# Patient Record
Sex: FEMALE | Race: OTHER | NOT HISPANIC OR LATINO | Employment: FULL TIME | URBAN - METROPOLITAN AREA
[De-identification: names, ages, dates, MRNs, and addresses within clinical notes are randomized per-mention and may not be internally consistent; named-entity substitution may affect disease eponyms.]

---

## 2017-01-25 ENCOUNTER — TELEPHONE (OUTPATIENT)
Dept: CARDIOLOGY | Facility: CLINIC | Age: 57
End: 2017-01-25

## 2017-01-25 RX ORDER — CARVEDILOL 25 MG/1
25 TABLET ORAL 2 TIMES DAILY WITH MEALS
Qty: 60 TABLET | Refills: 2 | Status: SHIPPED | OUTPATIENT
Start: 2017-01-25 | End: 2017-08-13 | Stop reason: SDUPTHER

## 2017-02-22 ENCOUNTER — CLINICAL SUPPORT NO REQUIREMENTS (OUTPATIENT)
Dept: CARDIOLOGY | Facility: CLINIC | Age: 57
End: 2017-02-22

## 2017-02-22 ENCOUNTER — TELEPHONE (OUTPATIENT)
Dept: CARDIOLOGY | Facility: CLINIC | Age: 57
End: 2017-02-22

## 2017-02-22 DIAGNOSIS — I42.9 CARDIOMYOPATHY (HCC): Primary | ICD-10-CM

## 2017-02-22 PROCEDURE — 93297 REM INTERROG DEV EVAL ICPMS: CPT | Performed by: INTERNAL MEDICINE

## 2017-02-22 PROCEDURE — 93295 DEV INTERROG REMOTE 1/2/MLT: CPT | Performed by: INTERNAL MEDICINE

## 2017-02-22 PROCEDURE — 93296 REM INTERROG EVL PM/IDS: CPT | Performed by: INTERNAL MEDICINE

## 2017-02-22 NOTE — TELEPHONE ENCOUNTER
Scheduled remote transmission received and reviewed. Presenting rhythm AS/Vs at 71 bpm. Automated testing shows normal AAI<=>DDD ICD function.  < 0.1% and AP 8/8%. No episodes since last interrogation.     However, pt's fluid index has crossed the threshold around 1/18/17. It returned baseline around 2/1/17, however is currently continuing to rise and has crossed the threshold again on 2/15/17. Thoracic impedance is also < baseline. Pt's 14 month hx does show a saw tooth pattern on the fluid index, however this past episode that crossed the threshold was the most above the threshold she has been.     I called her to further assess. She stated that the 1/18 episode was likely due to eating out and general dietary indiscretions. She states over the past few weeks she has been trying to exercise more and eat healthier. She states she is easily fatigued but has had no change in weight or SOB. She confirmed she is taking her carvedilol, spironolactone and furosemide as prescribed. I advised that if she became increasingly fatigued or had an increase in weight, edema or SOB to call Dr. Saldivar's office.     Dr. Saldivar, please advise if you would like anything else done.

## 2017-03-07 ENCOUNTER — TRANSCRIBE ORDERS (OUTPATIENT)
Dept: CARDIOLOGY | Facility: CLINIC | Age: 57
End: 2017-03-07

## 2017-03-07 ENCOUNTER — HOSPITAL ENCOUNTER (OUTPATIENT)
Dept: CARDIOLOGY | Facility: HOSPITAL | Age: 57
Discharge: HOME | End: 2017-03-07
Admitting: PHYSICIAN ASSISTANT

## 2017-03-07 ENCOUNTER — OFFICE VISIT (OUTPATIENT)
Dept: CARDIOLOGY | Facility: CLINIC | Age: 57
End: 2017-03-07

## 2017-03-07 ENCOUNTER — LAB (OUTPATIENT)
Dept: LAB | Facility: HOSPITAL | Age: 57
End: 2017-03-07

## 2017-03-07 ENCOUNTER — HOSPITAL ENCOUNTER (OUTPATIENT)
Dept: GENERAL RADIOLOGY | Facility: HOSPITAL | Age: 57
Discharge: HOME | End: 2017-03-07

## 2017-03-07 VITALS
SYSTOLIC BLOOD PRESSURE: 124 MMHG | WEIGHT: 225 LBS | HEART RATE: 83 BPM | DIASTOLIC BLOOD PRESSURE: 80 MMHG | OXYGEN SATURATION: 99 % | BODY MASS INDEX: 45.36 KG/M2 | HEIGHT: 59 IN

## 2017-03-07 DIAGNOSIS — Z95.810 AUTOMATIC IMPLANTABLE CARDIOVERTER-DEFIBRILLATOR IN SITU: ICD-10-CM

## 2017-03-07 DIAGNOSIS — R53.83 FATIGUE, UNSPECIFIED TYPE: ICD-10-CM

## 2017-03-07 DIAGNOSIS — I50.20 CONGESTIVE HEART FAILURE WITH LEFT VENTRICULAR SYSTOLIC DYSFUNCTION (HCC): ICD-10-CM

## 2017-03-07 DIAGNOSIS — R06.02 SHORTNESS OF BREATH: ICD-10-CM

## 2017-03-07 DIAGNOSIS — I50.20 CONGESTIVE HEART FAILURE WITH LEFT VENTRICULAR SYSTOLIC DYSFUNCTION (HCC): Primary | ICD-10-CM

## 2017-03-07 PROBLEM — I10 BENIGN ESSENTIAL HYPERTENSION: Status: ACTIVE | Noted: 2017-03-07

## 2017-03-07 PROBLEM — G47.33 OBSTRUCTIVE SLEEP APNEA SYNDROME: Status: ACTIVE | Noted: 2017-03-07

## 2017-03-07 PROBLEM — I25.5 GENERALIZED ISCHEMIC MYOCARDIAL DYSFUNCTION: Status: ACTIVE | Noted: 2017-03-07

## 2017-03-07 LAB
ALBUMIN SERPL-MCNC: 4.4 G/DL (ref 3.5–5.2)
ALBUMIN/GLOB SERPL: 1.3 G/DL
ALP SERPL-CCNC: 107 U/L (ref 39–117)
ALT SERPL W P-5'-P-CCNC: 77 U/L (ref 1–33)
ANION GAP SERPL CALCULATED.3IONS-SCNC: 14.7 MMOL/L
AST SERPL-CCNC: 39 U/L (ref 1–32)
BASOPHILS # BLD AUTO: 0.05 10*3/MM3 (ref 0–0.2)
BASOPHILS NFR BLD AUTO: 0.5 % (ref 0–1.5)
BILIRUB SERPL-MCNC: 0.5 MG/DL (ref 0.1–1.2)
BUN BLD-MCNC: 11 MG/DL (ref 6–20)
BUN/CREAT SERPL: 11 (ref 7–25)
CALCIUM SPEC-SCNC: 10.7 MG/DL (ref 8.6–10.5)
CHLORIDE SERPL-SCNC: 99 MMOL/L (ref 98–107)
CO2 SERPL-SCNC: 27.3 MMOL/L (ref 22–29)
CREAT BLD-MCNC: 1 MG/DL (ref 0.57–1)
DEPRECATED RDW RBC AUTO: 42.8 FL (ref 37–54)
EOSINOPHIL # BLD AUTO: 0.2 10*3/MM3 (ref 0–0.7)
EOSINOPHIL NFR BLD AUTO: 1.8 % (ref 0.3–6.2)
ERYTHROCYTE [DISTWIDTH] IN BLOOD BY AUTOMATED COUNT: 13.8 % (ref 11.7–13)
GFR SERPL CREATININE-BSD FRML MDRD: 58 ML/MIN/1.73
GLOBULIN UR ELPH-MCNC: 3.4 GM/DL
GLUCOSE BLD-MCNC: 128 MG/DL (ref 65–99)
HCT VFR BLD AUTO: 44.8 % (ref 35.6–45.5)
HGB BLD-MCNC: 14.6 G/DL (ref 11.9–15.5)
IMM GRANULOCYTES # BLD: 0.04 10*3/MM3 (ref 0–0.03)
IMM GRANULOCYTES NFR BLD: 0.4 % (ref 0–0.5)
LYMPHOCYTES # BLD AUTO: 4.67 10*3/MM3 (ref 0.9–4.8)
LYMPHOCYTES NFR BLD AUTO: 42.1 % (ref 19.6–45.3)
MCH RBC QN AUTO: 27.9 PG (ref 26.9–32)
MCHC RBC AUTO-ENTMCNC: 32.6 G/DL (ref 32.4–36.3)
MCV RBC AUTO: 85.5 FL (ref 80.5–98.2)
MONOCYTES # BLD AUTO: 0.83 10*3/MM3 (ref 0.2–1.2)
MONOCYTES NFR BLD AUTO: 7.5 % (ref 5–12)
NEUTROPHILS # BLD AUTO: 5.31 10*3/MM3 (ref 1.9–8.1)
NEUTROPHILS NFR BLD AUTO: 47.7 % (ref 42.7–76)
NT-PROBNP SERPL-MCNC: 137.1 PG/ML (ref 0–900)
PLATELET # BLD AUTO: 313 10*3/MM3 (ref 140–500)
PMV BLD AUTO: 11.2 FL (ref 6–12)
POTASSIUM BLD-SCNC: 3.9 MMOL/L (ref 3.5–5.2)
PROT SERPL-MCNC: 7.8 G/DL (ref 6–8.5)
RBC # BLD AUTO: 5.24 10*6/MM3 (ref 3.9–5.2)
SODIUM BLD-SCNC: 141 MMOL/L (ref 136–145)
WBC NRBC COR # BLD: 11.1 10*3/MM3 (ref 4.5–10.7)

## 2017-03-07 PROCEDURE — 83880 ASSAY OF NATRIURETIC PEPTIDE: CPT

## 2017-03-07 PROCEDURE — 93000 ELECTROCARDIOGRAM COMPLETE: CPT | Performed by: PHYSICIAN ASSISTANT

## 2017-03-07 PROCEDURE — 99214 OFFICE O/P EST MOD 30 MIN: CPT | Performed by: PHYSICIAN ASSISTANT

## 2017-03-07 PROCEDURE — 71020 HC CHEST PA AND LATERAL: CPT

## 2017-03-07 PROCEDURE — 85025 COMPLETE CBC W/AUTO DIFF WBC: CPT

## 2017-03-07 PROCEDURE — 80053 COMPREHEN METABOLIC PANEL: CPT

## 2017-03-07 PROCEDURE — 36415 COLL VENOUS BLD VENIPUNCTURE: CPT

## 2017-03-07 RX ORDER — DULAGLUTIDE 1.5 MG/.5ML
INJECTION, SOLUTION SUBCUTANEOUS
Refills: 0 | Status: ON HOLD | COMMUNITY
Start: 2017-02-16 | End: 2018-01-12

## 2017-03-07 RX ORDER — LISINOPRIL 2.5 MG/1
2.5 TABLET ORAL DAILY
Qty: 30 TABLET | Refills: 1 | Status: SHIPPED | OUTPATIENT
Start: 2017-03-07 | End: 2017-08-18 | Stop reason: SDUPTHER

## 2017-03-07 NOTE — PROGRESS NOTES
Date of Office Visit: 2017  Encounter Provider: JAN Gracia  Place of Service: [facility] CARDIOLOGY  Patient Name: Daisy Dent  :1960    Chief Complaint   Patient presents with   • Follow-up     fluid on lungs   :     HPI: Daisy Dent is a 56 y.o. female , new to me, who presents today for follow-up.  Old records a been obtained and reviewed by me.  She is a patient of Dr. Saldivar's with a past medical history significant for ischemic cardiomyopathy with her last known EF at 30-35%, diabetes mellitus type 2, hypertension, status post dual-chamber AICD, dyslipidemia, and coronary artery disease with prior LAD stent.  She was last in our office to see Dr. Saldivar on 11/15/2016.  At that visit, she was doing well.  She was stable from a heart failure standpoint as well as a coronary artery disease standpoint.  Dr. 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  Recommendations were to follow-up in 1 year or sooner if need her.  At that visit, her blood pressure was too low to add an ACE or an ARB.  She is here today because a remote device check showed that the patient's fluid index has crossed the threshold several times over the past month.   She is here today because she is concerned about her symptoms.  For about a month now, she has been extremely fatigued.  She feels like the fatigue is getting worse.  About 3 weeks ago, she started to go to the gym and walking on the treadmill so that she could try to lose weight.  She does weigh herself at home, and her weight has remained stable.  She really only fluctuates about 1 pound a day.  She feels like her shortness of breath is getting worse.  She did have some dietary indiscretions in January that she could attribute to the fluctuation in her fluid status.  However she recently has been really paying attention to  her sodium intake and eating more fruits and vegetables.  She did have a couple episodes of chest pain that she states were sharp and lasted for less than a second.  There was substernal.  She has not had any anginal type chest pain.  These have occurred at rest and not with exercise.  She feels like she can walk less than a city block before she gets short of breath.  If she takes it so, she does feel well.  She does have some swelling in her legs.  At times she has palpitations.  She states that prior to her second stent placement for thrombosis in her previous stent, her main complaint was fatigue.        Past Medical History   Diagnosis Date   • Abdominal pain    • Abnormal liver function test    • Acute bronchitis    • CAD (coronary artery disease)    • Colon polyp    • Congestive heart disease    • Cough    • Diabetes    • Diarrhea    • Gastroenteritis    • Health care maintenance    • Hyperlipidemia    • Hypertension    • IFG (impaired fasting glucose)    • Ischemic cardiomyopathy    • Myocardial infarction    • SHELLIE (obstructive sleep apnea)    • Sore throat    • Type 2 diabetes mellitus    • Vitamin D deficiency        Past Surgical History   Procedure Laterality Date   • Pacemaker implantation     • Tubal abdominal ligation         Social History     Social History   • Marital status:      Spouse name: N/A   • Number of children: N/A   • Years of education: N/A     Occupational History   • Not on file.     Social History Main Topics   • Smoking status: Former Smoker   • Smokeless tobacco: Not on file      Comment: CAFFINE USE   • Alcohol use 0.6 oz/week     1 Glasses of wine per week      Comment: occasionally   • Drug use: No   • Sexual activity: Defer     Other Topics Concern   • Not on file     Social History Narrative       Family History   Problem Relation Age of Onset   • Hypertension Father    • Colon cancer Father    • Heart attack Father    • Diabetes Mother    • Emphysema Mother    • Heart  disease Brother    • Heart attack Brother    • Heart disease Maternal Grandmother    • Heart disease Maternal Grandfather    • Heart disease Paternal Grandmother    • Heart disease Paternal Grandfather        Review of Systems   Constitution: Positive for malaise/fatigue. Negative for chills, fever, weight gain and weight loss.   HENT: Negative for ear pain, headaches, hearing loss, nosebleeds and sore throat.    Eyes: Negative for double vision, pain and visual disturbance.   Cardiovascular: Positive for dyspnea on exertion and leg swelling. Negative for chest pain, irregular heartbeat, near-syncope, orthopnea, palpitations, paroxysmal nocturnal dyspnea and syncope.   Respiratory: Positive for shortness of breath. Negative for cough, sleep disturbances due to breathing, snoring and wheezing.    Endocrine: Negative for cold intolerance, heat intolerance and polyuria.   Skin: Negative for itching and rash.   Musculoskeletal: Negative for joint pain, joint swelling and myalgias.   Gastrointestinal: Negative for abdominal pain, diarrhea, melena, nausea and vomiting.   Genitourinary: Negative for frequency, hematuria and hesitancy.   Neurological: Negative for excessive daytime sleepiness, light-headedness, numbness, paresthesias and seizures.   Psychiatric/Behavioral: Negative for altered mental status and depression.   Allergic/Immunologic: Negative.    All other systems reviewed and are negative.      No Known Allergies      Current Outpatient Prescriptions:   •  aspirin 81 MG tablet, Take 81 mg by mouth Daily., Disp: , Rfl:   •  atorvastatin (LIPITOR) 80 MG tablet, Take 1 tablet by mouth Daily., Disp: 30 tablet, Rfl: 11  •  carvedilol (COREG) 25 MG tablet, Take 1 tablet by mouth 2 (Two) Times a Day With Meals., Disp: 60 tablet, Rfl: 2  •  Cholecalciferol (VITAMIN D3) 2000 UNITS capsule, Take 2,000 Units by mouth Daily., Disp: , Rfl:   •  clopidogrel (PLAVIX) 75 MG tablet, Take 1 tablet by mouth Daily., Disp: 30  "tablet, Rfl: 11  •  furosemide (LASIX) 40 MG tablet, TAKE ONE TABLET BY MOUTH DAILY, Disp: 90 tablet, Rfl: 0  •  glucose blood test strip, daily., Disp: , Rfl:   •  metFORMIN (GLUCOPHAGE) 1000 MG tablet, Take 1,000 mg by mouth 2 (Two) Times a Day., Disp: , Rfl:   •  nitroglycerin (NITROSTAT) 0.4 MG SL tablet, Place 1 tablet under the tongue Every 5 (Five) Minutes As Needed for chest pain. Take no more than 3 doses in 15 minutes., Disp: 30 tablet, Rfl: 12  •  Omega-3 Fatty Acids (FISH OIL) 1200 MG capsule capsule, Take 1,200 mg by mouth Daily With Breakfast., Disp: , Rfl:   •  spironolactone (ALDACTONE) 25 MG tablet, TAKE ONE TABLET BY MOUTH DAILY, Disp: 90 tablet, Rfl: 0  •  TRULICITY 1.5 MG/0.5ML solution pen-injector, , Disp: , Rfl: 0  •  lisinopril (PRINIVIL,ZESTRIL) 2.5 MG tablet, Take 1 tablet by mouth Daily., Disp: 30 tablet, Rfl: 1     Objective:     Vitals:    03/07/17 1506 03/07/17 1521   BP: 124/80 124/80   BP Location: Right arm Left arm   Pulse: 83    SpO2: 99%    Weight: 225 lb (102 kg)    Height: 59\" (149.9 cm)      Body mass index is 45.44 kg/(m^2).    PHYSICAL EXAM:    Physical Exam   Constitutional: She is oriented to person, place, and time. She appears well-developed and well-nourished. No distress.   HENT:   Head: Normocephalic and atraumatic.   Eyes: Pupils are equal, round, and reactive to light.   Neck: No JVD present. No thyromegaly present.   Cardiovascular: Normal rate, regular rhythm, normal heart sounds and intact distal pulses.    No murmur heard.  Pulmonary/Chest: Effort normal. No respiratory distress. She has decreased breath sounds.   Abdominal: Soft. Bowel sounds are normal. She exhibits no distension. There is no splenomegaly or hepatomegaly. There is no tenderness.   Musculoskeletal: Normal range of motion. She exhibits no edema.   Neurological: She is alert and oriented to person, place, and time.   Skin: Skin is warm and dry. She is not diaphoretic. No erythema.   Psychiatric: " She has a normal mood and affect. Her behavior is normal. Judgment normal.         ECG 12 Lead  Date/Time: 3/7/2017 3:25 PM  Performed by: ALETHA RICKS.  Authorized by: ALETHA RICKS   Comparison: compared with previous ECG from 11/15/2016  Similar to previous ECG  Rhythm: sinus rhythm  Ectopy: PVCs  BPM: 83  Q waves: V1 and V2  Clinical impression: abnormal ECG  Comments: Indication: Coronary artery disease, status post stenting, CHF              Assessment:       Diagnosis Plan   1. Congestive heart failure with left ventricular systolic dysfunction  ECG 12 Lead    Adult Transthoracic Echo Complete    XR Chest 2 View    Comprehensive Metabolic Panel    CBC & Differential    proBNP    lisinopril (PRINIVIL,ZESTRIL) 2.5 MG tablet   2. Automatic implantable cardioverter-defibrillator in situ     3. Shortness of breath  Adult Transthoracic Echo Complete    XR Chest 2 View    Comprehensive Metabolic Panel    CBC & Differential    proBNP   4. Fatigue, unspecified type  Adult Transthoracic Echo Complete    Comprehensive Metabolic Panel    CBC & Differential    proBNP     Orders Placed This Encounter   Procedures   • XR Chest 2 View     Standing Status:   Future     Standing Expiration Date:   3/7/2018     Order Specific Question:   Reason for Exam:     Answer:   dyspnea     Order Specific Question:   Is the patient pregnant?     Answer:   No   • Comprehensive Metabolic Panel     Standing Status:   Future     Standing Expiration Date:   3/7/2018   • proBNP     Standing Status:   Future     Standing Expiration Date:   3/7/2018   • ECG 12 Lead     This order was created via procedure documentation   • Adult Transthoracic Echo Complete     Standing Status:   Future     Order Specific Question:   Reason for exam?     Answer:   Dyspnea     Order Specific Question:   Reason for exam?     Answer:   Heart Failure, Cardiomyopathy, or Hypertension     Order Specific Question:   Hypertension, Heart Failure, or Cardiomyopathy  specification?     Answer:   Known Heart Failure     Order Specific Question:   Change in clinical status, cardiac exam, or medical therapy?     Answer:   Yes          Plan:       1.  Heart Failure  Assessment  • NYHA class III-A - There is limitation of physical activity. The patient is comfortable at rest, but ordinary activity causes fatigue, palpitations or shortness of breath.  • ACE inhibitor prescribed  • Beta blocker prescribed  • Diuretics prescribed  • Left ventricular function is moderately reduced by qualitative assessment    Plan  • The patient has received heart failure education on the following topics: dietary sodium restriction, medication instructions, symptom management, physical activity, weight monitoring and minimizing alcohol intake  • The heart failure care plan was discussed with the patient today including: up-titrating HF medications    Subjective/Objective  • The patient reports dyspnea  • Physical exam findings positive for peripheral edema.   • Physical exam findings negative for elevated JVP and hepatomegaly.  • The patient has an ICD implant  • I do have concerns about this patient.  She has fluid fluctuations on her interrogation that have been going above the threshold several times over the past month.  She also has symptoms of fatigue and increased shortness of breath.  Fortunately, her weight is remaining stable.  It's been a while since she's had an echocardiogram.  I did discuss this with Dr. Tilley.  I'm going to check an echocardiogram, chest x-ray, CBC, CMP, and BNP.  The increase her Lasix to 40 mg twice a day for 1 week.  Her blood pressure is good today at 124/80 and she can now tolerate a small dose of ACE inhibitor.  I'm going to give her lisinopril 2.5 mg daily.  Going to have her come back and see Dr. Saldivar, her primary cardiologist, in 1 week.  Recommendations will be made pending the results of these tests.    2.  Coronary Artery Disease  Assessment  • The patient  "has no angina    Plan  • Lifestyle modifications discussed include adhering to a heart healthy diet and medication compliance    Subjective - Objective  • There is a history of past MI  • There has been a previous stent procedure using HUGO  • Current antiplatelet therapy includes aspirin 81 mg and clopidogrel 75 mg  • She did have some chest pains, however I do not think they were anginal in nature.  They were fleeting and sharp.  I am a little concerned about her recent fatigue, however.  Her main complaint prior to her last stent placement was \"bleeding and fatigue.  Her fatigue is not as bad this time, but it is somewhat concerning.  I'm going to check an echocardiogram to assess her heart function.  Continue current medical regimen.      As always, it has been a pleasure to participate in your patient's care.      Sincerely,         Karina Arteaga PA-C  "

## 2017-03-08 ENCOUNTER — HOSPITAL ENCOUNTER (OUTPATIENT)
Dept: CARDIOLOGY | Facility: HOSPITAL | Age: 57
Discharge: HOME | End: 2017-03-08
Admitting: PHYSICIAN ASSISTANT

## 2017-03-08 VITALS
BODY MASS INDEX: 45.36 KG/M2 | HEIGHT: 59 IN | DIASTOLIC BLOOD PRESSURE: 76 MMHG | WEIGHT: 225 LBS | SYSTOLIC BLOOD PRESSURE: 128 MMHG | HEART RATE: 72 BPM

## 2017-03-08 DIAGNOSIS — I50.20 CONGESTIVE HEART FAILURE WITH LEFT VENTRICULAR SYSTOLIC DYSFUNCTION (HCC): ICD-10-CM

## 2017-03-08 DIAGNOSIS — R53.83 FATIGUE, UNSPECIFIED TYPE: ICD-10-CM

## 2017-03-08 DIAGNOSIS — R06.02 SHORTNESS OF BREATH: ICD-10-CM

## 2017-03-08 LAB
ASCENDING AORTA: 3 CM
BH CV ECHO MEAS - ACS: 2.1 CM
BH CV ECHO MEAS - AO MAX PG (FULL): 4.4 MMHG
BH CV ECHO MEAS - AO MAX PG: 6.7 MMHG
BH CV ECHO MEAS - AO MEAN PG (FULL): 2.6 MMHG
BH CV ECHO MEAS - AO MEAN PG: 3.7 MMHG
BH CV ECHO MEAS - AO ROOT AREA (BSA CORRECTED): 1.7
BH CV ECHO MEAS - AO ROOT AREA: 8.7 CM^2
BH CV ECHO MEAS - AO ROOT DIAM: 3.3 CM
BH CV ECHO MEAS - AO V2 MAX: 129.1 CM/SEC
BH CV ECHO MEAS - AO V2 MEAN: 88.8 CM/SEC
BH CV ECHO MEAS - AO V2 VTI: 30.3 CM
BH CV ECHO MEAS - ASC AORTA: 3 CM
BH CV ECHO MEAS - AVA(I,A): 1.5 CM^2
BH CV ECHO MEAS - AVA(I,D): 1.5 CM^2
BH CV ECHO MEAS - AVA(V,A): 2 CM^2
BH CV ECHO MEAS - AVA(V,D): 2 CM^2
BH CV ECHO MEAS - BSA(HAYCOCK): 2.1 M^2
BH CV ECHO MEAS - BSA: 1.9 M^2
BH CV ECHO MEAS - BZI_BMI: 45.4 KILOGRAMS/M^2
BH CV ECHO MEAS - BZI_METRIC_HEIGHT: 149.9 CM
BH CV ECHO MEAS - BZI_METRIC_WEIGHT: 102.1 KG
BH CV ECHO MEAS - CONTRAST EF (2CH): 38.1 ML/M^2
BH CV ECHO MEAS - CONTRAST EF 4CH: 43.7 ML/M^2
BH CV ECHO MEAS - EDV(MOD-SP2): 105 ML
BH CV ECHO MEAS - EDV(MOD-SP4): 103 ML
BH CV ECHO MEAS - EDV(TEICH): 207.4 ML
BH CV ECHO MEAS - EF(CUBED): 38.5 %
BH CV ECHO MEAS - EF(MOD-SP2): 38.1 %
BH CV ECHO MEAS - EF(MOD-SP4): 43.7 %
BH CV ECHO MEAS - EF(TEICH): 31 %
BH CV ECHO MEAS - ESV(MOD-SP2): 65 ML
BH CV ECHO MEAS - ESV(MOD-SP4): 58 ML
BH CV ECHO MEAS - ESV(TEICH): 143.1 ML
BH CV ECHO MEAS - FS: 15 %
BH CV ECHO MEAS - IVS/LVPW: 0.65
BH CV ECHO MEAS - IVSD: 0.65 CM
BH CV ECHO MEAS - LAT PEAK E' VEL: 8 CM/SEC
BH CV ECHO MEAS - LV DIASTOLIC VOL/BSA (35-75): 53.1 ML/M^2
BH CV ECHO MEAS - LV MASS(C)D: 215.7 GRAMS
BH CV ECHO MEAS - LV MASS(C)DI: 111.3 GRAMS/M^2
BH CV ECHO MEAS - LV MAX PG: 2.3 MMHG
BH CV ECHO MEAS - LV MEAN PG: 1.1 MMHG
BH CV ECHO MEAS - LV SYSTOLIC VOL/BSA (12-30): 29.9 ML/M^2
BH CV ECHO MEAS - LV V1 MAX: 75.4 CM/SEC
BH CV ECHO MEAS - LV V1 MEAN: 45.6 CM/SEC
BH CV ECHO MEAS - LV V1 VTI: 13.6 CM
BH CV ECHO MEAS - LVIDD: 6.4 CM
BH CV ECHO MEAS - LVIDS: 5.4 CM
BH CV ECHO MEAS - LVLD AP2: 8.4 CM
BH CV ECHO MEAS - LVLD AP4: 8.6 CM
BH CV ECHO MEAS - LVLS AP2: 8 CM
BH CV ECHO MEAS - LVLS AP4: 7.3 CM
BH CV ECHO MEAS - LVOT AREA (M): 3.5 CM^2
BH CV ECHO MEAS - LVOT AREA: 3.4 CM^2
BH CV ECHO MEAS - LVOT DIAM: 2.1 CM
BH CV ECHO MEAS - LVPWD: 1 CM
BH CV ECHO MEAS - MED PEAK E' VEL: 8 CM/SEC
BH CV ECHO MEAS - MR MAX PG: 58.2 MMHG
BH CV ECHO MEAS - MR MAX VEL: 381.6 CM/SEC
BH CV ECHO MEAS - MV A DUR: 0.1 SEC
BH CV ECHO MEAS - MV A MAX VEL: 84.4 CM/SEC
BH CV ECHO MEAS - MV DEC SLOPE: 302.5 CM/SEC^2
BH CV ECHO MEAS - MV DEC TIME: 0.16 SEC
BH CV ECHO MEAS - MV E MAX VEL: 52.9 CM/SEC
BH CV ECHO MEAS - MV E/A: 0.63
BH CV ECHO MEAS - MV MAX PG: 3.5 MMHG
BH CV ECHO MEAS - MV MEAN PG: 1.7 MMHG
BH CV ECHO MEAS - MV P1/2T MAX VEL: 57.2 CM/SEC
BH CV ECHO MEAS - MV P1/2T: 55.4 MSEC
BH CV ECHO MEAS - MV V2 MAX: 93.4 CM/SEC
BH CV ECHO MEAS - MV V2 MEAN: 61.4 CM/SEC
BH CV ECHO MEAS - MV V2 VTI: 24.9 CM
BH CV ECHO MEAS - MVA P1/2T LCG: 3.8 CM^2
BH CV ECHO MEAS - MVA(P1/2T): 4 CM^2
BH CV ECHO MEAS - MVA(VTI): 1.9 CM^2
BH CV ECHO MEAS - PA MAX PG (FULL): 1.2 MMHG
BH CV ECHO MEAS - PA MAX PG: 2.9 MMHG
BH CV ECHO MEAS - PA V2 MAX: 85.8 CM/SEC
BH CV ECHO MEAS - PULM A REVS DUR: 0.1 SEC
BH CV ECHO MEAS - PULM A REVS VEL: 25.2 CM/SEC
BH CV ECHO MEAS - PULM DIAS VEL: 31.7 CM/SEC
BH CV ECHO MEAS - PULM S/D: 1.4
BH CV ECHO MEAS - PULM SYS VEL: 45.4 CM/SEC
BH CV ECHO MEAS - PVA(V,A): 2.3 CM^2
BH CV ECHO MEAS - PVA(V,D): 2.3 CM^2
BH CV ECHO MEAS - QP/QS: 0.85
BH CV ECHO MEAS - RV MAX PG: 1.8 MMHG
BH CV ECHO MEAS - RV MEAN PG: 0.98 MMHG
BH CV ECHO MEAS - RV V1 MAX: 66.5 CM/SEC
BH CV ECHO MEAS - RV V1 MEAN: 45.2 CM/SEC
BH CV ECHO MEAS - RV V1 VTI: 13.5 CM
BH CV ECHO MEAS - RVOT AREA: 2.9 CM^2
BH CV ECHO MEAS - RVOT DIAM: 1.9 CM
BH CV ECHO MEAS - SI(AO): 135.3 ML/M^2
BH CV ECHO MEAS - SI(CUBED): 51.7 ML/M^2
BH CV ECHO MEAS - SI(LVOT): 24.1 ML/M^2
BH CV ECHO MEAS - SI(MOD-SP2): 20.6 ML/M^2
BH CV ECHO MEAS - SI(MOD-SP4): 23.2 ML/M^2
BH CV ECHO MEAS - SI(TEICH): 33.2 ML/M^2
BH CV ECHO MEAS - SUP REN AO DIAM: 1.4 CM
BH CV ECHO MEAS - SV(AO): 262.3 ML
BH CV ECHO MEAS - SV(CUBED): 100.2 ML
BH CV ECHO MEAS - SV(LVOT): 46.6 ML
BH CV ECHO MEAS - SV(MOD-SP2): 40 ML
BH CV ECHO MEAS - SV(MOD-SP4): 45 ML
BH CV ECHO MEAS - SV(RVOT): 39.6 ML
BH CV ECHO MEAS - SV(TEICH): 64.3 ML
BH CV ECHO MEAS - TAPSE (>1.6): 2.5 CM2
BH CV ECHO MEAS - TR MAX VEL: 218.4 CM/SEC
BH CV XLRA - RV BASE: 3.2 CM
BH CV XLRA - TDI S': 11 CM/SEC
E/E' RATIO: 7
LEFT ATRIUM VOLUME INDEX: 19 ML/M2
LEFT ATRIUM VOLUME: 37 CM3
SINUS: 3 CM
STJ: 2.9 CM

## 2017-03-08 PROCEDURE — 93306 TTE W/DOPPLER COMPLETE: CPT | Performed by: INTERNAL MEDICINE

## 2017-03-08 PROCEDURE — C8929 TTE W OR WO FOL WCON,DOPPLER: HCPCS

## 2017-03-08 PROCEDURE — 25010000002 PERFLUTREN (DEFINITY) 8.476 MG IN SODIUM CHLORIDE 10 ML INJECTION: Performed by: PHYSICIAN ASSISTANT

## 2017-03-08 RX ADMIN — PERFLUTREN 1.5 ML: 6.52 INJECTION, SUSPENSION INTRAVENOUS at 09:18

## 2017-03-09 ENCOUNTER — TELEPHONE (OUTPATIENT)
Dept: CARDIOLOGY | Facility: CLINIC | Age: 57
End: 2017-03-09

## 2017-03-09 DIAGNOSIS — I50.20 CONGESTIVE HEART FAILURE WITH LEFT VENTRICULAR SYSTOLIC DYSFUNCTION (HCC): Primary | ICD-10-CM

## 2017-03-09 RX ORDER — POTASSIUM CHLORIDE 750 MG/1
10 TABLET, FILM COATED, EXTENDED RELEASE ORAL DAILY
Qty: 7 TABLET | Refills: 0 | Status: SHIPPED | OUTPATIENT
Start: 2017-03-09 | End: 2017-11-15 | Stop reason: SDUPTHER

## 2017-03-09 RX ORDER — FUROSEMIDE 40 MG/1
40 TABLET ORAL DAILY
Qty: 7 TABLET | Refills: 0 | Status: SHIPPED | OUTPATIENT
Start: 2017-03-09 | End: 2017-11-15 | Stop reason: SDUPTHER

## 2017-03-09 NOTE — TELEPHONE ENCOUNTER
I talked to the patient and informed her of the results of her echocardiogram.  Her potassium was 3.9 when I last checked it and before I increased her Lasix.  I'm going to give her 10 mEq of potassium to take only while she is doubled up on her Lasix.  I'm also going to have her come back and check another BMP prior to her visit with Dr. Saldivar next week.

## 2017-03-14 ENCOUNTER — OFFICE VISIT (OUTPATIENT)
Dept: CARDIOLOGY | Facility: CLINIC | Age: 57
End: 2017-03-14

## 2017-03-14 ENCOUNTER — LAB (OUTPATIENT)
Dept: LAB | Facility: HOSPITAL | Age: 57
End: 2017-03-14

## 2017-03-14 VITALS
HEIGHT: 59 IN | BODY MASS INDEX: 44.96 KG/M2 | DIASTOLIC BLOOD PRESSURE: 70 MMHG | SYSTOLIC BLOOD PRESSURE: 126 MMHG | WEIGHT: 223 LBS | HEART RATE: 79 BPM

## 2017-03-14 DIAGNOSIS — I50.20 CONGESTIVE HEART FAILURE WITH LEFT VENTRICULAR SYSTOLIC DYSFUNCTION (HCC): ICD-10-CM

## 2017-03-14 DIAGNOSIS — R07.2 PRECORDIAL PAIN: ICD-10-CM

## 2017-03-14 DIAGNOSIS — E11.9 DIABETES MELLITUS TYPE 2, NONINSULIN DEPENDENT (HCC): ICD-10-CM

## 2017-03-14 DIAGNOSIS — R53.83 FATIGUE, UNSPECIFIED TYPE: ICD-10-CM

## 2017-03-14 DIAGNOSIS — I10 BENIGN ESSENTIAL HYPERTENSION: ICD-10-CM

## 2017-03-14 DIAGNOSIS — I25.10 CHRONIC CORONARY ARTERY DISEASE: ICD-10-CM

## 2017-03-14 DIAGNOSIS — E78.5 HYPERLIPIDEMIA, UNSPECIFIED HYPERLIPIDEMIA TYPE: Primary | ICD-10-CM

## 2017-03-14 DIAGNOSIS — I21.09 ACUTE MYOCARDIAL INFARCTION OF ANTERIOR WALL (HCC): ICD-10-CM

## 2017-03-14 DIAGNOSIS — I42.9 CARDIOMYOPATHY (HCC): ICD-10-CM

## 2017-03-14 DIAGNOSIS — Z95.810 AUTOMATIC IMPLANTABLE CARDIOVERTER-DEFIBRILLATOR IN SITU: ICD-10-CM

## 2017-03-14 DIAGNOSIS — G47.33 OBSTRUCTIVE SLEEP APNEA SYNDROME: ICD-10-CM

## 2017-03-14 LAB
ANION GAP SERPL CALCULATED.3IONS-SCNC: 17.1 MMOL/L
BUN BLD-MCNC: 11 MG/DL (ref 6–20)
BUN/CREAT SERPL: 11.1 (ref 7–25)
CALCIUM SPEC-SCNC: 9.8 MG/DL (ref 8.6–10.5)
CHLORIDE SERPL-SCNC: 98 MMOL/L (ref 98–107)
CO2 SERPL-SCNC: 23.9 MMOL/L (ref 22–29)
CREAT BLD-MCNC: 0.99 MG/DL (ref 0.57–1)
GFR SERPL CREATININE-BSD FRML MDRD: 58 ML/MIN/1.73
GLUCOSE BLD-MCNC: 179 MG/DL (ref 65–99)
POTASSIUM BLD-SCNC: 4 MMOL/L (ref 3.5–5.2)
SODIUM BLD-SCNC: 139 MMOL/L (ref 136–145)

## 2017-03-14 PROCEDURE — 99214 OFFICE O/P EST MOD 30 MIN: CPT | Performed by: INTERNAL MEDICINE

## 2017-03-14 PROCEDURE — 36415 COLL VENOUS BLD VENIPUNCTURE: CPT

## 2017-03-14 PROCEDURE — 93000 ELECTROCARDIOGRAM COMPLETE: CPT | Performed by: INTERNAL MEDICINE

## 2017-03-14 PROCEDURE — 80048 BASIC METABOLIC PNL TOTAL CA: CPT

## 2017-03-14 NOTE — PROGRESS NOTES
Subjective:     Encounter Date:03/14/2017      Patient ID: Daisy Dent is a 56 y.o. female.    Chief Complaint:  Coronary Artery Disease   Symptoms include chest pain. Pertinent negatives include no dizziness, leg swelling, muscle weakness, palpitations or shortness of breath.   Hypertension   Associated symptoms include chest pain and malaise/fatigue. Pertinent negatives include no headaches, orthopnea, palpitations, PND or shortness of breath.        The patient is a 56-year-old female with a history of ischemic cardiomyopathy.  Her last ejection fraction was about 40-45%, diabetes mellitus type 2, and hypertension, status post dual chamber AICD, and dyslipidemia, coronary artery disease with prior anterior MI and LAD stent placement who presents for a followup. I saw the patient initially in 11/2015 when she presented to [facility] care.  Prior to that, she was followed by Dr. Ayala with [facility].   Prior cardiac history included a non-ST-elevation MI in 07/2006, at which time she received a Cypher 3.0 x 23 mm stent to her mid-LAD.  Unfortunately, she presented in 10/2009 with a late in-stent thrombosis and required repeat stenting of her mid-LAD in addition to a stent placement of her left circumflex artery.   Her last cardiac cath since then was in 2010 that was reportedly unremarkable.  Following her MI in 2009, she developed ischemic cardiomyopathy and ultimately underwent AICD placement in 07/2010.  In 07/2015 she was admitted and underwent a repeat echocardiogram that was unremarkable with no significant changes in her left ventricle function.  She also had a PET stress test at that time that showed anterior infarct with minimal elva-infarct ischemic.  She has also been diagnosed with diabetes mellitus type 2 that is followed by endocrinology, and sleep apnea which she has compliant with a CPAP.    When I saw her last she was doing well in the office.  She had an interrogation  of her device that revealed she was a little bit above her OptiVol threshold.    Since I saw her last, her remote interrogation of her device continued to show her fluid index had crossed the threshold several times over the prior month.  She ended up following up with Karina Arteaga regarding this.  She complained to her that she was having more fatigue than normal.  She has decided to start working out at the gym about four weeks ago, and reports that this did not really make a difference.  She denies any change in her weight.  She has described to her very brief episodes of chest discomfort that only lasted a second.  Those episodes occurred at rest.   She ended up undergoing an echocardiogram around that time that showed her ejection fraction was fairly stable at around 40-45%.  She, otherwise, had no significant valvular disease.       Today, she presents for a one week followup.  In addition to her fatigue, she now tells me that when she exercises on a treadmill that sometimes she gets a discomfort in her chest.   Initially, she told me this was a sharp like pain, but when pressed further described it more as a heaviness.  Usually, when the symptoms occur she would have to stop and rest.   This does not occur every time she exercises.    She is quite concerned about her fatigue.   She reports she has been compliant with her CPAP.  She has had her thyroid function checked recently, which has been within normal limits. She does admit she has been under a lot of stress lately at work.           Review of Systems   Constitution: Positive for malaise/fatigue. Negative for weakness.   HENT: Negative for headaches, hearing loss, hoarse voice, nosebleeds and sore throat.    Eyes: Negative for pain.   Cardiovascular: Positive for chest pain. Negative for claudication, cyanosis, dyspnea on exertion, irregular heartbeat, leg swelling, near-syncope, orthopnea, palpitations, paroxysmal nocturnal dyspnea and syncope.    Respiratory: Negative for shortness of breath and snoring.    Endocrine: Negative for cold intolerance, heat intolerance, polydipsia, polyphagia and polyuria.   Skin: Negative for itching and rash.   Musculoskeletal: Negative for arthritis, falls, joint pain, joint swelling, muscle cramps, muscle weakness and myalgias.   Gastrointestinal: Negative for constipation, diarrhea, dysphagia, heartburn, hematemesis, hematochezia, melena, nausea and vomiting.   Genitourinary: Negative for frequency, hematuria and hesitancy.   Neurological: Negative for excessive daytime sleepiness, dizziness, light-headedness and numbness.   Psychiatric/Behavioral: Negative for depression. The patient is not nervous/anxious.           Current Outpatient Prescriptions:   •  aspirin 81 MG tablet, Take 81 mg by mouth Daily., Disp: , Rfl:   •  atorvastatin (LIPITOR) 80 MG tablet, Take 1 tablet by mouth Daily., Disp: 30 tablet, Rfl: 11  •  carvedilol (COREG) 25 MG tablet, Take 1 tablet by mouth 2 (Two) Times a Day With Meals., Disp: 60 tablet, Rfl: 2  •  Cholecalciferol (VITAMIN D3) 2000 UNITS capsule, Take 2,000 Units by mouth Daily., Disp: , Rfl:   •  clopidogrel (PLAVIX) 75 MG tablet, Take 1 tablet by mouth Daily., Disp: 30 tablet, Rfl: 11  •  furosemide (LASIX) 40 MG tablet, TAKE ONE TABLET BY MOUTH DAILY, Disp: 90 tablet, Rfl: 0  •  furosemide (LASIX) 40 MG tablet, Take 1 tablet by mouth Daily., Disp: 7 tablet, Rfl: 0  •  glucose blood test strip, daily., Disp: , Rfl:   •  lisinopril (PRINIVIL,ZESTRIL) 2.5 MG tablet, Take 1 tablet by mouth Daily., Disp: 30 tablet, Rfl: 1  •  metFORMIN (GLUCOPHAGE) 1000 MG tablet, Take 1,000 mg by mouth 2 (Two) Times a Day., Disp: , Rfl:   •  nitroglycerin (NITROSTAT) 0.4 MG SL tablet, Place 1 tablet under the tongue Every 5 (Five) Minutes As Needed for chest pain. Take no more than 3 doses in 15 minutes., Disp: 30 tablet, Rfl: 12  •  Omega-3 Fatty Acids (FISH OIL) 1200 MG capsule capsule, Take 1,200 mg by  "mouth Daily With Breakfast., Disp: , Rfl:   •  potassium chloride (K-DUR) 10 MEQ CR tablet, Take 1 tablet by mouth Daily., Disp: 7 tablet, Rfl: 0  •  spironolactone (ALDACTONE) 25 MG tablet, TAKE ONE TABLET BY MOUTH DAILY, Disp: 90 tablet, Rfl: 0  •  TRULICITY 1.5 MG/0.5ML solution pen-injector, , Disp: , Rfl: 0    Past Medical History   Diagnosis Date   • Abdominal pain    • Abnormal liver function test    • Acute bronchitis    • CAD (coronary artery disease)    • Colon polyp    • Congestive heart disease    • Cough    • Diabetes    • Diarrhea    • Gastroenteritis    • Health care maintenance    • Hyperlipidemia    • Hypertension    • IFG (impaired fasting glucose)    • Ischemic cardiomyopathy    • Myocardial infarction    • SHELLIE (obstructive sleep apnea)    • Sore throat    • Type 2 diabetes mellitus    • Vaginal yeast infection    • Vitamin D deficiency      Past Surgical History   Procedure Laterality Date   • Pacemaker implantation     • Tubal abdominal ligation       Family History   Problem Relation Age of Onset   • Hypertension Father    • Colon cancer Father    • Heart attack Father    • Diabetes Mother    • Emphysema Mother    • Heart disease Brother    • Heart attack Brother    • Heart disease Maternal Grandmother    • Heart disease Maternal Grandfather    • Heart disease Paternal Grandmother    • Heart disease Paternal Grandfather      Social History   Substance Use Topics   • Smoking status: Former Smoker   • Smokeless tobacco: None      Comment: caffeine use   • Alcohol use 0.6 oz/week     1 Glasses of wine per week      Comment: occasionally           ECG 12 Lead  Date/Time: 3/14/2017 5:50 PM  Performed by: AMY CASTANEDA  Authorized by: AMY CASTANEDA   Comparison: compared with previous ECG   Similar to previous ECG  Rhythm: sinus rhythm  Q waves: V1, V2 and V3                 Objective:         Visit Vitals   • /70 (BP Location: Right arm, Patient Position: Sitting)   • Pulse 79   • Ht 59\" " (149.9 cm)   • Wt 223 lb (101 kg)   • BMI 45.04 kg/m2          Physical Exam   Constitutional: She is oriented to person, place, and time. She appears well-developed and well-nourished.   HENT:   Head: Normocephalic and atraumatic.   Eyes: Conjunctivae, EOM and lids are normal. Pupils are equal, round, and reactive to light.   Neck: Normal range of motion and full passive range of motion without pain. Neck supple. No JVD present. Carotid bruit is not present.   Cardiovascular: Normal rate, regular rhythm, S1 normal and S2 normal.  Exam reveals no S3 and no S4.    No murmur heard.  Pulses:       Radial pulses are 2+ on the right side, and 2+ on the left side.   No bilateral lower extremity edema   Pulmonary/Chest: Effort normal and breath sounds normal. She has no rales.   Abdominal: Soft. Normal appearance. She exhibits no ascites. There is no hepatomegaly.   Lymphadenopathy:     She has no cervical adenopathy.   Neurological: She is alert and oriented to person, place, and time.   Skin: Skin is warm, dry and intact.   Psychiatric: She has a normal mood and affect.       Lab Review:       Assessment:          Diagnosis Plan   1. Hyperlipidemia, unspecified hyperlipidemia type     2. Cardiomyopathy     3. Automatic implantable cardioverter-defibrillator in situ     4. Chronic coronary artery disease  Stress Test With Pet Myocardial Perfusion (Multi-Study)   5. Acute myocardial infarction of anterior wall     6. Precordial pain  Stress Test With Pet Myocardial Perfusion (Multi-Study)   7. Diabetes mellitus type 2, noninsulin dependent     8. Obstructive sleep apnea syndrome     9. Congestive heart failure with left ventricular systolic dysfunction     10. Benign essential hypertension            Plan:         1. Fatigue/chest discomfort.  I am having a hard time figuring out what the cause of her fatigue is.  But she is having some chest heaviness with exercise.  Her recent echocardiogram showed no significant change  in her LV function.  At this point we will go ahead and proceed with a PET stress test to see if she has any new ischemia.  If this is unremarkable, then I think lifestyle changes including continued exercise, management of her stress may need to be pursued in order to see if this will help with her symptoms.   2. Ischemic cardiomyopathy.  Her ejection fraction is 40-45%.  She was started on lisinopril by Karina Arteaga last week and the patient appears to be tolerating this well.  Additionally she is on carvedilol and a stable dose of furosemide.    3. Coronary artery disease.  She has had prior stents to both her LAD and left circumflex with a late in-stent thrombosis of her LAD stent resulting in an anterior myocardial infarction.  She should remain on aspirin and clopidogrel indefinitely as long as she is able to tolerate.    4. Status post dual chamber AICD.   5. Dyslipidemia.  She is on high dose atorvastatin for this and is followed by her endocrinologist.   6. Hypertension.  Her blood pressures are well controlled.   7. Diabetes mellitus type 2.  Followed by endocrinology.   8. Obstructive sleep apnea.  She is compliant with use of her CPAP.      We will call the patient with the results of her stress test.  Further workup, management and followup will be based on those results.       Coronary Artery Disease  Assessment  • The patient has CCS class I - angina only during strenuous or prolonged physical activity  • The patient has stable angina     Plan  • Lifestyle modifications discussed include adhering to a heart healthy diet, avoidance of tobacco products, maintenance of a healthy weight, medication compliance, regular exercise and regular monitoring of cholesterol and blood pressure    Subjective - Objective  • There has been a previous stent procedure using HUGO 7/2006, 10/2009  • Current antiplatelet therapy includes aspirin 81 mg and clopidogrel 75 mg      Heart Failure  Assessment  • NYHA class II -  There is slight limitation of physical activity. The patient is comfortable at rest, but physical activity results in fatigue, palpitations or shortness of breath.  • ACE inhibitor prescribed  • Beta blocker prescribed  • Diuretics prescribed  • Angiotensin receptor blocker (ARB) not prescribed for medical reasons  • Calcium channel blockers not prescribed  • Left ventricular function is mildly reduced by qualitative assessment    Plan  • The heart failure care plan was discussed with the patient today including: continuing the current program    Subjective/Objective    • Physical exam findings negative for rales, peripheral edema, elevated JVP, S3 gallop, S4 gallop, hepatomegaly and ascites.

## 2017-03-20 RX ORDER — FUROSEMIDE 40 MG/1
TABLET ORAL
Qty: 90 TABLET | Refills: 0 | Status: SHIPPED | OUTPATIENT
Start: 2017-03-20 | End: 2017-11-15

## 2017-03-20 RX ORDER — SPIRONOLACTONE 25 MG/1
TABLET ORAL
Qty: 90 TABLET | Refills: 0 | Status: SHIPPED | OUTPATIENT
Start: 2017-03-20 | End: 2017-06-21 | Stop reason: SDUPTHER

## 2017-05-24 ENCOUNTER — CLINICAL SUPPORT NO REQUIREMENTS (OUTPATIENT)
Dept: CARDIOLOGY | Facility: CLINIC | Age: 57
End: 2017-05-24

## 2017-05-24 DIAGNOSIS — I50.22 CHRONIC SYSTOLIC HEART FAILURE (HCC): ICD-10-CM

## 2017-05-24 DIAGNOSIS — I42.9 CARDIOMYOPATHY (HCC): Primary | ICD-10-CM

## 2017-05-24 PROCEDURE — 93283 PRGRMG EVAL IMPLANTABLE DFB: CPT | Performed by: INTERNAL MEDICINE

## 2017-05-24 PROCEDURE — 93290 INTERROG DEV EVAL ICPMS IP: CPT | Performed by: INTERNAL MEDICINE

## 2017-06-21 RX ORDER — FUROSEMIDE 40 MG/1
TABLET ORAL
Qty: 90 TABLET | Refills: 0 | Status: SHIPPED | OUTPATIENT
Start: 2017-06-21 | End: 2017-11-15

## 2017-06-21 RX ORDER — SPIRONOLACTONE 25 MG/1
TABLET ORAL
Qty: 90 TABLET | Refills: 0 | Status: SHIPPED | OUTPATIENT
Start: 2017-06-21 | End: 2017-10-11 | Stop reason: SDUPTHER

## 2017-08-14 RX ORDER — CARVEDILOL 25 MG/1
TABLET ORAL
Qty: 180 TABLET | Refills: 0 | Status: SHIPPED | OUTPATIENT
Start: 2017-08-14 | End: 2017-11-15 | Stop reason: SDUPTHER

## 2017-08-18 ENCOUNTER — TELEPHONE (OUTPATIENT)
Dept: CARDIOLOGY | Facility: CLINIC | Age: 57
End: 2017-08-18

## 2017-08-18 DIAGNOSIS — I50.20 CONGESTIVE HEART FAILURE WITH LEFT VENTRICULAR SYSTOLIC DYSFUNCTION (HCC): ICD-10-CM

## 2017-08-18 NOTE — TELEPHONE ENCOUNTER
It sounds like vertigo.  I would have her start with PCP and we can see her if Dr. Kathleen doesn't think it is vertigo.  thanks

## 2017-08-18 NOTE — TELEPHONE ENCOUNTER
08/18/17  9:59 AM  Levymyleneaysha Dent  1960    Home Phone [phone]     Ms. Dent reports two days of dizziness that occurred a couple of weeks ago and returned today. She states the dizziness is constant and is not made worse with position change. She reports that it makes her feel that she can only sit still and not move. She describes the sensation at the room spinning.     She denies chest pain or SOA. She has not measured BP/ HR.     Does she need to be seen or does she need to see PCP?    Carli FISHER RN

## 2017-08-21 RX ORDER — LISINOPRIL 2.5 MG/1
TABLET ORAL
Qty: 30 TABLET | Refills: 3 | Status: SHIPPED | OUTPATIENT
Start: 2017-08-21 | End: 2017-11-15 | Stop reason: SDUPTHER

## 2017-08-23 ENCOUNTER — CLINICAL SUPPORT NO REQUIREMENTS (OUTPATIENT)
Dept: CARDIOLOGY | Facility: CLINIC | Age: 57
End: 2017-08-23

## 2017-08-23 DIAGNOSIS — I42.9 CARDIOMYOPATHY (HCC): Primary | ICD-10-CM

## 2017-08-23 PROCEDURE — 93297 REM INTERROG DEV EVAL ICPMS: CPT | Performed by: INTERNAL MEDICINE

## 2017-08-23 PROCEDURE — 93295 DEV INTERROG REMOTE 1/2/MLT: CPT | Performed by: INTERNAL MEDICINE

## 2017-08-23 PROCEDURE — 93296 REM INTERROG EVL PM/IDS: CPT | Performed by: INTERNAL MEDICINE

## 2017-08-24 ENCOUNTER — HOSPITAL ENCOUNTER (OUTPATIENT)
Dept: CARDIOLOGY | Facility: HOSPITAL | Age: 57
Discharge: HOME | End: 2017-08-24
Attending: INTERNAL MEDICINE | Admitting: INTERNAL MEDICINE

## 2017-08-24 DIAGNOSIS — R07.2 PRECORDIAL PAIN: ICD-10-CM

## 2017-08-24 DIAGNOSIS — I25.10 CHRONIC CORONARY ARTERY DISEASE: ICD-10-CM

## 2017-08-24 LAB
BH CV NUCLEAR PRIOR STUDY: 3
BH CV STRESS BP STAGE 1: NORMAL
BH CV STRESS COMMENTS STAGE 1: NORMAL
BH CV STRESS DOSE REGADENOSON STAGE 1: 0.4
BH CV STRESS DURATION MIN STAGE 1: 0
BH CV STRESS DURATION SEC STAGE 1: 15
BH CV STRESS HR STAGE 1: 105
BH CV STRESS PROTOCOL 1: NORMAL
BH CV STRESS RECOVERY BP: NORMAL MMHG
BH CV STRESS RECOVERY HR: 80 BPM
BH CV STRESS STAGE 1: 1
LV EF NUC BP: 54 %
MAXIMAL PREDICTED HEART RATE: 165 BPM
PERCENT MAX PREDICTED HR: 63.64 %
STRESS BASELINE BP: NORMAL MMHG
STRESS BASELINE HR: 63 BPM
STRESS PERCENT HR: 75 %
STRESS POST EXERCISE DUR SEC: 15 SEC
STRESS POST PEAK BP: NORMAL MMHG
STRESS POST PEAK HR: 105 BPM
STRESS TARGET HR: 140 BPM

## 2017-08-24 PROCEDURE — 25010000002 REGADENOSON 0.4 MG/5ML SOLUTION: Performed by: INTERNAL MEDICINE

## 2017-08-24 PROCEDURE — 93018 CV STRESS TEST I&R ONLY: CPT | Performed by: INTERNAL MEDICINE

## 2017-08-24 PROCEDURE — 93016 CV STRESS TEST SUPVJ ONLY: CPT | Performed by: INTERNAL MEDICINE

## 2017-08-24 PROCEDURE — 93017 CV STRESS TEST TRACING ONLY: CPT

## 2017-08-24 PROCEDURE — 78492 MYOCRD IMG PET MLT RST&STRS: CPT | Performed by: INTERNAL MEDICINE

## 2017-08-24 PROCEDURE — 0 RUBIDIUM CHLORIDE: Performed by: INTERNAL MEDICINE

## 2017-08-24 PROCEDURE — 78492 MYOCRD IMG PET MLT RST&STRS: CPT

## 2017-08-24 PROCEDURE — A9555 RB82 RUBIDIUM: HCPCS | Performed by: INTERNAL MEDICINE

## 2017-08-24 RX ADMIN — RUBIDIUM CHLORIDE RB-82 1 DOSE: 150 INJECTION, SOLUTION INTRAVENOUS at 10:15

## 2017-08-24 RX ADMIN — REGADENOSON 0.4 MG: 0.08 INJECTION, SOLUTION INTRAVENOUS at 10:15

## 2017-08-24 RX ADMIN — RUBIDIUM CHLORIDE RB-82 1 DOSE: 150 INJECTION, SOLUTION INTRAVENOUS at 10:00

## 2017-08-25 NOTE — PROGRESS NOTES
Called and left a message stating that her stress test findings look similar to prior stress test.  Asked her to call back so I could follow up on her symptoms and determine next steps.

## 2017-10-04 RX ORDER — FUROSEMIDE 40 MG/1
TABLET ORAL
Qty: 90 TABLET | Refills: 0 | Status: SHIPPED | OUTPATIENT
Start: 2017-10-04 | End: 2017-11-15

## 2017-10-12 RX ORDER — SPIRONOLACTONE 25 MG/1
TABLET ORAL
Qty: 90 TABLET | Refills: 0 | Status: SHIPPED | OUTPATIENT
Start: 2017-10-12 | End: 2017-11-15 | Stop reason: SDUPTHER

## 2017-11-15 ENCOUNTER — CLINICAL SUPPORT NO REQUIREMENTS (OUTPATIENT)
Dept: CARDIOLOGY | Facility: CLINIC | Age: 57
End: 2017-11-15

## 2017-11-15 ENCOUNTER — OFFICE VISIT (OUTPATIENT)
Dept: CARDIOLOGY | Facility: CLINIC | Age: 57
End: 2017-11-15

## 2017-11-15 VITALS
BODY MASS INDEX: 44.35 KG/M2 | HEIGHT: 59 IN | HEART RATE: 64 BPM | DIASTOLIC BLOOD PRESSURE: 64 MMHG | WEIGHT: 220 LBS | SYSTOLIC BLOOD PRESSURE: 118 MMHG

## 2017-11-15 DIAGNOSIS — I10 BENIGN ESSENTIAL HYPERTENSION: ICD-10-CM

## 2017-11-15 DIAGNOSIS — G47.33 OBSTRUCTIVE SLEEP APNEA SYNDROME: ICD-10-CM

## 2017-11-15 DIAGNOSIS — I25.5 ISCHEMIC CARDIOMYOPATHY: Primary | ICD-10-CM

## 2017-11-15 DIAGNOSIS — I25.10 CHRONIC CORONARY ARTERY DISEASE: ICD-10-CM

## 2017-11-15 DIAGNOSIS — I50.20 CONGESTIVE HEART FAILURE WITH LEFT VENTRICULAR SYSTOLIC DYSFUNCTION (HCC): ICD-10-CM

## 2017-11-15 DIAGNOSIS — Z95.810 AUTOMATIC IMPLANTABLE CARDIOVERTER-DEFIBRILLATOR IN SITU: ICD-10-CM

## 2017-11-15 DIAGNOSIS — E78.5 HYPERLIPIDEMIA, UNSPECIFIED HYPERLIPIDEMIA TYPE: ICD-10-CM

## 2017-11-15 DIAGNOSIS — I21.09 ACUTE MYOCARDIAL INFARCTION OF ANTERIOR WALL (HCC): ICD-10-CM

## 2017-11-15 DIAGNOSIS — Z95.5 S/P CORONARY ARTERY STENT PLACEMENT: ICD-10-CM

## 2017-11-15 DIAGNOSIS — I25.10 CORONARY ARTERY DISEASE INVOLVING NATIVE CORONARY ARTERY OF NATIVE HEART WITHOUT ANGINA PECTORIS: ICD-10-CM

## 2017-11-15 DIAGNOSIS — E11.9 DIABETES MELLITUS TYPE 2, NONINSULIN DEPENDENT (HCC): ICD-10-CM

## 2017-11-15 PROCEDURE — 93000 ELECTROCARDIOGRAM COMPLETE: CPT | Performed by: INTERNAL MEDICINE

## 2017-11-15 PROCEDURE — 99214 OFFICE O/P EST MOD 30 MIN: CPT | Performed by: INTERNAL MEDICINE

## 2017-11-15 PROCEDURE — 93283 PRGRMG EVAL IMPLANTABLE DFB: CPT | Performed by: INTERNAL MEDICINE

## 2017-11-15 RX ORDER — FUROSEMIDE 40 MG/1
40 TABLET ORAL DAILY
Qty: 90 TABLET | Refills: 2 | Status: SHIPPED | OUTPATIENT
Start: 2017-11-15 | End: 2019-07-29 | Stop reason: SDUPTHER

## 2017-11-15 RX ORDER — ATORVASTATIN CALCIUM 80 MG/1
80 TABLET, FILM COATED ORAL DAILY
Qty: 90 TABLET | Refills: 2 | Status: SHIPPED | OUTPATIENT
Start: 2017-11-15 | End: 2018-12-17 | Stop reason: SDUPTHER

## 2017-11-15 RX ORDER — CARVEDILOL 25 MG/1
25 TABLET ORAL 2 TIMES DAILY WITH MEALS
Qty: 180 TABLET | Refills: 2 | Status: SHIPPED | OUTPATIENT
Start: 2017-11-15 | End: 2018-11-26 | Stop reason: SDUPTHER

## 2017-11-15 RX ORDER — LISINOPRIL 2.5 MG/1
2.5 TABLET ORAL DAILY
Qty: 90 TABLET | Refills: 2 | Status: SHIPPED | OUTPATIENT
Start: 2017-11-15 | End: 2018-09-27 | Stop reason: SDUPTHER

## 2017-11-15 RX ORDER — POTASSIUM CHLORIDE 750 MG/1
10 TABLET, FILM COATED, EXTENDED RELEASE ORAL DAILY
Qty: 90 TABLET | Refills: 2 | Status: SHIPPED | OUTPATIENT
Start: 2017-11-15 | End: 2018-11-12 | Stop reason: SDUPTHER

## 2017-11-15 RX ORDER — NITROGLYCERIN 0.4 MG/1
0.4 TABLET SUBLINGUAL
Qty: 30 TABLET | Refills: 12 | Status: SHIPPED | OUTPATIENT
Start: 2017-11-15 | End: 2019-01-22 | Stop reason: SDUPTHER

## 2017-11-15 RX ORDER — CLOPIDOGREL BISULFATE 75 MG/1
75 TABLET ORAL DAILY
Qty: 90 TABLET | Refills: 2 | Status: SHIPPED | OUTPATIENT
Start: 2017-11-15 | End: 2018-09-18 | Stop reason: SDUPTHER

## 2017-11-15 RX ORDER — SPIRONOLACTONE 25 MG/1
25 TABLET ORAL DAILY
Qty: 90 TABLET | Refills: 2 | Status: SHIPPED | OUTPATIENT
Start: 2017-11-15 | End: 2019-07-29 | Stop reason: SDUPTHER

## 2017-11-15 NOTE — PROGRESS NOTES
Subjective:     Encounter Date:11/15/2017      Patient ID: Daisy Dent is a 57 y.o. female.    Chief Complaint:  History of Present Illness    This is a 57-year-old female with a history of ischemic cardiomyopathy, last ejection fraction of 40-45%, diabetes mellitus type 2, obstructive sleep apnea on CPAP, hypertension, status post dual-chamber AICD placement, dyslipidemia, coronary artery disease with a history of prior anterior myocardial infarction and LAD stent placement, who presents for follow-up.    I began following the patient in 11/2015 when she presented to establish care.  Prior to that she was followed by Dr. Ayala with [facility].  Her prior cardiac history includes a non-ST elevation MI and 7/2006 at which time she received a Cypher 3.0 x 23 mm stent to her mid LAD.  She then presented in 10/2009 with a late in-stent thrombosis that required repeat stenting of her mid LAD in addition to a second drug-eluting stent placement of her left circumflex artery.  Following that she had a repeat cardiac catheterization in 2010 that was reportedly unremarkable.  Following her myocardial infarction in 2009 she developed ischemic myopathy with an EF of around 25% and ultimately underwent AICD placement and 7/2010.  At some point in the course of her follow-ups her left ventricular function improved with an ejection fraction of 40-45%.  His also had stress test that Showed evidence of a large anterior infarct and minimal elva-infarct ischemia.    In her follow-ups she has done fairly well recently when she reported more fatigued than normal and episodes of chest discomfort that occurred while she was exercising on the treadmill.  She underwent  an echocardiogram in 3/2017 that showed an ejection fraction of 40-45%.  Due to continued symptoms we went ahead and proceeded with a PET stress test which was performed in 8/2017 that showed medium sized anterior wall infarct with mild elva-infarct  ischemia and a post stress ejection fraction of 58%.     Today she presents for routine follow-up.  She denies any chest discomfort with exercise any longer.  She reports that she does continue to have chest tightness but this is primarily at times of stress and sometimes can last several hours or even a couple of days at a time.  She denies any significant dyspnea on exertion.  She denies any PND or orthopnea, presyncope or syncope, or lower extremity edema.  She underwent a device check today in the office which shows that her generator is at end-of-life and could stop functioning at any point.        Review of Systems   Constitution: Negative for weakness and malaise/fatigue.   HENT: Negative for hearing loss, hoarse voice, nosebleeds and sore throat.    Eyes: Negative for pain.   Cardiovascular: Positive for chest pain. Negative for claudication, cyanosis, dyspnea on exertion, irregular heartbeat, leg swelling, near-syncope, orthopnea, palpitations, paroxysmal nocturnal dyspnea and syncope.   Respiratory: Negative for shortness of breath and snoring.    Endocrine: Negative for cold intolerance, heat intolerance, polydipsia, polyphagia and polyuria.   Skin: Negative for itching and rash.   Musculoskeletal: Negative for arthritis, falls, joint pain, joint swelling, muscle cramps, muscle weakness and myalgias.   Gastrointestinal: Negative for constipation, diarrhea, dysphagia, heartburn, hematemesis, hematochezia, melena, nausea and vomiting.   Genitourinary: Negative for frequency, hematuria and hesitancy.   Neurological: Positive for light-headedness. Negative for excessive daytime sleepiness, dizziness, headaches and numbness.   Psychiatric/Behavioral: Negative for depression. The patient is not nervous/anxious.           Current Outpatient Prescriptions:   •  aspirin 81 MG tablet, Take 81 mg by mouth Daily., Disp: , Rfl:   •  atorvastatin (LIPITOR) 80 MG tablet, Take 1 tablet by mouth Daily., Disp: 90 tablet,  Rfl: 2  •  carvedilol (COREG) 25 MG tablet, Take 1 tablet by mouth 2 (Two) Times a Day With Meals., Disp: 180 tablet, Rfl: 2  •  Cholecalciferol (VITAMIN D3) 2000 UNITS capsule, Take 2,000 Units by mouth Daily., Disp: , Rfl:   •  clopidogrel (PLAVIX) 75 MG tablet, Take 1 tablet by mouth Daily., Disp: 90 tablet, Rfl: 2  •  furosemide (LASIX) 40 MG tablet, Take 1 tablet by mouth Daily., Disp: 90 tablet, Rfl: 2  •  glucose blood test strip, daily., Disp: , Rfl:   •  lisinopril (PRINIVIL,ZESTRIL) 2.5 MG tablet, Take 1 tablet by mouth Daily., Disp: 90 tablet, Rfl: 2  •  metFORMIN (GLUCOPHAGE) 1000 MG tablet, Take 1,000 mg by mouth 2 (Two) Times a Day., Disp: , Rfl:   •  nitroglycerin (NITROSTAT) 0.4 MG SL tablet, Place 1 tablet under the tongue Every 5 (Five) Minutes As Needed for Chest Pain. Take no more than 3 doses in 15 minutes., Disp: 30 tablet, Rfl: 12  •  Omega-3 Fatty Acids (FISH OIL) 1200 MG capsule capsule, Take 1,200 mg by mouth Daily With Breakfast., Disp: , Rfl:   •  potassium chloride (K-DUR) 10 MEQ CR tablet, Take 1 tablet by mouth Daily., Disp: 90 tablet, Rfl: 2  •  spironolactone (ALDACTONE) 25 MG tablet, Take 1 tablet by mouth Daily., Disp: 90 tablet, Rfl: 2  •  TRULICITY 1.5 MG/0.5ML solution pen-injector, , Disp: , Rfl: 0    Past Medical History:   Diagnosis Date   • Abdominal pain    • Abnormal liver function test    • Acute bronchitis    • CAD (coronary artery disease)    • Colon polyp    • Congestive heart disease    • Cough    • Diabetes    • Diarrhea    • Gastroenteritis    • Health care maintenance    • Hyperlipidemia    • Hypertension    • IFG (impaired fasting glucose)    • Ischemic cardiomyopathy    • Myocardial infarction    • SHELLIE (obstructive sleep apnea)    • Sore throat    • Type 2 diabetes mellitus    • Vaginal yeast infection    • Vitamin D deficiency      Past Surgical History:   Procedure Laterality Date   • PACEMAKER IMPLANTATION     • TUBAL ABDOMINAL LIGATION       Family History  "  Problem Relation Age of Onset   • Hypertension Father    • Colon cancer Father    • Heart attack Father    • Diabetes Mother    • Emphysema Mother    • Heart disease Brother    • Heart attack Brother    • Heart disease Maternal Grandmother    • Heart disease Maternal Grandfather    • Heart disease Paternal Grandmother    • Heart disease Paternal Grandfather      Social History   Substance Use Topics   • Smoking status: Former Smoker   • Smokeless tobacco: None      Comment: caffeine use   • Alcohol use 0.6 oz/week     1 Glasses of wine per week      Comment: occasionally           ECG 12 Lead  Date/Time: 11/15/2017 11:36 AM  Performed by: AMY CASTANEDA  Authorized by: AMY CASTANEDA   Comparison: compared with previous ECG   Similar to previous ECG  Rhythm: sinus rhythm  Q waves: V1, V2, V3 and V4                 Objective:         Visit Vitals   • /64 (BP Location: Right arm, Patient Position: Sitting)   • Pulse 64   • Ht 59\" (149.9 cm)   • Wt 220 lb (99.8 kg)   • BMI 44.43 kg/m2          Physical Exam   Constitutional: She is oriented to person, place, and time. She appears well-developed and well-nourished.   HENT:   Head: Normocephalic and atraumatic.   Eyes: Conjunctivae, EOM and lids are normal. Pupils are equal, round, and reactive to light.   Neck: Normal range of motion and full passive range of motion without pain. Neck supple. No JVD present. Carotid bruit is not present.   Cardiovascular: Normal rate, regular rhythm, S1 normal and S2 normal.  Exam reveals no gallop.    No murmur heard.  Pulses:       Radial pulses are 2+ on the right side, and 2+ on the left side.   No bilateral lower extremity edema   Pulmonary/Chest: Effort normal and breath sounds normal.   Abdominal: Soft. Normal appearance.   Lymphadenopathy:     She has no cervical adenopathy.   Neurological: She is alert and oriented to person, place, and time.   Skin: Skin is warm, dry and intact.   Psychiatric: She has a normal mood " and affect.       Lab Review:       Assessment:          Diagnosis Plan   1. Ischemic cardiomyopathy  carvedilol (COREG) 25 MG tablet    furosemide (LASIX) 40 MG tablet    potassium chloride (K-DUR) 10 MEQ CR tablet    spironolactone (ALDACTONE) 25 MG tablet   2. Chronic coronary artery disease  atorvastatin (LIPITOR) 80 MG tablet    carvedilol (COREG) 25 MG tablet   3. History of anterior myocardial infarction      4. Benign essential hypertension     5. Diabetes mellitus type 2, noninsulin dependent  Ambulatory Referral to Endocrinology   6. Obstructive sleep apnea syndrome     7. Congestive heart failure with left ventricular systolic dysfunction  lisinopril (PRINIVIL,ZESTRIL) 2.5 MG tablet   8. S/P coronary artery stent placement     9. Hyperlipidemia, unspecified hyperlipidemia type  Ambulatory Referral to Endocrinology    atorvastatin (LIPITOR) 80 MG tablet   10. Automatic implantable cardioverter-defibrillator in situ     11. Coronary artery disease involving native coronary artery of native heart without angina pectoris  clopidogrel (PLAVIX) 75 MG tablet    nitroglycerin (NITROSTAT) 0.4 MG SL tablet          Plan:       1.  Chest tightness.  Symptoms sound atypical and likely due to stress.  Nose Additional symptoms at this time.  Recent stress test showed no new findings with continued findings of an anterior infarct with mild elva-infarct ischemia.  She also continues to have stable left ventricular function.  At this point we'll just monitor her symptoms.  If her symptoms worsen or become more associated with exertion would consider repeat cardiac catheterization.    2.  Coronary artery disease.  As per #1.  Continue current medical management.  Due to her early in-stent restenosis she is to stay on dual antiplatelet therapy indefinitely.  3.  Ischemic cardiomyopathy.  Last EF of 40-45%.  Continue current medical management.  She remains NYHA class 1-2.  4.  Status post AICD placement.  Her generator is  at end-of-life.  It appears that this was placed primarily for ischemic cardiac myopathy with an ejection fraction of less than 35% following her myocardial infarction in 2009.  With improvement in her LV function and no known history of significant ventricular arrhythmias could consider not replacing the generator.  We will review her history further and make further recommendations based on that.  5.  Hyperlipidemia.  On high-dose atorvastatin.  This is followed by endocrinology.  She is requesting a switch to [facility].  We'll make that referral.  6.  Diabetes mellitus type 2.  Again will refer to [facility].  7.  Hypertension.  Well controlled.  8.  Objective sleep apnea.  Compliant with CPAP.  9.  Morbid obesity.  Continued to encourage her to continue to exercise.    Will plan on seeing the patient back again in 6 months.  We'll call and discuss recommendations regarding generator replacement.

## 2017-12-08 ENCOUNTER — TELEPHONE (OUTPATIENT)
Dept: CARDIOLOGY | Facility: CLINIC | Age: 57
End: 2017-12-08

## 2017-12-08 NOTE — TELEPHONE ENCOUNTER
Pt's ICD reached RRT as of this morning. She AP 23.7% and  < 0.1%.     She has a Medtronic device and both her leads are MRI safe. (RA - 5096 lead and RV - 1971 lead). No abandoned leads that we are aware of.

## 2017-12-11 NOTE — TELEPHONE ENCOUNTER
Dr. Saldivar,   Patient called back and was wondering if you had decided you wanted the device replaced or not. Per your last note you were considering not replacing. Please advise.

## 2017-12-12 ENCOUNTER — TELEPHONE (OUTPATIENT)
Dept: CARDIOLOGY | Facility: CLINIC | Age: 57
End: 2017-12-12

## 2018-01-11 ENCOUNTER — LAB (OUTPATIENT)
Dept: LAB | Facility: HOSPITAL | Age: 58
End: 2018-01-11
Attending: INTERNAL MEDICINE

## 2018-01-11 ENCOUNTER — TRANSCRIBE ORDERS (OUTPATIENT)
Dept: CARDIOLOGY | Facility: CLINIC | Age: 58
End: 2018-01-11

## 2018-01-11 DIAGNOSIS — Z13.6 SCREENING FOR ISCHEMIC HEART DISEASE: ICD-10-CM

## 2018-01-11 DIAGNOSIS — Z01.810 PRE-OPERATIVE CARDIOVASCULAR EXAMINATION: Primary | ICD-10-CM

## 2018-01-11 DIAGNOSIS — Z01.810 PRE-OPERATIVE CARDIOVASCULAR EXAMINATION: ICD-10-CM

## 2018-01-11 LAB
ANION GAP SERPL CALCULATED.3IONS-SCNC: 12.2 MMOL/L
BASOPHILS # BLD AUTO: 0.03 10*3/MM3 (ref 0–0.2)
BASOPHILS NFR BLD AUTO: 0.4 % (ref 0–1.5)
BUN BLD-MCNC: 14 MG/DL (ref 6–20)
BUN/CREAT SERPL: 13.5 (ref 7–25)
CALCIUM SPEC-SCNC: 9.7 MG/DL (ref 8.6–10.5)
CHLORIDE SERPL-SCNC: 98 MMOL/L (ref 98–107)
CO2 SERPL-SCNC: 26.8 MMOL/L (ref 22–29)
CREAT BLD-MCNC: 1.04 MG/DL (ref 0.57–1)
DEPRECATED RDW RBC AUTO: 42.7 FL (ref 37–54)
EOSINOPHIL # BLD AUTO: 0.15 10*3/MM3 (ref 0–0.7)
EOSINOPHIL NFR BLD AUTO: 2 % (ref 0.3–6.2)
ERYTHROCYTE [DISTWIDTH] IN BLOOD BY AUTOMATED COUNT: 13.7 % (ref 11.7–13)
GFR SERPL CREATININE-BSD FRML MDRD: 55 ML/MIN/1.73
GLUCOSE BLD-MCNC: 294 MG/DL (ref 65–99)
HCT VFR BLD AUTO: 43.5 % (ref 35.6–45.5)
HGB BLD-MCNC: 14.5 G/DL (ref 11.9–15.5)
IMM GRANULOCYTES # BLD: 0.04 10*3/MM3 (ref 0–0.03)
IMM GRANULOCYTES NFR BLD: 0.5 % (ref 0–0.5)
LYMPHOCYTES # BLD AUTO: 3.3 10*3/MM3 (ref 0.9–4.8)
LYMPHOCYTES NFR BLD AUTO: 45 % (ref 19.6–45.3)
MCH RBC QN AUTO: 28.3 PG (ref 26.9–32)
MCHC RBC AUTO-ENTMCNC: 33.3 G/DL (ref 32.4–36.3)
MCV RBC AUTO: 85 FL (ref 80.5–98.2)
MONOCYTES # BLD AUTO: 0.57 10*3/MM3 (ref 0.2–1.2)
MONOCYTES NFR BLD AUTO: 7.8 % (ref 5–12)
NEUTROPHILS # BLD AUTO: 3.24 10*3/MM3 (ref 1.9–8.1)
NEUTROPHILS NFR BLD AUTO: 44.3 % (ref 42.7–76)
PLATELET # BLD AUTO: 256 10*3/MM3 (ref 140–500)
PMV BLD AUTO: 11.5 FL (ref 6–12)
POTASSIUM BLD-SCNC: 4.3 MMOL/L (ref 3.5–5.2)
RBC # BLD AUTO: 5.12 10*6/MM3 (ref 3.9–5.2)
SODIUM BLD-SCNC: 137 MMOL/L (ref 136–145)
WBC NRBC COR # BLD: 7.33 10*3/MM3 (ref 4.5–10.7)

## 2018-01-11 PROCEDURE — 85025 COMPLETE CBC W/AUTO DIFF WBC: CPT

## 2018-01-11 PROCEDURE — 36415 COLL VENOUS BLD VENIPUNCTURE: CPT

## 2018-01-11 PROCEDURE — 80048 BASIC METABOLIC PNL TOTAL CA: CPT

## 2018-01-12 ENCOUNTER — HOSPITAL ENCOUNTER (OUTPATIENT)
Facility: HOSPITAL | Age: 58
Setting detail: HOSPITAL OUTPATIENT SURGERY
Discharge: HOME | End: 2018-01-12
Attending: INTERNAL MEDICINE | Admitting: INTERNAL MEDICINE

## 2018-01-12 VITALS
RESPIRATION RATE: 16 BRPM | BODY MASS INDEX: 44.35 KG/M2 | TEMPERATURE: 98.5 F | WEIGHT: 220 LBS | HEART RATE: 59 BPM | OXYGEN SATURATION: 95 % | DIASTOLIC BLOOD PRESSURE: 57 MMHG | HEIGHT: 59 IN | SYSTOLIC BLOOD PRESSURE: 114 MMHG

## 2018-01-12 LAB — GLUCOSE BLDC GLUCOMTR-MCNC: 284 MG/DL (ref 70–130)

## 2018-01-12 PROCEDURE — 33263 RMVL & RPLCMT DFB GEN 2 LEAD: CPT | Performed by: INTERNAL MEDICINE

## 2018-01-12 PROCEDURE — 25010000002 MIDAZOLAM PER 1 MG: Performed by: INTERNAL MEDICINE

## 2018-01-12 PROCEDURE — 99152 MOD SED SAME PHYS/QHP 5/>YRS: CPT | Performed by: INTERNAL MEDICINE

## 2018-01-12 PROCEDURE — C1721 AICD, DUAL CHAMBER: HCPCS | Performed by: INTERNAL MEDICINE

## 2018-01-12 PROCEDURE — 25010000002 FENTANYL CITRATE (PF) 100 MCG/2ML SOLUTION: Performed by: INTERNAL MEDICINE

## 2018-01-12 PROCEDURE — 99153 MOD SED SAME PHYS/QHP EA: CPT | Performed by: INTERNAL MEDICINE

## 2018-01-12 PROCEDURE — 82962 GLUCOSE BLOOD TEST: CPT

## 2018-01-12 DEVICE — IMPLANTABLE DEVICE: Type: IMPLANTABLE DEVICE | Status: FUNCTIONAL

## 2018-01-12 RX ORDER — HYDROCODONE BITARTRATE AND ACETAMINOPHEN 5; 325 MG/1; MG/1
1 TABLET ORAL EVERY 4 HOURS PRN
Status: DISCONTINUED | OUTPATIENT
Start: 2018-01-12 | End: 2018-01-12 | Stop reason: HOSPADM

## 2018-01-12 RX ORDER — FENTANYL CITRATE 50 UG/ML
INJECTION, SOLUTION INTRAMUSCULAR; INTRAVENOUS AS NEEDED
Status: DISCONTINUED | OUTPATIENT
Start: 2018-01-12 | End: 2018-01-12 | Stop reason: HOSPADM

## 2018-01-12 RX ORDER — SODIUM CHLORIDE 9 MG/ML
75 INJECTION, SOLUTION INTRAVENOUS CONTINUOUS
Status: DISCONTINUED | OUTPATIENT
Start: 2018-01-12 | End: 2018-01-12 | Stop reason: HOSPADM

## 2018-01-12 RX ORDER — LIDOCAINE HYDROCHLORIDE 10 MG/ML
INJECTION, SOLUTION INFILTRATION; PERINEURAL AS NEEDED
Status: DISCONTINUED | OUTPATIENT
Start: 2018-01-12 | End: 2018-01-12 | Stop reason: HOSPADM

## 2018-01-12 RX ORDER — ONDANSETRON 2 MG/ML
4 INJECTION INTRAMUSCULAR; INTRAVENOUS EVERY 6 HOURS PRN
Status: DISCONTINUED | OUTPATIENT
Start: 2018-01-12 | End: 2018-01-12 | Stop reason: HOSPADM

## 2018-01-12 RX ORDER — NITROGLYCERIN 0.4 MG/1
0.4 TABLET SUBLINGUAL
Status: DISCONTINUED | OUTPATIENT
Start: 2018-01-12 | End: 2018-01-12 | Stop reason: HOSPADM

## 2018-01-12 RX ORDER — CHOLECALCIFEROL (VITAMIN D3) 125 MCG
500 CAPSULE ORAL DAILY
COMMUNITY

## 2018-01-12 RX ORDER — PROMETHAZINE HYDROCHLORIDE 25 MG/ML
12.5 INJECTION, SOLUTION INTRAMUSCULAR; INTRAVENOUS EVERY 4 HOURS PRN
Status: DISCONTINUED | OUTPATIENT
Start: 2018-01-12 | End: 2018-01-12 | Stop reason: HOSPADM

## 2018-01-12 RX ORDER — ALPRAZOLAM 0.25 MG/1
1 TABLET ORAL 2 TIMES DAILY PRN
Status: DISCONTINUED | OUTPATIENT
Start: 2018-01-12 | End: 2018-01-12 | Stop reason: HOSPADM

## 2018-01-12 RX ORDER — METOCLOPRAMIDE HYDROCHLORIDE 5 MG/ML
10 INJECTION INTRAMUSCULAR; INTRAVENOUS EVERY 6 HOURS PRN
Status: DISCONTINUED | OUTPATIENT
Start: 2018-01-12 | End: 2018-01-12 | Stop reason: HOSPADM

## 2018-01-12 RX ORDER — MIDAZOLAM HYDROCHLORIDE 1 MG/ML
1 INJECTION INTRAMUSCULAR; INTRAVENOUS
Status: DISCONTINUED | OUTPATIENT
Start: 2018-01-12 | End: 2018-01-12 | Stop reason: HOSPADM

## 2018-01-12 RX ORDER — LIDOCAINE HYDROCHLORIDE 10 MG/ML
0.1 INJECTION, SOLUTION EPIDURAL; INFILTRATION; INTRACAUDAL; PERINEURAL ONCE AS NEEDED
Status: DISCONTINUED | OUTPATIENT
Start: 2018-01-12 | End: 2018-01-12 | Stop reason: HOSPADM

## 2018-01-12 RX ORDER — OXYCODONE HYDROCHLORIDE AND ACETAMINOPHEN 5; 325 MG/1; MG/1
1-2 TABLET ORAL EVERY 4 HOURS PRN
Qty: 15 TABLET | Refills: 0 | Status: SHIPPED | OUTPATIENT
Start: 2018-01-12 | End: 2019-01-03

## 2018-01-12 RX ORDER — SODIUM CHLORIDE 0.9 % (FLUSH) 0.9 %
1-10 SYRINGE (ML) INJECTION AS NEEDED
Status: DISCONTINUED | OUTPATIENT
Start: 2018-01-12 | End: 2018-01-12 | Stop reason: HOSPADM

## 2018-01-12 RX ORDER — ACETAMINOPHEN 325 MG/1
650 TABLET ORAL EVERY 4 HOURS PRN
Status: DISCONTINUED | OUTPATIENT
Start: 2018-01-12 | End: 2018-01-12 | Stop reason: HOSPADM

## 2018-01-12 RX ORDER — MIDAZOLAM HYDROCHLORIDE 1 MG/ML
INJECTION INTRAMUSCULAR; INTRAVENOUS AS NEEDED
Status: DISCONTINUED | OUTPATIENT
Start: 2018-01-12 | End: 2018-01-12 | Stop reason: HOSPADM

## 2018-01-12 RX ADMIN — CEFAZOLIN SODIUM 2 G: 1 INJECTION, POWDER, FOR SOLUTION INTRAMUSCULAR; INTRAVENOUS at 07:58

## 2018-01-12 RX ADMIN — SODIUM CHLORIDE 75 ML/HR: 9 INJECTION, SOLUTION INTRAVENOUS at 07:36

## 2018-01-19 ENCOUNTER — TELEPHONE (OUTPATIENT)
Dept: CARDIOLOGY | Facility: CLINIC | Age: 58
End: 2018-01-19

## 2018-01-19 ENCOUNTER — CLINICAL SUPPORT NO REQUIREMENTS (OUTPATIENT)
Dept: CARDIOLOGY | Facility: CLINIC | Age: 58
End: 2018-01-19

## 2018-01-19 DIAGNOSIS — I42.9 CARDIOMYOPATHY, UNSPECIFIED TYPE (HCC): Primary | ICD-10-CM

## 2018-01-19 PROCEDURE — 93283 PRGRMG EVAL IMPLANTABLE DFB: CPT | Performed by: INTERNAL MEDICINE

## 2018-01-19 NOTE — TELEPHONE ENCOUNTER
I saw this patient in the clinic for an incision check s/p generator change to Medtronic Evera ICD. There is a  observation about her therapy redirect intervals being longer than the initial detection intervals. Her VT initial is 16, and her redirect is 20. For VF initial is 24/32 and redirect is 30/40. On her previous device, VT redirect was 12 and VF redirect was 9/12. Also, Monitor is off, was previously set at a rate of 150 x28 beats. I just wanted to double check that these settings are what you want.

## 2018-01-25 ENCOUNTER — CLINICAL SUPPORT NO REQUIREMENTS (OUTPATIENT)
Dept: CARDIOLOGY | Facility: CLINIC | Age: 58
End: 2018-01-25

## 2018-01-25 DIAGNOSIS — I42.9 CARDIOMYOPATHY, UNSPECIFIED TYPE (HCC): Primary | ICD-10-CM

## 2018-01-26 ENCOUNTER — CLINICAL SUPPORT NO REQUIREMENTS (OUTPATIENT)
Dept: CARDIOLOGY | Facility: CLINIC | Age: 58
End: 2018-01-26

## 2018-01-26 DIAGNOSIS — I25.5 ISCHEMIC CARDIOMYOPATHY: Primary | ICD-10-CM

## 2018-04-23 ENCOUNTER — CLINICAL SUPPORT NO REQUIREMENTS (OUTPATIENT)
Dept: CARDIOLOGY | Facility: CLINIC | Age: 58
End: 2018-04-23

## 2018-04-23 DIAGNOSIS — I42.9 CARDIOMYOPATHY, UNSPECIFIED TYPE (HCC): Primary | ICD-10-CM

## 2018-04-23 PROCEDURE — 93295 DEV INTERROG REMOTE 1/2/MLT: CPT | Performed by: INTERNAL MEDICINE

## 2018-04-23 PROCEDURE — 93296 REM INTERROG EVL PM/IDS: CPT | Performed by: INTERNAL MEDICINE

## 2018-04-23 PROCEDURE — 93297 REM INTERROG DEV EVAL ICPMS: CPT | Performed by: INTERNAL MEDICINE

## 2018-09-18 ENCOUNTER — OFFICE VISIT (OUTPATIENT)
Dept: CARDIOLOGY | Facility: CLINIC | Age: 58
End: 2018-09-18

## 2018-09-18 ENCOUNTER — CLINICAL SUPPORT NO REQUIREMENTS (OUTPATIENT)
Dept: CARDIOLOGY | Facility: CLINIC | Age: 58
End: 2018-09-18

## 2018-09-18 VITALS
WEIGHT: 211 LBS | BODY MASS INDEX: 41.43 KG/M2 | HEIGHT: 60 IN | DIASTOLIC BLOOD PRESSURE: 68 MMHG | HEART RATE: 70 BPM | SYSTOLIC BLOOD PRESSURE: 118 MMHG

## 2018-09-18 DIAGNOSIS — Z95.810 AUTOMATIC IMPLANTABLE CARDIOVERTER-DEFIBRILLATOR IN SITU: ICD-10-CM

## 2018-09-18 DIAGNOSIS — I42.9 CARDIOMYOPATHY, UNSPECIFIED TYPE (HCC): Primary | ICD-10-CM

## 2018-09-18 DIAGNOSIS — I21.09 ACUTE MYOCARDIAL INFARCTION OF ANTERIOR WALL (HCC): ICD-10-CM

## 2018-09-18 DIAGNOSIS — E11.9 DIABETES MELLITUS TYPE 2, NONINSULIN DEPENDENT (HCC): ICD-10-CM

## 2018-09-18 DIAGNOSIS — I25.10 CHRONIC CORONARY ARTERY DISEASE: Primary | ICD-10-CM

## 2018-09-18 DIAGNOSIS — E78.5 HYPERLIPIDEMIA, UNSPECIFIED HYPERLIPIDEMIA TYPE: ICD-10-CM

## 2018-09-18 DIAGNOSIS — G47.33 OBSTRUCTIVE SLEEP APNEA SYNDROME: ICD-10-CM

## 2018-09-18 DIAGNOSIS — I25.5 ISCHEMIC CARDIOMYOPATHY: ICD-10-CM

## 2018-09-18 DIAGNOSIS — I10 BENIGN ESSENTIAL HYPERTENSION: ICD-10-CM

## 2018-09-18 DIAGNOSIS — I25.10 CORONARY ARTERY DISEASE INVOLVING NATIVE CORONARY ARTERY OF NATIVE HEART WITHOUT ANGINA PECTORIS: ICD-10-CM

## 2018-09-18 DIAGNOSIS — Z95.5 S/P CORONARY ARTERY STENT PLACEMENT: ICD-10-CM

## 2018-09-18 PROCEDURE — 93000 ELECTROCARDIOGRAM COMPLETE: CPT | Performed by: INTERNAL MEDICINE

## 2018-09-18 PROCEDURE — 93283 PRGRMG EVAL IMPLANTABLE DFB: CPT | Performed by: INTERNAL MEDICINE

## 2018-09-18 PROCEDURE — 99214 OFFICE O/P EST MOD 30 MIN: CPT | Performed by: INTERNAL MEDICINE

## 2018-09-18 RX ORDER — CLOPIDOGREL BISULFATE 75 MG/1
TABLET ORAL
Qty: 120 TABLET | Refills: 1 | Status: SHIPPED | OUTPATIENT
Start: 2018-09-18 | End: 2018-12-15 | Stop reason: SDUPTHER

## 2018-09-18 NOTE — PROGRESS NOTES
Subjective:     Encounter Date:09/18/2018      Patient ID: Daisy Dent is a 57 y.o. female.    Chief Complaint:  History of Present Illness    This is a 57-year-old female with a history of ischemic cardiomyopathy, last ejection fraction of 40-45%, diabetes mellitus type 2, obstructive sleep apnea on CPAP, hypertension, status post dual-chamber AICD placement, dyslipidemia, coronary artery disease with a history of prior anterior myocardial infarction and LAD stent placement, who presents for follow-up.     I began following the patient in 11/2015 when she presented to establish care.  Prior to that she was followed by Dr. Ayala with [facility].  Her prior cardiac history includes a non-ST elevation MI and 7/2006 at which time she received a Cypher 3.0 x 23 mm stent to her mid LAD.  She then presented in 10/2009 with a late in-stent thrombosis that required repeat stenting of her mid LAD in addition to a second drug-eluting stent placement of her left circumflex artery.  Following that she had a repeat cardiac catheterization in 2010 that was reportedly unremarkable.  Following her myocardial infarction in 2009 she developed ischemic myopathy with an EF of around 25% and ultimately underwent AICD placement and 7/2010.  At some point in the course of her follow-ups her left ventricular function improved with an ejection fraction of 40-45%.  His also had stress test that Showed evidence of a large anterior infarct and minimal elva-infarct ischemia.     In her follow-ups she has done fairly well recently when she reported more fatigued than normal and episodes of chest discomfort that occurred while she was exercising on the treadmill.  She underwent  an echocardiogram in 3/2017 that showed an ejection fraction of 40-45%.  Due to continued symptoms we went ahead and proceeded with a PET stress test which was performed in 8/2017 that showed medium sized anterior wall infarct with mild elva-infarct  ischemia and a post stress ejection fraction of 58%.      Center lasting office in 11/2017 is noted that her AICD was at [facility].  She subsequently underwent generator replacement on 1/12/2018 with Dr. Ornelas.  Is her first office visit since then.  She had her device checked again today that showed she is atrial paced about 70% of the time.  She had no recent episodes.  She denies any changes in her medications.  She continues to have episodes of brief chest tightness that occurs both at rest and with activity and usually resolves if she will lay down and rest.  This is her chronic issue and she denies any change in the frequency, intensity, or duration.  She also reports occasional episodes of shortness of breath that resolves with use of her inhaler.  She also reports that this is chronic and stable symptom.  She has intermittent lower extremity edema.  She denies any dizziness or lightheadedness, palpitations, PND or orthopnea.  She has been working on trying to lose weight.  She is trying to exercise 3 times a week.  She is down about 9 pounds since her last office visit.  She does struggle with her diet and is very much aware of this.       Review of Systems   Constitution: Positive for malaise/fatigue. Negative for weakness.   HENT: Negative for hearing loss, hoarse voice, nosebleeds and sore throat.    Eyes: Negative for pain.   Cardiovascular: Positive for chest pain, dyspnea on exertion and leg swelling. Negative for claudication, cyanosis, irregular heartbeat, near-syncope, orthopnea, palpitations, paroxysmal nocturnal dyspnea and syncope.   Respiratory: Negative for shortness of breath and snoring.    Endocrine: Negative for cold intolerance, heat intolerance, polydipsia, polyphagia and polyuria.   Skin: Negative for itching and rash.   Musculoskeletal: Negative for arthritis, falls, joint pain, joint swelling, muscle cramps, muscle weakness and myalgias.   Gastrointestinal: Negative for constipation,  diarrhea, dysphagia, heartburn, hematemesis, hematochezia, melena, nausea and vomiting.   Genitourinary: Negative for frequency, hematuria and hesitancy.   Neurological: Negative for excessive daytime sleepiness, dizziness, headaches, light-headedness and numbness.   Psychiatric/Behavioral: Negative for depression. The patient is not nervous/anxious.           Current Outpatient Prescriptions:   •  aspirin 81 MG tablet, Take 81 mg by mouth Daily., Disp: , Rfl:   •  atorvastatin (LIPITOR) 80 MG tablet, Take 1 tablet by mouth Daily., Disp: 90 tablet, Rfl: 2  •  carvedilol (COREG) 25 MG tablet, Take 1 tablet by mouth 2 (Two) Times a Day With Meals., Disp: 180 tablet, Rfl: 2  •  Cholecalciferol (VITAMIN D3) 2000 UNITS capsule, Take 1,000 Units by mouth Daily., Disp: , Rfl:   •  clopidogrel (PLAVIX) 75 MG tablet, Take 1 tablet by mouth Daily., Disp: 90 tablet, Rfl: 2  •  furosemide (LASIX) 40 MG tablet, Take 1 tablet by mouth Daily., Disp: 90 tablet, Rfl: 2  •  lisinopril (PRINIVIL,ZESTRIL) 2.5 MG tablet, Take 1 tablet by mouth Daily., Disp: 90 tablet, Rfl: 2  •  metFORMIN (GLUCOPHAGE) 1000 MG tablet, Take 1,000 mg by mouth Every Evening., Disp: , Rfl:   •  metFORMIN (GLUCOPHAGE) 500 MG tablet, Take 500 mg by mouth Every Morning., Disp: , Rfl:   •  Multiple Vitamins-Minerals (MULTIVITAL PO), Take 1 tablet by mouth Daily., Disp: , Rfl:   •  nitroglycerin (NITROSTAT) 0.4 MG SL tablet, Place 1 tablet under the tongue Every 5 (Five) Minutes As Needed for Chest Pain. Take no more than 3 doses in 15 minutes., Disp: 30 tablet, Rfl: 12  •  Omega-3 Fatty Acids (FISH OIL) 1200 MG capsule capsule, Take 1,200 mg by mouth Daily With Breakfast., Disp: , Rfl:   •  oxyCODONE-acetaminophen (PERCOCET) 5-325 MG per tablet, Take 1-2 tablets by mouth Every 4 (Four) Hours As Needed (Pain)., Disp: 15 tablet, Rfl: 0  •  potassium chloride (K-DUR) 10 MEQ CR tablet, Take 1 tablet by mouth Daily., Disp: 90 tablet, Rfl: 2  •  spironolactone  (ALDACTONE) 25 MG tablet, Take 1 tablet by mouth Daily., Disp: 90 tablet, Rfl: 2  •  vitamin B-12 (CYANOCOBALAMIN) 500 MCG tablet, Take 500 mcg by mouth Daily., Disp: , Rfl:     Past Medical History:   Diagnosis Date   • Abdominal pain    • Abnormal liver function test    • Acute bronchitis    • CAD (coronary artery disease)    • Colon polyp    • Congestive heart disease (CMS/HCC)    • Cough    • Diabetes (CMS/HCC)    • Diarrhea    • Gastroenteritis    • Health care maintenance    • Hyperlipidemia    • Hypertension    • IFG (impaired fasting glucose)    • Ischemic cardiomyopathy    • Myocardial infarction    • SHELLIE (obstructive sleep apnea)    • Sore throat    • Type 2 diabetes mellitus (CMS/HCC)    • Vaginal yeast infection    • Vitamin D deficiency      Past Surgical History:   Procedure Laterality Date   • CARDIAC ELECTROPHYSIOLOGY PROCEDURE N/A 1/12/2018    Procedure: ICD DC generator change  medtronic;  Surgeon: Hany Ornelas MD;  Location: [facility] INVASIVE LOCATION;  Service:    • PACEMAKER IMPLANTATION     • TUBAL ABDOMINAL LIGATION       Family History   Problem Relation Age of Onset   • Hypertension Father    • Colon cancer Father    • Heart attack Father    • Diabetes Mother    • Emphysema Mother    • Heart disease Brother    • Heart attack Brother    • Heart disease Maternal Grandmother    • Heart disease Maternal Grandfather    • Heart disease Paternal Grandmother    • Heart disease Paternal Grandfather      Social History   Substance Use Topics   • Smoking status: Former Smoker     Packs/day: 1.00     Years: 15.00     Types: Cigarettes   • Smokeless tobacco: Never Used      Comment: QUIT 10 YRS   • Alcohol use 0.6 oz/week     1 Glasses of wine per week      Comment: occasionally           ECG 12 Lead  Date/Time: 9/18/2018 10:25 AM  Performed by: AMY CASTANEDA  Authorized by: AMY CASTANEDA   Comparison: compared with previous ECG   Similar to previous ECG  Rhythm: sinus rhythm  Q waves: V1,  "V2 and V3                 Objective:         Visit Vitals  /68 (BP Location: Right arm, Patient Position: Sitting)   Pulse 70   Ht 152.4 cm (60\")   Wt 95.7 kg (211 lb)   BMI 41.21 kg/m²          Physical Exam   Constitutional: She is oriented to person, place, and time. She appears well-developed and well-nourished.   HENT:   Head: Normocephalic and atraumatic.   Eyes: Pupils are equal, round, and reactive to light. Conjunctivae, EOM and lids are normal.   Neck: Normal range of motion and full passive range of motion without pain. Neck supple. No JVD present. Carotid bruit is not present.   Cardiovascular: Normal rate, regular rhythm, S1 normal and S2 normal.    No murmur heard.  Pulses:       Radial pulses are 2+ on the right side, and 2+ on the left side.   No bilateral lower extremity edema   Pulmonary/Chest: Effort normal and breath sounds normal.   Abdominal: Soft. Normal appearance.   Lymphadenopathy:     She has no cervical adenopathy.   Neurological: She is alert and oriented to person, place, and time.   Skin: Skin is warm, dry and intact.   Psychiatric: She has a normal mood and affect.       Lab Review:       Assessment:          Diagnosis Plan   1. Chronic coronary artery disease     2. S/P coronary artery stent placement     3. Ischemic cardiomyopathy     4. Hyperlipidemia, unspecified hyperlipidemia type     5. Automatic implantable cardioverter-defibrillator in situ     6. History of anterior myocardial infarction      7. Benign essential hypertension     8. Obstructive sleep apnea syndrome     9. Diabetes mellitus type 2, noninsulin dependent (CMS/HCC)            Plan:       1.  Coronary artery disease.  She has a history of a prior anterior myocardial infarction.  She is to continue current medical management including dual antiplatelet therapy due to a history of early in-stent restenosis.  2.  Ischemic cardiomyopathy.  EF of 40-45%.  She remains NYHA class 1-2.  She is status post AICD and " generator replacement.  We'll continue her current medical management.  3.  Status post AICD.  She underwent a generator change and 1/218.  She has normal function on her device interrogation today.  4.  Hyperlipidemia.  She is on high-dose atorvastatin.  She had recent lab work and reports that her cholesterol was noted to be elevated but it should it should be system or diet.  Encouraged her to continue to work on improving her diet.  5.  Diabetes mellitus type 2  6.  Hypertension.  Well controlled.  7.  Obstructive sleep apnea.  This is followed by Dr. Blackburn.  She is compliant with her CPAP.  8.  Morbid obesity.  I encouraged her to continue her efforts in order to lose weight.    We'll plan on seeing the patient back again in one year or sooner if further issues arise.    Coronary Artery Disease  Assessment  • The patient has CCS class I - angina only during strenuous or prolonged physical activity  • The patient has stable angina     Plan  • Lifestyle modifications discussed include adhering to a heart healthy diet, avoidance of tobacco products, maintenance of a healthy weight, medication compliance, regular exercise and regular monitoring of cholesterol and blood pressure    Subjective - Objective  • There has been a previous stent procedure using HUGO 7/2006, 10/2009  • Current antiplatelet therapy includes aspirin 81 mg and clopidogrel 75 mg      Heart Failure  Assessment  • NYHA class II - There is slight limitation of physical activity. The patient is comfortable at rest, but physical activity results in fatigue, palpitations or shortness of breath.  • ACE inhibitor prescribed  • Beta blocker prescribed  • Diuretics prescribed  • Angiotensin receptor blocker (ARB) not prescribed for medical reasons  • Calcium channel blockers not prescribed  • Left ventricular function is mildly reduced by qualitative assessment    Plan  • The heart failure care plan was discussed with the patient today including:  continuing the current program    Subjective/Objective    • Physical exam findings negative for elevated JVP.  • The patient has an ICD implant

## 2018-09-27 DIAGNOSIS — I50.20 CONGESTIVE HEART FAILURE WITH LEFT VENTRICULAR SYSTOLIC DYSFUNCTION (HCC): ICD-10-CM

## 2018-09-27 RX ORDER — LISINOPRIL 2.5 MG/1
TABLET ORAL
Qty: 90 TABLET | Refills: 1 | Status: SHIPPED | OUTPATIENT
Start: 2018-09-27 | End: 2019-01-22

## 2018-11-12 DIAGNOSIS — I25.5 ISCHEMIC CARDIOMYOPATHY: ICD-10-CM

## 2018-11-12 RX ORDER — POTASSIUM CHLORIDE 750 MG/1
10 TABLET, FILM COATED, EXTENDED RELEASE ORAL DAILY
Qty: 90 TABLET | Refills: 2 | Status: SHIPPED | OUTPATIENT
Start: 2018-11-12 | End: 2018-11-13 | Stop reason: SDUPTHER

## 2018-11-13 DIAGNOSIS — I25.5 ISCHEMIC CARDIOMYOPATHY: ICD-10-CM

## 2018-11-13 RX ORDER — POTASSIUM CHLORIDE 750 MG/1
TABLET, FILM COATED, EXTENDED RELEASE ORAL
Qty: 90 TABLET | Refills: 2 | Status: SHIPPED | OUTPATIENT
Start: 2018-11-13 | End: 2019-12-15 | Stop reason: SDUPTHER

## 2018-11-26 DIAGNOSIS — I25.10 CHRONIC CORONARY ARTERY DISEASE: ICD-10-CM

## 2018-11-26 DIAGNOSIS — I25.5 ISCHEMIC CARDIOMYOPATHY: ICD-10-CM

## 2018-11-26 RX ORDER — CARVEDILOL 25 MG/1
TABLET ORAL
Qty: 60 TABLET | Refills: 1 | Status: SHIPPED | OUTPATIENT
Start: 2018-11-26 | End: 2019-01-22

## 2018-12-08 ENCOUNTER — APPOINTMENT (OUTPATIENT)
Dept: GENERAL RADIOLOGY | Facility: HOSPITAL | Age: 58
End: 2018-12-08

## 2018-12-08 ENCOUNTER — HOSPITAL ENCOUNTER (INPATIENT)
Facility: HOSPITAL | Age: 58
LOS: 4 days | Discharge: HOME | End: 2018-12-12
Attending: EMERGENCY MEDICINE | Admitting: INTERNAL MEDICINE

## 2018-12-08 ENCOUNTER — APPOINTMENT (OUTPATIENT)
Dept: CT IMAGING | Facility: HOSPITAL | Age: 58
End: 2018-12-08

## 2018-12-08 DIAGNOSIS — Z74.09 IMPAIRED FUNCTIONAL MOBILITY, BALANCE, GAIT, AND ENDURANCE: ICD-10-CM

## 2018-12-08 DIAGNOSIS — I63.412 CEREBROVASCULAR ACCIDENT (CVA) DUE TO EMBOLISM OF LEFT MIDDLE CEREBRAL ARTERY (HCC): Primary | ICD-10-CM

## 2018-12-08 DIAGNOSIS — R73.9 HYPERGLYCEMIA: ICD-10-CM

## 2018-12-08 LAB
ABO GROUP BLD: NORMAL
ALBUMIN SERPL-MCNC: 4.7 G/DL (ref 3.5–5.2)
ALBUMIN/GLOB SERPL: 1.5 G/DL
ALP SERPL-CCNC: 132 U/L (ref 39–117)
ALT SERPL W P-5'-P-CCNC: 108 U/L (ref 1–33)
ANION GAP SERPL CALCULATED.3IONS-SCNC: 16.1 MMOL/L
APTT PPP: 23.6 SECONDS (ref 22.7–35.4)
AST SERPL-CCNC: 67 U/L (ref 1–32)
BASOPHILS # BLD AUTO: 0.03 10*3/MM3 (ref 0–0.2)
BASOPHILS NFR BLD AUTO: 0.4 % (ref 0–1.5)
BILIRUB SERPL-MCNC: 0.4 MG/DL (ref 0.1–1.2)
BLD GP AB SCN SERPL QL: NEGATIVE
BUN BLD-MCNC: 11 MG/DL (ref 6–20)
BUN/CREAT SERPL: 9.6 (ref 7–25)
CALCIUM SPEC-SCNC: 10.1 MG/DL (ref 8.6–10.5)
CHLORIDE SERPL-SCNC: 91 MMOL/L (ref 98–107)
CO2 SERPL-SCNC: 24.9 MMOL/L (ref 22–29)
CREAT BLD-MCNC: 1.15 MG/DL (ref 0.57–1)
DEPRECATED RDW RBC AUTO: 40.1 FL (ref 37–54)
EOSINOPHIL # BLD AUTO: 0.12 10*3/MM3 (ref 0–0.7)
EOSINOPHIL NFR BLD AUTO: 1.8 % (ref 0.3–6.2)
ERYTHROCYTE [DISTWIDTH] IN BLOOD BY AUTOMATED COUNT: 12.9 % (ref 11.7–13)
GFR SERPL CREATININE-BSD FRML MDRD: 49 ML/MIN/1.73
GLOBULIN UR ELPH-MCNC: 3.2 GM/DL
GLUCOSE BLD-MCNC: 666 MG/DL (ref 65–99)
GLUCOSE BLDC GLUCOMTR-MCNC: 317 MG/DL (ref 70–130)
GLUCOSE BLDC GLUCOMTR-MCNC: 379 MG/DL (ref 70–130)
HCT VFR BLD AUTO: 48.2 % (ref 35.6–45.5)
HGB BLD-MCNC: 15.8 G/DL (ref 11.9–15.5)
HOLD SPECIMEN: NORMAL
HOLD SPECIMEN: NORMAL
IMM GRANULOCYTES # BLD: 0.02 10*3/MM3 (ref 0–0.03)
IMM GRANULOCYTES NFR BLD: 0.3 % (ref 0–0.5)
INR PPP: 0.95 (ref 0.9–1.1)
LYMPHOCYTES # BLD AUTO: 2.73 10*3/MM3 (ref 0.9–4.8)
LYMPHOCYTES NFR BLD AUTO: 40.3 % (ref 19.6–45.3)
MCH RBC QN AUTO: 27.9 PG (ref 26.9–32)
MCHC RBC AUTO-ENTMCNC: 32.8 G/DL (ref 32.4–36.3)
MCV RBC AUTO: 85.2 FL (ref 80.5–98.2)
MONOCYTES # BLD AUTO: 0.58 10*3/MM3 (ref 0.2–1.2)
MONOCYTES NFR BLD AUTO: 8.6 % (ref 5–12)
NEUTROPHILS # BLD AUTO: 3.3 10*3/MM3 (ref 1.9–8.1)
NEUTROPHILS NFR BLD AUTO: 48.6 % (ref 42.7–76)
PLATELET # BLD AUTO: 246 10*3/MM3 (ref 140–500)
PMV BLD AUTO: 11.8 FL (ref 6–12)
POTASSIUM BLD-SCNC: 4.3 MMOL/L (ref 3.5–5.2)
PROT SERPL-MCNC: 7.9 G/DL (ref 6–8.5)
PROTHROMBIN TIME: 12.5 SECONDS (ref 11.7–14.2)
RBC # BLD AUTO: 5.66 10*6/MM3 (ref 3.9–5.2)
RH BLD: POSITIVE
SODIUM BLD-SCNC: 132 MMOL/L (ref 136–145)
T&S EXPIRATION DATE: NORMAL
TROPONIN T SERPL-MCNC: <0.01 NG/ML (ref 0–0.03)
WBC NRBC COR # BLD: 6.78 10*3/MM3 (ref 4.5–10.7)
WHOLE BLOOD HOLD SPECIMEN: NORMAL
WHOLE BLOOD HOLD SPECIMEN: NORMAL

## 2018-12-08 PROCEDURE — 0 IOPAMIDOL PER 1 ML: Performed by: NURSE PRACTITIONER

## 2018-12-08 PROCEDURE — 80053 COMPREHEN METABOLIC PANEL: CPT | Performed by: NURSE PRACTITIONER

## 2018-12-08 PROCEDURE — 86901 BLOOD TYPING SEROLOGIC RH(D): CPT | Performed by: NURSE PRACTITIONER

## 2018-12-08 PROCEDURE — 99253 IP/OBS CNSLTJ NEW/EST LOW 45: CPT | Performed by: PSYCHIATRY & NEUROLOGY

## 2018-12-08 PROCEDURE — 85610 PROTHROMBIN TIME: CPT | Performed by: NURSE PRACTITIONER

## 2018-12-08 PROCEDURE — 86900 BLOOD TYPING SEROLOGIC ABO: CPT | Performed by: NURSE PRACTITIONER

## 2018-12-08 PROCEDURE — 63710000001 INSULIN REGULAR HUMAN PER 5 UNITS: Performed by: EMERGENCY MEDICINE

## 2018-12-08 PROCEDURE — 99291 CRITICAL CARE FIRST HOUR: CPT

## 2018-12-08 PROCEDURE — 85025 COMPLETE CBC W/AUTO DIFF WBC: CPT | Performed by: NURSE PRACTITIONER

## 2018-12-08 PROCEDURE — 70496 CT ANGIOGRAPHY HEAD: CPT

## 2018-12-08 PROCEDURE — 86850 RBC ANTIBODY SCREEN: CPT | Performed by: NURSE PRACTITIONER

## 2018-12-08 PROCEDURE — 82962 GLUCOSE BLOOD TEST: CPT

## 2018-12-08 PROCEDURE — 70498 CT ANGIOGRAPHY NECK: CPT

## 2018-12-08 PROCEDURE — 82565 ASSAY OF CREATININE: CPT

## 2018-12-08 PROCEDURE — 25010000002 ALTEPLASE PER 1 MG: Performed by: EMERGENCY MEDICINE

## 2018-12-08 PROCEDURE — 25010000002 ALTEPLASE PER 1 MG

## 2018-12-08 PROCEDURE — 93005 ELECTROCARDIOGRAM TRACING: CPT | Performed by: NURSE PRACTITIONER

## 2018-12-08 PROCEDURE — 3E03317 INTRODUCTION OF OTHER THROMBOLYTIC INTO PERIPHERAL VEIN, PERCUTANEOUS APPROACH: ICD-10-PCS | Performed by: EMERGENCY MEDICINE

## 2018-12-08 PROCEDURE — 93010 ELECTROCARDIOGRAM REPORT: CPT | Performed by: INTERNAL MEDICINE

## 2018-12-08 PROCEDURE — 84484 ASSAY OF TROPONIN QUANT: CPT | Performed by: NURSE PRACTITIONER

## 2018-12-08 PROCEDURE — 0042T HC CT CEREBRAL PERFUSION W/WO CONTRAST: CPT

## 2018-12-08 PROCEDURE — 85730 THROMBOPLASTIN TIME PARTIAL: CPT | Performed by: NURSE PRACTITIONER

## 2018-12-08 PROCEDURE — 71045 X-RAY EXAM CHEST 1 VIEW: CPT

## 2018-12-08 RX ORDER — FUROSEMIDE 40 MG/1
40 TABLET ORAL DAILY
COMMUNITY
End: 2019-01-03

## 2018-12-08 RX ORDER — SODIUM CHLORIDE 0.9 % (FLUSH) 0.9 %
3 SYRINGE (ML) INJECTION EVERY 12 HOURS SCHEDULED
Status: DISCONTINUED | OUTPATIENT
Start: 2018-12-08 | End: 2018-12-12 | Stop reason: HOSPADM

## 2018-12-08 RX ORDER — SPIRONOLACTONE 25 MG/1
25 TABLET ORAL DAILY
COMMUNITY
End: 2019-01-03

## 2018-12-08 RX ORDER — SODIUM CHLORIDE 9 MG/ML
100 INJECTION, SOLUTION INTRAVENOUS ONCE
Status: COMPLETED | OUTPATIENT
Start: 2018-12-08 | End: 2018-12-08

## 2018-12-08 RX ORDER — ATORVASTATIN CALCIUM 80 MG/1
80 TABLET, FILM COATED ORAL NIGHTLY
Status: DISCONTINUED | OUTPATIENT
Start: 2018-12-08 | End: 2018-12-12 | Stop reason: HOSPADM

## 2018-12-08 RX ORDER — SODIUM CHLORIDE 0.9 % (FLUSH) 0.9 %
3-10 SYRINGE (ML) INJECTION AS NEEDED
Status: DISCONTINUED | OUTPATIENT
Start: 2018-12-08 | End: 2018-12-12 | Stop reason: HOSPADM

## 2018-12-08 RX ORDER — CARVEDILOL 25 MG/1
25 TABLET ORAL 2 TIMES DAILY WITH MEALS
COMMUNITY
End: 2019-01-22 | Stop reason: SDUPTHER

## 2018-12-08 RX ORDER — ASPIRIN 325 MG
325 TABLET ORAL DAILY
Status: DISCONTINUED | OUTPATIENT
Start: 2018-12-09 | End: 2018-12-11

## 2018-12-08 RX ORDER — POTASSIUM CHLORIDE 750 MG/1
10 TABLET, FILM COATED, EXTENDED RELEASE ORAL DAILY
COMMUNITY
End: 2019-01-03

## 2018-12-08 RX ORDER — ATORVASTATIN CALCIUM 80 MG/1
80 TABLET, FILM COATED ORAL DAILY
COMMUNITY
End: 2019-01-22

## 2018-12-08 RX ORDER — SODIUM CHLORIDE 0.9 % (FLUSH) 0.9 %
10 SYRINGE (ML) INJECTION AS NEEDED
Status: DISCONTINUED | OUTPATIENT
Start: 2018-12-08 | End: 2018-12-12 | Stop reason: HOSPADM

## 2018-12-08 RX ORDER — ASPIRIN 300 MG/1
300 SUPPOSITORY RECTAL DAILY
Status: DISCONTINUED | OUTPATIENT
Start: 2018-12-09 | End: 2018-12-11

## 2018-12-08 RX ORDER — CLOPIDOGREL BISULFATE 75 MG/1
75 TABLET ORAL DAILY
COMMUNITY
End: 2019-01-22

## 2018-12-08 RX ADMIN — IOPAMIDOL 150 ML: 755 INJECTION, SOLUTION INTRAVENOUS at 14:39

## 2018-12-08 RX ADMIN — ATORVASTATIN CALCIUM 80 MG: 80 TABLET, FILM COATED ORAL at 21:46

## 2018-12-08 RX ADMIN — INSULIN HUMAN 10 UNITS: 100 INJECTION, SOLUTION PARENTERAL at 15:14

## 2018-12-08 RX ADMIN — SODIUM CHLORIDE 1000 ML: 9 INJECTION, SOLUTION INTRAVENOUS at 15:36

## 2018-12-08 RX ADMIN — SODIUM CHLORIDE 100 ML: 9 INJECTION, SOLUTION INTRAVENOUS at 16:05

## 2018-12-08 RX ADMIN — SODIUM CHLORIDE, PRESERVATIVE FREE 3 ML: 5 INJECTION INTRAVENOUS at 21:47

## 2018-12-08 RX ADMIN — Medication 76 MG: at 15:05

## 2018-12-08 NOTE — ED NOTES
Dr. Jones at bedside speaking with patient and family at this time     Keshav Land, RN  12/08/18 1071

## 2018-12-08 NOTE — ED PROVIDER NOTES
EMERGENCY DEPARTMENT ENCOUNTER    Room Number:  37/37  Date seen:  12/8/2018  Time seen: 2:12 PM  PCP: Kim Kathleen MD    HPI:  Chief complaint:neuro deficits  Context:PAULA Dent is a 58 y.o. female who presents to the ED with c/o neuro deficits that occurred around 1315 after going into the bathroom. Per daughter, pt remembers walking into the bathroom but does not remember anything until a few minutes after. Pt is able to nod and understand what is being asked of her. Pt only complains of aphasia. Pt denies any difficulty breathing.     Timing:constant  Duration: one hour  Location:diffuse  Radiation:none  Quality:aphasia  Intensity/Severity:moderate  Associated Symptoms:none  Aggravating Factors:none  Alleviating Factors:none  Previous Episodes:none  Treatment before arrival:EMS care and treatment     ALLERGIES  Patient has no known allergies.    PAST MEDICAL HISTORY  Active Ambulatory Problems     Diagnosis Date Noted   • No Active Ambulatory Problems     Resolved Ambulatory Problems     Diagnosis Date Noted   • No Resolved Ambulatory Problems     No Additional Past Medical History       PAST SURGICAL HISTORY  No past surgical history on file.    FAMILY HISTORY  No family history on file.    SOCIAL HISTORY  Social History     Socioeconomic History   • Marital status:      Spouse name: Not on file   • Number of children: Not on file   • Years of education: Not on file   • Highest education level: Not on file   Social Needs   • Financial resource strain: Not on file   • Food insecurity - worry: Not on file   • Food insecurity - inability: Not on file   • Transportation needs - medical: Not on file   • Transportation needs - non-medical: Not on file   Occupational History   • Not on file   Tobacco Use   • Smoking status: Not on file   Substance and Sexual Activity   • Alcohol use: Not on file   • Drug use: Not on file   • Sexual activity: Not on file   Other Topics Concern   • Not on  file   Social History Narrative   • Not on file       REVIEW OF SYSTEMS  Review of Systems   Constitutional: Negative for activity change, appetite change, diaphoresis and fever.   HENT: Negative for trouble swallowing.    Eyes: Negative for visual disturbance.   Respiratory: Negative for cough, chest tightness, shortness of breath and wheezing.    Cardiovascular: Negative for chest pain, palpitations and leg swelling.   Gastrointestinal: Negative for abdominal pain, diarrhea, nausea and vomiting.   Genitourinary: Negative for dysuria.   Musculoskeletal: Negative for back pain.   Skin: Negative for rash.   Neurological: Positive for speech difficulty (aphasia).       PHYSICAL EXAM  ED Triage Vitals   Temp Pulse Resp BP SpO2   -- -- -- -- --      Temp src Heart Rate Source Patient Position BP Location FiO2 (%)   -- -- -- -- --     Physical Exam   Constitutional: She is oriented to person, place, and time and well-developed, well-nourished, and in no distress. No distress.   HENT:   Head: Normocephalic and atraumatic.   Mouth/Throat: Uvula is midline and mucous membranes are normal.   Eyes: EOM are normal. Pupils are equal, round, and reactive to light.   Neck: Normal range of motion. Neck supple.   Cardiovascular: Normal rate, regular rhythm, S1 normal, S2 normal and normal heart sounds. Exam reveals no gallop and no friction rub.   No murmur heard.  Pulmonary/Chest: Effort normal and breath sounds normal. She has no decreased breath sounds. She has no wheezes. She has no rhonchi. She has no rales.   Lungs are clear   Abdominal: Soft. Normal appearance. There is no rebound and no guarding.   Musculoskeletal: Normal range of motion.   Neurological: She is alert and oriented to person, place, and time.   Pt is unable to verbalize but can mouth words and use fingers to demonstrate numbers. BUE and BLE equal. No drift noted.    Skin: Skin is warm, dry and intact.   Psychiatric: Affect and judgment normal.   Nursing note  and vitals reviewed.      LAB RESULTS  Recent Results (from the past 24 hour(s))   Comprehensive Metabolic Panel    Collection Time: 12/08/18  2:15 PM   Result Value Ref Range    Glucose 666 (C) 65 - 99 mg/dL    BUN 11 6 - 20 mg/dL    Creatinine 1.15 (H) 0.57 - 1.00 mg/dL    Sodium 132 (L) 136 - 145 mmol/L    Potassium 4.3 3.5 - 5.2 mmol/L    Chloride 91 (L) 98 - 107 mmol/L    CO2 24.9 22.0 - 29.0 mmol/L    Calcium 10.1 8.6 - 10.5 mg/dL    Total Protein 7.9 6.0 - 8.5 g/dL    Albumin 4.70 3.50 - 5.20 g/dL    ALT (SGPT) 108 (H) 1 - 33 U/L    AST (SGOT) 67 (H) 1 - 32 U/L    Alkaline Phosphatase 132 (H) 39 - 117 U/L    Total Bilirubin 0.4 0.1 - 1.2 mg/dL    eGFR Non African Amer 49 (L) >60 mL/min/1.73    Globulin 3.2 gm/dL    A/G Ratio 1.5 g/dL    BUN/Creatinine Ratio 9.6 7.0 - 25.0    Anion Gap 16.1 mmol/L   Protime-INR    Collection Time: 12/08/18  2:15 PM   Result Value Ref Range    Protime 12.5 11.7 - 14.2 Seconds    INR 0.95 0.90 - 1.10   aPTT    Collection Time: 12/08/18  2:15 PM   Result Value Ref Range    PTT 23.6 22.7 - 35.4 seconds   Troponin    Collection Time: 12/08/18  2:15 PM   Result Value Ref Range    Troponin T <0.010 0.000 - 0.030 ng/mL   Type & Screen    Collection Time: 12/08/18  2:15 PM   Result Value Ref Range    ABO Type A     RH type Positive     Antibody Screen Negative     T&S Expiration Date 12/11/2018 11:59:59 PM    Light Blue Top    Collection Time: 12/08/18  2:15 PM   Result Value Ref Range    Extra Tube hold for add-on    Green Top (Gel)    Collection Time: 12/08/18  2:15 PM   Result Value Ref Range    Extra Tube Hold for add-ons.    Lavender Top    Collection Time: 12/08/18  2:15 PM   Result Value Ref Range    Extra Tube hold for add-on    Gold Top - SST    Collection Time: 12/08/18  2:15 PM   Result Value Ref Range    Extra Tube Hold for add-ons.    CBC Auto Differential    Collection Time: 12/08/18  2:15 PM   Result Value Ref Range    WBC 6.78 4.50 - 10.70 10*3/mm3    RBC 5.66 (H) 3.90  - 5.20 10*6/mm3    Hemoglobin 15.8 (H) 11.9 - 15.5 g/dL    Hematocrit 48.2 (H) 35.6 - 45.5 %    MCV 85.2 80.5 - 98.2 fL    MCH 27.9 26.9 - 32.0 pg    MCHC 32.8 32.4 - 36.3 g/dL    RDW 12.9 11.7 - 13.0 %    RDW-SD 40.1 37.0 - 54.0 fl    MPV 11.8 6.0 - 12.0 fL    Platelets 246 140 - 500 10*3/mm3    Neutrophil % 48.6 42.7 - 76.0 %    Lymphocyte % 40.3 19.6 - 45.3 %    Monocyte % 8.6 5.0 - 12.0 %    Eosinophil % 1.8 0.3 - 6.2 %    Basophil % 0.4 0.0 - 1.5 %    Immature Grans % 0.3 0.0 - 0.5 %    Neutrophils, Absolute 3.30 1.90 - 8.10 10*3/mm3    Lymphocytes, Absolute 2.73 0.90 - 4.80 10*3/mm3    Monocytes, Absolute 0.58 0.20 - 1.20 10*3/mm3    Eosinophils, Absolute 0.12 0.00 - 0.70 10*3/mm3    Basophils, Absolute 0.03 0.00 - 0.20 10*3/mm3    Immature Grans, Absolute 0.02 0.00 - 0.03 10*3/mm3       I ordered the above labs and reviewed the results    RADIOLOGY  CT Cerebral Perfusion With & Without Contrast   Preliminary Result   1. Abnormal cerebral perfusion imaging as discussed above.   2. There is cut off of opacification of a left anterior middle cerebral   artery branch as described. This is consistent with acute occlusion.   Please see full discussion above.   3. No other significant findings are noted on the CT of the brain with   and without contrast.        Radiation dose reduction techniques were utilized, including automated   exposure control and exposure modulation based on body size.              CT Angiogram Head With & Without Contrast   Preliminary Result   1. Abnormal cerebral perfusion imaging as discussed above.   2. There is cut off of opacification of a left anterior middle cerebral   artery branch as described. This is consistent with acute occlusion.   Please see full discussion above.   3. No other significant findings are noted on the CT of the brain with   and without contrast.        Radiation dose reduction techniques were utilized, including automated   exposure control and exposure  modulation based on body size.              CT Angiogram Neck With & Without Contrast   Preliminary Result   1. Abnormal cerebral perfusion imaging as discussed above.   2. There is cut off of opacification of a left anterior middle cerebral   artery branch as described. This is consistent with acute occlusion.   Please see full discussion above.   3. No other significant findings are noted on the CT of the brain with   and without contrast.        Radiation dose reduction techniques were utilized, including automated   exposure control and exposure modulation based on body size.              XR Chest 1 View    (Results Pending)   CT Head Without Contrast    (Results Pending)       I ordered the above noted radiological studies and reviewed the images on the PACS system.      MEDICATIONS GIVEN IN ER  Medications   sodium chloride 0.9 % flush 10 mL (not administered)   sodium chloride 0.9 % bolus 1,000 mL (not administered)   alteplase (ACTIVASE) 76 mg in sterile water (preservative free) 76 mL (1 mg/mL) infusion (76 mg Intravenous New Bag 12/8/18 1505)   sodium chloride 0.9 % infusion 100 mL (not administered)   sodium chloride 0.9 % flush 3 mL (not administered)   sodium chloride 0.9 % flush 3-10 mL (not administered)   atorvastatin (LIPITOR) tablet 80 mg (not administered)   aspirin tablet 325 mg (not administered)     Or   aspirin suppository 300 mg (not administered)   iopamidol (ISOVUE-370) 76 % injection 150 mL (150 mL Intravenous Given by Other 12/8/18 1439)   insulin regular (humuLIN R,novoLIN R) injection 10 Units (10 Units Intravenous Given 12/8/18 1514)   alteplase (ACTIVASE) 0.09 mg/kg = 8.4 mg in sterile water (preservative free) 8.4 mL bolus (8.4 mg Intravenous Bolus from Bag 12/8/18 1504)       EKG  Interpreted by ED Physician    PROCEDURES  Procedures    COURSE & MEDICAL DECISION MAKING  Pertinent Labs and Imaging studies that were ordered and reviewed are noted above.  Results were  "reviewed/discussed with the patient and they were also made aware of online access.  Pt also made aware that some labs, such as cultures, will not be resulted during ER visit and follow up with PMD is necessary.     PROGRESS AND CONSULTS    Progress Notes:       1412-Called Team EVI Glucose at this time greater than 600.     1413-Discussed pt case with  (stroke neurology) who states to order the stroke panel.     1416-Ordered stroke order panel.     1418-Discussed pt case with nurse who states while doing verbal test pt was about to say \"glove\" and that she knew what it was.     1420- Reviewed pt's history and workup with Dr. Caldwell.  After a bedside evaluation, Dr. Caldwell agrees with the plan of care.    1430-Per nurse, pt's family is now at bedside and states that pt was able to ambulate out of the restroom to the couch and began hysterically crying and stating that for a split second she did not recognize her daughter. Pt then began crying again and EMS was called.     1458-Care turned over to  (ED Physician)     Disposition vitals:  /87   Pulse 98   Temp 98.4 °F (36.9 °C)   Resp 20   Ht 157.5 cm (62\")   Wt 94 kg (207 lb 3.7 oz)   SpO2 97%   BMI 37.90 kg/m²       DIAGNOSIS  Final diagnoses:   Cerebrovascular accident (CVA) due to embolism of left middle cerebral artery (CMS/HCC)   Hyperglycemia         Documentation assistance provided by mary Chatterjee for KEYONA Sky.  Information recorded by the scribe was done at my direction and has been verified and validated by me.  Electronically signed by Janneth Scales on 12/8/2018 at time 2:12 PM       Janae Chatterjee  12/08/18 1502       Janneth Scales APRN  12/08/18 1532    "

## 2018-12-08 NOTE — ED NOTES
Pt daughter states pt went into bathroom at 1315. Came out of the bathroom and started crying hysterically on the couch. Then patient began to calm down enough to speak and told her daughter that she did not recognize her when she walked out of the bathroom. Pt then started to cry hysterically again and tried to talk but was unable to get any words out. Pt daughter states she thought patient has a slight droop but ems said that she looked find.     Amrita Jj RN  12/08/18 [id number]

## 2018-12-08 NOTE — CONSULTS
Neurology Note    Patient:  PAULA Dent    YOB: 1960    REFERRING PHYSICIAN:  No ref. provider found    CHIEF COMPLAINT:    Difficulty with speech    HISTORY OF PRESENT ILLNESS:   The patient is a 58 y.o. female with h/o DM, CAD, CMT, PCM. On Plavix with sudden onset of inability to speak around 1 pm today. She presented to the ED right away, NIHSS 3, CT head negative, CTP with a large LMC mismatch, small core infarct, CTA with LMCA thrombus. Initially aphasic, her aphasia resolved after TPA administration. Reviewed imaging with radiology and NS. Patient denies prior stroke symptoms, reports being on anticoagulation I the past, not sure whu it was stopped. SBP 150s, .    Past Medical History:  DM.CAD, CMT, PCM    Past Surgical History:  PCM    Social History:   Social History     Socioeconomic History   • Marital status:    Denies alcohol, tobacco smoking.    Family History:       Medications Prior to Admission:    Prior to Admission medications    Plavix    Allergies:  Patient has no known allergies.      Review of system  Review of Systems   Unable to perform ROS: Patient nonverbal       Vitals:    12/08/18 1448   BP: 142/87   Pulse: 98   Resp: 20   Temp:    SpO2: 97%       Physical exam  Physical Exam   Constitutional: She appears well-developed and well-nourished.   Neck: Carotid bruit is not present.   Cardiovascular: Normal rate and regular rhythm.   Pulmonary/Chest: Effort normal.   Neurological: She is alert. She has normal strength and normal reflexes. No cranial nerve deficit or sensory deficit. She displays no Babinski's sign on the right side. She displays no Babinski's sign on the left side.   Expressive aphasia, follows commands well, visual fields full, no facial droop or limb drift.   Psychiatric: Her behavior is normal. Her mood appears anxious.         Lab Results   Component Value Date    WBC 6.78 12/08/2018    HGB 15.8 (H) 12/08/2018    HCT 48.2 (H)  12/08/2018    MCV 85.2 12/08/2018     12/08/2018     Lab Results   Component Value Date    GLUCOSE 666 (C) 12/08/2018    BUN 11 12/08/2018    CREATININE 1.15 (H) 12/08/2018    EGFRIFNONA 49 (L) 12/08/2018    BCR 9.6 12/08/2018    CO2 24.9 12/08/2018    CALCIUM 10.1 12/08/2018    ALBUMIN 4.70 12/08/2018    AST 67 (H) 12/08/2018     (H) 12/08/2018         Radiological Studies:   Ct Angiogram Head With & Without Contrast    Result Date: 12/8/2018  CT OF THE BRAIN WITH AND WITHOUT CONTRAST, CT ANGIOGRAPHY OF THE HEAD AND NECK WITH CONTRAST INCLUDING RECONSTRUCTION IMAGES AND CT CEREBRAL PERFUSION IMAGES 12/08/2018.  HISTORY: Unable to speak. Possible stroke.  TECHNIQUE: Axial images were obtained through the brain without intravenous contrast.  FINDINGS: There is some bifrontal atrophy. Ventricles appear normal in size. There is no evidence of acute infarction, hemorrhage, midline shift or mass effect. Minimal basal ganglia calcification is seen on the left. Report was called to the stroke team at approximately 1425 hours.  TECHNIQUE: Following the intravenous contrast injection CT angiography of the head and neck was performed. Sagittal, coronal and 3-D reconstruction images were reviewed. CT cerebral perfusion images were obtained.  FINDINGS: On the cerebral perfusion images there is prolonged Tmax in the left frontoparietal region in the distribution of the left middle cerebral artery territory. A small area of abnormal density is seen in the left frontal lobe on the CBF images. This is not seen on the CBV images.  NASCET criteria was used. Sagittal, coronal and 3-D reconstruction images were reviewed.  There appears to be cut off of opacification of an anterior division left middle cerebral artery branch involving the anterior aspect of the left temporal lobe. This is best seen on coronal reconstruction images 66 through 69. The left middle cerebral artery otherwise opacifies normally. The left  anterior cerebral artery and the right middle and anterior cerebral arteries opacify normally. The bilateral vertebral and the bilateral common carotid and internal carotid arteries opacify relatively normally.  No aneurysm is seen.  The right vertebral artery appears smaller than the left.  Postcontrast CT of the brain shows no abnormal enhancement.      1. Abnormal cerebral perfusion imaging as discussed above. 2. There is cut off of opacification of a left anterior middle cerebral artery branch as described. This is consistent with acute occlusion. Please see full discussion above. 3. No other significant findings are noted on the CT of the brain with and without contrast.   Radiation dose reduction techniques were utilized, including automated exposure control and exposure modulation based on body size.       Ct Angiogram Neck With & Without Contrast    Result Date: 12/8/2018  CT OF THE BRAIN WITH AND WITHOUT CONTRAST, CT ANGIOGRAPHY OF THE HEAD AND NECK WITH CONTRAST INCLUDING RECONSTRUCTION IMAGES AND CT CEREBRAL PERFUSION IMAGES 12/08/2018.  HISTORY: Unable to speak. Possible stroke.  TECHNIQUE: Axial images were obtained through the brain without intravenous contrast.  FINDINGS: There is some bifrontal atrophy. Ventricles appear normal in size. There is no evidence of acute infarction, hemorrhage, midline shift or mass effect. Minimal basal ganglia calcification is seen on the left. Report was called to the stroke team at approximately 1425 hours.  TECHNIQUE: Following the intravenous contrast injection CT angiography of the head and neck was performed. Sagittal, coronal and 3-D reconstruction images were reviewed. CT cerebral perfusion images were obtained.  FINDINGS: On the cerebral perfusion images there is prolonged Tmax in the left frontoparietal region in the distribution of the left middle cerebral artery territory. A small area of abnormal density is seen in the left frontal lobe on the CBF images.  This is not seen on the CBV images.  NASCET criteria was used. Sagittal, coronal and 3-D reconstruction images were reviewed.  There appears to be cut off of opacification of an anterior division left middle cerebral artery branch involving the anterior aspect of the left temporal lobe. This is best seen on coronal reconstruction images 66 through 69. The left middle cerebral artery otherwise opacifies normally. The left anterior cerebral artery and the right middle and anterior cerebral arteries opacify normally. The bilateral vertebral and the bilateral common carotid and internal carotid arteries opacify relatively normally.  No aneurysm is seen.  The right vertebral artery appears smaller than the left.  Postcontrast CT of the brain shows no abnormal enhancement.      1. Abnormal cerebral perfusion imaging as discussed above. 2. There is cut off of opacification of a left anterior middle cerebral artery branch as described. This is consistent with acute occlusion. Please see full discussion above. 3. No other significant findings are noted on the CT of the brain with and without contrast.   Radiation dose reduction techniques were utilized, including automated exposure control and exposure modulation based on body size.       Xr Chest 1 View    Result Date: 12/8/2018  XR CHEST 1 VW-  HISTORY: Female who is 58 years-old,  stroke  TECHNIQUE: Frontal view of the chest  COMPARISON: None available  FINDINGS: Heart size is borderline. Left-sided pacemaker and cardiac leads. Mild vascular congestion. No focal pulmonary consolidation, pleural effusion, or pneumothorax. No acute osseous process.      No focal pulmonary consolidation. Borderline heart size with mild vascular congestion.  This report was finalized on 12/8/2018 4:27 PM by Dr. Jarvis Marcum M.D.      Ct Cerebral Perfusion With & Without Contrast    Result Date: 12/8/2018  CT OF THE BRAIN WITH AND WITHOUT CONTRAST, CT ANGIOGRAPHY OF THE HEAD AND NECK  WITH CONTRAST INCLUDING RECONSTRUCTION IMAGES AND CT CEREBRAL PERFUSION IMAGES 12/08/2018.  HISTORY: Unable to speak. Possible stroke.  TECHNIQUE: Axial images were obtained through the brain without intravenous contrast.  FINDINGS: There is some bifrontal atrophy. Ventricles appear normal in size. There is no evidence of acute infarction, hemorrhage, midline shift or mass effect. Minimal basal ganglia calcification is seen on the left. Report was called to the stroke team at approximately 1425 hours.  TECHNIQUE: Following the intravenous contrast injection CT angiography of the head and neck was performed. Sagittal, coronal and 3-D reconstruction images were reviewed. CT cerebral perfusion images were obtained.  FINDINGS: On the cerebral perfusion images there is prolonged Tmax in the left frontoparietal region in the distribution of the left middle cerebral artery territory. A small area of abnormal density is seen in the left frontal lobe on the CBF images. This is not seen on the CBV images.  NASCET criteria was used. Sagittal, coronal and 3-D reconstruction images were reviewed.  There appears to be cut off of opacification of an anterior division left middle cerebral artery branch involving the anterior aspect of the left temporal lobe. This is best seen on coronal reconstruction images 66 through 69. The left middle cerebral artery otherwise opacifies normally. The left anterior cerebral artery and the right middle and anterior cerebral arteries opacify normally. The bilateral vertebral and the bilateral common carotid and internal carotid arteries opacify relatively normally.  No aneurysm is seen.  The right vertebral artery appears smaller than the left.  Postcontrast CT of the brain shows no abnormal enhancement.      1. Abnormal cerebral perfusion imaging as discussed above. 2. There is cut off of opacification of a left anterior middle cerebral artery branch as described. This is consistent with acute  occlusion. Please see full discussion above. 3. No other significant findings are noted on the CT of the brain with and without contrast.   Radiation dose reduction techniques were utilized, including automated exposure control and exposure modulation based on body size.           During this visit the following were done:  Labs Reviewed [x]    Labs Ordered []    Radiology Reports Reviewed [x]    Radiology Ordered []    EKG, echo, and/or stress test reviewed []    EEG results reviewed  []    EEG reviewed and interpreted per myself   []    Discussed case with neurointerventionalist or neuroradiologist [x]    Referring Provider Records Reviewed []    ER Records Reviewed [x]    Hospital Records Reviewed []    History Obtained From Family [x]    Radiological images view and Interpreted per myself [x]    Case Discussed with referring provider []     Decision to obtain and request outside records  []        Assessment and Plan     Threatened LMCA stroke, suspect cardiac embolic source given cardiac h/o, getting IV TPA.      - ICU admit per TPA protocol.    - Maintain -180, DBP<105.    - TTE.    - Start AC if 24 hour head CT negative for bleed.    Thanks,    Electronically signed by Mark Jones MD on 12/8/2018 at 3:22 PM

## 2018-12-08 NOTE — ED PROVIDER NOTES
MD ATTESTATION NOTE    Pt presents to the ED with trouble talking that began about 1315 today. Pt is unable to verbalize any history. On exam, the pt has severe expressive aphasia but has no receptive aphasia. Pt quickly nods yes or no appropriately to questions. Pt has no sensory deficit or focal motor deficit. I agree with the plan to order the Team D protocol.    EKG          EKG time: 1450  Rhythm/Rate: NSR, 94  P waves and ID: normal  QRS, axis: old anteroseptal infarct   ST and T waves: unremarkable     Interpreted Contemporaneously by me, independently viewed  No prior for comparison    1415  Interval: baseline(at presentation)  1a. Level of Consciousness: 0-->Alert, keenly responsive  1b. LOC Questions: 0-->Answers both questions correctly  1c. LOC Commands: 0-->Performs both tasks correctly  2. Best Gaze: 0-->Normal  3. Visual: 0-->No visual loss  4. Facial Palsy: 0-->Normal symmetrical movements  5a. Motor Arm, Left: 0-->No drift, limb holds 90 (or 45) degrees for full 10 secs  5b. Motor Arm, Right: 0-->No drift, limb holds 90 (or 45) degrees for full 10 secs  6a. Motor Leg, Left: 0-->No drift, leg holds 30 degree position for full 5 secs  6b. Motor Leg, Right: 0-->No drift, leg holds 30 degree position for full 5 secs  7. Limb Ataxia: 0-->Absent  8. Sensory: 0-->Normal, no sensory loss  9. Best Language: 3-->Mute, global aphasia, no usable speech or auditory comprehension  10. Dysarthria: (UN) Intubated or other physical barrier  11. Extinction and Inattention (formerly Neglect): 0-->No abnormality    Total (NIH Stroke Scale): 3  Critical Care  Performed by: Leobardo Caldwell MD  Authorized by: Leobardo Caldwell MD     Critical care provider statement:     Critical care time (minutes):  42    Critical care time was exclusive of:  Separately billable procedures and treating other patients    Critical care was necessary to treat or prevent imminent or life-threatening deterioration of the following conditions:   CNS failure or compromise    Critical care was time spent personally by me on the following activities:  Development of treatment plan with patient or surrogate, discussions with consultants, evaluation of patient's response to treatment, examination of patient, obtaining history from patient or surrogate, ordering and performing treatments and interventions, ordering and review of laboratory studies, ordering and review of radiographic studies, pulse oximetry and re-evaluation of patient's condition          1448- Discussed the pt's case with Dr. Jones (neurology), who agrees to come to the ED to evaluate the pt.    1449- Rechecked pt. Pt's family is now at bedside. Pt's family confirmed that the pt is on Plavix. RN states that the pt is beginning to respond verbally to some questions and .    1453- Discussed the pt's case with Dr. Jones (neurology), who states that the pt has a left MCA stroke.     1454- Rechecked pt. Pt is resting comfortably and states that her speech is starting to improve. Notified pt and family of the pt's CTA Head results and that the pt is a tPA candidate. I discussed the risks and benefits of tPA. Pt states that she wants tPA. Discussed the plan to administer tPA and to admit the pt for observation and further evaluation. Pt and family agree with the plan and all questions were addressed.    1501- Ordered IVF for hydration and insulin for hyperglycemia.    1507- Rechecked pt. Pt nods that she has been taking her medications as prescribed. Dr. Jones is now at bedside. Pt understands and agrees with the plan, all questions answered.    1517- Placed call to pulmonology for admission.    1523- Rechecked pt. Pt is now slowly reading words off the stroke cards.    1539- Discussed the pt's case with Dr. Diaz (pulmonology), who agrees to admit the pt to the ICU.    1611- Dr. Jones informed me that he reviewed the pt's imaging studies with Dr. Cleveland and that Dr. Cleveland is going to  take the pt to the cath lab for clot retrieval.     1620- Dr. Cleveland (neurosurgery) is in the ED talking to pt's family    Pt's tPA administration was delayed by the pt's atypical presentation, CTP results and for family to bring the pt's medications from home.     Final diagnoses:   Cerebrovascular accident (CVA) due to embolism of left middle cerebral artery (CMS/HCC)   Hyperglycemia     DISPOSITION  ADMISSION    Discussed treatment plan and reason for admission with pt/family and admitting physician.  Pt/family voiced understanding of the plan for admission for further testing/treatment as needed.       The CARMEL and I have discussed this patient's history, physical exam, and treatment plan.  I have reviewed the documentation and personally had a face to face interaction with the patient. I affirm the documentation and agree with the treatment and plan.  The attached note describes my personal findings.    Documentation assistance provided by mary Gannon for Dr. Caldwell.  Information recorded by the mary was done at my direction and has been verified and validated by me.     Kim Gannon  12/08/18 1548       Kim Gannon  12/08/18 1621       Leobardo Caldwell MD  12/09/18 0056

## 2018-12-09 ENCOUNTER — APPOINTMENT (OUTPATIENT)
Dept: CARDIOLOGY | Facility: HOSPITAL | Age: 58
End: 2018-12-09
Attending: INTERNAL MEDICINE

## 2018-12-09 ENCOUNTER — APPOINTMENT (OUTPATIENT)
Dept: CT IMAGING | Facility: HOSPITAL | Age: 58
End: 2018-12-09

## 2018-12-09 LAB
ANION GAP SERPL CALCULATED.3IONS-SCNC: 11.8 MMOL/L
ANION GAP SERPL CALCULATED.3IONS-SCNC: 14.4 MMOL/L
ARTICHOKE IGE QN: 199 MG/DL (ref 0–100)
BUN BLD-MCNC: 11 MG/DL (ref 6–20)
BUN BLD-MCNC: 9 MG/DL (ref 6–20)
BUN/CREAT SERPL: 12.7 (ref 7–25)
BUN/CREAT SERPL: 12.9 (ref 7–25)
CALCIUM SPEC-SCNC: 7.9 MG/DL (ref 8.6–10.5)
CALCIUM SPEC-SCNC: 9.6 MG/DL (ref 8.6–10.5)
CHLORIDE SERPL-SCNC: 106 MMOL/L (ref 98–107)
CHLORIDE SERPL-SCNC: 108 MMOL/L (ref 98–107)
CHOLEST SERPL-MCNC: 279 MG/DL (ref 0–200)
CO2 SERPL-SCNC: 21.6 MMOL/L (ref 22–29)
CO2 SERPL-SCNC: 23.2 MMOL/L (ref 22–29)
CREAT BLD-MCNC: 0.71 MG/DL (ref 0.57–1)
CREAT BLD-MCNC: 0.85 MG/DL (ref 0.57–1)
CREAT BLDA-MCNC: 1 MG/DL (ref 0.6–1.3)
GFR SERPL CREATININE-BSD FRML MDRD: 69 ML/MIN/1.73
GFR SERPL CREATININE-BSD FRML MDRD: 85 ML/MIN/1.73
GLUCOSE BLD-MCNC: 295 MG/DL (ref 65–99)
GLUCOSE BLD-MCNC: 310 MG/DL (ref 65–99)
GLUCOSE BLDC GLUCOMTR-MCNC: 303 MG/DL (ref 70–130)
GLUCOSE BLDC GLUCOMTR-MCNC: 329 MG/DL (ref 70–130)
GLUCOSE BLDC GLUCOMTR-MCNC: 350 MG/DL (ref 70–130)
GLUCOSE BLDC GLUCOMTR-MCNC: 367 MG/DL (ref 70–130)
HBA1C MFR BLD: 12.6 % (ref 4.8–5.6)
HDLC SERPL-MCNC: 32 MG/DL (ref 40–60)
LDLC SERPL CALC-MCNC: ABNORMAL MG/DL (ref 0–100)
LDLC/HDLC SERPL: ABNORMAL {RATIO}
POTASSIUM BLD-SCNC: 3.4 MMOL/L (ref 3.5–5.2)
POTASSIUM BLD-SCNC: 3.9 MMOL/L (ref 3.5–5.2)
SODIUM BLD-SCNC: 142 MMOL/L (ref 136–145)
SODIUM BLD-SCNC: 143 MMOL/L (ref 136–145)
TRIGL SERPL-MCNC: 401 MG/DL (ref 0–150)
VLDLC SERPL-MCNC: ABNORMAL MG/DL (ref 5–40)

## 2018-12-09 PROCEDURE — 63710000001 INSULIN GLARGINE PER 5 UNITS: Performed by: INTERNAL MEDICINE

## 2018-12-09 PROCEDURE — 70450 CT HEAD/BRAIN W/O DYE: CPT

## 2018-12-09 PROCEDURE — 97162 PT EVAL MOD COMPLEX 30 MIN: CPT

## 2018-12-09 PROCEDURE — 99254 IP/OBS CNSLTJ NEW/EST MOD 60: CPT | Performed by: INTERNAL MEDICINE

## 2018-12-09 PROCEDURE — 83721 ASSAY OF BLOOD LIPOPROTEIN: CPT | Performed by: PSYCHIATRY & NEUROLOGY

## 2018-12-09 PROCEDURE — 83036 HEMOGLOBIN GLYCOSYLATED A1C: CPT | Performed by: PSYCHIATRY & NEUROLOGY

## 2018-12-09 PROCEDURE — 97110 THERAPEUTIC EXERCISES: CPT

## 2018-12-09 PROCEDURE — 82962 GLUCOSE BLOOD TEST: CPT

## 2018-12-09 PROCEDURE — 63710000001 INSULIN LISPRO (HUMAN) PER 5 UNITS: Performed by: INTERNAL MEDICINE

## 2018-12-09 PROCEDURE — 80061 LIPID PANEL: CPT | Performed by: PSYCHIATRY & NEUROLOGY

## 2018-12-09 PROCEDURE — 80048 BASIC METABOLIC PNL TOTAL CA: CPT | Performed by: INTERNAL MEDICINE

## 2018-12-09 PROCEDURE — 99233 SBSQ HOSP IP/OBS HIGH 50: CPT | Performed by: NURSE PRACTITIONER

## 2018-12-09 RX ORDER — DEXTROSE MONOHYDRATE 25 G/50ML
25 INJECTION, SOLUTION INTRAVENOUS
Status: DISCONTINUED | OUTPATIENT
Start: 2018-12-09 | End: 2018-12-12 | Stop reason: HOSPADM

## 2018-12-09 RX ORDER — FUROSEMIDE 40 MG/1
40 TABLET ORAL DAILY
Status: DISCONTINUED | OUTPATIENT
Start: 2018-12-09 | End: 2018-12-12 | Stop reason: HOSPADM

## 2018-12-09 RX ORDER — CARVEDILOL 25 MG/1
25 TABLET ORAL 2 TIMES DAILY WITH MEALS
Status: DISCONTINUED | OUTPATIENT
Start: 2018-12-09 | End: 2018-12-12 | Stop reason: HOSPADM

## 2018-12-09 RX ORDER — CLOPIDOGREL BISULFATE 75 MG/1
75 TABLET ORAL DAILY
Status: DISCONTINUED | OUTPATIENT
Start: 2018-12-09 | End: 2018-12-12 | Stop reason: HOSPADM

## 2018-12-09 RX ORDER — SPIRONOLACTONE 25 MG/1
25 TABLET ORAL DAILY
Status: DISCONTINUED | OUTPATIENT
Start: 2018-12-09 | End: 2018-12-12 | Stop reason: HOSPADM

## 2018-12-09 RX ORDER — INSULIN GLARGINE 100 [IU]/ML
30 INJECTION, SOLUTION SUBCUTANEOUS EVERY MORNING
Status: DISCONTINUED | OUTPATIENT
Start: 2018-12-09 | End: 2018-12-10

## 2018-12-09 RX ORDER — NICOTINE POLACRILEX 4 MG
15 LOZENGE BUCCAL
Status: DISCONTINUED | OUTPATIENT
Start: 2018-12-09 | End: 2018-12-12 | Stop reason: HOSPADM

## 2018-12-09 RX ADMIN — SODIUM CHLORIDE, PRESERVATIVE FREE 3 ML: 5 INJECTION INTRAVENOUS at 22:43

## 2018-12-09 RX ADMIN — METFORMIN HYDROCHLORIDE 1000 MG: 1000 TABLET ORAL at 11:27

## 2018-12-09 RX ADMIN — SPIRONOLACTONE 25 MG: 25 TABLET ORAL at 11:20

## 2018-12-09 RX ADMIN — INSULIN LISPRO 6 UNITS: 100 INJECTION, SOLUTION INTRAVENOUS; SUBCUTANEOUS at 16:40

## 2018-12-09 RX ADMIN — INSULIN LISPRO 6 UNITS: 100 INJECTION, SOLUTION INTRAVENOUS; SUBCUTANEOUS at 16:47

## 2018-12-09 RX ADMIN — ATORVASTATIN CALCIUM 80 MG: 80 TABLET, FILM COATED ORAL at 22:35

## 2018-12-09 RX ADMIN — SODIUM CHLORIDE, PRESERVATIVE FREE 3 ML: 5 INJECTION INTRAVENOUS at 08:34

## 2018-12-09 RX ADMIN — INSULIN LISPRO 4 UNITS: 100 INJECTION, SOLUTION INTRAVENOUS; SUBCUTANEOUS at 12:24

## 2018-12-09 RX ADMIN — ASPIRIN 325 MG: 325 TABLET ORAL at 16:38

## 2018-12-09 RX ADMIN — FUROSEMIDE 40 MG: 40 TABLET ORAL at 11:26

## 2018-12-09 RX ADMIN — CLOPIDOGREL 75 MG: 75 TABLET, FILM COATED ORAL at 18:12

## 2018-12-09 RX ADMIN — CARVEDILOL 25 MG: 25 TABLET, FILM COATED ORAL at 11:26

## 2018-12-09 RX ADMIN — INSULIN GLARGINE 30 UNITS: 100 INJECTION, SOLUTION SUBCUTANEOUS at 16:38

## 2018-12-09 RX ADMIN — INSULIN LISPRO 6 UNITS: 100 INJECTION, SOLUTION INTRAVENOUS; SUBCUTANEOUS at 22:35

## 2018-12-09 RX ADMIN — CARVEDILOL 25 MG: 25 TABLET, FILM COATED ORAL at 22:35

## 2018-12-09 NOTE — PROGRESS NOTES
Neymar Matute MD                          [phone]      Patient ID:    Name:  PAULA Dent    MRN:  [id number]    1960   58 y.o.  female            Patient Care Team:  Kim Kathleen MD as PCP - General (Family Medicine)    CC/ Reason for visit: Per Assessment mentioned below    Subjective: Pt seen and examined this AM. No acute overnight events noted. Doing better. No neuro deficits noted.     ROS: Denies any subjective fevers, chest pain, nausea/ vomiting    Objective     Vital Signs past 24hrs    BP range: BP: (111-167)/(54-89) 111/62  Pulse range: Heart Rate:  [] 64  Resp rate range: Resp:  [16-20] 18  Temp range: Temp (24hrs), Av.3 °F (36.8 °C), Min:97.8 °F (36.6 °C), Max:98.6 °F (37 °C)      Ventilator/Non-Invasive Ventilation Settings (From admission, onward)    None          Device (Oxygen Therapy): room air       92.8 kg (204 lb 9.4 oz); Body mass index is 39.96 kg/m².      Intake/Output Summary (Last 24 hours) at 2018 1054  Last data filed at 2018 0600  Gross per 24 hour   Intake 1165 ml   Output 500 ml   Net 665 ml       Exam:  Constitutional: No acute distress No accessory muscle use   Head: - NCAT  Eyes: No pallor, Anicteric conjunctiva, EOMI.  ENMT:  Mallampati 3, no oral thrush. No palpable cervical lymphadenopathy  Heart: RRR, no murmur  Lungs: REGINA +, No wheezes/crackles heard    Abdomen: Obese. Soft. No tenderness, guarding or rigidity  Extremities: Extremities warm and well perfused, Trace pitting edema  Neuro: Conscious, alert, oriented x3, no gross focal neuro deficits    Scheduled meds:    aspirin 325 mg Oral Daily   Or      aspirin 300 mg Rectal Daily   atorvastatin 80 mg Oral Nightly   carvedilol 25 mg Oral BID With Meals   furosemide 40 mg Oral Daily   insulin lispro 0-7 Units Subcutaneous 4x Daily With Meals & Nightly   metFORMIN 1,000 mg Oral BID With Meals   sodium chloride 3 mL Intravenous Q12H    spironolactone 25 mg Oral Daily       IV meds:                           Data Review:      Results from last 7 days   Lab Units  12/09/18   0309  12/08/18   1421  12/08/18   1415   SODIUM mmol/L  142   --   132*   POTASSIUM mmol/L  3.9   --   4.3   CHLORIDE mmol/L  106   --   91*   CO2 mmol/L  21.6*   --   24.9   BUN mg/dL  11   --   11   CREATININE mg/dL  0.85  1.00  1.15*   CALCIUM mg/dL  9.6   --   10.1   BILIRUBIN mg/dL   --    --   0.4   ALK PHOS U/L   --    --   132*   ALT (SGPT) U/L   --    --   108*   AST (SGOT) U/L   --    --   67*   GLUCOSE mg/dL  310*   --   666*   WBC 10*3/mm3   --    --   6.78   HEMOGLOBIN g/dL   --    --   15.8*   PLATELETS 10*3/mm3   --    --   246   INR    --    --   0.95       Lab Results   Component Value Date    CALCIUM 9.6 12/09/2018                    Results Review:    I have reviewed the available laboratory results and reviewed the chest imaging from the last 3 days personally and summarized it below    Assessment    Ac aphasia; resolved    Ac Lt Frontal CVA S/p Tpa   Uncontrolled diabetes  Hyperlipidemia  CAD status post PCI  ICM with EF of 40-45% s/p AICD   SHELLIE on CPAP sees   COPD not in exac  Morbid obesity    PLAN:  Patient has made neurological recovery after the TPA.  She does not have any new bleeding noted on the repeat CT head.  Evolving ischemia confirmed.  This has been deemed embolic by neurology but no further workup has been ordered.  Patient has a history of CHF and with a low ejection fraction of 40-45%.  I will get an echocardiogram.  We will consider consulting cardiology to evaluate for embolic source.  Patient has severely uncontrolled diabetes with HbA1c of 12.  We will consult endocrinology given that she has poor control for several visits noted in the cardiology documentation.   Will restart home meds  PT/ST eval   Tx to floor   [facility] DVT ppx     I have discussed my findings and recommendations with patient, family and nursing staff.     Neymar  Bert Matute MD  12/9/2018

## 2018-12-09 NOTE — THERAPY EVALUATION
Acute Care - Physical Therapy Initial Evaluation  [facility]     Patient Name: PAULA Dent  : 1960  MRN: [id number]  Today's Date: 2018      Date of Referral to PT: 18  Referring Physician: Dr. Jones      Admit Date: 2018    Visit Dx:     ICD-10-CM ICD-9-CM   1. Cerebrovascular accident (CVA) due to embolism of left middle cerebral artery (CMS/HCC) I63.412 434.11   2. Hyperglycemia R73.9 790.29   3. Impaired functional mobility, balance, gait, and endurance Z74.09 V49.89     Patient Active Problem List   Diagnosis   • Cerebrovascular accident (CVA) due to embolism of left middle cerebral artery (CMS/HCC)     Past Medical History:   Diagnosis Date   • CHF (congestive heart failure) (CMS/HCC)    • COPD (chronic obstructive pulmonary disease) (CMS/HCC)    • Diabetes mellitus (CMS/HCC)    • Hyperlipidemia      Past Surgical History:   Procedure Laterality Date   • CARDIAC CATHETERIZATION          PT ASSESSMENT (last 12 hours)      Physical Therapy Evaluation     Row Name 18 1045          PT Evaluation Time/Intention    Subjective Information  no complaints  -DO     Document Type  evaluation  -DO     Mode of Treatment  physical therapy  -DO     Total Evaluation Minutes, Physical Therapy  19  -DO     Patient Effort  excellent  -DO     Symptoms Noted During/After Treatment  none  -DO     Comment  Pt tolerated evaluation well with no adverse s/s noted; VSS throughout.   -DO     Row Name 18 1045          General Information    Patient Profile Reviewed?  yes  -DO     Referring Physician  Dr. Jones  -DO     Patient Observations  alert;cooperative;agree to therapy  -DO     Patient/Family Observations  Pt supine in bed in no apparent distress upon entry.  present.   -DO     Prior Level of Function  independent:;all household mobility;community mobility  -DO     Equipment Currently Used at Home  none  -DO     Existing Precautions/Restrictions  fall  -DO      Benefits Reviewed  patient and family:;improve function;increase independence;increase strength;increase balance  -DO     Barriers to Rehab  none identified  -DO     Row Name 12/09/18 1045          Relationship/Environment    Lives With  spouse  -DO     Row Name 12/09/18 1045          Resource/Environmental Concerns    Current Living Arrangements  home/apartment/condo two flights of stairs within home.  -DO     Resource/Environmental Concerns  none  -DO     Transportation Concerns  car, none  -DO     Row Name 12/09/18 1045          Cognitive Assessment/Intervention- PT/OT    Orientation Status (Cognition)  oriented x 4  -DO     Follows Commands (Cognition)  WFL  -DO     Row Name 12/09/18 1045          Bed Mobility Assessment/Treatment    Bed Mobility Assessment/Treatment  supine-sit;sit-supine  -DO     Supine-Sit [place] (Bed Mobility)  independent  -DO     Sit-Supine [place] (Bed Mobility)  independent  -DO     Row Name 12/09/18 1045          Transfer Assessment/Treatment    Transfer Assessment/Treatment  sit-stand transfer;stand-sit transfer  -DO     Sit-Stand [place] (Transfers)  supervision  -DO     Stand-Sit [place] (Transfers)  supervision  -DO     Row Name 12/09/18 1045          Gait/Stairs Assessment/Training    [place] Level (Gait)  supervision  -DO     Assistive Device (Gait)  -- None.  -DO     Distance in Feet (Gait)  150  -DO     Pattern (Gait)  step-through  -DO     [place] Level (Stairs)  not tested  -DO     Row Name 12/09/18 1045          General ROM    GENERAL ROM COMMENTS  AROM is WFL for the BUE/BLE.   -DO     Row Name 12/09/18 1045          MMT (Manual Muscle Testing)    General MMT Comments  Pt demonstrates functional strength of at least 3+/5 in the BUE/BLE.   -DO     Row Name 12/09/18 1045          Sensory Assessment/Intervention    Sensory General Assessment  no sensation deficits identified  -DO     Row Name 12/09/18 1045          Vision Assessment/Intervention     Visual Impairment/Limitations  WFL;corrective lenses full time  -DO     Row Name 12/09/18 1045          Pain Assessment    Additional Documentation  Pain Scale: Numbers Pre/Post-Treatment (Group)  -DO     Row Name 12/09/18 1045          Pain Scale: Numbers Pre/Post-Treatment    Pain Scale: Numbers, Pretreatment  0/10 - no pain  -DO     Pain Scale: Numbers, Post-Treatment  0/10 - no pain  -DO     Row Name 12/09/18 1045          Physical Therapy Clinical Impression    Date of Referral to PT  12/09/18  -DO     PT Diagnosis (PT Clinical Impression)  Impaired mobility  -DO     Prognosis (PT Clinical Impression)  excellent  -DO     Functional Level at Time of Evaluation (PT Clinical Impression)  Supervision  -DO     Criteria for Skilled Interventions Met (PT Clinical Impression)  no;no problems identified which require skilled intervention Pt appears to be at PLOF.  -DO     Pathology/Pathophysiology Noted (Describe Specifically for Each System)  musculoskeletal  -DO     Impairments Found (describe specific impairments)  aerobic capacity/endurance;muscle performance  -DO     Rehab Potential (PT Clinical Summary)  good, to achieve stated therapy goals  -DO     Row Name 12/09/18 1045          Vital Signs    Pre Systolic BP Rehab  111  -DO     Pre Treatment Diastolic BP  62  -DO     Pretreatment Heart Rate (beats/min)  63  -DO     Pretreatment Resp Rate (breaths/min)  14  -DO     Pre SpO2 (%)  98  -DO     O2 Delivery Pre Treatment  room air  -DO     Row Name 12/09/18 1045          Positioning and Restraints    Pre-Treatment Position  in bed  -DO     Post Treatment Position  bed  -DO     In Bed  notified nsg;fowlers;call light within reach;encouraged to call for assist;with family/caregiver;SCD pump applied  -DO       User Key  (r) = Recorded By, (t) = Taken By, (c) = Cosigned By    Initials Name Provider Type    DO Faraz Urbina, KRISTIN Physical Therapist        Physical Therapy Education     Title: PT OT SLP Therapies  (Done)     Topic: Physical Therapy (Done)     Point: Mobility training (Done)     Learning Progress Summary           Patient Acceptance, E, VU,DU by DO at 12/9/2018 11:14 AM   Family Acceptance, E, VU,DU by DO at 12/9/2018 11:14 AM                   Point: Home exercise program (Done)     Learning Progress Summary           Patient Acceptance, E, VU,DU by DO at 12/9/2018 11:14 AM   Family Acceptance, E, VU,DU by DO at 12/9/2018 11:14 AM                   Point: Body mechanics (Done)     Learning Progress Summary           Patient Acceptance, E, VU,DU by DO at 12/9/2018 11:14 AM   Family Acceptance, E, VU,DU by DO at 12/9/2018 11:14 AM                   Point: Precautions (Done)     Learning Progress Summary           Patient Acceptance, E, VU,DU by DO at 12/9/2018 11:14 AM   Family Acceptance, E, VU,DU by DO at 12/9/2018 11:14 AM                               User Key     Initials Effective Dates Name Provider Type Discipline    DO 03/07/18 -  Faraz Urbina, PT Physical Therapist PT              PT Recommendation and Plan  Anticipated Discharge Disposition (PT): home, home with assist  Therapy Frequency (PT Clinical Impression): evaluation only  Outcome Summary/Treatment Plan (PT)  Anticipated Discharge Disposition (PT): home, home with assist  Plan of Care Reviewed With: patient, family  Outcome Summary: PT is a 58 y.o. female admitted for CVA due to embolism in the [facility], with a PMH that includes CHF, COPD, and DM. Pt was able to demonstrates independence with bed mobility, transfers, and ambulate 150ft. Pt provided supervision for safety only, however her functional mobility seems to be at her PLOF. No unsteadiness noted during ambulation. Pt does not require skilled PT at this time due to being at PLOF and was encouraged to ambulate in hallway with spouse/nursing. KANE Naik aware. PT will sign off at this time, with reconsult if necessary. PT recommends d/c home when medically appropriate.   Outcome Measures      Row Name 12/09/18 1100             How much help from another person do you currently need...    Turning from your back to your side while in flat bed without using bedrails?  4  -DO      Moving from lying on back to sitting on the side of a flat bed without bedrails?  4  -DO      Moving to and from a bed to a chair (including a wheelchair)?  4  -DO      Standing up from a chair using your arms (e.g., wheelchair, bedside chair)?  4  -DO      Climbing 3-5 steps with a railing?  4  -DO      To walk in hospital room?  4  -DO      AM-PAC 6 Clicks Score  24  -DO         Functional Assessment    Outcome Measure Options  AM-PAC 6 Clicks Basic Mobility (PT)  -DO        User Key  (r) = Recorded By, (t) = Taken By, (c) = Cosigned By    Initials Name Provider Type    Faraz Fuentes PT Physical Therapist         Time Calculation:   PT Charges     Row Name 12/09/18 1117             Time Calculation    Start Time  1045  -DO      Stop Time  1104  -DO      Time Calculation (min)  19 min  -DO      PT Received On  12/09/18  -DO        User Key  (r) = Recorded By, (t) = Taken By, (c) = Cosigned By    Initials Name Provider Type    Faraz Fuentes PT Physical Therapist        Therapy Suggested Charges     Code   Minutes Charges    None           Therapy Charges for Today     Code Description Service Date Service Provider Modifiers Qty    16198314746  PT EVAL MOD COMPLEXITY 2 12/9/2018 Faraz Urbina, PT GP 1    08449961971 HC PT THER PROC EA 15 MIN 12/9/2018 Faraz Urbina, PT GP 1          PT G-Codes  Outcome Measure Options: AM-PAC 6 Clicks Basic Mobility (PT)  AM-PAC 6 Clicks Score: 24      Faraz Urbina PT  12/9/2018

## 2018-12-09 NOTE — PLAN OF CARE
Problem: Patient Care Overview  Goal: Plan of Care Review   12/09/18 0517   Coping/Psychosocial   Plan of Care Reviewed With patient   Plan of Care Review   Progress improving   OTHER   Outcome Summary Pt neurologically intact, NIH 0, passed bedside swallow, am CT completed, no significant changes, VSS, no c/o of pain or HA, will continue to monitor.

## 2018-12-09 NOTE — PROGRESS NOTES
"DOS: 2018  NAME: PAULA Dent   : 1960  PCP: Kim Kathleen MD  Chief Complaint   Patient presents with   • Neuro Deficit(s)     CC: Stroke    Subjective: No new events overnight per RN. Patient with no recurrent aphasia. She denies headache or any new stroke/TIA symptoms. Spouse at bedside.     Interval History  History taken from: patient chart family RN    Objective:  Vital signs: /62 (BP Location: Left arm, Patient Position: Lying)   Pulse 64   Temp 97.8 °F (36.6 °C) (Oral)   Resp 18   Ht 152.4 cm (60\")   Wt 92.8 kg (204 lb 9.4 oz)   LMP  (LMP Unknown)   SpO2 97%   Breastfeeding? No   BMI 39.96 kg/m²       Physical Exam:  GENERAL: NAD, obese  HEENT: Normocephalic, atraumatic   COR: RRR  Resp: Even and unlabored  Extremities: No signs of distal embolization.     Neurological:   MS: AO. Language normal. No neglect. Higher integrative function normal  CN: II-XII normal  Motor: Normal strength and tone throughout.  Sensory: Intact  Coordination: Normal    Current Medications:  Scheduled Medications:  aspirin 325 mg Oral Daily   Or      aspirin 300 mg Rectal Daily   atorvastatin 80 mg Oral Nightly   carvedilol 25 mg Oral BID With Meals   furosemide 40 mg Oral Daily   insulin lispro 0-7 Units Subcutaneous 4x Daily With Meals & Nightly   metFORMIN 1,000 mg Oral BID With Meals   sodium chloride 3 mL Intravenous Q12H   spironolactone 25 mg Oral Daily     Infusions:    PRN Medications:  dextrose  •  dextrose  •  glucagon (human recombinant)  •  sodium chloride  •  sodium chloride    Medications Reviewed:    Laboratory results:  Lab Results   Component Value Date    GLUCOSE 310 (H) 2018    CALCIUM 9.6 2018     2018    K 3.9 2018    CO2 21.6 (L) 2018     2018    BUN 11 2018    CREATININE 0.85 2018    EGFRIFNONA 69 2018    BCR 12.9 2018    ANIONGAP 14.4 2018     Lab Results   Component Value Date    WBC " 6.78 12/08/2018    HGB 15.8 (H) 12/08/2018    HCT 48.2 (H) 12/08/2018    MCV 85.2 12/08/2018     12/08/2018      Results from last 7 days   Lab Units  12/09/18   0309   CHOLESTEROL mg/dL  279*     Lab Results   Component Value Date    INR 0.95 12/08/2018    PROTIME 12.5 12/08/2018     No results found for: TSH  Lab Results   Component Value Date    HGBA1C 12.60 (H) 12/09/2018     Lab Results   Component Value Date    CHOL 279 (H) 12/09/2018    TRIG 401 (H) 12/09/2018    HDL 32 (L) 12/09/2018    LDL  12/09/2018      Comment:      Unable to calculate     (H) 12/09/2018      No results found for: [id number]    Results Review:     I reviewed the patient's new clinical results.  I reviewed the patient's new imaging results and agree with the interpretation.  CT head 12/8/18: There is some bifrontal atrophy. Ventricles appear normal in  size. There is no evidence of acute infarction, hemorrhage, midline  shift or mass effect. Minimal basal ganglia calcification is seen on the  Left.  CTP 12/8/18: On the cerebral perfusion images there is prolonged Tmax in  the left frontoparietal region in the distribution of the left middle  cerebral artery territory. A small area of abnormal density is seen in  the left frontal lobe on the CBF images. This is not seen on the CBV  images.   CTA  Head/neck 12/8/18: There appears to be cut off of opacification of an anterior division  left middle cerebral artery branch involving the anterior aspect of the  left temporal lobe. This is best seen on coronal reconstruction images  66 through 69. The left middle cerebral artery otherwise opacifies  normally. The left anterior cerebral artery and the right middle and  anterior cerebral arteries opacify normally. The bilateral vertebral and  the bilateral common carotid and internal carotid arteries opacify  relatively normally.    EKG 12/8/18: SR  TTE Pending    Impression: Ms. Dent is a 59 yo female with CHF, PPM, COPD, DM, HLD  who presented on 12/8 with aphasia, NIH 3 and CTP with large perfusion mismatch, CTA with LMCA thrombus. Her aphasia resolved s/p IV tPA and therefore not a candidate for embolectomy. I reviewed and discussed her imaging with Dr. Jones. The etiology of her event is likely cardioembolic. She is followed by cardiology and has PPM, ?MRI compatible. Her 24-hour CT head is pending this afternoon.     Clinically she has remained at her baseline with no recurrent aphasia. If 24-hour CT negative for bleed, okay for ASA and to transfer out of ICU. Need for anticoagulation pending cardiology consult. She also needs aggressive control of RFs given her A1C of 12.6 and high LDL. Diabetes educator consult. Continue post-tPA protocol. PT/OT/ST. Close neurochecks. Will follow.     Plan:  Cardiology consult  Aspirin 325mg if 24-hour CT head negative for bleed  Lipitor 80 mg ()  Hydrate  Neurochecks  Non-pharmacological DVT prophylaxis  EKG Tele  PT/OT/ST  Stroke Education  Diabetes educator    I have discussed the above with Dr. Jones, Dr. EMMANUEL, RN, patient and family.  Shanae Lew, APRN  12/09/18  10:50 AM        Cerebrovascular accident (CVA) due to embolism of left middle cerebral artery (CMS/HCC)

## 2018-12-09 NOTE — PLAN OF CARE
Problem: Patient Care Overview  Goal: Plan of Care Review  Outcome: Outcome(s) achieved Date Met: 12/09/18 12/09/18 1114   Coping/Psychosocial   Plan of Care Reviewed With patient;family   OTHER   Outcome Summary PT is a 58 y.o. female admitted for CVA due to embolism in the [facility], with a PMH that includes CHF, COPD, and DM. Pt was able to demonstrates independence with bed mobility, transfers, and ambulate 150ft. Pt provided supervision for safety only, however her functional mobility seems to be at her PLOF. No unsteadiness noted during ambulation. Pt does not require skilled PT at this time due to being at PLOF and was encouraged to ambulate in hallway with spouse/nursing. RN Gumaro aware. PT will sign off at this time, with reconsult if necessary. PT recommends d/c home when medically appropriate.

## 2018-12-09 NOTE — H&P
[facility]    Patient Care Team:  Kim Kathleen MD as PCP - General (Family Medicine)    CC: Aphasia    HPI: Patient is a pleasant 58-year-old  female with a past medical history significant for CHF, COPD, diabetes, hyperlipidemia who noted sudden onset of inability to speak around 1 PM today.  She presents to the ED and had an initial NIHSS of 3.  CT head showed no acute stroke.  CT perfusion was concerning for a large perfusion mismatch.  CTA showed a left MCA thrombus.  She was given TPA after discussing with neurology.  Neurosurgery was consulted for possible intervention.  During my evaluation patient appears to be stable.  Does not have any headache or visual defects.  Does not have any neurological deficits.  She has no significant aphagia.     Objective     I have discussed the case with ED physician     Records Reviewed  Old medical records.  Nursing notes.  Previous radiology studies.    Review of Systems:  Constitutional: No fever, chills, significant weight loss or loss of appetite.   ENMT: No sinus congestion, post nasal drip, epistaxis, sore throat  Cardiovascular: No chest pain, palpitation or legs swelling.    Respiratory: No hemoptysis, orthopnea, PND.  Gastrointestinal: No constipation, diarrhea or abdominal pain   Neurology: As above   Musculoskeletal: No joint stiffness or swelling.   Psychiatry: No agitation or behavioral changes  Lymphatic: No swollen neck glands.  Integumentary: No rash.    Past Medical History:   Diagnosis Date   • CHF (congestive heart failure) (CMS/HCC)    • COPD (chronic obstructive pulmonary disease) (CMS/HCC)    • Diabetes mellitus (CMS/HCC)    • Hyperlipidemia        Past Surgical History:   Procedure Laterality Date   • CARDIAC CATHETERIZATION         Prior to Admission Medications     Prescriptions Last Dose Informant Patient Reported? Taking?    atorvastatin (LIPITOR) 80 MG tablet 12/7/2018 Medication Bottle Yes Yes    Take 80 mg by mouth  Daily.    carvedilol (COREG) 25 MG tablet 2018 Medication Bottle Yes Yes    Take 25 mg by mouth 2 (Two) Times a Day With Meals.    clopidogrel (PLAVIX) 75 MG tablet 2018 Medication Bottle Yes Yes    Take 75 mg by mouth Daily.    furosemide (LASIX) 40 MG tablet 2018 Medication Bottle Yes Yes    Take 40 mg by mouth Daily.    metFORMIN (GLUCOPHAGE) 500 MG tablet 2018 Medication Bottle Yes Yes    Take 500 mg by mouth Daily With Breakfast.    metFORMIN (GLUCOPHAGE) 500 MG tablet 2018 Medication Bottle Yes Yes    Take 1,000 mg by mouth every night at bedtime.    potassium chloride (K-DUR) 10 MEQ CR tablet 2018 Medication Bottle Yes Yes    Take 10 mEq by mouth Daily.    spironolactone (ALDACTONE) 25 MG tablet 2018 Medication Bottle Yes Yes    Take 25 mg by mouth Daily.          Patient has no known allergies.    Family History   Problem Relation Age of Onset   • Diabetes Mother    • Heart disease Mother    • Hyperlipidemia Mother    • Diabetes Father    • Heart disease Father    • Hyperlipidemia Father    • Heart disease Brother    • Hyperlipidemia Brother        Social History     Socioeconomic History   • Marital status:      Spouse name: Not on file   • Number of children: Not on file   • Years of education: Not on file   • Highest education level: Not on file   Tobacco Use   • Smoking status: Former Smoker     Packs/day: 1.00     Years: 30.00     Pack years: 30.00     Types: Cigarettes     Start date: 1979     Last attempt to quit: 2009     Years since quittin.1   • Smokeless tobacco: Never Used   Substance and Sexual Activity   • Alcohol use: Yes   • Drug use: No   • Sexual activity: Yes     Partners: Male       Physical Exam    Temp:  [98.4 °F (36.9 °C)-98.6 °F (37 °C)] 98.6 °F (37 °C)  Heart Rate:  [] 86  Resp:  [16-20] 18  BP: (122-167)/(58-89) 146/58    Constitutional: No acute distress No accessory muscle use   Head: - NCAT  Eyes: No pallor, Anicteric  conjunctiva, EOMI.  ENMT:  Mallampati 3, no oral thrush. No palpable cervical lymphadenopathy  Heart: RRR, no murmur  Lungs: REGINA +, No wheezes/crackles heard    Abdomen: Obese. Soft. No tenderness, guarding or rigidity  Extremities: Extremities warm and well perfused, Trace pitting edema  Neuro: Conscious, alert, oriented x3, no gross focal neuro deficits    LABS and IMAGING DATA:    Lab Results (last 24 hours)     Procedure Component Value Units Date/Time    POC Glucose Once [[id number]]  (Abnormal) Collected:  12/08/18 1654    Specimen:  Blood Updated:  12/08/18 1704     Glucose 379 mg/dL     [place] Draw [[id number]] Collected:  12/08/18 1415    Specimen:  Blood Updated:  12/08/18 1515    Narrative:       The following orders were created for panel order [place] Draw.  Procedure                               Abnormality         Status                     ---------                               -----------         ------                     Light Blue Top[[id number]]                                   Final result               Green Top (Gel)[[id number]]                                  Final result               Lavender Top[[id number]]                                     Final result               Gold Top - SST[[id number]]                                   Final result                 Please view results for these tests on the individual orders.    Light Blue Top [[id number]] Collected:  12/08/18 1415    Specimen:  Blood Updated:  12/08/18 1515     Extra Tube hold for add-on     Comment: Auto resulted       Green Top (Gel) [[id number]] Collected:  12/08/18 1415    Specimen:  Blood Updated:  12/08/18 1515     Extra Tube Hold for add-ons.     Comment: Auto resulted.       Lavender Top [[id number]] Collected:  12/08/18 1415    Specimen:  Blood Updated:  12/08/18 1515     Extra Tube hold for add-on     Comment: Auto resulted       Gold Top - SST [[id number]] Collected:  12/08/18 1415    Specimen:  Blood Updated:  12/08/18  1515     Extra Tube Hold for add-ons.     Comment: Auto resulted.       Comprehensive Metabolic Panel [[id number]]  (Abnormal) Collected:  12/08/18 1415    Specimen:  Blood Updated:  12/08/18 1458     Glucose 666 mg/dL      BUN 11 mg/dL      Creatinine 1.15 mg/dL      Sodium 132 mmol/L      Potassium 4.3 mmol/L      Chloride 91 mmol/L      CO2 24.9 mmol/L      Calcium 10.1 mg/dL      Total Protein 7.9 g/dL      Albumin 4.70 g/dL      ALT (SGPT) 108 U/L      AST (SGOT) 67 U/L      Alkaline Phosphatase 132 U/L      Total Bilirubin 0.4 mg/dL      eGFR Non African Amer 49 mL/min/1.73      Globulin 3.2 gm/dL      A/G Ratio 1.5 g/dL      BUN/Creatinine Ratio 9.6     Anion Gap 16.1 mmol/L     Troponin [[id number]]  (Normal) Collected:  12/08/18 1415    Specimen:  Blood Updated:  12/08/18 1442     Troponin T <0.010 ng/mL     Narrative:       Troponin T Reference Ranges:  Less than 0.03 ng/mL:    Negative for AMI  0.03 to 0.09 ng/mL:      Indeterminant for AMI  Greater than 0.09 ng/mL: Positive for AMI    Protime-INR [[id number]]  (Normal) Collected:  12/08/18 1415    Specimen:  Blood Updated:  12/08/18 1435     Protime 12.5 Seconds      INR 0.95    aPTT [[id number]]  (Normal) Collected:  12/08/18 1415    Specimen:  Blood Updated:  12/08/18 1435     PTT 23.6 seconds     CBC & Differential [[id number]] Collected:  12/08/18 1415    Specimen:  Blood Updated:  12/08/18 1425    Narrative:       The following orders were created for panel order CBC & Differential.  Procedure                               Abnormality         Status                     ---------                               -----------         ------                     CBC Auto Differential[[id number]]        Abnormal            Final result                 Please view results for these tests on the individual orders.    CBC Auto Differential [[id number]]  (Abnormal) Collected:  12/08/18 1415    Specimen:  Blood Updated:  12/08/18 1425     WBC 6.78 10*3/mm3      RBC 5.66  10*6/mm3      Hemoglobin 15.8 g/dL      Hematocrit 48.2 %      MCV 85.2 fL      MCH 27.9 pg      MCHC 32.8 g/dL      RDW 12.9 %      RDW-SD 40.1 fl      MPV 11.8 fL      Platelets 246 10*3/mm3      Neutrophil % 48.6 %      Lymphocyte % 40.3 %      Monocyte % 8.6 %      Eosinophil % 1.8 %      Basophil % 0.4 %      Immature Grans % 0.3 %      Neutrophils, Absolute 3.30 10*3/mm3      Lymphocytes, Absolute 2.73 10*3/mm3      Monocytes, Absolute 0.58 10*3/mm3      Eosinophils, Absolute 0.12 10*3/mm3      Basophils, Absolute 0.03 10*3/mm3      Immature Grans, Absolute 0.02 10*3/mm3         Lab Results   Component Value Date    TROPONINT <0.010 12/08/2018             Results from last 7 days   Lab Units  12/08/18   1415   INR   0.95            I have personally reviewed the chest imaging from the last 3 days, agree with assessment of the radiologist and summarized it below    Assessment     ASSESSMENT:  Ac aphasia   Threatened Lt MCA CVA  S/p Tpa   COPD not in exac  H/o CHF  DM    PLAN:  Patient did well with TPA.  Awaiting neurology and further neurosurgery plan regarding interventional procedure.  We will continue to watch the patient closely in ICU.  Post TPA protocol  Echocardiogram and IV heparin plan per neurology noted  Secondary stroke prophylaxis  DVT ppx     I have discussed my plan with the patient, available family and nursing staff.     Neymar Matute MD  12/8/2018  7:43 PM

## 2018-12-09 NOTE — CONSULTS
Date of Hospital Visit: 18  Encounter Provider: Milo Tilley MD  Place of Service: [facility] CARDIOLOG[facility]  Patient Name: PAULA Dent  :1960  [id number]  Referral Provider: Donovan Millard MD    Chief complaint: aphasia.    History of Present Illness:  Ms. Dent is a 58 year old woman who follows with Dr. Saldivar and has history of ischemic cardiomyopathy with AICD placement, coronary artery disease, COPD, sleep apnea (CPAP), hyperlipidemia, and type II diabetes. She had NSTEMI in  and had HUGO to LAD; EF was normal. She had in-stent thrombosis in 2009 and had repeat stenting of LAD and new stent to LCX. She was hospitalized with respiratory failure in  and had unremarkable cardiac catheterization after an abnormal stress test. A [employer] AICD was implanted in 2010. In 2015, echocardiogram showed severely decreased LV systolic function and grade I diastolic dysfunction. Nuclear stress test showed large anteroapical defect at lateral margin with elva-infarct ischemia; this was felt not to be significant. She began following with Dr. Saldivar in 2015. In May 2016, she had normal BLE dopplers to evaluate for peripheral vascular disease. In 2017 she reported chest pain and had  nuclear stress test showed medium sized infarct of anterior wall with mild elva-infarct ischemia and EF 54-58%; unchanged from previous. Echocardiogram showed EF 40-45%. She was last seen in office in September and was instructed to continue her dual antiplatelet therapy due to history of in-stent stenosis.     She presented to ED on 18 with aphasia and loss of short-term memory. Upon arrival, /87 with HR 98; ekg showed sinus rhythm. Labs: glu 666, BUN 11, Crea 1.15, troponin <0.01. CT cerebral perfusion showed abnormal cerebral perfusion of left anterior middle cerebral artery branch. She was administered tPA. Her aphasia resolved.  Repeat CT head showed no new bleeding. Endocrinology has been consulted for hgb A1c of 12. An echocardiogram has been ordered.     I have reviewed the above HPI and agree with it.  This is a complex lady.  She has had diabetes that is poorly controlled.  She has had stents in the past.  There is a report that she had stent thrombosis.  Talking to her, it is not clear that she has it.  She has had fluctuating LV function in the past that one time was quite low and she has an ICD in.  Last time it was looked at, it was in the 40% to 45% range.  She is chronically on Plavix and aspirin.  Yesterday, she had aphasia and difficulty with motor skill.  She was brought here to the hospital and found to have a stroke and was given TPA.  She is now back to her baseline.  She has not had any palpitations, no chest pain.  She has not been complaining of shortness of breath.  No bleeding difficulty.  Her sugars are out of control.        Cardiac Testing:  Myocardial Perfusion Stress Test March 2017  Interpretation Summary     · Left ventricular ejection fraction is normal (Calculated EF = 54%).  · Myocardial perfusion imaging indicates a medium-sized infarct located in the anterior wall with mild elva-infarct ischemia.  · Impressions are consistent with an intermediate risk study.  · LVEF with stress is 58%.     Echocardiogram March 2017  Interpretation Summary     · There is global hypokinesis. EF 40-45%.  · The study is technically difficult for diagnosis.     Nuclear Stress Test January 2015      Echocardiogram January 2015    Past Medical History:   Diagnosis Date   • CHF (congestive heart failure) (CMS/[facility])    • COPD (chronic obstructive pulmonary disease) (CMS/[facility])    • Diabetes mellitus (CMS/[facility])    • Hyperlipidemia        Past Surgical History:   Procedure Laterality Date   • CARDIAC CATHETERIZATION         Medications Prior to Admission   Medication Sig Dispense Refill Last Dose   • atorvastatin (LIPITOR) 80 MG tablet  Take 80 mg by mouth Daily.   12/7/2018 at 2100   • carvedilol (COREG) 25 MG tablet Take 25 mg by mouth 2 (Two) Times a Day With Meals.   12/8/2018 at 0800   • clopidogrel (PLAVIX) 75 MG tablet Take 75 mg by mouth Daily.   12/8/2018 at 0800   • furosemide (LASIX) 40 MG tablet Take 40 mg by mouth Daily.   12/8/2018 at 0800   • metFORMIN (GLUCOPHAGE) 500 MG tablet Take 500 mg by mouth Daily With Breakfast.   12/8/2018 at 0800   • metFORMIN (GLUCOPHAGE) 500 MG tablet Take 1,000 mg by mouth every night at bedtime.   12/7/2018 at 2100   • potassium chloride (K-DUR) 10 MEQ CR tablet Take 10 mEq by mouth Daily.   12/8/2018 at 0800   • spironolactone (ALDACTONE) 25 MG tablet Take 25 mg by mouth Daily.   12/8/2018 at 0800       Current Meds  Scheduled Meds:    aspirin 325 mg Oral Daily   Or      aspirin 300 mg Rectal Daily   atorvastatin 80 mg Oral Nightly   carvedilol 25 mg Oral BID With Meals   furosemide 40 mg Oral Daily   insulin glargine 30 Units Subcutaneous QAM   insulin lispro 0-7 Units Subcutaneous 4x Daily With Meals & Nightly   insulin lispro 6 Units Subcutaneous TID With Meals   metFORMIN 1,000 mg Oral BID With Meals   sodium chloride 3 mL Intravenous Q12H   spironolactone 25 mg Oral Daily     Continuous Infusions:   PRN Meds:.dextrose  •  dextrose  •  glucagon (human recombinant)  •  sodium chloride  •  sodium chloride    Allergies as of 12/08/2018   • (No Known Allergies)       Social History     Socioeconomic History   • Marital status:      Spouse name: Not on file   • Number of children: Not on file   • Years of education: Not on file   • Highest education level: Not on file   Social Needs   • Financial resource strain: Not on file   • Food insecurity - worry: Not on file   • Food insecurity - inability: Not on file   • Transportation needs - medical: Not on file   • Transportation needs - non-medical: Not on file   Occupational History   • Not on file   Tobacco Use   • Smoking status: Former Smoker      Packs/day: 1.00     Years: 30.00     Pack years: 30.00     Types: Cigarettes     Start date: 1979     Last attempt to quit: 2009     Years since quittin.1   • Smokeless tobacco: Never Used   Substance and Sexual Activity   • Alcohol use: Yes   • Drug use: No   • Sexual activity: Yes     Partners: Male   Other Topics Concern   • Not on file   Social History Narrative   • Not on file       Family History   Problem Relation Age of Onset   • Diabetes Mother    • Heart disease Mother    • Hyperlipidemia Mother    • Diabetes Father    • Heart disease Father    • Hyperlipidemia Father    • Heart disease Brother    • Hyperlipidemia Brother        REVIEW OF SYSTEMS:   ROS was performed and is negative except as outlined in HPI     REVIEW OF SYSTEMS:   CONSTITUTIONAL: No weight loss, fever, chills, weakness or fatigue.   HEENT: Eyes: No visual loss, blurred vision, double vision or yellow sclerae. Ears, Nose, Throat: No hearing loss, sneezing, congestion, runny nose or sore throat.   SKIN: No rash or itching.     RESPIRATORY: No shortness of breath, hemoptysis, cough or sputum.   GASTROINTESTINAL: No anorexia, nausea, vomiting or diarrhea. No abdominal pain, bright red blood per rectum or melena.  GENITOURINARY: No burning on urination, hematuria or increased frequency.  NEUROLOGICAL: No headache, dizziness, syncope, paralysis, ataxia, numbness or tingling in the extremities. No change in bowel or bladder control.   MUSCULOSKELETAL: No muscle, back pain, joint pain or stiffness.   HEMATOLOGIC: No anemia, bleeding or bruising.   LYMPHATICS: No enlarged nodes. No history of splenectomy.   PSYCHIATRIC: No history of depression, anxiety, hallucinations.   ENDOCRINOLOGIC: No reports of sweating, cold or heat intolerance. No polyuria or polydipsia.        Objective:   Temp:  [97.8 °F (36.6 °C)-98.6 °F (37 °C)] 98.1 °F (36.7 °C)  Heart Rate:  [] 67  Resp:  [16-20] 16  BP: (110-167)/(54-89) 131/74  Body mass  "index is 39.96 kg/m².  Flowsheet Rows      First Filed Value   Admission Height  157.5 cm (62\") Documented at 12/08/2018 1400   Admission Weight  94 kg (207 lb 3.7 oz) Documented at 12/08/2018 1400        Vitals:    12/09/18 1500   BP: 131/74   Pulse: 67   Resp: 16   Temp:    SpO2: 96%       Head:    Normocephalic, without obvious abnormality, atraumatic   Eyes:            Lids and lashes normal, conjunctivae and sclerae normal, no   icterus, no pallor   Ears:    Ears appear intact with no abnormalities noted   Throat:   No oral lesions, dentition good   Neck:   No adenopathy, supple, trachea midline, no thyromegaly, no   carotid bruit, no JVD   Lungs:     Breath sounds are equal and clear to auscultation    Heart:    Normal S1 and S2, RRR, No M/G/R   Abdomen:     Normal bowel sounds, no masses, no organomegaly, soft        non-tender, non-distended, no guarding   Extremities:   Moves all extremities well, no edema, no cyanosis, no redness   Pulses:   Pulses palpable and equal bilaterally.    Skin:  Psychiatric:   No bleeding, bruising or rash    Awake, alert and oriented x 3, normal mood and affect           Telemetry:        EKG:        I personally viewed and interpreted the patient's EKG/Telemetry data    Assessment:  Active Hospital Problems    Diagnosis Date Noted   • Cerebrovascular accident (CVA) due to embolism of left middle cerebral artery (CMS/HCC) [I63.412] 12/08/2018      Resolved Hospital Problems   No resolved problems to display.       Plan:This is a complex issue with stroke in a lady who is on Plavix and aspirin already, it is thought to be embolic. It is suspicious that it may be something from the left ventricle or maybe left atrial appendage.  At this point, we need to get a regular echocardiogram and see what that shows.  We will interrogate the ICD and see if we are seeing any tachyarrhythmias that have been missed.  I am inclined to recommend anticoagulation in addition to what she is " already on and probably drop the aspirin, but I think she needs a complete evaluation first.  It starts with an echocardiogram.         Milo Tilley MD  12/09/18  3:09 PM.

## 2018-12-09 NOTE — CONSULTS
58 y.o.  Patient Care Team:  Kim Kathleen MD as PCP - General (Family Medicine)      Chief Complaint   Patient presents with   • Neuro Deficit(s)       HPI  58-year-old female with past medical history as mentioned below presents to the hospital with sudden inability to speak.  When she presented to the hospital yesterday at around 1 PM with the symptoms she was diagnosed to have acute stroke and also received TPA.  With the TPA administration most of her symptoms have immediately resolved.  Endocrine has been consulted for elevated blood sugars.    Type 2 diabetes mellitus-diagnosed about 2 years ago, currently being managed by her primary care.  Has been on metformin 1 pill in the morning and 2 pills in the evening.  Has been checking her blood sugars maybe 2 or 3 times a week.  Most of her blood sugars have been running in 200s range.  Patient did note that her A1c has been running high as her primary care has reported that if her next A1c remains high she should be started on some other medications.  Occasional increased urination, no increased thirst.  Last eye examination was about one to 2 years ago, no known history of diabetic retinopathy.  Denied complains of tingling, burning sensation and numbness in her bilateral feet.  Does have history of coronary artery disease, stroke in this admission, no history of CK D.    Past Medical History:   Diagnosis Date   • CHF (congestive heart failure) (CMS/[facility])    • COPD (chronic obstructive pulmonary disease) (CMS/HCC)    • Diabetes mellitus (CMS/HCC)    • Hyperlipidemia        Family History   Problem Relation Age of Onset   • Diabetes Mother    • Heart disease Mother    • Hyperlipidemia Mother    • Diabetes Father    • Heart disease Father    • Hyperlipidemia Father    • Heart disease Brother    • Hyperlipidemia Brother        Social History     Socioeconomic History   • Marital status:      Spouse name: Not on file   • Number of children: Not on file    • Years of education: Not on file   • Highest education level: Not on file   Social Needs   • Financial resource strain: Not on file   • Food insecurity - worry: Not on file   • Food insecurity - inability: Not on file   • Transportation needs - medical: Not on file   • Transportation needs - non-medical: Not on file   Occupational History   • Not on file   Tobacco Use   • Smoking status: Former Smoker     Packs/day: 1.00     Years: 30.00     Pack years: 30.00     Types: Cigarettes     Start date: 1979     Last attempt to quit: 2009     Years since quittin.1   • Smokeless tobacco: Never Used   Substance and Sexual Activity   • Alcohol use: Yes   • Drug use: No   • Sexual activity: Yes     Partners: Male   Other Topics Concern   • Not on file   Social History Narrative   • Not on file       No Known Allergies      Current Facility-Administered Medications:   •  aspirin tablet 325 mg, 325 mg, Oral, Daily **OR** aspirin suppository 300 mg, 300 mg, Rectal, Daily, Mark Jones MD  •  atorvastatin (LIPITOR) tablet 80 mg, 80 mg, Oral, Nightly, Mark Jones MD, 80 mg at 18 2146  •  carvedilol (COREG) tablet 25 mg, 25 mg, Oral, BID With Meals, Neymar Matute MD, 25 mg at 18 1126  •  dextrose (D50W) 25 g/ 50mL Intravenous Solution 25 g, 25 g, Intravenous, Q15 Min PRN, Neymar Matute MD  •  dextrose (GLUTOSE) oral gel 15 g, 15 g, Oral, Q15 Min PRN, Neymar Matute MD  •  furosemide (LASIX) tablet 40 mg, 40 mg, Oral, Daily, Neymar Matute MD, 40 mg at 18 1126  •  glucagon (human recombinant) (GLUCAGEN DIAGNOSTIC) injection 1 mg, 1 mg, Subcutaneous, PRN, Neymar Matute MD  •  insulin glargine (LANTUS) injection 30 Units, 30 Units, Subcutaneous, Constantine LIU Manikya, MD  •  insulin lispro (humaLOG) injection 0-7 Units, 0-7 Units, Subcutaneous, 4x Daily With Meals & Nightly, Neymar Matute MD, 4 Units at 18  1224  •  insulin lispro (humaLOG) injection 6 Units, 6 Units, Subcutaneous, TID With Meals, Abraham Fitch MD  •  metFORMIN (GLUCOPHAGE) tablet 1,000 mg, 1,000 mg, Oral, BID With Meals, Neymar Matute MD, 1,000 mg at 12/09/18 1127  •  sodium chloride 0.9 % flush 10 mL, 10 mL, Intravenous, PRN, Janneth Scales APRN  •  sodium chloride 0.9 % flush 3 mL, 3 mL, Intravenous, Q12H, Mark Jones MD, 3 mL at 12/09/18 0834  •  sodium chloride 0.9 % flush 3-10 mL, 3-10 mL, Intravenous, PRN, Mark Jones MD  •  spironolactone (ALDACTONE) tablet 25 mg, 25 mg, Oral, Daily, Neymar Matute MD, 25 mg at 12/09/18 1120         Review of Systems   Constitutional: Positive for appetite change and fatigue. Negative for fever.   Eyes: Negative for visual disturbance.   Respiratory: Negative for shortness of breath.    Cardiovascular: Negative for palpitations and leg swelling.   Gastrointestinal: Negative for abdominal pain and vomiting.   Endocrine: Negative for polydipsia and polyuria.   Musculoskeletal: Negative for joint swelling and neck pain.   Skin: Negative for rash.   Neurological: Positive for weakness. Negative for numbness.   Psychiatric/Behavioral: Negative for behavioral problems.     Objective     Vital Signs  Temp:  [97.8 °F (36.6 °C)-98.6 °F (37 °C)] 98.5 °F (36.9 °C)  Heart Rate:  [] 69  Resp:  [16-20] 18  BP: (111-167)/(54-89) 130/75    Physical Exam  Physical Exam   Constitutional: She is oriented to person, place, and time. She appears well-nourished.   Obese     HENT:   Head: Normocephalic and atraumatic.   Wide neck   Eyes: Conjunctivae and EOM are normal. No scleral icterus.   Neck: Normal range of motion. Neck supple. No thyromegaly present.   Acanthosis nigricans   Cardiovascular: Normal rate and normal heart sounds.   Pulmonary/Chest: Effort normal and breath sounds normal. No stridor. She has no wheezes.   Abdominal: Soft. Bowel sounds are normal. She exhibits  no distension. There is no tenderness.   Central obesity   Musculoskeletal: She exhibits no edema or tenderness.   Neurological: She is alert and oriented to person, place, and time.   Skin: Skin is warm and dry. She is not diaphoretic.   Psychiatric: She has a normal mood and affect.   Vitals reviewed.      Results Review:    I reviewed the patient's new clinical results and summarized them in the HPI and in plan.  Glucose   Date/Time Value Ref Range Status   12/09/2018 0805 303 (H) 70 - 130 mg/dL Final   12/09/2018 0616 329 (H) 70 - 130 mg/dL Final   12/08/2018 2332 317 (H) 70 - 130 mg/dL Final   12/08/2018 1654 379 (H) 70 - 130 mg/dL Final     Lab Results (last 24 hours)     Procedure Component Value Units Date/Time    Basic Metabolic Panel [[id number]]  (Abnormal) Collected:  12/09/18 1042    Specimen:  Blood Updated:  12/09/18 1122     Glucose 295 mg/dL      BUN 9 mg/dL      Creatinine 0.71 mg/dL      Sodium 143 mmol/L      Potassium 3.4 mmol/L      Chloride 108 mmol/L      CO2 23.2 mmol/L      Calcium 7.9 mg/dL      eGFR Non African Amer 85 mL/min/1.73      BUN/Creatinine Ratio 12.7     Anion Gap 11.8 mmol/L     Narrative:       GFR Normal >60  Chronic Kidney Disease <60  Kidney Failure <15    Basic Metabolic Panel [[id number]]  (Abnormal) Collected:  12/09/18 0309    Specimen:  Blood Updated:  12/09/18 1049     Glucose 310 mg/dL      BUN 11 mg/dL      Creatinine 0.85 mg/dL      Sodium 142 mmol/L      Potassium 3.9 mmol/L      Chloride 106 mmol/L      CO2 21.6 mmol/L      Calcium 9.6 mg/dL      eGFR Non African Amer 69 mL/min/1.73      BUN/Creatinine Ratio 12.9     Anion Gap 14.4 mmol/L     Narrative:       GFR Normal >60  Chronic Kidney Disease <60  Kidney Failure <15    POC Glucose Once [[id number]]  (Abnormal) Collected:  12/09/18 0805    Specimen:  Blood Updated:  12/09/18 0806     Glucose 303 mg/dL     POC Glucose Once [[id number]]  (Abnormal) Collected:  12/09/18 0616    Specimen:  Blood Updated:  12/09/18  0617     Glucose 329 mg/dL     POC Creatinine [[id number]]  (Normal) Collected:  12/08/18 1421    Specimen:  Blood Updated:  12/09/18 0550     Creatinine 1.00 mg/dL      Comment: Serial Number: [id number]:  [id number]       LDL Cholesterol, Direct [[id number]]  (Abnormal) Collected:  12/09/18 0309    Specimen:  Blood Updated:  12/09/18 0442     LDL Cholesterol  199 mg/dL     Narrative:       LDL Reference Ranges    (U.S. Department of Health and Human Services ATP III Classifications)    Optimal          <100 mg/dl  Near Optimal     100-129 mg/dl  Borderline High  130-159 mg/dl  High             160-189 mg/dl  Very High        >189 mg/dl    Hemoglobin A1c [[id number]]  (Abnormal) Collected:  12/09/18 0309    Specimen:  Blood Updated:  12/09/18 0404     Hemoglobin A1C 12.60 %     Narrative:       Hemoglobin A1C Ranges:    Increased Risk for Diabetes  5.7% to 6.4%  Diabetes                     >= 6.5%  Diabetic Goal                < 7.0%    Lipid Panel [[id number]]  (Abnormal) Collected:  12/09/18 0309    Specimen:  Blood Updated:  12/09/18 0354     Total Cholesterol 279 mg/dL      Triglycerides 401 mg/dL      HDL Cholesterol 32 mg/dL      LDL Cholesterol  -- mg/dL      Comment: Unable to calculate        VLDL Cholesterol -- mg/dL      Comment: Unable to calculate        LDL/HDL Ratio --     Comment: Unable to calculate       Narrative:       Cholesterol Reference Ranges  (U.S. Department of Health and Human Services ATP III Classifications)    Desirable          <200 mg/dL  Borderline High    200-239 mg/dL  High Risk          >240 mg/dL      Triglyceride Reference Ranges  (U.S. Department of Health and Human Services ATP III Classifications)    Normal           <150 mg/dL  Borderline High  150-199 mg/dL  High             200-499 mg/dL  Very High        >500 mg/dL    HDL Reference Ranges  (U.S. Department of Health and Human Services ATP III Classifcations)    Low     <40 mg/dl (major risk factor for CHD)  High    >60  mg/dl ('negative' risk factor for CHD)        LDL Reference Ranges  (U.S. Department of Health and Human Services ATP III Classifcations)    Optimal          <100 mg/dL  Near Optimal     100-129 mg/dL  Borderline High  130-159 mg/dL  High             160-189 mg/dL  Very High        >189 mg/dL    POC Glucose Once [[id number]]  (Abnormal) Collected:  12/08/18 2332    Specimen:  Blood Updated:  12/08/18 2333     Glucose 317 mg/dL     POC Glucose Once [[id number]]  (Abnormal) Collected:  12/08/18 1654    Specimen:  Blood Updated:  12/08/18 1704     Glucose 379 mg/dL     [place] Draw [[id number]] Collected:  12/08/18 1415    Specimen:  Blood Updated:  12/08/18 1515    Narrative:       The following orders were created for panel order [place] Draw.  Procedure                               Abnormality         Status                     ---------                               -----------         ------                     Light Blue Top[[id number]]                                   Final result               Green Top (Gel)[[id number]]                                  Final result               Lavender Top[[id number]]                                     Final result               Gold Top - SST[[id number]]                                   Final result                 Please view results for these tests on the individual orders.    Light Blue Top [[id number]] Collected:  12/08/18 1415    Specimen:  Blood Updated:  12/08/18 1515     Extra Tube hold for add-on     Comment: Auto resulted       Green Top (Gel) [[id number]] Collected:  12/08/18 1415    Specimen:  Blood Updated:  12/08/18 1515     Extra Tube Hold for add-ons.     Comment: Auto resulted.       Lavender Top [[id number]] Collected:  12/08/18 1415    Specimen:  Blood Updated:  12/08/18 1515     Extra Tube hold for add-on     Comment: Auto resulted       Gold Top - SST [[id number]] Collected:  12/08/18 1415    Specimen:  Blood Updated:  12/08/18 1515     Extra Tube Hold for  add-ons.     Comment: Auto resulted.       Comprehensive Metabolic Panel [[id number]]  (Abnormal) Collected:  12/08/18 1415    Specimen:  Blood Updated:  12/08/18 1458     Glucose 666 mg/dL      BUN 11 mg/dL      Creatinine 1.15 mg/dL      Sodium 132 mmol/L      Potassium 4.3 mmol/L      Chloride 91 mmol/L      CO2 24.9 mmol/L      Calcium 10.1 mg/dL      Total Protein 7.9 g/dL      Albumin 4.70 g/dL      ALT (SGPT) 108 U/L      AST (SGOT) 67 U/L      Alkaline Phosphatase 132 U/L      Total Bilirubin 0.4 mg/dL      eGFR Non African Amer 49 mL/min/1.73      Globulin 3.2 gm/dL      A/G Ratio 1.5 g/dL      BUN/Creatinine Ratio 9.6     Anion Gap 16.1 mmol/L     Troponin [[id number]]  (Normal) Collected:  12/08/18 1415    Specimen:  Blood Updated:  12/08/18 1442     Troponin T <0.010 ng/mL     Narrative:       Troponin T Reference Ranges:  Less than 0.03 ng/mL:    Negative for AMI  0.03 to 0.09 ng/mL:      Indeterminant for AMI  Greater than 0.09 ng/mL: Positive for AMI    Protime-INR [[id number]]  (Normal) Collected:  12/08/18 1415    Specimen:  Blood Updated:  12/08/18 1435     Protime 12.5 Seconds      INR 0.95    aPTT [[id number]]  (Normal) Collected:  12/08/18 1415    Specimen:  Blood Updated:  12/08/18 1435     PTT 23.6 seconds     CBC & Differential [[id number]] Collected:  12/08/18 1415    Specimen:  Blood Updated:  12/08/18 1425    Narrative:       The following orders were created for panel order CBC & Differential.  Procedure                               Abnormality         Status                     ---------                               -----------         ------                     CBC Auto Differential[[id number]]        Abnormal            Final result                 Please view results for these tests on the individual orders.    CBC Auto Differential [[id number]]  (Abnormal) Collected:  12/08/18 1415    Specimen:  Blood Updated:  12/08/18 1425     WBC 6.78 10*3/mm3      RBC 5.66 10*6/mm3      Hemoglobin  15.8 g/dL      Hematocrit 48.2 %      MCV 85.2 fL      MCH 27.9 pg      MCHC 32.8 g/dL      RDW 12.9 %      RDW-SD 40.1 fl      MPV 11.8 fL      Platelets 246 10*3/mm3      Neutrophil % 48.6 %      Lymphocyte % 40.3 %      Monocyte % 8.6 %      Eosinophil % 1.8 %      Basophil % 0.4 %      Immature Grans % 0.3 %      Neutrophils, Absolute 3.30 10*3/mm3      Lymphocytes, Absolute 2.73 10*3/mm3      Monocytes, Absolute 0.58 10*3/mm3      Eosinophils, Absolute 0.12 10*3/mm3      Basophils, Absolute 0.03 10*3/mm3      Immature Grans, Absolute 0.02 10*3/mm3           Imaging Results (last 24 hours)     Procedure Component Value Units Date/Time    CT Head Without Contrast [[id number]] Collected:  12/09/18 0315     Updated:  12/09/18 0324    Narrative:       CRANIAL CT SCAN WITHOUT CONTRAST     CLINICAL HISTORY: Stroke; I63.412-Cerebral infarction due to embolism of  left middle cerebral artery; R73.9-Hyperglycemia, unspecified     COMPARISON: December 8, 2018.     TECHNIQUE: Radiation dose reduction techniques were utilized, including  automated exposure control and exposure modulation based on body size.  Multiple axial images of the head were obtained without contrast.      FINDINGS:  Mild atrophy, frontal lobes primarily. There is a small area  of ischemia now evident along the inferior-posterior margin of the left  frontal lobe and lateral insular region adjacent to the left sylvian  fissure. This area is within the previously described perfusion  abnormality. It is best seen on axial images 20-25. There is no mass  effect or acute hemorrhage.  Ventricular size and configuration is  normal. Bone window images show small polyp or retention cyst in the  left maxillary sinus.                Impression:       1. Evolving ischemia is now evident in the inferior-posterior left  frontal lobe corresponding to a portion of the CT perfusion abnormality.  There is no mass effect or hemorrhage.                     This report was  finalized on 12/9/2018 3:21 AM by Jay Bar M.D.       XR Chest 1 View [[id number]] Collected:  12/08/18 1625     Updated:  12/08/18 1631    Narrative:       XR CHEST 1 VW-     HISTORY: Female who is 58 years-old,  stroke     TECHNIQUE: Frontal view of the chest     COMPARISON: None available     FINDINGS: Heart size is borderline. Left-sided pacemaker and cardiac  leads. Mild vascular congestion. No focal pulmonary consolidation,  pleural effusion, or pneumothorax. No acute osseous process.       Impression:       No focal pulmonary consolidation. Borderline heart size with  mild vascular congestion.     This report was finalized on 12/8/2018 4:27 PM by Dr. Jarvis Marcum M.D.       CT Cerebral Perfusion With & Without Contrast [[id number]] Collected:  12/08/18 1529     Updated:  12/08/18 1529    Narrative:       CT OF THE BRAIN WITH AND WITHOUT CONTRAST, CT ANGIOGRAPHY OF THE HEAD  AND NECK WITH CONTRAST INCLUDING RECONSTRUCTION IMAGES AND CT CEREBRAL  PERFUSION IMAGES 12/08/2018.     HISTORY: Unable to speak. Possible stroke.     TECHNIQUE: Axial images were obtained through the brain without  intravenous contrast.      FINDINGS: There is some bifrontal atrophy. Ventricles appear normal in  size. There is no evidence of acute infarction, hemorrhage, midline  shift or mass effect. Minimal basal ganglia calcification is seen on the  left. Report was called to the stroke team at approximately 1425 hours.     TECHNIQUE: Following the intravenous contrast injection CT angiography  of the head and neck was performed. Sagittal, coronal and 3-D  reconstruction images were reviewed. CT cerebral perfusion images were  obtained.     FINDINGS: On the cerebral perfusion images there is prolonged Tmax in  the left frontoparietal region in the distribution of the left middle  cerebral artery territory. A small area of abnormal density is seen in  the left frontal lobe on the CBF images. This is not seen on the  CBV  images.     NASCET criteria was used. Sagittal, coronal and 3-D reconstruction  images were reviewed.     There appears to be cut off of opacification of an anterior division  left middle cerebral artery branch involving the anterior aspect of the  left temporal lobe. This is best seen on coronal reconstruction images  66 through 69. The left middle cerebral artery otherwise opacifies  normally. The left anterior cerebral artery and the right middle and  anterior cerebral arteries opacify normally. The bilateral vertebral and  the bilateral common carotid and internal carotid arteries opacify  relatively normally.     No aneurysm is seen.     The right vertebral artery appears smaller than the left.     Postcontrast CT of the brain shows no abnormal enhancement.       Impression:       1. Abnormal cerebral perfusion imaging as discussed above.  2. There is cut off of opacification of a left anterior middle cerebral  artery branch as described. This is consistent with acute occlusion.  Please see full discussion above.  3. No other significant findings are noted on the CT of the brain with  and without contrast.      Radiation dose reduction techniques were utilized, including automated  exposure control and exposure modulation based on body size.          CT Angiogram Head With & Without Contrast [[id number]] Collected:  12/08/18 1529     Updated:  12/08/18 1529    Narrative:       CT OF THE BRAIN WITH AND WITHOUT CONTRAST, CT ANGIOGRAPHY OF THE HEAD  AND NECK WITH CONTRAST INCLUDING RECONSTRUCTION IMAGES AND CT CEREBRAL  PERFUSION IMAGES 12/08/2018.     HISTORY: Unable to speak. Possible stroke.     TECHNIQUE: Axial images were obtained through the brain without  intravenous contrast.      FINDINGS: There is some bifrontal atrophy. Ventricles appear normal in  size. There is no evidence of acute infarction, hemorrhage, midline  shift or mass effect. Minimal basal ganglia calcification is seen on the  left.  Report was called to the stroke team at approximately 1425 hours.     TECHNIQUE: Following the intravenous contrast injection CT angiography  of the head and neck was performed. Sagittal, coronal and 3-D  reconstruction images were reviewed. CT cerebral perfusion images were  obtained.     FINDINGS: On the cerebral perfusion images there is prolonged Tmax in  the left frontoparietal region in the distribution of the left middle  cerebral artery territory. A small area of abnormal density is seen in  the left frontal lobe on the CBF images. This is not seen on the CBV  images.     NASCET criteria was used. Sagittal, coronal and 3-D reconstruction  images were reviewed.     There appears to be cut off of opacification of an anterior division  left middle cerebral artery branch involving the anterior aspect of the  left temporal lobe. This is best seen on coronal reconstruction images  66 through 69. The left middle cerebral artery otherwise opacifies  normally. The left anterior cerebral artery and the right middle and  anterior cerebral arteries opacify normally. The bilateral vertebral and  the bilateral common carotid and internal carotid arteries opacify  relatively normally.     No aneurysm is seen.     The right vertebral artery appears smaller than the left.     Postcontrast CT of the brain shows no abnormal enhancement.       Impression:       1. Abnormal cerebral perfusion imaging as discussed above.  2. There is cut off of opacification of a left anterior middle cerebral  artery branch as described. This is consistent with acute occlusion.  Please see full discussion above.  3. No other significant findings are noted on the CT of the brain with  and without contrast.      Radiation dose reduction techniques were utilized, including automated  exposure control and exposure modulation based on body size.          CT Angiogram Neck With & Without Contrast [[id number]] Collected:  12/08/18 1529     Updated:   12/08/18 1529    Narrative:       CT OF THE BRAIN WITH AND WITHOUT CONTRAST, CT ANGIOGRAPHY OF THE HEAD  AND NECK WITH CONTRAST INCLUDING RECONSTRUCTION IMAGES AND CT CEREBRAL  PERFUSION IMAGES 12/08/2018.     HISTORY: Unable to speak. Possible stroke.     TECHNIQUE: Axial images were obtained through the brain without  intravenous contrast.      FINDINGS: There is some bifrontal atrophy. Ventricles appear normal in  size. There is no evidence of acute infarction, hemorrhage, midline  shift or mass effect. Minimal basal ganglia calcification is seen on the  left. Report was called to the stroke team at approximately 1425 hours.     TECHNIQUE: Following the intravenous contrast injection CT angiography  of the head and neck was performed. Sagittal, coronal and 3-D  reconstruction images were reviewed. CT cerebral perfusion images were  obtained.     FINDINGS: On the cerebral perfusion images there is prolonged Tmax in  the left frontoparietal region in the distribution of the left middle  cerebral artery territory. A small area of abnormal density is seen in  the left frontal lobe on the CBF images. This is not seen on the CBV  images.     NASCET criteria was used. Sagittal, coronal and 3-D reconstruction  images were reviewed.     There appears to be cut off of opacification of an anterior division  left middle cerebral artery branch involving the anterior aspect of the  left temporal lobe. This is best seen on coronal reconstruction images  66 through 69. The left middle cerebral artery otherwise opacifies  normally. The left anterior cerebral artery and the right middle and  anterior cerebral arteries opacify normally. The bilateral vertebral and  the bilateral common carotid and internal carotid arteries opacify  relatively normally.     No aneurysm is seen.     The right vertebral artery appears smaller than the left.     Postcontrast CT of the brain shows no abnormal enhancement.       Impression:       1.  "Abnormal cerebral perfusion imaging as discussed above.  2. There is cut off of opacification of a left anterior middle cerebral  artery branch as described. This is consistent with acute occlusion.  Please see full discussion above.  3. No other significant findings are noted on the CT of the brain with  and without contrast.      Radiation dose reduction techniques were utilized, including automated  exposure control and exposure modulation based on body size.                Assessment/Plan     Active Hospital Problems    Diagnosis Date Noted   • Cerebrovascular accident (CVA) due to embolism of left middle cerebral artery (CMS/HCC) [I63.412] 12/08/2018      Resolved Hospital Problems   No resolved problems to display.     Type 2 diabetes mellitus-severely uncontrolled  AsS3j-64%  Will start patient on Lantus 30 units, starting now.  Will start Humalog 6 units with each meal  Will cover with Humalog sliding scale 3 times a day before meals and at bedtime  Will continue metformin thousand milligrams twice daily.    Discussed with the patient extensively the importance of glycemic control in order to prevent cardiovascular diseases.    Hyperlipidemia  Continue Lipitor 80 mg oral daily.    Patient would need teaching with insulin administration and diabetic diet control. Will do this on Monday.   Dm educator consult has been placed.     Discussed plan with nurse.     Thank you for your consultation.  Will follow the pt while in hospital.     Abraham Fitch MD.  12/09/18  1:58 PM      EMR Dragon / transcription disclaimer:    \"Dictated utilizing Dragon dictation\".   "

## 2018-12-09 NOTE — PLAN OF CARE
Problem: Patient Care Overview  Goal: Plan of Care Review  Outcome: Ongoing (interventions implemented as appropriate)   12/08/18 1907   Coping/Psychosocial   Plan of Care Reviewed With patient;spouse;daughter;family   Plan of Care Review   Progress improving   OTHER   Outcome Summary Patient from ER S/P TPA administration. Q 15 minute neuro checks completed, Q 30 minute neuro checks until 2330 tonight. No reports of headache reported. AVSS continue to monitor and assess patient's neurological, hemodynamic, and situational response statuses.     Goal: Individualization and Mutuality  Outcome: Ongoing (interventions implemented as appropriate)   12/08/18 1907   Individualization   Patient Specific Preferences Goes by Dionic[employer]     Goal: Discharge Needs Assessment  Outcome: Ongoing (interventions implemented as appropriate)   12/08/18 1907   Discharge Needs Assessment   Readmission Within the Last 30 Days no previous admission in last 30 days   Concerns to be Addressed no discharge needs identified   Patient/Family Anticipates Transition to home with family   Patient/Family Anticipated Services at Transition none   Transportation Concerns car, none   Transportation Anticipated family or friend will provide   Equipment Needed After Discharge none   Disability   Equipment Currently Used at Home none     Goal: Interprofessional Rounds/Family Conf  Outcome: Ongoing (interventions implemented as appropriate)   12/08/18 1907   Interdisciplinary Rounds/Family Conf   Participants family;nursing;patient;physician       Problem: Stroke (IRF) (Adult)  Goal: Promote Optimal Functional [place]  Outcome: Ongoing (interventions implemented as appropriate)   12/08/18 1907   Stroke (IRF) (Adult)   Promote Optimal Functional [place] demonstrating adequate progress;progress more gradual than expected       Problem: Thrombolytic Therapy (Adult)  Goal: Signs and Symptoms of Listed Potential Problems Will be Absent, Minimized or  Managed (Thrombolytic Therapy)  Outcome: Ongoing (interventions implemented as appropriate)   12/08/18 1907   Goal/Outcome Evaluation   Problems Assessed (Thrombolytic Therapy) all   Problems Assessed (Thrombolytic Therapy) none       Problem: Fall Risk (Adult)  Goal: Identify Related Risk Factors and Signs and Symptoms  Outcome: Ongoing (interventions implemented as appropriate)   12/08/18 1907   Fall Risk (Adult)   Related Risk Factors (Fall Risk) environment unfamiliar;slippery/uneven surfaces     Goal: Absence of Fall  Outcome: Ongoing (interventions implemented as appropriate)   12/08/18 1907   Fall Risk (Adult)   Absence of Fall making progress toward outcome

## 2018-12-10 ENCOUNTER — APPOINTMENT (OUTPATIENT)
Dept: CARDIOLOGY | Facility: HOSPITAL | Age: 58
End: 2018-12-10
Attending: INTERNAL MEDICINE

## 2018-12-10 LAB
ANION GAP SERPL CALCULATED.3IONS-SCNC: 12 MMOL/L
AORTIC DIMENSIONLESS INDEX: 0.9 (DI)
BASOPHILS # BLD AUTO: 0.04 10*3/MM3 (ref 0–0.2)
BASOPHILS NFR BLD AUTO: 0.6 % (ref 0–1.5)
BH CV ECHO MEAS - AO MAX PG (FULL): 1.3 MMHG
BH CV ECHO MEAS - AO MAX PG: 7.4 MMHG
BH CV ECHO MEAS - AO MEAN PG (FULL): 0 MMHG
BH CV ECHO MEAS - AO MEAN PG: 3 MMHG
BH CV ECHO MEAS - AO ROOT AREA (BSA CORRECTED): 1.3
BH CV ECHO MEAS - AO ROOT AREA: 4.9 CM^2
BH CV ECHO MEAS - AO ROOT DIAM: 2.5 CM
BH CV ECHO MEAS - AO V2 MAX: 136 CM/SEC
BH CV ECHO MEAS - AO V2 MEAN: 87.2 CM/SEC
BH CV ECHO MEAS - AO V2 VTI: 29.6 CM
BH CV ECHO MEAS - AVA(I,A): 2.4 CM^2
BH CV ECHO MEAS - AVA(I,D): 2.4 CM^2
BH CV ECHO MEAS - AVA(V,A): 2.6 CM^2
BH CV ECHO MEAS - AVA(V,D): 2.6 CM^2
BH CV ECHO MEAS - BSA(HAYCOCK): 2 M^2
BH CV ECHO MEAS - BSA: 1.9 M^2
BH CV ECHO MEAS - BZI_BMI: 39.3 KILOGRAMS/M^2
BH CV ECHO MEAS - BZI_METRIC_HEIGHT: 152.4 CM
BH CV ECHO MEAS - BZI_METRIC_WEIGHT: 91.2 KG
BH CV ECHO MEAS - EDV(CUBED): 110.6 ML
BH CV ECHO MEAS - EDV(MOD-SP2): 92 ML
BH CV ECHO MEAS - EDV(MOD-SP4): 81 ML
BH CV ECHO MEAS - EDV(TEICH): 107.5 ML
BH CV ECHO MEAS - EF(CUBED): 37.7 %
BH CV ECHO MEAS - EF(MOD-BP): 30 %
BH CV ECHO MEAS - EF(MOD-SP2): 25 %
BH CV ECHO MEAS - EF(MOD-SP4): 38.3 %
BH CV ECHO MEAS - EF(TEICH): 31 %
BH CV ECHO MEAS - ESV(CUBED): 68.9 ML
BH CV ECHO MEAS - ESV(MOD-SP2): 69 ML
BH CV ECHO MEAS - ESV(MOD-SP4): 50 ML
BH CV ECHO MEAS - ESV(TEICH): 74.2 ML
BH CV ECHO MEAS - FS: 14.6 %
BH CV ECHO MEAS - IVS/LVPW: 1.1
BH CV ECHO MEAS - IVSD: 1.1 CM
BH CV ECHO MEAS - LAT PEAK E' VEL: 6 CM/SEC
BH CV ECHO MEAS - LV DIASTOLIC VOL/BSA (35-75): 43.3 ML/M^2
BH CV ECHO MEAS - LV MASS(C)D: 181.9 GRAMS
BH CV ECHO MEAS - LV MASS(C)DI: 97.2 GRAMS/M^2
BH CV ECHO MEAS - LV MAX PG: 6.1 MMHG
BH CV ECHO MEAS - LV MEAN PG: 3 MMHG
BH CV ECHO MEAS - LV SYSTOLIC VOL/BSA (12-30): 26.7 ML/M^2
BH CV ECHO MEAS - LV V1 MAX: 123 CM/SEC
BH CV ECHO MEAS - LV V1 MEAN: 75.3 CM/SEC
BH CV ECHO MEAS - LV V1 VTI: 25.4 CM
BH CV ECHO MEAS - LVIDD: 4.8 CM
BH CV ECHO MEAS - LVIDS: 4.1 CM
BH CV ECHO MEAS - LVLD AP2: 7.7 CM
BH CV ECHO MEAS - LVLD AP4: 7.4 CM
BH CV ECHO MEAS - LVLS AP2: 7.6 CM
BH CV ECHO MEAS - LVLS AP4: 6.8 CM
BH CV ECHO MEAS - LVOT AREA (M): 2.8 CM^2
BH CV ECHO MEAS - LVOT AREA: 2.8 CM^2
BH CV ECHO MEAS - LVOT DIAM: 1.9 CM
BH CV ECHO MEAS - LVPWD: 1 CM
BH CV ECHO MEAS - MED PEAK E' VEL: 6 CM/SEC
BH CV ECHO MEAS - MR MAX PG: 40.2 MMHG
BH CV ECHO MEAS - MR MAX VEL: 317 CM/SEC
BH CV ECHO MEAS - MV A DUR: 0.2 SEC
BH CV ECHO MEAS - MV A MAX VEL: 81.5 CM/SEC
BH CV ECHO MEAS - MV DEC SLOPE: 172 CM/SEC^2
BH CV ECHO MEAS - MV DEC TIME: 0.22 SEC
BH CV ECHO MEAS - MV E MAX VEL: 58.7 CM/SEC
BH CV ECHO MEAS - MV E/A: 0.72
BH CV ECHO MEAS - MV MAX PG: 2.9 MMHG
BH CV ECHO MEAS - MV MEAN PG: 1 MMHG
BH CV ECHO MEAS - MV P1/2T MAX VEL: 71.7 CM/SEC
BH CV ECHO MEAS - MV P1/2T: 122.1 MSEC
BH CV ECHO MEAS - MV V2 MAX: 84.9 CM/SEC
BH CV ECHO MEAS - MV V2 MEAN: 49.6 CM/SEC
BH CV ECHO MEAS - MV V2 VTI: 37.8 CM
BH CV ECHO MEAS - MVA P1/2T LCG: 3.1 CM^2
BH CV ECHO MEAS - MVA(P1/2T): 1.8 CM^2
BH CV ECHO MEAS - MVA(VTI): 1.9 CM^2
BH CV ECHO MEAS - PA ACC TIME: 0.16 SEC
BH CV ECHO MEAS - PA MAX PG (FULL): -0.25 MMHG
BH CV ECHO MEAS - PA MAX PG: 2.4 MMHG
BH CV ECHO MEAS - PA PR(ACCEL): 9.3 MMHG
BH CV ECHO MEAS - PA V2 MAX: 77.4 CM/SEC
BH CV ECHO MEAS - PVA(V,A): 4 CM^2
BH CV ECHO MEAS - PVA(V,D): 4 CM^2
BH CV ECHO MEAS - QP/QS: 0.96
BH CV ECHO MEAS - RAP SYSTOLE: 3 MMHG
BH CV ECHO MEAS - RV MAX PG: 2.6 MMHG
BH CV ECHO MEAS - RV MEAN PG: 1 MMHG
BH CV ECHO MEAS - RV V1 MAX: 81.3 CM/SEC
BH CV ECHO MEAS - RV V1 MEAN: 52.5 CM/SEC
BH CV ECHO MEAS - RV V1 VTI: 18.1 CM
BH CV ECHO MEAS - RVOT AREA: 3.8 CM^2
BH CV ECHO MEAS - RVOT DIAM: 2.2 CM
BH CV ECHO MEAS - RVSP: 16.5 MMHG
BH CV ECHO MEAS - SI(AO): 77.7 ML/M^2
BH CV ECHO MEAS - SI(CUBED): 22.3 ML/M^2
BH CV ECHO MEAS - SI(LVOT): 38.5 ML/M^2
BH CV ECHO MEAS - SI(MOD-SP2): 12.3 ML/M^2
BH CV ECHO MEAS - SI(MOD-SP4): 16.6 ML/M^2
BH CV ECHO MEAS - SI(TEICH): 17.8 ML/M^2
BH CV ECHO MEAS - SV(AO): 145.3 ML
BH CV ECHO MEAS - SV(CUBED): 41.7 ML
BH CV ECHO MEAS - SV(LVOT): 72 ML
BH CV ECHO MEAS - SV(MOD-SP2): 23 ML
BH CV ECHO MEAS - SV(MOD-SP4): 31 ML
BH CV ECHO MEAS - SV(RVOT): 68.8 ML
BH CV ECHO MEAS - SV(TEICH): 33.3 ML
BH CV ECHO MEAS - TR MAX VEL: 184 CM/SEC
BH CV ECHO MEASUREMENTS AVERAGE E/E' RATIO: 9.78
BH CV VAS BP RIGHT ARM: NORMAL MMHG
BH CV XLRA - RV BASE: 3.3 CM
BUN BLD-MCNC: 11 MG/DL (ref 6–20)
BUN/CREAT SERPL: 12.8 (ref 7–25)
CALCIUM SPEC-SCNC: 9.3 MG/DL (ref 8.6–10.5)
CHLORIDE SERPL-SCNC: 100 MMOL/L (ref 98–107)
CO2 SERPL-SCNC: 23 MMOL/L (ref 22–29)
CREAT BLD-MCNC: 0.86 MG/DL (ref 0.57–1)
DEPRECATED RDW RBC AUTO: 40.7 FL (ref 37–54)
EOSINOPHIL # BLD AUTO: 0.16 10*3/MM3 (ref 0–0.7)
EOSINOPHIL NFR BLD AUTO: 2.3 % (ref 0.3–6.2)
ERYTHROCYTE [DISTWIDTH] IN BLOOD BY AUTOMATED COUNT: 13 % (ref 11.7–13)
GFR SERPL CREATININE-BSD FRML MDRD: 68 ML/MIN/1.73
GLUCOSE BLD-MCNC: 306 MG/DL (ref 65–99)
GLUCOSE BLDC GLUCOMTR-MCNC: 212 MG/DL (ref 70–130)
GLUCOSE BLDC GLUCOMTR-MCNC: 294 MG/DL (ref 70–130)
GLUCOSE BLDC GLUCOMTR-MCNC: 325 MG/DL (ref 70–130)
GLUCOSE BLDC GLUCOMTR-MCNC: 410 MG/DL (ref 70–130)
HCT VFR BLD AUTO: 44.2 % (ref 35.6–45.5)
HGB BLD-MCNC: 14.2 G/DL (ref 11.9–15.5)
IMM GRANULOCYTES # BLD: 0.03 10*3/MM3 (ref 0–0.03)
IMM GRANULOCYTES NFR BLD: 0.4 % (ref 0–0.5)
LEFT ATRIUM VOLUME INDEX: 19 ML/M2
LV EF 2D ECHO EST: 30 %
LYMPHOCYTES # BLD AUTO: 3.32 10*3/MM3 (ref 0.9–4.8)
LYMPHOCYTES NFR BLD AUTO: 46.8 % (ref 19.6–45.3)
MAXIMAL PREDICTED HEART RATE: 163 BPM
MCH RBC QN AUTO: 27.5 PG (ref 26.9–32)
MCHC RBC AUTO-ENTMCNC: 32.1 G/DL (ref 32.4–36.3)
MCV RBC AUTO: 85.7 FL (ref 80.5–98.2)
MONOCYTES # BLD AUTO: 0.69 10*3/MM3 (ref 0.2–1.2)
MONOCYTES NFR BLD AUTO: 9.7 % (ref 5–12)
NEUTROPHILS # BLD AUTO: 2.86 10*3/MM3 (ref 1.9–8.1)
NEUTROPHILS NFR BLD AUTO: 40.2 % (ref 42.7–76)
PLATELET # BLD AUTO: 207 10*3/MM3 (ref 140–500)
PMV BLD AUTO: 11.4 FL (ref 6–12)
POTASSIUM BLD-SCNC: 3.5 MMOL/L (ref 3.5–5.2)
RBC # BLD AUTO: 5.16 10*6/MM3 (ref 3.9–5.2)
SODIUM BLD-SCNC: 135 MMOL/L (ref 136–145)
STRESS TARGET HR: 139 BPM
WBC NRBC COR # BLD: 7.1 10*3/MM3 (ref 4.5–10.7)

## 2018-12-10 PROCEDURE — 99232 SBSQ HOSP IP/OBS MODERATE 35: CPT | Performed by: INTERNAL MEDICINE

## 2018-12-10 PROCEDURE — 63710000001 INSULIN LISPRO (HUMAN) PER 5 UNITS: Performed by: INTERNAL MEDICINE

## 2018-12-10 PROCEDURE — 92610 EVALUATE SWALLOWING FUNCTION: CPT | Performed by: SPEECH-LANGUAGE PATHOLOGIST

## 2018-12-10 PROCEDURE — 63710000001 INSULIN GLARGINE PER 5 UNITS: Performed by: INTERNAL MEDICINE

## 2018-12-10 PROCEDURE — 85025 COMPLETE CBC W/AUTO DIFF WBC: CPT | Performed by: INTERNAL MEDICINE

## 2018-12-10 PROCEDURE — 99233 SBSQ HOSP IP/OBS HIGH 50: CPT | Performed by: NURSE PRACTITIONER

## 2018-12-10 PROCEDURE — 82962 GLUCOSE BLOOD TEST: CPT

## 2018-12-10 PROCEDURE — 93306 TTE W/DOPPLER COMPLETE: CPT | Performed by: INTERNAL MEDICINE

## 2018-12-10 PROCEDURE — 93306 TTE W/DOPPLER COMPLETE: CPT

## 2018-12-10 PROCEDURE — 80048 BASIC METABOLIC PNL TOTAL CA: CPT | Performed by: INTERNAL MEDICINE

## 2018-12-10 PROCEDURE — 25010000002 PERFLUTREN (DEFINITY) 8.476 MG IN SODIUM CHLORIDE 0.9 % 10 ML INJECTION: Performed by: INTERNAL MEDICINE

## 2018-12-10 RX ORDER — INSULIN GLARGINE 100 [IU]/ML
10 INJECTION, SOLUTION SUBCUTANEOUS ONCE
Status: COMPLETED | OUTPATIENT
Start: 2018-12-10 | End: 2018-12-10

## 2018-12-10 RX ORDER — BLOOD-GLUCOSE METER
EACH MISCELLANEOUS
Qty: 1 KIT | Refills: 0 | Status: SHIPPED | OUTPATIENT
Start: 2018-12-10 | End: 2019-01-09 | Stop reason: SDUPTHER

## 2018-12-10 RX ORDER — LANCETS
EACH MISCELLANEOUS
Qty: 100 EACH | Refills: 2 | Status: SHIPPED | OUTPATIENT
Start: 2018-12-10 | End: 2019-01-09 | Stop reason: SDUPTHER

## 2018-12-10 RX ORDER — INSULIN GLARGINE 100 [IU]/ML
45 INJECTION, SOLUTION SUBCUTANEOUS EVERY MORNING
Status: DISCONTINUED | OUTPATIENT
Start: 2018-12-11 | End: 2018-12-11

## 2018-12-10 RX ADMIN — ATORVASTATIN CALCIUM 80 MG: 80 TABLET, FILM COATED ORAL at 22:01

## 2018-12-10 RX ADMIN — INSULIN LISPRO 6 UNITS: 100 INJECTION, SOLUTION INTRAVENOUS; SUBCUTANEOUS at 12:06

## 2018-12-10 RX ADMIN — SODIUM CHLORIDE, PRESERVATIVE FREE 3 ML: 5 INJECTION INTRAVENOUS at 22:04

## 2018-12-10 RX ADMIN — INSULIN LISPRO 4 UNITS: 100 INJECTION, SOLUTION INTRAVENOUS; SUBCUTANEOUS at 18:58

## 2018-12-10 RX ADMIN — FUROSEMIDE 40 MG: 40 TABLET ORAL at 08:23

## 2018-12-10 RX ADMIN — PERFLUTREN 4 ML: 6.52 INJECTION, SUSPENSION INTRAVENOUS at 17:30

## 2018-12-10 RX ADMIN — CARVEDILOL 25 MG: 25 TABLET, FILM COATED ORAL at 18:57

## 2018-12-10 RX ADMIN — CARVEDILOL 25 MG: 25 TABLET, FILM COATED ORAL at 08:24

## 2018-12-10 RX ADMIN — CLOPIDOGREL 75 MG: 75 TABLET, FILM COATED ORAL at 08:24

## 2018-12-10 RX ADMIN — SODIUM CHLORIDE, PRESERVATIVE FREE 3 ML: 5 INJECTION INTRAVENOUS at 08:45

## 2018-12-10 RX ADMIN — INSULIN LISPRO 6 UNITS: 100 INJECTION, SOLUTION INTRAVENOUS; SUBCUTANEOUS at 08:25

## 2018-12-10 RX ADMIN — INSULIN LISPRO 5 UNITS: 100 INJECTION, SOLUTION INTRAVENOUS; SUBCUTANEOUS at 08:25

## 2018-12-10 RX ADMIN — INSULIN LISPRO 7 UNITS: 100 INJECTION, SOLUTION INTRAVENOUS; SUBCUTANEOUS at 12:06

## 2018-12-10 RX ADMIN — METFORMIN HYDROCHLORIDE 1000 MG: 1000 TABLET ORAL at 08:42

## 2018-12-10 RX ADMIN — INSULIN GLARGINE 10 UNITS: 100 INJECTION, SOLUTION SUBCUTANEOUS at 13:03

## 2018-12-10 RX ADMIN — INSULIN GLARGINE 30 UNITS: 100 INJECTION, SOLUTION SUBCUTANEOUS at 08:25

## 2018-12-10 RX ADMIN — SPIRONOLACTONE 25 MG: 25 TABLET ORAL at 08:23

## 2018-12-10 RX ADMIN — INSULIN LISPRO 10 UNITS: 100 INJECTION, SOLUTION INTRAVENOUS; SUBCUTANEOUS at 18:58

## 2018-12-10 RX ADMIN — ASPIRIN 325 MG: 325 TABLET ORAL at 08:24

## 2018-12-10 RX ADMIN — METFORMIN HYDROCHLORIDE 1000 MG: 1000 TABLET ORAL at 18:57

## 2018-12-10 RX ADMIN — INSULIN LISPRO 8 UNITS: 100 INJECTION, SOLUTION INTRAVENOUS; SUBCUTANEOUS at 22:01

## 2018-12-10 NOTE — PLAN OF CARE
Problem: Patient Care Overview  Goal: Plan of Care Review  Outcome: Ongoing (interventions implemented as appropriate)   12/10/18 6693   Coping/Psychosocial   Plan of Care Reviewed With patient   OTHER   Outcome Summary No overt s/s aspiration with any consistency. Good mastication and oral clearance. No difficulties with speech or language. Pt reports currently at baseline. Discussed to contact MD if notices difficulties with return home/work.

## 2018-12-10 NOTE — PROGRESS NOTES
Discharge Planning Assessment  [facility]     Patient Name: PAULA Dent  MRN: [id number]  Today's Date: 12/10/2018    Admit Date: 12/8/2018    Discharge Needs Assessment     Row Name 12/10/18 1048       Living Environment    Lives With  spouse    Current Living Arrangements  home/apartment/condo    Primary Care Provided by  self    Provides Primary Care For  no one    Family Caregiver if Needed  spouse    Family Caregiver Names  -- Stan Dent [phone]    Quality of Family Relationships  involved;helpful    Able to Return to Prior Arrangements  yes       Resource/Environmental Concerns    Resource/Environmental Concerns  none    Transportation Concerns  car, none       Transition Planning    Patient/Family Anticipates Transition to  home with family    Patient/Family Anticipated Services at Transition  none    Transportation Anticipated  family or friend will provide       Discharge Needs Assessment    Concerns to be Addressed  discharge planning    Equipment Currently Used at Home  none    Anticipated Changes Related to Illness  none    Equipment Needed After Discharge  none    Offered/Gave Vendor List  yes pt declined    Discharge Coordination/Progress  Home with spouse, no needs identified. -JACQUELYN Urena        Discharge Plan     Row Name 12/10/18 1058       Plan    Plan  Home with spouse, no needs identified. -JACQUELYN Urena    Patient/Family in Agreement with Plan  yes    Plan Comments  CCP met with pt at bedside. Explained role of CCP, verified face sheet, and discussed d/c planning. Changes made to pt's EPIC profile include a new PCP (Nusrat Dent) and a new pharmacy [facility]. Pt lives at home with spouse in multi-story home with zero exterior steps and 15 interior steps. Pt DME includes c-pap through [facility] and a glucometer. Pt is independent with all ADL's. Pt denies medication affordability issues and denies issues with transportation. No previous  SNF/HH services. Pt's preferred d/c plan is to return home with spouse, no need for services or DME. Spouse will provide transportation at discharge. -JACQUELYN Urena        Destination      No service coordination in this encounter.      Durable Medical Equipment      No service coordination in this encounter.      Dialysis/Infusion      No service coordination in this encounter.      Home Medical Care      No service coordination in this encounter.      Community Resources      No service coordination in this encounter.          Demographic Summary     Row Name 12/10/18 1047       General Information    Admission Type  inpatient    Arrived From  home    Referral Source  admission list    Reason for Consult  discharge planning    Preferred Language  English     Used During This Interaction  no        Functional Status     Row Name 12/10/18 1047       Functional Status    Usual Activity Tolerance  good    Current Activity Tolerance  good       Functional Status, IADL    Medications  independent    Meal Preparation  independent    Housekeeping  independent    Laundry  independent    Shopping  independent       Mental Status    General Appearance WDL  WDL       Mental Status Summary    Recent Changes in Mental Status/Cognitive Functioning  no changes        Psychosocial    No documentation.       Abuse/Neglect    No documentation.       Legal    No documentation.       Substance Abuse    No documentation.       Patient Forms    No documentation.           JACQUELYN Urena

## 2018-12-10 NOTE — PROGRESS NOTES
Hospital Follow Up    Chief Complaint: Status post stroke, CAD, ICMP    Interval History:  No chest pain or dyspnea.     Objective:     Objective:  Temp:  [98.1 °F (36.7 °C)-98.5 °F (36.9 °C)] 98.2 °F (36.8 °C)  Heart Rate:  [62-86] 62  Resp:  [16-18] 16  BP: (106-136)/(56-83) 120/71     Intake/Output Summary (Last 24 hours) at 12/10/2018 0740  Last data filed at 12/9/2018 2243  Gross per 24 hour   Intake 870 ml   Output 500 ml   Net 370 ml     Body mass index is 39.27 kg/m².      12/08/18  1712 12/09/18  0600 12/10/18  0503   Weight: 92.8 kg (204 lb 9.4 oz) 92.8 kg (204 lb 9.4 oz) 91.2 kg (201 lb 1.6 oz)     Weight change: -2.782 kg (-2.1 oz)      Physical Exam:   General : Alert, cooperative, in no acute distress.  Neuro: alert,cooperative and oriented  Lungs: CTAB. Normal respiratory effort and rate.  CV:: Regular rate and rhythm, normal S1 and S2, no murmurs, gallops or rubs.  ABD: Soft, nontender, non-distended. positive bowel sounds  Extr: No edema or cyanosis, moves all extremities    Lab Review:   Results from last 7 days   Lab Units  12/10/18   0601  12/09/18   1042   12/08/18   1415   SODIUM mmol/L  135*  143   < >  132*   POTASSIUM mmol/L  3.5  3.4*   < >  4.3   CHLORIDE mmol/L  100  108*   < >  91*   CO2 mmol/L  23.0  23.2   < >  24.9   BUN mg/dL  11  9   < >  11   CREATININE mg/dL  0.86  0.71   < >  1.15*   GLUCOSE mg/dL  306*  295*   < >  666*   CALCIUM mg/dL  9.3  7.9*   < >  10.1   AST (SGOT) U/L   --    --    --   67*   ALT (SGPT) U/L   --    --    --   108*    < > = values in this interval not displayed.     Results from last 7 days   Lab Units  12/08/18   1415   TROPONIN T ng/mL  <0.010     Results from last 7 days   Lab Units  12/10/18   0601  12/08/18   1415   WBC 10*3/mm3  7.10  6.78   HEMOGLOBIN g/dL  14.2  15.8*   HEMATOCRIT %  44.2  48.2*   PLATELETS 10*3/mm3  207  246     Results from last 7 days   Lab Units  12/08/18   1415   INR   0.95   APTT seconds  23.6         Results from last 7  days   Lab Units  12/09/18   0309   CHOLESTEROL mg/dL  279*   TRIGLYCERIDES mg/dL  401*   HDL CHOL mg/dL  32*             I reviewed the patient's new clinical results.  I personally viewed and interpreted the patient's EKG  Current Medications:   Scheduled Meds:  aspirin 325 mg Oral Daily   Or      aspirin 300 mg Rectal Daily   atorvastatin 80 mg Oral Nightly   carvedilol 25 mg Oral BID With Meals   clopidogrel 75 mg Oral Daily   furosemide 40 mg Oral Daily   insulin glargine 30 Units Subcutaneous QAM   insulin lispro 0-7 Units Subcutaneous 4x Daily With Meals & Nightly   insulin lispro 6 Units Subcutaneous TID With Meals   metFORMIN 1,000 mg Oral BID With Meals   sodium chloride 3 mL Intravenous Q12H   spironolactone 25 mg Oral Daily     Continuous Infusions:     Allergies:  No Known Allergies    Assessment/Plan:     1. Left frontal stroke due to left MCA occlusion.  Felt to be cardioembolic.  Presented with aphasia that resolved with TPA administration.  Already on chronic aspirin and clopidogrel.  Echo and device interrogation pending.   2. History of coronary artery disease  3. Ischemic cardiomyopathy  4. Status post AICD  5. Diabetes mellitus type 2. HgA1c 12.  6. Hyperlipidemia.    -  Will follow up echocardiogram.      Oneida Saldivar MD  12/10/18  7:40 AM

## 2018-12-10 NOTE — THERAPY EVALUATION
Acute Care - Speech Language Pathology   Swallow Initial Evaluation [facility]     Patient Name: PAULA Dent  : 1960  MRN: [id number]  Today's Date: 12/10/2018        Referring Physician: Dr. Jones      Admit Date: 2018    Visit Dx:     ICD-10-CM ICD-9-CM   1. Cerebrovascular accident (CVA) due to embolism of left middle cerebral artery (CMS/HCC) I63.412 434.11   2. Hyperglycemia R73.9 790.29   3. Impaired functional mobility, balance, gait, and endurance Z74.09 V49.89     Patient Active Problem List   Diagnosis   • Cerebrovascular accident (CVA) due to embolism of left middle cerebral artery (CMS/HCC)     Past Medical History:   Diagnosis Date   • CHF (congestive heart failure) (CMS/HCC)    • COPD (chronic obstructive pulmonary disease) (CMS/HCC)    • Diabetes mellitus (CMS/HCC)    • Hyperlipidemia      Past Surgical History:   Procedure Laterality Date   • CARDIAC CATHETERIZATION          SWALLOW EVALUATION (last 72 hours)      SLP Adult Swallow Evaluation     Row Name 12/10/18 1145                   Rehab Evaluation    Document Type  evaluation  -SA        Subjective Information  no complaints  -SA        Patient Observations  alert;cooperative  -SA        Patient Effort  good  -SA        Symptoms Noted During/After Treatment  none  -SA           General Information    Patient Profile Reviewed  yes  -SA        Pertinent History Of Current Problem  L frontal infarct, s/p TPA  -SA        Current Method of Nutrition  regular textures;thin liquids  -SA        Prior Level of Function-Communication  WFL  -SA        Prior Level of Function-Swallowing  no diet consistency restrictions  -SA        Plans/Goals Discussed with  patient  -SA        Barriers to Rehab  none identified  -SA        Patient's Goals for Discharge  return home;return to all previous roles/activities  -SA           Pain Assessment    Additional Documentation  Pain Scale: Numbers Pre/Post-Treatment (Group)  -SA            Pain Scale: Numbers Pre/Post-Treatment    Pain Scale: Numbers, Pretreatment  0/10 - no pain  -SA        Pain Scale: Numbers, Post-Treatment  0/10 - no pain  -SA           Clinical Swallow Eval    Oral Prep Phase  WFL  -SA        Oral Transit  WFL  -SA        Oral Residue  WFL  -SA        Pharyngeal Phase  no overt signs/symptoms of pharyngeal impairment  -SA        Esophageal Phase  unremarkable  -SA        Clinical Swallow Evaluation Summary  No overt s/s aspiration with any consistency.  Good mastication and oral clearance.  No difficulties with speech or language.  Pt reports currently at baseline.  Discussed to contact MD if notices difficulties with return home/work.  -SA           Clinical Impression    SLP Swallowing Diagnosis  functional pharyngeal phase;functional oral phase  -SA        Functional Impact  no impact on function  -SA        Swallow Criteria for Skilled Therapeutic Interventions Met  no problems identified which require skilled intervention  -SA           Recommendations    Therapy Frequency (Swallow)  evaluation only  -        Predicted Duration Therapy Intervention (Days)  until discharge  -SA        SLP Diet Recommendation  regular textures;thin liquids  -SA        Recommended Precautions and Strategies  upright posture during/after eating;small bites of food and sips of liquid  -SA        SLP Rec. for Method of Medication Administration  as tolerated  -        Monitor for Signs of Aspiration  notify SLP if any concerns  -SA        Anticipated Dischage Disposition  home  -          User Key  (r) = Recorded By, (t) = Taken By, (c) = Cosigned By    Initials Name Effective Dates    Miesha Ny MS [facility]-SLP 03/07/18 -           EDUCATION  The patient has been educated in the following areas:   Dysphagia (Swallowing Impairment).    SLP Recommendation and Plan  SLP Swallowing Diagnosis: functional pharyngeal phase, functional oral phase  SLP Diet Recommendation: regular textures,  thin liquids  Recommended Precautions and Strategies: upright posture during/after eating, small bites of food and sips of liquid     Monitor for Signs of Aspiration: notify SLP if any concerns     Swallow Criteria for Skilled Therapeutic Interventions Met: no problems identified which require skilled intervention  Anticipated Dischage Disposition: home     Therapy Frequency (Swallow): evaluation only  Predicted Duration Therapy Intervention (Days): until discharge       Plan of Care Reviewed With: patient  Plan of Care Review  Plan of Care Reviewed With: patient  Outcome Summary: No overt s/s aspiration with any consistency.  Good mastication and oral clearance.  No difficulties with speech or language.  Pt reports currently at baseline.  Discussed to contact MD if notices difficulties with return home/work.         SLP Outcome Measures (last 72 hours)      SLP Outcome Measures     Row Name 12/10/18 1145             SLP Outcome Measures    Outcome Measure Used?  Adult NOMS  -         Adult FCM Scores    FCM Chosen  Swallowing  -      Swallowing FCM Score  7  -        User Key  (r) = Recorded By, (t) = Taken By, (c) = Cosigned By    Initials Name Effective Dates    Miesha Ny MS CCC-SLP 03/07/18 -            Time Calculation:   Time Calculation- SLP     Row Name 12/10/18 1645             Time Calculation- SLP    SLP Start Time  1045  -      SLP Stop Time  1145  -      SLP Time Calculation (min)  60 min  -      SLP Received On  12/10/18  -        User Key  (r) = Recorded By, (t) = Taken By, (c) = Cosigned By    Initials Name Provider Type    Miesha Ny MS CCC-SLP Speech and Language Pathologist          Therapy Charges for Today     Code Description Service Date Service Provider Modifiers Qty    20797226799 HC ST EVAL ORAL PHARYNG SWALLOW 4 12/10/2018 Miesha Ochoa MS CCC-SLP GN 1               Miesha Ochoa MS CCC-MAXIMILIAN  12/10/2018

## 2018-12-10 NOTE — PROGRESS NOTES
Neymar Matute MD                          [phone]      Patient ID:    Name:  PAULA Dent    MRN:  [id number]    1960   58 y.o.  female            Patient Care Team:  Nusrat Dent APRN as PCP - General (Family Medicine)    CC/ Reason for visit: Per Assessment mentioned below    Subjective: Pt seen and examined this AM. No acute overnight events noted. Doing better. No neuro deficits noted.     ROS: Denies any subjective fevers, chest pain, nausea/ vomiting    Objective     Vital Signs past 24hrs    BP range: BP: (106-123)/(56-71) 120/71  Pulse range: Heart Rate:  [62-67] 62  Resp rate range: Resp:  [16-18] 16  Temp range: Temp (24hrs), Av.2 °F (36.8 °C), Min:98.2 °F (36.8 °C), Max:98.2 °F (36.8 °C)      Ventilator/Non-Invasive Ventilation Settings (From admission, onward)    None          Device (Oxygen Therapy): room air       91.2 kg (201 lb 1.6 oz); Body mass index is 39.27 kg/m².      Intake/Output Summary (Last 24 hours) at 12/10/2018 1500  Last data filed at 2018 2243  Gross per 24 hour   Intake 150 ml   Output --   Net 150 ml       Exam:  Constitutional: No acute distress, No accessory muscle use   Head: - NCAT  Eyes: No pallor, Anicteric conjunctiva, EOMI.  ENMT:  Mallampati 3, no oral thrush. No palpable cervical lymphadenopathy  Heart: RRR, no murmur  Lungs: REGINA +, No wheezes/crackles heard    Abdomen: Obese. Soft. No tenderness, guarding or rigidity  Extremities: Extremities warm and well perfused, Trace pitting edema  Neuro: Conscious, alert, oriented x3, no gross focal neuro deficits    Scheduled meds:      aspirin 325 mg Oral Daily   Or      aspirin 300 mg Rectal Daily   atorvastatin 80 mg Oral Nightly   carvedilol 25 mg Oral BID With Meals   clopidogrel 75 mg Oral Daily   furosemide 40 mg Oral Daily   [START ON 2018] insulin glargine 45 Units Subcutaneous QAM   insulin lispro 0-10 Units Subcutaneous 4x Daily With Meals &  Nightly   insulin lispro 10 Units Subcutaneous TID With Meals   metFORMIN 1,000 mg Oral BID With Meals   sodium chloride 3 mL Intravenous Q12H   spironolactone 25 mg Oral Daily       IV meds:                           Data Review:      Results from last 7 days   Lab Units  12/10/18   0601  12/09/18   1042  12/09/18   0309   12/08/18   1415   SODIUM mmol/L  135*  143  142   --   132*   POTASSIUM mmol/L  3.5  3.4*  3.9   --   4.3   CHLORIDE mmol/L  100  108*  106   --   91*   CO2 mmol/L  23.0  23.2  21.6*   --   24.9   BUN mg/dL  11  9  11   --   11   CREATININE mg/dL  0.86  0.71  0.85   < >  1.15*   CALCIUM mg/dL  9.3  7.9*  9.6   --   10.1   BILIRUBIN mg/dL   --    --    --    --   0.4   ALK PHOS U/L   --    --    --    --   132*   ALT (SGPT) U/L   --    --    --    --   108*   AST (SGOT) U/L   --    --    --    --   67*   GLUCOSE mg/dL  306*  295*  310*   --   666*   WBC 10*3/mm3  7.10   --    --    --   6.78   HEMOGLOBIN g/dL  14.2   --    --    --   15.8*   PLATELETS 10*3/mm3  207   --    --    --   246   INR    --    --    --    --   0.95    < > = values in this interval not displayed.       Lab Results   Component Value Date    CALCIUM 9.3 12/10/2018                    Results Review:    I have reviewed the available laboratory results and reviewed the chest imaging from the last 3 days personally and summarized it below    Assessment      Ac aphasia; resolved    Ac Lt Frontal CVA S/p Tpa   Uncontrolled diabetes  Hyperlipidemia  CAD status post PCI  ICM with EF of 40-45% s/p AICD   SHELLIE on CPAP sees   COPD not in exac  Morbid obesity    PLAN:  Patient has made neurological recovery after the TPA.    ECHO pending. Appreciate Cards eval.  .  Appreciate endo eval and recs. Agree with insulin rx.    On home meds   PT/ST cleared pt for home.   [facility] DVT ppx     DISPO - Will dc in am once we have a plan from all.     I have discussed my findings and recommendations with patient, family and nursing staff.      Neymar Matute MD  12/10/2018

## 2018-12-10 NOTE — PLAN OF CARE
Problem: Patient Care Overview  Goal: Plan of Care Review  Outcome: Ongoing (interventions implemented as appropriate)   12/10/18 1500   Coping/Psychosocial   Plan of Care Reviewed With patient;daughter   OTHER   Outcome Summary Meet with 57 y/o pt this afternoon at bedside to initiate insulin instructions. Pls see DM ed flowsheet or note for more detail.

## 2018-12-10 NOTE — PROGRESS NOTES
58 y.o.   LOS: 2 days   Patient Care Team:  Nusrat Dent APRN as PCP - General (Family Medicine)    Chief Complaint:  Elevated BG    Chief Complaint   Patient presents with   • Neuro Deficit(s)       Subjective    Patients sugars are still elevated and 300 and 400 range.  Discussed with the patient extensively about diet control if she is being discharged today.    Interval History:    Review of Systems:   Review of Systems   Constitutional: Positive for appetite change and fatigue. Negative for fever.   Respiratory: Negative for shortness of breath.    Cardiovascular: Negative for chest pain.   Gastrointestinal: Negative for diarrhea and vomiting.   Endocrine: Negative for polydipsia and polyuria.   Genitourinary: Negative for flank pain.   Musculoskeletal: Negative for arthralgias and myalgias.   Skin: Negative for pallor.   Neurological: Positive for weakness. Negative for numbness.   Psychiatric/Behavioral: Negative for agitation.     Objective     Vital Signs   Temp:  [98.1 °F (36.7 °C)-98.5 °F (36.9 °C)] 98.2 °F (36.8 °C)  Heart Rate:  [62-69] 62  Resp:  [16-18] 16  BP: (106-136)/(56-83) 120/71    Physical Exam:  Physical Exam   Constitutional: She is oriented to person, place, and time. She appears well-nourished.   Obese     HENT:   Head: Normocephalic and atraumatic.   Wide neck   Eyes: Conjunctivae and EOM are normal. No scleral icterus.   Neck: Normal range of motion. Neck supple. No thyromegaly present.   Acanthosis nigricans   Cardiovascular: Normal rate and normal heart sounds.   Pulmonary/Chest: Effort normal and breath sounds normal. No stridor. She has no wheezes.   Abdominal: Soft. Bowel sounds are normal. She exhibits no distension. There is no tenderness.   Central obesity   Musculoskeletal: She exhibits no edema or tenderness.   Neurological: She is alert and oriented to person, place, and time.   Skin: Skin is warm and dry. She is not diaphoretic.   Psychiatric: She has a normal mood and  affect.   Vitals reviewed.  Results Review:     I reviewed the patient's new clinical results and summarized them in subjective and in plan.      Glucose   Date/Time Value Ref Range Status   12/10/2018 0601 306 (H) 65 - 99 mg/dL Final   12/09/2018 1042 295 (H) 65 - 99 mg/dL Final   12/09/2018 0309 310 (H) 65 - 99 mg/dL Final   12/08/2018 1415 666 (C) 65 - 99 mg/dL Final     Lab Results (last 24 hours)     Procedure Component Value Units Date/Time    POC Glucose Once [[id number]]  (Abnormal) Collected:  12/10/18 1104    Specimen:  Blood Updated:  12/10/18 1106     Glucose 410 mg/dL     Basic Metabolic Panel [[id number]]  (Abnormal) Collected:  12/10/18 0601    Specimen:  Blood Updated:  12/10/18 0724     Glucose 306 mg/dL      BUN 11 mg/dL      Creatinine 0.86 mg/dL      Sodium 135 mmol/L      Potassium 3.5 mmol/L      Chloride 100 mmol/L      CO2 23.0 mmol/L      Calcium 9.3 mg/dL      eGFR Non African Amer 68 mL/min/1.73      BUN/Creatinine Ratio 12.8     Anion Gap 12.0 mmol/L     Narrative:       GFR Normal >60  Chronic Kidney Disease <60  Kidney Failure <15    CBC & Differential [[id number]] Collected:  12/10/18 0601    Specimen:  Blood Updated:  12/10/18 0703    Narrative:       The following orders were created for panel order CBC & Differential.  Procedure                               Abnormality         Status                     ---------                               -----------         ------                     CBC Auto Differential[[id number]]        Abnormal            Final result                 Please view results for these tests on the individual orders.    CBC Auto Differential [[id number]]  (Abnormal) Collected:  12/10/18 0601    Specimen:  Blood Updated:  12/10/18 0703     WBC 7.10 10*3/mm3      RBC 5.16 10*6/mm3      Hemoglobin 14.2 g/dL      Hematocrit 44.2 %      MCV 85.7 fL      MCH 27.5 pg      MCHC 32.1 g/dL      RDW 13.0 %      RDW-SD 40.7 fl      MPV 11.4 fL      Platelets 207 10*3/mm3       Neutrophil % 40.2 %      Lymphocyte % 46.8 %      Monocyte % 9.7 %      Eosinophil % 2.3 %      Basophil % 0.6 %      Immature Grans % 0.4 %      Neutrophils, Absolute 2.86 10*3/mm3      Lymphocytes, Absolute 3.32 10*3/mm3      Monocytes, Absolute 0.69 10*3/mm3      Eosinophils, Absolute 0.16 10*3/mm3      Basophils, Absolute 0.04 10*3/mm3      Immature Grans, Absolute 0.03 10*3/mm3     POC Glucose Once [[id number]]  (Abnormal) Collected:  12/10/18 0544    Specimen:  Blood Updated:  12/10/18 0548     Glucose 294 mg/dL     POC Glucose Once [[id number]]  (Abnormal) Collected:  12/09/18 2102    Specimen:  Blood Updated:  12/09/18 2103     Glucose 367 mg/dL     POC Glucose Once [[id number]]  (Abnormal) Collected:  12/09/18 1633    Specimen:  Blood Updated:  12/09/18 1635     Glucose 350 mg/dL         Imaging Results (last 24 hours)     Procedure Component Value Units Date/Time    CT Cerebral Perfusion With & Without Contrast [[id number]] Collected:  12/08/18 1529     Updated:  12/10/18 1103    Narrative:       CT OF THE BRAIN WITH AND WITHOUT CONTRAST, CT ANGIOGRAPHY OF THE HEAD  AND NECK WITH CONTRAST INCLUDING RECONSTRUCTION IMAGES AND CT CEREBRAL  PERFUSION IMAGES 12/08/2018.     HISTORY: Unable to speak. Possible stroke.     TECHNIQUE: Axial images were obtained through the brain without  intravenous contrast.      FINDINGS: There is some bifrontal atrophy. Ventricles appear normal in  size. There is no evidence of acute infarction, hemorrhage, midline  shift or mass effect. Minimal basal ganglia calcification is seen on the  left. Report was called to the stroke team at approximately 1425 hours.     TECHNIQUE: Following the intravenous contrast injection CT angiography  of the head and neck was performed. Sagittal, coronal and 3-D  reconstruction images were reviewed. CT cerebral perfusion images were  obtained.     FINDINGS: On the cerebral perfusion images there is prolonged Tmax in  the left frontoparietal region in  the distribution of the left middle  cerebral artery territory. A small area of abnormal density is seen in  the left frontal lobe on the CBF images. This is not seen on the CBV  images.     NASCET criteria was used. Sagittal, coronal and 3-D reconstruction  images were reviewed.     There appears to be cut off of opacification of an anterior division  left middle cerebral artery branch involving the anterior aspect of the  left temporal lobe. This is best seen on coronal reconstruction images  66 through 69. The left middle cerebral artery otherwise opacifies  normally. The left anterior cerebral artery and the right middle and  anterior cerebral arteries opacify normally. The bilateral vertebral and  the bilateral common carotid and internal carotid arteries opacify  relatively normally.     No aneurysm is seen.     The right vertebral artery appears smaller than the left.     Postcontrast CT of the brain shows no abnormal enhancement.       Impression:       1. Abnormal cerebral perfusion imaging as discussed above.  2. There is absent/cut off of opacification of a left anterior middle  cerebral artery branch as described. This is consistent with acute  occlusion. Please see full discussion above.  3. No other significant findings are noted on the CT of the brain with  and without contrast. There is no evidence of acute infarction  hemorrhage or mass effect.      Radiation dose reduction techniques were utilized, including automated  exposure control and exposure modulation based on body size.     This report was finalized on 12/10/2018 10:59 AM by Dr. Rich Banerjee M.D.       CT Angiogram Head With & Without Contrast [[id number]] Collected:  12/08/18 1529     Updated:  12/10/18 1103    Narrative:       CT OF THE BRAIN WITH AND WITHOUT CONTRAST, CT ANGIOGRAPHY OF THE HEAD  AND NECK WITH CONTRAST INCLUDING RECONSTRUCTION IMAGES AND CT CEREBRAL  PERFUSION IMAGES 12/08/2018.     HISTORY: Unable to speak.  Possible stroke.     TECHNIQUE: Axial images were obtained through the brain without  intravenous contrast.      FINDINGS: There is some bifrontal atrophy. Ventricles appear normal in  size. There is no evidence of acute infarction, hemorrhage, midline  shift or mass effect. Minimal basal ganglia calcification is seen on the  left. Report was called to the stroke team at approximately 1425 hours.     TECHNIQUE: Following the intravenous contrast injection CT angiography  of the head and neck was performed. Sagittal, coronal and 3-D  reconstruction images were reviewed. CT cerebral perfusion images were  obtained.     FINDINGS: On the cerebral perfusion images there is prolonged Tmax in  the left frontoparietal region in the distribution of the left middle  cerebral artery territory. A small area of abnormal density is seen in  the left frontal lobe on the CBF images. This is not seen on the CBV  images.     NASCET criteria was used. Sagittal, coronal and 3-D reconstruction  images were reviewed.     There appears to be cut off of opacification of an anterior division  left middle cerebral artery branch involving the anterior aspect of the  left temporal lobe. This is best seen on coronal reconstruction images  66 through 69. The left middle cerebral artery otherwise opacifies  normally. The left anterior cerebral artery and the right middle and  anterior cerebral arteries opacify normally. The bilateral vertebral and  the bilateral common carotid and internal carotid arteries opacify  relatively normally.     No aneurysm is seen.     The right vertebral artery appears smaller than the left.     Postcontrast CT of the brain shows no abnormal enhancement.       Impression:       1. Abnormal cerebral perfusion imaging as discussed above.  2. There is absent/cut off of opacification of a left anterior middle  cerebral artery branch as described. This is consistent with acute  occlusion. Please see full discussion  above.  3. No other significant findings are noted on the CT of the brain with  and without contrast. There is no evidence of acute infarction  hemorrhage or mass effect.      Radiation dose reduction techniques were utilized, including automated  exposure control and exposure modulation based on body size.     This report was finalized on 12/10/2018 10:59 AM by Dr. Rich Banerjee M.D.       CT Angiogram Neck With & Without Contrast [[id number]] Collected:  12/08/18 1529     Updated:  12/10/18 1103    Narrative:       CT OF THE BRAIN WITH AND WITHOUT CONTRAST, CT ANGIOGRAPHY OF THE HEAD  AND NECK WITH CONTRAST INCLUDING RECONSTRUCTION IMAGES AND CT CEREBRAL  PERFUSION IMAGES 12/08/2018.     HISTORY: Unable to speak. Possible stroke.     TECHNIQUE: Axial images were obtained through the brain without  intravenous contrast.      FINDINGS: There is some bifrontal atrophy. Ventricles appear normal in  size. There is no evidence of acute infarction, hemorrhage, midline  shift or mass effect. Minimal basal ganglia calcification is seen on the  left. Report was called to the stroke team at approximately 1425 hours.     TECHNIQUE: Following the intravenous contrast injection CT angiography  of the head and neck was performed. Sagittal, coronal and 3-D  reconstruction images were reviewed. CT cerebral perfusion images were  obtained.     FINDINGS: On the cerebral perfusion images there is prolonged Tmax in  the left frontoparietal region in the distribution of the left middle  cerebral artery territory. A small area of abnormal density is seen in  the left frontal lobe on the CBF images. This is not seen on the CBV  images.     NASCET criteria was used. Sagittal, coronal and 3-D reconstruction  images were reviewed.     There appears to be cut off of opacification of an anterior division  left middle cerebral artery branch involving the anterior aspect of the  left temporal lobe. This is best seen on coronal  reconstruction images  66 through 69. The left middle cerebral artery otherwise opacifies  normally. The left anterior cerebral artery and the right middle and  anterior cerebral arteries opacify normally. The bilateral vertebral and  the bilateral common carotid and internal carotid arteries opacify  relatively normally.     No aneurysm is seen.     The right vertebral artery appears smaller than the left.     Postcontrast CT of the brain shows no abnormal enhancement.       Impression:       1. Abnormal cerebral perfusion imaging as discussed above.  2. There is absent/cut off of opacification of a left anterior middle  cerebral artery branch as described. This is consistent with acute  occlusion. Please see full discussion above.  3. No other significant findings are noted on the CT of the brain with  and without contrast. There is no evidence of acute infarction  hemorrhage or mass effect.      Radiation dose reduction techniques were utilized, including automated  exposure control and exposure modulation based on body size.     This report was finalized on 12/10/2018 10:59 AM by Dr. Rich Banerjee M.D.       CT Head Without Contrast [[id number]] Collected:  12/09/18 1607     Updated:  12/09/18 1621    Narrative:       CT HEAD WO CONTRAST-     INDICATIONS: Stroke, follow-up     TECHNIQUE: Radiation dose reduction techniques were utilized, including  automated exposure control and exposure modulation based on body size.      Noncontrast head CT     COMPARISON: 12/9/2018 at 0253 hours     FINDINGS:      A 2.4 cm focus of hypodensity in the left frontal lobe is mildly more  conspicuous, reflecting evolving post infarct changes. A couple punctate  hyperdensities within this region, for example image 26 of series 1 may  be passing vessels or potentially petechial hemorrhage, continued  follow-up suggested. No other evidence of acute intracranial hemorrhage,  midline shift or conspicuous mass effect.  Basal ganglia  mineralization  is present.     Arterial calcifications are seen at the base of the brain.        Ventricles, cisterns, cerebral sulci are unremarkable for patient age.     Likely mucous retention cyst or polyp in the left maxillary sinus. The  visualized paranasal sinuses, orbits, mastoid air cells are otherwise  unremarkable.                   Impression:             Evolving postinfarct changes in the left frontal lobe, as described.  Continued follow-up suggested.     Discussed by telephone with the patient's nurse, Anjel, at 1615,  12/9/2018.     This report was finalized on 12/9/2018 4:18 PM by Dr. Jarvis Marcum M.D.             Medication Review: DONE      Current Facility-Administered Medications:   •  aspirin tablet 325 mg, 325 mg, Oral, Daily, 325 mg at 12/10/18 0824 **OR** aspirin suppository 300 mg, 300 mg, Rectal, Daily, Mark Jones MD  •  atorvastatin (LIPITOR) tablet 80 mg, 80 mg, Oral, Nightly, Mark Jones MD, 80 mg at 12/09/18 2235  •  carvedilol (COREG) tablet 25 mg, 25 mg, Oral, BID With Meals, Neymar Matute MD, 25 mg at 12/10/18 0824  •  clopidogrel (PLAVIX) tablet 75 mg, 75 mg, Oral, Daily, Mark Jones MD, 75 mg at 12/10/18 0824  •  dextrose (D50W) 25 g/ 50mL Intravenous Solution 25 g, 25 g, Intravenous, Q15 Min PRN, Neymar Matute MD  •  dextrose (GLUTOSE) oral gel 15 g, 15 g, Oral, Q15 Min PRN, Neymar Matute MD  •  furosemide (LASIX) tablet 40 mg, 40 mg, Oral, Daily, Neymar Matute MD, 40 mg at 12/10/18 0823  •  glucagon (human recombinant) (GLUCAGEN DIAGNOSTIC) injection 1 mg, 1 mg, Subcutaneous, PRN, Neymar Matute MD  •  [START ON 12/11/2018] insulin glargine (LANTUS) injection 45 Units, 45 Units, Subcutaneous, Constantine LIU Manikya, MD  •  insulin lispro (humaLOG) injection 0-10 Units, 0-10 Units, Subcutaneous, 4x Daily With Meals & Nightly, Abraham Fitch MD  •  insulin lispro (humaLOG) injection 10  "Units, 10 Units, Subcutaneous, TID With Meals, Abraham Fitch MD  •  metFORMIN (GLUCOPHAGE) tablet 1,000 mg, 1,000 mg, Oral, BID With Meals, Neymar Matute MD, 1,000 mg at 12/10/18 0842  •  sodium chloride 0.9 % flush 10 mL, 10 mL, Intravenous, PRN, Janneth Scales APRN  •  sodium chloride 0.9 % flush 3 mL, 3 mL, Intravenous, Q12H, Mark Jones MD, 3 mL at 12/10/18 0845  •  sodium chloride 0.9 % flush 3-10 mL, 3-10 mL, Intravenous, PRN, Mark Jones MD  •  spironolactone (ALDACTONE) tablet 25 mg, 25 mg, Oral, Daily, Neymar Matute MD, 25 mg at 12/10/18 0823    Assessment/Plan     Active Hospital Problems    Diagnosis Date Noted   • Cerebrovascular accident (CVA) due to embolism of left middle cerebral artery (CMS/HCC) [I63.412] 12/08/2018      Resolved Hospital Problems   No resolved problems to display.     Type 2 diabetes mellitus-severely uncontrolled  Will increase the dosage of Lantus to 45 units in the morning.  Will give Lantus 10 units ×1 now  Will increase the dosage of Humalog to 10 units with each meal  Continue Humalog sliding scale 3 times a day before meals and at bedtime  Continue metformin thousand milligrams twice daily.    Discussed with the patient extensively the importance of glycemic control in order to prevent cardiovascular diseases.     Hyperlipidemia  Continue Lipitor 80 mg oral daily.      Discharge instructions from endocrine  Prescriptions have been sent to patient's pharmacy  Accu-Chek glucometer has been sent to the pharmacy.  Tresiba 45 units in the morning  NovoLog 10 units with each meal  Metformin thousand milligrams twice daily  These insulins are covered by patient's insurance.  Follow-up with me in clinic in about 2-3 weeks.     Discussed plan with Nurse.     Abraham Fitch MD.  12/10/18  12:21 PM      EMR Dragon / transcription disclaimer:    \"Dictated utilizing Dragon dictation\".   "

## 2018-12-10 NOTE — PLAN OF CARE
Problem: Patient Care Overview  Goal: Plan of Care Review  Outcome: Ongoing (interventions implemented as appropriate)   12/10/18 0438   Coping/Psychosocial   Plan of Care Reviewed With patient   Plan of Care Review   Progress improving   OTHER   Outcome Summary Transfer from ICU on dayshift-diagnosed with stroke/received TPA/NIH=0/Up ad tye/ gait steady/P.T. signed off/Endocrinology following due to DM management/DM Educator to see/Placed on scheduled Lantus and humalog and ssi/accuchecks ac/hs/VSS/will continue to monitor        Problem: Stroke (IRF) (Adult)  Goal: Promote Optimal Functional [place]  Outcome: Ongoing (interventions implemented as appropriate)      Problem: Thrombolytic Therapy (Adult)  Goal: Signs and Symptoms of Listed Potential Problems Will be Absent, Minimized or Managed (Thrombolytic Therapy)  Outcome: Ongoing (interventions implemented as appropriate)      Problem: Diabetes, Type 2 (Adult)  Goal: Signs and Symptoms of Listed Potential Problems Will be Absent, Minimized or Managed (Diabetes, Type 2)  Outcome: Ongoing (interventions implemented as appropriate)

## 2018-12-10 NOTE — PROGRESS NOTES
DOS: 12/10/2018  NAME: Daisy Dent  : 1960  PCP: Nusrat RAND  CC: difficulty with speech     Stroke    Subjective: Patient sitting up in bed, states all of her symptoms have resolved. She does notice sometimes it takes her a few seconds to get out what she wants to say but her speech is normal. Daughter at bedside.    Objective:  Vital signs:      Vitals:    18 1912 18 2329 12/10/18 0503 12/10/18 0734   BP: 123/67 106/56  120/71   BP Location: Right arm Left arm  Right arm   Patient Position: Lying Lying  Lying   Pulse: 67 66  62   Resp:   16   Temp: 98.2 °F (36.8 °C) 98.2 °F (36.8 °C)  98.2 °F (36.8 °C)   TempSrc: Oral Oral  Oral   SpO2: 95% 96%  95%   Weight:   91.2 kg (201 lb 1.6 oz)    Height:           Current Facility-Administered Medications:   •  aspirin tablet 325 mg, 325 mg, Oral, Daily, 325 mg at 12/10/18 0824 **OR** aspirin suppository 300 mg, 300 mg, Rectal, Daily, Mark Jones MD  •  atorvastatin (LIPITOR) tablet 80 mg, 80 mg, Oral, Nightly, Mark Jones MD, 80 mg at 18  •  carvedilol (COREG) tablet 25 mg, 25 mg, Oral, BID With Meals, Neymar Matute MD, 25 mg at 12/10/18 0824  •  clopidogrel (PLAVIX) tablet 75 mg, 75 mg, Oral, Daily, Mark Jones MD, 75 mg at 12/10/18 0824  •  dextrose (D50W) 25 g/ 50mL Intravenous Solution 25 g, 25 g, Intravenous, Q15 Min PRN, Neymar Matute MD  •  dextrose (GLUTOSE) oral gel 15 g, 15 g, Oral, Q15 Min PRN, Neymar Matute MD  •  furosemide (LASIX) tablet 40 mg, 40 mg, Oral, Daily, Neymar Matute MD, 40 mg at 12/10/18 0823  •  glucagon (human recombinant) (GLUCAGEN DIAGNOSTIC) injection 1 mg, 1 mg, Subcutaneous, PRN, Neymar Matute MD  •  insulin glargine (LANTUS) injection 10 Units, 10 Units, Subcutaneous, Once, Abraham Fitch MD  •  [START ON 2018] insulin glargine (LANTUS) injection 45 Units, 45 Units, Subcutaneous, QAM,  Abraham Fitch MD  •  insulin lispro (humaLOG) injection 0-10 Units, 0-10 Units, Subcutaneous, 4x Daily With Meals & Nightly, Abraham Fitch MD  •  insulin lispro (humaLOG) injection 10 Units, 10 Units, Subcutaneous, TID With Meals, Abraham Fitch MD  •  metFORMIN (GLUCOPHAGE) tablet 1,000 mg, 1,000 mg, Oral, BID With Meals, Neymar Matute MD, 1,000 mg at 12/10/18 0842  •  sodium chloride 0.9 % flush 10 mL, 10 mL, Intravenous, PRN, Janneth Scales APRN  •  sodium chloride 0.9 % flush 3 mL, 3 mL, Intravenous, Q12H, Mark Jones MD, 3 mL at 12/10/18 0845  •  sodium chloride 0.9 % flush 3-10 mL, 3-10 mL, Intravenous, PRN, Mark Jones MD  •  spironolactone (ALDACTONE) tablet 25 mg, 25 mg, Oral, Daily, Neymar Matute MD, 25 mg at 12/10/18 0823  No current facility-administered medications on file prior to encounter.      Current Outpatient Medications on File Prior to Encounter   Medication Sig   • atorvastatin (LIPITOR) 80 MG tablet Take 80 mg by mouth Daily.   • carvedilol (COREG) 25 MG tablet Take 25 mg by mouth 2 (Two) Times a Day With Meals.   • clopidogrel (PLAVIX) 75 MG tablet Take 75 mg by mouth Daily.   • furosemide (LASIX) 40 MG tablet Take 40 mg by mouth Daily.   • metFORMIN (GLUCOPHAGE) 500 MG tablet Take 500 mg by mouth Daily With Breakfast.   • metFORMIN (GLUCOPHAGE) 500 MG tablet Take 1,000 mg by mouth every night at bedtime.   • potassium chloride (K-DUR) 10 MEQ CR tablet Take 10 mEq by mouth Daily.   • spironolactone (ALDACTONE) 25 MG tablet Take 25 mg by mouth Daily.     PRN meds  dextrose  •  dextrose  •  glucagon (human recombinant)  •  sodium chloride  •  sodium chloride    General appearance: alert and cooperative, well groomed, in NAD  HEENT: Normocephalic, atraumatic, no masses or tenderness  COR: RRR  Resp: Even and unlabored, clear to auscultation in all 4 lung fields  Extremities: Equal pulses, no edema  Skin: warm, dry, bruising in BUE A/C  area    Neurological:   MS: AO x4. Language normal. No neglect. Higher integrative function normal  CN: II-XII normal  Motor: 5/5 strength in all 4 ext., normal tone  Reflexes: 1+ in all 4 ext.  Sensory: normal light sensation intact in all 4 ext.  Coordination: Normal finger to nose test, normal heel to shin test    Laboratory results:  Lab Results   Component Value Date    CHOL 279 (H) 12/09/2018     Lab Results   Component Value Date    TRIG 401 (H) 12/09/2018     Lab Results   Component Value Date    HDL 32 (L) 12/09/2018     Lab Results   Component Value Date    LDL  12/09/2018      Comment:      Unable to calculate     (H) 12/09/2018     Lab Results   Component Value Date    HGBA1C 12.60 (H) 12/09/2018     No results found for: TSH  No results found for: [id number]  Lab Results   Component Value Date    GLUCOSE 306 (H) 12/10/2018    BUN 11 12/10/2018    CREATININE 0.86 12/10/2018    EGFRIFNONA 68 12/10/2018    BCR 12.8 12/10/2018    K 3.5 12/10/2018    CO2 23.0 12/10/2018    CALCIUM 9.3 12/10/2018    ALBUMIN 4.70 12/08/2018    AST 67 (H) 12/08/2018     (H) 12/08/2018       Review and interpretation of imaging:  Ct Angiogram Head With & Without Contrast    Result Date: 12/10/2018  1. Abnormal cerebral perfusion imaging as discussed above. 2. There is absent/cut off of opacification of a left anterior middle cerebral artery branch as described. This is consistent with acute occlusion. Please see full discussion above. 3. No other significant findings are noted on the CT of the brain with and without contrast. There is no evidence of acute infarction hemorrhage or mass effect.   Radiation dose reduction techniques were utilized, including automated exposure control and exposure modulation based on body size.  This report was finalized on 12/10/2018 10:59 AM by Dr. Rich Banerjee M.D.      Ct Head Without Contrast    Result Date: 12/9/2018      Evolving postinfarct changes in the left frontal  lobe, as described. Continued follow-up suggested.  Discussed by telephone with the patient's nurse, Anjel, at 1615, 12/9/2018.  This report was finalized on 12/9/2018 4:18 PM by Dr. Jarvis Marcum M.D.      Ct Head Without Contrast    Result Date: 12/9/2018  1. Evolving ischemia is now evident in the inferior-posterior left frontal lobe corresponding to a portion of the CT perfusion abnormality. There is no mass effect or hemorrhage.         This report was finalized on 12/9/2018 3:21 AM by Jay Bar M.D.      Ct Angiogram Neck With & Without Contrast    Result Date: 12/10/2018  1. Abnormal cerebral perfusion imaging as discussed above. 2. There is absent/cut off of opacification of a left anterior middle cerebral artery branch as described. This is consistent with acute occlusion. Please see full discussion above. 3. No other significant findings are noted on the CT of the brain with and without contrast. There is no evidence of acute infarction hemorrhage or mass effect.   Radiation dose reduction techniques were utilized, including automated exposure control and exposure modulation based on body size.  This report was finalized on 12/10/2018 10:59 AM by Dr. Rich Banerjee M.D.      Xr Chest 1 View    Result Date: 12/8/2018  No focal pulmonary consolidation. Borderline heart size with mild vascular congestion.  This report was finalized on 12/8/2018 4:27 PM by Dr. Jarvis Marcum M.D.      Ct Cerebral Perfusion With & Without Contrast    Result Date: 12/10/2018  1. Abnormal cerebral perfusion imaging as discussed above. 2. There is absent/cut off of opacification of a left anterior middle cerebral artery branch as described. This is consistent with acute occlusion. Please see full discussion above. 3. No other significant findings are noted on the CT of the brain with and without contrast. There is no evidence of acute infarction hemorrhage or mass effect.   Radiation dose reduction techniques were  utilized, including automated exposure control and exposure modulation based on body size.  This report was finalized on 12/10/2018 10:59 AM by Dr. Rich Banerjee M.D.      2D Echo pending    Impression: This is a 57 yo female with PMH of DM, CAD, CMT with PPM who presented to the ER with aphasia, hyperglycemia, and mildly hypertensive.  Her initial CT head was negative but CTP showed a large LMCA mismatch, small core infarct, and CTA showed a LMCA thrombus.  She received TPA and her aphasia was noted to have resolved, therefore patient was not a candidate for thrombectomy. She was on Plavix and Lipitor at home. Repeat CTA head today showing no acute infarction hemorrhage or mass effect.  Also CTA insignificant for ICAD, the etiology of her stroke is likely cardioembolic. 2D echo is pending, however patient may benefit from a RUBEN.  Cardiology following, will assess need for A/C + DAPT. She will need aggressive control of her risk factors to prevent another stroke event (Uncontrolled diabetes, hyperlipidemia). Endocrinology is following the patient.       Assessment/Plan:  Left MCA thrombus likely cardioembolic  Continue ASA 325mg and Plavix 75mg  Lipitor 80mg  Neurochecks per stroke protocol  2D Echo pending, Cardiology following patient may need A/C + plavix.  Endocrinology/Diabetes educator following for hyperglycemia.  Normalize BP, encourage hydration.  Stroke Education  AUBREE/SCDs  PT/OT/ST  Will follow.    Discussed plan with patient, RN, and Dr. Wright, agrees with plan above.  KEYONA Paz

## 2018-12-10 NOTE — SIGNIFICANT NOTE
12/10/18 0836   Rehab Time/Intention   Evaluation Not Performed (pt ambulates to BR, eating, lower body dressing w/o assist, denies visual/cognitive/sensation/coordination deficits.  Feels back to baseline.  Full OT eval not warranted.  RN aware. )   Rehab Treatment   Discipline occupational therapist

## 2018-12-10 NOTE — NURSING NOTE
"Diabetes Education  Assessment/Teaching    Patient Name:  PAULA Dent  YOB: 1960  MRN: [id number]  Admit Date:  12/8/2018      Assessment Date:  12/10/2018    Most Recent Value   General Information    Referral From:  MD selby   Height  152.4 cm (60\")   Height Method  Stated   Weight  91.2 kg (201 lb 1.6 oz)   Weight Method  Bed scale   Diabetes History   What type of diabetes do you have?  Type 2   Do you test your blood sugar at home?  yes   Have you had high blood sugar? (>140mg/dl)  yes [based upon current a1C elevated > 12%.]   Education Preferences   Barriers to Learning  -- none observed this afternoon.    Assessment Topics   Taking Medication - Assessment  Needs education -anticipate pt to dc new to basal/bolus insulin (long-acting/meal-time insulins.) Pt describes learning to use injectable DM med (Trulicity.)    Healthy Coping - Assessment  Competent [supportive daughter at bedside. ]   Monitoring - Assessment  Competent   DM Goals            Most Recent Value   DM Education Needs   Meter  Has own   Meter Type  Freestyle. D/w pt she will need a new rx for test strips d/t increased frequency to test BGs upon dc.    Medication  Insulin, Administration, Pen [Initiate instructions for taking Lantus qd, and  Humalog qmeal. ]   Problem Solving  Hypoglycemia   Healthy Coping  Appropriate   Discharge Plan  Home, Follow-up with PCP [plan is f/u with endo Dr. Fitch.]   Motivation  Moderate, Engaged   Teaching Method  Explanation, Discussion, Demonstration, Handouts   Patient Response  Needs reinforcement   This RN asked pt to call re her pharmacy benefits to find out which insulins will be covered, so that she can get co-pay cards for each. Will plan to f/u with pt tomorrow.          Other Comments:  Pt needs rx for dc for 'Stefany pen needles' as well as insulin pens. Thank you.         Electronically signed by:  Magda Wu RN, BSN, CDE   12/10/18 4:22 PM  "

## 2018-12-11 LAB
ANION GAP SERPL CALCULATED.3IONS-SCNC: 15.3 MMOL/L
BASOPHILS # BLD AUTO: 0.03 10*3/MM3 (ref 0–0.2)
BASOPHILS NFR BLD AUTO: 0.3 % (ref 0–1.5)
BUN BLD-MCNC: 11 MG/DL (ref 6–20)
BUN/CREAT SERPL: 14.5 (ref 7–25)
CALCIUM SPEC-SCNC: 9.2 MG/DL (ref 8.6–10.5)
CHLORIDE SERPL-SCNC: 104 MMOL/L (ref 98–107)
CO2 SERPL-SCNC: 22.7 MMOL/L (ref 22–29)
CREAT BLD-MCNC: 0.76 MG/DL (ref 0.57–1)
DEPRECATED RDW RBC AUTO: 40.3 FL (ref 37–54)
EOSINOPHIL # BLD AUTO: 0.24 10*3/MM3 (ref 0–0.7)
EOSINOPHIL NFR BLD AUTO: 2.8 % (ref 0.3–6.2)
ERYTHROCYTE [DISTWIDTH] IN BLOOD BY AUTOMATED COUNT: 13 % (ref 11.7–13)
GFR SERPL CREATININE-BSD FRML MDRD: 78 ML/MIN/1.73
GLUCOSE BLD-MCNC: 214 MG/DL (ref 65–99)
GLUCOSE BLDC GLUCOMTR-MCNC: 135 MG/DL (ref 70–130)
GLUCOSE BLDC GLUCOMTR-MCNC: 141 MG/DL (ref 70–130)
GLUCOSE BLDC GLUCOMTR-MCNC: 251 MG/DL (ref 70–130)
GLUCOSE BLDC GLUCOMTR-MCNC: 284 MG/DL (ref 70–130)
HCT VFR BLD AUTO: 44.5 % (ref 35.6–45.5)
HGB BLD-MCNC: 14.3 G/DL (ref 11.9–15.5)
IMM GRANULOCYTES # BLD: 0.04 10*3/MM3 (ref 0–0.03)
IMM GRANULOCYTES NFR BLD: 0.5 % (ref 0–0.5)
LYMPHOCYTES # BLD AUTO: 3.98 10*3/MM3 (ref 0.9–4.8)
LYMPHOCYTES NFR BLD AUTO: 46.3 % (ref 19.6–45.3)
MCH RBC QN AUTO: 27.4 PG (ref 26.9–32)
MCHC RBC AUTO-ENTMCNC: 32.1 G/DL (ref 32.4–36.3)
MCV RBC AUTO: 85.2 FL (ref 80.5–98.2)
MONOCYTES # BLD AUTO: 0.71 10*3/MM3 (ref 0.2–1.2)
MONOCYTES NFR BLD AUTO: 8.3 % (ref 5–12)
NEUTROPHILS # BLD AUTO: 3.59 10*3/MM3 (ref 1.9–8.1)
NEUTROPHILS NFR BLD AUTO: 41.8 % (ref 42.7–76)
PLATELET # BLD AUTO: 208 10*3/MM3 (ref 140–500)
PMV BLD AUTO: 11.2 FL (ref 6–12)
POTASSIUM BLD-SCNC: 3.6 MMOL/L (ref 3.5–5.2)
RBC # BLD AUTO: 5.22 10*6/MM3 (ref 3.9–5.2)
SODIUM BLD-SCNC: 142 MMOL/L (ref 136–145)
WBC NRBC COR # BLD: 8.59 10*3/MM3 (ref 4.5–10.7)

## 2018-12-11 PROCEDURE — 99232 SBSQ HOSP IP/OBS MODERATE 35: CPT | Performed by: NURSE PRACTITIONER

## 2018-12-11 PROCEDURE — 80048 BASIC METABOLIC PNL TOTAL CA: CPT | Performed by: INTERNAL MEDICINE

## 2018-12-11 PROCEDURE — 99232 SBSQ HOSP IP/OBS MODERATE 35: CPT | Performed by: INTERNAL MEDICINE

## 2018-12-11 PROCEDURE — 63710000001 INSULIN GLARGINE PER 5 UNITS: Performed by: INTERNAL MEDICINE

## 2018-12-11 PROCEDURE — 63710000001 INSULIN LISPRO (HUMAN) PER 5 UNITS: Performed by: INTERNAL MEDICINE

## 2018-12-11 PROCEDURE — 82962 GLUCOSE BLOOD TEST: CPT

## 2018-12-11 PROCEDURE — 85025 COMPLETE CBC W/AUTO DIFF WBC: CPT | Performed by: INTERNAL MEDICINE

## 2018-12-11 RX ORDER — INSULIN GLARGINE 100 [IU]/ML
55 INJECTION, SOLUTION SUBCUTANEOUS EVERY MORNING
Status: DISCONTINUED | OUTPATIENT
Start: 2018-12-12 | End: 2018-12-12 | Stop reason: HOSPADM

## 2018-12-11 RX ADMIN — ASPIRIN 325 MG: 325 TABLET ORAL at 07:54

## 2018-12-11 RX ADMIN — CLOPIDOGREL 75 MG: 75 TABLET, FILM COATED ORAL at 07:55

## 2018-12-11 RX ADMIN — INSULIN GLARGINE 45 UNITS: 100 INJECTION, SOLUTION SUBCUTANEOUS at 07:03

## 2018-12-11 RX ADMIN — SPIRONOLACTONE 25 MG: 25 TABLET ORAL at 07:54

## 2018-12-11 RX ADMIN — SODIUM CHLORIDE, PRESERVATIVE FREE 3 ML: 5 INJECTION INTRAVENOUS at 20:21

## 2018-12-11 RX ADMIN — CARVEDILOL 25 MG: 25 TABLET, FILM COATED ORAL at 07:54

## 2018-12-11 RX ADMIN — SODIUM CHLORIDE, PRESERVATIVE FREE 3 ML: 5 INJECTION INTRAVENOUS at 07:57

## 2018-12-11 RX ADMIN — INSULIN LISPRO 15 UNITS: 100 INJECTION, SOLUTION INTRAVENOUS; SUBCUTANEOUS at 17:34

## 2018-12-11 RX ADMIN — CARVEDILOL 25 MG: 25 TABLET, FILM COATED ORAL at 17:34

## 2018-12-11 RX ADMIN — INSULIN LISPRO 6 UNITS: 100 INJECTION, SOLUTION INTRAVENOUS; SUBCUTANEOUS at 07:55

## 2018-12-11 RX ADMIN — RIVAROXABAN 15 MG: 15 TABLET, FILM COATED ORAL at 18:23

## 2018-12-11 RX ADMIN — INSULIN LISPRO 6 UNITS: 100 INJECTION, SOLUTION INTRAVENOUS; SUBCUTANEOUS at 12:20

## 2018-12-11 RX ADMIN — METFORMIN HYDROCHLORIDE 1000 MG: 1000 TABLET ORAL at 07:54

## 2018-12-11 RX ADMIN — INSULIN LISPRO 10 UNITS: 100 INJECTION, SOLUTION INTRAVENOUS; SUBCUTANEOUS at 12:20

## 2018-12-11 RX ADMIN — ATORVASTATIN CALCIUM 80 MG: 80 TABLET, FILM COATED ORAL at 20:21

## 2018-12-11 RX ADMIN — FUROSEMIDE 40 MG: 40 TABLET ORAL at 07:54

## 2018-12-11 RX ADMIN — INSULIN LISPRO 10 UNITS: 100 INJECTION, SOLUTION INTRAVENOUS; SUBCUTANEOUS at 07:55

## 2018-12-11 RX ADMIN — METFORMIN HYDROCHLORIDE 1000 MG: 1000 TABLET ORAL at 17:34

## 2018-12-11 NOTE — PROGRESS NOTES
Continued Stay Note  [facility]     Patient Name: PAULA Dent  MRN: [id number]  Today's Date: 12/11/2018    Admit Date: 12/8/2018    Discharge Plan     Row Name 12/11/18 1413       Plan    Plan Comments  Received referral to check pt's cost of Xarelto.  [facility] placed call to Carli OLVERA/ Cardiology nurse ([phone]) to request Xarelto be escribed to pt's pharmacy in order to check cost.  Await rx to be sent.  Pt has commercial insurance.  Xarelto $10 copay card given to pt.          Discharge Codes    No documentation.             Miesha Walls, RN

## 2018-12-11 NOTE — PROGRESS NOTES
DOS: 2018  NAME: Daisy Dent  : 1960  PCP: Nusrat RAND  CC: difficulty with speech    Stroke    Subjective: No acute events overnight. Resting in bed, states she is hoping to go home today. Not having any new deficits.    Objective:  Vital signs:      Vitals:    12/10/18 1935 12/10/18 2300 18 0500 18 0734   BP: 117/81 109/59  112/76   BP Location: Left arm Left arm  Right arm   Patient Position: Lying Lying  Lying   Pulse:  67  72   Resp:    Temp: 98 °F (36.7 °C) 97.7 °F (36.5 °C)  97.7 °F (36.5 °C)   TempSrc: Oral Oral  Oral   SpO2: 96% 95%     Weight:   90.9 kg (200 lb 4.8 oz)    Height:           Current Facility-Administered Medications:   •  aspirin tablet 325 mg, 325 mg, Oral, Daily, 325 mg at 18 075 **OR** aspirin suppository 300 mg, 300 mg, Rectal, Daily, Mark Jones MD  •  atorvastatin (LIPITOR) tablet 80 mg, 80 mg, Oral, Nightly, Mark Jones MD, 80 mg at 12/10/18 2201  •  carvedilol (COREG) tablet 25 mg, 25 mg, Oral, BID With Meals, Neymar Matute MD, 25 mg at 18 075  •  clopidogrel (PLAVIX) tablet 75 mg, 75 mg, Oral, Daily, Mark Jones MD, 75 mg at 18 0755  •  dextrose (D50W) 25 g/ 50mL Intravenous Solution 25 g, 25 g, Intravenous, Q15 Min PRN, Neymar Matute MD  •  dextrose (GLUTOSE) oral gel 15 g, 15 g, Oral, Q15 Min PRN, Neymar Matute MD  •  furosemide (LASIX) tablet 40 mg, 40 mg, Oral, Daily, Neymar Matute MD, 40 mg at 18 0754  •  glucagon (human recombinant) (GLUCAGEN DIAGNOSTIC) injection 1 mg, 1 mg, Subcutaneous, PRN, Neymar Matute MD  •  insulin glargine (LANTUS) injection 45 Units, 45 Units, Subcutaneous, QAConstantine FISHER Manikya, MD, 45 Units at 18 0703  •  insulin lispro (humaLOG) injection 0-10 Units, 0-10 Units, Subcutaneous, 4x Daily With Meals & Nightly, Abraham Fitch MD, 6 Units at 18 0755  •  insulin lispro  (humaLOG) injection 10 Units, 10 Units, Subcutaneous, TID With Meals, Abraham Fitch MD, 10 Units at 12/11/18 0755  •  metFORMIN (GLUCOPHAGE) tablet 1,000 mg, 1,000 mg, Oral, BID With Meals, Neymar Matute MD, 1,000 mg at 12/11/18 0754  •  sodium chloride 0.9 % flush 10 mL, 10 mL, Intravenous, PRN, Janneth Scales APRN  •  sodium chloride 0.9 % flush 3 mL, 3 mL, Intravenous, Q12H, Mark Jones MD, 3 mL at 12/11/18 0757  •  sodium chloride 0.9 % flush 3-10 mL, 3-10 mL, Intravenous, PRN, Mark Jones MD  •  spironolactone (ALDACTONE) tablet 25 mg, 25 mg, Oral, Daily, Neymar Matute MD, 25 mg at 12/11/18 0754    No current facility-administered medications on file prior to encounter.      Current Outpatient Medications on File Prior to Encounter   Medication Sig   • atorvastatin (LIPITOR) 80 MG tablet Take 80 mg by mouth Daily.   • carvedilol (COREG) 25 MG tablet Take 25 mg by mouth 2 (Two) Times a Day With Meals.   • clopidogrel (PLAVIX) 75 MG tablet Take 75 mg by mouth Daily.   • furosemide (LASIX) 40 MG tablet Take 40 mg by mouth Daily.   • metFORMIN (GLUCOPHAGE) 500 MG tablet Take 500 mg by mouth Daily With Breakfast.   • metFORMIN (GLUCOPHAGE) 500 MG tablet Take 1,000 mg by mouth every night at bedtime.   • potassium chloride (K-DUR) 10 MEQ CR tablet Take 10 mEq by mouth Daily.   • spironolactone (ALDACTONE) 25 MG tablet Take 25 mg by mouth Daily.     PRN meds  dextrose  •  dextrose  •  glucagon (human recombinant)  •  sodium chloride  •  sodium chloride    General appearance:   HEENT: Normocephalic, atraumatic   COR: RRR  Resp: Even and unlabored  Extremities: Equal pulses, non distal embolization  Skin:    Neurological:   MS: AO. Language normal. No neglect. Higher integrative function normal  CN: II-XII normal  Motor: 5/5, normal tone  Reflexes: 2+  Sensory: Intact  Coordination: Normal    Laboratory results:  No results found for: TSH  No results found for:  [id number]  Lab Results   Component Value Date    HGBA1C 12.60 (H) 12/09/2018     Lab Results   Component Value Date    CHOL 279 (H) 12/09/2018     Lab Results   Component Value Date    TRIG 401 (H) 12/09/2018     Lab Results   Component Value Date    HDL 32 (L) 12/09/2018     Lab Results   Component Value Date    LDL  12/09/2018      Comment:      Unable to calculate     (H) 12/09/2018     Lab Results   Component Value Date    GLUCOSE 214 (H) 12/11/2018    BUN 11 12/11/2018    CREATININE 0.76 12/11/2018    EGFRIFNONA 78 12/11/2018    BCR 14.5 12/11/2018    K 3.6 12/11/2018    CO2 22.7 12/11/2018    CALCIUM 9.2 12/11/2018    ALBUMIN 4.70 12/08/2018    AST 67 (H) 12/08/2018     (H) 12/08/2018     Lab Results   Component Value Date    WBC 8.59 12/11/2018    HGB 14.3 12/11/2018    HCT 44.5 12/11/2018    MCV 85.2 12/11/2018     12/11/2018       Review and interpretation of imaging:  Ct Angiogram Head With & Without Contrast    Result Date: 12/10/2018  1. Abnormal cerebral perfusion imaging as discussed above. 2. There is absent/cut off of opacification of a left anterior middle cerebral artery branch as described. This is consistent with acute occlusion. Please see full discussion above. 3. No other significant findings are noted on the CT of the brain with and without contrast. There is no evidence of acute infarction hemorrhage or mass effect.   Radiation dose reduction techniques were utilized, including automated exposure control and exposure modulation based on body size.  This report was finalized on 12/10/2018 10:59 AM by Dr. Rich Banerjee M.D.      Ct Head Without Contrast    Result Date: 12/9/2018      Evolving postinfarct changes in the left frontal lobe, as described. Continued follow-up suggested.  Discussed by telephone with the patient's nurse, Anjel, at 1615, 12/9/2018.  This report was finalized on 12/9/2018 4:18 PM by Dr. Jarvis Marcum M.D.      Ct Head Without  Contrast    Result Date: 12/9/2018  1. Evolving ischemia is now evident in the inferior-posterior left frontal lobe corresponding to a portion of the CT perfusion abnormality. There is no mass effect or hemorrhage.         This report was finalized on 12/9/2018 3:21 AM by Jay Bar M.D.      Ct Angiogram Neck With & Without Contrast    Result Date: 12/10/2018  1. Abnormal cerebral perfusion imaging as discussed above. 2. There is absent/cut off of opacification of a left anterior middle cerebral artery branch as described. This is consistent with acute occlusion. Please see full discussion above. 3. No other significant findings are noted on the CT of the brain with and without contrast. There is no evidence of acute infarction hemorrhage or mass effect.   Radiation dose reduction techniques were utilized, including automated exposure control and exposure modulation based on body size.  This report was finalized on 12/10/2018 10:59 AM by Dr. Rich Banerjee M.D.      Xr Chest 1 View    Result Date: 12/8/2018  No focal pulmonary consolidation. Borderline heart size with mild vascular congestion.  This report was finalized on 12/8/2018 4:27 PM by Dr. Jarvis Marcum M.D.      Ct Cerebral Perfusion With & Without Contrast    Result Date: 12/10/2018  1. Abnormal cerebral perfusion imaging as discussed above. 2. There is absent/cut off of opacification of a left anterior middle cerebral artery branch as described. This is consistent with acute occlusion. Please see full discussion above. 3. No other significant findings are noted on the CT of the brain with and without contrast. There is no evidence of acute infarction hemorrhage or mass effect.   Radiation dose reduction techniques were utilized, including automated exposure control and exposure modulation based on body size.  This report was finalized on 12/10/2018 10:59 AM by Dr. Rich Banerjee M.D.      12/10 2D Echo Interpretation:  · Calculated EF =  30%. Estimated EF was in agreement with the calculated EF. Estimated EF = 30%. Normal left ventricular cavity size and wall thickness noted. Left ventricular diastolic dysfunction is noted (grade I) consistent with impaired relaxation.  · Wall motion abnormalities as noted below  · Trace mitral valve regurgitation is present.  · The tricuspid valve is grossly normal. Physiologic tricuspid valve regurgitation is present. Calculated right ventricular systolic pressure from tricuspid regurgitation is 16.5 mmHg.  · Electronic lead present in the ventricle.    Impression: This is a 59 yo female with PMH of DM, CAD, CMT with PPM who presented to the ER with aphasia, hyperglycemia, and mildly hypertensive.  Her initial CT head was negative but CTP showed a large LMCA mismatch, small core infarct, and CTA showed a LMCA thrombus.  She received TPA and her aphasia was noted to have resolved, therefore patient was not a candidate for thrombectomy. She was on Plavix and Lipitor at home. Repeat CTA head on 12/10 showing no acute infarction hemorrhage or mass effect.  Also CTA insignificant for ICAD, the etiology of her stroke is likely cardioembolic.    2D echo with EF of 30%,  I agree with Cardiology patient will need A/C + Plavix. She also needs aggressive control of her risk factors to prevent another stroke event (Uncontrolled diabetes, hyperlipidemia).     Okay to start patient on A/C post TPA.     Assessment/Plan:  Left MCA thrombus likely cardioembolic  Continue ASA 325mg and Plavix 75mg, until Cardiology starts A/C.   Lipitor 80mg   Neurochecks per stroke protocol  Endocrinology/Diabetes educator following for hyperglycemia.  Normalize BP, encourage hydration.  Stroke Education  AUBREE/SCDs  PT/OT/ST  Will sign off, will see again per request.  Patient to follow up in neurology clinic as needed in 3 months with Nikki RAND.      Plan Discussed with Dr. Wright, patient, and RN, agrees with plan above.

## 2018-12-11 NOTE — PROGRESS NOTES
Hospital Follow Up    Chief Complaint: Follow up stroke, CAD, ICMP    Interval History:  No chest pain or dyspnea.     Objective:     Objective:  Temp:  [97.7 °F (36.5 °C)-98.2 °F (36.8 °C)] 97.7 °F (36.5 °C)  Heart Rate:  [62-67] 67  Resp:  [16-18] 18  BP: (109-120)/(59-81) 109/59     Intake/Output Summary (Last 24 hours) at 12/11/2018 0732  Last data filed at 12/10/2018 1423  Gross per 24 hour   Intake 420 ml   Output --   Net 420 ml     Body mass index is 39.12 kg/m².      12/10/18  0503 12/10/18  1900 12/11/18  0500   Weight: 91.2 kg (201 lb 1.6 oz) 91.2 kg (201 lb) 90.9 kg (200 lb 4.8 oz)     Weight change: -0.045 kg (-1.6 oz)      Physical Exam:   General : Alert, cooperative, in no acute distress.  Neuro: alert,cooperative and oriented  Lungs: CTAB. Normal respiratory effort and rate.  CV:: Regular rate and rhythm, normal S1 and S2, no murmurs, gallops or rubs.  ABD: Soft, nontender, non-distended. positive bowel sounds  Extr: No edema or cyanosis, moves all extremities    Lab Review:   Results from last 7 days   Lab Units  12/11/18   0347  12/10/18   0601   12/08/18   1415   SODIUM mmol/L  142  135*   < >  132*   POTASSIUM mmol/L  3.6  3.5   < >  4.3   CHLORIDE mmol/L  104  100   < >  91*   CO2 mmol/L  22.7  23.0   < >  24.9   BUN mg/dL  11  11   < >  11   CREATININE mg/dL  0.76  0.86   < >  1.15*   GLUCOSE mg/dL  214*  306*   < >  666*   CALCIUM mg/dL  9.2  9.3   < >  10.1   AST (SGOT) U/L   --    --    --   67*   ALT (SGPT) U/L   --    --    --   108*    < > = values in this interval not displayed.     Results from last 7 days   Lab Units  12/08/18   1415   TROPONIN T ng/mL  <0.010     Results from last 7 days   Lab Units  12/11/18   0347  12/10/18   0601   WBC 10*3/mm3  8.59  7.10   HEMOGLOBIN g/dL  14.3  14.2   HEMATOCRIT %  44.5  44.2   PLATELETS 10*3/mm3  208  207     Results from last 7 days   Lab Units  12/08/18   1415   INR   0.95   APTT seconds  23.6         Results from last 7 days   Lab Units   12/09/18   0309   CHOLESTEROL mg/dL  279*   TRIGLYCERIDES mg/dL  401*   HDL CHOL mg/dL  32*             I reviewed the patient's new clinical results.  I personally viewed and interpreted the patient's EKG  Current Medications:   Scheduled Meds:  aspirin 325 mg Oral Daily   Or      aspirin 300 mg Rectal Daily   atorvastatin 80 mg Oral Nightly   carvedilol 25 mg Oral BID With Meals   clopidogrel 75 mg Oral Daily   furosemide 40 mg Oral Daily   insulin glargine 45 Units Subcutaneous QAM   insulin lispro 0-10 Units Subcutaneous 4x Daily With Meals & Nightly   insulin lispro 10 Units Subcutaneous TID With Meals   metFORMIN 1,000 mg Oral BID With Meals   sodium chloride 3 mL Intravenous Q12H   spironolactone 25 mg Oral Daily     Continuous Infusions:     Allergies:  No Known Allergies    Assessment/Plan:     1. Left frontal stroke due to left MCA occlusion.  Felt to be cardioembolic.  Presented with aphasia that resolved with TPA administration.  Already on chronic aspirin and clopidogrel.  Aspirin dose increased.  Device interrogation with no events including no evidence of atrial fibrillation.  Echo with depressed LV function, EF of 30% and no evidence of left ventricular thrombus.    2. History of coronary artery disease  3. Ischemic cardiomyopathy  4. Status post AICD  5. Diabetes mellitus type 2. HgA1c 12.  Endocrine following.  6. Hyperlipidemia.    -  Felt to be cardioembolic but no evidence of atrial fibrillation or thrombus on TTE.  I do not know if additional imaging with a RUBEN will be useful at this point.  She did have this even on dual antiplatelet therapy adding anticoagulation should be strongly considered.  I discussed this at length with the patient including the risks and benefits and the options for anticoagulation.  We decided to start on her anticoagulation.  She would prefer a novel anticoagulant.    -  Will consider starting rivaroxaban if the cost is not prohibitive.  Will need to stop aspirin  once this is started.    -  Will wait to start anticoagulation until ok from neurology standpoint after TPA administration.  -  She is otherwise ok from my standpoint when above sorted out.    Oneida Saldivar MD  12/11/18  7:32 AM

## 2018-12-11 NOTE — PROGRESS NOTES
Neymar Matute MD                          [phone]      Patient ID:    Name:  PAULA Dent    MRN:  [id number]    1960   58 y.o.  female            Patient Care Team:  Nusrat Dent APRN as PCP - General (Family Medicine)    CC/ Reason for visit: Per Assessment mentioned below    Subjective: Pt seen and examined this AM. No acute overnight events noted. Doing better. No neuro deficits noted.     ROS: Denies any subjective fevers, chest pain, nausea/ vomiting    Objective     Vital Signs past 24hrs    BP range: BP: (109-129)/(59-81) 129/80  Pulse range: Heart Rate:  [62-72] 70  Resp rate range: Resp:  [16-18] 16  Temp range: Temp (24hrs), Av.8 °F (36.6 °C), Min:97.7 °F (36.5 °C), Max:98 °F (36.7 °C)      Ventilator/Non-Invasive Ventilation Settings (From admission, onward)    None          Device (Oxygen Therapy): room air       90.9 kg (200 lb 4.8 oz); Body mass index is 39.12 kg/m².    No intake or output data in the 24 hours ending 18 1728    Exam:  Constitutional: No acute distress, No accessory muscle use   Head: - NCAT  Eyes: No pallor, Anicteric conjunctiva, EOMI.  ENMT:  Mallampati 3, no oral thrush. No palpable cervical lymphadenopathy  Heart: RRR, no murmur  Lungs: REGINA +, No wheezes/crackles heard    Abdomen: Obese. Soft. No tenderness, guarding or rigidity  Extremities: Extremities warm and well perfused, Trace pitting edema  Neuro: Conscious, alert, oriented x3, no gross focal neuro deficits    Scheduled meds:      atorvastatin 80 mg Oral Nightly   carvedilol 25 mg Oral BID With Meals   clopidogrel 75 mg Oral Daily   furosemide 40 mg Oral Daily   [START ON 2018] insulin glargine 55 Units Subcutaneous QAM   insulin lispro 0-10 Units Subcutaneous 4x Daily With Meals & Nightly   insulin lispro 15 Units Subcutaneous TID With Meals   metFORMIN 1,000 mg Oral BID With Meals   rivaroxaban 15 mg Oral BID With Meals   Followed by       [START ON 1/2/2019] rivaroxaban 20 mg Oral Daily With Dinner   sodium chloride 3 mL Intravenous Q12H   spironolactone 25 mg Oral Daily       IV meds:                           Data Review:      Results from last 7 days   Lab Units  12/11/18   0347  12/10/18   0601  12/09/18   1042   12/08/18   1415   SODIUM mmol/L  142  135*  143   < >  132*   POTASSIUM mmol/L  3.6  3.5  3.4*   < >  4.3   CHLORIDE mmol/L  104  100  108*   < >  91*   CO2 mmol/L  22.7  23.0  23.2   < >  24.9   BUN mg/dL  11  11  9   < >  11   CREATININE mg/dL  0.76  0.86  0.71   < >  1.15*   CALCIUM mg/dL  9.2  9.3  7.9*   < >  10.1   BILIRUBIN mg/dL   --    --    --    --   0.4   ALK PHOS U/L   --    --    --    --   132*   ALT (SGPT) U/L   --    --    --    --   108*   AST (SGOT) U/L   --    --    --    --   67*   GLUCOSE mg/dL  214*  306*  295*   < >  666*   WBC 10*3/mm3  8.59  7.10   --    --   6.78   HEMOGLOBIN g/dL  14.3  14.2   --    --   15.8*   PLATELETS 10*3/mm3  208  207   --    --   246   INR    --    --    --    --   0.95    < > = values in this interval not displayed.       Lab Results   Component Value Date    CALCIUM 9.2 12/11/2018                    Results Review:    I have reviewed the available laboratory results and reviewed the chest imaging from the last 3 days personally and summarized it below    Assessment    Ac aphasia; resolved    Ac Lt Frontal CVA S/p Tpa   Uncontrolled diabetes  Hyperlipidemia  CAD status post PCI  ICM with EF of 40-45% s/p AICD   SHELLIE on CPAP sees   COPD not in exac  Morbid obesity    PLAN:  Patient has made neurological recovery after the TPA.    Noted acrds and neuro rec - Will start Xarelto per plan and dc ASA.  Appreciate endo eval and recs. Noted BS still high. Insulin changes noted   On home meds   PT/ST cleared pt for home.   C/w CPAP.   Mech DVT ppx     DISPO - Will dc in am once tolerating AC and better BS noted.     I have discussed my findings and recommendations with patient, family  and nursing staff.     Neymar Matute MD  12/11/2018

## 2018-12-11 NOTE — PAYOR COMM NOTE
"PAULA Dent (58 y.o. Female)     PLEASE SEE ATTACHED CLINICAL REVIEW. PT REMAINS INPT AT [facility]    REF#[id number]    PLEASE CALL   OR  [phone] WITH CONTINUED STAY AUTH AND DAYS.     THANK YOU    VIOLETTA LOYD LPN  CCP        Date of Birth Social Security Number Address Home Phone MRN    1960  [address] [phone] [id number]    Jain Marital Status          None        Admission Date Admission Type Admitting Provider Attending Provider Department, Room/Bed    12/8/18 Emergency Donovan Millard MD Nidadavolu, Vinay Gopal, MD [address], [address]    Discharge Date Discharge Disposition Discharge Destination                       Attending Provider:  Neymar Matute MD    Allergies:  No Known Allergies    Isolation:  None   Infection:  None   Code Status:  CPR    Ht:  152.4 cm (60\")   Wt:  90.9 kg (200 lb 4.8 oz)    Admission Cmt:  None   Principal Problem:  None                Active Insurance as of 12/8/2018     Primary Coverage     Payor Plan Insurance Group Employer/Plan Group    [employer] BLUE CROSS AN[employer]M BLUE CROSS BLUE [employer]IELD PPO [id number]     Payor Plan Address Payor Plan Phone Number Payor Plan Fax Number Effective Dates    [address] [phone]  1/1/2018 - None Entered    [place] [address]       Subscriber Name Subscriber Birth Date Member ID       ROBERTO DENT 10/9/1958 [id number]                 Emergency Contacts      (Rel.) Home Phone Work Phone Mobile Phone    ROBERTO DENT (Spouse) [phone] -- --    issac, Cat (Daughter) -- -- [phone]              Intake & Output (last day)       12/10 0701 - 12/11 0700 12/11 0701 - 12/12 0700    P.O. 420     Total Intake(mL/kg) 420 (4.6)     Urine (mL/kg/hr)      Stool      Total Output      Net +420           Urine Unmeasured Occurrence 1 x         Lines, Drains & Airways    Active LDAs     Name:   Placement date:   " Placement time:   Site:   Days:    Peripheral IV 12/08/18 1446 Right Antecubital   12/08/18    1446    Antecubital   2    Peripheral IV 12/08/18 1500 Right Antecubital   12/08/18    1500    Antecubital   2                Orders (last 24 hrs)     Start     Ordered    12/11/18 0916  Inpatient Case Management  Consult  Once     Provider:  (Not yet assigned)    12/11/18 0916    12/09/18 1100  POC Glucose 4x Daily AC & at Bedtime  4 Times Daily Before Meals & at Bedtime      12/09/18 1016             Physician Progress Notes (last 24 hours) (Notes from 12/10/2018 10:06 AM through 12/11/2018 10:06 AM)      Oneida Saldivar MD at 12/11/2018  7:32 AM              Hospital Follow Up    Chief Complaint: Follow up stroke, CAD, ICMP    Interval History:  No chest pain or dyspnea.     Objective:     Objective:  Temp:  [97.7 °F (36.5 °C)-98.2 °F (36.8 °C)] 97.7 °F (36.5 °C)  Heart Rate:  [62-67] 67  Resp:  [16-18] 18  BP: (109-120)/(59-81) 109/59     Intake/Output Summary (Last 24 hours) at 12/11/2018 0732  Last data filed at 12/10/2018 1423  Gross per 24 hour   Intake 420 ml   Output --   Net 420 ml     Body mass index is 39.12 kg/m².      12/10/18  0503 12/10/18  1900 12/11/18  0500   Weight: 91.2 kg (201 lb 1.6 oz) 91.2 kg (201 lb) 90.9 kg (200 lb 4.8 oz)     Weight change: -0.045 kg (-1.6 oz)      Physical Exam:   General : Alert, cooperative, in no acute distress.  Neuro: alert,cooperative and oriented  Lungs: CTAB. Normal respiratory effort and rate.  CV:: Regular rate and rhythm, normal S1 and S2, no murmurs, gallops or rubs.  ABD: Soft, nontender, non-distended. positive bowel sounds  Extr: No edema or cyanosis, moves all extremities    Lab Review:   Results from last 7 days   Lab Units  12/11/18   0347  12/10/18   0601   12/08/18   1415   SODIUM mmol/L  142  135*   < >  132*   POTASSIUM mmol/L  3.6  3.5   < >  4.3   CHLORIDE mmol/L  104  100   < >  91*   CO2 mmol/L  22.7  23.0   < >  24.9   BUN mg/dL  11   11   < >  11   CREATININE mg/dL  0.76  0.86   < >  1.15*   GLUCOSE mg/dL  214*  306*   < >  666*   CALCIUM mg/dL  9.2  9.3   < >  10.1   AST (SGOT) U/L   --    --    --   67*   ALT (SGPT) U/L   --    --    --   108*    < > = values in this interval not displayed.     Results from last 7 days   Lab Units  12/08/18   1415   TROPONIN T ng/mL  <0.010     Results from last 7 days   Lab Units  12/11/18   0347  12/10/18   0601   WBC 10*3/mm3  8.59  7.10   HEMOGLOBIN g/dL  14.3  14.2   HEMATOCRIT %  44.5  44.2   PLATELETS 10*3/mm3  208  207     Results from last 7 days   Lab Units  12/08/18   1415   INR   0.95   APTT seconds  23.6         Results from last 7 days   Lab Units  12/09/18   0309   CHOLESTEROL mg/dL  279*   TRIGLYCERIDES mg/dL  401*   HDL CHOL mg/dL  32*             I reviewed the patient's new clinical results.  I personally viewed and interpreted the patient's EKG  Current Medications:   Scheduled Meds:  aspirin 325 mg Oral Daily   Or      aspirin 300 mg Rectal Daily   atorvastatin 80 mg Oral Nightly   carvedilol 25 mg Oral BID With Meals   clopidogrel 75 mg Oral Daily   furosemide 40 mg Oral Daily   insulin glargine 45 Units Subcutaneous QAM   insulin lispro 0-10 Units Subcutaneous 4x Daily With Meals & Nightly   insulin lispro 10 Units Subcutaneous TID With Meals   metFORMIN 1,000 mg Oral BID With Meals   sodium chloride 3 mL Intravenous Q12H   spironolactone 25 mg Oral Daily     Continuous Infusions:     Allergies:  No Known Allergies    Assessment/Plan:     1. Left frontal stroke due to left MCA occlusion.  Felt to be cardioembolic.  Presented with aphasia that resolved with TPA administration.  Already on chronic aspirin and clopidogrel.   Aspirin dose increased.  Device interrogation with no events including no evidence of atrial fibrillation.  Echo with depressed LV function, EF of 30% and no evidence of left ventricular thrombus.    2. History of coronary artery disease  3. Ischemic cardiomyopathy  4.  Status post AICD  5. Diabetes mellitus type 2. HgA1c 12.  Endocrine following.  6. Hyperlipidemia.    -  Felt to be cardioembolic but no evidence of atrial fibrillation or thrombus on TTE.  I do not know if additional imaging with a RUBEN will be useful at this point.  She did have this even on dual antiplatelet therapy adding anticoagulation should be strongly considered.  I discussed this at length with the patient including the risks and benefits and the options for anticoagulation.  We decided to start on her anticoagulation.  She would prefer a novel anticoagulant.    -  Will consider starting rivaroxaban if the cost is not prohibitive.  Will need to stop aspirin once this is started.    -  Will wait to start anticoagulation until ok from neurology standpoint after TPA administration.  -  She is otherwise ok from my standpoint when above sorted out.    Oneida Saldivar MD  18  7:32 AM    Electronically signed by Oneida Saldivar MD at 2018  9:26 AM     Neymar Matute MD at 12/10/2018  3:00 PM                                      Neymar Matute MD                          [phone]      Patient ID:    Name:  PAULA Dent    MRN:  [id number]    1960   58 y.o.  female            Patient Care Team:  Nusrat Dent APRN as PCP - General (Family Medicine)    CC/ Reason for visit: Per Assessment mentioned below    Subjective: Pt seen and examined this AM. No acute overnight events noted. Doing better. No neuro deficits noted.     ROS: Denies any subjective fevers, chest pain, nausea/ vomiting    Objective     Vital Signs past 24hrs    BP range: BP: (106-123)/(56-71) 120/71  Pulse range: Heart Rate:  [62-67] 62  Resp rate range: Resp:  [16-18] 16  Temp range: Temp (24hrs), Av.2 °F (36.8 °C), Min:98.2 °F (36.8 °C), Max:98.2 °F (36.8 °C)      Ventilator/Non-Invasive Ventilation Settings (From admission, onward)    None          Device (Oxygen Therapy): room air        91.2 kg (201 lb 1.6 oz); Body mass index is 39.27 kg/m².      Intake/Output Summary (Last 24 hours) at 12/10/2018 1500  Last data filed at 12/9/2018 2243  Gross per 24 hour   Intake 150 ml   Output --   Net 150 ml       Exam:  Constitutional: No acute distress, No accessory muscle use   Head: - NCAT  Eyes: No pallor, Anicteric conjunctiva, EOMI.  ENMT:  Mallampati 3, no oral thrush. No palpable cervical lymphadenopathy  Heart: RRR, no murmur  Lungs: REGINA +, No wheezes/crackles heard    Abdomen: Obese. Soft. No tenderness, guarding or rigidity  Extremities: Extremities warm and well perfused, Trace pitting edema  Neuro: Conscious, alert, oriented x3, no gross focal neuro deficits    Scheduled meds:      aspirin 325 mg Oral Daily   Or      aspirin 300 mg Rectal Daily   atorvastatin 80 mg Oral Nightly   carvedilol 25 mg Oral BID With Meals   clopidogrel 75 mg Oral Daily   furosemide 40 mg Oral Daily   [START ON 12/11/2018] insulin glargine 45 Units Subcutaneous QAM   insulin lispro 0-10 Units Subcutaneous 4x Daily With Meals & Nightly   insulin lispro 10 Units Subcutaneous TID With Meals   metFORMIN 1,000 mg Oral BID With Meals   sodium chloride 3 mL Intravenous Q12H   spironolactone 25 mg Oral Daily       IV meds:                           Data Review:      Results from last 7 days   Lab Units  12/10/18   0601  12/09/18   1042  12/09/18   0309   12/08/18   1415   SODIUM mmol/L  135*  143  142   --   132*   POTASSIUM mmol/L  3.5  3.4*  3.9   --   4.3   CHLORIDE mmol/L  100  108*  106   --   91*   CO2 mmol/L  23.0  23.2  21.6*   --   24.9   BUN mg/dL  11  9  11   --   11   CREATININE mg/dL  0.86  0.71  0.85   < >  1.15*   CALCIUM mg/dL  9.3  7.9*  9.6   --   10.1   BILIRUBIN mg/dL   --    --    --    --   0.4   ALK PHOS U/L   --    --    --    --   132*   ALT (SGPT) U/L   --    --    --    --   108*   AST (SGOT) U/L   --    --    --    --   67*   GLUCOSE mg/dL  306*  295*  310*   --   666*   WBC 10*3/mm3  7.10   --     --    --   6.78   HEMOGLOBIN g/dL  14.2   --    --    --   15.8*   PLATELETS 10*3/mm3  207   --    --    --   246   INR    --    --    --    --   0.95    < > = values in this interval not displayed.       Lab Results   Component Value Date    CALCIUM 9.3 12/10/2018                    Results Review:    I have reviewed the available laboratory results and reviewed the chest imaging from the last 3 days personally and summarized it below    Assessment      Ac aphasia; resolved    Ac Lt Frontal CVA S/p Tpa   Uncontrolled diabetes  Hyperlipidemia  CAD status post PCI  ICM with EF of 40-45% s/p AICD   SHELLIE on CPAP sees   COPD not in exac  Morbid obesity    PLAN:  Patient has made neurological recovery after the TPA.    ECHO pending. Appreciate Cards eval.  .  Appreciate endo eval and recs. Agree with insulin rx.    On home meds   PT/ST cleared pt for home.   [facility] DVT ppx     DISPO - Will dc in am once we have a plan from all.     I have discussed my findings and recommendations with patient, family and nursing staff.     Neymar Matute MD  12/10/2018    Electronically signed by Neymar Matute MD at 12/10/2018  3:03 PM     Charline Hollingsworth APRN at 12/10/2018 12:18 PM          DOS: 12/10/2018  NAME: Daisy Dent  : 1960  PCP: Nusrat RAND  CC: difficulty with speech     Stroke    Subjective: Patient sitting up in bed, states all of her symptoms have resolved. She does notice sometimes it takes her a few seconds to get out what she wants to say but her speech is normal. Daughter at bedside.    Objective:  Vital signs:      Vitals:    18 1912 18 2329 12/10/18 0503 12/10/18 0734   BP: 123/67 106/56  120/71   BP Location: Right arm Left arm  Right arm   Patient Position: Lying Lying  Lying   Pulse: 67 66  62   Resp: 18 18  16   Temp: 98.2 °F (36.8 °C) 98.2 °F (36.8 °C)  98.2 °F (36.8 °C)   TempSrc: Oral Oral  Oral   SpO2: 95% 96%  95%   Weight:   91.2 kg (201 lb  1.6 oz)    Height:           Current Facility-Administered Medications:   •  aspirin tablet 325 mg, 325 mg, Oral, Daily, 325 mg at 12/10/18 0824 **OR** aspirin suppository 300 mg, 300 mg, Rectal, Daily, Mark Jones MD  •  atorvastatin (LIPITOR) tablet 80 mg, 80 mg, Oral, Nightly, Mark Jones MD, 80 mg at 12/09/18 2235  •  carvedilol (COREG) tablet 25 mg, 25 mg, Oral, BID With Meals, Neymar Matute MD, 25 mg at 12/10/18 0824  •  clopidogrel (PLAVIX) tablet 75 mg, 75 mg, Oral, Daily, Mark Jones MD, 75 mg at 12/10/18 0824  •  dextrose (D50W) 25 g/ 50mL Intravenous Solution 25 g, 25 g, Intravenous, Q15 Min PRN, Neymar Matute MD  •  dextrose (GLUTOSE) oral gel 15 g, 15 g, Oral, Q15 Min PRN, Neymar Matute MD  •  furosemide (LASIX) tablet 40 mg, 40 mg, Oral, Daily, Neymar Matute MD, 40 mg at 12/10/18 0823  •  glucagon (human recombinant) (GLUCAGEN DIAGNOSTIC) injection 1 mg, 1 mg, Subcutaneous, PRN, Neymar Matute MD  •  insulin glargine (LANTUS) injection 10 Units, 10 Units, Subcutaneous, Once, Abraham Fitch MD  •  [START ON 12/11/2018] insulin glargine (LANTUS) injection 45 Units, 45 Units, Subcutaneous, QAM, Abraham Fitch MD  •  insulin lispro (humaLOG) injection 0-10 Units, 0-10 Units, Subcutaneous, 4x Daily With Meals & Nightly, Abraham Fitch MD  •  insulin lispro (humaLOG) injection 10 Units, 10 Units, Subcutaneous, TID With Meals, Abraham Fitch MD  •  metFORMIN (GLUCOPHAGE) tablet 1,000 mg, 1,000 mg, Oral, BID With Meals, Neymar Matute MD, 1,000 mg at 12/10/18 0842  •  sodium chloride 0.9 % flush 10 mL, 10 mL, Intravenous, PRN, Janneth Scales APRN  •  sodium chloride 0.9 % flush 3 mL, 3 mL, Intravenous, Q12H, Mark Jones MD, 3 mL at 12/10/18 0845  •  sodium chloride 0.9 % flush 3-10 mL, 3-10 mL, Intravenous, PRN, Mark Jones MD  •  spironolactone (ALDACTONE) tablet 25 mg, 25 mg,  Oral, Daily, Neymar Matute MD, 25 mg at 12/10/18 0823  No current facility-administered medications on file prior to encounter.      Current Outpatient Medications on File Prior to Encounter   Medication Sig   • atorvastatin (LIPITOR) 80 MG tablet Take 80 mg by mouth Daily.   • carvedilol (COREG) 25 MG tablet Take 25 mg by mouth 2 (Two) Times a Day With Meals.   • clopidogrel (PLAVIX) 75 MG tablet Take 75 mg by mouth Daily.   • furosemide (LASIX) 40 MG tablet Take 40 mg by mouth Daily.   • metFORMIN (GLUCOPHAGE) 500 MG tablet Take 500 mg by mouth Daily With Breakfast.   • metFORMIN (GLUCOPHAGE) 500 MG tablet Take 1,000 mg by mouth every night at bedtime.   • potassium chloride (K-DUR) 10 MEQ CR tablet Take 10 mEq by mouth Daily.   • spironolactone (ALDACTONE) 25 MG tablet Take 25 mg by mouth Daily.     PRN meds  dextrose  •  dextrose  •  glucagon (human recombinant)  •  sodium chloride  •  sodium chloride    General appearance: alert and cooperative, well groomed, in NAD  HEENT: Normocephalic, atraumatic, no masses or tenderness  COR: RRR  Resp: Even and unlabored, clear to auscultation in all 4 lung fields  Extremities: Equal pulses, no edema  Skin: warm, dry, bruising in BUE A/C area    Neurological:   MS: AO x4. Language normal. No neglect. Higher integrative function normal  CN: II-XII normal  Motor: 5/5 strength in all 4 ext., normal tone  Reflexes: 1+ in all 4 ext.  Sensory: normal light sensation intact in all 4 ext.  Coordination: Normal finger to nose test, normal heel to shin test    Laboratory results:  Lab Results   Component Value Date    CHOL 279 (H) 12/09/2018     Lab Results   Component Value Date    TRIG 401 (H) 12/09/2018     Lab Results   Component Value Date    HDL 32 (L) 12/09/2018     Lab Results   Component Value Date    LDL  12/09/2018      Comment:      Unable to calculate     (H) 12/09/2018     Lab Results   Component Value Date    HGBA1C 12.60 (H) 12/09/2018     No  results found for: TSH  No results found for: [id number]  Lab Results   Component Value Date    GLUCOSE 306 (H) 12/10/2018    BUN 11 12/10/2018    CREATININE 0.86 12/10/2018    EGFRIFNONA 68 12/10/2018    BCR 12.8 12/10/2018    K 3.5 12/10/2018    CO2 23.0 12/10/2018    CALCIUM 9.3 12/10/2018    ALBUMIN 4.70 12/08/2018    AST 67 (H) 12/08/2018     (H) 12/08/2018       Review and interpretation of imaging:  Ct Angiogram Head With & Without Contrast    Result Date: 12/10/2018  1. Abnormal cerebral perfusion imaging as discussed above. 2. There is absent/cut off of opacification of a left anterior middle cerebral artery branch as described. This is consistent with acute occlusion. Please see full discussion above. 3. No other significant findings are noted on the CT of the brain with and without contrast. There is no evidence of acute infarction hemorrhage or mass effect.   Radiation dose reduction techniques were utilized, including automated exposure control and exposure modulation based on body size.  This report was finalized on 12/10/2018 10:59 AM by Dr. Rich Banerjee M.D.      Ct Head Without Contrast    Result Date: 12/9/2018      Evolving postinfarct changes in the left frontal lobe, as described. Continued follow-up suggested.  Discussed by telephone with the patient's nurse, Anjel, at 1615, 12/9/2018.  This report was finalized on 12/9/2018 4:18 PM by Dr. Jarvis Marcum M.D.      Ct Head Without Contrast    Result Date: 12/9/2018  1. Evolving ischemia is now evident in the inferior-posterior left frontal lobe corresponding to a portion of the CT perfusion abnormality. There is no mass effect or hemorrhage.         This report was finalized on 12/9/2018 3:21 AM by Jay Bar M.D.      Ct Angiogram Neck With & Without Contrast    Result Date: 12/10/2018  1. Abnormal cerebral perfusion imaging as discussed above. 2. There is absent/cut off of opacification of a left anterior middle cerebral  artery branch as described. This is consistent with acute occlusion. Please see full discussion above. 3. No other significant findings are noted on the CT of the brain with and without contrast. There is no evidence of acute infarction hemorrhage or mass effect.   Radiation dose reduction techniques were utilized, including automated exposure control and exposure modulation based on body size.  This report was finalized on 12/10/2018 10:59 AM by Dr. Rich Banerjee M.D.      Xr Chest 1 View    Result Date: 12/8/2018  No focal pulmonary consolidation. Borderline heart size with mild vascular congestion.  This report was finalized on 12/8/2018 4:27 PM by Dr. Jarvis Marcum M.D.      Ct Cerebral Perfusion With & Without Contrast    Result Date: 12/10/2018  1. Abnormal cerebral perfusion imaging as discussed above. 2. There is absent/cut off of opacification of a left anterior middle cerebral artery branch as described. This is consistent with acute occlusion. Please see full discussion above. 3. No other significant findings are noted on the CT of the brain with and without contrast. There is no evidence of acute infarction hemorrhage or mass effect.   Radiation dose reduction techniques were utilized, including automated exposure control and exposure modulation based on body size.  This report was finalized on 12/10/2018 10:59 AM by Dr. Rich Banerjee M.D.      2D Echo pending    Impression: This is a 57 yo female with PMH of DM, CAD, CMT with PPM who presented to the ER with aphasia, hyperglycemia, and mildly hypertensive.  Her initial CT head was negative but CTP showed a large LMCA mismatch, small core infarct, and CTA showed a LMCA thrombus.  She received TPA and her aphasia was noted to have resolved, therefore patient was not a candidate for thrombectomy. She was on Plavix and Lipitor at home. Repeat CTA head today showing no acute infarction hemorrhage or mass effect.  Also CTA insignificant for ICAD,  the etiology of her stroke is likely cardioembolic. 2D echo is pending, however patient may benefit from a RUBEN.  Cardiology following, will assess need for A/C + DAPT. She will need aggressive control of her risk factors to prevent another stroke event (Uncontrolled diabetes, hyperlipidemia). Endocrinology is following the patient.       Assessment/Plan:  Left MCA thrombus likely cardioembolic  Continue ASA 325mg and Plavix 75mg  Lipitor 80mg  Neurochecks per stroke protocol  2D Echo pending, Cardiology following patient may need A/C + plavix.  Endocrinology/Diabetes educator following for hyperglycemia.  Normalize BP, encourage hydration.  Stroke Education  AUBREE/SCDs  PT/OT/ST  Will follow.    Discussed plan with patient, RN, and Dr. Wright, agrees with plan above.  KEYONA Paz      Electronically signed by Charline Hollingsworth APRN at 12/10/2018  2:21 PM     Abraham Fitch MD at 12/10/2018 11:55 AM          58 y.o.   LOS: 2 days   Patient Care Team:  Nusrat Dent APRN as PCP - General (Family Medicine)    Chief Complaint:  Elevated BG    Chief Complaint   Patient presents with   • Neuro Deficit(s)       Subjective    Patients sugars are still elevated and 300 and 400 range.  Discussed with the patient extensively about diet control if she is being discharged today.    Interval History:    Review of Systems:   Review of Systems   Constitutional: Positive for appetite change and fatigue. Negative for fever.   Respiratory: Negative for shortness of breath.    Cardiovascular: Negative for chest pain.   Gastrointestinal: Negative for diarrhea and vomiting.   Endocrine: Negative for polydipsia and polyuria.   Genitourinary: Negative for flank pain.   Musculoskeletal: Negative for arthralgias and myalgias.   Skin: Negative for pallor.   Neurological: Positive for weakness. Negative for numbness.   Psychiatric/Behavioral: Negative for agitation.     Objective     Vital Signs   Temp:  [98.1 °F (36.7 °C)-98.5 °F  (36.9 °C)] 98.2 °F (36.8 °C)  Heart Rate:  [62-69] 62  Resp:  [16-18] 16  BP: (106-136)/(56-83) 120/71    Physical Exam:  Physical Exam   Constitutional: She is oriented to person, place, and time. She appears well-nourished.   Obese     HENT:   Head: Normocephalic and atraumatic.   Wide neck   Eyes: Conjunctivae and EOM are normal. No scleral icterus.   Neck: Normal range of motion. Neck supple. No thyromegaly present.   Acanthosis nigricans   Cardiovascular: Normal rate and normal heart sounds.   Pulmonary/Chest: Effort normal and breath sounds normal. No stridor. She has no wheezes.   Abdominal: Soft. Bowel sounds are normal. She exhibits no distension. There is no tenderness.   Central obesity   Musculoskeletal: She exhibits no edema or tenderness.   Neurological: She is alert and oriented to person, place, and time.   Skin: Skin is warm and dry. She is not diaphoretic.   Psychiatric: She has a normal mood and affect.   Vitals reviewed.  Results Review:     I reviewed the patient's new clinical results and summarized them in subjective and in plan.    Medication Review: DONE      Current Facility-Administered Medications:   •  aspirin tablet 325 mg, 325 mg, Oral, Daily, 325 mg at 12/10/18 0824 **OR** aspirin suppository 300 mg, 300 mg, Rectal, Daily, Mark Jones MD  •  atorvastatin (LIPITOR) tablet 80 mg, 80 mg, Oral, Nightly, Mark Jones MD, 80 mg at 12/09/18 2235  •  carvedilol (COREG) tablet 25 mg, 25 mg, Oral, BID With Meals, Neymar Matute MD, 25 mg at 12/10/18 0824  •  clopidogrel (PLAVIX) tablet 75 mg, 75 mg, Oral, Daily, Mark Jones MD, 75 mg at 12/10/18 0824  •  dextrose (D50W) 25 g/ 50mL Intravenous Solution 25 g, 25 g, Intravenous, Q15 Min PRN, Neymar Matute MD  •  dextrose (GLUTOSE) oral gel 15 g, 15 g, Oral, Q15 Min PRN, Neymar Matute MD  •  furosemide (LASIX) tablet 40 mg, 40 mg, Oral, Daily, Neymar Matute MD, 40 mg at  12/10/18 0823  •  glucagon (human recombinant) (GLUCAGEN DIAGNOSTIC) injection 1 mg, 1 mg, Subcutaneous, PRN, Neymar Matute MD  •  [START ON 12/11/2018] insulin glargine (LANTUS) injection 45 Units, 45 Units, Subcutaneous, QAM, Abraham Fitch MD  •  insulin lispro (humaLOG) injection 0-10 Units, 0-10 Units, Subcutaneous, 4x Daily With Meals & Nightly, Abraham Fitch MD  •  insulin lispro (humaLOG) injection 10 Units, 10 Units, Subcutaneous, TID With Meals, Abraham Fitch MD  •  metFORMIN (GLUCOPHAGE) tablet 1,000 mg, 1,000 mg, Oral, BID With Meals, Neymar Matute MD, 1,000 mg at 12/10/18 0842  •  sodium chloride 0.9 % flush 10 mL, 10 mL, Intravenous, PRN, Janneth Scales APRN  •  sodium chloride 0.9 % flush 3 mL, 3 mL, Intravenous, Q12H, Mark Jones MD, 3 mL at 12/10/18 0845  •  sodium chloride 0.9 % flush 3-10 mL, 3-10 mL, Intravenous, PRN, Mark Jones MD  •  spironolactone (ALDACTONE) tablet 25 mg, 25 mg, Oral, Daily, Neymar Matute MD, 25 mg at 12/10/18 0823    Assessment/Plan     Active Hospital Problems    Diagnosis Date Noted   • Cerebrovascular accident (CVA) due to embolism of left middle cerebral artery (CMS/HCC) [I63.412] 12/08/2018      Resolved Hospital Problems   No resolved problems to display.     Type 2 diabetes mellitus-severely uncontrolled  Will increase the dosage of Lantus to 45 units in the morning.  Will give Lantus 10 units ×1 now  Will increase the dosage of Humalog to 10 units with each meal  Continue Humalog sliding scale 3 times a day before meals and at bedtime  Continue metformin thousand milligrams twice daily.    Discussed with the patient extensively the importance of glycemic control in order to prevent cardiovascular diseases.     Hyperlipidemia  Continue Lipitor 80 mg oral daily.      Discharge instructions from endocrine  Prescriptions have been sent to patient's pharmacy  Accu-Chek glucometer has been sent to the  "pharmacy.  Tresiba 45 units in the morning  NovoLog 10 units with each meal  Metformin thousand milligrams twice daily  These insulins are covered by patient's insurance.  Follow-up with me in clinic in about 2-3 weeks.     Discussed plan with Nurse.     Abraham Fitch MD.  12/10/18  12:21 PM      EMR Dragon / transcription disclaimer:    \"Dictated utilizing Dragon dictation\".     Electronically signed by Abraham Fitch MD at 12/10/2018 12:26 PM     "

## 2018-12-11 NOTE — PLAN OF CARE
Problem: Patient Care Overview  Goal: Plan of Care Review  Outcome: Ongoing (interventions implemented as appropriate)   12/11/18 0108   Coping/Psychosocial   Plan of Care Reviewed With patient   Plan of Care Review   Progress improving   OTHER   Outcome Summary NIH=0/accuchecks ac/hs with insulin dosage/per MD note/possible D/C home on Tuesday/will continue to monitor       Problem: Stroke (IRF) (Adult)  Goal: Promote Optimal Functional [place]  Outcome: Ongoing (interventions implemented as appropriate)

## 2018-12-11 NOTE — PROGRESS NOTES
58 y.o.   LOS: 3 days   Patient Care Team:  Nusrat Dent APRN as PCP - General (Family Medicine)    Chief Complaint:  Elevated BG    Chief Complaint   Patient presents with   • Neuro Deficit(s)       Subjective    Pt BG are still in 200s. Eating well. Mostly will be dced today.     Interval History:    Review of Systems:   Review of Systems   Constitutional: Positive for appetite change and fatigue. Negative for fever.   Respiratory: Negative for shortness of breath.    Cardiovascular: Negative for chest pain.   Gastrointestinal: Negative for diarrhea and vomiting.   Endocrine: Negative for polydipsia and polyuria.   Genitourinary: Negative for flank pain.   Musculoskeletal: Negative for arthralgias and myalgias.   Skin: Negative for pallor.   Neurological: Positive for weakness. Negative for numbness.   Psychiatric/Behavioral: Negative for agitation.     Objective     Vital Signs   Temp:  [97.7 °F (36.5 °C)-98 °F (36.7 °C)] 97.7 °F (36.5 °C)  Heart Rate:  [62-72] 72  Resp:  [16-18] 16  BP: (109-120)/(59-81) 112/76    Physical Exam:  Physical Exam   Constitutional: She is oriented to person, place, and time. She appears well-nourished.   Obese     HENT:   Head: Normocephalic and atraumatic.   Wide neck   Eyes: Conjunctivae and EOM are normal. No scleral icterus.   Neck: Normal range of motion. Neck supple. No thyromegaly present.   Acanthosis nigricans   Cardiovascular: Normal rate and normal heart sounds.   Pulmonary/Chest: Effort normal and breath sounds normal. No stridor. She has no wheezes.   Abdominal: Soft. Bowel sounds are normal. She exhibits no distension. There is no tenderness.   Central obesity   Musculoskeletal: She exhibits no edema or tenderness.   Neurological: She is alert and oriented to person, place, and time.   Skin: Skin is warm and dry. She is not diaphoretic.   Psychiatric: She has a normal mood and affect.   Vitals reviewed.  Results Review:     I reviewed the patient's new clinical  results and summarized them in subjective and in plan.      Glucose   Date/Time Value Ref Range Status   12/11/2018 0347 214 (H) 65 - 99 mg/dL Final   12/10/2018 0601 306 (H) 65 - 99 mg/dL Final   12/09/2018 1042 295 (H) 65 - 99 mg/dL Final   12/09/2018 0309 310 (H) 65 - 99 mg/dL Final   12/08/2018 1415 666 (C) 65 - 99 mg/dL Final     Lab Results (last 24 hours)     Procedure Component Value Units Date/Time    POC Glucose Once [[id number]]  (Abnormal) Collected:  12/11/18 1123    Specimen:  Blood Updated:  12/11/18 1126     Glucose 284 mg/dL     POC Glucose Once [[id number]]  (Abnormal) Collected:  12/11/18 0629    Specimen:  Blood Updated:  12/11/18 0633     Glucose 251 mg/dL     Basic Metabolic Panel [[id number]]  (Abnormal) Collected:  12/11/18 0347    Specimen:  Blood Updated:  12/11/18 0455     Glucose 214 mg/dL      BUN 11 mg/dL      Creatinine 0.76 mg/dL      Sodium 142 mmol/L      Potassium 3.6 mmol/L      Chloride 104 mmol/L      CO2 22.7 mmol/L      Calcium 9.2 mg/dL      eGFR Non African Amer 78 mL/min/1.73      BUN/Creatinine Ratio 14.5     Anion Gap 15.3 mmol/L     Narrative:       GFR Normal >60  Chronic Kidney Disease <60  Kidney Failure <15    CBC & Differential [[id number]] Collected:  12/11/18 0347    Specimen:  Blood Updated:  12/11/18 0430    Narrative:       The following orders were created for panel order CBC & Differential.  Procedure                               Abnormality         Status                     ---------                               -----------         ------                     CBC Auto Differential[[id number]]        Abnormal            Final result                 Please view results for these tests on the individual orders.    CBC Auto Differential [[id number]]  (Abnormal) Collected:  12/11/18 0347    Specimen:  Blood Updated:  12/11/18 0430     WBC 8.59 10*3/mm3      RBC 5.22 10*6/mm3      Hemoglobin 14.3 g/dL      Hematocrit 44.5 %      MCV 85.2 fL      MCH 27.4 pg      MCHC  32.1 g/dL      RDW 13.0 %      RDW-SD 40.3 fl      MPV 11.2 fL      Platelets 208 10*3/mm3      Neutrophil % 41.8 %      Lymphocyte % 46.3 %      Monocyte % 8.3 %      Eosinophil % 2.8 %      Basophil % 0.3 %      Immature Grans % 0.5 %      Neutrophils, Absolute 3.59 10*3/mm3      Lymphocytes, Absolute 3.98 10*3/mm3      Monocytes, Absolute 0.71 10*3/mm3      Eosinophils, Absolute 0.24 10*3/mm3      Basophils, Absolute 0.03 10*3/mm3      Immature Grans, Absolute 0.04 10*3/mm3     POC Glucose Once [[id number]]  (Abnormal) Collected:  12/10/18 2057    Specimen:  Blood Updated:  12/10/18 2100     Glucose 325 mg/dL     POC Glucose Once [[id number]]  (Abnormal) Collected:  12/10/18 1850    Specimen:  Blood Updated:  12/10/18 1902     Glucose 212 mg/dL         Imaging Results (last 24 hours)     ** No results found for the last 24 hours. **          Medication Review: DONE      Current Facility-Administered Medications:   •  aspirin tablet 325 mg, 325 mg, Oral, Daily, 325 mg at 12/11/18 0754 **OR** aspirin suppository 300 mg, 300 mg, Rectal, Daily, Mark Jones MD  •  atorvastatin (LIPITOR) tablet 80 mg, 80 mg, Oral, Nightly, Mark Jones MD, 80 mg at 12/10/18 2201  •  carvedilol (COREG) tablet 25 mg, 25 mg, Oral, BID With Meals, Neymar Matute MD, 25 mg at 12/11/18 0754  •  clopidogrel (PLAVIX) tablet 75 mg, 75 mg, Oral, Daily, Mark Jones MD, 75 mg at 12/11/18 0755  •  dextrose (D50W) 25 g/ 50mL Intravenous Solution 25 g, 25 g, Intravenous, Q15 Min PRN, Neymar Matute MD  •  dextrose (GLUTOSE) oral gel 15 g, 15 g, Oral, Q15 Min PRN, Neymar Matute MD  •  furosemide (LASIX) tablet 40 mg, 40 mg, Oral, Daily, Neymar Matute MD, 40 mg at 12/11/18 0754  •  glucagon (human recombinant) (GLUCAGEN DIAGNOSTIC) injection 1 mg, 1 mg, Subcutaneous, PRN, Neymar Matute MD  •  insulin glargine (LANTUS) injection 45 Units, 45 Units, Subcutaneous, QAM,  Abraham Fitch MD, 45 Units at 12/11/18 0703  •  insulin lispro (humaLOG) injection 0-10 Units, 0-10 Units, Subcutaneous, 4x Daily With Meals & Nightly, Abraham Fitch MD, 6 Units at 12/11/18 1220  •  insulin lispro (humaLOG) injection 10 Units, 10 Units, Subcutaneous, TID With Meals, Abraham Fitch MD, 10 Units at 12/11/18 1220  •  metFORMIN (GLUCOPHAGE) tablet 1,000 mg, 1,000 mg, Oral, BID With Meals, Neymar Matute MD, 1,000 mg at 12/11/18 0754  •  sodium chloride 0.9 % flush 10 mL, 10 mL, Intravenous, PRN, Janneth Scales APRN  •  sodium chloride 0.9 % flush 3 mL, 3 mL, Intravenous, Q12H, Mark Jones MD, 3 mL at 12/11/18 0757  •  sodium chloride 0.9 % flush 3-10 mL, 3-10 mL, Intravenous, PRN, Mark Jones MD  •  spironolactone (ALDACTONE) tablet 25 mg, 25 mg, Oral, Daily, Neymar Matute MD, 25 mg at 12/11/18 0754    Assessment/Plan     Active Hospital Problems    Diagnosis Date Noted   • Cerebrovascular accident (CVA) due to embolism of left middle cerebral artery (CMS/[facility]) [I63.412] 12/08/2018      Resolved Hospital Problems   No resolved problems to display.     Type 2 diabetes mellitus-severely uncontrolled  Increase Lantus 55 units in the morning  Increase the dosage of Humalog to 15 units with each meal  Continue Humalog sliding scale 3 times a day before meals and at bedtime  Continue metformin thousand milligrams twice daily.    Discussed with the patient extensively the importance of glycemic control in order to prevent cardiovascular diseases.     Hyperlipidemia  Continue Lipitor 80 mg oral daily.    Discharge instructions from endocrine  Prescriptions have been sent to patient's pharmacy  Accu-Chek glucometer has been sent to the pharmacy.  Tresiba 45 units in the morning  NovoLog 10 units with each meal  Metformin thousand milligrams twice daily  These insulins are covered by patient's insurance.  Follow-up with me in clinic in about 2-3 weeks.    "  Discussed plan with Nurse.     Abraham Fitch MD.  12/11/18  12:21 PM      EMR Dragon / transcription disclaimer:    \"Dictated utilizing Dragon dictation\".   "

## 2018-12-11 NOTE — NURSING NOTE
"Diabetes Education  Assessment/Teaching    Patient Name:  PAULA Dent  YOB: 1960  MRN: [id number]  Admit Date:  12/8/2018      Assessment Date:  12/10/18    Most Recent Value   General Information    Referral From:  MD selby. F/u with pt at bedside to assess for questions r/t DM ed.    Height  152.4 cm (60\")   Height Method  Stated   Weight  90.9 kg (200 lb 4.8 oz)   Weight Method  Bed scale   Diabetes History   What type of diabetes do you have?  Type 2   Length of Diabetes Diagnosis  1 - 5 years   Do you test your blood sugar at home?  yes   Have you had high blood sugar? (>140mg/dl)  yes [based upon current a1C elevated > 12%.]   Education Preferences   Assessment Topics   Problem Solving - Assessment  -- Review: hypoglycemia and approp tx.    Healthy Coping - Assessment  Competent   Monitoring - Assessment  Competent   DM Goals            Most Recent Value   DM Education Needs   Meter  Has own   Meter Type  Freestyle   Problem Solving  Hypoglycemia, Treatment [advise pt to always have her BG meter w/her as well as source of carbohydrate in case of hypoglycemia ]   Healthy Coping  Appropriate   Discharge Plan  Home   Motivation    Teaching Method  Explanation   Patient Response  Verbalized understanding, Needs reinforcement   Pt voices no questions this morning.         Other Comments:  Pt needs rx for 'Stefany pen needles' at dc as well as for insulins. Thank you.         Electronically signed by:  Magda Wu, RN, BSN, CDE   12/11/18 11:25 AM  "

## 2018-12-11 NOTE — PLAN OF CARE
Problem: Patient Care Overview  Goal: Plan of Care Review  Outcome: Ongoing (interventions implemented as appropriate)   12/11/18 1110   Coping/Psychosocial   Plan of Care Reviewed With patient   OTHER   Outcome Summary F/u with 57 y/o pt at bedside in anticipation of dc. Pls see DM ed flowsheet or note for more detail.

## 2018-12-12 VITALS
BODY MASS INDEX: 39.32 KG/M2 | RESPIRATION RATE: 16 BRPM | HEART RATE: 75 BPM | SYSTOLIC BLOOD PRESSURE: 118 MMHG | WEIGHT: 200.3 LBS | HEIGHT: 60 IN | OXYGEN SATURATION: 95 % | DIASTOLIC BLOOD PRESSURE: 70 MMHG | TEMPERATURE: 97.8 F

## 2018-12-12 LAB
ANION GAP SERPL CALCULATED.3IONS-SCNC: 12.3 MMOL/L
BASOPHILS # BLD AUTO: 0.03 10*3/MM3 (ref 0–0.2)
BASOPHILS NFR BLD AUTO: 0.4 % (ref 0–1.5)
BUN BLD-MCNC: 11 MG/DL (ref 6–20)
BUN/CREAT SERPL: 12.9 (ref 7–25)
CALCIUM SPEC-SCNC: 9.3 MG/DL (ref 8.6–10.5)
CHLORIDE SERPL-SCNC: 100 MMOL/L (ref 98–107)
CO2 SERPL-SCNC: 25.7 MMOL/L (ref 22–29)
CREAT BLD-MCNC: 0.85 MG/DL (ref 0.57–1)
DEPRECATED RDW RBC AUTO: 40.2 FL (ref 37–54)
EOSINOPHIL # BLD AUTO: 0.21 10*3/MM3 (ref 0–0.7)
EOSINOPHIL NFR BLD AUTO: 2.8 % (ref 0.3–6.2)
ERYTHROCYTE [DISTWIDTH] IN BLOOD BY AUTOMATED COUNT: 13.2 % (ref 11.7–13)
GFR SERPL CREATININE-BSD FRML MDRD: 69 ML/MIN/1.73
GLUCOSE BLD-MCNC: 195 MG/DL (ref 65–99)
GLUCOSE BLDC GLUCOMTR-MCNC: 117 MG/DL (ref 70–130)
GLUCOSE BLDC GLUCOMTR-MCNC: 231 MG/DL (ref 70–130)
HCT VFR BLD AUTO: 41.7 % (ref 35.6–45.5)
HGB BLD-MCNC: 14.2 G/DL (ref 11.9–15.5)
IMM GRANULOCYTES # BLD: 0.04 10*3/MM3 (ref 0–0.03)
IMM GRANULOCYTES NFR BLD: 0.5 % (ref 0–0.5)
LYMPHOCYTES # BLD AUTO: 3.62 10*3/MM3 (ref 0.9–4.8)
LYMPHOCYTES NFR BLD AUTO: 47.8 % (ref 19.6–45.3)
MCH RBC QN AUTO: 27.9 PG (ref 26.9–32)
MCHC RBC AUTO-ENTMCNC: 34.1 G/DL (ref 32.4–36.3)
MCV RBC AUTO: 81.9 FL (ref 80.5–98.2)
MONOCYTES # BLD AUTO: 0.72 10*3/MM3 (ref 0.2–1.2)
MONOCYTES NFR BLD AUTO: 9.5 % (ref 5–12)
NEUTROPHILS # BLD AUTO: 2.99 10*3/MM3 (ref 1.9–8.1)
NEUTROPHILS NFR BLD AUTO: 39.5 % (ref 42.7–76)
PLATELET # BLD AUTO: 212 10*3/MM3 (ref 140–500)
PMV BLD AUTO: 12.2 FL (ref 6–12)
POTASSIUM BLD-SCNC: 3.7 MMOL/L (ref 3.5–5.2)
RBC # BLD AUTO: 5.09 10*6/MM3 (ref 3.9–5.2)
SODIUM BLD-SCNC: 138 MMOL/L (ref 136–145)
WBC NRBC COR # BLD: 7.57 10*3/MM3 (ref 4.5–10.7)

## 2018-12-12 PROCEDURE — 85025 COMPLETE CBC W/AUTO DIFF WBC: CPT | Performed by: INTERNAL MEDICINE

## 2018-12-12 PROCEDURE — 80048 BASIC METABOLIC PNL TOTAL CA: CPT | Performed by: INTERNAL MEDICINE

## 2018-12-12 PROCEDURE — 63710000001 INSULIN LISPRO (HUMAN) PER 5 UNITS: Performed by: INTERNAL MEDICINE

## 2018-12-12 PROCEDURE — 99232 SBSQ HOSP IP/OBS MODERATE 35: CPT | Performed by: INTERNAL MEDICINE

## 2018-12-12 PROCEDURE — 82962 GLUCOSE BLOOD TEST: CPT

## 2018-12-12 PROCEDURE — 63710000001 INSULIN GLARGINE PER 5 UNITS: Performed by: INTERNAL MEDICINE

## 2018-12-12 RX ADMIN — INSULIN LISPRO 4 UNITS: 100 INJECTION, SOLUTION INTRAVENOUS; SUBCUTANEOUS at 12:02

## 2018-12-12 RX ADMIN — CLOPIDOGREL 75 MG: 75 TABLET, FILM COATED ORAL at 09:21

## 2018-12-12 RX ADMIN — FUROSEMIDE 40 MG: 40 TABLET ORAL at 09:22

## 2018-12-12 RX ADMIN — INSULIN GLARGINE 55 UNITS: 100 INJECTION, SOLUTION SUBCUTANEOUS at 06:29

## 2018-12-12 RX ADMIN — INSULIN LISPRO 15 UNITS: 100 INJECTION, SOLUTION INTRAVENOUS; SUBCUTANEOUS at 12:01

## 2018-12-12 RX ADMIN — SPIRONOLACTONE 25 MG: 25 TABLET ORAL at 09:21

## 2018-12-12 RX ADMIN — SODIUM CHLORIDE, PRESERVATIVE FREE 3 ML: 5 INJECTION INTRAVENOUS at 09:24

## 2018-12-12 RX ADMIN — CARVEDILOL 25 MG: 25 TABLET, FILM COATED ORAL at 09:21

## 2018-12-12 RX ADMIN — RIVAROXABAN 20 MG: 20 TABLET, FILM COATED ORAL at 12:01

## 2018-12-12 RX ADMIN — METFORMIN HYDROCHLORIDE 1000 MG: 1000 TABLET ORAL at 09:21

## 2018-12-12 RX ADMIN — INSULIN LISPRO 15 UNITS: 100 INJECTION, SOLUTION INTRAVENOUS; SUBCUTANEOUS at 09:21

## 2018-12-12 NOTE — PROGRESS NOTES
Hospital Follow Up    Chief Complaint: Follow up stroke, CAD, ICMP    Interval History:  No chest pain or dyspnea.  Started on rivaroxaban yesterday.     Objective:     Objective:  Temp:  [97.4 °F (36.3 °C)-97.8 °F (36.6 °C)] 97.5 °F (36.4 °C)  Heart Rate:  [60-72] 61  Resp:  [16] 16  BP: (105-129)/(57-80) 126/78   No intake or output data in the 24 hours ending 12/12/18 0728  Body mass index is 39.12 kg/m².      12/10/18  0503 12/10/18  1900 12/11/18  0500   Weight: 91.2 kg (201 lb 1.6 oz) 91.2 kg (201 lb) 90.9 kg (200 lb 4.8 oz)     Weight change:       Physical Exam:   General : Alert, cooperative, in no acute distress.  Neuro: alert,cooperative and oriented  Lungs: CTAB. Normal respiratory effort and rate.  CV:: Regular rate and rhythm, normal S1 and S2, no murmurs, gallops or rubs.  ABD: Soft, nontender, non-distended. positive bowel sounds  Extr: No edema or cyanosis, moves all extremities    Lab Review:   Results from last 7 days   Lab Units  12/12/18 0429 12/11/18 0347 12/08/18   1415   SODIUM mmol/L  138  142   < >  132*   POTASSIUM mmol/L  3.7  3.6   < >  4.3   CHLORIDE mmol/L  100  104   < >  91*   CO2 mmol/L  25.7  22.7   < >  24.9   BUN mg/dL  11  11   < >  11   CREATININE mg/dL  0.85  0.76   < >  1.15*   GLUCOSE mg/dL  195*  214*   < >  666*   CALCIUM mg/dL  9.3  9.2   < >  10.1   AST (SGOT) U/L   --    --    --   67*   ALT (SGPT) U/L   --    --    --   108*    < > = values in this interval not displayed.     Results from last 7 days   Lab Units  12/08/18   1415   TROPONIN T ng/mL  <0.010     Results from last 7 days   Lab Units  12/12/18 0429 12/11/18   0347   WBC 10*3/mm3  7.57  8.59   HEMOGLOBIN g/dL  14.2  14.3   HEMATOCRIT %  41.7  44.5   PLATELETS 10*3/mm3  212  208     Results from last 7 days   Lab Units  12/08/18   1415   INR   0.95   APTT seconds  23.6         Results from last 7 days   Lab Units  12/09/18   0309   CHOLESTEROL mg/dL  279*   TRIGLYCERIDES mg/dL  401*   HDL CHOL  mg/dL  32*             I reviewed the patient's new clinical results.  I personally viewed and interpreted the patient's EKG  Current Medications:   Scheduled Meds:  atorvastatin 80 mg Oral Nightly   carvedilol 25 mg Oral BID With Meals   clopidogrel 75 mg Oral Daily   furosemide 40 mg Oral Daily   insulin glargine 55 Units Subcutaneous QAM   insulin lispro 0-10 Units Subcutaneous 4x Daily With Meals & Nightly   insulin lispro 15 Units Subcutaneous TID With Meals   metFORMIN 1,000 mg Oral BID With Meals   rivaroxaban 15 mg Oral BID With Meals   Followed by      [START ON 1/2/2019] rivaroxaban 20 mg Oral Daily With Dinner   sodium chloride 3 mL Intravenous Q12H   spironolactone 25 mg Oral Daily     Continuous Infusions:     Allergies:  No Known Allergies    Assessment/Plan:     1. Left frontal stroke due to left MCA occlusion.  Felt to be cardioembolic.  Presented with aphasia that resolved with TPA administration.  Already on chronic aspirin and clopidogrel.  Aspirin dose increased.  Device interrogation with no events including no evidence of atrial fibrillation.  Echo with depressed LV function, EF of 30% and no evidence of left ventricular thrombus.    2. History of coronary artery disease  3. Ischemic cardiomyopathy  4. Status post AICD  5. Diabetes mellitus type 2. HgA1c 12.  Endocrine following.  6. Hyperlipidemia.     -  Will change rivaroxaban to 20 mg daily.  I don't think she needs DVT/PE indicated dosing.  -  Ok for discharge from my standpoint.  Follow up with me in the office in 6 weeks      Oneida Saldivar MD  12/12/18  7:28 AM

## 2018-12-12 NOTE — DISCHARGE SUMMARY
PHYSICIAN DISCHARGE SUMMARY                                                                          [facility]      Patient Identification:    Name: PAULA Dent  Age: 58 y.o.  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  She presents to the ED and had an initial NIHSS of 3.  CT head showed no acute stroke.  CT perfusion was concerning for a large perfusion mismatch.  CTA showed a left MCA thrombus.  She was given TPA after discussing with neurology.  Neurosurgery was consulted for possible intervention.  During my evaluation patient appears to be stable.  Does not have any headache or visual defects.  Does not have any neurological deficits.  She has no significant aphagia.    Consults:   Patient Care Team:  Nusrat Dent APRN as PCP - General (Family Medicine)    Procedures Performed:  Procedure(s):  EMBOLECTOMY MECHANICAL       Hospital Course:   Patient was admitted to ICU due to concern for acute CVA requiring TPA.  She was evaluated by neurology and they felt that the stroke was possibly embolic.  Cardiology was consulted given that she has no history of congestive heart failure.  She was recommended to be started on anticoagulation and continue with Plavix only.  She was started on medication while in the hospital and did not have any complications.  She also was noted to have  significantly uncontrolled diabetes and hence endocrinology was consulted who have started her on aggressive insulin regimen with good response.  She'll need close outpatient follow-up with the same.  She'll follow-up with neurology clinic in 3 months.  She'll follow-up with PCP soon in a week.  She sees Dr. Blackburn in the clinic for sleep apnea and follow up with them as scheduled     Objective:   Temp:  [97.4 °F (36.3 °C)-97.8 °F (36.6 °C)] 97.8 °F (36.6 °C)  Heart Rate:  [60-75] 75  Resp:  [16] 16  BP: (105-126)/(57-78) 118/70      on    Device (Oxygen Therapy): room air    Intake/Output Summary (Last 24 hours) at 12/12/2018 1558  Last data filed at 12/12/2018 1300  Gross per 24 hour   Intake 210 ml   Output --   Net 210 ml     Body mass index is 39.12 kg/m².      12/10/18  0503 12/10/18  1900 12/11/18  0500   Weight: 91.2 kg (201 lb 1.6 oz) 91.2 kg (201 lb) 90.9 kg (200 lb 4.8 oz)     Weight change:       Physical Exam:  Constitutional: No acute distress, No accessory muscle use   Head: - NCAT  Eyes: No pallor, Anicteric conjunctiva, EOMI.  ENMT:  Mallampati 3, no oral thrush. No palpable cervical lymphadenopathy  Heart: RRR, no murmur  Lungs: REGINA +, No wheezes/crackles heard    Abdomen: Obese. Soft. No tenderness, guarding or rigidity  Extremities: Extremities warm and well perfused, Trace pitting edema  Neuro: Conscious, alert, oriented x3, no gross focal neuro deficits    Significant Discharge Diagnostics     Pertinent Lab Results:  Results from last 7 days   Lab Units  12/12/18   0429  12/11/18   0347  12/10/18   0601  12/09/18   1042  12/09/18   0309  12/08/18   1421  12/08/18   1415   SODIUM mmol/L  138  142  135*  143  142   --   132*   POTASSIUM mmol/L  3.7  3.6  3.5  3.4*  3.9   --   4.3   CHLORIDE mmol/L  100  104  100  108*  106   --   91*   CO2 mmol/L  25.7  22.7  23.0  23.2  21.6*   --   24.9   BUN mg/dL  11  11  11  9  11   --   11   CREATININE mg/dL  0.85  0.76  0.86  0.71  0.85  1.00  1.15*    GLUCOSE mg/dL  195*  214*  306*  295*  310*   --   666*   CALCIUM mg/dL  9.3  9.2  9.3  7.9*  9.6   --   10.1   AST (SGOT) U/L   --    --    --    --    --    --   67*   ALT (SGPT) U/L   --    --    --    --    --    --   108*     Results from last 7 days   Lab Units  12/08/18   1415   TROPONIN T ng/mL  <0.010     Results from last 7 days   Lab Units  12/12/18   0429  12/11/18   0347  12/10/18   0601  12/08/18   1415   WBC 10*3/mm3  7.57  8.59  7.10  6.78   HEMOGLOBIN g/dL  14.2  14.3  14.2  15.8*   HEMATOCRIT %  41.7  44.5  44.2  48.2*   PLATELETS 10*3/mm3  212  208  207  246   MCV fL  81.9  85.2  85.7  85.2   MCH pg  27.9  27.4  27.5  27.9   MCHC g/dL  34.1  32.1*  32.1*  32.8   RDW %  13.2*  13.0  13.0  12.9   RDW-SD fl  40.2  40.3  40.7  40.1   MPV fL  12.2*  11.2  11.4  11.8   NEUTROPHIL % %  39.5*  41.8*  40.2*  48.6   LYMPHOCYTE % %  47.8*  46.3*  46.8*  40.3   MONOCYTES % %  9.5  8.3  9.7  8.6   EOSINOPHIL % %  2.8  2.8  2.3  1.8   BASOPHIL % %  0.4  0.3  0.6  0.4   IMM GRAN % %  0.5  0.5  0.4  0.3   NEUTROS ABS 10*3/mm3  2.99  3.59  2.86  3.30   LYMPHS ABS 10*3/mm3  3.62  3.98  3.32  2.73   MONOS ABS 10*3/mm3  0.72  0.71  0.69  0.58   EOS ABS 10*3/mm3  0.21  0.24  0.16  0.12   BASOS ABS 10*3/mm3  0.03  0.03  0.04  0.03   IMMATURE GRANS (ABS) 10*3/mm3  0.04*  0.04*  0.03  0.02     Results from last 7 days   Lab Units  12/08/18   1415   INR   0.95   APTT seconds  23.6                                           Imaging Results:  Imaging Results (all)     Procedure Component Value Units Date/Time    CT Cerebral Perfusion With & Without Contrast [[id number]] Collected:  12/08/18 1529     Updated:  12/10/18 1103    Narrative:       CT OF THE BRAIN WITH AND WITHOUT CONTRAST, CT ANGIOGRAPHY OF THE HEAD  AND NECK WITH CONTRAST INCLUDING RECONSTRUCTION IMAGES AND CT CEREBRAL  PERFUSION IMAGES 12/08/2018.     HISTORY: Unable to speak. Possible stroke.     TECHNIQUE: Axial images were obtained through the brain  without  intravenous contrast.      FINDINGS: There is some bifrontal atrophy. Ventricles appear normal in  size. There is no evidence of acute infarction, hemorrhage, midline  shift or mass effect. Minimal basal ganglia calcification is seen on the  left. Report was called to the stroke team at approximately 1425 hours.     TECHNIQUE: Following the intravenous contrast injection CT angiography  of the head and neck was performed. Sagittal, coronal and 3-D  reconstruction images were reviewed. CT cerebral perfusion images were  obtained.     FINDINGS: On the cerebral perfusion images there is prolonged Tmax in  the left frontoparietal region in the distribution of the left middle  cerebral artery territory. A small area of abnormal density is seen in  the left frontal lobe on the CBF images. This is not seen on the CBV  images.     NASCET criteria was used. Sagittal, coronal and 3-D reconstruction  images were reviewed.     There appears to be cut off of opacification of an anterior division  left middle cerebral artery branch involving the anterior aspect of the  left temporal lobe. This is best seen on coronal reconstruction images  66 through 69. The left middle cerebral artery otherwise opacifies  normally. The left anterior cerebral artery and the right middle and  anterior cerebral arteries opacify normally. The bilateral vertebral and  the bilateral common carotid and internal carotid arteries opacify  relatively normally.     No aneurysm is seen.     The right vertebral artery appears smaller than the left.     Postcontrast CT of the brain shows no abnormal enhancement.       Impression:       1. Abnormal cerebral perfusion imaging as discussed above.  2. There is absent/cut off of opacification of a left anterior middle  cerebral artery branch as described. This is consistent with acute  occlusion. Please see full discussion above.  3. No other significant findings are noted on the CT of the brain with  and  without contrast. There is no evidence of acute infarction  hemorrhage or mass effect.      Radiation dose reduction techniques were utilized, including automated  exposure control and exposure modulation based on body size.     This report was finalized on 12/10/2018 10:59 AM by Dr. Rich Banerjee M.D.       CT Angiogram Head With & Without Contrast [[id number]] Collected:  12/08/18 1529     Updated:  12/10/18 1103    Narrative:       CT OF THE BRAIN WITH AND WITHOUT CONTRAST, CT ANGIOGRAPHY OF THE HEAD  AND NECK WITH CONTRAST INCLUDING RECONSTRUCTION IMAGES AND CT CEREBRAL  PERFUSION IMAGES 12/08/2018.     HISTORY: Unable to speak. Possible stroke.     TECHNIQUE: Axial images were obtained through the brain without  intravenous contrast.      FINDINGS: There is some bifrontal atrophy. Ventricles appear normal in  size. There is no evidence of acute infarction, hemorrhage, midline  shift or mass effect. Minimal basal ganglia calcification is seen on the  left. Report was called to the stroke team at approximately 1425 hours.     TECHNIQUE: Following the intravenous contrast injection CT angiography  of the head and neck was performed. Sagittal, coronal and 3-D  reconstruction images were reviewed. CT cerebral perfusion images were  obtained.     FINDINGS: On the cerebral perfusion images there is prolonged Tmax in  the left frontoparietal region in the distribution of the left middle  cerebral artery territory. A small area of abnormal density is seen in  the left frontal lobe on the CBF images. This is not seen on the CBV  images.     NASCET criteria was used. Sagittal, coronal and 3-D reconstruction  images were reviewed.     There appears to be cut off of opacification of an anterior division  left middle cerebral artery branch involving the anterior aspect of the  left temporal lobe. This is best seen on coronal reconstruction images  66 through 69. The left middle cerebral artery otherwise  opacifies  normally. The left anterior cerebral artery and the right middle and  anterior cerebral arteries opacify normally. The bilateral vertebral and  the bilateral common carotid and internal carotid arteries opacify  relatively normally.     No aneurysm is seen.     The right vertebral artery appears smaller than the left.     Postcontrast CT of the brain shows no abnormal enhancement.       Impression:       1. Abnormal cerebral perfusion imaging as discussed above.  2. There is absent/cut off of opacification of a left anterior middle  cerebral artery branch as described. This is consistent with acute  occlusion. Please see full discussion above.  3. No other significant findings are noted on the CT of the brain with  and without contrast. There is no evidence of acute infarction  hemorrhage or mass effect.      Radiation dose reduction techniques were utilized, including automated  exposure control and exposure modulation based on body size.     This report was finalized on 12/10/2018 10:59 AM by Dr. Rich Banerjee M.D.       CT Angiogram Neck With & Without Contrast [[id number]] Collected:  12/08/18 1529     Updated:  12/10/18 1103    Narrative:       CT OF THE BRAIN WITH AND WITHOUT CONTRAST, CT ANGIOGRAPHY OF THE HEAD  AND NECK WITH CONTRAST INCLUDING RECONSTRUCTION IMAGES AND CT CEREBRAL  PERFUSION IMAGES 12/08/2018.     HISTORY: Unable to speak. Possible stroke.     TECHNIQUE: Axial images were obtained through the brain without  intravenous contrast.      FINDINGS: There is some bifrontal atrophy. Ventricles appear normal in  size. There is no evidence of acute infarction, hemorrhage, midline  shift or mass effect. Minimal basal ganglia calcification is seen on the  left. Report was called to the stroke team at approximately 1425 hours.     TECHNIQUE: Following the intravenous contrast injection CT angiography  of the head and neck was performed. Sagittal, coronal and 3-D  reconstruction images  were reviewed. CT cerebral perfusion images were  obtained.     FINDINGS: On the cerebral perfusion images there is prolonged Tmax in  the left frontoparietal region in the distribution of the left middle  cerebral artery territory. A small area of abnormal density is seen in  the left frontal lobe on the CBF images. This is not seen on the CBV  images.     NASCET criteria was used. Sagittal, coronal and 3-D reconstruction  images were reviewed.     There appears to be cut off of opacification of an anterior division  left middle cerebral artery branch involving the anterior aspect of the  left temporal lobe. This is best seen on coronal reconstruction images  66 through 69. The left middle cerebral artery otherwise opacifies  normally. The left anterior cerebral artery and the right middle and  anterior cerebral arteries opacify normally. The bilateral vertebral and  the bilateral common carotid and internal carotid arteries opacify  relatively normally.     No aneurysm is seen.     The right vertebral artery appears smaller than the left.     Postcontrast CT of the brain shows no abnormal enhancement.       Impression:       1. Abnormal cerebral perfusion imaging as discussed above.  2. There is absent/cut off of opacification of a left anterior middle  cerebral artery branch as described. This is consistent with acute  occlusion. Please see full discussion above.  3. No other significant findings are noted on the CT of the brain with  and without contrast. There is no evidence of acute infarction  hemorrhage or mass effect.      Radiation dose reduction techniques were utilized, including automated  exposure control and exposure modulation based on body size.     This report was finalized on 12/10/2018 10:59 AM by Dr. Rich Banerjee M.D.       CT Head Without Contrast [[id number]] Collected:  12/09/18 1607     Updated:  12/09/18 1621    Narrative:       CT HEAD WO CONTRAST-     INDICATIONS: Stroke, follow-up      TECHNIQUE: Radiation dose reduction techniques were utilized, including  automated exposure control and exposure modulation based on body size.      Noncontrast head CT     COMPARISON: 12/9/2018 at 0253 hours     FINDINGS:      A 2.4 cm focus of hypodensity in the left frontal lobe is mildly more  conspicuous, reflecting evolving post infarct changes. A couple punctate  hyperdensities within this region, for example image 26 of series 1 may  be passing vessels or potentially petechial hemorrhage, continued  follow-up suggested. No other evidence of acute intracranial hemorrhage,  midline shift or conspicuous mass effect.  Basal ganglia mineralization  is present.     Arterial calcifications are seen at the base of the brain.        Ventricles, cisterns, cerebral sulci are unremarkable for patient age.     Likely mucous retention cyst or polyp in the left maxillary sinus. The  visualized paranasal sinuses, orbits, mastoid air cells are otherwise  unremarkable.                   Impression:             Evolving postinfarct changes in the left frontal lobe, as described.  Continued follow-up suggested.     Discussed by telephone with the patient's nurse, Anjel, at 1615,  12/9/2018.     This report was finalized on 12/9/2018 4:18 PM by Dr. Jarvis Marcum M.D.       CT Head Without Contrast [[id number]] Collected:  12/09/18 0315     Updated:  12/09/18 0324    Narrative:       CRANIAL CT SCAN WITHOUT CONTRAST     CLINICAL HISTORY: Stroke; I63.412-Cerebral infarction due to embolism of  left middle cerebral artery; R73.9-Hyperglycemia, unspecified     COMPARISON: December 8, 2018.     TECHNIQUE: Radiation dose reduction techniques were utilized, including  automated exposure control and exposure modulation based on body size.  Multiple axial images of the head were obtained without contrast.      FINDINGS:  Mild atrophy, frontal lobes primarily. There is a small area  of ischemia now evident along the  inferior-posterior margin of the left  frontal lobe and lateral insular region adjacent to the left sylvian  fissure. This area is within the previously described perfusion  abnormality. It is best seen on axial images 20-25. There is no mass  effect or acute hemorrhage.  Ventricular size and configuration is  normal. Bone window images show small polyp or retention cyst in the  left maxillary sinus.                Impression:       1. Evolving ischemia is now evident in the inferior-posterior left  frontal lobe corresponding to a portion of the CT perfusion abnormality.  There is no mass effect or hemorrhage.                     This report was finalized on 12/9/2018 3:21 AM by Jay Bar M.D.       XR Chest 1 View [[id number]] Collected:  12/08/18 1625     Updated:  12/08/18 1631    Narrative:       XR CHEST 1 VW-     HISTORY: Female who is 58 years-old,  stroke     TECHNIQUE: Frontal view of the chest     COMPARISON: None available     FINDINGS: Heart size is borderline. Left-sided pacemaker and cardiac  leads. Mild vascular congestion. No focal pulmonary consolidation,  pleural effusion, or pneumothorax. No acute osseous process.       Impression:       No focal pulmonary consolidation. Borderline heart size with  mild vascular congestion.     This report was finalized on 12/8/2018 4:27 PM by Dr. Jarvis Marcum M.D.             Discharge Medications and Instructions:     Discharge Medications     Discharge Medications      New Medications      Instructions Start Date   ACCU-CHEK LONNY PLUS w/Device kit   1 device      accu-chek multiclix lancets   To check 3 - 4 times a day      glucose blood test strip  Commonly known as:  ACCU-CHEK LONNY PLUS   To check 3 - 4 times a day      insulin aspart 100 UNIT/ML solution pen-injector sc pen  Commonly known as:  NOVOLOG FLEXPEN   15 Units, Subcutaneous, 3 Times Daily With Meals      Insulin Degludec 200 UNIT/ML solution pen-injector  Commonly known as:  TRESIBA  FLEXTOUCH   55 Units, Subcutaneous, Daily      Insulin Pen Needle 32G X 4 MM misc  Commonly known as:  BD PEN NEEDLE NADEEN U/F   TO INJECT 4 TIMES DAILY      rivaroxaban 20 MG tablet  Commonly known as:  XARELTO   20 mg, Oral, Daily         Changes to Medications      Instructions Start Date   metFORMIN 1000 MG tablet  Commonly known as:  GLUCOPHAGE  What changed:    · medication strength  · how much to take  · when to take this  · Another medication with the same name was removed. Continue taking this medication, and follow the directions you see here.   1,000 mg, Oral, 2 Times Daily With Meals         Continue These Medications      Instructions Start Date   atorvastatin 80 MG tablet  Commonly known as:  LIPITOR   80 mg, Oral, Daily      carvedilol 25 MG tablet  Commonly known as:  COREG   25 mg, Oral, 2 Times Daily With Meals      clopidogrel 75 MG tablet  Commonly known as:  PLAVIX   75 mg, Oral, Daily      furosemide 40 MG tablet  Commonly known as:  LASIX   40 mg, Oral, Daily      potassium chloride 10 MEQ CR tablet  Commonly known as:  K-DUR   10 mEq, Oral, Daily      spironolactone 25 MG tablet  Commonly known as:  ALDACTONE   25 mg, Oral, Daily             Disposition:  Home    Follow-up Information     Oneida Saldivar MD Follow up in 6 week(s).    Specialty:  Cardiology  Contact information:  [address] [facility] [address]  [place] [address]  [phone]             Nusrat Dent APRN Follow up.    Specialty:  Family Medicine  Contact information:  [address] [place] [address]  [phone]             Nikki Dalal APRN. Schedule an appointment as soon as possible for a visit in 3 month(s).    Specialties:  Emergency Medicine, Neurology, Nurse Practitioner  Contact information:  [address] [facility] [address]  [place] [address]  [phone]             Abraham Fitch MD. Schedule an appointment as soon as possible for a visit in 2 week(s).    Specialty:  Endocrinology  Contact  information:  [address] [address]  [phone]                   Total time spent discharging patient including evaluation, medication reconciliation, arranging follow up, and post hospitalization instructions and education total time exceeds 30 minutes.    Signed:  Neymar Matute MD  12/12/2018  3:58 PM

## 2018-12-12 NOTE — PLAN OF CARE
Problem: Patient Care Overview  Goal: Plan of Care Review  Outcome: Ongoing (interventions implemented as appropriate)   12/12/18 5539   Coping/Psychosocial   Plan of Care Reviewed With patient   Plan of Care Review   Progress improving   OTHER   Outcome Summary Mx glucose r/t insulin changes, Pt cleared to DC home in AM once toleratiting anticoagulant and having better blood sugar checks.

## 2018-12-12 NOTE — PROGRESS NOTES
Continued Stay Note  [facility]     Patient Name: PAULA Dent  MRN: [id number]  Today's Date: 12/12/2018    Admit Date: 12/8/2018    Discharge Plan     Row Name 12/12/18 1606       Plan    Plan  Plans are home.  Call to [facility] pharmacy, no copay on Xarelto.  Pt updated.  CCP will follow.         Discharge Codes    No documentation.       Expected Discharge Date and Time     Expected Discharge Date Expected Discharge Time    Dec 12, 2018             Ashleigh Villareal RN

## 2018-12-13 ENCOUNTER — READMISSION MANAGEMENT (OUTPATIENT)
Dept: CALL CENTER | Facility: HOSPITAL | Age: 58
End: 2018-12-13

## 2018-12-13 NOTE — PROGRESS NOTES
Case Management Discharge Note    Final Note: Pt was dc'd home.  Call to [facility] pharmacy and pt had 0 copay on Xarelto.  pt was updated prior to dc.     Destination      No service has been selected for the patient.      Durable Medical Equipment      No service has been selected for the patient.      Dialysis/Infusion      No service has been selected for the patient.      Home Medical Care      No service has been selected for the patient.      Community Resources      No service has been selected for the patient.        Other: Other    Final Discharge Disposition Code: 01 - home or self-care

## 2018-12-13 NOTE — OUTREACH NOTE
Prep Survey      Responses   Facility patient discharged from?  [facility]   Is patient eligible?  Yes   Discharge diagnosis  Lt Frontal CVA S/p Tpa    Does the patient have one of the following disease processes/diagnoses(primary or secondary)?  Stroke (TIA)   Does the patient have Home health ordered?  No   Is there a DME ordered?  No   Prep survey completed?  Yes          Magda Alvarado RN

## 2018-12-13 NOTE — PAYOR COMM NOTE
"Daisy Quigley (58 y.o. Female)     PLEASE SEE ATTACHED CLINICAL REVIEW. PT. REMAINED ON A MONITORED TELEM FLOOR.   PT. DC'D  ON 12/12/18     REF#[id number]    PLEASE CALL   OR  [phone] WITH CONTINUED STAY AUTH AND DAYS.     THANK YOU    VIOLETTA LOYD LPN CCP    Date of Birth Social Security Number Address Home Phone MRN    1960  [facility] [place] [address] [phone] [id number]    [employer] Marital Status          Yazidism        Admission Date Admission Type Admitting Provider Attending Provider Department, Room/Bed    12/8/18 Emergency Donovan Millard MD  [address], [address]/1    Discharge Date Discharge Disposition Discharge Destination        12/12/2018 Home or Self Care              Attending Provider:  (none)   Allergies:  No Known Allergies    Isolation:  None   Infection:  None   Code Status:  Prior    Ht:  152.4 cm (60\")   Wt:  90.9 kg (200 lb 4.8 oz)    Admission Cmt:  None   Principal Problem:  None                Active Insurance as of 12/8/2018     Primary Coverage     Payor Plan Insurance Group Employer/Plan Group    [employer] BLUE [employer]OSS [employer] BLUE [employer]OSS BLUE [employer]IELD PPO [id number]     Payor Plan Address Payor Plan Phone Number Payor Plan Fax Number Effective Dates    [address] [phone]  1/1/2018 - None Entered    [place] [address]       Subscriber Name Subscriber Birth Date Member ID       ROBERTO QUIGLEY 10/9/1958 [id number]                 Emergency Contacts      (Rel.) Home Phone Work Phone Mobile Phone    Roberto Quigley (Spouse) [phone] -- --              Intake & Output (last 3 days)       12/10 0701 - 12/11 0700 12/11 0701 - 12/12 0700 12/12 0701 - 12/13 0700 12/13 0701 - 12/14 0700    P.O. 420  210     I.V. (mL/kg)        Total Intake(mL/kg) 420 (4.6)  210 (2.3)     Urine (mL/kg/hr)        Stool        Total Output        Net +420  +210             Urine Unmeasured Occurrence 1 x       "     Lines, Drains & Airways    Active LDAs     None         Inactive LDAs     Name:   Placement date:   Placement time:   Removal date:   Removal time:   Site:   Days:    [REMOVED] Peripheral IV 12/08/18 1414 Left Antecubital   12/08/18    1414    12/08/18    1446    Antecubital   less than 1    [REMOVED] Peripheral IV 12/08/18 1446 Right Antecubital   12/08/18    1446    12/12/18    1648    Antecubital   4    [REMOVED] Peripheral IV 12/08/18 1500 Right Antecubital   12/08/18    1500    12/12/18    1648    Antecubital   4                Lab Results (last 72 hours)     Procedure Component Value Units Date/Time    POC Glucose Once [[id number]]  (Normal) Collected:  12/12/18 1645    Specimen:  Blood Updated:  12/12/18 1648     Glucose 117 mg/dL     POC Glucose Once [[id number]]  (Abnormal) Collected:  12/12/18 1126    Specimen:  Blood Updated:  12/12/18 1129     Glucose 231 mg/dL     Basic Metabolic Panel [[id number]]  (Abnormal) Collected:  12/12/18 0429    Specimen:  Blood Updated:  12/12/18 0522     Glucose 195 mg/dL      BUN 11 mg/dL      Creatinine 0.85 mg/dL      Sodium 138 mmol/L      Potassium 3.7 mmol/L      Chloride 100 mmol/L      CO2 25.7 mmol/L      Calcium 9.3 mg/dL      eGFR Non African Amer 69 mL/min/1.73      BUN/Creatinine Ratio 12.9     Anion Gap 12.3 mmol/L     Narrative:       CBC & Differential [[id number]] Collected:  12/12/18 0429    Specimen:  Blood Updated:  12/12/18 0505    Narrative:       CBC Auto Differential [[id number]]  (Abnormal) Collected:  12/12/18 0429    Specimen:  Blood Updated:  12/12/18 0505     WBC 7.57 10*3/mm3      RBC 5.09 10*6/mm3      Hemoglobin 14.2 g/dL      Hematocrit 41.7 %      MCV 81.9 fL      MCH 27.9 pg      MCHC 34.1 g/dL      RDW 13.2 %      RDW-SD 40.2 fl      MPV 12.2 fL      Platelets 212 10*3/mm3      Neutrophil % 39.5 %      Lymphocyte % 47.8 %      Monocyte % 9.5 %      Eosinophil % 2.8 %      Basophil % 0.4 %      Immature Grans % 0.5 %      Neutrophils,  Absolute 2.99 10*3/mm3      Lymphocytes, Absolute 3.62 10*3/mm3      Monocytes, Absolute 0.72 10*3/mm3      Eosinophils, Absolute 0.21 10*3/mm3      Basophils, Absolute 0.03 10*3/mm3      Immature Grans, Absolute 0.04 10*3/mm3     POC Glucose Once [[id number]]  (Abnormal) Collected:  12/11/18 2048    Specimen:  Blood Updated:  12/11/18 2050     Glucose 141 mg/dL     POC Glucose Once [[id number]]  (Abnormal) Collected:  12/11/18 1704    Specimen:  Blood Updated:  12/11/18 1706     Glucose 135 mg/dL     POC Glucose Once [[id number]]  (Abnormal) Collected:  12/11/18 1123    Specimen:  Blood Updated:  12/11/18 1126     Glucose 284 mg/dL     POC Glucose Once [[id number]]  (Abnormal) Collected:  12/11/18 0629    Specimen:  Blood Updated:  12/11/18 0633     Glucose 251 mg/dL     Basic Metabolic Panel [[id number]]  (Abnormal) Collected:  12/11/18 0347    Specimen:  Blood Updated:  12/11/18 0455     Glucose 214 mg/dL      BUN 11 mg/dL      Creatinine 0.76 mg/dL      Sodium 142 mmol/L      Potassium 3.6 mmol/L      Chloride 104 mmol/L      CO2 22.7 mmol/L      Calcium 9.2 mg/dL      eGFR Non African Amer 78 mL/min/1.73      BUN/Creatinine Ratio 14.5     Anion Gap 15.3 mmol/L     Narrative:       CBC & Differential [[id number]] Collected:  12/11/18 0347    Specimen:  Blood Updated:  12/11/18 0430    Narrative:       CBC Auto Differential [[id number]]  (Abnormal) Collected:  12/11/18 0347    Specimen:  Blood Updated:  12/11/18 0430     WBC 8.59 10*3/mm3      RBC 5.22 10*6/mm3      Hemoglobin 14.3 g/dL      Hematocrit 44.5 %      MCV 85.2 fL      MCH 27.4 pg      MCHC 32.1 g/dL      RDW 13.0 %      RDW-SD 40.3 fl      MPV 11.2 fL      Platelets 208 10*3/mm3      Neutrophil % 41.8 %      Lymphocyte % 46.3 %      Monocyte % 8.3 %      Eosinophil % 2.8 %      Basophil % 0.3 %      Immature Grans % 0.5 %      Neutrophils, Absolute 3.59 10*3/mm3      Lymphocytes, Absolute 3.98 10*3/mm3      Monocytes, Absolute 0.71 10*3/mm3       Eosinophils, Absolute 0.24 10*3/mm3      Basophils, Absolute 0.03 10*3/mm3      Immature Grans, Absolute 0.04 10*3/mm3      Orders (last 72 hrs)     Start     Ordered    12/12/18 1555  Discharge patient  Once      12/12/18 1556    12/12/18 0000  Insulin Degludec (TRESIBA FLEXTOUCH) 200 UNIT/ML solution pen-injector  Daily      12/12/18 1556    12/12/18 0000  insulin aspart (NOVOLOG FLEXPEN) 100 UNIT/ML solution pen-injector sc pen  3 Times Daily With Meals      12/12/18 1556    12/11/18 2051  POC Glucose Once  Once      12/11/18 2048    12/11/18 1815  rivaroxaban (XARELTO) tablet 15 mg  2 Times Daily With Meals,   Status:  Discontinued      12/11/18 1725    12/11/18 0916  Inpatient Case Management  Consult  Once,   Status:  Canceled     Provider:  (Not yet assigned)    12/11/18 0916                  Physician Progress Notes (last 72 hours) (Notes from 12/10/2018  2:18 PM through 12/13/2018  2:18 PM)      Oneida Saldivar MD at 12/12/2018  7:28 AM              Hospital Follow Up    Chief Complaint: Follow up stroke, CAD, ICMP    Interval History:  No chest pain or dyspnea.  Started on rivaroxaban yesterday.     Objective:     Objective:  Temp:  [97.4 °F (36.3 °C)-97.8 °F (36.6 °C)] 97.5 °F (36.4 °C)  Heart Rate:  [60-72] 61  Resp:  [16] 16  BP: (105-129)/(57-80) 126/78   No intake or output data in the 24 hours ending 12/12/18 0728  Body mass index is 39.12 kg/m².      12/10/18  0503 12/10/18  1900 12/11/18  0500   Weight: 91.2 kg (201 lb 1.6 oz) 91.2 kg (201 lb) 90.9 kg (200 lb 4.8 oz)     Weight change:       Physical Exam:   General : Alert, cooperative, in no acute distress.  Neuro: alert,cooperative and oriented  Lungs: CTAB. Normal respiratory effort and rate.  CV:: Regular rate and rhythm, normal S1 and S2, no murmurs, gallops or rubs.  ABD: Soft, nontender, non-distended. positive bowel sounds  Extr: No edema or cyanosis, moves all extremities      I reviewed the patient's new clinical  results.  I personally viewed and interpreted the patient's EKG  Current Medications:   Scheduled Meds:  atorvastatin 80 mg Oral Nightly   carvedilol 25 mg Oral BID With Meals   clopidogrel 75 mg Oral Daily   furosemide 40 mg Oral Daily   insulin glargine 55 Units Subcutaneous QAM   insulin lispro 0-10 Units Subcutaneous 4x Daily With Meals & Nightly   insulin lispro 15 Units Subcutaneous TID With Meals   metFORMIN 1,000 mg Oral BID With Meals   rivaroxaban 15 mg Oral BID With Meals   Followed by      [START ON 1/2/2019] rivaroxaban 20 mg Oral Daily With Dinner   sodium chloride 3 mL Intravenous Q12H   spironolactone 25 mg Oral Daily     Continuous Infusions:     Allergies:  No Known Allergies    Assessment/Plan:     1. Left frontal stroke due to left MCA occlusion.  Felt to be cardioembolic.  Presented with aphasia that resolved with TPA administration.  Already on chronic aspirin and clopidogrel.  Aspirin dose increased.  Device interrogation with no events including no evidence of atrial fibrillation.  Echo with depressed LV function, EF of 30% and no evidence of left ventricular thrombus.    2. History of coronary artery disease  3. Ischemic cardiomyopathy  4. Status post AICD  5. Diabetes mellitus type 2. HgA1c 12.  Endocrine following.  6. Hyperlipidemia.     -  Will change rivaroxaban to 20 mg daily.  I don't think she needs DVT/PE indicated dosing.  -  Ok for discharge from my standpoint.  Follow up with me in the office in 6 weeks      Oneida Saldivar MD  12/12/18  7:28 AM    Electronically signed by Oneida Saldivar MD at 12/12/2018  8:33 AM     Neymar Matute MD at 12/11/2018  5:28 PM                                      Neymar Matute MD                          [phone]      Patient ID:    Name:  PAULA Dent    MRN:  [id number]    1960   58 y.o.  female            Patient Care Team:  Nusrat Dent APRN as PCP - General (Family Medicine)    CC/ Reason for  visit: Per Assessment mentioned below    Subjective: Pt seen and examined this AM. No acute overnight events noted. Doing better. No neuro deficits noted.     ROS: Denies any subjective fevers, chest pain, nausea/ vomiting    Objective     Vital Signs past 24hrs    BP range: BP: (109-129)/(59-81) 129/80  Pulse range: Heart Rate:  [62-72] 70  Resp rate range: Resp:  [16-18] 16  Temp range: Temp (24hrs), Av.8 °F (36.6 °C), Min:97.7 °F (36.5 °C), Max:98 °F (36.7 °C)      Ventilator/Non-Invasive Ventilation Settings (From admission, onward)    None          Device (Oxygen Therapy): room air       90.9 kg (200 lb 4.8 oz); Body mass index is 39.12 kg/m².    No intake or output data in the 24 hours ending 18 1728    Exam:  Constitutional: No acute distress, No accessory muscle use   Head: - NCAT  Eyes: No pallor, Anicteric conjunctiva, EOMI.  ENMT:  Mallampati 3, no oral thrush. No palpable cervical lymphadenopathy  Heart: RRR, no murmur  Lungs: REGINA +, No wheezes/crackles heard    Abdomen: Obese. Soft. No tenderness, guarding or rigidity  Extremities: Extremities warm and well perfused, Trace pitting edema  Neuro: Conscious, alert, oriented x3, no gross focal neuro deficits    Scheduled meds:      atorvastatin 80 mg Oral Nightly   carvedilol 25 mg Oral BID With Meals   clopidogrel 75 mg Oral Daily   furosemide 40 mg Oral Daily   [START ON 2018] insulin glargine 55 Units Subcutaneous QAM   insulin lispro 0-10 Units Subcutaneous 4x Daily With Meals & Nightly   insulin lispro 15 Units Subcutaneous TID With Meals   metFORMIN 1,000 mg Oral BID With Meals   rivaroxaban 15 mg Oral BID With Meals   Followed by      [START ON 2019] rivaroxaban 20 mg Oral Daily With Dinner   sodium chloride 3 mL Intravenous Q12H   spironolactone 25 mg Oral Daily                    Results Review:    I have reviewed the available laboratory results and reviewed the chest imaging from the last 3 days personally and summarized it  below    Assessment    Ac aphasia; resolved    Ac Lt Frontal CVA S/p Tpa   Uncontrolled diabetes  Hyperlipidemia  CAD status post PCI  ICM with EF of 40-45% s/p AICD   SHELLIE on CPAP sees   COPD not in exac  Morbid obesity    PLAN:  Patient has made neurological recovery after the TPA.    Noted acrds and neuro rec - Will start Xarelto per plan and dc ASA.  Appreciate endo eval and recs. Noted BS still high. Insulin changes noted   On home meds   PT/ST cleared pt for home.   C/w CPAP.   Mech DVT ppx     DISPO - Will dc in am once tolerating AC and better BS noted.     I have discussed my findings and recommendations with patient, family and nursing staff.     Neymar Matute MD  12/11/2018    Electronically signed by Neymar Matute MD at 12/11/2018  5:29 PM     Abraham Fitch MD at 12/11/2018  1:55 PM          58 y.o.   LOS: 3 days   Patient Care Team:  Nusrat Dent APRN as PCP - General (Family Medicine)    Chief Complaint:  Elevated BG    Chief Complaint   Patient presents with   • Neuro Deficit(s)       Subjective    Pt BG are still in 200s. Eating well. Mostly will be dced today.     Interval History:    Review of Systems:   Review of Systems   Constitutional: Positive for appetite change and fatigue. Negative for fever.   Respiratory: Negative for shortness of breath.    Cardiovascular: Negative for chest pain.   Gastrointestinal: Negative for diarrhea and vomiting.   Endocrine: Negative for polydipsia and polyuria.   Genitourinary: Negative for flank pain.   Musculoskeletal: Negative for arthralgias and myalgias.   Skin: Negative for pallor.   Neurological: Positive for weakness. Negative for numbness.   Psychiatric/Behavioral: Negative for agitation.     Objective     Vital Signs   Temp:  [97.7 °F (36.5 °C)-98 °F (36.7 °C)] 97.7 °F (36.5 °C)  Heart Rate:  [62-72] 72  Resp:  [16-18] 16  BP: (109-120)/(59-81) 112/76    Physical Exam:  Physical Exam   Constitutional: She is oriented to  person, place, and time. She appears well-nourished.   Obese     HENT:   Head: Normocephalic and atraumatic.   Wide neck   Eyes: Conjunctivae and EOM are normal. No scleral icterus.   Neck: Normal range of motion. Neck supple. No thyromegaly present.   Acanthosis nigricans   Cardiovascular: Normal rate and normal heart sounds.   Pulmonary/Chest: Effort normal and breath sounds normal. No stridor. She has no wheezes.   Abdominal: Soft. Bowel sounds are normal. She exhibits no distension. There is no tenderness.   Central obesity   Musculoskeletal: She exhibits no edema or tenderness.   Neurological: She is alert and oriented to person, place, and time.   Skin: Skin is warm and dry. She is not diaphoretic.   Psychiatric: She has a normal mood and affect.   Vitals reviewed.  Results Review:     I reviewed the patient's new clinical results and summarized them in subjective and in plan.    Current Facility-Administered Medications:   •  aspirin tablet 325 mg, 325 mg, Oral, Daily, 325 mg at 12/11/18 0754 **OR** aspirin suppository 300 mg, 300 mg, Rectal, Daily, Mark Jones MD  •  atorvastatin (LIPITOR) tablet 80 mg, 80 mg, Oral, Nightly, Mark Jones MD, 80 mg at 12/10/18 2201  •  carvedilol (COREG) tablet 25 mg, 25 mg, Oral, BID With Meals, Neymar Matute MD, 25 mg at 12/11/18 0754  •  clopidogrel (PLAVIX) tablet 75 mg, 75 mg, Oral, Daily, Mark Jones MD, 75 mg at 12/11/18 0755  •  dextrose (D50W) 25 g/ 50mL Intravenous Solution 25 g, 25 g, Intravenous, Q15 Min PRN, Neymar Matute MD  •  dextrose (GLUTOSE) oral gel 15 g, 15 g, Oral, Q15 Min PRN, Neymar Matute MD  •  furosemide (LASIX) tablet 40 mg, 40 mg, Oral, Daily, Neymar Matute MD, 40 mg at 12/11/18 0754  •  glucagon (human recombinant) (GLUCAGEN DIAGNOSTIC) injection 1 mg, 1 mg, Subcutaneous, PRN, Neymar Matute MD  •  insulin glargine (LANTUS) injection 45 Units, 45 Units,  Subcutaneous, QAM, Abraham Fitch MD, 45 Units at 12/11/18 0703  •  insulin lispro (humaLOG) injection 0-10 Units, 0-10 Units, Subcutaneous, 4x Daily With Meals & Nightly, Abraham Fitch MD, 6 Units at 12/11/18 1220  •  insulin lispro (humaLOG) injection 10 Units, 10 Units, Subcutaneous, TID With Meals, Abraham Fitch MD, 10 Units at 12/11/18 1220  •  metFORMIN (GLUCOPHAGE) tablet 1,000 mg, 1,000 mg, Oral, BID With Meals, Neymar Matute MD, 1,000 mg at 12/11/18 0754  •  sodium chloride 0.9 % flush 10 mL, 10 mL, Intravenous, PRN, Janneth Scales APRN  •  sodium chloride 0.9 % flush 3 mL, 3 mL, Intravenous, Q12H, Mark Jones MD, 3 mL at 12/11/18 0757  •  sodium chloride 0.9 % flush 3-10 mL, 3-10 mL, Intravenous, PRN, Mark Jones MD  •  spironolactone (ALDACTONE) tablet 25 mg, 25 mg, Oral, Daily, Neymar Matute MD, 25 mg at 12/11/18 0754    Assessment/Plan     Active Hospital Problems    Diagnosis Date Noted   • Cerebrovascular accident (CVA) due to embolism of left middle cerebral artery (CMS/HCC) [I63.412] 12/08/2018      Resolved Hospital Problems   No resolved problems to display.     Type 2 diabetes mellitus-severely uncontrolled  Increase Lantus 55 units in the morning  Increase the dosage of Humalog to 15 units with each meal  Continue Humalog sliding scale 3 times a day before meals and at bedtime  Continue metformin thousand milligrams twice daily.    Discussed with the patient extensively the importance of glycemic control in order to prevent cardiovascular diseases.     Hyperlipidemia  Continue Lipitor 80 mg oral daily.    Discharge instructions from endocrine  Prescriptions have been sent to patient's pharmacy  Accu-Chek glucometer has been sent to the pharmacy.  Tresiba 45 units in the morning  NovoLog 10 units with each meal  Metformin thousand milligrams twice daily  These insulins are covered by patient's insurance.  Follow-up with me in clinic in about  "2-3 weeks.     Discussed plan with Nurse.     Abraham Fitch MD.  18  12:21 PM      EMR Dragon / transcription disclaimer:    \"Dictated utilizing Dragon dictation\".     Electronically signed by Abraham Fitch MD at 2018  9:20 PM     Charline Hollingsworth APRN at 2018 12:01 PM          DOS: 2018  NAME: Daisy Dent  : 1960  PCP: Nusrat RAND  CC: difficulty with speech    Stroke    Subjective: No acute events overnight. Resting in bed, states she is hoping to go home today. Not having any new deficits.    Objective:  Vital signs:      Vitals:    12/10/18 1935 12/10/18 2300 18 0500 18 0734   BP: 117/81 109/59  112/76   BP Location: Left arm Left arm  Right arm   Patient Position: Lying Lying  Lying   Pulse:  67  72   Resp:   16   Temp: 98 °F (36.7 °C) 97.7 °F (36.5 °C)  97.7 °F (36.5 °C)   TempSrc: Oral Oral  Oral   SpO2: 96% 95%     Weight:   90.9 kg (200 lb 4.8 oz)    Height:           Current Facility-Administered Medications:   •  aspirin tablet 325 mg, 325 mg, Oral, Daily, 325 mg at 18 0754 **OR** aspirin suppository 300 mg, 300 mg, Rectal, Daily, Mark Jones MD  •  atorvastatin (LIPITOR) tablet 80 mg, 80 mg, Oral, Nightly, Mark Jones MD, 80 mg at 12/10/18 2201  •  carvedilol (COREG) tablet 25 mg, 25 mg, Oral, BID With Meals, Neymar Matute MD, 25 mg at 18 0754  •  clopidogrel (PLAVIX) tablet 75 mg, 75 mg, Oral, Daily, Mark Jones MD, 75 mg at 18 0755  •  dextrose (D50W) 25 g/ 50mL Intravenous Solution 25 g, 25 g, Intravenous, Q15 Min PRN, Neymar Matute MD  •  dextrose (GLUTOSE) oral gel 15 g, 15 g, Oral, Q15 Min PRN, Neymar Matute MD  •  furosemide (LASIX) tablet 40 mg, 40 mg, Oral, Daily, Neymar Matute MD, 40 mg at 18 0754  •  glucagon (human recombinant) (GLUCAGEN DIAGNOSTIC) injection 1 mg, 1 mg, Subcutaneous, PRN, Neymar Matute MD  • "  insulin glargine (LANTUS) injection 45 Units, 45 Units, Subcutaneous, QAM, Abraham Fitch MD, 45 Units at 12/11/18 0703  •  insulin lispro (humaLOG) injection 0-10 Units, 0-10 Units, Subcutaneous, 4x Daily With Meals & Nightly, Abraham Fitch MD, 6 Units at 12/11/18 0755  •  insulin lispro (humaLOG) injection 10 Units, 10 Units, Subcutaneous, TID With Meals, Abraham Fitch MD, 10 Units at 12/11/18 0755  •  metFORMIN (GLUCOPHAGE) tablet 1,000 mg, 1,000 mg, Oral, BID With Meals, Neymar Matute MD, 1,000 mg at 12/11/18 0754  •  sodium chloride 0.9 % flush 10 mL, 10 mL, Intravenous, PRN, Janneth Scales APRN  •  sodium chloride 0.9 % flush 3 mL, 3 mL, Intravenous, Q12H, Mark Jones MD, 3 mL at 12/11/18 0757  •  sodium chloride 0.9 % flush 3-10 mL, 3-10 mL, Intravenous, PRN, Mark Jones MD  •  spironolactone (ALDACTONE) tablet 25 mg, 25 mg, Oral, Daily, Neymar Matute MD, 25 mg at 12/11/18 0754    No current facility-administered medications on file prior to encounter.      Current Outpatient Medications on File Prior to Encounter   Medication Sig   • atorvastatin (LIPITOR) 80 MG tablet Take 80 mg by mouth Daily.   • carvedilol (COREG) 25 MG tablet Take 25 mg by mouth 2 (Two) Times a Day With Meals.   • clopidogrel (PLAVIX) 75 MG tablet Take 75 mg by mouth Daily.   • furosemide (LASIX) 40 MG tablet Take 40 mg by mouth Daily.   • metFORMIN (GLUCOPHAGE) 500 MG tablet Take 500 mg by mouth Daily With Breakfast.   • metFORMIN (GLUCOPHAGE) 500 MG tablet Take 1,000 mg by mouth every night at bedtime.   • potassium chloride (K-DUR) 10 MEQ CR tablet Take 10 mEq by mouth Daily.   • spironolactone (ALDACTONE) 25 MG tablet Take 25 mg by mouth Daily.     PRN meds  dextrose  •  dextrose  •  glucagon (human recombinant)  •  sodium chloride  •  sodium chloride    General appearance:   HEENT: Normocephalic, atraumatic   COR: RRR  Resp: Even and unlabored  Extremities: Equal pulses, non  distal embolization  Skin:    Neurological:   MS: AO. Language normal. No neglect. Higher integrative function normal  CN: II-XII normal  Motor: 5/5, normal tone  Reflexes: 2+  Sensory: Intact  Coordination: Normal  2D echo with EF of 30%,  I agree with Cardiology patient will need A/C + Plavix. She also needs aggressive control of her risk factors to prevent another stroke event (Uncontrolled diabetes, hyperlipidemia).     Okay to start patient on A/C post TPA.     Assessment/Plan:  Left MCA thrombus likely cardioembolic  Continue ASA 325mg and Plavix 75mg , until Cardiology starts A/C.   Lipitor 80mg    Neurochecks per stroke protocol  Endocrinology/Diabetes educator following for hyperglycemia.  Normalize BP, encourage hydration.  Stroke Education  AUBREE/SCDs  PT/OT/ST  Will sign off, will see again per request.  Patient to follow up in neurology clinic as needed in 3 months with Nikki RAND.      Plan Discussed with Dr. Wright, patient, and RN, agrees with plan above.     Electronically signed by Charline Hollingsworth APRN at 12/11/2018 12:16 PM     Oneida Saldivar MD at 12/11/2018  7:32 AM              Hospital Follow Up    Chief Complaint: Follow up stroke, CAD, ICMP    Interval History:  No chest pain or dyspnea.     Objective:     Objective:  Temp:  [97.7 °F (36.5 °C)-98.2 °F (36.8 °C)] 97.7 °F (36.5 °C)  Heart Rate:  [62-67] 67  Resp:  [16-18] 18  BP: (109-120)/(59-81) 109/59     Intake/Output Summary (Last 24 hours) at 12/11/2018 0732  Last data filed at 12/10/2018 1423  Gross per 24 hour   Intake 420 ml   Output --   Net 420 ml     Body mass index is 39.12 kg/m².      12/10/18  0503 12/10/18  1900 12/11/18  0500   Weight: 91.2 kg (201 lb 1.6 oz) 91.2 kg (201 lb) 90.9 kg (200 lb 4.8 oz)     Weight change: -0.045 kg (-1.6 oz)      Physical Exam:   General : Alert, cooperative, in no acute distress.  Neuro: alert,cooperative and oriented  Lungs: CTAB. Normal respiratory effort and rate.  CV:: Regular  rate and rhythm, normal S1 and S2, no murmurs, gallops or rubs.  ABD: Soft, nontender, non-distended. positive bowel sounds  Extr: No edema or cyanosis, moves all extremities      Assessment/Plan:     1. Left frontal stroke due to left MCA occlusion.  Felt to be cardioembolic.  Presented with aphasia that resolved with TPA administration.  Already on chronic aspirin and clopidogrel.   Aspirin dose increased.  Device interrogation with no events including no evidence of atrial fibrillation.  Echo with depressed LV function, EF of 30% and no evidence of left ventricular thrombus.    2. History of coronary artery disease  3. Ischemic cardiomyopathy  4. Status post AICD  5. Diabetes mellitus type 2. HgA1c 12.  Endocrine following.  6. Hyperlipidemia.    -  Felt to be cardioembolic but no evidence of atrial fibrillation or thrombus on TTE.  I do not know if additional imaging with a RUBEN will be useful at this point.  She did have this even on dual antiplatelet therapy adding anticoagulation should be strongly considered.  I discussed this at length with the patient including the risks and benefits and the options for anticoagulation.  We decided to start on her anticoagulation.  She would prefer a novel anticoagulant.    -  Will consider starting rivaroxaban if the cost is not prohibitive.  Will need to stop aspirin once this is started.    -  Will wait to start anticoagulation until ok from neurology standpoint after TPA administration.  -  She is otherwise ok from my standpoint when above sorted out.    Oneida Saldivar MD  12/11/18  7:32 AM    Electronically signed by Oneida Saldivar MD at 12/11/2018  9:26 AM

## 2018-12-14 ENCOUNTER — READMISSION MANAGEMENT (OUTPATIENT)
Dept: CALL CENTER | Facility: HOSPITAL | Age: 58
End: 2018-12-14

## 2018-12-14 NOTE — OUTREACH NOTE
Stroke Week 1 Survey      Responses   Facility patient discharged from?  [place]   Does the patient have one of the following disease processes/diagnoses(primary or secondary)?  Stroke (TIA)   Is there a successful TCM telephone encounter documented?  No   Week 1 attempt successful?  No   Unsuccessful attempts  Attempt 1          Radha Aj RN

## 2018-12-15 DIAGNOSIS — I25.10 CORONARY ARTERY DISEASE INVOLVING NATIVE CORONARY ARTERY OF NATIVE HEART WITHOUT ANGINA PECTORIS: ICD-10-CM

## 2018-12-17 ENCOUNTER — READMISSION MANAGEMENT (OUTPATIENT)
Dept: CALL CENTER | Facility: HOSPITAL | Age: 58
End: 2018-12-17

## 2018-12-17 DIAGNOSIS — E78.5 HYPERLIPIDEMIA, UNSPECIFIED HYPERLIPIDEMIA TYPE: ICD-10-CM

## 2018-12-17 DIAGNOSIS — I25.10 CHRONIC CORONARY ARTERY DISEASE: ICD-10-CM

## 2018-12-17 RX ORDER — ATORVASTATIN CALCIUM 80 MG/1
80 TABLET, FILM COATED ORAL DAILY
Qty: 90 TABLET | Refills: 2 | Status: SHIPPED | OUTPATIENT
Start: 2018-12-17 | End: 2019-09-13 | Stop reason: SDUPTHER

## 2018-12-17 RX ORDER — SPIRONOLACTONE 25 MG/1
TABLET ORAL
Qty: 90 TABLET | Refills: 1 | Status: SHIPPED | OUTPATIENT
Start: 2018-12-17 | End: 2019-01-03 | Stop reason: SDUPTHER

## 2018-12-17 RX ORDER — CLOPIDOGREL BISULFATE 75 MG/1
TABLET ORAL
Qty: 120 TABLET | Refills: 0 | Status: SHIPPED | OUTPATIENT
Start: 2018-12-17 | End: 2019-04-19 | Stop reason: SDUPTHER

## 2018-12-17 RX ORDER — FUROSEMIDE 40 MG/1
TABLET ORAL
Qty: 90 TABLET | Refills: 1 | Status: SHIPPED | OUTPATIENT
Start: 2018-12-17 | End: 2019-01-03

## 2018-12-17 NOTE — OUTREACH NOTE
Stroke Week 1 Survey      Responses   Facility patient discharged from?  [place]   Does the patient have one of the following disease processes/diagnoses(primary or secondary)?  Stroke (TIA)   Is there a successful TCM telephone encounter documented?  No   Week 1 attempt successful?  No   Unsuccessful attempts  Attempt 2          Lorri Schafer RN

## 2018-12-20 ENCOUNTER — CLINICAL SUPPORT NO REQUIREMENTS (OUTPATIENT)
Dept: CARDIOLOGY | Facility: CLINIC | Age: 58
End: 2018-12-20

## 2018-12-20 ENCOUNTER — READMISSION MANAGEMENT (OUTPATIENT)
Dept: CALL CENTER | Facility: HOSPITAL | Age: 58
End: 2018-12-20

## 2018-12-20 DIAGNOSIS — I25.5 ISCHEMIC CARDIOMYOPATHY: Primary | ICD-10-CM

## 2018-12-20 PROCEDURE — 93296 REM INTERROG EVL PM/IDS: CPT | Performed by: INTERNAL MEDICINE

## 2018-12-20 PROCEDURE — 93295 DEV INTERROG REMOTE 1/2/MLT: CPT | Performed by: INTERNAL MEDICINE

## 2018-12-20 PROCEDURE — 93297 REM INTERROG DEV EVAL ICPMS: CPT | Performed by: INTERNAL MEDICINE

## 2018-12-20 NOTE — OUTREACH NOTE
Stroke Week 2 Survey      Responses   Facility patient discharged from?  [place]   Does the patient have one of the following disease processes/diagnoses(primary or secondary)?  Stroke (TIA)   Week 2 attempt successful?  No   Unsuccessful attempts  Attempt 1          Renny Brennan RN

## 2018-12-21 ENCOUNTER — READMISSION MANAGEMENT (OUTPATIENT)
Dept: CALL CENTER | Facility: HOSPITAL | Age: 58
End: 2018-12-21

## 2018-12-21 NOTE — OUTREACH NOTE
Stroke Week 2 Survey      Responses   Facility patient discharged from?  [place]   Does the patient have one of the following disease processes/diagnoses(primary or secondary)?  Stroke (TIA)   Week 2 attempt successful?  No   Unsuccessful attempts  Attempt 2          Latrice Chandler RN

## 2018-12-24 ENCOUNTER — TRANSCRIBE ORDERS (OUTPATIENT)
Dept: ADMINISTRATIVE | Facility: HOSPITAL | Age: 58
End: 2018-12-24

## 2018-12-24 DIAGNOSIS — Z12.2 ENCOUNTER FOR SCREENING FOR LUNG CANCER: Primary | ICD-10-CM

## 2018-12-24 DIAGNOSIS — F17.218 NICOTINE DEPENDENCE, CIGARETTES, WITH OTHER NICOTINE-INDUCED DISORDERS: ICD-10-CM

## 2018-12-24 RX ORDER — FLASH GLUCOSE SCANNING READER
1 EACH MISCELLANEOUS AS NEEDED
Qty: 1 DEVICE | Refills: 0 | Status: SHIPPED | OUTPATIENT
Start: 2018-12-24 | End: 2019-07-29 | Stop reason: SDUPTHER

## 2018-12-24 RX ORDER — FLASH GLUCOSE SENSOR
1 KIT MISCELLANEOUS AS NEEDED
Qty: 2 EACH | Refills: 3 | Status: SHIPPED | OUTPATIENT
Start: 2018-12-24 | End: 2019-07-29 | Stop reason: SDUPTHER

## 2018-12-26 ENCOUNTER — READMISSION MANAGEMENT (OUTPATIENT)
Dept: CALL CENTER | Facility: HOSPITAL | Age: 58
End: 2018-12-26

## 2018-12-26 NOTE — OUTREACH NOTE
Stroke Week 2 Survey      Responses   Facility patient discharged from?  [place]   Does the patient have one of the following disease processes/diagnoses(primary or secondary)?  Stroke (TIA)   Week 2 attempt successful?  No   Unsuccessful attempts  Attempt 2          Libby Abdullahi RN

## 2018-12-27 ENCOUNTER — TELEPHONE (OUTPATIENT)
Dept: NEUROLOGY | Facility: CLINIC | Age: 58
End: 2018-12-27

## 2018-12-27 NOTE — TELEPHONE ENCOUNTER
Two Week Stroke Phone Call  Spoke with the patient    · Admission Date:  12/8/2018    · Discharge Date: 12/12/2018    · Discharge Destination: Home    · Meds reviewed with patient/caregiver?    [x]Yes [] No   o Antiplatelet: Plavix  - Understands purpose     [x]  Yes     []  No     - Understands how to take      [x]  Yes     []  No    o Cholesterol Reducing: Lipitor  - Understands purpose     [x]  Yes     []  No    - Understands how to take      [x]  Yes     []  No   o Anit-Coagulate: Xarelto  - Understands purpose     [x]  Yes     []  No    - Understands how to take      [x]  Yes     []  No       · Is the patient taking all medication as directed?   [x]  Yes  []  No    · Discussed personal risk factors   [x]  Yes []  No    o High blood pressure   - Bp goal <130/80  o Diabetes   o High cholesterol   - Review desired LDL goal <70  o Atherosclerosis  - Plaque inside the arteries, “hardening of the arteries”    • Discussed signs and symptoms of stroke and when patient to call 911?   [x]  Yes []  No  o Sudden weakness or numbness of the face, arm, or leg especially on one side of the body  o Sudden confusion, trouble speaking or understanding  o Sudden trouble seeing in one or both eyes   o Sudden trouble walking, dizziness, loss of balance or coordination  o Sudden severe headaches with no known cause      Notified Patient that if any of these symptoms occur to call 911    · Does the patient have any new signs or symptoms of a stroke?   []  Yes     [x]  No    · Does the patietnt have an appointment with PCP?  [x]  Yes     []  No  · Does the patient have 3 month Stroke Clinic appointment?  Office will call to make    · Is the patient currently in therapy, outpatient, or home health?  []  Yes     [x]  No    Needs a referral?      []  Yes     [x]  No      Patient Satisfaction     · How often were you kept up to date with your plan of care?    []Never  [] Sometimes  [x] Usually  [] Always    · When test were ordered how  often did someone explain to you the results?    []  Never  [] Sometimes  [x] Usually  [] Always    · What suggestions does the patient have of ways to improve the care to stroke patients?     No suggestions at this time    · Overall how satisfied were you with the stroke care you received at [facility]?   []  Very Dissatisfied [] Dissatisfied   [x] Satisfied [] Very Satisfied

## 2018-12-28 ENCOUNTER — HOSPITAL ENCOUNTER (OUTPATIENT)
Dept: PET IMAGING | Facility: HOSPITAL | Age: 58
Discharge: HOME | End: 2018-12-28
Attending: INTERNAL MEDICINE | Admitting: INTERNAL MEDICINE

## 2018-12-28 ENCOUNTER — TELEPHONE (OUTPATIENT)
Dept: CARDIOLOGY | Facility: CLINIC | Age: 58
End: 2018-12-28

## 2018-12-28 ENCOUNTER — READMISSION MANAGEMENT (OUTPATIENT)
Dept: CALL CENTER | Facility: HOSPITAL | Age: 58
End: 2018-12-28

## 2018-12-28 DIAGNOSIS — F17.218 NICOTINE DEPENDENCE, CIGARETTES, WITH OTHER NICOTINE-INDUCED DISORDERS: ICD-10-CM

## 2018-12-28 DIAGNOSIS — Z12.2 ENCOUNTER FOR SCREENING FOR LUNG CANCER: ICD-10-CM

## 2018-12-28 PROCEDURE — G0297 LDCT FOR LUNG CA SCREEN: HCPCS

## 2018-12-28 NOTE — TELEPHONE ENCOUNTER
Pt called requesting refill on Xarelto. Called to inform pt prescription sent to [facility] in [place]. Pt understood information given. Thanks, Richard

## 2018-12-28 NOTE — OUTREACH NOTE
Stroke Week 2 Survey      Responses   Facility patient discharged from?  [place]   Does the patient have one of the following disease processes/diagnoses(primary or secondary)?  Stroke (TIA)   Week 2 attempt successful?  No   Unsuccessful attempts  Attempt 3          Renny Brennan RN

## 2019-01-03 ENCOUNTER — OFFICE VISIT (OUTPATIENT)
Dept: ENDOCRINOLOGY | Age: 59
End: 2019-01-03

## 2019-01-03 VITALS
BODY MASS INDEX: 40.05 KG/M2 | HEART RATE: 71 BPM | DIASTOLIC BLOOD PRESSURE: 64 MMHG | WEIGHT: 204 LBS | OXYGEN SATURATION: 97 % | SYSTOLIC BLOOD PRESSURE: 126 MMHG | HEIGHT: 60 IN

## 2019-01-03 DIAGNOSIS — IMO0002 DIABETES MELLITUS TYPE 2, UNCONTROLLED, WITH COMPLICATIONS: Primary | ICD-10-CM

## 2019-01-03 DIAGNOSIS — E78.2 MIXED HYPERLIPIDEMIA: ICD-10-CM

## 2019-01-03 PROCEDURE — 99214 OFFICE O/P EST MOD 30 MIN: CPT | Performed by: INTERNAL MEDICINE

## 2019-01-03 NOTE — PROGRESS NOTES
58 y.o.    Patient Care Team:  Nusrat Dent APRN as PCP - General (Family Medicine)  Kim Kathleen MD as PCP - Family Medicine  Kim Kathleen MD    Chief Complaint:    HOSPITAL FOLLOW UP/ TYPE 2 DIABETES MELLITUS  Subjective     HPI     57 y/o  female with pmhx of Type 2 dm, HLP is here for the follow up.   Pt was hospitalized in Dec 2018 with CVA.   She used to see Dr. FENG from Endocrine at [facility] but failed to follow up with her.   During her admission to hospital her a1c was 12%    Type 2 dm - Diagnosed about 1 - 2 years ago. Was started on oral agents initially. Insulin was started in hospital.  Today in clinic pt reports that she is on tresiba 45 units in the afternoon. Novolog 10 units with each meal.   Checks BG 3 - 4 times a day.   FBG - 100 - 150 mg/dl, Premeal - 120 - 200   No increased urination or increased thirst. No BG less than 60 mg/dl in the last one month, does have hypoglycemic awareness. Highest BG in the last 1 month was -  221 Mg/dl.  Pt got her log book for me. No nausea and vomiting.   Due for eye exam, still and no prior hx of dm retinopathy.   No tingling or numbness in her b/l feet, no ulcers and amputations of her feet.   Hx of CAD, no hx of CKD,hx of CVA per pt.   Pt is physically active. weight has been stable.   Pt tries to follow DM diet for most part, but eats candy once in a while.   On Ace inb.    Hyperlipidemia  On Lipitor 80 mg oral daily.    Reviewed primary care physician's/consulting physician documentation and lab results :     Interval History      The following portions of the patient's history were reviewed and updated as appropriate: allergies, current medications, past family history, past medical history, past social history, past surgical history and problem list.    Past Medical History:   Diagnosis Date   • Abdominal pain    • Abnormal liver function test    • Acute bronchitis    • CAD (coronary artery disease)    • CHF (congestive heart failure)  (CMS/[facility])    • Colon polyp    • Congestive heart disease (CMS/[facility])    • COPD (chronic obstructive pulmonary disease) (CMS/[facility])    • Cough    • Diabetes (CMS/[facility])    • Diabetes mellitus (CMS/[facility])    • Diarrhea    • Gastroenteritis    • Health care maintenance    • Hyperlipidemia    • Hypertension    • IFG (impaired fasting glucose)    • Ischemic cardiomyopathy    • Myocardial infarction (CMS/[facility])    • SHELLIE (obstructive sleep apnea)    • Sore throat    • Type 2 diabetes mellitus (CMS/[facility])    • Vaginal yeast infection    • Vitamin D deficiency      Family History   Problem Relation Age of Onset   • Diabetes Mother    • Heart disease Mother    • Hyperlipidemia Mother    • Diabetes Father    • Heart disease Father    • Hyperlipidemia Father    • Heart disease Brother    • Hyperlipidemia Brother    • Hypertension Father    • Colon cancer Father    • Heart attack Father    • Emphysema Mother    • Heart disease Brother    • Heart attack Brother    • Heart disease Maternal Grandmother    • Heart disease Maternal Grandfather    • Heart disease Paternal Grandmother    • Heart disease Paternal Grandfather      Social History     Socioeconomic History   • Marital status:      Spouse name: Not on file   • Number of children: Not on file   • Years of education: Not on file   • Highest education level: Not on file   Social Needs   • Financial resource strain: Not on file   • Food insecurity - worry: Not on file   • Food insecurity - inability: Not on file   • Transportation needs - medical: Not on file   • Transportation needs - non-medical: Not on file   Occupational History   • Not on file   Tobacco Use   • Smoking status: Former Smoker     Packs/day: 1.00     Years: 30.00     Pack years: 30.00     Types: Cigarettes     Start date: 1979     Last attempt to quit: 2009     Years since quittin.1   • Smokeless tobacco: Never Used   • Tobacco comment: QUIT 10 YRS   Substance and Sexual Activity   • Alcohol use: Yes      Comment: occasionally   • Drug use: No   • Sexual activity: Yes     Partners: Male   Other Topics Concern   • Not on file   Social History Narrative    ** Merged History Encounter **          No Known Allergies    Current Outpatient Medications:   •  atorvastatin (LIPITOR) 80 MG tablet, Take 80 mg by mouth Daily., Disp: , Rfl:   •  carvedilol (COREG) 25 MG tablet, TAKE ONE TABLET BY MOUTH TWICE A DAY WITH MEALS, Disp: 60 tablet, Rfl: 1  •  Cholecalciferol (VITAMIN D3) 2000 UNITS capsule, Take 1,000 Units by mouth Daily., Disp: , Rfl:   •  clopidogrel (PLAVIX) 75 MG tablet, Take 75 mg by mouth Daily., Disp: , Rfl:   •  furosemide (LASIX) 40 MG tablet, Take 1 tablet by mouth Daily., Disp: 90 tablet, Rfl: 2  •  insulin aspart (NOVOLOG FLEXPEN) 100 UNIT/ML solution pen-injector sc pen, Inject 15 Units under the skin into the appropriate area as directed 3 (Three) Times a Day With Meals., Disp: 15 mL, Rfl: 3  •  Insulin Pen Needle (BD PEN NEEDLE NADEEN U/F) 32G X 4 MM misc, TO INJECT 4 TIMES DAILY, Disp: 100 each, Rfl: 3  •  lisinopril (PRINIVIL,ZESTRIL) 2.5 MG tablet, TAKE 1 TABLET BY MOUTH EVERY DAY, Disp: 90 tablet, Rfl: 1  •  metFORMIN (GLUCOPHAGE) 1000 MG tablet, Take 1 tablet by mouth 2 (Two) Times a Day With Meals., Disp: 60 tablet, Rfl: 11  •  Multiple Vitamins-Minerals (MULTIVITAL PO), Take 1 tablet by mouth Daily., Disp: , Rfl:   •  nitroglycerin (NITROSTAT) 0.4 MG SL tablet, Place 1 tablet under the tongue Every 5 (Five) Minutes As Needed for Chest Pain. Take no more than 3 doses in 15 minutes., Disp: 30 tablet, Rfl: 12  •  Omega-3 Fatty Acids (FISH OIL) 1200 MG capsule capsule, Take 1,200 mg by mouth Daily With Breakfast., Disp: , Rfl:   •  potassium chloride (K-DUR) 10 MEQ CR tablet, TAKE 1 TABLET BY MOUTH EVERY DAY, Disp: 90 tablet, Rfl: 2  •  rivaroxaban (XARELTO) 20 MG tablet, Take 1 tablet by mouth Daily., Disp: 90 tablet, Rfl: 1  •  spironolactone (ALDACTONE) 25 MG tablet, Take 1 tablet by mouth  Daily., Disp: 90 tablet, Rfl: 2  •  vitamin B-12 (CYANOCOBALAMIN) 500 MCG tablet, Take 500 mcg by mouth Daily., Disp: , Rfl:   •  aspirin 81 MG tablet, Take 81 mg by mouth Daily., Disp: , Rfl:   •  atorvastatin (LIPITOR) 80 MG tablet, Take 1 tablet by mouth Daily., Disp: 90 tablet, Rfl: 2  •  Blood Glucose Monitoring Suppl (ACCU-CHEK LONNY PLUS) w/Device kit, 1 device, Disp: 1 kit, Rfl: 0  •  carvedilol (COREG) 25 MG tablet, Take 25 mg by mouth 2 (Two) Times a Day With Meals., Disp: , Rfl:   •  clopidogrel (PLAVIX) 75 MG tablet, TAKE 1 TABLET BY MOUTH EVERY DAY, Disp: 120 tablet, Rfl: 0  •  Continuous Blood Gluc  (FREESTYLE SHAQUILLE 14 DAY READER) device, 1 Device As Needed (USE TO CHECK BLOOD GLUCOSE LEVELS)., Disp: 1 Device, Rfl: 0  •  Continuous Blood Gluc Sensor (FREESTYLE SHAQUILLE 14 DAY SENSOR) misc, 1 application As Needed (PLACE SENSOR ON UPPER ARM EVERY 14 DAYS)., Disp: 2 each, Rfl: 3  •  glucose blood (ACCU-CHEK LONNY PLUS) test strip, To check 3 - 4 times a day, Disp: 100 each, Rfl: 2  •  Insulin Degludec (TRESIBA FLEXTOUCH) 200 UNIT/ML solution pen-injector, Inject 55 Units under the skin into the appropriate area as directed Daily. (Patient taking differently: Inject 45 Units under the skin into the appropriate area as directed Daily.), Disp: 15 mL, Rfl: 3  •  Lancets (ACCU-CHEK MULTICLIX) lancets, To check 3 - 4 times a day, Disp: 100 each, Rfl: 2        Review of Systems   Constitutional: Negative for fatigue and fever.   Eyes: Negative for visual disturbance.   Respiratory: Positive for shortness of breath.    Cardiovascular: Negative for palpitations and leg swelling.   Gastrointestinal: Negative for abdominal pain and vomiting.   Endocrine: Negative for polydipsia and polyuria.   Musculoskeletal: Negative for joint swelling and neck pain.   Skin: Negative for rash.   Neurological: Negative for weakness and numbness.   Psychiatric/Behavioral: Negative for behavioral problems.       Objective  "      Vitals:    01/03/19 1319   BP: 126/64   Pulse: 71   SpO2: 97%   Weight: 92.5 kg (204 lb)   Height: 152.4 cm (60\")     Body mass index is 39.84 kg/m².      Physical Exam   Constitutional: She is oriented to person, place, and time. She appears well-nourished.   Obese     HENT:   Head: Normocephalic and atraumatic.   Wide neck   Eyes: Conjunctivae and EOM are normal. No scleral icterus.   Neck: Normal range of motion. Neck supple. No thyromegaly present.   Acanthosis nigricans   Cardiovascular: Normal rate and normal heart sounds.   Pulmonary/Chest: Effort normal and breath sounds normal. No stridor. She has no wheezes.   Abdominal: Soft. Bowel sounds are normal. She exhibits no distension. There is no tenderness.   Central obesity   Musculoskeletal: She exhibits no edema or tenderness.   Neurological: She is alert and oriented to person, place, and time.   Skin: Skin is warm and dry. She is not diaphoretic.   Psychiatric: She has a normal mood and affect.   Vitals reviewed.    Results Review:     I reviewed the patient's new clinical results and mentioned them above in HPI and in plan as well.    Medical records reviewed  Summary: done      Admission on 12/08/2018, Discharged on 12/12/2018   No results displayed because visit has over 200 results.        Lab Results   Component Value Date    HGBA1C 12.60 (H) 12/09/2018    HGBA1C 14.3 (H) 07/15/2015    HGBA1C 14.8 (H) 07/14/2015     Lab Results   Component Value Date    CREATININE 0.85 12/12/2018     Imaging Results (most recent)     None                Assessment and Plan:    Diagnoses and all orders for this visit:    Diabetes mellitus type 2, uncontrolled, with complications (CMS/[facility])  -     Ambulatory Referral to Diabetic Education  -     Basic Metabolic Panel; Future  -     Hemoglobin A1c; Future  -     Lipid Panel; Future  -     TSH; Future  -     Vitamin B12 & Folate; Future  -     Vitamin D 25 Hydroxy; Future  -     T4, Free; Future    Mixed " "hyperlipidemia  -     Basic Metabolic Panel; Future  -     Hemoglobin A1c; Future  -     Lipid Panel; Future  -     TSH; Future  -     Vitamin B12 & Folate; Future  -     Vitamin D 25 Hydroxy; Future  -     T4, Free; Future      Type 2 diabetes mellitus-significantly uncontrolled  Placed continues glucose monitoring on the patient to further adjust her insulin regimen  Continue the current insulin regimen for now.    Will refer the patient to diabetic educator to help her with diabetic education.    Hyperlipidemia  Will check lipid panel  Continue Lipitor 80 mg oral daily for now.    Reviewed Lab results with the patient.     Placed CGM on the pt for BG monitoring.   Explained the benefits of CGM to the pt.   CGM would help in adjusting pt's insulin regimen and prevent hyperglycemia and hypoglycemia episodes.   Pt would wear CGM for the next 2 weeks and based on the data downloaded we will adjust his DM medications.                 Abraham Fitch MD  01/03/19    EMR Dragon / transcription disclaimer:     \"Dictated utilizing Dragon dictation\".  "

## 2019-01-09 RX ORDER — LANCETS
EACH MISCELLANEOUS
Qty: 200 EACH | Refills: 2 | Status: SHIPPED | OUTPATIENT
Start: 2019-01-09 | End: 2020-10-27 | Stop reason: SDUPTHER

## 2019-01-09 RX ORDER — BLOOD-GLUCOSE METER
EACH MISCELLANEOUS
Qty: 1 KIT | Refills: 0 | Status: SHIPPED | OUTPATIENT
Start: 2019-01-09 | End: 2020-02-11

## 2019-01-09 RX ORDER — BLOOD SUGAR DIAGNOSTIC
STRIP MISCELLANEOUS
Qty: 200 EACH | Refills: 2 | Status: SHIPPED | OUTPATIENT
Start: 2019-01-09 | End: 2019-10-17 | Stop reason: SDUPTHER

## 2019-01-21 ENCOUNTER — TREATMENT (OUTPATIENT)
Dept: ENDOCRINOLOGY | Age: 59
End: 2019-01-21

## 2019-01-21 DIAGNOSIS — IMO0002 DIABETES MELLITUS TYPE 2, UNCONTROLLED, WITH COMPLICATIONS: ICD-10-CM

## 2019-01-21 PROCEDURE — 95251 CONT GLUC MNTR ANALYSIS I&R: CPT | Performed by: INTERNAL MEDICINE

## 2019-01-21 NOTE — PROGRESS NOTES
Continues glucose monitoring data    Patient wore continuous glucose monitoring-free style fabián Pro from  Francisco 3 - Jan 7th   Average blood glucose reading was 145 mg/dl.   Estimated HbA1c was 7.5%  Likelihood of low blood glucose levels was mild  Likelihood of high blood glucose levels was moderate  Blood glucose trends showed high BG at break fast and dinner times    Current treatment regimen  Tresiba 45 units in the afternoon.   Novolog 10 units with each meal.     Changes to the treatment regimen  Continue tresiba 45 units but to take at bedtime.  NovoLog 10 units with breakfast, 12 units with lunch and 12 units with dinner.  Will start patient on moderate dose NovoLog sliding scale with each meal only.   BG less than 150 - zero  151 - 200 - 2 unit  201 - 250 - 4 units  251 - 300 - 6 units  301 - 350 - 8 units  Above 350 mg/dl - 10 units.

## 2019-01-22 ENCOUNTER — OFFICE VISIT (OUTPATIENT)
Dept: CARDIOLOGY | Facility: CLINIC | Age: 59
End: 2019-01-22

## 2019-01-22 VITALS
BODY MASS INDEX: 41.03 KG/M2 | HEIGHT: 60 IN | HEART RATE: 68 BPM | SYSTOLIC BLOOD PRESSURE: 122 MMHG | WEIGHT: 209 LBS | DIASTOLIC BLOOD PRESSURE: 84 MMHG

## 2019-01-22 DIAGNOSIS — G47.33 OBSTRUCTIVE SLEEP APNEA SYNDROME: ICD-10-CM

## 2019-01-22 DIAGNOSIS — I25.10 CHRONIC CORONARY ARTERY DISEASE: Primary | ICD-10-CM

## 2019-01-22 DIAGNOSIS — I63.412 CEREBROVASCULAR ACCIDENT (CVA) DUE TO EMBOLISM OF LEFT MIDDLE CEREBRAL ARTERY (HCC): ICD-10-CM

## 2019-01-22 DIAGNOSIS — I25.10 CORONARY ARTERY DISEASE INVOLVING NATIVE CORONARY ARTERY OF NATIVE HEART WITHOUT ANGINA PECTORIS: ICD-10-CM

## 2019-01-22 DIAGNOSIS — I10 BENIGN ESSENTIAL HYPERTENSION: ICD-10-CM

## 2019-01-22 DIAGNOSIS — Z95.810 AUTOMATIC IMPLANTABLE CARDIOVERTER-DEFIBRILLATOR IN SITU: ICD-10-CM

## 2019-01-22 DIAGNOSIS — E11.9 DIABETES MELLITUS TYPE 2, NONINSULIN DEPENDENT (HCC): ICD-10-CM

## 2019-01-22 DIAGNOSIS — I25.5 ISCHEMIC CARDIOMYOPATHY: ICD-10-CM

## 2019-01-22 DIAGNOSIS — E78.2 MIXED HYPERLIPIDEMIA: ICD-10-CM

## 2019-01-22 DIAGNOSIS — Z95.5 S/P CORONARY ARTERY STENT PLACEMENT: ICD-10-CM

## 2019-01-22 DIAGNOSIS — I21.09 ACUTE MYOCARDIAL INFARCTION OF ANTERIOR WALL (HCC): ICD-10-CM

## 2019-01-22 PROCEDURE — 93000 ELECTROCARDIOGRAM COMPLETE: CPT | Performed by: INTERNAL MEDICINE

## 2019-01-22 PROCEDURE — 99213 OFFICE O/P EST LOW 20 MIN: CPT | Performed by: INTERNAL MEDICINE

## 2019-01-22 RX ORDER — CARVEDILOL 25 MG/1
25 TABLET ORAL 2 TIMES DAILY WITH MEALS
Qty: 180 TABLET | Refills: 2 | Status: SHIPPED | OUTPATIENT
Start: 2019-01-22 | End: 2019-12-23

## 2019-01-22 RX ORDER — NITROGLYCERIN 0.4 MG/1
0.4 TABLET SUBLINGUAL
Qty: 30 TABLET | Refills: 12 | Status: SHIPPED | OUTPATIENT
Start: 2019-01-22 | End: 2020-04-10 | Stop reason: SDUPTHER

## 2019-01-22 NOTE — PROGRESS NOTES
Subjective:     Encounter Date:01/22/2019      Patient ID: Daisy Dent is a 58 y.o. female.    Chief Complaint:  History of Present Illness    This is a 58-year-old female with a history of ischemic cardiomyopathy, last ejection fraction of 40-45%, diabetes mellitus type 2, obstructive sleep apnea on CPAP, hypertension, status post dual-chamber AICD placement, dyslipidemia, coronary artery disease with a history of prior anterior myocardial infarction and LAD stent placement, status post left MCA stroke, who presents for follow-up.     I began following the patient in 11/2015 when she presented to establish care.  Prior to that she was followed by Dr. Ayala with [facility].  Her prior cardiac history includes a non-ST elevation MI and 7/2006 at which time she received a Cypher 3.0 x 23 mm stent to her mid LAD.  She then presented in 10/2009 with a late in-stent thrombosis that required repeat stenting of her mid LAD in addition to a second drug-eluting stent placement of her left circumflex artery.  Following that she had a repeat cardiac catheterization in 2010 that was reportedly unremarkable.  Following her myocardial infarction in 2009 she developed ischemic myopathy with an EF of around 25% and ultimately underwent AICD placement and 7/2010.  At some point in the course of her follow-ups her left ventricular function improved with an ejection fraction of 40-45%.  A stress test showed evidence of a large anterior infarct and minimal elva-infarct ischemia.     In her follow-ups she has done fairly well recently when she reported more fatigued than normal and episodes of chest discomfort that occurred while she was exercising on the treadmill.  She underwent  an echocardiogram in 3/2017 that showed an ejection fraction of 40-45%.  Due to continued symptoms we went ahead and proceeded with a PET stress test which was performed in 8/2017 that showed medium sized anterior wall infarct with mild  elva-infarct ischemia and a post stress ejection fraction of 58%.      In 11/2017 her AICD was noted to be at CHELLE.  She subsequently underwent generator replacement on 1/12/2018 with Dr. Ornelas. When I saw her back in follow up in 9/2018 she reported that she denied any new or worsening symptoms.      She was admitted and 12/2018 after she presented with aphasia.  On arrival to the hospital she underwent a CT of the head that showed no acute stroke but a perfusion scan showed a large perfusion mismatch.  A CTA showed evidence of a left MCA thrombus.  She was given TPA at this time with improvement in her symptoms.  Neurology felt that her stroke was likely embolic.  She underwent a repeat echocardiogram during her admission that showed moderately depressed left ventricular systolic function with an EF of 30%, grade 1 diastolic dysfunction, and no significant valvular disease.  Based on the likelihood that this was an embolic stroke I opted to go ahead and recommend anticoagulation.  She was subsequently started on rivaroxaban 20 mg a day.  Her aspirin was stopped and she was continued on clopidogrel.    Today she percent for routine follow-up.  She reports that she's been improving since her hospitalization.  She reports that she is to go back to work starting tomorrow.  She denies any chest pain, shortness of breath, PND or orthopnea, near-syncope or syncope, palpitations, lower extremity edema, or bleeding issues.      Review of Systems   Constitution: Positive for malaise/fatigue. Negative for weakness.   HENT: Negative for hearing loss, hoarse voice, nosebleeds and sore throat.    Eyes: Negative for pain.   Cardiovascular: Negative for chest pain, claudication, cyanosis, dyspnea on exertion, irregular heartbeat, leg swelling, near-syncope, orthopnea, palpitations, paroxysmal nocturnal dyspnea and syncope.   Respiratory: Negative for shortness of breath and snoring.    Endocrine: Negative for cold intolerance,  heat intolerance, polydipsia, polyphagia and polyuria.   Skin: Negative for itching and rash.   Musculoskeletal: Negative for arthritis, falls, joint pain, joint swelling, muscle cramps, muscle weakness and myalgias.   Gastrointestinal: Negative for constipation, diarrhea, dysphagia, heartburn, hematemesis, hematochezia, melena, nausea and vomiting.   Genitourinary: Negative for frequency, hematuria and hesitancy.   Neurological: Negative for excessive daytime sleepiness, dizziness, headaches, light-headedness and numbness.   Psychiatric/Behavioral: Negative for depression. The patient is not nervous/anxious.           Current Outpatient Medications:   •  ACCU-CHEK LONNY PLUS test strip, To check 3 - 4 times a day, Disp: 200 each, Rfl: 2  •  atorvastatin (LIPITOR) 80 MG tablet, Take 1 tablet by mouth Daily., Disp: 90 tablet, Rfl: 2  •  Blood Glucose Monitoring Suppl (ACCU-CHEK LONNY PLUS) w/Device kit, 1 device, Disp: 1 kit, Rfl: 0  •  carvedilol (COREG) 25 MG tablet, Take 25 mg by mouth 2 (Two) Times a Day With Meals., Disp: , Rfl:   •  Cholecalciferol (VITAMIN D3) 2000 UNITS capsule, Take 1,000 Units by mouth Daily., Disp: , Rfl:   •  clopidogrel (PLAVIX) 75 MG tablet, TAKE 1 TABLET BY MOUTH EVERY DAY, Disp: 120 tablet, Rfl: 0  •  Continuous Blood Gluc  (FREESTYLE SHAQUILLE 14 DAY READER) device, 1 Device As Needed (USE TO CHECK BLOOD GLUCOSE LEVELS)., Disp: 1 Device, Rfl: 0  •  Continuous Blood Gluc Sensor (FREESTYLE SHAQUILLE 14 DAY SENSOR) misc, 1 application As Needed (PLACE SENSOR ON UPPER ARM EVERY 14 DAYS)., Disp: 2 each, Rfl: 3  •  furosemide (LASIX) 40 MG tablet, Take 1 tablet by mouth Daily., Disp: 90 tablet, Rfl: 2  •  insulin aspart (NOVOLOG FLEXPEN) 100 UNIT/ML solution pen-injector sc pen, Inject 15 Units under the skin into the appropriate area as directed 3 (Three) Times a Day With Meals., Disp: 15 mL, Rfl: 3  •  Insulin Degludec (TRESIBA FLEXTOUCH) 200 UNIT/ML solution pen-injector, Inject 55  Units under the skin into the appropriate area as directed Daily. (Patient taking differently: Inject 45 Units under the skin into the appropriate area as directed Daily.), Disp: 15 mL, Rfl: 3  •  Insulin Pen Needle (BD PEN NEEDLE NADEEN U/F) 32G X 4 MM misc, TO INJECT 4 TIMES DAILY, Disp: 100 each, Rfl: 3  •  Lancets (ACCU-CHEK MULTICLIX) lancets, To check 3 - 4 times a day, Disp: 200 each, Rfl: 2  •  metFORMIN (GLUCOPHAGE) 1000 MG tablet, Take 1 tablet by mouth 2 (Two) Times a Day With Meals., Disp: 60 tablet, Rfl: 11  •  Multiple Vitamins-Minerals (MULTIVITAL PO), Take 1 tablet by mouth Daily., Disp: , Rfl:   •  nitroglycerin (NITROSTAT) 0.4 MG SL tablet, Place 1 tablet under the tongue Every 5 (Five) Minutes As Needed for Chest Pain. Take no more than 3 doses in 15 minutes., Disp: 30 tablet, Rfl: 12  •  Omega-3 Fatty Acids (FISH OIL) 1200 MG capsule capsule, Take 1,200 mg by mouth Daily With Breakfast., Disp: , Rfl:   •  potassium chloride (K-DUR) 10 MEQ CR tablet, TAKE 1 TABLET BY MOUTH EVERY DAY, Disp: 90 tablet, Rfl: 2  •  rivaroxaban (XARELTO) 20 MG tablet, Take 1 tablet by mouth Daily., Disp: 90 tablet, Rfl: 1  •  spironolactone (ALDACTONE) 25 MG tablet, Take 1 tablet by mouth Daily., Disp: 90 tablet, Rfl: 2  •  vitamin B-12 (CYANOCOBALAMIN) 500 MCG tablet, Take 500 mcg by mouth Daily., Disp: , Rfl:     Past Medical History:   Diagnosis Date   • Abdominal pain    • Abnormal liver function test    • Acute bronchitis    • Benign essential hypertension    • CAD (coronary artery disease)    • CHF (congestive heart failure) (CMS/HCC)    • Colon polyp    • Congestive heart disease (CMS/HCC)    • COPD (chronic obstructive pulmonary disease) (CMS/HCC)    • Cough    • Diabetes (CMS/HCC)    • Diabetes mellitus (CMS/HCC)    • Diarrhea    • Gastroenteritis    • Health care maintenance    • Hyperlipidemia    • Hypertension    • IFG (impaired fasting glucose)    • Ischemic cardiomyopathy    • Myocardial infarction  "(CMS/[facility])    • SHELLIE (obstructive sleep apnea)    • Sore throat    • Type 2 diabetes mellitus (CMS/HCC)    • Vaginal yeast infection    • Vitamin D deficiency      Past Surgical History:   Procedure Laterality Date   • CARDIAC CATHETERIZATION     • CARDIAC ELECTROPHYSIOLOGY PROCEDURE N/A 2018    Procedure: ICD DC generator change  medtronic;  Surgeon: Hany Ornelas MD;  Location: [facility] INVASIVE LOCATION;  Service:    • PACEMAKER IMPLANTATION     • TUBAL ABDOMINAL LIGATION       Family History   Problem Relation Age of Onset   • Diabetes Mother    • Heart disease Mother    • Hyperlipidemia Mother    • Diabetes Father    • Heart disease Father    • Hyperlipidemia Father    • Heart disease Brother    • Hyperlipidemia Brother    • Hypertension Father    • Colon cancer Father    • Heart attack Father    • Emphysema Mother    • Heart disease Brother    • Heart attack Brother    • Heart disease Maternal Grandmother    • Heart disease Maternal Grandfather    • Heart disease Paternal Grandmother    • Heart disease Paternal Grandfather      Social History     Tobacco Use   • Smoking status: Former Smoker     Packs/day: 1.00     Years: 30.00     Pack years: 30.00     Types: Cigarettes     Start date: 1979     Last attempt to quit: 2009     Years since quittin.2   • Smokeless tobacco: Never Used   • Tobacco comment: QUIT 10 YRS   Substance Use Topics   • Alcohol use: Yes     Comment: occasionally   • Drug use: No           ECG 12 Lead  Date/Time: 2019 3:35 PM  Performed by: Oneida Saldivar MD  Authorized by: Oneida Saldivar MD   Comparison: compared with previous ECG   Similar to previous ECG  Rhythm: sinus rhythm  Q waves: V1 and V2                 Objective:         Visit Vitals  /84   Pulse 68   Ht 152.4 cm (60\")   Wt 94.8 kg (209 lb)   BMI 40.82 kg/m²          Physical Exam   Constitutional: She is oriented to person, place, and time. She appears well-developed and well-nourished. "   HENT:   Head: Normocephalic and atraumatic.   Eyes: Conjunctivae, EOM and lids are normal. Pupils are equal, round, and reactive to light.   Neck: Normal range of motion and full passive range of motion without pain. Neck supple. No JVD present. Carotid bruit is not present.   Cardiovascular: Normal rate, regular rhythm, S1 normal and S2 normal. Exam reveals no gallop.   No murmur heard.  Pulses:       Radial pulses are 2+ on the right side, and 2+ on the left side.   No bilateral lower extremity edema   Pulmonary/Chest: Effort normal and breath sounds normal.   Abdominal: Soft. Normal appearance.   Lymphadenopathy:     She has no cervical adenopathy.   Neurological: She is alert and oriented to person, place, and time.   Skin: Skin is warm, dry and intact.   Psychiatric: She has a normal mood and affect.       Lab Review:       Assessment:          Diagnosis Plan   1. Chronic coronary artery disease     2. History of anterior myocardial infarction      3. Ischemic cardiomyopathy     4. Automatic implantable cardioverter-defibrillator in situ     5. Mixed hyperlipidemia     6. Benign essential hypertension     7. Cerebrovascular accident (CVA) due to embolism of left middle cerebral artery (CMS/HCC)     8. Diabetes mellitus type 2, noninsulin dependent (CMS/HCC)     9. Obstructive sleep apnea syndrome     10. S/P coronary artery stent placement            Plan:         1.  Coronary artery disease.  No recent symptoms.  Continue current medical management including clopidogrel and statin.  2.  Ischemic cardiopathy myopathy.  Most recent echocardiogram showed her EF had declined from 40-45% to 30%.  She already has an AICD in place.  She is on carvedilol and spironolactone.  We'll continue current management for now.  We'll consider repeat echocardiogram in the near future to reevaluate her ejection fraction.  3.  Status post AICD.  Continue routine device checks her office.  4.  Status post left MCA stroke.  Felt  to be embolic.  This was on both aspirin and clopidogrel.  She is started on anticoagulation since then with rivaroxaban will continue along with clopidogrel.  5.  Diabetes mellitus type 2  6.  Hypertension.  Well-controlled.    7.  Obstructive sleep apnea.  She is compliant with her CPAP  8.  Morbid obesity    Will plan on seeing the patient again in 6 months.    Coronary Artery Disease  Assessment  • The patient has CCS class I - angina only during strenuous or prolonged physical activity  • The patient has stable angina     Plan  • Lifestyle modifications discussed include adhering to a heart healthy diet, avoidance of tobacco products, maintenance of a healthy weight, medication compliance, regular exercise and regular monitoring of cholesterol and blood pressure    Subjective - Objective  • There has been a previous stent procedure using HUGO 7/2006, 10/2009  • Current antiplatelet therapy includes aspirin 81 mg and clopidogrel 75 mg      Heart Failure  Assessment  • NYHA class II - There is slight limitation of physical activity. The patient is comfortable at rest, but physical activity results in fatigue, palpitations or shortness of breath.  • ACE inhibitor prescribed  • Beta blocker prescribed  • Diuretics prescribed  • Angiotensin receptor blocker (ARB) not prescribed for medical reasons  • Calcium channel blockers not prescribed  • Left ventricular function is mildly reduced by qualitative assessment    Plan  • The heart failure care plan was discussed with the patient today including: continuing the current program    Subjective/Objective    • Physical exam findings negative for elevated JVP.  • The patient has an ICD implant

## 2019-03-11 DIAGNOSIS — E11.9 DIABETES MELLITUS TYPE 2, NONINSULIN DEPENDENT (HCC): Primary | ICD-10-CM

## 2019-03-27 ENCOUNTER — OFFICE VISIT (OUTPATIENT)
Dept: NEUROLOGY | Facility: CLINIC | Age: 59
End: 2019-03-27

## 2019-03-27 VITALS
HEART RATE: 69 BPM | OXYGEN SATURATION: 97 % | WEIGHT: 212 LBS | DIASTOLIC BLOOD PRESSURE: 70 MMHG | BODY MASS INDEX: 41.62 KG/M2 | SYSTOLIC BLOOD PRESSURE: 120 MMHG | HEIGHT: 60 IN

## 2019-03-27 DIAGNOSIS — I63.412 CEREBROVASCULAR ACCIDENT (CVA) DUE TO EMBOLISM OF LEFT MIDDLE CEREBRAL ARTERY (HCC): Primary | ICD-10-CM

## 2019-03-27 DIAGNOSIS — G44.049 CHRONIC PAROXYSMAL HEMICRANIA, NOT INTRACTABLE: ICD-10-CM

## 2019-03-27 DIAGNOSIS — I10 BENIGN ESSENTIAL HYPERTENSION: ICD-10-CM

## 2019-03-27 DIAGNOSIS — I25.5 ISCHEMIC CARDIOMYOPATHY: ICD-10-CM

## 2019-03-27 DIAGNOSIS — G47.33 OBSTRUCTIVE SLEEP APNEA SYNDROME: ICD-10-CM

## 2019-03-27 DIAGNOSIS — E11.9 DIABETES MELLITUS TYPE 2, NONINSULIN DEPENDENT (HCC): ICD-10-CM

## 2019-03-27 DIAGNOSIS — E78.2 MIXED HYPERLIPIDEMIA: ICD-10-CM

## 2019-03-27 PROBLEM — G44.329 CHRONIC POST-TRAUMATIC HEADACHE, NOT INTRACTABLE: Status: RESOLVED | Noted: 2019-03-27 | Resolved: 2019-03-27

## 2019-03-27 PROBLEM — G44.329 CHRONIC POST-TRAUMATIC HEADACHE, NOT INTRACTABLE: Status: ACTIVE | Noted: 2019-03-27

## 2019-03-27 PROCEDURE — 99214 OFFICE O/P EST MOD 30 MIN: CPT | Performed by: NURSE PRACTITIONER

## 2019-03-27 RX ORDER — NORTRIPTYLINE HYDROCHLORIDE 25 MG/1
25 CAPSULE ORAL NIGHTLY
Qty: 30 CAPSULE | Refills: 2 | Status: SHIPPED | OUTPATIENT
Start: 2019-03-27 | End: 2019-03-27 | Stop reason: ALTCHOICE

## 2019-03-27 RX ORDER — TOPIRAMATE 25 MG/1
25 TABLET ORAL NIGHTLY
Qty: 30 TABLET | Refills: 2 | Status: SHIPPED | OUTPATIENT
Start: 2019-03-27 | End: 2019-09-25

## 2019-03-27 NOTE — PROGRESS NOTES
"DOS: 3/27/2019  NAME: Daisy Dent   : 1960  PCP: Nusrat Dent APRN    Chief Complaint   Patient presents with   • Stroke      SUBJECTIVE  Neurological Problem:  58 y.o. RH  female with DM, HLD, CAD (s/p stent), ICM with AICD,SHELLIE (compliant with CPAP), COPD who presents today for stroke F/U. She is unaccompanied.     Interval History:   Ms. Dent presented  to [facility] on  with sudden onset inability to speak. Her initial CT head was negative but CTP showed a large LMCA mismatch, small core infarct, and CTA showed a LMCA thrombus.  She received TPA and her aphasia was noted to have resolved, therefore patient was not a candidate for thrombectomy. She was on Plavix and Lipitor at home. Repeat CT head on 12/10 showing no acute infarction hemorrhage or mass effect.  Also CTA insignificant for ICAD, the etiology of her stroke was likely cardioembolic. TTE showed LVEF 30% and she was discharged on Xarelto, Plavix and Lipitor 80 mg.      She presents today and continues on Xarelto, Plavix and Lipitor 80 mg is tolerating medications well.  She reports that she is \"80-85% \"back to her baseline; however she does have some worsening headaches since discharge and some unsteadiness, balance problems at times.  She denies any headache today.  She denies any vision changes, unilateral weakness, worsening speech or any new stroke/TIA symptoms.  She has returned back to most of her normal activities including work and does feel increased anxiety as well as problems sleeping.  She also states she has tried to start an exercise regimen using her treadmill regularly.  She is now being seen by endocrinology and states her blood sugars are much better controlled.  She does not take her BP regularly but states it is well controlled.  She denies smoking. Rare alcohol use.     Review of Systems:Review of Systems   Constitutional: Negative.    Eyes: Negative.    Respiratory: Negative.    Cardiovascular: " Negative.    Gastrointestinal: Negative.    Endocrine: Negative.    Genitourinary: Negative.    Musculoskeletal: Positive for gait problem. Negative for arthralgias, back pain, joint swelling, myalgias, neck pain and neck stiffness.   Skin: Negative.    Allergic/Immunologic: Negative.    Neurological: Positive for speech difficulty and headaches. Negative for dizziness, tremors, seizures, syncope, facial asymmetry, weakness, light-headedness and numbness.   Hematological: Negative for adenopathy. Bruises/bleeds easily.   Psychiatric/Behavioral: Negative.     Above ROS reviewed    Current Medications:   Current Outpatient Medications:   •  ACCU-CHEK LONNY PLUS test strip, To check 3 - 4 times a day, Disp: 200 each, Rfl: 2  •  atorvastatin (LIPITOR) 80 MG tablet, Take 1 tablet by mouth Daily., Disp: 90 tablet, Rfl: 2  •  Blood Glucose Monitoring Suppl (ACCU-CHEK LONNY PLUS) w/Device kit, 1 device, Disp: 1 kit, Rfl: 0  •  carvedilol (COREG) 25 MG tablet, Take 1 tablet by mouth 2 (Two) Times a Day With Meals., Disp: 180 tablet, Rfl: 2  •  Cholecalciferol (VITAMIN D3) 2000 UNITS capsule, Take 1,000 Units by mouth Daily., Disp: , Rfl:   •  clopidogrel (PLAVIX) 75 MG tablet, TAKE 1 TABLET BY MOUTH EVERY DAY, Disp: 120 tablet, Rfl: 0  •  Continuous Blood Gluc  (FREESTYLE SHAQUILLE 14 DAY READER) device, 1 Device As Needed (USE TO CHECK BLOOD GLUCOSE LEVELS)., Disp: 1 Device, Rfl: 0  •  Continuous Blood Gluc Sensor (FREESTYLE SHAQUILLE 14 DAY SENSOR) misc, 1 application As Needed (PLACE SENSOR ON UPPER ARM EVERY 14 DAYS)., Disp: 2 each, Rfl: 3  •  furosemide (LASIX) 40 MG tablet, Take 1 tablet by mouth Daily., Disp: 90 tablet, Rfl: 2  •  insulin aspart (NOVOLOG FLEXPEN) 100 UNIT/ML solution pen-injector sc pen, Inject 15 Units under the skin into the appropriate area as directed 3 (Three) Times a Day With Meals., Disp: 15 mL, Rfl: 3  •  Insulin Degludec (TRESIBA FLEXTOUCH) 200 UNIT/ML solution pen-injector, Inject 55 Units  under the skin into the appropriate area as directed Daily. (Patient taking differently: Inject 45 Units under the skin into the appropriate area as directed Daily.), Disp: 15 mL, Rfl: 3  •  Insulin Pen Needle (BD PEN NEEDLE NADEEN U/F) 32G X 4 MM misc, TO INJECT 4 TIMES DAILY, Disp: 100 each, Rfl: 3  •  Lancets (ACCU-CHEK MULTICLIX) lancets, To check 3 - 4 times a day, Disp: 200 each, Rfl: 2  •  metFORMIN (GLUCOPHAGE) 1000 MG tablet, Take 1 tablet by mouth 2 (Two) Times a Day With Meals., Disp: 60 tablet, Rfl: 11  •  Multiple Vitamins-Minerals (MULTIVITAL PO), Take 1 tablet by mouth Daily., Disp: , Rfl:   •  nitroglycerin (NITROSTAT) 0.4 MG SL tablet, Place 1 tablet under the tongue Every 5 (Five) Minutes As Needed for Chest Pain. Take no more than 3 doses in 15 minutes., Disp: 30 tablet, Rfl: 12  •  potassium chloride (K-DUR) 10 MEQ CR tablet, TAKE 1 TABLET BY MOUTH EVERY DAY, Disp: 90 tablet, Rfl: 2  •  rivaroxaban (XARELTO) 20 MG tablet, Take 1 tablet by mouth Daily., Disp: 90 tablet, Rfl: 1  •  spironolactone (ALDACTONE) 25 MG tablet, Take 1 tablet by mouth Daily., Disp: 90 tablet, Rfl: 2  •  vitamin B-12 (CYANOCOBALAMIN) 500 MCG tablet, Take 500 mcg by mouth Daily., Disp: , Rfl:   •  Omega-3 Fatty Acids (FISH OIL) 1200 MG capsule capsule, Take 1,200 mg by mouth Daily With Breakfast., Disp: , Rfl:   •  topiramate (TOPAMAX) 25 MG tablet, Take 1 tablet by mouth Every Night., Disp: 30 tablet, Rfl: 2    The following portions of the patient's history were reviewed and updated as appropriate: allergies, current medications, past family history, past medical history, past social history, past surgical history and problem list.    OBJECTIVE  Vitals:    03/27/19 1259   BP: 120/70   Pulse: 69   SpO2: 97%       Diagnostics:  CT head 12/8/18: There is some bifrontal atrophy. Ventricles appear normal in  size. There is no evidence of acute infarction, hemorrhage, midline  shift or mass effect. Minimal basal ganglia  calcification is seen on the  Left.  CTP 12/8/18: On the cerebral perfusion images there is prolonged Tmax in  the left frontoparietal region in the distribution of the left middle  cerebral artery territory. A small area of abnormal density is seen in  the left frontal lobe on the CBF images. This is not seen on the CBV  images.   CTA  Head/neck 12/8/18: There appears to be cut off of opacification of an anterior division  left middle cerebral artery branch involving the anterior aspect of the  left temporal lobe. This is best seen on coronal reconstruction images  66 through 69. The left middle cerebral artery otherwise opacifies  normally. The left anterior cerebral artery and the right middle and  anterior cerebral arteries opacify normally. The bilateral vertebral and  the bilateral common carotid and internal carotid arteries opacify  relatively normally.     EKG 12/8/18: SR  TTE 12/10/18: Interpretation Summary   · Calculated EF = 30%. Estimated EF was in agreement with the calculated EF. Estimated EF = 30%. Normal left ventricular cavity size and wall thickness noted. Left ventricular diastolic dysfunction is noted (grade I) consistent with impaired relaxation.  · Wall motion abnormalities as noted below  · Trace mitral valve regurgitation is present.  · The tricuspid valve is grossly normal. Physiologic tricuspid valve regurgitation is present. Calculated right ventricular systolic pressure from tricuspid regurgitation is 16.5 mmHg.  · Electronic lead present in the ventricle.     Laboratory Results:         Lab Results   Component Value Date    WBC 7.57 12/12/2018    HGB 14.2 12/12/2018    HCT 41.7 12/12/2018    MCV 81.9 12/12/2018     12/12/2018     Lab Results   Component Value Date    GLUCOSE 195 (H) 12/12/2018    BUN 11 12/12/2018    CREATININE 0.85 12/12/2018    EGFRIFNONA 69 12/12/2018    EGFRIFAFRI >60 07/14/2015    BCR 12.9 12/12/2018    K 3.7 12/12/2018    CO2 25.7 12/12/2018    CALCIUM 9.3  12/12/2018    PROTENTOTREF 7.3 07/14/2015    ALBUMIN 4.70 12/08/2018    LABIL2 1.8 07/14/2015    AST 67 (H) 12/08/2018     (H) 12/08/2018     Lab Results   Component Value Date    HGBA1C 12.60 (H) 12/09/2018     Lab Results   Component Value Date    CHOL 279 (H) 12/09/2018     Lab Results   Component Value Date    HDL 32 (L) 12/09/2018    HDL 30 (L) 07/14/2015    HDL 33 (L) 01/15/2015     Lab Results   Component Value Date    LDL  12/09/2018      Comment:      Unable to calculate     (H) 12/09/2018    LDL  07/14/2015      Comment:      Unable to calculate due to elevated Triglycerides.                      LDL References Ranges  (U.S. Department of Health and Human Services ATP III Classifications)                Optimal                       <100 mg/dL                Near Optimal               100-129 mg/dL                Borderline High            130-159 mg/dL                High                             160-189 mg/dL                Very High                     >189 mg/dL       Lab Results   Component Value Date    TRIG 401 (H) 12/09/2018    TRIG 409 (H) 07/14/2015    TRIG 241 (H) 01/15/2015     No results found for: RPR  Lab Results   Component Value Date    TSH 1.410 07/14/2015     No results found for: [id number]    Physical Examination:   General Appearance:   Well developed, abdominal obesity, well groomed, alert, and cooperative.  HEENT: Normocephalic.    Neck and Spine: Normal range of motion.  Normal alignment. No mass or tenderness.   Cardiac: Regular rate and rhythm.   Peripheral Vasculature: Radial pulses are equal and symmetric. No signs of distal embolization.  Extremities:    No edema or deformities. Normal joint ROM.  Skin:    No rashes or birth marks.    Neurological examination:  Higher Integrative  Function: Oriented to time, place and person. Normal registration, recall (3/3), attention span and concentration. Rare paraphasic errors. Normal comprehension, spontaneous speech,  repetition, reading, writing, naming and vocabulary. No neglect with normal visual-spatial function and construction. Normal fund of knowledge and higher integrative function.  CN II: Pupils are equal, round, and reactive to light. Normal visual acuity and visual fields.    CN III IV VI: Extraocular movements are full without nystagmus.   CN V: Normal facial sensation and strength of muscles of mastication.  CN VII: Facial movements are symmetric. No weakness.  CN VIII:   Auditory acuity is normal.  CN IX & X:   Symmetric palatal movement.  CN XI: Sternocleidomastoid and trapezius are normal.  No weakness.  CN XII:   The tongue is midline.  No atrophy or fasciculations.  Motor: Normal muscle strength, bulk and tone in upper and lower extremities.  No fasciculations, rigidity, spasticity, or abnormal movements.  Sensation: Normal to light touch and proprioception in arms and legs. Normal graphesthesia and no extinction on DSS.  Station and Gait: Normal gait and station.    Coordination: Finger to nose test shows no dysmetria.  Heel to shin normal.    Impression:  Ms. Dent is doing well following her admission in Dec. 2018 with LMCA thrombus s/p IV tPA.  Follow-up CT head showed no evidence of infarct.  She is unable to have MRI secondary to AICD.  She was on ASA and Plavix prior to event, she has since been maintained on Xarelto and Plavix, Lipitor with no recurrent or new stroke/TIA symptoms.  She does have some mild residual aphasia on exam, no other focal findings.  Regarding her headaches, will start Topamax 25 mg nightly, to titrate to 50 mg at bedtime if side effects tolerable. We discussed keeping well-hydrated, trying OTC Migralief.  I encouraged her to continue her aggressive control of risk factors including blood sugar.  She has follow-up appointment with endocrinology. Monitor BP regularly. Discussed the signs and symptoms of stroke and the importance of calling 911 if she were to develop any of these.   Follow-up in 6 months, sooner if symptoms warrant.    Plan:     Continue medication regimen  Add topamax 25 mg at night, increase to 50 mg if tolerates  Keep well-hydrated, try OTC Migralief  Monitor BP regularly  Encouraged regular physical   Patient to bring in FMLA paperwork  Secondary stroke prevention: Ideal targets for stroke prevention would be Blood pressure < 130/80; B12 > 500 TSH in normal range and LDL < 70; HbA1c < 6.5 and smoking cessation if applicable.    Call 911 for stroke symptoms  Follow-up in 6 months    I spent 30 minutes face to face with patient, with  > 50% spent counseling patient regarding diagnosis, review of diagnostics, personal risk factors for stroke and importance of risk factor control for stroke prevention.     Daisy was seen today for stroke.    Diagnoses and all orders for this visit:    Cerebrovascular accident (CVA) due to embolism of left middle cerebral artery (CMS/HCC)    Benign essential hypertension    Mixed hyperlipidemia    Ischemic cardiomyopathy    Obstructive sleep apnea syndrome    Diabetes mellitus type 2, noninsulin dependent (CMS/HCC)    Chronic paroxysmal hemicrania, not intractable    Other orders  -     Discontinue: nortriptyline (PAMELOR) 25 MG capsule; Take 1 capsule by mouth Every Night.  -     topiramate (TOPAMAX) 25 MG tablet; Take 1 tablet by mouth Every Night.        Coding      Dictated using Dragon

## 2019-04-01 LAB
25(OH)D3+25(OH)D2 SERPL-MCNC: 29.2 NG/ML (ref 30–100)
BUN SERPL-MCNC: 19 MG/DL (ref 6–20)
BUN/CREAT SERPL: 18.6 (ref 7–25)
CALCIUM SERPL-MCNC: 10.7 MG/DL (ref 8.6–10.5)
CHLORIDE SERPL-SCNC: 103 MMOL/L (ref 98–107)
CHOLEST SERPL-MCNC: 154 MG/DL (ref 0–200)
CO2 SERPL-SCNC: 23.6 MMOL/L (ref 22–29)
CREAT SERPL-MCNC: 1.02 MG/DL (ref 0.57–1)
FOLATE SERPL-MCNC: 10.8 NG/ML (ref 4.78–24.2)
GLUCOSE SERPL-MCNC: 155 MG/DL (ref 65–99)
HBA1C MFR BLD: 6.6 % (ref 4.8–5.6)
HDLC SERPL-MCNC: 39 MG/DL (ref 40–60)
INTERPRETATION: NORMAL
LDLC SERPL CALC-MCNC: 74 MG/DL (ref 0–100)
Lab: NORMAL
POTASSIUM SERPL-SCNC: 4.2 MMOL/L (ref 3.5–5.2)
SODIUM SERPL-SCNC: 140 MMOL/L (ref 136–145)
T4 FREE SERPL-MCNC: 1.22 NG/DL (ref 0.93–1.7)
TRIGL SERPL-MCNC: 207 MG/DL (ref 0–150)
TSH SERPL DL<=0.005 MIU/L-ACNC: 1.33 MIU/ML (ref 0.27–4.2)
VIT B12 SERPL-MCNC: 277 PG/ML (ref 211–946)
VLDLC SERPL CALC-MCNC: 41.4 MG/DL

## 2019-04-01 RX ORDER — INSULIN ASPART 100 [IU]/ML
INJECTION, SOLUTION INTRAVENOUS; SUBCUTANEOUS
Qty: 5 PEN | Refills: 3 | Status: SHIPPED | OUTPATIENT
Start: 2019-04-01 | End: 2019-04-15

## 2019-04-02 ENCOUNTER — TELEPHONE (OUTPATIENT)
Dept: NEUROLOGY | Facility: CLINIC | Age: 59
End: 2019-04-02

## 2019-04-02 NOTE — TELEPHONE ENCOUNTER
Regarding: Prescription Question  Contact: [phone]  ----- Message from [facility], Generic sent at 4/2/2019 10:50 AM EDT -----    I started taking Topiramate that was prescribed last week and had to stop.  It was causing Diaherra and I had to stop taking it.  I was checking if you could prescribe me something else.

## 2019-04-03 ENCOUNTER — RESULTS ENCOUNTER (OUTPATIENT)
Dept: ENDOCRINOLOGY | Age: 59
End: 2019-04-03

## 2019-04-03 DIAGNOSIS — IMO0002 DIABETES MELLITUS TYPE 2, UNCONTROLLED, WITH COMPLICATIONS: ICD-10-CM

## 2019-04-03 DIAGNOSIS — E78.2 MIXED HYPERLIPIDEMIA: ICD-10-CM

## 2019-04-04 ENCOUNTER — TELEPHONE (OUTPATIENT)
Dept: NEUROLOGY | Facility: CLINIC | Age: 59
End: 2019-04-04

## 2019-04-04 NOTE — TELEPHONE ENCOUNTER
Regarding: Prescription Question  Contact: [phone]  ----- Message from [employer], Generic sent at 4/4/2019 11:23 AM EDT -----    I had to stop taking my    Topiramate on Douglas  3/31 due to it making me have diarrhea.  I wasn't sure if there was something else you could prescribed.    Thank you  Marcelina Dent

## 2019-04-04 NOTE — TELEPHONE ENCOUNTER
We can try nortriptyline (which we also discussed at her office visit). I can put in script for 25 mg qhs if she wants to try that. If it helps she can titrate to 50 mg qhs after a week.

## 2019-04-11 RX ORDER — NORTRIPTYLINE HYDROCHLORIDE 25 MG/1
25 CAPSULE ORAL NIGHTLY
Qty: 30 CAPSULE | Refills: 2 | Status: SHIPPED | OUTPATIENT
Start: 2019-04-11 | End: 2019-04-15

## 2019-04-15 ENCOUNTER — OFFICE VISIT (OUTPATIENT)
Dept: ENDOCRINOLOGY | Age: 59
End: 2019-04-15

## 2019-04-15 VITALS
OXYGEN SATURATION: 99 % | HEART RATE: 69 BPM | DIASTOLIC BLOOD PRESSURE: 70 MMHG | HEIGHT: 60 IN | BODY MASS INDEX: 42.41 KG/M2 | SYSTOLIC BLOOD PRESSURE: 124 MMHG | WEIGHT: 216 LBS

## 2019-04-15 DIAGNOSIS — E11.9 DIABETES MELLITUS TYPE 2, NONINSULIN DEPENDENT (HCC): ICD-10-CM

## 2019-04-15 DIAGNOSIS — IMO0002 DIABETES MELLITUS TYPE 2, UNCONTROLLED, WITH COMPLICATIONS: Primary | ICD-10-CM

## 2019-04-15 DIAGNOSIS — E78.2 MIXED HYPERLIPIDEMIA: ICD-10-CM

## 2019-04-15 PROCEDURE — 99214 OFFICE O/P EST MOD 30 MIN: CPT | Performed by: INTERNAL MEDICINE

## 2019-04-15 NOTE — PROGRESS NOTES
58 y.o.    Patient Care Team:  Nusrat Dent APRN as PCP - General (Family Medicine)  iKm Kathleen MD as PCP - Family Medicine  Kim Kathleen MD    Chief Complaint:    Follow up appointment/ type 2 diabetes mellitus  Subjective     HPI    59 y/o  female with pmhx of Type 2 dm, HLP is here for the follow up.   Pt was hospitalized in Dec 2018 with CVA.   She used to see Dr. FENG from Endocrine at [facility] but failed to follow up with her.   During her admission to hospital her a1c was 12%    Type 2 diabetes mellitus-diagnosed about 2 years ago.  Insulin was started on patient was hospitalized in December 2018.  Today in the clinic patient is on tresiba 45 units in the morning, NovoLog 10 units with breakfast and dinner.  Most of the working day she is skipping her NovoLog as she misses her lunch.  During the weekend she takes NovoLog 75% of the times when she is busy out shopping she might forget her NovoLog.  Also on metformin thousand milligrams twice daily.  She checks her blood sugars 1-2 times a day.  Reports that she feels her body and so she knows her high and low sugars.  She did get her glucometer for me to review.  Average blood sugars are around 130-140.  No significantly low blood sugars.  Highest blood sugar was 180.  She is still due for her eye examination, no history of diabetic retinopathy.  She reports that her eye examination appointment is tomorrow.  No tingling or numbness in her bilateral feet.  No ulcers or amputations of her toes.  Hx of CAD, no hx of CKD,hx of CVA per pt.   Pt is physically active. weight has been stable.   Pt tries to follow DM diet for most part, but eats candy once in a while.   On Ace inb.     Hyperlipidemia  On Lipitor 80 mg oral daily.      Reviewed primary care physician's/consulting physician documentation and lab results :     Interval History      The following portions of the patient's history were reviewed and updated as appropriate: allergies,  current medications, past family history, past medical history, past social history, past surgical history and problem list.    Past Medical History:   Diagnosis Date   • Abdominal pain    • Abnormal liver function test    • Acute bronchitis    • Benign essential hypertension    • CAD (coronary artery disease)    • CHF (congestive heart failure) (CMS/[facility])    • Colon polyp    • Congestive heart disease (CMS/HCC)    • COPD (chronic obstructive pulmonary disease) (CMS/[facility])    • Cough    • Diabetes (CMS/HCC)    • Diabetes mellitus (CMS/HCC)    • Diarrhea    • Gastroenteritis    • Health care maintenance    • Hyperlipidemia    • Hypertension    • IFG (impaired fasting glucose)    • Ischemic cardiomyopathy    • Myocardial infarction (CMS/HCC)    • SHELLIE (obstructive sleep apnea)    • Sore throat    • Type 2 diabetes mellitus (CMS/HCC)    • Vaginal yeast infection    • Vitamin D deficiency      Family History   Problem Relation Age of Onset   • Diabetes Mother    • Heart disease Mother    • Hyperlipidemia Mother    • Diabetes Father    • Heart disease Father    • Hyperlipidemia Father    • Heart disease Brother    • Hyperlipidemia Brother    • Hypertension Father    • Colon cancer Father    • Heart attack Father    • Emphysema Mother    • Heart disease Brother    • Heart attack Brother    • Heart disease Maternal Grandmother    • Heart disease Maternal Grandfather    • Heart disease Paternal Grandmother    • Heart disease Paternal Grandfather      Social History     Socioeconomic History   • Marital status:      Spouse name: Not on file   • Number of children: Not on file   • Years of education: Not on file   • Highest education level: Not on file   Tobacco Use   • Smoking status: Former Smoker     Packs/day: 1.00     Years: 30.00     Pack years: 30.00     Types: Cigarettes     Start date: 1979     Last attempt to quit: 2009     Years since quittin.4   • Smokeless tobacco: Never Used   • Tobacco comment:  QUIT 10 YRS   Substance and Sexual Activity   • Alcohol use: Yes     Comment: occasionally   • Drug use: No   • Sexual activity: Yes     Partners: Male   Social History Narrative    ** Merged History Encounter **          No Known Allergies    Current Outpatient Medications:   •  ACCU-CHEK LONNY PLUS test strip, To check 3 - 4 times a day, Disp: 200 each, Rfl: 2  •  atorvastatin (LIPITOR) 80 MG tablet, Take 1 tablet by mouth Daily., Disp: 90 tablet, Rfl: 2  •  Blood Glucose Monitoring Suppl (ACCU-CHEK LONNY PLUS) w/Device kit, 1 device, Disp: 1 kit, Rfl: 0  •  carvedilol (COREG) 25 MG tablet, Take 1 tablet by mouth 2 (Two) Times a Day With Meals., Disp: 180 tablet, Rfl: 2  •  Cholecalciferol (VITAMIN D3) 2000 UNITS capsule, Take 1,000 Units by mouth Daily., Disp: , Rfl:   •  clopidogrel (PLAVIX) 75 MG tablet, TAKE 1 TABLET BY MOUTH EVERY DAY, Disp: 120 tablet, Rfl: 0  •  Continuous Blood Gluc  (FREESTYLE SHAQUILLE 14 DAY READER) device, 1 Device As Needed (USE TO CHECK BLOOD GLUCOSE LEVELS)., Disp: 1 Device, Rfl: 0  •  Continuous Blood Gluc Sensor (FREESTYLE SHAQUILLE 14 DAY SENSOR) misc, 1 application As Needed (PLACE SENSOR ON UPPER ARM EVERY 14 DAYS)., Disp: 2 each, Rfl: 3  •  furosemide (LASIX) 40 MG tablet, Take 1 tablet by mouth Daily., Disp: 90 tablet, Rfl: 2  •  insulin aspart (NOVOLOG FLEXPEN) 100 UNIT/ML solution pen-injector sc pen, Inject 15 Units under the skin into the appropriate area as directed 3 (Three) Times a Day With Meals., Disp: 15 mL, Rfl: 3  •  Insulin Degludec (TRESIBA FLEXTOUCH) 200 UNIT/ML solution pen-injector, Inject 55 Units under the skin into the appropriate area as directed Daily. (Patient taking differently: Inject 45 Units under the skin into the appropriate area as directed Daily.), Disp: 15 mL, Rfl: 3  •  Insulin Pen Needle (BD PEN NEEDLE NADEEN U/F) 32G X 4 MM misc, TO INJECT 4 TIMES DAILY, Disp: 100 each, Rfl: 3  •  Lancets (ACCU-CHEK MULTICLIX) lancets, To check 3 - 4 times a  "day, Disp: 200 each, Rfl: 2  •  metFORMIN (GLUCOPHAGE) 1000 MG tablet, Take 1 tablet by mouth 2 (Two) Times a Day With Meals., Disp: 60 tablet, Rfl: 11  •  Multiple Vitamins-Minerals (MULTIVITAL PO), Take 1 tablet by mouth Daily., Disp: , Rfl:   •  nitroglycerin (NITROSTAT) 0.4 MG SL tablet, Place 1 tablet under the tongue Every 5 (Five) Minutes As Needed for Chest Pain. Take no more than 3 doses in 15 minutes., Disp: 30 tablet, Rfl: 12  •  Omega-3 Fatty Acids (FISH OIL) 1200 MG capsule capsule, Take 1,200 mg by mouth Daily With Breakfast., Disp: , Rfl:   •  potassium chloride (K-DUR) 10 MEQ CR tablet, TAKE 1 TABLET BY MOUTH EVERY DAY, Disp: 90 tablet, Rfl: 2  •  rivaroxaban (XARELTO) 20 MG tablet, Take 1 tablet by mouth Daily., Disp: 90 tablet, Rfl: 1  •  spironolactone (ALDACTONE) 25 MG tablet, Take 1 tablet by mouth Daily., Disp: 90 tablet, Rfl: 2  •  topiramate (TOPAMAX) 25 MG tablet, Take 1 tablet by mouth Every Night., Disp: 30 tablet, Rfl: 2  •  vitamin B-12 (CYANOCOBALAMIN) 500 MCG tablet, Take 500 mcg by mouth Daily., Disp: , Rfl:         Review of Systems   Constitutional: Positive for fatigue. Negative for fever.   Eyes: Negative for visual disturbance.   Respiratory: Negative for shortness of breath.    Cardiovascular: Negative for palpitations and leg swelling.   Gastrointestinal: Negative for abdominal pain and vomiting.   Endocrine: Negative for polydipsia and polyuria.   Musculoskeletal: Negative for joint swelling and neck pain.   Skin: Negative for rash.   Neurological: Negative for weakness and numbness.   Psychiatric/Behavioral: Negative for behavioral problems.       Objective       Vitals:    04/15/19 1126   BP: 124/70   Pulse: 69   SpO2: 99%   Weight: 98 kg (216 lb)   Height: 152.4 cm (60\")     Body mass index is 42.18 kg/m².      Physical Exam   Constitutional: She is oriented to person, place, and time. She appears well-nourished.   Obese     HENT:   Head: Normocephalic and atraumatic. "   Wide neck   Eyes: Conjunctivae and EOM are normal. No scleral icterus.   Neck: Normal range of motion. Neck supple. No thyromegaly present.   Acanthosis nigricans   Cardiovascular: Normal rate and normal heart sounds.   Pulmonary/Chest: Effort normal and breath sounds normal. No stridor. She has no wheezes.   Abdominal: Soft. Bowel sounds are normal. She exhibits no distension. There is no tenderness.   Central obesity   Musculoskeletal: She exhibits no edema or tenderness.   Neurological: She is alert and oriented to person, place, and time.   Skin: Skin is warm and dry. She is not diaphoretic.   Psychiatric: She has a normal mood and affect.   Vitals reviewed.    Results Review:     I reviewed the patient's new clinical results and mentioned them above in HPI and in plan as well.    Medical records reviewed  Summary: done      Results Encounter on 04/03/2019   Component Date Value Ref Range Status   • Glucose 04/01/2019 155* 65 - 99 mg/dL Final   • BUN 04/01/2019 19  6 - 20 mg/dL Final   • Creatinine 04/01/2019 1.02* 0.57 - 1.00 mg/dL Final   • eGFR Non African Am 04/01/2019 56* >60 mL/min/1.73 Final   • eGFR African Am 04/01/2019 68  >60 mL/min/1.73 Final   • BUN/Creatinine Ratio 04/01/2019 18.6  7.0 - 25.0 Final   • Sodium 04/01/2019 140  136 - 145 mmol/L Final   • Potassium 04/01/2019 4.2  3.5 - 5.2 mmol/L Final   • Chloride 04/01/2019 103  98 - 107 mmol/L Final   • Total CO2 04/01/2019 23.6  22.0 - 29.0 mmol/L Final   • Calcium 04/01/2019 10.7* 8.6 - 10.5 mg/dL Final   • Hemoglobin A1C 04/01/2019 6.60* 4.80 - 5.60 % Final    Comment: Hemoglobin A1C Ranges:  Increased Risk for Diabetes  5.7% to 6.4%  Diabetes                     >= 6.5%  Diabetic Goal                < 7.0%     • Total Cholesterol 04/01/2019 154  0 - 200 mg/dL Final   • Triglycerides 04/01/2019 207* 0 - 150 mg/dL Final   • HDL Cholesterol 04/01/2019 39* 40 - 60 mg/dL Final   • VLDL Cholesterol 04/01/2019 41.4  mg/dL Final   • LDL Cholesterol   04/01/2019 74  0 - 100 mg/dL Final   • TSH 04/01/2019 1.330  0.270 - 4.200 mIU/mL Final   • Vitamin B-12 04/01/2019 277  211 - 946 pg/mL Final   • Folate 04/01/2019 10.80  4.78 - 24.20 ng/mL Final   • 25 Hydroxy, Vitamin D 04/01/2019 29.2* 30.0 - 100.0 ng/ml Final    Comment: Reference Range for Total Vitamin D 25(OH)  Deficiency <20.0 ng/mL  Insufficiency 21-29 ng/mL  Sufficiency  ng/mL  Toxicity >100 ng/ml     • Free T4 04/01/2019 1.22  0.93 - 1.70 ng/dL Final   • Interpretation 04/01/2019 Note   Final    Supplemental report is available.   • PDF Image 04/01/2019 Not applicable   Final     Lab Results   Component Value Date    HGBA1C 6.60 (H) 04/01/2019    HGBA1C 12.60 (H) 12/09/2018    HGBA1C 14.3 (H) 07/15/2015     Lab Results   Component Value Date    CREATININE 1.02 (H) 04/01/2019     Imaging Results (most recent)     None                Assessment and Plan:    Daisy was seen today for diabetes.    Diagnoses and all orders for this visit:    Diabetes mellitus type 2, uncontrolled, with complications (CMS/[facility])  -     Hemoglobin A1c; Future  -     Basic Metabolic Panel; Future  -     Lipid Panel; Future  -     TSH; Future  -     Vitamin B12 & Folate; Future  -     Vitamin D 25 Hydroxy; Future    Mixed hyperlipidemia  -     Hemoglobin A1c; Future  -     Basic Metabolic Panel; Future  -     Lipid Panel; Future  -     TSH; Future  -     Vitamin B12 & Folate; Future  -     Vitamin D 25 Hydroxy; Future    Diabetes mellitus type 2, noninsulin dependent (CMS/HCC)  -     Hemoglobin A1c; Future  -     Basic Metabolic Panel; Future  -     Lipid Panel; Future  -     TSH; Future  -     Vitamin B12 & Folate; Future  -     Vitamin D 25 Hydroxy; Future      Type 2 diabetes mellitus-uncontrolled  Discussed with the patient to be compliant with her insulin regimen  Advised the patient not to miss whole insulin dosages.  Continue the current insulin regimen  Continue metformin thousand milligrams twice  "daily.    Hyperlipidemia  Continue Lipitor 80 mg oral daily.      Reviewed Lab results with the patient.             Abraham Fitch MD  04/15/19    [facility] Dragon / transcription disclaimer:     \"Dictated utilizing Dragon dictation\".  "

## 2019-04-19 DIAGNOSIS — I25.10 CORONARY ARTERY DISEASE INVOLVING NATIVE CORONARY ARTERY OF NATIVE HEART WITHOUT ANGINA PECTORIS: ICD-10-CM

## 2019-04-22 RX ORDER — CLOPIDOGREL BISULFATE 75 MG/1
TABLET ORAL
Qty: 90 TABLET | Refills: 2 | Status: SHIPPED | OUTPATIENT
Start: 2019-04-22 | End: 2020-01-23

## 2019-05-16 ENCOUNTER — PATIENT MESSAGE (OUTPATIENT)
Dept: CARDIOLOGY | Facility: CLINIC | Age: 59
End: 2019-05-16

## 2019-06-10 RX ORDER — SPIRONOLACTONE 25 MG/1
TABLET ORAL
Qty: 90 TABLET | Refills: 1 | Status: SHIPPED | OUTPATIENT
Start: 2019-06-10 | End: 2019-12-15 | Stop reason: SDUPTHER

## 2019-06-10 RX ORDER — FUROSEMIDE 40 MG/1
TABLET ORAL
Qty: 90 TABLET | Refills: 1 | Status: SHIPPED | OUTPATIENT
Start: 2019-06-10 | End: 2019-12-15 | Stop reason: SDUPTHER

## 2019-06-11 RX ORDER — INSULIN DEGLUDEC 200 U/ML
INJECTION, SOLUTION SUBCUTANEOUS
Qty: 15 ML | Refills: 2 | Status: SHIPPED | OUTPATIENT
Start: 2019-06-11 | End: 2019-07-29

## 2019-06-16 ENCOUNTER — CLINICAL SUPPORT NO REQUIREMENTS (OUTPATIENT)
Dept: CARDIOLOGY | Facility: CLINIC | Age: 59
End: 2019-06-16

## 2019-06-16 DIAGNOSIS — I42.9 CARDIOMYOPATHY, UNSPECIFIED TYPE (HCC): Primary | ICD-10-CM

## 2019-06-16 PROCEDURE — 93295 DEV INTERROG REMOTE 1/2/MLT: CPT | Performed by: INTERNAL MEDICINE

## 2019-06-16 PROCEDURE — 93296 REM INTERROG EVL PM/IDS: CPT | Performed by: INTERNAL MEDICINE

## 2019-06-17 RX ORDER — RIVAROXABAN 20 MG/1
TABLET, FILM COATED ORAL
Qty: 90 TABLET | Refills: 0 | Status: SHIPPED | OUTPATIENT
Start: 2019-06-17 | End: 2019-09-28 | Stop reason: SDUPTHER

## 2019-06-24 ENCOUNTER — CLINICAL SUPPORT NO REQUIREMENTS (OUTPATIENT)
Dept: CARDIOLOGY | Facility: CLINIC | Age: 59
End: 2019-06-24

## 2019-06-24 DIAGNOSIS — I42.9 CARDIOMYOPATHY, UNSPECIFIED TYPE (HCC): Primary | ICD-10-CM

## 2019-07-14 ENCOUNTER — RESULTS ENCOUNTER (OUTPATIENT)
Dept: ENDOCRINOLOGY | Age: 59
End: 2019-07-14

## 2019-07-14 DIAGNOSIS — E78.2 MIXED HYPERLIPIDEMIA: ICD-10-CM

## 2019-07-14 DIAGNOSIS — E11.9 DIABETES MELLITUS TYPE 2, NONINSULIN DEPENDENT (HCC): ICD-10-CM

## 2019-07-14 DIAGNOSIS — IMO0002 DIABETES MELLITUS TYPE 2, UNCONTROLLED, WITH COMPLICATIONS: ICD-10-CM

## 2019-07-15 LAB
25(OH)D3+25(OH)D2 SERPL-MCNC: 29.7 NG/ML (ref 30–100)
BUN SERPL-MCNC: 11 MG/DL (ref 6–20)
BUN/CREAT SERPL: 12 (ref 7–25)
CALCIUM SERPL-MCNC: 9.2 MG/DL (ref 8.6–10.5)
CHLORIDE SERPL-SCNC: 99 MMOL/L (ref 98–107)
CHOLEST SERPL-MCNC: 172 MG/DL (ref 0–200)
CO2 SERPL-SCNC: 26.8 MMOL/L (ref 22–29)
CREAT SERPL-MCNC: 0.92 MG/DL (ref 0.57–1)
FOLATE SERPL-MCNC: 9.22 NG/ML (ref 4.78–24.2)
GLUCOSE SERPL-MCNC: 181 MG/DL (ref 65–99)
HBA1C MFR BLD: 7.5 % (ref 4.8–5.6)
HDLC SERPL-MCNC: 37 MG/DL (ref 40–60)
INTERPRETATION: NORMAL
LDLC SERPL CALC-MCNC: 97 MG/DL (ref 0–100)
Lab: NORMAL
POTASSIUM SERPL-SCNC: 4.1 MMOL/L (ref 3.5–5.2)
SODIUM SERPL-SCNC: 140 MMOL/L (ref 136–145)
TRIGL SERPL-MCNC: 188 MG/DL (ref 0–150)
TSH SERPL DL<=0.005 MIU/L-ACNC: 1.9 MIU/ML (ref 0.27–4.2)
VIT B12 SERPL-MCNC: 231 PG/ML (ref 211–946)
VLDLC SERPL CALC-MCNC: 37.6 MG/DL

## 2019-07-29 ENCOUNTER — OFFICE VISIT (OUTPATIENT)
Dept: ENDOCRINOLOGY | Age: 59
End: 2019-07-29

## 2019-07-29 VITALS
WEIGHT: 216 LBS | BODY MASS INDEX: 42.41 KG/M2 | DIASTOLIC BLOOD PRESSURE: 70 MMHG | SYSTOLIC BLOOD PRESSURE: 122 MMHG | OXYGEN SATURATION: 96 % | HEIGHT: 60 IN | HEART RATE: 74 BPM

## 2019-07-29 DIAGNOSIS — IMO0002 DIABETES MELLITUS TYPE 2, UNCONTROLLED, WITH COMPLICATIONS: Primary | ICD-10-CM

## 2019-07-29 DIAGNOSIS — E78.2 MIXED HYPERLIPIDEMIA: ICD-10-CM

## 2019-07-29 PROCEDURE — 99214 OFFICE O/P EST MOD 30 MIN: CPT | Performed by: INTERNAL MEDICINE

## 2019-07-29 RX ORDER — FLASH GLUCOSE SENSOR
1 KIT MISCELLANEOUS AS NEEDED
Qty: 2 EACH | Refills: 3 | Status: SHIPPED | OUTPATIENT
Start: 2019-07-29 | End: 2019-11-07

## 2019-07-29 RX ORDER — FLASH GLUCOSE SCANNING READER
1 EACH MISCELLANEOUS AS NEEDED
Qty: 1 DEVICE | Refills: 0 | Status: SHIPPED | OUTPATIENT
Start: 2019-07-29 | End: 2019-11-07

## 2019-07-29 NOTE — PROGRESS NOTES
58 y.o.    Patient Care Team:  Nusrat Dent APRN as PCP - General (Family Medicine)  Kim Kathleen MD as PCP - Family Medicine  Kim Kathleen MD    Chief Complaint:    Follow up/ Type 2 Diabetes Mellitus   Subjective     HPI    59 y/o  female with pmhx of Type 2 dm, HLP is here for the follow up.   Pt was hospitalized in Dec 2018 with CVA.   She used to see Dr. FENG from Endocrine at [facility] but failed to follow up with her.   During her admission to hospital her a1c was 12%     Type 2 diabetes mellitus-diagnosed about 2 years ago.  Insulin was started on patient was hospitalized in December 2018.  Today in clinic patient reports that she is on Tresiba 45 units at bedtime, NovoLog 10/12/2010 units with each meal.  Also on NovoLog moderate dose sliding scale.  Metformin thousand milligrams oral daily only.  She could not tolerate the full dose of metformin.  Also following the NovoLog sliding scale moderate dose.  She has recently been on vacation and as a result her diet control for diabetes has not been ideal.  She has also been checking her blood sugars 1-2 times a day only.  Most of her blood sugars are around 150-180.  She is still due for her eye examination, no history of diabetic retinopathy.  She reports that her eye examination appointment is tomorrow.  No tingling or numbness in her bilateral feet.  No ulcers or amputations of her toes.  Hx of CAD, no hx of CKD,hx of CVA per pt.   Pt is physically active. weight has been stable.   Pt tries to follow DM diet for most part, but eats candy once in a while.   On Ace inb.     Hyperlipidemia  On Lipitor 80 mg oral daily.        Reviewed primary care physician's/consulting physician documentation and lab results :     Interval History      The following portions of the patient's history were reviewed and updated as appropriate: allergies, current medications, past family history, past medical history, past social history, past surgical history  and problem list.    Past Medical History:   Diagnosis Date   • Abdominal pain    • Abnormal liver function test    • Acute bronchitis    • Benign essential hypertension    • CAD (coronary artery disease)    • CHF (congestive heart failure) (CMS/[facility])    • Colon polyp    • Congestive heart disease (CMS/HCC)    • COPD (chronic obstructive pulmonary disease) (CMS/HCC)    • Cough    • Diabetes (CMS/[facility])    • Diabetes mellitus (CMS/[facility])    • Diarrhea    • Gastroenteritis    • Health care maintenance    • Hyperlipidemia    • Hypertension    • IFG (impaired fasting glucose)    • Ischemic cardiomyopathy    • Myocardial infarction (CMS/[facility])    • SHELLIE (obstructive sleep apnea)    • Sore throat    • Type 2 diabetes mellitus (CMS/HCC)    • Vaginal yeast infection    • Vitamin D deficiency      Family History   Problem Relation Age of Onset   • Diabetes Mother    • Heart disease Mother    • Hyperlipidemia Mother    • Diabetes Father    • Heart disease Father    • Hyperlipidemia Father    • Heart disease Brother    • Hyperlipidemia Brother    • Hypertension Father    • Colon cancer Father    • Heart attack Father    • Emphysema Mother    • Heart disease Brother    • Heart attack Brother    • Heart disease Maternal Grandmother    • Heart disease Maternal Grandfather    • Heart disease Paternal Grandmother    • Heart disease Paternal Grandfather      Social History     Socioeconomic History   • Marital status:      Spouse name: Not on file   • Number of children: Not on file   • Years of education: Not on file   • Highest education level: Not on file   Tobacco Use   • Smoking status: Former Smoker     Packs/day: 1.00     Years: 30.00     Pack years: 30.00     Types: Cigarettes     Start date: 1979     Last attempt to quit: 2009     Years since quittin.7   • Smokeless tobacco: Never Used   • Tobacco comment: QUIT 10 YRS   Substance and Sexual Activity   • Alcohol use: Yes     Comment: occasionally   • Drug use:  No   • Sexual activity: Yes     Partners: Male   Social History Narrative    ** Merged History Encounter **          No Known Allergies    Current Outpatient Medications:   •  ACCU-CHEK LONNY PLUS test strip, To check 3 - 4 times a day, Disp: 200 each, Rfl: 2  •  atorvastatin (LIPITOR) 80 MG tablet, Take 1 tablet by mouth Daily., Disp: 90 tablet, Rfl: 2  •  Blood Glucose Monitoring Suppl (ACCU-CHEK LONNY PLUS) w/Device kit, 1 device, Disp: 1 kit, Rfl: 0  •  carvedilol (COREG) 25 MG tablet, Take 1 tablet by mouth 2 (Two) Times a Day With Meals., Disp: 180 tablet, Rfl: 2  •  Cholecalciferol (VITAMIN D3) 2000 UNITS capsule, Take 1,000 Units by mouth Daily., Disp: , Rfl:   •  clopidogrel (PLAVIX) 75 MG tablet, TAKE 1 TABLET BY MOUTH EVERY DAY, Disp: 90 tablet, Rfl: 2  •  Continuous Blood Gluc  (FREESTYLE SHAQUILLE 14 DAY READER) device, 1 Device As Needed (USE TO CHECK BLOOD GLUCOSE LEVELS)., Disp: 1 Device, Rfl: 0  •  Continuous Blood Gluc Sensor (FREESTYLE SHAQUILLE 14 DAY SENSOR) misc, 1 application As Needed (PLACE SENSOR ON UPPER ARM EVERY 14 DAYS)., Disp: 2 each, Rfl: 3  •  insulin aspart (NOVOLOG FLEXPEN) 100 UNIT/ML solution pen-injector sc pen, Inject 15 Units under the skin into the appropriate area as directed 3 (Three) Times a Day With Meals., Disp: 15 mL, Rfl: 3  •  Insulin Degludec (TRESIBA FLEXTOUCH) 200 UNIT/ML solution pen-injector, Inject 55 Units under the skin into the appropriate area as directed Daily. (Patient taking differently: Inject 45 Units under the skin into the appropriate area as directed Daily.), Disp: 15 mL, Rfl: 3  •  Insulin Pen Needle (BD PEN NEEDLE NADEEN U/F) 32G X 4 MM misc, TO INJECT 4 TIMES DAILY, Disp: 200 each, Rfl: 3  •  Lancets (ACCU-CHEK MULTICLIX) lancets, To check 3 - 4 times a day, Disp: 200 each, Rfl: 2  •  metFORMIN (GLUCOPHAGE) 1000 MG tablet, Take 1 tablet by mouth 2 (Two) Times a Day With Meals., Disp: 60 tablet, Rfl: 11  •  Multiple Vitamins-Minerals (MULTIVITAL PO),  "Take 1 tablet by mouth Daily., Disp: , Rfl:   •  nitroglycerin (NITROSTAT) 0.4 MG SL tablet, Place 1 tablet under the tongue Every 5 (Five) Minutes As Needed for Chest Pain. Take no more than 3 doses in 15 minutes., Disp: 30 tablet, Rfl: 12  •  Omega-3 Fatty Acids (FISH OIL) 1200 MG capsule capsule, Take 1,200 mg by mouth Daily With Breakfast., Disp: , Rfl:   •  potassium chloride (K-DUR) 10 MEQ CR tablet, TAKE 1 TABLET BY MOUTH EVERY DAY, Disp: 90 tablet, Rfl: 2  •  spironolactone (ALDACTONE) 25 MG tablet, TAKE 1 TABLET BY MOUTH EVERY DAY, Disp: 90 tablet, Rfl: 1  •  vitamin B-12 (CYANOCOBALAMIN) 500 MCG tablet, Take 500 mcg by mouth Daily., Disp: , Rfl:   •  XARELTO 20 MG tablet, TAKE 1 TABLET BY MOUTH EVERY DAY, Disp: 90 tablet, Rfl: 0  •  furosemide (LASIX) 40 MG tablet, TAKE 1 TABLET BY MOUTH EVERY DAY, Disp: 90 tablet, Rfl: 1  •  topiramate (TOPAMAX) 25 MG tablet, Take 1 tablet by mouth Every Night., Disp: 30 tablet, Rfl: 2        Review of Systems   Constitutional: Negative for appetite change, fatigue and fever.   Eyes: Negative for visual disturbance.   Respiratory: Negative for shortness of breath.    Cardiovascular: Negative for palpitations and leg swelling.   Gastrointestinal: Negative for abdominal pain and vomiting.   Endocrine: Negative for polydipsia and polyuria.   Musculoskeletal: Negative for joint swelling and neck pain.   Skin: Negative for rash.   Neurological: Positive for numbness. Negative for weakness.   Psychiatric/Behavioral: Negative for behavioral problems.       Objective       Vitals:    07/29/19 1310   BP: 122/70   Pulse: 74   SpO2: 96%   Weight: 98 kg (216 lb)   Height: 152.4 cm (60\")     Body mass index is 42.18 kg/m².      Physical Exam   Constitutional: She is oriented to person, place, and time. She appears well-nourished.   Obese     HENT:   Head: Normocephalic and atraumatic.   Wide neck   Eyes: Conjunctivae and EOM are normal. No scleral icterus.   Neck: Normal range of " motion. Neck supple. No thyromegaly present.   Acanthosis nigricans   Cardiovascular: Normal rate and normal heart sounds.   Pulmonary/Chest: Effort normal and breath sounds normal. No stridor. She has no wheezes.   Abdominal: Soft. Bowel sounds are normal. She exhibits no distension. There is no tenderness.   Central obesity   Musculoskeletal: She exhibits no edema or tenderness.   Neurological: She is alert and oriented to person, place, and time.   Skin: Skin is warm and dry. She is not diaphoretic.   Psychiatric: She has a normal mood and affect.   Vitals reviewed.    Results Review:     I reviewed the patient's new clinical results and mentioned them above in HPI and in plan as well.    Medical records reviewed  Summary:done      Results Encounter on 07/14/2019   Component Date Value Ref Range Status   • Hemoglobin A1C 07/15/2019 7.50* 4.80 - 5.60 % Final    Comment: Hemoglobin A1C Ranges:  Increased Risk for Diabetes  5.7% to 6.4%  Diabetes                     >= 6.5%  Diabetic Goal                < 7.0%     • Glucose 07/15/2019 181* 65 - 99 mg/dL Final   • BUN 07/15/2019 11  6 - 20 mg/dL Final   • Creatinine 07/15/2019 0.92  0.57 - 1.00 mg/dL Final   • eGFR Non  Am 07/15/2019 63  >60 mL/min/1.73 Final   • eGFR African Am 07/15/2019 76  >60 mL/min/1.73 Final   • BUN/Creatinine Ratio 07/15/2019 12.0  7.0 - 25.0 Final   • Sodium 07/15/2019 140  136 - 145 mmol/L Final   • Potassium 07/15/2019 4.1  3.5 - 5.2 mmol/L Final   • Chloride 07/15/2019 99  98 - 107 mmol/L Final   • Total CO2 07/15/2019 26.8  22.0 - 29.0 mmol/L Final   • Calcium 07/15/2019 9.2  8.6 - 10.5 mg/dL Final   • Total Cholesterol 07/15/2019 172  0 - 200 mg/dL Final   • Triglycerides 07/15/2019 188* 0 - 150 mg/dL Final   • HDL Cholesterol 07/15/2019 37* 40 - 60 mg/dL Final   • VLDL Cholesterol 07/15/2019 37.6  mg/dL Final   • LDL Cholesterol  07/15/2019 97  0 - 100 mg/dL Final   • TSH 07/15/2019 1.900  0.270 - 4.200 mIU/mL Final   • Vitamin  B-12 07/15/2019 231  211 - 946 pg/mL Final   • Folate 07/15/2019 9.22  4.78 - 24.20 ng/mL Final   • 25 Hydroxy, Vitamin D 07/15/2019 29.7* 30.0 - 100.0 ng/ml Final    Comment: Reference Range for Total Vitamin D 25(OH)  Deficiency <20.0 ng/mL  Insufficiency 21-29 ng/mL  Sufficiency  ng/mL  Toxicity >100 ng/ml     • Interpretation 07/15/2019 Note   Final    Supplemental report is available.   • PDF Image 07/15/2019 Not applicable   Final     Lab Results   Component Value Date    HGBA1C 7.50 (H) 07/15/2019    HGBA1C 6.60 (H) 04/01/2019    HGBA1C 12.60 (H) 12/09/2018     Lab Results   Component Value Date    MICROALBUR 0.1 05/07/2018    CREATININE 0.92 07/15/2019     Imaging Results (most recent)     None                Assessment and Plan:    Daisy was seen today for diabetes.    Diagnoses and all orders for this visit:    Diabetes mellitus type 2, uncontrolled, with complications (CMS/[facility])  -     Hemoglobin A1c; Future  -     Basic Metabolic Panel; Future  -     Microalbumin / Creatinine Urine Ratio - Urine, Clean Catch; Future    Mixed hyperlipidemia  -     Hemoglobin A1c; Future  -     Basic Metabolic Panel; Future  -     Microalbumin / Creatinine Urine Ratio - Urine, Clean Catch; Future    Other orders  -     Continuous Blood Gluc  (FREESTYLE SHAQUILLE 14 DAY READER) device; 1 Device As Needed (USE TO CHECK BLOOD GLUCOSE LEVELS).  -     Continuous Blood Gluc Sensor (FREESTYLE SHAQUILLE 14 DAY SENSOR) misc; 1 application As Needed (PLACE SENSOR ON UPPER ARM EVERY 14 DAYS).      Type 2 diabetes mellitus-uncontrolled  HbA1c is worse since last visit  Continue the current insulin regimen  Discussed with the patient about being compliant with her NovoLog doses especially at lunchtime.  Continue metformin thousand milligrams oral daily only.  Discussed with the patient about adding Januvia, patient would like to work on her diet before she goes on on your medication.    Emphasized to the patient to contact us  "if her blood sugars are consistently over 200 so that way we could either change her insulin regimen or add the Januvia sooner than later.    Hyperlipidemia  Continue Lipitor 80 mg oral daily.    Reviewed Lab results with the patient.             Abraham Fitch MD  07/29/19    EMR Dragon / transcription disclaimer:     \"Dictated utilizing Dragon dictation\".  "

## 2019-07-29 NOTE — PATIENT INSTRUCTIONS
Continue tresiba 45 units but to take at bedtime.  NovoLog 10 units with breakfast, 12 units with lunch and 12 units with dinner.  Will start patient on moderate dose NovoLog sliding scale with each meal only.   BG less than 150 - zero  151 - 200 - 2 unit  201 - 250 - 4 units  251 - 300 - 6 units  301 - 350 - 8 units  Above 350 mg/dl - 10 units.     Metformin 1000 mg oral daily.

## 2019-08-08 ENCOUNTER — OFFICE VISIT (OUTPATIENT)
Dept: CARDIOLOGY | Facility: CLINIC | Age: 59
End: 2019-08-08

## 2019-08-08 ENCOUNTER — CLINICAL SUPPORT NO REQUIREMENTS (OUTPATIENT)
Dept: CARDIOLOGY | Facility: CLINIC | Age: 59
End: 2019-08-08

## 2019-08-08 VITALS
BODY MASS INDEX: 42.8 KG/M2 | DIASTOLIC BLOOD PRESSURE: 68 MMHG | HEIGHT: 60 IN | SYSTOLIC BLOOD PRESSURE: 118 MMHG | WEIGHT: 218 LBS | HEART RATE: 66 BPM

## 2019-08-08 DIAGNOSIS — Z95.5 S/P CORONARY ARTERY STENT PLACEMENT: ICD-10-CM

## 2019-08-08 DIAGNOSIS — I63.412 CEREBROVASCULAR ACCIDENT (CVA) DUE TO EMBOLISM OF LEFT MIDDLE CEREBRAL ARTERY (HCC): ICD-10-CM

## 2019-08-08 DIAGNOSIS — I42.9 CARDIOMYOPATHY, UNSPECIFIED TYPE (HCC): Primary | ICD-10-CM

## 2019-08-08 DIAGNOSIS — I10 BENIGN ESSENTIAL HYPERTENSION: ICD-10-CM

## 2019-08-08 DIAGNOSIS — E11.9 DIABETES MELLITUS TYPE 2, NONINSULIN DEPENDENT (HCC): ICD-10-CM

## 2019-08-08 DIAGNOSIS — Z95.810 AUTOMATIC IMPLANTABLE CARDIOVERTER-DEFIBRILLATOR IN SITU: ICD-10-CM

## 2019-08-08 DIAGNOSIS — I25.10 CHRONIC CORONARY ARTERY DISEASE: ICD-10-CM

## 2019-08-08 DIAGNOSIS — I21.09 ACUTE MYOCARDIAL INFARCTION OF ANTERIOR WALL (HCC): ICD-10-CM

## 2019-08-08 DIAGNOSIS — E78.2 MIXED HYPERLIPIDEMIA: ICD-10-CM

## 2019-08-08 PROCEDURE — 99214 OFFICE O/P EST MOD 30 MIN: CPT | Performed by: INTERNAL MEDICINE

## 2019-08-08 PROCEDURE — 93000 ELECTROCARDIOGRAM COMPLETE: CPT | Performed by: INTERNAL MEDICINE

## 2019-08-08 PROCEDURE — 93290 INTERROG DEV EVAL ICPMS IP: CPT | Performed by: INTERNAL MEDICINE

## 2019-08-08 PROCEDURE — 93283 PRGRMG EVAL IMPLANTABLE DFB: CPT | Performed by: INTERNAL MEDICINE

## 2019-08-08 NOTE — PROGRESS NOTES
Subjective:     Encounter Date:08/08/2019      Patient ID: Daisy Dent is a 58 y.o. female.    Chief Complaint:  History of Present Illness    This is a 58-year-old female with a history of ischemic cardiomyopathy, last ejection fraction of 40-45%, diabetes mellitus type 2, obstructive sleep apnea on CPAP, hypertension, status post dual-chamber AICD placement, dyslipidemia, coronary artery disease with a history of prior anterior myocardial infarction and LAD stent placement, status post left MCA stroke, who presents for follow-up.     I began following the patient in 11/2015 when she presented to establish care.  Prior to that she was followed by Dr. Ayala with [facility].  Her prior cardiac history includes a non-ST elevation MI and 7/2006 at which time she received a Cypher 3.0 x 23 mm stent to her mid LAD.  She then presented in 10/2009 with a late in-stent thrombosis that required repeat stenting of her mid LAD in addition to a second drug-eluting stent placement of her left circumflex artery.  Following that she had a repeat cardiac catheterization in 2010 that was reportedly unremarkable.  Following her myocardial infarction in 2009 she developed ischemic myopathy with an EF of around 25% and ultimately underwent AICD placement and 7/2010.  At some point in the course of her follow-ups her left ventricular function improved with an ejection fraction of 40-45%.  A stress test showed evidence of a large anterior infarct and minimal elva-infarct ischemia.     In her follow-ups she has done fairly well recently when she reported more fatigued than normal and episodes of chest discomfort that occurred while she was exercising on the treadmill.  She underwent  an echocardiogram in 3/2017 that showed an ejection fraction of 40-45%.  Due to continued symptoms we went ahead and proceeded with a PET stress test which was performed in 8/2017 that showed medium sized anterior wall infarct with mild  elva-infarct ischemia and a post stress ejection fraction of 58%.      In 11/2017 her AICD was noted to be at CHELLE.  She subsequently underwent generator replacement on 1/12/2018 with Dr. Ornelas. When I saw her back in follow up in 9/2018 she reported that she denied any new or worsening symptoms.       She was admitted and 12/2018 after she presented with aphasia.  On arrival to the hospital she underwent a CT of the head that showed no acute stroke but a perfusion scan showed a large perfusion mismatch.  A CTA showed evidence of a left MCA thrombus.  She was given TPA at this time with improvement in her symptoms.  Neurology felt that her stroke was likely embolic.  She underwent a repeat echocardiogram during her admission that showed moderately depressed left ventricular systolic function with an EF of 30%, grade 1 diastolic dysfunction, and no significant valvular disease.  Based on the likelihood that this was an embolic stroke I opted to go ahead and recommend anticoagulation.  She was subsequently started on rivaroxaban 20 mg a day.  Her aspirin was stopped and she was continued on clopidogrel.     She presents today for routine six-month follow-up.  She reports overall she continues to improve since her stroke although she still has residual fatigue.  She denies any chest pain, shortness of breath, PND or orthopnea, near-syncope or syncope, or palpitations.  She does have some intermittent lower extremity edema that is stable and unchanged.  She underwent device interrogation in the office today that showed normal function with no recent events.       Review of Systems   Constitution: Negative for weakness and malaise/fatigue.   HENT: Negative for hearing loss, hoarse voice, nosebleeds and sore throat.    Eyes: Negative for pain.   Cardiovascular: Positive for leg swelling. Negative for chest pain, claudication, cyanosis, dyspnea on exertion, irregular heartbeat, near-syncope, orthopnea, palpitations,  paroxysmal nocturnal dyspnea and syncope.   Respiratory: Negative for shortness of breath and snoring.    Endocrine: Negative for cold intolerance, heat intolerance, polydipsia, polyphagia and polyuria.   Skin: Negative for itching and rash.   Musculoskeletal: Negative for arthritis, falls, joint pain, joint swelling, muscle cramps, muscle weakness and myalgias.   Gastrointestinal: Negative for constipation, diarrhea, dysphagia, heartburn, hematemesis, hematochezia, melena, nausea and vomiting.   Genitourinary: Negative for frequency, hematuria and hesitancy.   Neurological: Negative for excessive daytime sleepiness, dizziness, headaches, light-headedness and numbness.   Psychiatric/Behavioral: Negative for depression. The patient is not nervous/anxious.           Current Outpatient Medications:   •  ACCU-CHEK LONNY PLUS test strip, To check 3 - 4 times a day, Disp: 200 each, Rfl: 2  •  atorvastatin (LIPITOR) 80 MG tablet, Take 1 tablet by mouth Daily., Disp: 90 tablet, Rfl: 2  •  Blood Glucose Monitoring Suppl (ACCU-CHEK LONNY PLUS) w/Device kit, 1 device, Disp: 1 kit, Rfl: 0  •  carvedilol (COREG) 25 MG tablet, Take 1 tablet by mouth 2 (Two) Times a Day With Meals., Disp: 180 tablet, Rfl: 2  •  Cholecalciferol (VITAMIN D3) 2000 UNITS capsule, Take 1,000 Units by mouth Daily., Disp: , Rfl:   •  clopidogrel (PLAVIX) 75 MG tablet, TAKE 1 TABLET BY MOUTH EVERY DAY, Disp: 90 tablet, Rfl: 2  •  Continuous Blood Gluc  (FREESTYLE SHAQUILLE 14 DAY READER) device, 1 Device As Needed (USE TO CHECK BLOOD GLUCOSE LEVELS)., Disp: 1 Device, Rfl: 0  •  Continuous Blood Gluc Sensor (FREESTYLE SHAQUILLE 14 DAY SENSOR) misc, 1 application As Needed (PLACE SENSOR ON UPPER ARM EVERY 14 DAYS)., Disp: 2 each, Rfl: 3  •  furosemide (LASIX) 40 MG tablet, TAKE 1 TABLET BY MOUTH EVERY DAY, Disp: 90 tablet, Rfl: 1  •  insulin aspart (NOVOLOG FLEXPEN) 100 UNIT/ML solution pen-injector sc pen, Inject 15 Units under the skin into the  appropriate area as directed 3 (Three) Times a Day With Meals., Disp: 15 mL, Rfl: 3  •  Insulin Degludec (TRESIBA FLEXTOUCH) 200 UNIT/ML solution pen-injector, Inject 55 Units under the skin into the appropriate area as directed Daily. (Patient taking differently: Inject 45 Units under the skin into the appropriate area as directed Daily.), Disp: 15 mL, Rfl: 3  •  Insulin Pen Needle (BD PEN NEEDLE NADEEN U/F) 32G X 4 MM misc, TO INJECT 4 TIMES DAILY, Disp: 200 each, Rfl: 3  •  Lancets (ACCU-CHEK MULTICLIX) lancets, To check 3 - 4 times a day, Disp: 200 each, Rfl: 2  •  metFORMIN (GLUCOPHAGE) 1000 MG tablet, Take 1 tablet by mouth 2 (Two) Times a Day With Meals. (Patient taking differently: Take 1,000 mg by mouth Daily With Breakfast.), Disp: 60 tablet, Rfl: 11  •  Multiple Vitamins-Minerals (MULTIVITAL PO), Take 1 tablet by mouth Daily., Disp: , Rfl:   •  nitroglycerin (NITROSTAT) 0.4 MG SL tablet, Place 1 tablet under the tongue Every 5 (Five) Minutes As Needed for Chest Pain. Take no more than 3 doses in 15 minutes., Disp: 30 tablet, Rfl: 12  •  Omega-3 Fatty Acids (FISH OIL) 1200 MG capsule capsule, Take 1,200 mg by mouth Daily With Breakfast., Disp: , Rfl:   •  potassium chloride (K-DUR) 10 MEQ CR tablet, TAKE 1 TABLET BY MOUTH EVERY DAY, Disp: 90 tablet, Rfl: 2  •  spironolactone (ALDACTONE) 25 MG tablet, TAKE 1 TABLET BY MOUTH EVERY DAY, Disp: 90 tablet, Rfl: 1  •  topiramate (TOPAMAX) 25 MG tablet, Take 1 tablet by mouth Every Night., Disp: 30 tablet, Rfl: 2  •  vitamin B-12 (CYANOCOBALAMIN) 500 MCG tablet, Take 500 mcg by mouth Daily., Disp: , Rfl:   •  XARELTO 20 MG tablet, TAKE 1 TABLET BY MOUTH EVERY DAY, Disp: 90 tablet, Rfl: 0    Past Medical History:   Diagnosis Date   • Abdominal pain    • Abnormal liver function test    • Acute bronchitis    • Benign essential hypertension    • CAD (coronary artery disease)    • CHF (congestive heart failure) (CMS/HCC)    • Colon polyp    • Congestive heart disease  (CMS/[facility])    • COPD (chronic obstructive pulmonary disease) (CMS/[facility])    • Cough    • Diabetes (CMS/HCC)    • Diabetes mellitus (CMS/[facility])    • Diarrhea    • Gastroenteritis    • Health care maintenance    • Hyperlipidemia    • Hypertension    • IFG (impaired fasting glucose)    • Ischemic cardiomyopathy    • Myocardial infarction (CMS/[facility])    • SHELLIE (obstructive sleep apnea)    • Sore throat    • Type 2 diabetes mellitus (CMS/[facility])    • Vaginal yeast infection    • Vitamin D deficiency      Past Surgical History:   Procedure Laterality Date   • CARDIAC CATHETERIZATION     • CARDIAC ELECTROPHYSIOLOGY PROCEDURE N/A 2018    Procedure: ICD DC generator change  medtronic;  Surgeon: Hany Ornelas MD;  Location: [facility] INVASIVE LOCATION;  Service:    • PACEMAKER IMPLANTATION     • TUBAL ABDOMINAL LIGATION       Family History   Problem Relation Age of Onset   • Diabetes Mother    • Heart disease Mother    • Hyperlipidemia Mother    • Diabetes Father    • Heart disease Father    • Hyperlipidemia Father    • Heart disease Brother    • Hyperlipidemia Brother    • Hypertension Father    • Colon cancer Father    • Heart attack Father    • Emphysema Mother    • Heart disease Brother    • Heart attack Brother    • Heart disease Maternal Grandmother    • Heart disease Maternal Grandfather    • Heart disease Paternal Grandmother    • Heart disease Paternal Grandfather      Social History     Tobacco Use   • Smoking status: Former Smoker     Packs/day: 1.00     Years: 30.00     Pack years: 30.00     Types: Cigarettes     Start date: 1979     Last attempt to quit: 2009     Years since quittin.7   • Smokeless tobacco: Never Used   • Tobacco comment: QUIT 10 YRS   Substance Use Topics   • Alcohol use: Yes     Comment: occasionally   • Drug use: No           ECG 12 Lead  Date/Time: 2019 5:13 PM  Performed by: Oneida Saldivar MD  Authorized by: Oneida Saldivar MD   Comparison: compared with previous ECG  "  Similar to previous ECG  Rhythm: sinus rhythm  Q waves: V1 and V2                   Objective:         Visit Vitals  /68   Pulse 66   Ht 152.4 cm (60\")   Wt 98.9 kg (218 lb)   BMI 42.58 kg/m²          Physical Exam   Constitutional: She is oriented to person, place, and time. She appears well-developed and well-nourished.   HENT:   Head: Normocephalic and atraumatic.   Eyes: Conjunctivae, EOM and lids are normal. Pupils are equal, round, and reactive to light.   Neck: Normal range of motion and full passive range of motion without pain. Neck supple. No JVD present. Carotid bruit is not present.   Cardiovascular: Normal rate, regular rhythm, S1 normal and S2 normal. Exam reveals no gallop.   No murmur heard.  Pulses:       Radial pulses are 2+ on the right side, and 2+ on the left side.   No bilateral lower extremity edema   Pulmonary/Chest: Effort normal and breath sounds normal.   Abdominal: Soft. Normal appearance.   Lymphadenopathy:     She has no cervical adenopathy.   Neurological: She is alert and oriented to person, place, and time.   Skin: Skin is warm, dry and intact.   Psychiatric: She has a normal mood and affect.       Lab Review:       Assessment:          Diagnosis Plan   1. Cardiomyopathy, unspecified type (CMS/HCC)     2. Chronic coronary artery disease     3. History of anterior myocardial infarction      4. Cerebrovascular accident (CVA) due to embolism of left middle cerebral artery (CMS/HCC)     5. Mixed hyperlipidemia     6. Automatic implantable cardioverter-defibrillator in situ     7. Benign essential hypertension     8. Diabetes mellitus type 2, noninsulin dependent (CMS/HCC)     9. S/P coronary artery stent placement            Plan:       1.  Coronary artery disease.  No recent symptoms.  Continue current medical management.  2.  Ischemic cardiomyopathy.  Most recent echocardiogram in 12/2018 showed decline in her EF to 30%.  Charu Jones has an AICD in place.  She is on both " carvedilol and spironolactone.  Will need a repeat echocardiogram at her next office visit at which point we will need to determine further ischemic work-up and change in her medical management if her left ventricular systolic function remains depressed.  3.  Status post AICD.  Functioning normally today.  Continue routine device checks.    4.  Diabetes mellitus type 2  5.  History of left MCA stroke.  Felt to be embolic.  She is now on anticoagulation with rivaroxaban.  6.  Hypertension  7.  Obstructive sleep apnea.  Compliant with CPAP.  8.  Morbid obesity    Plan on seeing the patient back again in 6 months with a repeat echocardiogram.    Coronary Artery Disease  Assessment  • The patient has CCS class I - angina only during strenuous or prolonged physical activity  • The patient has stable angina     Plan  • Lifestyle modifications discussed include adhering to a heart healthy diet, avoidance of tobacco products, maintenance of a healthy weight, medication compliance, regular exercise and regular monitoring of cholesterol and blood pressure    Subjective - Objective  • There has been a previous stent procedure using HUGO 7/2006, 10/2009  • Current antiplatelet therapy includes aspirin 81 mg and clopidogrel 75 mg      Heart Failure  Assessment  • NYHA class II - There is slight limitation of physical activity. The patient is comfortable at rest, but physical activity results in fatigue, palpitations or shortness of breath.  • ACE inhibitor prescribed  • Beta blocker prescribed  • Diuretics prescribed  • Angiotensin receptor blocker (ARB) not prescribed for medical reasons  • Calcium channel blockers not prescribed  • Left ventricular function is mildly reduced by qualitative assessment    Plan  • The heart failure care plan was discussed with the patient today including: continuing the current program    Subjective/Objective    • Physical exam findings negative for elevated JVP.  • The patient has an ICD  implant

## 2019-08-26 PROBLEM — I25.5 ISCHEMIC CARDIOMYOPATHY: Status: ACTIVE | Noted: 2019-08-26

## 2019-08-26 PROBLEM — E11.8 TYPE 2 DIABETES MELLITUS WITH COMPLICATION: Status: ACTIVE | Noted: 2018-05-07

## 2019-08-26 PROBLEM — I50.20 SYSTOLIC CONGESTIVE HEART FAILURE: Status: ACTIVE | Noted: 2019-08-26

## 2019-08-26 PROBLEM — Z86.73 HISTORY OF CVA (CEREBROVASCULAR ACCIDENT) WITHOUT RESIDUAL DEFICITS: Status: ACTIVE | Noted: 2018-12-31

## 2019-09-13 DIAGNOSIS — I25.10 CHRONIC CORONARY ARTERY DISEASE: ICD-10-CM

## 2019-09-13 DIAGNOSIS — E78.5 HYPERLIPIDEMIA, UNSPECIFIED HYPERLIPIDEMIA TYPE: ICD-10-CM

## 2019-09-13 RX ORDER — ATORVASTATIN CALCIUM 80 MG/1
TABLET, FILM COATED ORAL
Qty: 90 TABLET | Refills: 3 | Status: SHIPPED | OUTPATIENT
Start: 2019-09-13 | End: 2020-08-10 | Stop reason: SDUPTHER

## 2019-09-25 ENCOUNTER — OFFICE VISIT (OUTPATIENT)
Dept: NEUROLOGY | Facility: CLINIC | Age: 59
End: 2019-09-25

## 2019-09-25 VITALS
HEART RATE: 84 BPM | RESPIRATION RATE: 17 BRPM | DIASTOLIC BLOOD PRESSURE: 74 MMHG | OXYGEN SATURATION: 96 % | SYSTOLIC BLOOD PRESSURE: 126 MMHG | WEIGHT: 218.4 LBS | BODY MASS INDEX: 42.88 KG/M2 | HEIGHT: 60 IN

## 2019-09-25 DIAGNOSIS — I25.10 CHRONIC CORONARY ARTERY DISEASE: ICD-10-CM

## 2019-09-25 DIAGNOSIS — E11.8 TYPE 2 DIABETES MELLITUS WITH COMPLICATION, UNSPECIFIED WHETHER LONG TERM INSULIN USE: ICD-10-CM

## 2019-09-25 DIAGNOSIS — G47.33 OBSTRUCTIVE SLEEP APNEA SYNDROME: ICD-10-CM

## 2019-09-25 DIAGNOSIS — E78.2 MIXED HYPERLIPIDEMIA: ICD-10-CM

## 2019-09-25 DIAGNOSIS — Z86.73 HISTORY OF CVA (CEREBROVASCULAR ACCIDENT) WITHOUT RESIDUAL DEFICITS: Primary | ICD-10-CM

## 2019-09-25 DIAGNOSIS — I10 BENIGN ESSENTIAL HYPERTENSION: ICD-10-CM

## 2019-09-25 PROCEDURE — 99214 OFFICE O/P EST MOD 30 MIN: CPT | Performed by: NURSE PRACTITIONER

## 2019-09-25 NOTE — PROGRESS NOTES
DOS: 2019  NAME: Daisy Dent   : 1960  PCP: Nusrat Dent APRN    Chief Complaint   Patient presents with   • Stroke     6 month, follow up      SUBJECTIVE  Neurological Problem:  58 y.o.  RH  female with DM, HLD, CAD (s/p stent), ICM with AICD,SHELLIE (compliant with CPAP), COPD who presents today for stroke F/U. She is unaccompanied.     Interval History:   Ms. Dent presented  to [facility] on  with sudden onset inability to speak. Her initial CT head was negative but CTP showed a large LMCA mismatch, small core infarct, and CTA showed a LMCA thrombus.  She received TPA and her aphasia was noted to have resolved, therefore patient was not a candidate for thrombectomy. She was on Plavix and Lipitor at home. Repeat CT head on 12/10 showing no acute infarction hemorrhage or mass effect.  Also CTA insignificant for ICAD, the etiology of her stroke was likely cardioembolic. TTE showed LVEF 30% and she was discharged on Xarelto, Plavix and Lipitor 80 mg.     She presents today and she continues on Xarelto, Plavix and Lipitor 40 mg.  She reports being essentially back to her baseline with some intermittent difficulties with word finding.  She did not tolerate the Topamax prescribed at her last visit for headaches, she reports having about 3-4 headache days per month occasionally associated with N/V, she uses Tylenol with fairly good relief and not currently interested in trying any other medication.  She denies any vision changes, unilateral weakness, or any new stroke/TIA symptoms.  She continues to engage in all of her normal activities including working.  Denies any changes in her health since her last visit.  She continues to be followed by endocrinology and is trying to improve her blood sugar control.  Review of her most recent lab work shows an A1c of 7.5, LDL of 97.  She does not take her BP regularly but states is well controlled.  She is compliant with her CPAP machine.  She denies  smoking.  Rare alcohol use.      Review of Systems:Review of Systems   Constitutional: Positive for fatigue. Negative for activity change and appetite change.   HENT: Positive for trouble swallowing (sometimes food gets stuck, every once in awhile). Negative for facial swelling and voice change.    Eyes: Negative for photophobia, pain and visual disturbance.   Respiratory: Negative for chest tightness, shortness of breath and wheezing.    Cardiovascular: Negative for chest pain, palpitations and leg swelling.   Endocrine: Negative for polydipsia and polyphagia.   Musculoskeletal: Positive for neck stiffness. Negative for back pain, gait problem and neck pain.   Skin: Negative for rash and wound.   Neurological: Positive for speech difficulty (finding words) and headaches (sharp pains, L side). Negative for dizziness, tremors, seizures, syncope, facial asymmetry, weakness, light-headedness and numbness.   Hematological: Does not bruise/bleed easily.   Psychiatric/Behavioral: Positive for decreased concentration and dysphoric mood. Negative for agitation, behavioral problems, confusion, hallucinations, self-injury, sleep disturbance and suicidal ideas. The patient is not nervous/anxious and is not hyperactive.     Above ROS reviewed    Current Medications:   Current Outpatient Medications:   •  ACCU-CHEK LONNY PLUS test strip, To check 3 - 4 times a day, Disp: 200 each, Rfl: 2  •  atorvastatin (LIPITOR) 80 MG tablet, TAKE 1 TABLET BY MOUTH EVERY DAY, Disp: 90 tablet, Rfl: 3  •  Blood Glucose Monitoring Suppl (ACCU-CHEK LONNY PLUS) w/Device kit, 1 device, Disp: 1 kit, Rfl: 0  •  carvedilol (COREG) 25 MG tablet, Take 1 tablet by mouth 2 (Two) Times a Day With Meals., Disp: 180 tablet, Rfl: 2  •  Cholecalciferol (VITAMIN D3) 2000 UNITS capsule, Take 1,000 Units by mouth Daily., Disp: , Rfl:   •  clopidogrel (PLAVIX) 75 MG tablet, TAKE 1 TABLET BY MOUTH EVERY DAY, Disp: 90 tablet, Rfl: 2  •  Continuous Blood Gluc   (FREESTYLE SHAQUILLE 14 DAY READER) device, 1 Device As Needed (USE TO CHECK BLOOD GLUCOSE LEVELS)., Disp: 1 Device, Rfl: 0  •  Continuous Blood Gluc Sensor (FREESTYLE SHAQUILLE 14 DAY SENSOR) misc, 1 application As Needed (PLACE SENSOR ON UPPER ARM EVERY 14 DAYS)., Disp: 2 each, Rfl: 3  •  furosemide (LASIX) 40 MG tablet, TAKE 1 TABLET BY MOUTH EVERY DAY, Disp: 90 tablet, Rfl: 1  •  insulin aspart (NOVOLOG FLEXPEN) 100 UNIT/ML solution pen-injector sc pen, Inject 15 Units under the skin into the appropriate area as directed 3 (Three) Times a Day With Meals., Disp: 15 mL, Rfl: 3  •  Insulin Degludec (TRESIBA FLEXTOUCH) 200 UNIT/ML solution pen-injector, Inject 55 Units under the skin into the appropriate area as directed Daily. (Patient taking differently: Inject 45 Units under the skin into the appropriate area as directed Daily.), Disp: 15 mL, Rfl: 3  •  Insulin Pen Needle (BD PEN NEEDLE NADEEN U/F) 32G X 4 MM misc, TO INJECT 4 TIMES DAILY, Disp: 200 each, Rfl: 3  •  Lancets (ACCU-CHEK MULTICLIX) lancets, To check 3 - 4 times a day, Disp: 200 each, Rfl: 2  •  metFORMIN (GLUCOPHAGE) 1000 MG tablet, Take 1 tablet by mouth 2 (Two) Times a Day With Meals. (Patient taking differently: Take 1,000 mg by mouth Daily With Breakfast.), Disp: 60 tablet, Rfl: 11  •  Multiple Vitamins-Minerals (MULTIVITAL PO), Take 1 tablet by mouth Daily., Disp: , Rfl:   •  nitroglycerin (NITROSTAT) 0.4 MG SL tablet, Place 1 tablet under the tongue Every 5 (Five) Minutes As Needed for Chest Pain. Take no more than 3 doses in 15 minutes., Disp: 30 tablet, Rfl: 12  •  Omega-3 Fatty Acids (FISH OIL) 1200 MG capsule capsule, Take 1,200 mg by mouth Daily With Breakfast., Disp: , Rfl:   •  potassium chloride (K-DUR) 10 MEQ CR tablet, TAKE 1 TABLET BY MOUTH EVERY DAY, Disp: 90 tablet, Rfl: 2  •  spironolactone (ALDACTONE) 25 MG tablet, TAKE 1 TABLET BY MOUTH EVERY DAY, Disp: 90 tablet, Rfl: 1  •  vitamin B-12 (CYANOCOBALAMIN) 500 MCG tablet, Take 500 mcg by  mouth Daily., Disp: , Rfl:   •  XARELTO 20 MG tablet, TAKE 1 TABLET BY MOUTH EVERY DAY, Disp: 90 tablet, Rfl: 0    The following portions of the patient's history were reviewed and updated as appropriate: allergies, current medications, past family history, past medical history, past social history, past surgical history and problem list.    OBJECTIVE  Vitals:    09/25/19 1401   BP: 126/74   Pulse: 84   Resp: 17   SpO2: 96%       Diagnostics:    Laboratory Results:         Lab Results   Component Value Date    WBC 7.57 12/12/2018    HGB 14.2 12/12/2018    HCT 41.7 12/12/2018    MCV 81.9 12/12/2018     12/12/2018     Lab Results   Component Value Date    GLUCOSE 195 (H) 12/12/2018    BUN 11 07/15/2019    CREATININE 0.92 07/15/2019    EGFRIFNONA 63 07/15/2019    EGFRIFAFRI 76 07/15/2019    BCR 12.0 07/15/2019    K 4.1 07/15/2019    CO2 26.8 07/15/2019    CALCIUM 9.2 07/15/2019    PROTENTOTREF 7.3 07/14/2015    ALBUMIN 4.70 12/08/2018    LABIL2 1.3 08/17/2018    AST 67 (H) 12/08/2018     (H) 12/08/2018     Lab Results   Component Value Date    HGBA1C 7.50 (H) 07/15/2019     Lab Results   Component Value Date    CHOL 279 (H) 12/09/2018     Lab Results   Component Value Date    HDL 37 (L) 07/15/2019    HDL 39 (L) 04/01/2019    HDL 32 (L) 12/09/2018     Lab Results   Component Value Date    LDL 97 07/15/2019    LDL 74 04/01/2019    LDL  12/09/2018      Comment:      Unable to calculate     (H) 12/09/2018     Lab Results   Component Value Date    TRIG 188 (H) 07/15/2019    TRIG 207 (H) 04/01/2019    TRIG 401 (H) 12/09/2018     No results found for: RPR  Lab Results   Component Value Date    TSH 1.900 07/15/2019     Lab Results   Component Value Date    FACGTOTA91 231 07/15/2019       Physical Examination:   General Appearance:   Well developed, obese, well groomed, alert, and cooperative.  HEENT: Normocephalic.    Neck and Spine: Normal range of motion.  Normal alignment. No mass or  tenderness.  Cardiac: Regular rate and rhythm.   Peripheral Vasculature: Radial pulses are equal and symmetric. No signs of distal embolization.  Extremities:    No edema or deformities. Normal joint ROM.  Skin:    No rashes or birth marks.  Psychiatric:    Euthymic. Normal affect.    Neurological examination:  Higher Integrative  Function: Oriented to time, place and person. Normal registration, recall (3/3), attention span and concentration. Normal language including comprehension, spontaneous speech, repetition, reading, writing, naming and vocabulary. No neglect. Normal fund of knowledge and higher integrative function.  CN II: Pupils are equal, round, and reactive to light. Normal visual acuity and visual fields.    CN III IV VI: Extraocular movements are full without nystagmus.   CN V: Normal facial sensation and strength of muscles of mastication.  CN VII: Facial movements are symmetric. No weakness.  CN VIII:   Auditory acuity is normal.  CN IX & X:   Symmetric palatal movement.  CN XI: Sternocleidomastoid and trapezius are normal.  No weakness.  CN XII:   The tongue is midline.  No atrophy or fasciculations.  Motor: Normal muscle strength, bulk and tone in upper and lower extremities.  No fasciculations, rigidity, spasticity, or abnormal movements.  Reflexes: 2+ in the upper and lower extremities.   Sensation: Normal to light touch, pinprick, vibration, temperature, and proprioception in arms and legs. Normal graphesthesia and no extinction on DSS.  Station and Gait: Normal gait and station.    Coordination: Finger to nose test shows no dysmetria.  Heel to shin normal.    Impression:  Ms. Dent continues to do well following her admission in Dec. 2018 with LMCA thrombus s/p IV tPA. She was on ASA and Plavix prior to her event, she has since been maintained on Xarelto and Plavix along with Lipitor with no recurrent or new stroke/TIA symptoms and a normal neurologic exam today.  Her headaches, she did not  tolerate Topamax, is having relatively good improvement on Tylenol as needed, and is not interested in trying any new medications at this time.  We reviewed her risk factors for stroke and the importance of risk factor control for stroke prevention including BP, cholesterol and blood sugar control.  We discussed the signs and symptoms of stroke the importance of calling 911 if she were to develop any of these.  She will follow-up here as needed.  She voiced understanding and agrees with above plan.    Plan:    1. Stroke  Continue current medication regimen  Monitor BP regularly  Keep well-hydrated  Encouraged regular physical activity  Secondary stroke prevention: Ideal targets for stroke prevention would be Blood pressure < 130/80; B12 > 500 TSH in normal range and LDL < 70; HbA1c < 6.5 and smoking cessation if applicable.  Call 911 for stroke symptoms    2. Headaches  Failed topamax d/t SEs  Currently responding well to Tylenol prn, continue (no more than 4g/24h period)  Follow-up as needed    I spent 25 minutes face to face with patient, with  > 50% spent counseling patient regarding diagnosis, review of diagnostics, personal risk factors for stroke and importance of risk factor control for stroke prevention, including lifestyle modifications and preventative measures.     aDisy was seen today for stroke.    Diagnoses and all orders for this visit:    History of CVA (cerebrovascular accident) without residual deficits    Type 2 diabetes mellitus with complication, unspecified whether long term insulin use (CMS/[facility])    Mixed hyperlipidemia    Chronic coronary artery disease    Benign essential hypertension    Obstructive sleep apnea syndrome        Coding      Dictated using Dragon

## 2019-09-30 RX ORDER — RIVAROXABAN 20 MG/1
TABLET, FILM COATED ORAL
Qty: 90 TABLET | Refills: 1 | Status: ON HOLD | OUTPATIENT
Start: 2019-09-30 | End: 2020-01-06 | Stop reason: SDUPTHER

## 2019-10-17 RX ORDER — BLOOD SUGAR DIAGNOSTIC
STRIP MISCELLANEOUS
Qty: 200 EACH | Refills: 2 | Status: SHIPPED | OUTPATIENT
Start: 2019-10-17 | End: 2020-05-04

## 2019-10-25 LAB
BUN SERPL-MCNC: 9 MG/DL (ref 6–20)
BUN/CREAT SERPL: 9.6 (ref 7–25)
CALCIUM SERPL-MCNC: 9.7 MG/DL (ref 8.6–10.5)
CHLORIDE SERPL-SCNC: 102 MMOL/L (ref 98–107)
CO2 SERPL-SCNC: 28.2 MMOL/L (ref 22–29)
CREAT SERPL-MCNC: 0.94 MG/DL (ref 0.57–1)
GLUCOSE SERPL-MCNC: 174 MG/DL (ref 65–99)
HBA1C MFR BLD: 8.6 % (ref 4.8–5.6)
POTASSIUM SERPL-SCNC: 4.1 MMOL/L (ref 3.5–5.2)
SODIUM SERPL-SCNC: 142 MMOL/L (ref 136–145)
UNABLE TO VOID: NORMAL

## 2019-10-27 ENCOUNTER — RESULTS ENCOUNTER (OUTPATIENT)
Dept: ENDOCRINOLOGY | Age: 59
End: 2019-10-27

## 2019-10-27 DIAGNOSIS — E78.2 MIXED HYPERLIPIDEMIA: ICD-10-CM

## 2019-10-27 DIAGNOSIS — IMO0002 DIABETES MELLITUS TYPE 2, UNCONTROLLED, WITH COMPLICATIONS: ICD-10-CM

## 2019-11-07 ENCOUNTER — OFFICE VISIT (OUTPATIENT)
Dept: ENDOCRINOLOGY | Age: 59
End: 2019-11-07

## 2019-11-07 VITALS
HEART RATE: 65 BPM | WEIGHT: 218 LBS | DIASTOLIC BLOOD PRESSURE: 70 MMHG | HEIGHT: 60 IN | OXYGEN SATURATION: 99 % | SYSTOLIC BLOOD PRESSURE: 120 MMHG | BODY MASS INDEX: 42.8 KG/M2

## 2019-11-07 DIAGNOSIS — IMO0002 DIABETES MELLITUS TYPE 2, UNCONTROLLED, WITH COMPLICATIONS: Primary | ICD-10-CM

## 2019-11-07 DIAGNOSIS — E78.2 MIXED HYPERLIPIDEMIA: ICD-10-CM

## 2019-11-07 PROCEDURE — 99214 OFFICE O/P EST MOD 30 MIN: CPT | Performed by: INTERNAL MEDICINE

## 2019-11-07 NOTE — PROGRESS NOTES
59 y.o.    Patient Care Team:  Nusrat Dent APRN as PCP - General (Family Medicine)    Chief Complaint:    FOLLOW UP/ TYPE 2 DIABETES MELLITUS   Subjective     HPI    57 y/o  female with pmhx of Type 2 dm, HLP is here for the follow up.   Pt was hospitalized in Dec 2018 with CVA.   She used to see Dr. FENG from Endocrine at [facility] but failed to follow up with her.   During her admission to hospital her a1c was 12%     Type 2 diabetes mellitus-diagnosed about 2 years ago.  Insulin was started on patient was hospitalized in December 2018.  Today in clinic patient reports she is on Tresiba 45 units at bedtime, NovoLog 10/12/10 with each meal.  Metformin thousand milligrams oral daily.  Could not increase the dosage of metformin further because of diarrhea and stomach cramps.  She is missing the dosage of NovoLog for almost 75% of the time both at lunch and dinner times and as a result her blood sugars are running high.  She is also checking her blood sugars maybe 2-3 times a day.  As a result is not also using her sliding scale.  She is still due for her eye examination, no history of diabetic retinopathy.  Complains of diabetic neuropathy  Hx of CAD, no hx of CKD,hx of CVA per pt.   Pt is physically active. weight has been stable.   Pt tries to follow DM diet for most part, but eats candy once in a while.   On Ace inb.     Hyperlipidemia  On Lipitor 80 mg oral daily.     Reviewed primary care physician's/consulting physician documentation and lab results :     Interval History      The following portions of the patient's history were reviewed and updated as appropriate: allergies, current medications, past family history, past medical history, past social history, past surgical history and problem list.    Past Medical History:   Diagnosis Date   • Abdominal pain    • Abnormal liver function test    • Acute bronchitis    • Benign essential hypertension    • CAD (coronary artery disease)    • CHF  (congestive heart failure) (CMS/[facility])    • Colon polyp    • Congestive heart disease (CMS/[facility])    • COPD (chronic obstructive pulmonary disease) (CMS/[facility])    • Cough    • Diabetes (CMS/[facility])    • Diabetes mellitus (CMS/[facility])    • Diarrhea    • Gastroenteritis    • Health care maintenance    • Hyperlipidemia    • Hypertension    • IFG (impaired fasting glucose)    • Ischemic cardiomyopathy    • Myocardial infarction (CMS/[facility])    • SHELLIE (obstructive sleep apnea)    • Sore throat    • Type 2 diabetes mellitus (CMS/[facility])    • Vaginal yeast infection    • Vitamin D deficiency      Family History   Problem Relation Age of Onset   • Diabetes Mother    • Heart disease Mother    • Hyperlipidemia Mother    • Diabetes Father    • Heart disease Father    • Hyperlipidemia Father    • Heart disease Brother    • Hyperlipidemia Brother    • Hypertension Father    • Colon cancer Father    • Heart attack Father    • Emphysema Mother    • Heart disease Brother    • Heart attack Brother    • Heart disease Maternal Grandmother    • Heart disease Maternal Grandfather    • Heart disease Paternal Grandmother    • Heart disease Paternal Grandfather      Social History     Socioeconomic History   • Marital status:      Spouse name: Not on file   • Number of children: Not on file   • Years of education: Not on file   • Highest education level: Not on file   Tobacco Use   • Smoking status: Former Smoker     Packs/day: 1.00     Years: 30.00     Pack years: 30.00     Types: Cigarettes     Start date: 11/1/1979     Last attempt to quit: 11/1/2009     Years since quitting: 10.0   • Smokeless tobacco: Never Used   • Tobacco comment: QUIT 10 YRS   Substance and Sexual Activity   • Alcohol use: Yes     Comment: occasionally   • Drug use: No   • Sexual activity: Yes     Partners: Male   Social History Narrative    ** Merged History Encounter **          No Known Allergies    Current Outpatient Medications:   •  ACCU-CHEK LONNY PLUS test strip, TO  CHECK 3 - 4 TIMES A DAY, Disp: 200 each, Rfl: 2  •  atorvastatin (LIPITOR) 80 MG tablet, TAKE 1 TABLET BY MOUTH EVERY DAY, Disp: 90 tablet, Rfl: 3  •  Blood Glucose Monitoring Suppl (ACCU-CHEK LONNY PLUS) w/Device kit, 1 device, Disp: 1 kit, Rfl: 0  •  carvedilol (COREG) 25 MG tablet, Take 1 tablet by mouth 2 (Two) Times a Day With Meals., Disp: 180 tablet, Rfl: 2  •  Cholecalciferol (VITAMIN D3) 2000 UNITS capsule, Take 1,000 Units by mouth Daily., Disp: , Rfl:   •  clopidogrel (PLAVIX) 75 MG tablet, TAKE 1 TABLET BY MOUTH EVERY DAY, Disp: 90 tablet, Rfl: 2  •  furosemide (LASIX) 40 MG tablet, TAKE 1 TABLET BY MOUTH EVERY DAY, Disp: 90 tablet, Rfl: 1  •  insulin aspart (NOVOLOG FLEXPEN) 100 UNIT/ML solution pen-injector sc pen, Inject 15 Units under the skin into the appropriate area as directed 3 (Three) Times a Day With Meals., Disp: 15 mL, Rfl: 3  •  Insulin Degludec (TRESIBA FLEXTOUCH) 200 UNIT/ML solution pen-injector, Inject 55 Units under the skin into the appropriate area as directed Daily. (Patient taking differently: Inject 45 Units under the skin into the appropriate area as directed Daily.), Disp: 15 mL, Rfl: 3  •  Insulin Pen Needle (BD PEN NEEDLE NADEEN U/F) 32G X 4 MM misc, TO INJECT 4 TIMES DAILY, Disp: 200 each, Rfl: 3  •  Lancets (ACCU-CHEK MULTICLIX) lancets, To check 3 - 4 times a day, Disp: 200 each, Rfl: 2  •  metFORMIN (GLUCOPHAGE) 1000 MG tablet, Take 1 tablet by mouth 2 (Two) Times a Day With Meals. (Patient taking differently: Take 1,000 mg by mouth Daily With Breakfast.), Disp: 60 tablet, Rfl: 11  •  Multiple Vitamins-Minerals (MULTIVITAL PO), Take 1 tablet by mouth Daily., Disp: , Rfl:   •  nitroglycerin (NITROSTAT) 0.4 MG SL tablet, Place 1 tablet under the tongue Every 5 (Five) Minutes As Needed for Chest Pain. Take no more than 3 doses in 15 minutes., Disp: 30 tablet, Rfl: 12  •  Omega-3 Fatty Acids (FISH OIL) 1200 MG capsule capsule, Take 1,200 mg by mouth Daily With Breakfast., Disp:  ", Rfl:   •  potassium chloride (K-DUR) 10 MEQ CR tablet, TAKE 1 TABLET BY MOUTH EVERY DAY, Disp: 90 tablet, Rfl: 2  •  spironolactone (ALDACTONE) 25 MG tablet, TAKE 1 TABLET BY MOUTH EVERY DAY, Disp: 90 tablet, Rfl: 1  •  vitamin B-12 (CYANOCOBALAMIN) 500 MCG tablet, Take 500 mcg by mouth Daily., Disp: , Rfl:   •  XARELTO 20 MG tablet, TAKE 1 TABLET BY MOUTH EVERY DAY, Disp: 90 tablet, Rfl: 1        Review of Systems   Constitutional: Negative for appetite change and fatigue.   Eyes: Negative for visual disturbance.   Respiratory: Negative for shortness of breath.    Cardiovascular: Negative for palpitations and leg swelling.   Gastrointestinal: Negative for abdominal pain and vomiting.   Endocrine: Negative for polydipsia and polyuria.   Musculoskeletal: Negative for joint swelling and neck pain.   Skin: Negative for rash.   Neurological: Negative for weakness and numbness.   Psychiatric/Behavioral: Negative for behavioral problems.       Objective       Vitals:    11/07/19 1344   BP: 120/70   Pulse: 65   SpO2: 99%   Weight: 98.9 kg (218 lb)   Height: 152.4 cm (60\")     Body mass index is 42.58 kg/m².      Physical Exam   Constitutional: She is oriented to person, place, and time. She appears well-nourished.   Obese     HENT:   Head: Normocephalic and atraumatic.   Wide neck   Eyes: Conjunctivae and EOM are normal. No scleral icterus.   Neck: Normal range of motion. Neck supple. No thyromegaly present.   Acanthosis nigricans   Cardiovascular: Normal rate and normal heart sounds.   Pulmonary/Chest: Effort normal and breath sounds normal. No stridor. She has no wheezes.   Abdominal: Soft. Bowel sounds are normal. She exhibits no distension. There is no tenderness.   Central obesity   Musculoskeletal: She exhibits no edema or tenderness.   Neurological: She is alert and oriented to person, place, and time.   Skin: Skin is warm and dry. She is not diaphoretic.   Psychiatric: She has a normal mood and affect.   Vitals " reviewed.    Results Review:     I reviewed the patient's new clinical results and mentioned them above in HPI and in plan as well.    Medical records reviewed  Summary:Done      Results Encounter on 10/27/2019   Component Date Value Ref Range Status   • Hemoglobin A1C 10/24/2019 8.60* 4.80 - 5.60 % Final    Comment: Hemoglobin A1C Ranges:  Increased Risk for Diabetes  5.7% to 6.4%  Diabetes                     >= 6.5%  Diabetic Goal                < 7.0%     • Glucose 10/24/2019 174* 65 - 99 mg/dL Final   • BUN 10/24/2019 9  6 - 20 mg/dL Final   • Creatinine 10/24/2019 0.94  0.57 - 1.00 mg/dL Final   • eGFR Non  Am 10/24/2019 61  >60 mL/min/1.73 Final   • eGFR African Am 10/24/2019 74  >60 mL/min/1.73 Final   • BUN/Creatinine Ratio 10/24/2019 9.6  7.0 - 25.0 Final   • Sodium 10/24/2019 142  136 - 145 mmol/L Final   • Potassium 10/24/2019 4.1  3.5 - 5.2 mmol/L Final   • Chloride 10/24/2019 102  98 - 107 mmol/L Final   • Total CO2 10/24/2019 28.2  22.0 - 29.0 mmol/L Final   • Calcium 10/24/2019 9.7  8.6 - 10.5 mg/dL Final   • Unable to Void 10/24/2019 Comment   Final    Comment: The patient was not able to render a urine sample and has been  instructed to return for a urine collection at their earliest  convenience.  The urine testing that you have requested has  been deleted from this report.  When the patient returns and  provides a urine specimen, the urine testing will be performed  and separately reported.       Lab Results   Component Value Date    HGBA1C 8.60 (H) 10/24/2019    HGBA1C 7.50 (H) 07/15/2019    HGBA1C 6.60 (H) 04/01/2019     Lab Results   Component Value Date    MICROALBUR 0.1 05/07/2018    CREATININE 0.94 10/24/2019     Imaging Results (Most Recent)     None                Assessment and Plan:    Daisy was seen today for diabetes.    Diagnoses and all orders for this visit:    Diabetes mellitus type 2, uncontrolled, with complications (CMS/[facility])  -     Hemoglobin A1c; Future  -     Basic  "Metabolic Panel; Future  -     Lipid Panel; Future  -     Referral for Diabetic Eye Exam (Optometry)    Mixed hyperlipidemia  -     Hemoglobin A1c; Future  -     Basic Metabolic Panel; Future  -     Lipid Panel; Future  -     Referral for Diabetic Eye Exam (Optometry)      Type 2 diabetes mellitus-uncontrolled  HbA1c is worse  Emphasized the patient to take her NovoLog as she is supposed to and not to miss the dosages.  Add Bydureon once a week  Discussed the benefits and the side effects of the medication  Continue the current dosage of Tresiba.    Hyperlipidemia  Continue Lipitor 80 mg oral daily    Reviewed Lab results with the patient.             Abraham Fitch MD  11/07/19    EMR Dragon / transcription disclaimer:     \"Dictated utilizing Dragon dictation\".  "

## 2019-11-13 ENCOUNTER — CLINICAL SUPPORT NO REQUIREMENTS (OUTPATIENT)
Dept: CARDIOLOGY | Facility: CLINIC | Age: 59
End: 2019-11-13

## 2019-11-13 DIAGNOSIS — I50.20 SYSTOLIC CONGESTIVE HEART FAILURE, UNSPECIFIED HF CHRONICITY (HCC): Primary | ICD-10-CM

## 2019-11-13 PROCEDURE — 93297 REM INTERROG DEV EVAL ICPMS: CPT | Performed by: INTERNAL MEDICINE

## 2019-11-13 PROCEDURE — 93296 REM INTERROG EVL PM/IDS: CPT | Performed by: INTERNAL MEDICINE

## 2019-11-13 PROCEDURE — 93295 DEV INTERROG REMOTE 1/2/MLT: CPT | Performed by: INTERNAL MEDICINE

## 2019-11-21 ENCOUNTER — TELEPHONE (OUTPATIENT)
Dept: CARDIOLOGY | Facility: CLINIC | Age: 59
End: 2019-11-21

## 2019-11-21 NOTE — TELEPHONE ENCOUNTER
"Patient calling in requesting an apt for chest \"tenderness\"  This doesn't radiate, she is not having any SOA and is not nauseated with this. She did mention that it is not like her MI in the past, but she would like to be seen to make sure it is not her heart.    I have scheduled her to see Dr Saldivar 11/26/19 and told her to go to the ER for any worsening symptoms.  Shanae Kaur RN  Triage nurse    "

## 2019-11-26 ENCOUNTER — OFFICE VISIT (OUTPATIENT)
Dept: CARDIOLOGY | Facility: CLINIC | Age: 59
End: 2019-11-26

## 2019-11-26 VITALS
SYSTOLIC BLOOD PRESSURE: 128 MMHG | BODY MASS INDEX: 42.76 KG/M2 | HEART RATE: 68 BPM | HEIGHT: 60 IN | RESPIRATION RATE: 16 BRPM | WEIGHT: 217.8 LBS | DIASTOLIC BLOOD PRESSURE: 74 MMHG

## 2019-11-26 DIAGNOSIS — I25.10 CHRONIC CORONARY ARTERY DISEASE: ICD-10-CM

## 2019-11-26 DIAGNOSIS — I50.22 CHRONIC SYSTOLIC CONGESTIVE HEART FAILURE (HCC): ICD-10-CM

## 2019-11-26 DIAGNOSIS — Z95.5 S/P CORONARY ARTERY STENT PLACEMENT: ICD-10-CM

## 2019-11-26 DIAGNOSIS — Z95.810 ICD (IMPLANTABLE CARDIOVERTER-DEFIBRILLATOR) IN PLACE: ICD-10-CM

## 2019-11-26 DIAGNOSIS — I10 BENIGN ESSENTIAL HYPERTENSION: ICD-10-CM

## 2019-11-26 DIAGNOSIS — I63.412 CEREBROVASCULAR ACCIDENT (CVA) DUE TO EMBOLISM OF LEFT MIDDLE CEREBRAL ARTERY (HCC): ICD-10-CM

## 2019-11-26 DIAGNOSIS — E78.2 MIXED HYPERLIPIDEMIA: ICD-10-CM

## 2019-11-26 DIAGNOSIS — G47.33 OBSTRUCTIVE SLEEP APNEA SYNDROME: ICD-10-CM

## 2019-11-26 DIAGNOSIS — I25.5 ISCHEMIC CARDIOMYOPATHY: ICD-10-CM

## 2019-11-26 DIAGNOSIS — E11.8 TYPE 2 DIABETES MELLITUS WITH COMPLICATION (HCC): ICD-10-CM

## 2019-11-26 DIAGNOSIS — R07.2 PRECORDIAL PAIN: Primary | ICD-10-CM

## 2019-11-26 PROCEDURE — 93000 ELECTROCARDIOGRAM COMPLETE: CPT | Performed by: INTERNAL MEDICINE

## 2019-11-26 PROCEDURE — 99214 OFFICE O/P EST MOD 30 MIN: CPT | Performed by: INTERNAL MEDICINE

## 2019-11-26 NOTE — PROGRESS NOTES
Subjective:     Encounter Date:11/26/2019      Patient ID: Daisy Dent is a 59 y.o. female.    Chief Complaint:  History of Present Illness    This is a 58-year-old female with a history of ischemic cardiomyopathy, last ejection fraction of 40-45%, diabetes mellitus type 2, obstructive sleep apnea on CPAP, hypertension, status post dual-chamber AICD placement, dyslipidemia, coronary artery disease with a history of prior anterior myocardial infarction and LAD stent placement, status post left MCA stroke, who presents for follow-up.     The patient presents today for urgent follow-up.  She reports over the last couple weeks she has been having episodes of substernal chest tightness.  Symptoms only last about a 1 to 2 minutes.  They are not associated with any shortness of breath.  She does report some palpitations that started after the chest discomfort starts.  Symptoms in quality are similar to what she had at the time of her prior myocardial infarctions but reports that its much milder than what she experienced at that time.  The symptoms only occur at rest and not with activity.  She denies any dyspnea on exertion out of the ordinary.  She denies any PND orthopnea, near-syncope or syncope.    Prior History:  I began following the patient in 11/2015 when she presented to establish care.  Prior to that she was followed by Dr. Ayala with [facility].  Her prior cardiac history includes a non-ST elevation MI and 7/2006 at which time she received a Cypher 3.0 x 23 mm stent to her mid LAD.  She then presented in 10/2009 with a late in-stent thrombosis that required repeat stenting of her mid LAD in addition to a second drug-eluting stent placement of her left circumflex artery.  Following that she had a repeat cardiac catheterization in 2010 that was reportedly unremarkable.  Following her myocardial infarction in 2009 she developed ischemic myopathy with an EF of around 25% and ultimately underwent  AICD placement and 7/2010.  At some point in the course of her follow-ups her left ventricular function improved with an ejection fraction of 40-45%.  A stress test showed evidence of a large anterior infarct and minimal elva-infarct ischemia.     In her follow-ups she has done fairly well recently when she reported more fatigued than normal and episodes of chest discomfort that occurred while she was exercising on the treadmill.  She underwent  an echocardiogram in 3/2017 that showed an ejection fraction of 40-45%.  Due to continued symptoms we went ahead and proceeded with a PET stress test which was performed in 8/2017 that showed medium sized anterior wall infarct with mild elva-infarct ischemia and a post stress ejection fraction of 58%.      In 11/2017 her AICD was noted to be at [facility].  She subsequently underwent generator replacement on 1/12/2018 with Dr. Ornelas. When I saw her back in follow up in 9/2018 she reported that she denied any new or worsening symptoms.       She was admitted and 12/2018 after she presented with aphasia.  On arrival to the hospital she underwent a CT of the head that showed no acute stroke but a perfusion scan showed a large perfusion mismatch.  A CTA showed evidence of a left MCA thrombus.  She was given TPA at this time with improvement in her symptoms.  Neurology felt that her stroke was likely embolic.  She underwent a repeat echocardiogram during her admission that showed moderately depressed left ventricular systolic function with an EF of 30%, grade 1 diastolic dysfunction, and no significant valvular disease.  Based on the likelihood that this was an embolic stroke I opted to go ahead and recommend anticoagulation.  She was subsequently started on rivaroxaban 20 mg a day.  Her aspirin was stopped and she was continued on clopidogrel.       Review of Systems   Constitution: Positive for malaise/fatigue. Negative for weakness.   HENT: Negative for hearing loss, hoarse voice,  nosebleeds and sore throat.    Eyes: Negative for pain.   Cardiovascular: Positive for chest pain and palpitations. Negative for claudication, cyanosis, dyspnea on exertion, irregular heartbeat, leg swelling, near-syncope, orthopnea, paroxysmal nocturnal dyspnea and syncope.   Respiratory: Negative for shortness of breath and snoring.    Endocrine: Negative for cold intolerance, heat intolerance, polydipsia, polyphagia and polyuria.   Skin: Negative for itching and rash.   Musculoskeletal: Negative for arthritis, falls, joint pain, joint swelling, muscle cramps, muscle weakness and myalgias.   Gastrointestinal: Negative for constipation, diarrhea, dysphagia, heartburn, hematemesis, hematochezia, melena, nausea and vomiting.   Genitourinary: Negative for frequency, hematuria and hesitancy.   Neurological: Negative for excessive daytime sleepiness, dizziness, headaches, light-headedness and numbness.   Psychiatric/Behavioral: Negative for depression. The patient is not nervous/anxious.          Current Outpatient Medications:   •  ACCU-CHEK LONNY PLUS test strip, TO CHECK 3 - 4 TIMES A DAY, Disp: 200 each, Rfl: 2  •  atorvastatin (LIPITOR) 80 MG tablet, TAKE 1 TABLET BY MOUTH EVERY DAY, Disp: 90 tablet, Rfl: 3  •  Blood Glucose Monitoring Suppl (ACCU-CHEK LONNY PLUS) w/Device kit, 1 device, Disp: 1 kit, Rfl: 0  •  carvedilol (COREG) 25 MG tablet, Take 1 tablet by mouth 2 (Two) Times a Day With Meals., Disp: 180 tablet, Rfl: 2  •  Cholecalciferol (VITAMIN D3) 2000 UNITS capsule, Take 1,000 Units by mouth Daily., Disp: , Rfl:   •  clopidogrel (PLAVIX) 75 MG tablet, TAKE 1 TABLET BY MOUTH EVERY DAY, Disp: 90 tablet, Rfl: 2  •  exenatide er (BYDUREON) 2 MG/0.85ML auto-injector injection, Inject 0.85 mL under the skin into the appropriate area as directed 1 (One) Time Per Week., Disp: 5 pen, Rfl: 3  •  furosemide (LASIX) 40 MG tablet, TAKE 1 TABLET BY MOUTH EVERY DAY, Disp: 90 tablet, Rfl: 1  •  insulin aspart (NOVOLOG  FLEXPEN) 100 UNIT/ML solution pen-injector sc pen, Inject 15 Units under the skin into the appropriate area as directed 3 (Three) Times a Day With Meals., Disp: 15 mL, Rfl: 3  •  Insulin Degludec (TRESIBA FLEXTOUCH) 200 UNIT/ML solution pen-injector, Inject 55 Units under the skin into the appropriate area as directed Daily. (Patient taking differently: Inject 45 Units under the skin into the appropriate area as directed Daily.), Disp: 15 mL, Rfl: 3  •  Insulin Pen Needle (BD PEN NEEDLE NADEEN U/F) 32G X 4 MM misc, TO INJECT 4 TIMES DAILY, Disp: 200 each, Rfl: 3  •  Lancets (ACCU-CHEK MULTICLIX) lancets, To check 3 - 4 times a day, Disp: 200 each, Rfl: 2  •  metFORMIN (GLUCOPHAGE) 1000 MG tablet, Take 1 tablet by mouth 2 (Two) Times a Day With Meals. (Patient taking differently: Take 1,000 mg by mouth Daily With Breakfast.), Disp: 60 tablet, Rfl: 11  •  Multiple Vitamins-Minerals (MULTIVITAL PO), Take 1 tablet by mouth Daily., Disp: , Rfl:   •  nitroglycerin (NITROSTAT) 0.4 MG SL tablet, Place 1 tablet under the tongue Every 5 (Five) Minutes As Needed for Chest Pain. Take no more than 3 doses in 15 minutes., Disp: 30 tablet, Rfl: 12  •  Omega-3 Fatty Acids (FISH OIL) 1200 MG capsule capsule, Take 1,200 mg by mouth Daily With Breakfast., Disp: , Rfl:   •  potassium chloride (K-DUR) 10 MEQ CR tablet, TAKE 1 TABLET BY MOUTH EVERY DAY, Disp: 90 tablet, Rfl: 2  •  spironolactone (ALDACTONE) 25 MG tablet, TAKE 1 TABLET BY MOUTH EVERY DAY, Disp: 90 tablet, Rfl: 1  •  vitamin B-12 (CYANOCOBALAMIN) 500 MCG tablet, Take 500 mcg by mouth Daily., Disp: , Rfl:   •  XARELTO 20 MG tablet, TAKE 1 TABLET BY MOUTH EVERY DAY, Disp: 90 tablet, Rfl: 1    Past Medical History:   Diagnosis Date   • Abdominal pain    • Abnormal liver function test    • Acute bronchitis    • Benign essential hypertension    • CAD (coronary artery disease)    • CHF (congestive heart failure) (CMS/HCC)    • Colon polyp    • Congestive heart disease (CMS/HCC)     • COPD (chronic obstructive pulmonary disease) (CMS/[facility])    • Cough    • Diabetes (CMS/[facility])    • Diabetes mellitus (CMS/[facility])    • Diarrhea    • Gastroenteritis    • Health care maintenance    • Hyperlipidemia    • Hypertension    • IFG (impaired fasting glucose)    • Ischemic cardiomyopathy    • Myocardial infarction (CMS/[facility])    • SHELLIE (obstructive sleep apnea)    • Sore throat    • Type 2 diabetes mellitus (CMS/[facility])    • Vaginal yeast infection    • Vitamin D deficiency        Past Surgical History:   Procedure Laterality Date   • CARDIAC CATHETERIZATION     • CARDIAC ELECTROPHYSIOLOGY PROCEDURE N/A 1/12/2018    Procedure: ICD DC generator change  medtronic;  Surgeon: Hany Ornelas MD;  Location: [facility] INVASIVE LOCATION;  Service:    • PACEMAKER IMPLANTATION     • TUBAL ABDOMINAL LIGATION         Family History   Problem Relation Age of Onset   • Diabetes Mother    • Heart disease Mother    • Hyperlipidemia Mother    • Diabetes Father    • Heart disease Father    • Hyperlipidemia Father    • Heart disease Brother    • Hyperlipidemia Brother    • Hypertension Father    • Colon cancer Father    • Heart attack Father    • Emphysema Mother    • Heart disease Brother    • Heart attack Brother    • Heart disease Maternal Grandmother    • Heart disease Maternal Grandfather    • Heart disease Paternal Grandmother    • Heart disease Paternal Grandfather        Social History     Tobacco Use   • Smoking status: Former Smoker     Packs/day: 1.00     Years: 30.00     Pack years: 30.00     Types: Cigarettes     Start date: 11/1/1979     Last attempt to quit: 11/1/2009     Years since quitting: 10.0   • Smokeless tobacco: Never Used   • Tobacco comment: QUIT 10 YRS   Substance Use Topics   • Alcohol use: Yes     Comment: occasionally/ caffeine use    • Drug use: No         ECG 12 Lead  Date/Time: 11/26/2019 3:44 PM  Performed by: Oneida Saldivar MD  Authorized by: Oneida Saldivar MD   Comparison: compared with  "previous ECG   Similar to previous ECG  Rhythm: sinus rhythm               Objective:     Visit Vitals  /74 (BP Location: Right arm, Patient Position: Sitting)   Pulse 68   Resp 16   Ht 152.4 cm (60\")   Wt 98.8 kg (217 lb 12.8 oz)   BMI 42.54 kg/m²         Physical Exam   Constitutional: She is oriented to person, place, and time. She appears well-developed and well-nourished.   HENT:   Head: Normocephalic and atraumatic.   Eyes: Conjunctivae, EOM and lids are normal. Pupils are equal, round, and reactive to light.   Neck: Normal range of motion and full passive range of motion without pain. Neck supple. No JVD present. Carotid bruit is not present.   Cardiovascular: Normal rate, regular rhythm, S1 normal and S2 normal. Exam reveals no gallop.   No murmur heard.  Pulses:       Radial pulses are 2+ on the right side, and 2+ on the left side.   No bilateral lower extremity edema   Pulmonary/Chest: Effort normal and breath sounds normal.   Abdominal: Soft. Normal appearance.   Lymphadenopathy:     She has no cervical adenopathy.   Neurological: She is alert and oriented to person, place, and time.   Skin: Skin is warm, dry and intact.   Psychiatric: She has a normal mood and affect.       Lab Review:       Assessment:          Diagnosis Plan   1. Precordial pain  Stress Test With Pet Myocardial Perfusion (MULTI STUDY, REST AND STRESS)   2. Ischemic cardiomyopathy     3. Chronic coronary artery disease  Stress Test With Pet Myocardial Perfusion (MULTI STUDY, REST AND STRESS)   4. ICD (implantable cardioverter-defibrillator) in place     5. Chronic systolic congestive heart failure (CMS/HCC)     6. Cerebrovascular accident (CVA) due to embolism of left middle cerebral artery (CMS/HCC)     7. Benign essential hypertension     8. Mixed hyperlipidemia     9. Obstructive sleep apnea syndrome     10. Type 2 diabetes mellitus with complication (CMS/HCC)     11. S/P coronary artery stent placement            Plan:     "   1.  Chest pain.  With typical and atypical sounding features.  Is been a couple years since we have done an ischemic work-up.  We will proceed with a stress test.  In the past she has had evidence of anterior infarct with elva-infarct ischemia.  We will see if anything new is present.  2.  Ischemic cardiomyopathy.  Echo in 12/2018 showed an EF of 30%.  Plan was for repeat echocardiogram at her next office visit next year.  The patient would like to wait on proceeding with the echocardiogram since she has not met her deductible this year.  Continue current medical management.  3.  Coronary artery disease.  Plan as per #1.  4.  Status post AICD.  Continue routine device checks.  5.  Diabetes mellitus type 2  6.  History of left MCA stroke.  Felt to be cardioembolic.  She is now on anticoagulation with rivaroxaban which she is tolerating.  7.  Hypertension.  Well controlled on her current medications.  8.  Obstructive sleep apnea.  Compliant with CPAP.  9.  Morbid obesity.    We will call and discuss the results of her stress test.  We will determine further recommendations based on those results.

## 2019-12-11 ENCOUNTER — HOSPITAL ENCOUNTER (OUTPATIENT)
Dept: CARDIOLOGY | Facility: HOSPITAL | Age: 59
Discharge: HOME | End: 2019-12-11
Admitting: INTERNAL MEDICINE

## 2019-12-11 VITALS — HEIGHT: 60 IN | BODY MASS INDEX: 42.85 KG/M2 | WEIGHT: 218.26 LBS

## 2019-12-11 LAB
BH CV NUCLEAR PRIOR STUDY: 3
BH CV STRESS BP STAGE 1: NORMAL
BH CV STRESS COMMENTS STAGE 1: NORMAL
BH CV STRESS DOSE REGADENOSON STAGE 1: 0.4
BH CV STRESS DURATION MIN STAGE 1: 0
BH CV STRESS DURATION SEC STAGE 1: 10
BH CV STRESS HR STAGE 1: 98
BH CV STRESS PROTOCOL 1: NORMAL
BH CV STRESS RECOVERY BP: NORMAL MMHG
BH CV STRESS RECOVERY HR: 82 BPM
BH CV STRESS STAGE 1: 1
LV EF NUC BP: 42 %
MAXIMAL PREDICTED HEART RATE: 162 BPM
PERCENT MAX PREDICTED HR: 60.49 %
STRESS BASELINE BP: NORMAL MMHG
STRESS BASELINE HR: 62 BPM
STRESS PERCENT HR: 71 %
STRESS POST EXERCISE DUR SEC: 10 SEC
STRESS POST PEAK BP: NORMAL MMHG
STRESS POST PEAK HR: 98 BPM
STRESS TARGET HR: 138 BPM

## 2019-12-11 PROCEDURE — 93017 CV STRESS TEST TRACING ONLY: CPT

## 2019-12-11 PROCEDURE — 0 RUBIDIUM CHLORIDE: Performed by: INTERNAL MEDICINE

## 2019-12-11 PROCEDURE — 93016 CV STRESS TEST SUPVJ ONLY: CPT | Performed by: INTERNAL MEDICINE

## 2019-12-11 PROCEDURE — A9555 RB82 RUBIDIUM: HCPCS | Performed by: INTERNAL MEDICINE

## 2019-12-11 PROCEDURE — 93018 CV STRESS TEST I&R ONLY: CPT | Performed by: INTERNAL MEDICINE

## 2019-12-11 PROCEDURE — 78492 MYOCRD IMG PET MLT RST&STRS: CPT | Performed by: INTERNAL MEDICINE

## 2019-12-11 PROCEDURE — 25010000002 REGADENOSON 0.4 MG/5ML SOLUTION: Performed by: INTERNAL MEDICINE

## 2019-12-11 PROCEDURE — 78492 MYOCRD IMG PET MLT RST&STRS: CPT

## 2019-12-11 RX ADMIN — RUBIDIUM CHLORIDE RB-82 1 DOSE: 150 INJECTION, SOLUTION INTRAVENOUS at 10:09

## 2019-12-11 RX ADMIN — REGADENOSON 0.4 MG: 0.08 INJECTION, SOLUTION INTRAVENOUS at 10:09

## 2019-12-11 RX ADMIN — RUBIDIUM CHLORIDE RB-82 1 DOSE: 150 INJECTION, SOLUTION INTRAVENOUS at 10:01

## 2019-12-13 ENCOUNTER — TELEPHONE (OUTPATIENT)
Dept: CARDIOLOGY | Facility: CLINIC | Age: 59
End: 2019-12-13

## 2019-12-13 NOTE — TELEPHONE ENCOUNTER
Called and went over the stress test results.  Explained that the stress test is not significantly different from last one.  We decided we would monitor symptoms for now and consider cath in the future if symptoms worsen for definitive diagnosis.

## 2019-12-13 NOTE — TELEPHONE ENCOUNTER
Patient calling for stress test results done on 12/11.    She asked that you call her at work [phone].    Thanks!

## 2019-12-15 DIAGNOSIS — I25.5 ISCHEMIC CARDIOMYOPATHY: ICD-10-CM

## 2019-12-16 RX ORDER — FUROSEMIDE 40 MG/1
TABLET ORAL
Qty: 90 TABLET | Refills: 1 | Status: ON HOLD | OUTPATIENT
Start: 2019-12-16 | End: 2020-01-06 | Stop reason: SDUPTHER

## 2019-12-16 RX ORDER — SPIRONOLACTONE 25 MG/1
TABLET ORAL
Qty: 90 TABLET | Refills: 1 | Status: SHIPPED | OUTPATIENT
Start: 2019-12-16 | End: 2020-05-20 | Stop reason: SDUPTHER

## 2019-12-16 RX ORDER — POTASSIUM CHLORIDE 750 MG/1
TABLET, FILM COATED, EXTENDED RELEASE ORAL
Qty: 90 TABLET | Refills: 1 | Status: ON HOLD | OUTPATIENT
Start: 2019-12-16 | End: 2020-01-06 | Stop reason: SDUPTHER

## 2019-12-23 ENCOUNTER — TELEPHONE (OUTPATIENT)
Dept: CARDIOLOGY | Facility: CLINIC | Age: 59
End: 2019-12-23

## 2019-12-23 DIAGNOSIS — R94.39 ABNORMAL STRESS TEST: Primary | ICD-10-CM

## 2019-12-23 DIAGNOSIS — I25.5 ISCHEMIC CARDIOMYOPATHY: ICD-10-CM

## 2019-12-23 RX ORDER — CARVEDILOL 25 MG/1
TABLET ORAL
Qty: 60 TABLET | Refills: 5 | Status: SHIPPED | OUTPATIENT
Start: 2019-12-23 | End: 2020-03-05 | Stop reason: SDUPTHER

## 2019-12-23 NOTE — TELEPHONE ENCOUNTER
----- Message from Daisy Dent sent at 12/23/2019  9:17 AM EST -----  Regarding: Visit Follow-Up Question  Contact: [phone]  I spoke to you a couple weeks ago and you had mentioned about a heart cath.  I have been feeling tightness on my chest more in the last  week and feel that I should go ahead and get the heart cath done.  I did call and  left a message for the scheduling department to see if they can schedule a heart catheterization hopefully before the year is out.   Thank you

## 2019-12-26 ENCOUNTER — TELEPHONE (OUTPATIENT)
Dept: CARDIOLOGY | Facility: CLINIC | Age: 59
End: 2019-12-26

## 2019-12-27 PROBLEM — R94.39 ABNORMAL STRESS TEST: Status: ACTIVE | Noted: 2019-12-27

## 2020-01-03 ENCOUNTER — TRANSCRIBE ORDERS (OUTPATIENT)
Dept: LAB | Facility: HOSPITAL | Age: 60
End: 2020-01-03

## 2020-01-03 ENCOUNTER — LAB (OUTPATIENT)
Dept: LAB | Facility: HOSPITAL | Age: 60
End: 2020-01-03

## 2020-01-03 DIAGNOSIS — R94.39 ABNORMAL STRESS TEST: Primary | ICD-10-CM

## 2020-01-03 DIAGNOSIS — R94.39 ABNORMAL STRESS TEST: ICD-10-CM

## 2020-01-03 LAB
ANION GAP SERPL CALCULATED.3IONS-SCNC: 12.9 MMOL/L (ref 5–15)
BASOPHILS # BLD AUTO: 0.05 10*3/MM3 (ref 0–0.2)
BASOPHILS NFR BLD AUTO: 0.7 % (ref 0–1.5)
BUN BLD-MCNC: 13 MG/DL (ref 6–20)
BUN/CREAT SERPL: 15.3 (ref 7–25)
CALCIUM SPEC-SCNC: 9.6 MG/DL (ref 8.6–10.5)
CHLORIDE SERPL-SCNC: 100 MMOL/L (ref 98–107)
CO2 SERPL-SCNC: 26.1 MMOL/L (ref 22–29)
CREAT BLD-MCNC: 0.85 MG/DL (ref 0.57–1)
DEPRECATED RDW RBC AUTO: 41.6 FL (ref 37–54)
EOSINOPHIL # BLD AUTO: 0.16 10*3/MM3 (ref 0–0.4)
EOSINOPHIL NFR BLD AUTO: 2.2 % (ref 0.3–6.2)
ERYTHROCYTE [DISTWIDTH] IN BLOOD BY AUTOMATED COUNT: 13.7 % (ref 12.3–15.4)
GFR SERPL CREATININE-BSD FRML MDRD: 69 ML/MIN/1.73
GFR SERPL CREATININE-BSD FRML MDRD: 83 ML/MIN/1.73
GLUCOSE BLD-MCNC: 284 MG/DL (ref 65–99)
HCT VFR BLD AUTO: 42.6 % (ref 34–46.6)
HGB BLD-MCNC: 13.6 G/DL (ref 12–15.9)
IMM GRANULOCYTES # BLD AUTO: 0.04 10*3/MM3 (ref 0–0.05)
IMM GRANULOCYTES NFR BLD AUTO: 0.5 % (ref 0–0.5)
LYMPHOCYTES # BLD AUTO: 2.8 10*3/MM3 (ref 0.7–3.1)
LYMPHOCYTES NFR BLD AUTO: 38.2 % (ref 19.6–45.3)
MCH RBC QN AUTO: 26.8 PG (ref 26.6–33)
MCHC RBC AUTO-ENTMCNC: 31.9 G/DL (ref 31.5–35.7)
MCV RBC AUTO: 84 FL (ref 79–97)
MONOCYTES # BLD AUTO: 0.72 10*3/MM3 (ref 0.1–0.9)
MONOCYTES NFR BLD AUTO: 9.8 % (ref 5–12)
NEUTROPHILS # BLD AUTO: 3.56 10*3/MM3 (ref 1.7–7)
NEUTROPHILS NFR BLD AUTO: 48.6 % (ref 42.7–76)
NRBC BLD AUTO-RTO: 0 /100 WBC (ref 0–0.2)
PLATELET # BLD AUTO: 278 10*3/MM3 (ref 140–450)
PMV BLD AUTO: 11.2 FL (ref 6–12)
POTASSIUM BLD-SCNC: 4.2 MMOL/L (ref 3.5–5.2)
RBC # BLD AUTO: 5.07 10*6/MM3 (ref 3.77–5.28)
SODIUM BLD-SCNC: 139 MMOL/L (ref 136–145)
WBC NRBC COR # BLD: 7.33 10*3/MM3 (ref 3.4–10.8)

## 2020-01-03 PROCEDURE — 80048 BASIC METABOLIC PNL TOTAL CA: CPT

## 2020-01-03 PROCEDURE — 85025 COMPLETE CBC W/AUTO DIFF WBC: CPT

## 2020-01-03 PROCEDURE — 36415 COLL VENOUS BLD VENIPUNCTURE: CPT

## 2020-01-06 ENCOUNTER — HOSPITAL ENCOUNTER (OUTPATIENT)
Facility: HOSPITAL | Age: 60
Setting detail: HOSPITAL OUTPATIENT SURGERY
Discharge: HOME | End: 2020-01-06
Attending: INTERNAL MEDICINE | Admitting: INTERNAL MEDICINE

## 2020-01-06 VITALS
TEMPERATURE: 99.8 F | BODY MASS INDEX: 42.21 KG/M2 | DIASTOLIC BLOOD PRESSURE: 50 MMHG | OXYGEN SATURATION: 97 % | HEART RATE: 74 BPM | HEIGHT: 60 IN | SYSTOLIC BLOOD PRESSURE: 100 MMHG | RESPIRATION RATE: 16 BRPM | WEIGHT: 215 LBS

## 2020-01-06 DIAGNOSIS — I25.10 CHRONIC CORONARY ARTERY DISEASE: Primary | ICD-10-CM

## 2020-01-06 DIAGNOSIS — I25.5 ISCHEMIC CARDIOMYOPATHY: ICD-10-CM

## 2020-01-06 DIAGNOSIS — R94.39 ABNORMAL STRESS TEST: ICD-10-CM

## 2020-01-06 LAB
GLUCOSE BLDC GLUCOMTR-MCNC: 381 MG/DL (ref 70–130)
GLUCOSE BLDC GLUCOMTR-MCNC: 465 MG/DL (ref 70–130)
GLUCOSE BLDC GLUCOMTR-MCNC: 465 MG/DL (ref 70–130)
GLUCOSE BLDC GLUCOMTR-MCNC: 472 MG/DL (ref 70–130)

## 2020-01-06 PROCEDURE — 93458 L HRT ARTERY/VENTRICLE ANGIO: CPT | Performed by: INTERNAL MEDICINE

## 2020-01-06 PROCEDURE — 25010000002 BH (CUPID ONLY) ADENOSINE 6 MG/100ML MIXTURE: Performed by: INTERNAL MEDICINE

## 2020-01-06 PROCEDURE — 99153 MOD SED SAME PHYS/QHP EA: CPT | Performed by: INTERNAL MEDICINE

## 2020-01-06 PROCEDURE — 85347 COAGULATION TIME ACTIVATED: CPT

## 2020-01-06 PROCEDURE — 99152 MOD SED SAME PHYS/QHP 5/>YRS: CPT | Performed by: INTERNAL MEDICINE

## 2020-01-06 PROCEDURE — 25010000002 MIDAZOLAM PER 1 MG: Performed by: INTERNAL MEDICINE

## 2020-01-06 PROCEDURE — C1769 GUIDE WIRE: HCPCS | Performed by: INTERNAL MEDICINE

## 2020-01-06 PROCEDURE — 63710000001 INSULIN LISPRO (HUMAN) PER 5 UNITS: Performed by: INTERNAL MEDICINE

## 2020-01-06 PROCEDURE — 25010000002 FENTANYL CITRATE (PF) 100 MCG/2ML SOLUTION: Performed by: INTERNAL MEDICINE

## 2020-01-06 PROCEDURE — 25010000002 HEPARIN (PORCINE) PER 1000 UNITS: Performed by: INTERNAL MEDICINE

## 2020-01-06 PROCEDURE — 93010 ELECTROCARDIOGRAM REPORT: CPT | Performed by: INTERNAL MEDICINE

## 2020-01-06 PROCEDURE — S0260 H&P FOR SURGERY: HCPCS | Performed by: INTERNAL MEDICINE

## 2020-01-06 PROCEDURE — 93571 IV DOP VEL&/PRESS C FLO 1ST: CPT | Performed by: INTERNAL MEDICINE

## 2020-01-06 PROCEDURE — 82962 GLUCOSE BLOOD TEST: CPT

## 2020-01-06 PROCEDURE — 92928 PRQ TCAT PLMT NTRAC ST 1 LES: CPT | Performed by: INTERNAL MEDICINE

## 2020-01-06 PROCEDURE — C1894 INTRO/SHEATH, NON-LASER: HCPCS | Performed by: INTERNAL MEDICINE

## 2020-01-06 PROCEDURE — 93005 ELECTROCARDIOGRAM TRACING: CPT | Performed by: INTERNAL MEDICINE

## 2020-01-06 PROCEDURE — C9600 PERC DRUG-EL COR STENT SING: HCPCS | Performed by: INTERNAL MEDICINE

## 2020-01-06 PROCEDURE — C1725 CATH, TRANSLUMIN NON-LASER: HCPCS | Performed by: INTERNAL MEDICINE

## 2020-01-06 PROCEDURE — C1874 STENT, COATED/COV W/DEL SYS: HCPCS | Performed by: INTERNAL MEDICINE

## 2020-01-06 PROCEDURE — C1887 CATHETER, GUIDING: HCPCS | Performed by: INTERNAL MEDICINE

## 2020-01-06 PROCEDURE — 0 IOPAMIDOL PER 1 ML: Performed by: INTERNAL MEDICINE

## 2020-01-06 DEVICE — XIENCE SIERRA™ EVEROLIMUS ELUTING CORONARY STENT SYSTEM 3.50 MM X 12 MM / RAPID-EXCHANGE
Type: IMPLANTABLE DEVICE | Status: FUNCTIONAL
Brand: XIENCE SIERRA™

## 2020-01-06 RX ORDER — ASPIRIN 325 MG
TABLET ORAL AS NEEDED
Status: DISCONTINUED | OUTPATIENT
Start: 2020-01-06 | End: 2020-01-06 | Stop reason: HOSPADM

## 2020-01-06 RX ORDER — HEPARIN SODIUM 1000 [USP'U]/ML
INJECTION, SOLUTION INTRAVENOUS; SUBCUTANEOUS AS NEEDED
Status: DISCONTINUED | OUTPATIENT
Start: 2020-01-06 | End: 2020-01-06 | Stop reason: HOSPADM

## 2020-01-06 RX ORDER — FUROSEMIDE 40 MG/1
40 TABLET ORAL DAILY
Start: 2020-01-06 | End: 2020-05-20 | Stop reason: SDUPTHER

## 2020-01-06 RX ORDER — CLOPIDOGREL BISULFATE 75 MG/1
TABLET ORAL AS NEEDED
Status: DISCONTINUED | OUTPATIENT
Start: 2020-01-06 | End: 2020-01-06 | Stop reason: HOSPADM

## 2020-01-06 RX ORDER — MIDAZOLAM HYDROCHLORIDE 1 MG/ML
INJECTION INTRAMUSCULAR; INTRAVENOUS AS NEEDED
Status: DISCONTINUED | OUTPATIENT
Start: 2020-01-06 | End: 2020-01-06 | Stop reason: HOSPADM

## 2020-01-06 RX ORDER — SODIUM CHLORIDE 0.9 % (FLUSH) 0.9 %
10 SYRINGE (ML) INJECTION AS NEEDED
Status: DISCONTINUED | OUTPATIENT
Start: 2020-01-06 | End: 2020-01-06 | Stop reason: HOSPADM

## 2020-01-06 RX ORDER — SODIUM CHLORIDE 0.9 % (FLUSH) 0.9 %
3 SYRINGE (ML) INJECTION EVERY 12 HOURS SCHEDULED
Status: DISCONTINUED | OUTPATIENT
Start: 2020-01-06 | End: 2020-01-06 | Stop reason: HOSPADM

## 2020-01-06 RX ORDER — POTASSIUM CHLORIDE 750 MG/1
10 TABLET, FILM COATED, EXTENDED RELEASE ORAL DAILY
Start: 2020-01-06 | End: 2020-05-20 | Stop reason: SDUPTHER

## 2020-01-06 RX ORDER — LIDOCAINE HYDROCHLORIDE 10 MG/ML
0.1 INJECTION, SOLUTION EPIDURAL; INFILTRATION; INTRACAUDAL; PERINEURAL ONCE AS NEEDED
Status: DISCONTINUED | OUTPATIENT
Start: 2020-01-06 | End: 2020-01-06 | Stop reason: HOSPADM

## 2020-01-06 RX ORDER — LIDOCAINE HYDROCHLORIDE 20 MG/ML
INJECTION, SOLUTION INFILTRATION; PERINEURAL AS NEEDED
Status: DISCONTINUED | OUTPATIENT
Start: 2020-01-06 | End: 2020-01-06 | Stop reason: HOSPADM

## 2020-01-06 RX ORDER — SODIUM CHLORIDE 9 MG/ML
100 INJECTION, SOLUTION INTRAVENOUS CONTINUOUS
Status: DISCONTINUED | OUTPATIENT
Start: 2020-01-06 | End: 2020-01-06 | Stop reason: HOSPADM

## 2020-01-06 RX ORDER — FENTANYL CITRATE 50 UG/ML
INJECTION, SOLUTION INTRAMUSCULAR; INTRAVENOUS AS NEEDED
Status: DISCONTINUED | OUTPATIENT
Start: 2020-01-06 | End: 2020-01-06 | Stop reason: HOSPADM

## 2020-01-06 RX ORDER — SODIUM CHLORIDE 9 MG/ML
75 INJECTION, SOLUTION INTRAVENOUS CONTINUOUS
Status: DISCONTINUED | OUTPATIENT
Start: 2020-01-06 | End: 2020-01-06 | Stop reason: HOSPADM

## 2020-01-06 RX ADMIN — SODIUM CHLORIDE 100 ML/HR: 9 INJECTION, SOLUTION INTRAVENOUS at 13:33

## 2020-01-06 RX ADMIN — SODIUM CHLORIDE 75 ML/HR: 9 INJECTION, SOLUTION INTRAVENOUS at 08:23

## 2020-01-06 RX ADMIN — INSULIN LISPRO 15 UNITS: 100 INJECTION, SOLUTION INTRAVENOUS; SUBCUTANEOUS at 11:26

## 2020-01-06 RX ADMIN — SODIUM CHLORIDE 100 ML/HR: 9 INJECTION, SOLUTION INTRAVENOUS at 11:40

## 2020-01-06 RX ADMIN — SODIUM CHLORIDE, PRESERVATIVE FREE 3 ML: 5 INJECTION INTRAVENOUS at 11:42

## 2020-01-06 NOTE — H&P
[facility] History And Physical Assessment    Patient Name: Daisy Dent  Age/Sex: 59 y.o. female  : 1960  MRN: [id number]    Date of Hospital Admission: 2020  Date of Encounter Visit: 20  Encounter Provider: Oneida Saldivar MD  Place of Service: [facility] CARDIOLOGY  Patient Care Team:  Nusrat Dent APRN as PCP - General (Family Medicine)    Subjective:     Chief Complaint:  Abnormal stress test, recurrent chest pain    History of Present Illness:  See HPI from 2019.  Since then a stress test showed evidence of medium anterior infarct with moderate elva-infarct ischemia.  Findings were similar to prior study however the amount of elva-infarct ischemia had increased to mild.  We initially opted to continue to treat medically and monitor symptoms however she continued to have recurrent chest pain and opted to proceed with cardiac catheterization for further evaluation.     HPI from 2019:  This is a 58-year-old female with a history of ischemic cardiomyopathy, last ejection fraction of 40-45%, diabetes mellitus type 2, obstructive sleep apnea on CPAP, hypertension, status post dual-chamber AICD placement, dyslipidemia, coronary artery disease with a history of prior anterior myocardial infarction and LAD stent placement, status post left MCA stroke, who presents for follow-up.     The patient presents today for urgent follow-up.  She reports over the last couple weeks she has been having episodes of substernal chest tightness.  Symptoms only last about a 1 to 2 minutes.  They are not associated with any shortness of breath.  She does report some palpitations that started after the chest discomfort starts.  Symptoms in quality are similar to what she had at the time of her prior myocardial infarctions but reports that its much milder than what she experienced at that time.  The symptoms only occur at rest and not with activity.  She  denies any dyspnea on exertion out of the ordinary.  She denies any PND orthopnea, near-syncope or syncope.     Prior History:  I began following the patient in 11/2015 when she presented to establish care.  Prior to that she was followed by Dr. Ayala with [facility].  Her prior cardiac history includes a non-ST elevation MI and 7/2006 at which time she received a Cypher 3.0 x 23 mm stent to her mid LAD.  She then presented in 10/2009 with a late in-stent thrombosis that required repeat stenting of her mid LAD in addition to a second drug-eluting stent placement of her left circumflex artery.  Following that she had a repeat cardiac catheterization in 2010 that was reportedly unremarkable.  Following her myocardial infarction in 2009 she developed ischemic myopathy with an EF of around 25% and ultimately underwent AICD placement and 7/2010.  At some point in the course of her follow-ups her left ventricular function improved with an ejection fraction of 40-45%.  A stress test showed evidence of a large anterior infarct and minimal elva-infarct ischemia.     In her follow-ups she has done fairly well recently when she reported more fatigued than normal and episodes of chest discomfort that occurred while she was exercising on the treadmill.  She underwent  an echocardiogram in 3/2017 that showed an ejection fraction of 40-45%.  Due to continued symptoms we went ahead and proceeded with a PET stress test which was performed in 8/2017 that showed medium sized anterior wall infarct with mild elva-infarct ischemia and a post stress ejection fraction of 58%.      In 11/2017 her AICD was noted to be at [facility].  She subsequently underwent generator replacement on 1/12/2018 with Dr. Ornelas. When I saw her back in follow up in 9/2018 she reported that she denied any new or worsening symptoms.       She was admitted and 12/2018 after she presented with aphasia.  On arrival to the hospital she underwent a CT of the head  that showed no acute stroke but a perfusion scan showed a large perfusion mismatch.  A CTA showed evidence of a left MCA thrombus.  She was given TPA at this time with improvement in her symptoms.  Neurology felt that her stroke was likely embolic.  She underwent a repeat echocardiogram during her admission that showed moderately depressed left ventricular systolic function with an EF of 30%, grade 1 diastolic dysfunction, and no significant valvular disease.  Based on the likelihood that this was an embolic stroke I opted to go ahead and recommend anticoagulation.  She was subsequently started on rivaroxaban 20 mg a day.  Her aspirin was stopped and she was continued on clopidogrel.      Past Medical History:  Past Medical History:   Diagnosis Date   • Abdominal pain    • Abnormal liver function test    • Acute bronchitis    • Benign essential hypertension    • CAD (coronary artery disease)    • CHF (congestive heart failure) (CMS/HCC)    • Colon polyp    • Congestive heart disease (CMS/HCC)    • COPD (chronic obstructive pulmonary disease) (CMS/HCC)    • Cough    • Diabetes (CMS/HCC)    • Diabetes mellitus (CMS/HCC)    • Diarrhea    • Gastroenteritis    • Health care maintenance    • Hyperlipidemia    • Hypertension    • IFG (impaired fasting glucose)    • Ischemic cardiomyopathy    • Myocardial infarction (CMS/HCC)    • SHELLIE (obstructive sleep apnea)    • Sore throat    • Type 2 diabetes mellitus (CMS/HCC)    • Vaginal yeast infection    • Vitamin D deficiency        Past Surgical History:   Procedure Laterality Date   • CARDIAC CATHETERIZATION     • CARDIAC ELECTROPHYSIOLOGY PROCEDURE N/A 1/12/2018    Procedure: ICD DC generator change  medtronic;  Surgeon: Hany Ornelas MD;  Location: [facility] INVASIVE LOCATION;  Service:    • PACEMAKER IMPLANTATION     • TUBAL ABDOMINAL LIGATION         Home Medications:   Medications Prior to Admission   Medication Sig Dispense Refill Last Dose   • ACCU-CHEK LONNY  PLUS test strip TO CHECK 3 - 4 TIMES A  each 2 1/5/2020 at Unknown time   • Blood Glucose Monitoring Suppl (ACCU-CHEK LONNY PLUS) w/Device kit 1 device 1 kit 0 1/5/2020 at Unknown time   • carvedilol (COREG) 25 MG tablet TAKE ONE TABLET BY MOUTH TWICE A DAY WITH MEALS (Patient taking differently: Take 25 mg by mouth 2 (Two) Times a Day With Meals.) 60 tablet 5 1/4/2020 at Unknown time   • Insulin Pen Needle (BD PEN NEEDLE NADEEN U/F) 32G X 4 MM misc TO INJECT 4 TIMES DAILY 200 each 3 1/5/2020 at Unknown time   • Lancets (ACCU-CHEK MULTICLIX) lancets To check 3 - 4 times a day 200 each 2 1/5/2020 at Unknown time   • atorvastatin (LIPITOR) 80 MG tablet TAKE 1 TABLET BY MOUTH EVERY DAY (Patient taking differently: Take 80 mg by mouth Daily.) 90 tablet 3 1/4/2020   • Cholecalciferol (VITAMIN D3) 2000 UNITS capsule Take 1,000 Units by mouth Daily.   1/4/2020   • clopidogrel (PLAVIX) 75 MG tablet TAKE 1 TABLET BY MOUTH EVERY DAY (Patient taking differently: Take 75 mg by mouth Daily.) 90 tablet 2 1/4/2020   • exenatide er (BYDUREON) 2 MG/0.85ML auto-injector injection Inject 0.85 mL under the skin into the appropriate area as directed 1 (One) Time Per Week. 5 pen 3 1/4/2020   • furosemide (LASIX) 40 MG tablet TAKE 1 TABLET BY MOUTH EVERY DAY (Patient taking differently: Take 40 mg by mouth Daily.) 90 tablet 1 1/4/2020   • insulin aspart (NOVOLOG FLEXPEN) 100 UNIT/ML solution pen-injector sc pen Inject 15 Units under the skin into the appropriate area as directed 3 (Three) Times a Day With Meals. 15 mL 3 1/4/2020   • Insulin Degludec (TRESIBA FLEXTOUCH) 200 UNIT/ML solution pen-injector Inject 55 Units under the skin into the appropriate area as directed Daily. (Patient taking differently: Inject 45 Units under the skin into the appropriate area as directed Daily.) 15 mL 3 1/4/2020   • metFORMIN (GLUCOPHAGE) 1000 MG tablet TAKE 1 TABLET BY MOUTH EVERY DAY IN THE EVENING (Patient taking differently: Take 1,000 mg by  mouth Daily With Breakfast. Do not give at time of or within 48 hours of iodinated intravenous contrast. Confirm renal function is normal before continuing.) 90 tablet 3 1/4/2020   • Multiple Vitamins-Minerals (MULTIVITAL PO) Take 1 tablet by mouth Daily.   1/4/2020   • nitroglycerin (NITROSTAT) 0.4 MG SL tablet Place 1 tablet under the tongue Every 5 (Five) Minutes As Needed for Chest Pain. Take no more than 3 doses in 15 minutes. 30 tablet 12 More than a month at Unknown time   • Omega-3 Fatty Acids (FISH OIL) 1200 MG capsule capsule Take 1,200 mg by mouth Daily With Breakfast.   1/4/2020   • potassium chloride (K-DUR) 10 MEQ CR tablet TAKE 1 TABLET BY MOUTH EVERY DAY (Patient taking differently: Take 10 mEq by mouth Daily.) 90 tablet 1 1/4/2020   • spironolactone (ALDACTONE) 25 MG tablet TAKE 1 TABLET BY MOUTH EVERY DAY (Patient taking differently: Take 25 mg by mouth Daily.) 90 tablet 1 1/4/2020   • vitamin B-12 (CYANOCOBALAMIN) 500 MCG tablet Take 500 mcg by mouth Daily.   1/4/2020   • XARELTO 20 MG tablet TAKE 1 TABLET BY MOUTH EVERY DAY (Patient taking differently: 20 mg Daily With Dinner.) 90 tablet 1 1/5/2020       Allergies:  No Known Allergies    Past Social History:  Social History     Socioeconomic History   • Marital status:      Spouse name: Not on file   • Number of children: Not on file   • Years of education: Not on file   • Highest education level: Not on file   Tobacco Use   • Smoking status: Former Smoker     Packs/day: 1.00     Years: 30.00     Pack years: 30.00     Types: Cigarettes     Start date: 11/1/1979     Last attempt to quit: 11/1/2009     Years since quitting: 10.1   • Smokeless tobacco: Never Used   • Tobacco comment: QUIT 10 YRS   Substance and Sexual Activity   • Alcohol use: Yes     Comment: occasionally/ caffeine use    • Drug use: No   • Sexual activity: Yes     Partners: Male   Social History Narrative    ** Merged History Encounter **            Past Family  History:  Family History   Problem Relation Age of Onset   • Diabetes Mother    • Heart disease Mother    • Hyperlipidemia Mother    • Diabetes Father    • Heart disease Father    • Hyperlipidemia Father    • Heart disease Brother    • Hyperlipidemia Brother    • Hypertension Father    • Colon cancer Father    • Heart attack Father    • Emphysema Mother    • Heart disease Brother    • Heart attack Brother    • Heart disease Maternal Grandmother    • Heart disease Maternal Grandfather    • Heart disease Paternal Grandmother    • Heart disease Paternal Grandfather        Review of Systems:   All systems reviewed. Pertinent positives identified in HPI. All other systems are negative.    Objective:   Temp:  [99.8 °F (37.7 °C)] 99.8 °F (37.7 °C)  Heart Rate:  [90] 90  Resp:  [20] 20  BP: (142)/(71) 142/71  No intake or output data in the 24 hours ending 01/06/20 0848  Body mass index is 41.99 kg/m².      01/06/20  0820   Weight: 97.5 kg (215 lb)     Weight change:     Physical Exam:   General Appearance:    Alert, cooperative, in no acute distress   Head:    Normocephalic, without obvious abnormality, atraumatic   Eyes:            Lids and lashes normal, conjunctivae and sclerae normal, no   icterus, no pallor, corneas clear, PERRLA   Ears:    Ears appear intact with no abnormalities noted   Neck:   No adenopathy, supple, trachea midline, no thyromegaly, no   carotid bruit, no JVD   Lungs:     Clear to auscultation,respirations regular, even and unlabored    Heart:    Regular rhythm and normal rate, normal S1 and S2, no murmur, no gallop, no rub, no click   Chest Wall:    No abnormalities observed   Abdomen:     Normal bowel sounds, no masses, no organomegaly, soft        non-tender, non-distended, no guarding, no rebound  tenderness   Extremities:   Moves all extremities well, no edema, no cyanosis, no redness   Pulses:   Pulses palpable and equal bilaterally. Normal radial, carotid, femoral, dorsalis pedis and  posterior tibial pulses bilaterally. Normal abdominal aorta   Skin:  Psychiatric:   No bleeding, bruising or rash    Alert and oriented x 3, normal mood and affect         Lab Review:   Results from last 7 days   Lab Units 01/03/20  0904   SODIUM mmol/L 139   POTASSIUM mmol/L 4.2   CHLORIDE mmol/L 100   CO2 mmol/L 26.1   BUN mg/dL 13   CREATININE mg/dL 0.85   GLUCOSE mg/dL 284*   CALCIUM mg/dL 9.6         Results from last 7 days   Lab Units 01/03/20  0904   WBC 10*3/mm3 7.33   HEMOGLOBIN g/dL 13.6   HEMATOCRIT % 42.6   PLATELETS 10*3/mm3 278     I personally viewed and interpreted the patient's EKG    Assessment/Plan:     1. Abnormal stress test  2. Coronary artery disease.  History of anterior myocardial infarction.   3. Ischemic cardiomyopathy. EF of 30%.   4. History of left MCA stroke.  On chronic anticoagulation with rivaroxaban.  On hold for procedure.   5. Diabetes mellitus type 2.  Due to a misunderstanding the patient held her insulin in addition to metformin for the last couple of days.  Blood sugars in 400s today.   6. History of AICD  7. Hypertension  8. Obstructive sleep apnea.     -  For cardiac catheterization today    Oneida Saldivar MD  01/06/20  8:48 AM

## 2020-01-06 NOTE — NURSING NOTE
Dr. Saldivar spoke with pt pre-procedure. Dr. Saldivar informed of elevated glucose, informed pt and this RN to await until after procedure to address elevated glucose level. Pt verbalized understanding.

## 2020-01-06 NOTE — NURSING NOTE
PT'S  & 465 (2 CHECKS DONE TO ENSURE ACCURACY IN PPT); PT STATES SHE HAS NOT HAD ANY DIABETES MEDS IN 2 DAYS PER THE OFFICE'S ORDERS. ATTEMPTED TO INFORM DR. CASTANEDA TO SEE IF SHE WOULD LIKE INSULIN GIVEN, HOWEVER,  WAS IN THE MIDDLE OF ANOTHER CATH AND STATES SHE WOULD NEED TO REVIEW THE PT'S CHART PRIOR. NO NEW ORDERS AT THIS TIME.

## 2020-01-06 NOTE — DISCHARGE INSTRUCTIONS
Return to work on Thursday.  Resume Plavix on Tuesday.   Resume Xarelto on Tuesday.   Resume Metformin on Wednesday.     [facility]  [address] [place] [address]    Coronary Angiogram with Stent (Radial Approach) After Care    Refer to this sheet in the next few weeks. These instructions provide you with information on caring for yourself after your procedure. Your health care provider may also give you more specific instructions. Your treatment has been planned according to current medical practices, but problems sometimes occur. Call your health care provider if you have any problems or questions after your procedure.       Home Care Instructions:  · You may shower the day after the procedure. Remove the bandage (dressing) and gently wash the site with plain soap and water. Gently pat the site dry. You may apply a band aid daily for 2 days if desired.    · Do not apply powder or lotion to the site.  · Do not submerge the affected site in water for 3 to 5 days or until the site is completely healed.   · Do not flex or bend the affected arm for 24 hours.  · Do not lift, push or pull anything over 10 pounds for 2 days after your procedure.  · Do not operate machinery or power tools for 24 hours.  · Inspect the site at least twice daily. You may notice some bruising at the site and it may be tender for 1 to 2 weeks.     · Increase your fluid intake for the next 2 days.    · Keep arm elevated for 24 hours.  For the remainder of the day, keep your arm in the “Pledge of Allegiance” position when up and about.    · Limit your activity for the next 48 hours and avoid strenuous activity as directed by your physician.   · Cardiac Rehab may or may not be ordered.  Please consult with your physician  · You may drive 24 hours after the procedure unless otherwise instructed by your caregiver.  · A responsible adult should be with you for the first 24 hours after you arrive home.   · Do not make any  important legal decisions or sign legal papers for 24 hours. Do not drink alcohol for 24 hours.    · Take medicines only as directed by your health care provider.  Blood thinners may be prescribed after your procedure to improve blood flow through the stent.    · Metformin or any medications containing Metformin should not be taken for 48 hours after your procedure.    · Eat a heart-healthy diet. This should include plenty of fresh fruits and vegetables. Meat should be lean cuts. Avoid the following types of food:    ¨ Food that is high in salt.    ¨ Canned or highly processed food.    ¨ Food that is high in saturated fat or sugar.    ¨ Fried food.    · Make any other lifestyle changes recommended by your health care provider. This may include:    ¨ Not using any tobacco products including cigarettes, chewing tobacco, or electronic cigarettes.   ¨ Managing your weight.    ¨ Getting regular exercise.    ¨ Managing your blood pressure.    ¨ Limiting your alcohol intake.    ¨ Managing other health problems, such as diabetes.    · If you need an MRI after your heart stent was placed, be sure to tell the health care provider who orders the MRI that you have a heart stent.    · Keep all follow-up visits as directed by your health care provider.    ·   Call Your Doctor If:  · You have unusual pain at the radial/ulnar (wrist) site.  · You have redness, warmth, swelling, or pain at the radial/ulnar (wrist) site.  · You have drainage (other than a small amount of blood on the dressing).  · `You have chills or a fever > 101.  · Your arm becomes pale or dark, cool, tingly, or numb.  · You develop chest pain, shortness of breath, feel faint or pass out.    · You have heavy bleeding from the site, hold pressure on the site for 20 minutes.  If the bleeding stops, apply a fresh bandage and call your cardiologist.  However, if you continue to have bleeding, call 911.        Make Sure You:   · Understand these instructions.  · Will  watch your condition.  · Will get help right away if you are not doing well or get worse.    SEDATION DISCHARGE INSTRUCTIONS.    IMPORTANT: The following information will help you return to your best level of health.  Sedation.  You have had a procedure that called for some medicine to reduce anxiety and pain. This medicine (or medicines) is called  sedation. After receiving the medicine, you may be sleepy, but able to breathe on your own. The effects of the medicine may last for several hours.    Follow these instructions after sedation:   Go right home. Rest quietly at home today, then you can be up and about.   Do not drink alcohol, drive or operate machinery for 24 hours.   Do not do anything where light-headedness or clumsiness would be dangerous.   Do not make important decisions or sign any legal papers for the next 24 hours.   Make sure A RESPONSIBLE PERSON stays with you the rest of today and overnight for your protection and safety.   Start your diet with fluids and light foods (jello, soup, juice, toast). Then, slowly progress to your usual diet if you are not sick to your stomach.  · If you have sleep apnea, surgery and certain medicines can increase your risk for breathing problems. Follow instructions from your health care provider about wearing your sleep device:  ? Anytime you are sleeping, including during daytime naps.  ? While taking prescription pain medicines, sleeping medicines, or medicines that make you drowsy.      Call your doctor if you have:   a gray or blue skin color.   excess sleepiness.   repeated vomiting.   trouble breathing.   any new problems or concerns.

## 2020-01-07 LAB
ACT BLD: 279 SECONDS (ref 82–152)
ACT BLD: 285 SECONDS (ref 82–152)
ACT BLD: 312 SECONDS (ref 82–152)

## 2020-01-09 ENCOUNTER — TELEPHONE (OUTPATIENT)
Dept: CARDIAC REHAB | Facility: HOSPITAL | Age: 60
End: 2020-01-09

## 2020-01-09 NOTE — TELEPHONE ENCOUNTER
Called pt secondary to referral for cardiac rehab.  Left VM for patient to return my call to schedule.

## 2020-01-10 NOTE — PROGRESS NOTES
Date of Office Visit: 20  Encounter Provider: KEYONA Arvizu  Primary Cardiologist: Dr. Saldivar  Place of Service: [facility]  Patient Name: Daisy Dent  :1960      Subjective:     Chief Complaint:  Post PCI follow up visit.    History of Present Illness:  Daisy Dent is a pleasant 59 y.o. female who is new to me .  Outside records have been obtained and reviewed by me.     This is a patient of Dr. Saldivar with a history of coronary artery disease and prior anterior MI and LAD stent placement, ischemic cardiomyopathy EF 40 to 45%, type 2 diabetes, obstructive sleep apnea on CPAP, hypertension, dual-chamber ICD placement, dyslipidemia, obesity, left MCA stroke 2018.    2019 patient presented to the office to see Dr. Saldivar for an urgent visit with chest pain.  She had some typical and atypical features and some associated palpitations starting after chest discomfort starts.  Dr. Saldivar set her up for a repeat stress test in the past she had known anterior infarct with mild elva-infarct ischemia.  2019 she had a repeat stress test that showed medium sized infarct in the anterior wall septal wall and apex with now moderate elva-infarct ischemia and was a high risk study.  She had septal and apical hypokinesis and mildly reduced EF of 42%.    2020 patient had cardiac catheterization which showed 95% in-stent restenosis of the proximal LAD stent that was treated with drug-eluting stent placement she also had moderate nonobstructive disease of OM1 with normal FFR as well as an ischemic cardiomyopathy.  She was chronically anticoagulated with rivaroxaban due to history of embolic stroke she was continued on antiplatelet therapy with clopidogrel only. Cardiac rehab was ordered.       She is presenting today for post PCI follow-up visit.  Since her recent stenting she has been feeling better.  She has not had any more sharp  chest pains.  Her breathing has improved and she is able to go up and down the steps before she has to sit down and rest and her activity tolerance has improved.  She denies any further palpitations.  She denies PND, orthopnea, lower extremity edema or significant weight changes.  She is compliant with her CPAP therapy at home.  She does report yesterday she picked up a box that she did not realize how heavy it was and now she is having some wrist pressure and pain.  Her cath site actually looks good and it appears to be a muscle strain.  She was encouraged to try ice and if symptoms persist to follow-up with her primary care physician.  She also has had intermittent nosebleeds occurring once approximately every 2 weeks that resolved with manual pressure within about 5 minutes.  She also is blowing out a lot of dried blood.  She feels like her nasal passages are very dry.  She is not tried any saline rinses.  This started over the wintertime.  She has not had any other abnormal bleeding or falls. She has not been measuring her blood pressure at home and is low normal today.  She is not having any syncope, near syncope, dizziness or lightheadedness.      Past Medical History:   Diagnosis Date   • Abdominal pain    • Abnormal liver function test    • Acute bronchitis    • Benign essential hypertension    • CAD (coronary artery disease)    • CHF (congestive heart failure) (CMS/[facility])    • Colon polyp    • Congestive heart disease (CMS/HCC)    • COPD (chronic obstructive pulmonary disease) (CMS/HCC)    • Cough    • Diabetes (CMS/HCC)    • Diabetes mellitus (CMS/HCC)    • Diarrhea    • Gastroenteritis    • Health care maintenance    • Hyperlipidemia    • Hypertension    • IFG (impaired fasting glucose)    • Ischemic cardiomyopathy    • Myocardial infarction (CMS/HCC)    • SHELLIE (obstructive sleep apnea)    • Sore throat    • Type 2 diabetes mellitus (CMS/HCC)    • Vaginal yeast infection    • Vitamin D deficiency      Past  Surgical History:   Procedure Laterality Date   • CARDIAC CATHETERIZATION     • CARDIAC CATHETERIZATION N/A 1/6/2020    Procedure: Coronary angiography;  Surgeon: Oneida Saldivar MD;  Location:  [facility];  Service: Cardiovascular   • CARDIAC CATHETERIZATION N/A 1/6/2020    Procedure: Left heart cath;  Surgeon: Oneida Saldivar MD;  Location:  [facility];  Service: Cardiovascular   • CARDIAC CATHETERIZATION N/A 1/6/2020    Procedure: Left ventriculography;  Surgeon: Oneida Saldivar MD;  Location:  [facility];  Service: Cardiovascular   • CARDIAC CATHETERIZATION N/A 1/6/2020    Procedure: Percutaneous Coronary Intervention;  Surgeon: Oneida Saldivar MD;  Location:  [facility];  Service: Cardiovascular   • CARDIAC CATHETERIZATION N/A 1/6/2020    Procedure: Stent HUGO coronary;  Surgeon: Oneida Saldivar MD;  Location:  [facility];  Service: Cardiovascular   • CARDIAC CATHETERIZATION  1/6/2020    Procedure: Functional Flow Sandy;  Surgeon: Oneida Saldivar MD;  Location:  [facility];  Service: Cardiovascular   • CARDIAC ELECTROPHYSIOLOGY PROCEDURE N/A 1/12/2018    Procedure: ICD DC generator change  medtronic;  Surgeon: Hany Ornelas MD;  Location:  [facility];  Service:    • PACEMAKER IMPLANTATION     • TUBAL ABDOMINAL LIGATION       Outpatient Medications Prior to Visit   Medication Sig Dispense Refill   • ACCU-CHEK LONNY PLUS test strip TO CHECK 3 - 4 TIMES A  each 2   • atorvastatin (LIPITOR) 80 MG tablet TAKE 1 TABLET BY MOUTH EVERY DAY (Patient taking differently: Take 80 mg by mouth Daily.) 90 tablet 3   • Blood Glucose Monitoring Suppl (ACCU-CHEK LONNY PLUS) w/Device kit 1 device 1 kit 0   • carvedilol (COREG) 25 MG tablet TAKE ONE TABLET BY MOUTH TWICE A DAY WITH MEALS (Patient taking differently: Take 25 mg by mouth 2 (Two) Times a Day With Meals.) 60 tablet 5   •  Cholecalciferol (VITAMIN D3) 2000 UNITS capsule Take 1,000 Units by mouth Daily.     • clopidogrel (PLAVIX) 75 MG tablet TAKE 1 TABLET BY MOUTH EVERY DAY (Patient taking differently: Take 75 mg by mouth Daily.) 90 tablet 2   • exenatide er (BYDUREON) 2 MG/0.85ML auto-injector injection Inject 0.85 mL under the skin into the appropriate area as directed 1 (One) Time Per Week. 5 pen 3   • furosemide (LASIX) 40 MG tablet Take 1 tablet by mouth Daily. Resume on 1/7/20     • insulin aspart (NOVOLOG FLEXPEN) 100 UNIT/ML solution pen-injector sc pen Inject 15 Units under the skin into the appropriate area as directed 3 (Three) Times a Day With Meals. 15 mL 3   • Insulin Degludec (TRESIBA FLEXTOUCH) 200 UNIT/ML solution pen-injector Inject 55 Units under the skin into the appropriate area as directed Daily. (Patient taking differently: Inject 45 Units under the skin into the appropriate area as directed Daily.) 15 mL 3   • Insulin Pen Needle (BD PEN NEEDLE NADEEN U/F) 32G X 4 MM misc TO INJECT 4 TIMES DAILY 200 each 3   • Lancets (ACCU-CHEK MULTICLIX) lancets To check 3 - 4 times a day 200 each 2   • metFORMIN (GLUCOPHAGE) 1000 MG tablet Take 1 tablet by mouth Daily With Breakfast. Resume on 1/8/20     • Multiple Vitamins-Minerals (MULTIVITAL PO) Take 1 tablet by mouth Daily.     • nitroglycerin (NITROSTAT) 0.4 MG SL tablet Place 1 tablet under the tongue Every 5 (Five) Minutes As Needed for Chest Pain. Take no more than 3 doses in 15 minutes. 30 tablet 12   • Omega-3 Fatty Acids (FISH OIL) 1200 MG capsule capsule Take 1,200 mg by mouth Daily With Breakfast.     • potassium chloride (K-DUR) 10 MEQ CR tablet Take 1 tablet by mouth Daily. Resume on 1/7/20     • rivaroxaban (XARELTO) 20 MG tablet Take 1 tablet by mouth Daily With Dinner. Resume on 1/7/20     • spironolactone (ALDACTONE) 25 MG tablet TAKE 1 TABLET BY MOUTH EVERY DAY (Patient taking differently: Take 25 mg by mouth Daily.) 90 tablet 1   • vitamin B-12  (CYANOCOBALAMIN) 500 MCG tablet Take 500 mcg by mouth Daily.       No facility-administered medications prior to visit.        Allergies as of 01/13/2020   • (No Known Allergies)     Social History     Socioeconomic History   • Marital status:      Spouse name: Not on file   • Number of children: Not on file   • Years of education: Not on file   • Highest education level: Not on file   Tobacco Use   • Smoking status: Former Smoker     Packs/day: 1.00     Years: 30.00     Pack years: 30.00     Types: Cigarettes     Start date: 11/1/1979     Last attempt to quit: 11/1/2009     Years since quitting: 10.2   • Smokeless tobacco: Never Used   • Tobacco comment: QUIT 10 YRS   Substance and Sexual Activity   • Alcohol use: Yes     Comment: occasionally/ caffeine use    • Drug use: No   • Sexual activity: Yes     Partners: Male   Social History Narrative    ** Merged History Encounter **          Family History   Problem Relation Age of Onset   • Diabetes Mother    • Heart disease Mother    • Hyperlipidemia Mother    • Diabetes Father    • Heart disease Father    • Hyperlipidemia Father    • Heart disease Brother    • Hyperlipidemia Brother    • Hypertension Father    • Colon cancer Father    • Heart attack Father    • Emphysema Mother    • Heart disease Brother    • Heart attack Brother    • Heart disease Maternal Grandmother    • Heart disease Maternal Grandfather    • Heart disease Paternal Grandmother    • Heart disease Paternal Grandfather      Review of Systems   Constitution: Positive for malaise/fatigue. Negative for chills and fever.   HENT: Positive for nosebleeds. Negative for ear pain, hearing loss and sore throat.    Eyes: Negative for double vision, pain, vision loss in left eye and vision loss in right eye.   Cardiovascular: Negative for chest pain, claudication, dyspnea on exertion, irregular heartbeat, leg swelling, near-syncope, orthopnea, palpitations, paroxysmal nocturnal dyspnea and syncope.    "  Respiratory: Positive for shortness of breath. Negative for cough, snoring and wheezing.    Endocrine: Negative for cold intolerance and heat intolerance.   Hematologic/Lymphatic: Negative for bleeding problem.   Skin: Negative for color change, itching, rash and unusual hair distribution.   Musculoskeletal: Negative for joint pain and joint swelling.   Gastrointestinal: Negative for abdominal pain, diarrhea, hematochezia, melena, nausea and vomiting.   Genitourinary: Negative for decreased libido, frequency, hematuria, hesitancy and incomplete emptying.   Neurological: Negative for excessive daytime sleepiness, dizziness, headaches, light-headedness, loss of balance, numbness, paresthesias and seizures.   Psychiatric/Behavioral: Negative for depression.          Objective:     Vitals:    01/13/20 1027   BP: 100/70   BP Location: Right arm   Patient Position: Sitting   Cuff Size: Adult   Pulse: 69   Weight: 99.1 kg (218 lb 6.4 oz)   Height: 152.4 cm (60\")     Body mass index is 42.65 kg/m².    PHYSICAL EXAM:  Physical Exam   Constitutional: She is oriented to person, place, and time. She appears well-developed and well-nourished. No distress.   HENT:   Head: Normocephalic and atraumatic.   Eyes: Pupils are equal, round, and reactive to light. Conjunctivae and EOM are normal.   Neck: Carotid bruit is not present.   Difficult to assess for JVD d/t body habitus   Cardiovascular: Normal rate, regular rhythm and intact distal pulses. Exam reveals distant heart sounds.   No murmur heard.  Pulses:       Radial pulses are 2+ on the right side, and 2+ on the left side.        Posterior tibial pulses are 2+ on the right side, and 2+ on the left side.   No pitting lower extremity edema    Right radial cath site well healed without erythema or ecchymosis, palpable proximal and distal pulses, good capillary refill, good motor function of hand, no thrill detected, site is soft and non-tender with no hematoma, no sensory " deficits noted.     Pulmonary/Chest: Effort normal. No accessory muscle usage. No tachypnea. No respiratory distress. She has no wheezes. She has no rhonchi. She has no rales. She exhibits no tenderness.   Somewhat distant lung sounds   Abdominal: Soft. Bowel sounds are normal. She exhibits no distension. There is no tenderness. There is no rebound and no guarding.   Obese   Musculoskeletal:        Right forearm: She exhibits tenderness and swelling.   Patient right hand palmar aspect of thumb hand/wrist tender with minimal bruising and some soft tissue swelling. Right hand drip weaker than left d/t pain and decreased ROM.   Neurological: She is alert and oriented to person, place, and time.   Skin: Skin is warm, dry and intact. She is not diaphoretic. No erythema.   Psychiatric: She has a normal mood and affect. Her speech is normal and behavior is normal. Judgment and thought content normal. Cognition and memory are normal.   Nursing note and vitals reviewed.        ECG 12 Lead  Date/Time: 1/13/2020 10:30 AM  Performed by: Tahmina Lacy APRN  Authorized by: Tahmina Lacy APRN   Comparison: compared with previous ECG from 1/6/2020  Similar to previous ECG  Rhythm: sinus rhythm  Rate: normal  BPM: 69  Q waves: V1 and V2    T flattening: aVL  Comments: Previous anteroseptal MI  Indication: CAD, s/p stenting            Assessment:       Diagnosis Plan   1. Chronic coronary artery disease  ECG 12 Lead   2. S/P coronary artery stent placement  ECG 12 Lead   3. Ischemic cardiomyopathy  ECG 12 Lead   4. History of anterior myocardial infarction   ECG 12 Lead   5. Congestive heart failure with left ventricular systolic dysfunction (CMS/HCC)  ECG 12 Lead   6. ICD (implantable cardioverter-defibrillator) in place  ECG 12 Lead   7. Benign essential hypertension  ECG 12 Lead   8. Type 2 diabetes mellitus with complication (CMS/HCC)  ECG 12 Lead   9. Obstructive sleep apnea syndrome  ECG 12 Lead   10. Morbid obesity with  BMI of 40.0-44.9, adult (CMS/[facility])  ECG 12 Lead   11. Cerebrovascular accident (CVA) due to embolism of left middle cerebral artery (CMS/[facility])  ECG 12 Lead       Plan:     1.  Chest pain:    Recent drug-eluting stent placement to instent restenosis of LAD, also with moderate OM1 disease with negative FFR.  She has had resolution of her chest pain and improvement in her shortness of breath.  I informed her to let me know if she has recurrence of chest pain or shortness of breath as she had OM 1 disease that currently we will be medically managing unless her symptoms worsen.  2.  Ischemic cardiomyopathy:  Echo in 12/2018 showed an EF of 30%. EF about 45% with akinetic apex and hypokinetic proximal to mid anterior wall on LV gram.  She appears stable from the standpoint.  Continue current medical management.  Her blood pressure is on the low side thus do not have room to add ACE inhibitor/ARB therapy today.  She is on GDM T with carvedilol, spironolactone and furosemide.  3.  Coronary artery disease: History of anterior MI and stenting to the LAD: 1/6/2020 she had repeat cath with 95% in-stent restenosis treated with drug-eluting stent to the LAD she had moderate disease of OM1 with normal FFR.  Continue with management of clopidogrel only without aspirin as she is on anticoagulation with rivaroxaban.  No further angina.  4.  Status post AICD:  Continue routine device checks.Denies shocks. Has next device check 2/11/2020  5.  Diabetes mellitus type 2: Has not been well controlled recently.  10/2019 A1c was 8.6%.  She is following with endocrinology and is on oral and insulin therapy.  I have encouraged her to contact them sooner than her March 2020 appt regarding her hyperglycemia.  6.  History of left MCA stroke: Felt to be cardioembolic.  She is now on anticoagulation with rivaroxaban which she is tolerating.  Denies further strokelike symptoms.  7.  Hypertension:  Well controlled on her current medications.  8.   Obstructive sleep apnea:  Compliant with CPAP.  9.  Morbid obesity: This is an issue that is largely affecting her care.  BMI 42.7.  Recommend weight loss.  10.  Right wrist strain: Her cast site is under control.  She did injure her wrist with lifting a heavy box yesterday.  I have encouraged her to ice and try some Tylenol.  I have encouraged her to follow-up with her PCP with continued symptoms  11.  Nosebleeds: Intermittent.  Worse in the wintertime.  She has a lot of dried blood in her nose and will have an occasional bleed once every 2 weeks where she has told me no pressure for 5 minutes.  I have encouraged her to talk with her sleep medicine physician about increased humidification for her CPAP machine as well as using nasal saline to try to keep her nasal passages moist.  She will call with recurrent or worsening symptoms at which time she may benefit from expertise of ENT.      I advised on the importance of good blood pressure, blood sugar and cholesterol control, as well as regular exercise and weight loss and avoidance of tobacco for cardiovascular disease risk factor modification.   We will get her started in cardiac rehab to increase her stamina and physical activity in hopes to achieve some weight loss.  I would like her to monitor blood pressure at home periodically and bring in her log to her next visit.  If her blood pressure is higher than it is running in our office today could consider trying to add ACE inhibitor or ARB therapy for GDM T of her ischemic cardiomyopathy.      Follow up with Dr. Saldivar on 2/11/2020, unless otherwise needed sooner.  I advised the patient to contact our office with any questions or concerns.       It has been a pleasure to participate in this patient's care. Please feel free to contact me with any questions or concerns.     Tahmina Lacy, KEYONA  01/13/20             Your medication list           Accurate as of January 13, 2020 11:24 AM. If you have any questions,  ask your nurse or doctor.               CHANGE how you take these medications      Instructions Last Dose Given Next Dose Due   Insulin Degludec 200 UNIT/ML solution pen-injector pen injection  Commonly known as:  TRESIBA FLEXTOUCH  What changed:  how much to take      Inject 55 Units under the skin into the appropriate area as directed Daily.          CONTINUE taking these medications      Instructions Last Dose Given Next Dose Due   ACCU-CHEK LONNY PLUS test strip  Generic drug:  glucose blood      TO CHECK 3 - 4 TIMES A DAY       ACCU-CHEK LONNY PLUS w/Device kit      1 device       accu-chek multiclix lancets      To check 3 - 4 times a day       atorvastatin 80 MG tablet  Commonly known as:  LIPITOR      TAKE 1 TABLET BY MOUTH EVERY DAY       carvedilol 25 MG tablet  Commonly known as:  COREG      TAKE ONE TABLET BY MOUTH TWICE A DAY WITH MEALS       clopidogrel 75 MG tablet  Commonly known as:  PLAVIX      TAKE 1 TABLET BY MOUTH EVERY DAY       exenatide er 2 MG/0.85ML auto-injector injection  Commonly known as:  BYDUREON      Inject 0.85 mL under the skin into the appropriate area as directed 1 (One) Time Per Week.       fish oil 1200 MG capsule capsule      Take 1,200 mg by mouth Daily With Breakfast.       furosemide 40 MG tablet  Commonly known as:  LASIX      Take 1 tablet by mouth Daily. Resume on 1/7/20       insulin aspart 100 UNIT/ML solution pen-injector sc pen  Commonly known as:  NOVOLOG FLEXPEN      Inject 15 Units under the skin into the appropriate area as directed 3 (Three) Times a Day With Meals.       Insulin Pen Needle 32G X 4 MM misc  Commonly known as:  BD PEN NEEDLE NADEEN U/F      TO INJECT 4 TIMES DAILY       metFORMIN 1000 MG tablet  Commonly known as:  GLUCOPHAGE      Take 1 tablet by mouth Daily With Breakfast. Resume on 1/8/20       MULTIVITAL PO      Take 1 tablet by mouth Daily.       nitroglycerin 0.4 MG SL tablet  Commonly known as:  NITROSTAT      Place 1 tablet under the tongue  Every 5 (Five) Minutes As Needed for Chest Pain. Take no more than 3 doses in 15 minutes.       potassium chloride 10 MEQ CR tablet  Commonly known as:  K-DUR      Take 1 tablet by mouth Daily. Resume on 1/7/20       rivaroxaban 20 MG tablet  Commonly known as:  XARELTO      Take 1 tablet by mouth Daily With Dinner. Resume on 1/7/20       spironolactone 25 MG tablet  Commonly known as:  ALDACTONE      TAKE 1 TABLET BY MOUTH EVERY DAY       vitamin B-12 500 MCG tablet  Commonly known as:  CYANOCOBALAMIN      Take 500 mcg by mouth Daily.       Vitamin D3 50 MCG (2000 UT) capsule      Take 1,000 Units by mouth Daily.              The above medication changes may not have been made by this provider.  Medication list was updated to reflect medications patient is currently taking including medication changes and discontinuations made by other healthcare providers.     Dictated utilizing Dragon Dictation System.

## 2020-01-13 ENCOUNTER — OFFICE VISIT (OUTPATIENT)
Dept: CARDIOLOGY | Facility: CLINIC | Age: 60
End: 2020-01-13

## 2020-01-13 VITALS
WEIGHT: 218.4 LBS | BODY MASS INDEX: 42.88 KG/M2 | HEART RATE: 69 BPM | DIASTOLIC BLOOD PRESSURE: 70 MMHG | SYSTOLIC BLOOD PRESSURE: 100 MMHG | HEIGHT: 60 IN

## 2020-01-13 DIAGNOSIS — I21.09 ACUTE MYOCARDIAL INFARCTION OF ANTERIOR WALL (HCC): ICD-10-CM

## 2020-01-13 DIAGNOSIS — Z95.810 ICD (IMPLANTABLE CARDIOVERTER-DEFIBRILLATOR) IN PLACE: ICD-10-CM

## 2020-01-13 DIAGNOSIS — E66.01 MORBID OBESITY WITH BMI OF 40.0-44.9, ADULT (HCC): ICD-10-CM

## 2020-01-13 DIAGNOSIS — E11.8 TYPE 2 DIABETES MELLITUS WITH COMPLICATION (HCC): ICD-10-CM

## 2020-01-13 DIAGNOSIS — I50.20 CONGESTIVE HEART FAILURE WITH LEFT VENTRICULAR SYSTOLIC DYSFUNCTION (HCC): ICD-10-CM

## 2020-01-13 DIAGNOSIS — Z95.5 S/P CORONARY ARTERY STENT PLACEMENT: ICD-10-CM

## 2020-01-13 DIAGNOSIS — I63.412 CEREBROVASCULAR ACCIDENT (CVA) DUE TO EMBOLISM OF LEFT MIDDLE CEREBRAL ARTERY (HCC): ICD-10-CM

## 2020-01-13 DIAGNOSIS — I25.5 ISCHEMIC CARDIOMYOPATHY: ICD-10-CM

## 2020-01-13 DIAGNOSIS — G47.33 OBSTRUCTIVE SLEEP APNEA SYNDROME: ICD-10-CM

## 2020-01-13 DIAGNOSIS — I10 BENIGN ESSENTIAL HYPERTENSION: ICD-10-CM

## 2020-01-13 DIAGNOSIS — I25.10 CHRONIC CORONARY ARTERY DISEASE: Primary | ICD-10-CM

## 2020-01-13 PROCEDURE — 99214 OFFICE O/P EST MOD 30 MIN: CPT | Performed by: NURSE PRACTITIONER

## 2020-01-13 PROCEDURE — 93000 ELECTROCARDIOGRAM COMPLETE: CPT | Performed by: NURSE PRACTITIONER

## 2020-01-14 ENCOUNTER — TELEPHONE (OUTPATIENT)
Dept: CARDIAC REHAB | Facility: HOSPITAL | Age: 60
End: 2020-01-14

## 2020-01-21 ENCOUNTER — TELEPHONE (OUTPATIENT)
Dept: CARDIOLOGY | Facility: CLINIC | Age: 60
End: 2020-01-21

## 2020-01-21 ENCOUNTER — PATIENT MESSAGE (OUTPATIENT)
Dept: CARDIOLOGY | Facility: CLINIC | Age: 60
End: 2020-01-21

## 2020-01-21 NOTE — TELEPHONE ENCOUNTER
"----- Message from Daisy Dent sent at 1/21/2020  9:23 AM EST -----  Regarding: Non-Urgent Medical Question  Contact: [phone]  Hi, I am trying to file a claim with my Critical Illness plan for \"Arteriosclerosis\"  and wanted to know if the procedure that was done back on Jan 6 similar to the below so I can file a claim?     My plan states the following;  Your plan with [employer] states:  Arteriosclerosis We pay a benefit if a Covered Person is diagnosed with Arteriosclerosis,  which means blockage of a coronary artery of sufficient severity to require  one or more coronary artery bypass graft(s).  Diagnosis must include demonstrated need for intervention.  We deem Arteriosclerosis to occur on the date a Doctor of appropriate  specialty makes a Diagnosis of Arteriosclerosis of sufficient severity to  warrant one or more coronary artery bypass graft(s).     Thank you  "

## 2020-01-21 NOTE — TELEPHONE ENCOUNTER
Let her know that she did have arteriosclerosis with sufficient severity to require intervention but that intervention was placement of a stent and not placement of a bypass graft (which would be open heart surgery).

## 2020-01-23 DIAGNOSIS — I25.10 CORONARY ARTERY DISEASE INVOLVING NATIVE CORONARY ARTERY OF NATIVE HEART WITHOUT ANGINA PECTORIS: ICD-10-CM

## 2020-01-23 RX ORDER — CLOPIDOGREL BISULFATE 75 MG/1
75 TABLET ORAL DAILY
Qty: 90 TABLET | Refills: 1 | Status: SHIPPED | OUTPATIENT
Start: 2020-01-23 | End: 2020-07-09 | Stop reason: SDUPTHER

## 2020-02-05 ENCOUNTER — RESULTS ENCOUNTER (OUTPATIENT)
Dept: ENDOCRINOLOGY | Age: 60
End: 2020-02-05

## 2020-02-05 DIAGNOSIS — E78.2 MIXED HYPERLIPIDEMIA: ICD-10-CM

## 2020-02-05 DIAGNOSIS — IMO0002 DIABETES MELLITUS TYPE 2, UNCONTROLLED, WITH COMPLICATIONS: ICD-10-CM

## 2020-02-11 ENCOUNTER — OFFICE VISIT (OUTPATIENT)
Dept: CARDIOLOGY | Facility: CLINIC | Age: 60
End: 2020-02-11

## 2020-02-11 VITALS
HEART RATE: 60 BPM | BODY MASS INDEX: 42.53 KG/M2 | DIASTOLIC BLOOD PRESSURE: 70 MMHG | WEIGHT: 216.6 LBS | HEIGHT: 60 IN | SYSTOLIC BLOOD PRESSURE: 110 MMHG

## 2020-02-11 DIAGNOSIS — Z95.810 AUTOMATIC IMPLANTABLE CARDIOVERTER-DEFIBRILLATOR IN SITU: ICD-10-CM

## 2020-02-11 DIAGNOSIS — I25.10 CHRONIC CORONARY ARTERY DISEASE: Primary | ICD-10-CM

## 2020-02-11 DIAGNOSIS — E11.8 TYPE 2 DIABETES MELLITUS WITH COMPLICATION (HCC): ICD-10-CM

## 2020-02-11 DIAGNOSIS — I63.412 CEREBROVASCULAR ACCIDENT (CVA) DUE TO EMBOLISM OF LEFT MIDDLE CEREBRAL ARTERY (HCC): ICD-10-CM

## 2020-02-11 DIAGNOSIS — I25.5 ISCHEMIC CARDIOMYOPATHY: ICD-10-CM

## 2020-02-11 DIAGNOSIS — G47.33 OBSTRUCTIVE SLEEP APNEA SYNDROME: ICD-10-CM

## 2020-02-11 DIAGNOSIS — Z95.5 S/P CORONARY ARTERY STENT PLACEMENT: ICD-10-CM

## 2020-02-11 DIAGNOSIS — E78.2 MIXED HYPERLIPIDEMIA: ICD-10-CM

## 2020-02-11 DIAGNOSIS — E66.01 MORBID OBESITY WITH BMI OF 40.0-44.9, ADULT (HCC): ICD-10-CM

## 2020-02-11 PROCEDURE — 93000 ELECTROCARDIOGRAM COMPLETE: CPT | Performed by: INTERNAL MEDICINE

## 2020-02-11 PROCEDURE — 99214 OFFICE O/P EST MOD 30 MIN: CPT | Performed by: INTERNAL MEDICINE

## 2020-02-11 RX ORDER — BLOOD-GLUCOSE METER
EACH MISCELLANEOUS
Qty: 1 KIT | Refills: 0 | Status: SHIPPED | OUTPATIENT
Start: 2020-02-11 | End: 2020-04-10

## 2020-02-11 NOTE — PROGRESS NOTES
Subjective:     Encounter Date:02/11/2020      Patient ID: Daisy Dent is a 59 y.o. female.    Chief Complaint:  History of Present Illness    This is a 59-year-old female with a history of ischemic cardiomyopathy, last ejection fraction of 40-45%, diabetes mellitus type 2, obstructive sleep apnea on CPAP, hypertension, status post dual-chamber AICD placement, dyslipidemia, coronary artery disease with a history of prior anterior myocardial infarction and LAD stent placement, status post left MCA stroke, who presents for follow-up.     At her last office visit in 11/2019 she reported a couple weeks of episodes of substernal chest tightness lasting 1 to 2 minutes and dyspnea on exertion.  She reported that the symptoms were similar to what she had at the time of her prior myocardial infarctions but reports that its much milder than what she experienced at that time.    Following that office visit she underwent a stress test that showed evidence of a medium anterior infarct with moderate elva-infarct ischemia.  The findings were similar to prior stress test however the amount of elva-infarct ischemia had impaired to increase.  We initially tried to treat her medically however she continued to have recurrent symptoms so we opted to proceed with a cardiac catheterization.  She presented to the hospital on 1/6/2020 for the procedure.  She was found to have severe in-stent restenosis of her proximal LAD stent and ultimately underwent repeat drug-eluting stent placement of the in-stent restenosis.  She was also noted to have moderate nonobstructive disease of the first obtuse marginal branch for which she underwent FFR evaluation which was normal at 0.96.    She now presents for routine follow-up.  Since her stent placement she was seen by KEYONA Demarco for 1 week follow-up at which time she was feeling well with improvement in her fatigue and dyspnea on exertion and no further chest pain.  She was also  referred to cardiac rehab.  Today she reports that she continues to feel well.  She denies any new symptoms.  She is denies any bleeding issues.  Specifically she denies any palpitations, chest pain, dyspnea on exertion, orthopnea, near-syncope or syncope, or lower extremity edema.  She had called cardiac rehab a couple weeks ago to find out if she would have to pay out of pocket since she met her deductible earlier this year but has not heard back from them yet.       Prior History:  I began following the patient in 11/2015 when she presented to establish care.  Prior to that she was followed by Dr. Ayala with [facility].  Her prior cardiac history includes a non-ST elevation MI and 7/2006 at which time she received a Cypher 3.0 x 23 mm stent to her mid LAD.  She then presented in 10/2009 with a late in-stent thrombosis that required repeat stenting of her mid LAD in addition to a second drug-eluting stent placement of her left circumflex artery.  Following that she had a repeat cardiac catheterization in 2010 that was reportedly unremarkable.  Following her myocardial infarction in 2009 she developed ischemic myopathy with an EF of around 25% and ultimately underwent AICD placement and 7/2010.  At some point in the course of her follow-ups her left ventricular function improved with an ejection fraction of 40-45%.  A stress test showed evidence of a large anterior infarct and minimal elva-infarct ischemia.     In her follow-ups she has done fairly well recently when she reported more fatigued than normal and episodes of chest discomfort that occurred while she was exercising on the treadmill.  She underwent  an echocardiogram in 3/2017 that showed an ejection fraction of 40-45%.  Due to continued symptoms we went ahead and proceeded with a PET stress test which was performed in 8/2017 that showed medium sized anterior wall infarct with mild elva-infarct ischemia and a post stress ejection fraction of 58%.       In 11/2017 her AICD was noted to be at [facility].  She subsequently underwent generator replacement on 1/12/2018 with Dr. Ornelas. When I saw her back in follow up in 9/2018 she reported that she denied any new or worsening symptoms.       She was admitted and 12/2018 after she presented with aphasia.  On arrival to the hospital she underwent a CT of the head that showed no acute stroke but a perfusion scan showed a large perfusion mismatch.  A CTA showed evidence of a left MCA thrombus.  She was given TPA at this time with improvement in her symptoms.  Neurology felt that her stroke was likely embolic.  She underwent a repeat echocardiogram during her admission that showed moderately depressed left ventricular systolic function with an EF of 30%, grade 1 diastolic dysfunction, and no significant valvular disease.  Based on the likelihood that this was an embolic stroke I opted to go ahead and recommend anticoagulation.  She was subsequently started on rivaroxaban 20 mg a day.  Her aspirin was stopped and she was continued on clopidogrel.    Review of Systems   Constitution: Positive for malaise/fatigue.   HENT: Negative for hearing loss, hoarse voice, nosebleeds and sore throat.    Eyes: Negative for pain.   Cardiovascular: Negative for chest pain, claudication, cyanosis, dyspnea on exertion, irregular heartbeat, leg swelling, near-syncope, orthopnea, palpitations, paroxysmal nocturnal dyspnea and syncope.   Respiratory: Negative for shortness of breath and snoring.    Endocrine: Negative for cold intolerance, heat intolerance, polydipsia, polyphagia and polyuria.   Skin: Negative for itching and rash.   Musculoskeletal: Negative for arthritis, falls, joint pain, joint swelling, muscle cramps, muscle weakness and myalgias.   Gastrointestinal: Negative for constipation, diarrhea, dysphagia, heartburn, hematemesis, hematochezia, melena, nausea and vomiting.   Genitourinary: Negative for frequency, hematuria and  hesitancy.   Neurological: Negative for excessive daytime sleepiness, dizziness, headaches, light-headedness, numbness and weakness.   Psychiatric/Behavioral: Negative for depression. The patient is not nervous/anxious.          Current Outpatient Medications:   •  ACCU-CHEK LONNY PLUS test strip, TO CHECK 3 - 4 TIMES A DAY, Disp: 200 each, Rfl: 2  •  atorvastatin (LIPITOR) 80 MG tablet, TAKE 1 TABLET BY MOUTH EVERY DAY (Patient taking differently: Take 80 mg by mouth Daily.), Disp: 90 tablet, Rfl: 3  •  Blood Glucose Monitoring Suppl (ACCU-CHEK LONNY PLUS) w/Device kit, 1 device, Disp: 1 kit, Rfl: 0  •  carvedilol (COREG) 25 MG tablet, TAKE ONE TABLET BY MOUTH TWICE A DAY WITH MEALS (Patient taking differently: Take 25 mg by mouth 2 (Two) Times a Day With Meals.), Disp: 60 tablet, Rfl: 5  •  Cholecalciferol (VITAMIN D3) 2000 UNITS capsule, Take 1,000 Units by mouth Daily., Disp: , Rfl:   •  clopidogrel (PLAVIX) 75 MG tablet, Take 1 tablet by mouth Daily., Disp: 90 tablet, Rfl: 1  •  exenatide er (BYDUREON) 2 MG/0.85ML auto-injector injection, Inject 0.85 mL under the skin into the appropriate area as directed 1 (One) Time Per Week., Disp: 5 pen, Rfl: 3  •  furosemide (LASIX) 40 MG tablet, Take 1 tablet by mouth Daily. Resume on 1/7/20, Disp: , Rfl:   •  insulin aspart (NOVOLOG FLEXPEN) 100 UNIT/ML solution pen-injector sc pen, Inject 15 Units under the skin into the appropriate area as directed 3 (Three) Times a Day With Meals., Disp: 15 mL, Rfl: 3  •  Insulin Degludec (TRESIBA FLEXTOUCH) 200 UNIT/ML solution pen-injector, Inject 55 Units under the skin into the appropriate area as directed Daily. (Patient taking differently: Inject 45 Units under the skin into the appropriate area as directed Daily.), Disp: 15 mL, Rfl: 3  •  Insulin Pen Needle 32G X 4 MM misc, USE 4 TIMES A DAY, Disp: 400 each, Rfl: 3  •  Lancets (ACCU-CHEK MULTICLIX) lancets, To check 3 - 4 times a day, Disp: 200 each, Rfl: 2  •  metFORMIN  (GLUCOPHAGE) 1000 MG tablet, Take 1 tablet by mouth Daily With Breakfast. Resume on 1/8/20, Disp: , Rfl:   •  Multiple Vitamins-Minerals (MULTIVITAL PO), Take 1 tablet by mouth Daily., Disp: , Rfl:   •  nitroglycerin (NITROSTAT) 0.4 MG SL tablet, Place 1 tablet under the tongue Every 5 (Five) Minutes As Needed for Chest Pain. Take no more than 3 doses in 15 minutes., Disp: 30 tablet, Rfl: 12  •  Omega-3 Fatty Acids (FISH OIL) 1200 MG capsule capsule, Take 1,200 mg by mouth Daily With Breakfast., Disp: , Rfl:   •  potassium chloride (K-DUR) 10 MEQ CR tablet, Take 1 tablet by mouth Daily. Resume on 1/7/20, Disp: , Rfl:   •  rivaroxaban (XARELTO) 20 MG tablet, Take 1 tablet by mouth Daily With Dinner. Resume on 1/7/20, Disp: , Rfl:   •  spironolactone (ALDACTONE) 25 MG tablet, TAKE 1 TABLET BY MOUTH EVERY DAY (Patient taking differently: Take 25 mg by mouth Daily.), Disp: 90 tablet, Rfl: 1  •  vitamin B-12 (CYANOCOBALAMIN) 500 MCG tablet, Take 500 mcg by mouth Daily., Disp: , Rfl:     Past Medical History:   Diagnosis Date   • Abdominal pain    • Abnormal liver function test    • Acute bronchitis    • Benign essential hypertension    • CAD (coronary artery disease)    • CHF (congestive heart failure) (CMS/HCC)    • Colon polyp    • Congestive heart disease (CMS/HCC)    • COPD (chronic obstructive pulmonary disease) (CMS/HCC)    • Cough    • Diabetes (CMS/HCC)    • Diabetes mellitus (CMS/HCC)    • Diarrhea    • Gastroenteritis    • Health care maintenance    • Hyperlipidemia    • Hypertension    • IFG (impaired fasting glucose)    • Ischemic cardiomyopathy    • Myocardial infarction (CMS/HCC)    • SHELLIE (obstructive sleep apnea)    • Sore throat    • Type 2 diabetes mellitus (CMS/HCC)    • Vaginal yeast infection    • Vitamin D deficiency        Past Surgical History:   Procedure Laterality Date   • CARDIAC CATHETERIZATION     • CARDIAC CATHETERIZATION N/A 1/6/2020    Procedure: Coronary angiography;  Surgeon: Janna  MD Oneida;  Location:  [facility];  Service: Cardiovascular   • CARDIAC CATHETERIZATION N/A 1/6/2020    Procedure: Left heart cath;  Surgeon: Oneida Saldivar MD;  Location: [facility] C[facility] INVAS[facility] LOC[facility];  Service: Cardiovascular   • CARDIAC CATHETERIZATION N/A 1/6/2020    Procedure: Left ventriculography;  Surgeon: Oneida Saldivar MD;  Location: [facility] CATH INVASIVE LOCATION;  Service: Cardiovascular   • CARDIAC CATHETERIZATION N/A 1/6/2020    Procedure: Percutaneous Coronary Intervention;  Surgeon: Oneida Saldivar MD;  Location:  [facility];  Service: Cardiovascular   • CARDIAC CATHETERIZATION N/A 1/6/2020    Procedure: Stent HUGO coronary;  Surgeon: Oneida Saldivar MD;  Location: [facility] CATH INVASIVE LOCATION;  Service: Cardiovascular   • CARDIAC CATHETERIZATION  1/6/2020    Procedure: Functional Flow [place];  Surgeon: [facility] Saldivar MD;  Location: [facility] CATH INVASIVE LOC[facility];  Service: Cardiovascular   • CARDIAC ELECTROPHYSIOLOGY PROCEDURE N/A 1/12/2018    Procedure: ICD DC generator change  medtronic;  Surgeon: Hany Ornelas MD;  Location: [facility] CATH INVASIVE LOCATION;  Service:    • PACEMAKER IMPLANTATION     • TUBAL ABDOMINAL LIGATION         Family History   Problem Relation Age of Onset   • Diabetes Mother    • Heart disease Mother    • Hyperlipidemia Mother    • Diabetes Father    • Heart disease Father    • Hyperlipidemia Father    • Heart disease Brother    • Hyperlipidemia Brother    • Hypertension Father    • Colon cancer Father    • Heart attack Father    • Emphysema Mother    • Heart disease Brother    • Heart attack Brother    • Heart disease Maternal Grandmother    • Heart disease Maternal Grandfather    • Heart disease Paternal Grandmother    • Heart disease Paternal Grandfather        Social History     Tobacco Use   • Smoking status: Former Smoker     Packs/day: 1.00     Years: 30.00     Pack years: 30.00     Types: Cigarettes     Start date:  "11/1/1979     Last attempt to quit: 11/1/2009     Years since quitting: 10.2   • Smokeless tobacco: Never Used   • Tobacco comment: QUIT 10 YRS   Substance Use Topics   • Alcohol use: Yes     Comment: occasionally/ caffeine use    • Drug use: No         ECG 12 Lead  Date/Time: 2/11/2020 11:21 AM  Performed by: Oneida Saldivar MD  Authorized by: [facility] Saldivar MD   Comparison: compared with previous ECG   Comments: Atrial paced rhythm               Objective:     Visit Vitals  /70 (BP Location: Left arm, Patient Position: Sitting)   Pulse 60   Ht 152.4 cm (60\")   Wt 98.2 kg (216 lb 9.6 oz)   BMI 42.30 kg/m²         Physical Exam   Constitutional: She is oriented to person, place, and time. She appears well-developed and well-nourished.   HENT:   Head: Normocephalic and atraumatic.   Eyes: Pupils are equal, round, and reactive to light. Conjunctivae, EOM and lids are normal.   Neck: Normal range of motion and full passive range of motion without pain. Neck supple. No JVD present. Carotid bruit is not present.   Cardiovascular: Normal rate, regular rhythm, S1 normal and S2 normal. Exam reveals no gallop.   No murmur heard.  Pulses:       Radial pulses are 2+ on the right side, and 2+ on the left side.   No bilateral lower extremity edema   Pulmonary/Chest: Effort normal and breath sounds normal.   Abdominal: Soft. Normal appearance.   Lymphadenopathy:     She has no cervical adenopathy.   Neurological: She is alert and oriented to person, place, and time.   Skin: Skin is warm, dry and intact.   Psychiatric: She has a normal mood and affect.       Lab Review:       Assessment:          Diagnosis Plan   1. Chronic coronary artery disease     2. S/P coronary artery stent placement     3. Ischemic cardiomyopathy     4. Automatic implantable cardioverter-defibrillator in situ     5. Cerebrovascular accident (CVA) due to embolism of left middle cerebral artery (CMS/HCC)     6. Mixed hyperlipidemia     7. Obstructive " sleep apnea syndrome     8. Morbid obesity with BMI of 40.0-44.9, adult (CMS/[facility])     9. Type 2 diabetes mellitus with complication (CMS/[facility])            Plan:       1.  Coronary artery disease.  She is status post repeat drug-eluting stent placement of her proximal LAD in-stent restenosis.  Continue current management with clopidogrel.  Will try to get her enrolled in cardiac rehab.  2.  Ischemic cardiomyopathy.  EF of about 35%.  She is status post prior AICD placement.  She appears to be NYHA class I currently.  Continue current medical management.  Blood pressures have been too low to add an ACE inhibitor or ARB.  3.  History of left MCA stroke.  On chronic anticoagulation with rivaroxaban.  4.  History of AICD.  Her device check today showed normal function and no recent events.  5.  Diabetes mellitus type 2  6.  Hypertension.  Well-controlled on her current medications.  7.  Obstructive sleep apnea.    We will plan on seeing the patient back again in 6 months.

## 2020-02-24 ENCOUNTER — TELEPHONE (OUTPATIENT)
Dept: CARDIAC REHAB | Facility: HOSPITAL | Age: 60
End: 2020-02-24

## 2020-03-05 DIAGNOSIS — I25.5 ISCHEMIC CARDIOMYOPATHY: ICD-10-CM

## 2020-03-05 RX ORDER — CARVEDILOL 25 MG/1
25 TABLET ORAL 2 TIMES DAILY WITH MEALS
Qty: 180 TABLET | Refills: 2 | Status: SHIPPED | OUTPATIENT
Start: 2020-03-05 | End: 2020-11-18 | Stop reason: SDUPTHER

## 2020-03-11 ENCOUNTER — TELEPHONE (OUTPATIENT)
Dept: NEUROLOGY | Facility: CLINIC | Age: 60
End: 2020-03-11

## 2020-03-11 NOTE — TELEPHONE ENCOUNTER
----- Message from Daisy Dent sent at 3/10/2020  2:24 PM EDT -----  Regarding: Non-Urgent Medical Question  Contact: [phone]  I have issues on the back right side of my head that has been happening since yesterday.  I am experiencing a squeezing feeling but it only last for a few seconds where I can feel the pain.  This has happen thru out the day.

## 2020-03-11 NOTE — TELEPHONE ENCOUNTER
Spoke with pt. Discussed what to watch for and when to call 911 or seek emergent care. Pt stated understanding. Also discussed making an appt to discuss headache management. At this time pt would like to wait.  If she decides that she needs further care for headaches or if the increase in intensity or frequency she will call office back to make an appt.

## 2020-03-11 NOTE — TELEPHONE ENCOUNTER
I spoke with pt to get further information.  She states that squeezing pain is on the right side of her head only and is not nearly as intense as it was when it started.  She said that she is just concerned because of her stroke history, and the pain being on an opposite side of what she had experienced previously. Pt states that pain is not accompanied with any blurry vision, dizziness or other symptoms.     Please review and advise.  Thank you.

## 2020-03-11 NOTE — TELEPHONE ENCOUNTER
Patient has a h/o of headaches. If she has any associated neurologic changes, ie stroke-like symptoms, she needs to call 911. If tylenol does not improve her headaches and she would further like to discuss headache management, she can come in for an appointment. Thanks

## 2020-03-12 LAB
BUN SERPL-MCNC: 16 MG/DL (ref 6–20)
BUN/CREAT SERPL: 16.3 (ref 7–25)
CALCIUM SERPL-MCNC: 9.5 MG/DL (ref 8.6–10.5)
CHLORIDE SERPL-SCNC: 99 MMOL/L (ref 98–107)
CHOLEST SERPL-MCNC: 144 MG/DL (ref 0–200)
CO2 SERPL-SCNC: 25 MMOL/L (ref 22–29)
CREAT SERPL-MCNC: 0.98 MG/DL (ref 0.57–1)
GLUCOSE SERPL-MCNC: 233 MG/DL (ref 65–99)
HBA1C MFR BLD: 9.1 % (ref 4.8–5.6)
HDLC SERPL-MCNC: 36 MG/DL (ref 40–60)
INTERPRETATION: NORMAL
LDLC SERPL CALC-MCNC: 76 MG/DL (ref 0–100)
Lab: NORMAL
POTASSIUM SERPL-SCNC: 4.2 MMOL/L (ref 3.5–5.2)
SODIUM SERPL-SCNC: 138 MMOL/L (ref 136–145)
TRIGL SERPL-MCNC: 158 MG/DL (ref 0–150)
VLDLC SERPL CALC-MCNC: 31.6 MG/DL

## 2020-03-30 RX ORDER — INSULIN ASPART 100 [IU]/ML
INJECTION, SOLUTION INTRAVENOUS; SUBCUTANEOUS
Qty: 10 PEN | Refills: 3 | Status: SHIPPED | OUTPATIENT
Start: 2020-03-30 | End: 2020-10-23

## 2020-04-10 DIAGNOSIS — I25.10 CORONARY ARTERY DISEASE INVOLVING NATIVE CORONARY ARTERY OF NATIVE HEART WITHOUT ANGINA PECTORIS: ICD-10-CM

## 2020-04-10 RX ORDER — NITROGLYCERIN 0.4 MG/1
0.4 TABLET SUBLINGUAL
Qty: 30 TABLET | Refills: 11 | Status: SHIPPED | OUTPATIENT
Start: 2020-04-10 | End: 2021-10-10 | Stop reason: SDUPTHER

## 2020-04-10 RX ORDER — BLOOD-GLUCOSE METER
EACH MISCELLANEOUS
Qty: 1 KIT | Refills: 0 | Status: SHIPPED | OUTPATIENT
Start: 2020-04-10 | End: 2020-10-01 | Stop reason: SDUPTHER

## 2020-05-04 RX ORDER — BLOOD SUGAR DIAGNOSTIC
STRIP MISCELLANEOUS
Qty: 200 EACH | Refills: 2 | Status: SHIPPED | OUTPATIENT
Start: 2020-05-04 | End: 2020-09-24

## 2020-05-20 DIAGNOSIS — I25.5 ISCHEMIC CARDIOMYOPATHY: ICD-10-CM

## 2020-05-20 RX ORDER — SPIRONOLACTONE 25 MG/1
25 TABLET ORAL DAILY
Qty: 90 TABLET | Refills: 1 | Status: SHIPPED | OUTPATIENT
Start: 2020-05-20 | End: 2020-05-20 | Stop reason: SDUPTHER

## 2020-05-20 RX ORDER — FUROSEMIDE 40 MG/1
40 TABLET ORAL DAILY
Qty: 90 TABLET | Refills: 1 | Status: SHIPPED | OUTPATIENT
Start: 2020-05-20 | End: 2020-12-21 | Stop reason: SDUPTHER

## 2020-05-20 RX ORDER — POTASSIUM CHLORIDE 750 MG/1
10 TABLET, FILM COATED, EXTENDED RELEASE ORAL DAILY
Qty: 90 TABLET | Refills: 1 | Status: SHIPPED | OUTPATIENT
Start: 2020-05-20 | End: 2020-10-23 | Stop reason: SDUPTHER

## 2020-05-20 RX ORDER — SPIRONOLACTONE 25 MG/1
25 TABLET ORAL DAILY
Qty: 90 TABLET | Refills: 1 | Status: SHIPPED | OUTPATIENT
Start: 2020-05-20 | End: 2020-10-23 | Stop reason: SDUPTHER

## 2020-07-09 DIAGNOSIS — I25.10 CORONARY ARTERY DISEASE INVOLVING NATIVE CORONARY ARTERY OF NATIVE HEART WITHOUT ANGINA PECTORIS: ICD-10-CM

## 2020-07-09 RX ORDER — CLOPIDOGREL BISULFATE 75 MG/1
75 TABLET ORAL DAILY
Qty: 90 TABLET | Refills: 0 | Status: SHIPPED | OUTPATIENT
Start: 2020-07-09 | End: 2020-09-30 | Stop reason: SDUPTHER

## 2020-08-10 DIAGNOSIS — E78.5 HYPERLIPIDEMIA, UNSPECIFIED HYPERLIPIDEMIA TYPE: ICD-10-CM

## 2020-08-10 DIAGNOSIS — I25.10 CHRONIC CORONARY ARTERY DISEASE: ICD-10-CM

## 2020-08-10 RX ORDER — ATORVASTATIN CALCIUM 80 MG/1
80 TABLET, FILM COATED ORAL DAILY
Qty: 90 TABLET | Refills: 0 | Status: SHIPPED | OUTPATIENT
Start: 2020-08-10 | End: 2020-10-23 | Stop reason: SDUPTHER

## 2020-08-18 ENCOUNTER — OFFICE VISIT (OUTPATIENT)
Dept: CARDIOLOGY | Facility: CLINIC | Age: 60
End: 2020-08-18

## 2020-08-18 ENCOUNTER — CLINICAL SUPPORT NO REQUIREMENTS (OUTPATIENT)
Dept: CARDIOLOGY | Facility: CLINIC | Age: 60
End: 2020-08-18

## 2020-08-18 VITALS
BODY MASS INDEX: 42.8 KG/M2 | WEIGHT: 218 LBS | HEART RATE: 63 BPM | DIASTOLIC BLOOD PRESSURE: 70 MMHG | HEIGHT: 60 IN | SYSTOLIC BLOOD PRESSURE: 112 MMHG

## 2020-08-18 DIAGNOSIS — I50.22 CHRONIC SYSTOLIC CONGESTIVE HEART FAILURE (HCC): ICD-10-CM

## 2020-08-18 DIAGNOSIS — Z95.5 S/P CORONARY ARTERY STENT PLACEMENT: ICD-10-CM

## 2020-08-18 DIAGNOSIS — Z95.810 ICD (IMPLANTABLE CARDIOVERTER-DEFIBRILLATOR) IN PLACE: ICD-10-CM

## 2020-08-18 DIAGNOSIS — E78.2 MIXED HYPERLIPIDEMIA: ICD-10-CM

## 2020-08-18 DIAGNOSIS — I25.5 ISCHEMIC CARDIOMYOPATHY: Primary | ICD-10-CM

## 2020-08-18 DIAGNOSIS — I10 BENIGN ESSENTIAL HYPERTENSION: ICD-10-CM

## 2020-08-18 DIAGNOSIS — E11.8 TYPE 2 DIABETES MELLITUS WITH COMPLICATION (HCC): ICD-10-CM

## 2020-08-18 DIAGNOSIS — I25.10 CHRONIC CORONARY ARTERY DISEASE: Primary | ICD-10-CM

## 2020-08-18 DIAGNOSIS — I63.412 CEREBROVASCULAR ACCIDENT (CVA) DUE TO EMBOLISM OF LEFT MIDDLE CEREBRAL ARTERY (HCC): ICD-10-CM

## 2020-08-18 DIAGNOSIS — I25.5 ISCHEMIC CARDIOMYOPATHY: ICD-10-CM

## 2020-08-18 PROCEDURE — 99214 OFFICE O/P EST MOD 30 MIN: CPT | Performed by: INTERNAL MEDICINE

## 2020-08-18 PROCEDURE — 93282 PRGRMG EVAL IMPLANTABLE DFB: CPT | Performed by: INTERNAL MEDICINE

## 2020-08-18 PROCEDURE — 93000 ELECTROCARDIOGRAM COMPLETE: CPT | Performed by: INTERNAL MEDICINE

## 2020-08-18 NOTE — PROGRESS NOTES
Subjective:     Encounter Date:08/18/2020      Patient ID: Daisy Dent is a 59 y.o. female.    Chief Complaint:  History of Present Illness    This is a 59-year-old female with a history of ischemic cardiomyopathy, last ejection fraction of 40-45%, diabetes mellitus type 2, obstructive sleep apnea on CPAP, hypertension, status post dual-chamber AICD placement, dyslipidemia, coronary artery disease with a history of prior anterior myocardial infarction and LAD stent placement, status post left MCA stroke, who presents for follow-up.     Today for routine 6-month follow-up.  Since I saw her last she was unable to get into cardiac rehab because of the pandemic.  She still interested in attending and is going to place a follow-up call.  She does continue to have milder episodes of chest tightness that can occur at activity or at rest.  Usually resolves within a few minutes.  Is not as severe as what she is experienced in the past.  She also does not have the associated symptoms of heart racing that she experienced in the past.  She reports dyspnea on exertion but feels that this is because of deconditioning and weight gain.  She does admit that she has been doing a lot more stress eating.  She denies any orthopnea, near-syncope or syncope, or lower extremity edema.  She reports compliance with her medications.     Prior History:  I began following the patient in 11/2015 when she presented to establish care.  Prior to that she was followed by Dr. Ayala with [facility].  Her prior cardiac history includes a non-ST elevation MI and 7/2006 at which time she received a Cypher 3.0 x 23 mm stent to her mid LAD.  She then presented in 10/2009 with a late in-stent thrombosis that required repeat stenting of her mid LAD in addition to a second drug-eluting stent placement of her left circumflex artery.  Following that she had a repeat cardiac catheterization in 2010 that was reportedly unremarkable.   Following her myocardial infarction in 2009 she developed ischemic myopathy with an EF of around 25% and ultimately underwent AICD placement and 7/2010.  At some point in the course of her follow-ups her left ventricular function improved with an ejection fraction of 40-45%.  A stress test showed evidence of a large anterior infarct and minimal elva-infarct ischemia.     In her follow-ups she has done fairly well recently when she reported more fatigued than normal and episodes of chest discomfort that occurred while she was exercising on the treadmill.  She underwent  an echocardiogram in 3/2017 that showed an ejection fraction of 40-45%.  Due to continued symptoms we went ahead and proceeded with a PET stress test which was performed in 8/2017 that showed medium sized anterior wall infarct with mild elva-infarct ischemia and a post stress ejection fraction of 58%.      In 11/2017 her AICD was noted to be at [facility].  She subsequently underwent generator replacement on 1/12/2018 with Dr. Ornelas. When I saw her back in follow up in 9/2018 she reported that she denied any new or worsening symptoms.       She was admitted and 12/2018 after she presented with aphasia.  On arrival to the hospital she underwent a CT of the head that showed no acute stroke but a perfusion scan showed a large perfusion mismatch.  A CTA showed evidence of a left MCA thrombus.  She was given TPA at this time with improvement in her symptoms.  Neurology felt that her stroke was likely embolic.  She underwent a repeat echocardiogram during her admission that showed moderately depressed left ventricular systolic function with an EF of 30%, grade 1 diastolic dysfunction, and no significant valvular disease.  Based on the likelihood that this was an embolic stroke I opted to go ahead and recommend anticoagulation.  She was subsequently started on rivaroxaban 20 mg a day.  Her aspirin was stopped and she was continued on clopidogrel.    In 11/2019  she reported a couple weeks of episodes of substernal chest tightness lasting 1 to 2 minutes and dyspnea on exertion.  She reported that the symptoms were similar to what she had at the time of her prior myocardial infarctions but reports that its much milder than what she experienced at that time.    Following that office visit she underwent a stress test that showed evidence of a medium anterior infarct with moderate elva-infarct ischemia.  The findings were similar to prior stress test however the amount of elva-infarct ischemia had impaired to increase.  We initially tried to treat her medically however she continued to have recurrent symptoms so we opted to proceed with a cardiac catheterization.  She presented to the hospital on 1/6/2020 for the procedure.  She was found to have severe in-stent restenosis of her proximal LAD stent and ultimately underwent repeat drug-eluting stent placement of the in-stent restenosis.  She was also noted to have moderate nonobstructive disease of the first obtuse marginal branch for which she underwent FFR evaluation which was normal at 0.96.       Review of Systems   Constitution: Positive for malaise/fatigue and weight gain.   HENT: Negative for hearing loss, hoarse voice, nosebleeds and sore throat.    Eyes: Negative for pain.   Cardiovascular: Positive for chest pain and dyspnea on exertion. Negative for claudication, cyanosis, irregular heartbeat, leg swelling, near-syncope, orthopnea, palpitations, paroxysmal nocturnal dyspnea and syncope.   Respiratory: Negative for shortness of breath and snoring.    Endocrine: Negative for cold intolerance, heat intolerance, polydipsia, polyphagia and polyuria.   Skin: Negative for itching and rash.   Musculoskeletal: Negative for arthritis, falls, joint pain, joint swelling, muscle cramps, muscle weakness and myalgias.   Gastrointestinal: Negative for constipation, diarrhea, dysphagia, heartburn, hematemesis, hematochezia, melena,  nausea and vomiting.   Genitourinary: Negative for frequency, hematuria and hesitancy.   Neurological: Negative for excessive daytime sleepiness, dizziness, headaches, light-headedness, numbness and weakness.   Psychiatric/Behavioral: Negative for depression. The patient is not nervous/anxious.          Current Outpatient Medications:   •  ACCU-CHEK LONNY PLUS test strip, TO CHECK 3 - 4 TIMES A DAY, Disp: 200 each, Rfl: 2  •  atorvastatin (LIPITOR) 80 MG tablet, Take 1 tablet by mouth Daily., Disp: 90 tablet, Rfl: 0  •  Blood Glucose Monitoring Suppl (ACCU-CHEK GUIDE) w/Device kit, USE TO TEST BLOOD SUGAR, Disp: 1 kit, Rfl: 0  •  carvedilol (COREG) 25 MG tablet, Take 1 tablet by mouth 2 (Two) Times a Day With Meals., Disp: 180 tablet, Rfl: 2  •  Cholecalciferol (VITAMIN D3) 2000 UNITS capsule, Take 1,000 Units by mouth Daily., Disp: , Rfl:   •  clopidogrel (PLAVIX) 75 MG tablet, Take 1 tablet by mouth Daily., Disp: 90 tablet, Rfl: 0  •  exenatide er (BYDUREON) 2 MG/0.85ML auto-injector injection, Inject 0.85 mL under the skin into the appropriate area as directed 1 (One) Time Per Week., Disp: 5 pen, Rfl: 3  •  furosemide (LASIX) 40 MG tablet, Take 1 tablet by mouth Daily. Resume on 1/7/20, Disp: 90 tablet, Rfl: 1  •  Insulin Glargine, 1 Unit Dial, (TOUJEO SOLOSTAR) 300 UNIT/ML solution pen-injector injection, Inject 45 Units under the skin into the appropriate area as directed Daily., Disp: 9 mL, Rfl: 3  •  Insulin Pen Needle 32G X 4 MM misc, USE 4 TIMES A DAY, Disp: 400 each, Rfl: 3  •  Lancets (ACCU-CHEK MULTICLIX) lancets, To check 3 - 4 times a day, Disp: 200 each, Rfl: 2  •  metFORMIN (GLUCOPHAGE) 1000 MG tablet, Take 1 tablet by mouth Daily With Breakfast. Resume on 1/8/20, Disp: , Rfl:   •  Multiple Vitamins-Minerals (MULTIVITAL PO), Take 1 tablet by mouth Daily., Disp: , Rfl:   •  nitroglycerin (Nitrostat) 0.4 MG SL tablet, Place 1 tablet under the tongue Every 5 (Five) Minutes As Needed for Chest Pain. Take  no more than 3 doses in 15 minutes., Disp: 30 tablet, Rfl: 11  •  NOVOLOG FLEXPEN 100 UNIT/ML solution pen-injector sc pen, INJECT 10 UNITS UNDER THE SKIN THREE TIMES DAILY WITH MEALS, Disp: 10 pen, Rfl: 3  •  Omega-3 Fatty Acids (FISH OIL) 1200 MG capsule capsule, Take 1,200 mg by mouth Daily With Breakfast., Disp: , Rfl:   •  potassium chloride (K-DUR) 10 MEQ CR tablet, Take 1 tablet by mouth Daily. Resume on 1/7/20, Disp: 90 tablet, Rfl: 1  •  rivaroxaban (Xarelto) 20 MG tablet, Take 1 tablet by mouth Daily With Dinner., Disp: 90 tablet, Rfl: 1  •  spironolactone (ALDACTONE) 25 MG tablet, Take 1 tablet by mouth Daily., Disp: 90 tablet, Rfl: 1  •  vitamin B-12 (CYANOCOBALAMIN) 500 MCG tablet, Take 500 mcg by mouth Daily., Disp: , Rfl:     Past Medical History:   Diagnosis Date   • Abdominal pain    • Abnormal liver function test    • Acute bronchitis    • Benign essential hypertension    • CAD (coronary artery disease)    • CHF (congestive heart failure) (CMS/HCC)    • Colon polyp    • Congestive heart disease (CMS/HCC)    • COPD (chronic obstructive pulmonary disease) (CMS/HCC)    • Cough    • Diabetes (CMS/HCC)    • Diabetes mellitus (CMS/HCC)    • Diarrhea    • Gastroenteritis    • Health care maintenance    • Hyperlipidemia    • Hypertension    • IFG (impaired fasting glucose)    • Ischemic cardiomyopathy    • Myocardial infarction (CMS/HCC)    • SHELLIE (obstructive sleep apnea)    • Sore throat    • Type 2 diabetes mellitus (CMS/HCC)    • Vaginal yeast infection    • Vitamin D deficiency        Past Surgical History:   Procedure Laterality Date   • CARDIAC CATHETERIZATION     • CARDIAC CATHETERIZATION N/A 1/6/2020    Procedure: Coronary angiography;  Surgeon: Oneida Saldivar MD;  Location:  [facility];  Service: Cardiovascular   • CARDIAC CATHETERIZATION N/A 1/6/2020    Procedure: Left heart cath;  Surgeon: Oneida Saldivar MD;  Location: [facility] INV[facility];  Service: Cardiovascular    • CARDIAC CATHETERIZATION N/A 1/6/2020    Procedure: Left ventriculography;  Surgeon: Oneida Saldivar MD;  Location:  [facility];  Service: Cardiovascular   • CARDIAC CATHETERIZATION N/A 1/6/2020    Procedure: Percutaneous Coronary Intervention;  Surgeon: Oneida Saldivar MD;  Location:  [facility];  Service: Cardiovascular   • CARDIAC CATHETERIZATION N/A 1/6/2020    Procedure: Stent HGUO coronary;  Surgeon: Oneida Saldivar MD;  Location:  [facility];  Service: Cardiovascular   • CARDIAC CATHETERIZATION  1/6/2020    Procedure: Functional Flow [place];  Surgeon: Oneida Saldivar MD;  Location:  [facility];  Service: Cardiovascular   • CARDIAC ELECTROPHYSIOLOGY PROCEDURE N/A 1/12/2018    Procedure: ICD DC generator change  medtronic;  Surgeon: Hany Ornelas MD;  Location:  [facility];  Service:    • PACEMAKER IMPLANTATION     • TUBAL ABDOMINAL LIGATION         Family History   Problem Relation Age of Onset   • Diabetes Mother    • Heart disease Mother    • Hyperlipidemia Mother    • Diabetes Father    • Heart disease Father    • Hyperlipidemia Father    • Heart disease Brother    • Hyperlipidemia Brother    • Hypertension Father    • Colon cancer Father    • Heart attack Father    • Emphysema Mother    • Heart disease Brother    • Heart attack Brother    • Heart disease Maternal Grandmother    • Heart disease Maternal Grandfather    • Heart disease Paternal Grandmother    • Heart disease Paternal Grandfather        Social History     Tobacco Use   • Smoking status: Former Smoker     Packs/day: 1.00     Years: 30.00     Pack years: 30.00     Types: Cigarettes     Start date: 11/1/1979     Last attempt to quit: 11/1/2009     Years since quitting: 10.8   • Smokeless tobacco: Never Used   • Tobacco comment: QUIT 10 YRS   Substance Use Topics   • Alcohol use: Yes     Comment: occasionally/ caffeine use    • Drug use: No  "        ECG 12 Lead  Date/Time: 8/18/2020 12:42 PM  Performed by: Oneida Saldivar MD  Authorized by: Oneida Saldivar MD   Comparison: compared with previous ECG   Rhythm: sinus rhythm  Q waves: V1 and V2                   Objective:     Visit Vitals  /70   Pulse 63   Ht 152.4 cm (60\")   Wt 98.9 kg (218 lb)   BMI 42.58 kg/m²         Physical Exam   Constitutional: She is oriented to person, place, and time. She appears well-developed and well-nourished.   HENT:   Head: Normocephalic and atraumatic.   Eyes: Pupils are equal, round, and reactive to light. Conjunctivae, EOM and lids are normal.   Neck: Normal range of motion and full passive range of motion without pain. Neck supple. No JVD present. Carotid bruit is not present.   Cardiovascular: Normal rate, regular rhythm, S1 normal and S2 normal. Exam reveals no gallop.   No murmur heard.  Pulses:       Radial pulses are 2+ on the right side, and 2+ on the left side.   Pulmonary/Chest: Effort normal and breath sounds normal.   Abdominal: Soft. Normal appearance.   Musculoskeletal: She exhibits no edema.   Lymphadenopathy:     She has no cervical adenopathy.   Neurological: She is alert and oriented to person, place, and time.   Skin: Skin is warm, dry and intact.   Psychiatric: She has a normal mood and affect.           Assessment:          Diagnosis Plan   1. Chronic coronary artery disease     2. Ischemic cardiomyopathy     3. Chronic systolic congestive heart failure (CMS/HCC)     4. Cerebrovascular accident (CVA) due to embolism of left middle cerebral artery (CMS/HCC)     5. ICD (implantable cardioverter-defibrillator) in place     6. Mixed hyperlipidemia     7. Type 2 diabetes mellitus with complication (CMS/HCC)     8. Benign essential hypertension     9. S/P coronary artery stent placement            Plan:       1.  Coronary artery disease.  She is having some mild symptoms that have been present since her stent placement and have not really changed. "  Will monitor them for now.  Repeat drug-eluting stent placement of proximal LAD in-stent restenosis in 1/2020.  She is on clopidogrel only.  She is on chronic anticoagulation with rivaroxaban.  I asked the patient to follow-up with cardiac rehab since they are now open.  2.  Ischemic cardiomyopathy.  EF of 35%.  Has a history of AICD placement.  She remains NYHA class I-II.  No evidence of volume overload on exam.  She is on carvedilol only at a high dose.  Unable to tolerate ACE inhibitor or ARB because of her low blood pressures.  3.  History of left MCA stroke.  On chronic anticoagulation with rivaroxaban.  4.  History of AICD.  Pacemaker check showed no recent events.  5.  Diabetes mellitus type 2  6.  Hypertension.  Well-controlled on her current medications.  7.  Obstructive sleep apnea.    We will plan on seeing the patient back again in 3 months to follow-up on her symptoms.  I asked her to notify us if her symptoms worsen prior to that.

## 2020-09-24 RX ORDER — BLOOD SUGAR DIAGNOSTIC
STRIP MISCELLANEOUS
Qty: 200 EACH | Refills: 11 | Status: SHIPPED | OUTPATIENT
Start: 2020-09-24 | End: 2020-10-23

## 2020-09-30 DIAGNOSIS — I25.10 CORONARY ARTERY DISEASE INVOLVING NATIVE CORONARY ARTERY OF NATIVE HEART WITHOUT ANGINA PECTORIS: ICD-10-CM

## 2020-10-01 RX ORDER — CLOPIDOGREL BISULFATE 75 MG/1
75 TABLET ORAL DAILY
Qty: 90 TABLET | Refills: 0 | Status: SHIPPED | OUTPATIENT
Start: 2020-10-01 | End: 2020-12-17

## 2020-10-02 RX ORDER — BLOOD-GLUCOSE METER
1 EACH MISCELLANEOUS DAILY
Qty: 1 KIT | Refills: 0 | Status: SHIPPED | OUTPATIENT
Start: 2020-10-02 | End: 2020-10-05 | Stop reason: SDUPTHER

## 2020-10-06 RX ORDER — BLOOD-GLUCOSE METER
1 EACH MISCELLANEOUS DAILY
Qty: 1 KIT | Refills: 0 | Status: SHIPPED | OUTPATIENT
Start: 2020-10-06

## 2020-10-09 RX ORDER — BLOOD SUGAR DIAGNOSTIC
STRIP MISCELLANEOUS
Qty: 400 EACH | Refills: 1 | Status: SHIPPED | OUTPATIENT
Start: 2020-10-09 | End: 2020-10-12 | Stop reason: SDUPTHER

## 2020-10-09 RX ORDER — LANCETS
EACH MISCELLANEOUS
Qty: 400 EACH | Refills: 1 | Status: SHIPPED | OUTPATIENT
Start: 2020-10-09 | End: 2020-10-12 | Stop reason: SDUPTHER

## 2020-10-12 ENCOUNTER — TELEPHONE (OUTPATIENT)
Dept: CARDIOLOGY | Facility: CLINIC | Age: 60
End: 2020-10-12

## 2020-10-12 NOTE — TELEPHONE ENCOUNTER
Can you please call patient and see if she can come in earlier than her scheduled f/u due to chest tightness.  It would be 10/22 at 2:00 or 2:15 ( breaking up new patient slot ).    Thank you!

## 2020-10-12 NOTE — TELEPHONE ENCOUNTER
Mrs. Dent left voicemail stating that for the last week or two, she has been experiencing tightness in her chest, lasting only seconds, but it does make her stop what she is doing.  After resting, it goes away.    She is wondering if she needs to be seen or any testing that you think she may need.    Please advise.  Thanks!

## 2020-10-12 NOTE — PROGRESS NOTES
60 y.o.    Patient Care Team:  Nusrat Dent APRN as PCP - General (Family Medicine)    Chief Complaint:    FOLLOW UP/ TYPE 2 DM  Subjective     HPI    59 y/o  female with pmhx of Type 2 dm, HLP is here for the follow up.   Pt was hospitalized in Dec 2018 with CVA.      Type 2 diabetes mellitus-diagnosed about 2 years ago.  Insulin was started on patient was hospitalized in December 2018.  Today in clinic patient reports that she is on Toujeo 45 units at bedtime, NovoLog 12 units with each meal, metformin 1000 mg oral daily.  Could not increase the dosage of metformin due to diarrhea and stomach cramps.  During last visit plans were made to start the Bydureon but for some reason patient did not start the medication.  No known history of diabetic retinopathy  Does complain of diabetic neuropathy.  Hx of CAD, no hx of CKD,hx of CVA per pt.   Pt is physically active. weight has been stable.   Pt tries to follow DM diet for most part, but eats candy once in a while.   On Ace inb.     Hyperlipidemia  On Lipitor 80 mg oral daily.       Reviewed primary care physician's/consulting physician documentation and lab results       Interval History      The following portions of the patient's history were reviewed and updated as appropriate: allergies, current medications, past family history, past medical history, past social history, past surgical history and problem list.    Past Medical History:   Diagnosis Date   • Abdominal pain    • Abnormal liver function test    • Acute bronchitis    • Benign essential hypertension    • CAD (coronary artery disease)    • CHF (congestive heart failure) (CMS/HCC)    • Colon polyp    • Congestive heart disease (CMS/HCC)    • COPD (chronic obstructive pulmonary disease) (CMS/HCC)    • Cough    • Diabetes (CMS/HCC)    • Diabetes mellitus (CMS/HCC)    • Diarrhea    • Gastroenteritis    • Health care maintenance    • Hyperlipidemia    • Hypertension    • IFG (impaired fasting  glucose)    • Ischemic cardiomyopathy    • Myocardial infarction (CMS/HCC)    • SHELLIE (obstructive sleep apnea)    • Sore throat    • Type 2 diabetes mellitus (CMS/HCC)    • Vaginal yeast infection    • Vitamin D deficiency      Family History   Problem Relation Age of Onset   • Diabetes Mother    • Heart disease Mother    • Hyperlipidemia Mother    • Diabetes Father    • Heart disease Father    • Hyperlipidemia Father    • Heart disease Brother    • Hyperlipidemia Brother    • Hypertension Father    • Colon cancer Father    • Heart attack Father    • Emphysema Mother    • Heart disease Brother    • Heart attack Brother    • Heart disease Maternal Grandmother    • Heart disease Maternal Grandfather    • Heart disease Paternal Grandmother    • Heart disease Paternal Grandfather      Social History     Socioeconomic History   • Marital status:      Spouse name: Not on file   • Number of children: Not on file   • Years of education: Not on file   • Highest education level: Not on file   Tobacco Use   • Smoking status: Former Smoker     Packs/day: 1.00     Years: 30.00     Pack years: 30.00     Types: Cigarettes     Start date: 11/1/1979     Quit date: 11/1/2009     Years since quitting: 10.9   • Smokeless tobacco: Never Used   • Tobacco comment: QUIT 10 YRS   Substance and Sexual Activity   • Alcohol use: Yes     Comment: occasionally/ caffeine use    • Drug use: No   • Sexual activity: Yes     Partners: Male   Social History Narrative    ** Merged History Encounter **          No Known Allergies    Current Outpatient Medications:   •  Accu-Chek FastClix Lancets misc, Use to test blood sugar 4 times daily  E11.8, Disp: 400 each, Rfl: 1  •  atorvastatin (LIPITOR) 80 MG tablet, Take 1 tablet by mouth Daily., Disp: 90 tablet, Rfl: 0  •  Blood Glucose Monitoring Suppl (Accu-Chek Guide) w/Device kit, Inject 1 Device under the skin into the appropriate area as directed Daily., Disp: 1 kit, Rfl: 0  •  carvedilol (COREG)  25 MG tablet, Take 1 tablet by mouth 2 (Two) Times a Day With Meals., Disp: 180 tablet, Rfl: 2  •  Cholecalciferol (VITAMIN D3) 2000 UNITS capsule, Take 1,000 Units by mouth Daily., Disp: , Rfl:   •  clopidogrel (PLAVIX) 75 MG tablet, Take 1 tablet by mouth Daily., Disp: 90 tablet, Rfl: 0  •  exenatide er (Bydureon) 2 MG pen-injector injection, Inject 1 pen under the skin into the appropriate area as directed 1 (One) Time Per Week., Disp: 4 pen, Rfl: 11  •  furosemide (LASIX) 40 MG tablet, Take 1 tablet by mouth Daily. Resume on 1/7/20, Disp: 90 tablet, Rfl: 1  •  glucose blood (Accu-Chek Flory Plus) test strip, Check 4 - 5 times a day, Disp: 200 each, Rfl: 11  •  insulin aspart (NovoLOG FlexPen) 100 UNIT/ML solution pen-injector sc pen, Inject 15 Units under the skin into the appropriate area as directed 3 (Three) Times a Day With Meals., Disp: 10 pen, Rfl: 3  •  Insulin Glargine, 1 Unit Dial, (Toujeo SoloStar) 300 UNIT/ML solution pen-injector injection, Inject 50 Units under the skin into the appropriate area as directed Daily., Disp: 9 mL, Rfl: 3  •  Insulin Pen Needle 32G X 4 MM misc, USE 4 TIMES A DAY, Disp: 400 each, Rfl: 3  •  Lancets (ACCU-CHEK MULTICLIX) lancets, To check 3 - 4 times a day, Disp: 200 each, Rfl: 2  •  metFORMIN (GLUCOPHAGE) 1000 MG tablet, Take 1 tablet by mouth Daily With Breakfast. Resume on 1/8/20, Disp: , Rfl:   •  Multiple Vitamins-Minerals (MULTIVITAL PO), Take 1 tablet by mouth Daily., Disp: , Rfl:   •  nitroglycerin (Nitrostat) 0.4 MG SL tablet, Place 1 tablet under the tongue Every 5 (Five) Minutes As Needed for Chest Pain. Take no more than 3 doses in 15 minutes., Disp: 30 tablet, Rfl: 11  •  Omega-3 Fatty Acids (FISH OIL) 1200 MG capsule capsule, Take 1,200 mg by mouth Daily With Breakfast., Disp: , Rfl:   •  potassium chloride (K-DUR) 10 MEQ CR tablet, Take 1 tablet by mouth Daily. Resume on 1/7/20, Disp: 90 tablet, Rfl: 1  •  rivaroxaban (Xarelto) 20 MG tablet, Take 1 tablet by  "mouth Daily With Dinner., Disp: 90 tablet, Rfl: 1  •  spironolactone (ALDACTONE) 25 MG tablet, Take 1 tablet by mouth Daily., Disp: 90 tablet, Rfl: 1  •  vitamin B-12 (CYANOCOBALAMIN) 500 MCG tablet, Take 500 mcg by mouth Daily., Disp: , Rfl:         Review of Systems   Constitutional: Negative for appetite change, fatigue and fever.   Eyes: Negative for visual disturbance.   Respiratory: Negative for shortness of breath.    Cardiovascular: Negative for palpitations and leg swelling.   Gastrointestinal: Negative for abdominal pain and vomiting.   Endocrine: Negative for polydipsia and polyuria.   Musculoskeletal: Negative for joint swelling and neck pain.   Skin: Negative for rash.   Neurological: Negative for weakness and numbness.   Psychiatric/Behavioral: Negative for behavioral problems.     I have reviewed and confirmed the accuracy of the ROS as documented by the MA/LPN/RN Abraham Fitch MD      Objective       Vitals:    10/23/20 1140   BP: 118/66   Pulse: 89   Resp: 16   SpO2: 97%   Weight: 96.4 kg (212 lb 9.6 oz)   Height: 152.4 cm (60\")     Body mass index is 41.52 kg/m².      Physical Exam  Vitals signs reviewed.   Constitutional:       Appearance: She is not diaphoretic.      Comments: Obese     HENT:      Head: Normocephalic and atraumatic.   Eyes:      General: No scleral icterus.     Conjunctiva/sclera: Conjunctivae normal.   Neck:      Musculoskeletal: Normal range of motion and neck supple.      Thyroid: No thyromegaly.      Comments: Acanthosis nigricans  Cardiovascular:      Rate and Rhythm: Normal rate.      Heart sounds: Normal heart sounds.   Pulmonary:      Effort: Pulmonary effort is normal.      Breath sounds: Normal breath sounds. No stridor. No wheezing.   Abdominal:      General: Bowel sounds are normal. There is no distension.      Palpations: Abdomen is soft.      Tenderness: There is no abdominal tenderness.      Comments: Central obesity   Musculoskeletal:         General: No " tenderness.   Skin:     General: Skin is warm and dry.   Neurological:      Mental Status: She is alert and oriented to person, place, and time.         Results Review:     I reviewed the patient's new clinical results and mentioned them above in HPI and in plan as well.    Medical records reviewed  Summary:Done      Lab on 10/22/2020   Component Date Value Ref Range Status   • Glucose 10/22/2020 150* 65 - 99 mg/dL Final   • BUN 10/22/2020 8  6 - 20 mg/dL Final   • Creatinine 10/22/2020 1.01* 0.57 - 1.00 mg/dL Final   • Sodium 10/22/2020 141  136 - 145 mmol/L Final   • Potassium 10/22/2020 3.7  3.5 - 5.2 mmol/L Final   • Chloride 10/22/2020 101  98 - 107 mmol/L Final   • CO2 10/22/2020 28.4  22.0 - 29.0 mmol/L Final   • Calcium 10/22/2020 9.6  8.6 - 10.5 mg/dL Final   • eGFR   Amer 10/22/2020 68  >60 mL/min/1.73 Final   • eGFR Non African Amer 10/22/2020 56* >60 mL/min/1.73 Final   • BUN/Creatinine Ratio 10/22/2020 7.9  7.0 - 25.0 Final   • Anion Gap 10/22/2020 11.6  5.0 - 15.0 mmol/L Final   • WBC 10/22/2020 8.25  3.40 - 10.80 10*3/mm3 Final   • RBC 10/22/2020 5.23  3.77 - 5.28 10*6/mm3 Final   • Hemoglobin 10/22/2020 13.8  12.0 - 15.9 g/dL Final   • Hematocrit 10/22/2020 42.8  34.0 - 46.6 % Final   • MCV 10/22/2020 81.8  79.0 - 97.0 fL Final   • MCH 10/22/2020 26.4* 26.6 - 33.0 pg Final   • MCHC 10/22/2020 32.2  31.5 - 35.7 g/dL Final   • RDW 10/22/2020 13.3  12.3 - 15.4 % Final   • RDW-SD 10/22/2020 39.2  37.0 - 54.0 fl Final   • MPV 10/22/2020 11.3  6.0 - 12.0 fL Final   • Platelets 10/22/2020 302  140 - 450 10*3/mm3 Final   • Neutrophil % 10/22/2020 48.1  42.7 - 76.0 % Final   • Lymphocyte % 10/22/2020 38.5  19.6 - 45.3 % Final   • Monocyte % 10/22/2020 9.9  5.0 - 12.0 % Final   • Eosinophil % 10/22/2020 2.2  0.3 - 6.2 % Final   • Basophil % 10/22/2020 0.8  0.0 - 1.5 % Final   • Immature Grans % 10/22/2020 0.5  0.0 - 0.5 % Final   • Neutrophils, Absolute 10/22/2020 3.96  1.70 - 7.00 10*3/mm3 Final   •  Lymphocytes, Absolute 10/22/2020 3.18* 0.70 - 3.10 10*3/mm3 Final   • Monocytes, Absolute 10/22/2020 0.82  0.10 - 0.90 10*3/mm3 Final   • Eosinophils, Absolute 10/22/2020 0.18  0.00 - 0.40 10*3/mm3 Final   • Basophils, Absolute 10/22/2020 0.07  0.00 - 0.20 10*3/mm3 Final   • Immature Grans, Absolute 10/22/2020 0.04  0.00 - 0.05 10*3/mm3 Final   • nRBC 10/22/2020 0.0  0.0 - 0.2 /100 WBC Final     Lab Results   Component Value Date    HGBA1C 11.10 (H) 10/16/2020    HGBA1C 9.10 (H) 03/12/2020    HGBA1C 8.60 (H) 10/24/2019     Lab Results   Component Value Date    CREATININE 1.01 (H) 10/22/2020     Imaging Results (Most Recent)     None                Assessment and Plan:    Diagnoses and all orders for this visit:    1. Long-term insulin use (CMS/[facility]) (Primary)  -     Hemoglobin A1c; Future  -     Creatinine, Serum; Future  -     eGFR-Glomerular Filtration; Future  -     Lipid Panel; Future  -     Microalbumin / Creatinine Urine Ratio - Urine, Clean Catch; Future  -     TSH; Future  -     T4, Free; Future    2. DM (diabetes mellitus), type 2, uncontrolled w/neurologic complication (CMS/[facility])    Other orders  -     glucose blood (Accu-Chek Flory Plus) test strip; Check 4 - 5 times a day  Dispense: 200 each; Refill: 11  -     exenatide er (Bydureon) 2 MG pen-injector injection; Inject 1 pen under the skin into the appropriate area as directed 1 (One) Time Per Week.  Dispense: 4 pen; Refill: 11  -     Insulin Glargine, 1 Unit Dial, (Toujeo SoloStar) 300 UNIT/ML solution pen-injector injection; Inject 50 Units under the skin into the appropriate area as directed Daily.  Dispense: 9 mL; Refill: 3  -     insulin aspart (NovoLOG FlexPen) 100 UNIT/ML solution pen-injector sc pen; Inject 15 Units under the skin into the appropriate area as directed 3 (Three) Times a Day With Meals.  Dispense: 10 pen; Refill: 3      Type 2 diabetes mellitus-severely uncontrolled  HbA1c is greater than 10%, with her prior history of coronary  "artery disease and stroke patient is at high risk for diabetic complications.  Emphasized with the patient the importance of glycemic control in order to prevent these cardiac complications.    Increase Toujeo to 50 units daily  Increase NovoLog to 15 units with each meal  Add Bydureon  Explained the risks and the benefits of this medication    Hyperlipidemia  Continue statin.    Obesity  Gave handout in AVS about calorie counting and exercise regimen to help with the weight loss.    Reviewed Lab results with the patient.               Abraham Fitch MD  10/23/20    EMR Dragon / transcription disclaimer:     \"Dictated utilizing Dragon dictation\".      "

## 2020-10-13 RX ORDER — LANCETS
EACH MISCELLANEOUS
Qty: 400 EACH | Refills: 1 | Status: SHIPPED | OUTPATIENT
Start: 2020-10-13

## 2020-10-13 RX ORDER — BLOOD SUGAR DIAGNOSTIC
STRIP MISCELLANEOUS
Qty: 400 EACH | Refills: 1 | Status: SHIPPED | OUTPATIENT
Start: 2020-10-13 | End: 2020-10-23

## 2020-10-16 ENCOUNTER — LAB (OUTPATIENT)
Dept: ENDOCRINOLOGY | Age: 60
End: 2020-10-16

## 2020-10-16 DIAGNOSIS — IMO0002 DIABETES MELLITUS TYPE 2, UNCONTROLLED, WITH COMPLICATIONS: Primary | ICD-10-CM

## 2020-10-17 LAB
ALBUMIN SERPL-MCNC: 4.4 G/DL (ref 3.5–5.2)
ALBUMIN/GLOB SERPL: 1.8 G/DL
ALP SERPL-CCNC: 120 U/L (ref 39–117)
ALT SERPL-CCNC: 64 U/L (ref 1–33)
AST SERPL-CCNC: 34 U/L (ref 1–32)
BILIRUB SERPL-MCNC: 0.6 MG/DL (ref 0–1.2)
BUN SERPL-MCNC: 12 MG/DL (ref 6–20)
BUN/CREAT SERPL: 13.6 (ref 7–25)
CALCIUM SERPL-MCNC: 10 MG/DL (ref 8.6–10.5)
CHLORIDE SERPL-SCNC: 99 MMOL/L (ref 98–107)
CHOLEST SERPL-MCNC: 147 MG/DL (ref 0–200)
CO2 SERPL-SCNC: 25.9 MMOL/L (ref 22–29)
CREAT SERPL-MCNC: 0.88 MG/DL (ref 0.57–1)
GLOBULIN SER CALC-MCNC: 2.4 GM/DL
GLUCOSE SERPL-MCNC: 309 MG/DL (ref 65–99)
HBA1C MFR BLD: 11.1 % (ref 4.8–5.6)
HDLC SERPL-MCNC: 33 MG/DL (ref 40–60)
INTERPRETATION: NORMAL
LDLC SERPL CALC-MCNC: 79 MG/DL (ref 0–100)
Lab: NORMAL
POTASSIUM SERPL-SCNC: 4.7 MMOL/L (ref 3.5–5.2)
PROT SERPL-MCNC: 6.8 G/DL (ref 6–8.5)
SODIUM SERPL-SCNC: 135 MMOL/L (ref 136–145)
TRIGL SERPL-MCNC: 207 MG/DL (ref 0–150)
TSH SERPL DL<=0.005 MIU/L-ACNC: 1.43 UIU/ML (ref 0.27–4.2)
VLDLC SERPL CALC-MCNC: 35 MG/DL (ref 5–40)

## 2020-10-22 ENCOUNTER — LAB (OUTPATIENT)
Dept: LAB | Facility: HOSPITAL | Age: 60
End: 2020-10-22

## 2020-10-22 ENCOUNTER — OFFICE VISIT (OUTPATIENT)
Dept: CARDIOLOGY | Facility: CLINIC | Age: 60
End: 2020-10-22

## 2020-10-22 ENCOUNTER — TRANSCRIBE ORDERS (OUTPATIENT)
Dept: ADMINISTRATIVE | Facility: HOSPITAL | Age: 60
End: 2020-10-22

## 2020-10-22 VITALS
BODY MASS INDEX: 41.62 KG/M2 | WEIGHT: 212 LBS | DIASTOLIC BLOOD PRESSURE: 62 MMHG | HEART RATE: 65 BPM | HEIGHT: 60 IN | SYSTOLIC BLOOD PRESSURE: 126 MMHG

## 2020-10-22 DIAGNOSIS — Z95.5 S/P CORONARY ARTERY STENT PLACEMENT: ICD-10-CM

## 2020-10-22 DIAGNOSIS — Z01.810 PRE-OPERATIVE CARDIOVASCULAR EXAMINATION: ICD-10-CM

## 2020-10-22 DIAGNOSIS — Z95.810 ICD (IMPLANTABLE CARDIOVERTER-DEFIBRILLATOR) IN PLACE: ICD-10-CM

## 2020-10-22 DIAGNOSIS — Z13.6 SCREENING FOR ISCHEMIC HEART DISEASE: ICD-10-CM

## 2020-10-22 DIAGNOSIS — Z01.810 PRE-OPERATIVE CARDIOVASCULAR EXAMINATION: Primary | ICD-10-CM

## 2020-10-22 DIAGNOSIS — E78.2 MIXED HYPERLIPIDEMIA: ICD-10-CM

## 2020-10-22 DIAGNOSIS — I21.09 ACUTE MYOCARDIAL INFARCTION OF ANTERIOR WALL (HCC): ICD-10-CM

## 2020-10-22 DIAGNOSIS — I20.8 ANGINA AT REST (HCC): ICD-10-CM

## 2020-10-22 DIAGNOSIS — E11.8 TYPE 2 DIABETES MELLITUS WITH COMPLICATION (HCC): ICD-10-CM

## 2020-10-22 DIAGNOSIS — I25.10 CHRONIC CORONARY ARTERY DISEASE: Primary | ICD-10-CM

## 2020-10-22 DIAGNOSIS — I25.5 ISCHEMIC CARDIOMYOPATHY: ICD-10-CM

## 2020-10-22 PROBLEM — I20.89 ANGINA AT REST: Status: ACTIVE | Noted: 2020-10-22

## 2020-10-22 LAB
ANION GAP SERPL CALCULATED.3IONS-SCNC: 11.6 MMOL/L (ref 5–15)
BASOPHILS # BLD AUTO: 0.07 10*3/MM3 (ref 0–0.2)
BASOPHILS NFR BLD AUTO: 0.8 % (ref 0–1.5)
BUN SERPL-MCNC: 8 MG/DL (ref 6–20)
BUN/CREAT SERPL: 7.9 (ref 7–25)
CALCIUM SPEC-SCNC: 9.6 MG/DL (ref 8.6–10.5)
CHLORIDE SERPL-SCNC: 101 MMOL/L (ref 98–107)
CO2 SERPL-SCNC: 28.4 MMOL/L (ref 22–29)
CREAT SERPL-MCNC: 1.01 MG/DL (ref 0.57–1)
DEPRECATED RDW RBC AUTO: 39.2 FL (ref 37–54)
EOSINOPHIL # BLD AUTO: 0.18 10*3/MM3 (ref 0–0.4)
EOSINOPHIL NFR BLD AUTO: 2.2 % (ref 0.3–6.2)
ERYTHROCYTE [DISTWIDTH] IN BLOOD BY AUTOMATED COUNT: 13.3 % (ref 12.3–15.4)
GFR SERPL CREATININE-BSD FRML MDRD: 56 ML/MIN/1.73
GFR SERPL CREATININE-BSD FRML MDRD: 68 ML/MIN/1.73
GLUCOSE SERPL-MCNC: 150 MG/DL (ref 65–99)
HCT VFR BLD AUTO: 42.8 % (ref 34–46.6)
HGB BLD-MCNC: 13.8 G/DL (ref 12–15.9)
IMM GRANULOCYTES # BLD AUTO: 0.04 10*3/MM3 (ref 0–0.05)
IMM GRANULOCYTES NFR BLD AUTO: 0.5 % (ref 0–0.5)
LYMPHOCYTES # BLD AUTO: 3.18 10*3/MM3 (ref 0.7–3.1)
LYMPHOCYTES NFR BLD AUTO: 38.5 % (ref 19.6–45.3)
MCH RBC QN AUTO: 26.4 PG (ref 26.6–33)
MCHC RBC AUTO-ENTMCNC: 32.2 G/DL (ref 31.5–35.7)
MCV RBC AUTO: 81.8 FL (ref 79–97)
MONOCYTES # BLD AUTO: 0.82 10*3/MM3 (ref 0.1–0.9)
MONOCYTES NFR BLD AUTO: 9.9 % (ref 5–12)
NEUTROPHILS NFR BLD AUTO: 3.96 10*3/MM3 (ref 1.7–7)
NEUTROPHILS NFR BLD AUTO: 48.1 % (ref 42.7–76)
NRBC BLD AUTO-RTO: 0 /100 WBC (ref 0–0.2)
PLATELET # BLD AUTO: 302 10*3/MM3 (ref 140–450)
PMV BLD AUTO: 11.3 FL (ref 6–12)
POTASSIUM SERPL-SCNC: 3.7 MMOL/L (ref 3.5–5.2)
RBC # BLD AUTO: 5.23 10*6/MM3 (ref 3.77–5.28)
SODIUM SERPL-SCNC: 141 MMOL/L (ref 136–145)
WBC # BLD AUTO: 8.25 10*3/MM3 (ref 3.4–10.8)

## 2020-10-22 PROCEDURE — 99214 OFFICE O/P EST MOD 30 MIN: CPT | Performed by: INTERNAL MEDICINE

## 2020-10-22 PROCEDURE — 80048 BASIC METABOLIC PNL TOTAL CA: CPT

## 2020-10-22 PROCEDURE — 85025 COMPLETE CBC W/AUTO DIFF WBC: CPT

## 2020-10-22 PROCEDURE — 36415 COLL VENOUS BLD VENIPUNCTURE: CPT

## 2020-10-22 PROCEDURE — 93000 ELECTROCARDIOGRAM COMPLETE: CPT | Performed by: INTERNAL MEDICINE

## 2020-10-22 NOTE — PROGRESS NOTES
Subjective:     Encounter Date:10/22/2020      Patient ID: Daisy Dent is a 60 y.o. female.    Chief Complaint:  History of Present Illness    This is a 60-year-old female with a history of ischemic cardiomyopathy, last ejection fraction of 40-45%, diabetes mellitus type 2, obstructive sleep apnea on CPAP, hypertension, status post dual-chamber AICD placement, dyslipidemia, coronary artery disease with a history of prior anterior myocardial infarction and LAD stent placement, status post left MCA stroke, who presents for follow-up.     She presents today for urgent follow-up.  She reports that the tightness has been increasing in frequency and occurs both at during the day and at night.  She reports the symptoms are similar to what she experienced earlier in the year.  Symptoms occur a few times a week.  She is also had a couple of episodes of severe squeezing chest discomfort which is something new.  Her last episode occurred last week at which point she called the office.  She is quite anxious about her symptoms.  Otherwise she denies any dyspnea on exertion.  She started walking about a month ago only about 3 blocks at a time but denies any symptoms with this.  She is working on losing weight and has lost about 6 pounds in the last couple months.  Denies any orthopnea, near-syncope or syncope, or lower extremity edema.     Prior History:  I began following the patient in 11/2015 when she presented to establish care.  Prior to that she was followed by Dr. Ayala with [facility].  Her prior cardiac history includes a non-ST elevation MI and 7/2006 at which time she received a Cypher 3.0 x 23 mm stent to her mid LAD.  She then presented in 10/2009 with a late in-stent thrombosis that required repeat stenting of her mid LAD in addition to a second drug-eluting stent placement of her left circumflex artery.  Following that she had a repeat cardiac catheterization in 2010 that was reportedly  unremarkable.  Following her myocardial infarction in 2009 she developed ischemic myopathy with an EF of around 25% and ultimately underwent AICD placement and 7/2010.  At some point in the course of her follow-ups her left ventricular function improved with an ejection fraction of 40-45%.  A stress test showed evidence of a large anterior infarct and minimal elva-infarct ischemia.     In her follow-ups she has done fairly well recently when she reported more fatigued than normal and episodes of chest discomfort that occurred while she was exercising on the treadmill.  She underwent  an echocardiogram in 3/2017 that showed an ejection fraction of 40-45%.  Due to continued symptoms we went ahead and proceeded with a PET stress test which was performed in 8/2017 that showed medium sized anterior wall infarct with mild elva-infarct ischemia and a post stress ejection fraction of 58%.      In 11/2017 her AICD was noted to be at [facility].  She subsequently underwent generator replacement on 1/12/2018 with Dr. Ornelas. When I saw her back in follow up in 9/2018 she reported that she denied any new or worsening symptoms.       She was admitted and 12/2018 after she presented with aphasia.  On arrival to the hospital she underwent a CT of the head that showed no acute stroke but a perfusion scan showed a large perfusion mismatch.  A CTA showed evidence of a left MCA thrombus.  She was given TPA at this time with improvement in her symptoms.  Neurology felt that her stroke was likely embolic.  She underwent a repeat echocardiogram during her admission that showed moderately depressed left ventricular systolic function with an EF of 30%, grade 1 diastolic dysfunction, and no significant valvular disease.  Based on the likelihood that this was an embolic stroke I opted to go ahead and recommend anticoagulation.  She was subsequently started on rivaroxaban 20 mg a day.  Her aspirin was stopped and she was continued on  clopidogrel.     In 11/2019 she reported a couple weeks of episodes of substernal chest tightness lasting 1 to 2 minutes and dyspnea on exertion.  She reported that the symptoms were similar to what she had at the time of her prior myocardial infarctions but reports that its much milder than what she experienced at that time.    Following that office visit she underwent a stress test that showed evidence of a medium anterior infarct with moderate elva-infarct ischemia.  The findings were similar to prior stress test however the amount of elva-infarct ischemia had impaired to increase.  We initially tried to treat her medically however she continued to have recurrent symptoms so we opted to proceed with a cardiac catheterization.  She presented to the hospital on 1/6/2020 for the procedure.  She was found to have severe in-stent restenosis of her proximal LAD stent and ultimately underwent repeat drug-eluting stent placement of the in-stent restenosis.  She was also noted to have moderate nonobstructive disease of the first obtuse marginal branch for which she underwent FFR evaluation which was normal at 0.96.    In routine follow-up in 8/2020 she was doing well.  She reported milder episodes of chest tightness that occurred at rest but nothing severe like she had in the past.  She did admit that she was not good about staying active and was eating more because of the stress associated with the pandemic.  She had not attended cardiac rehab following her stent placement.      Review of Systems   Constitution: Positive for malaise/fatigue.   HENT: Negative for hearing loss, hoarse voice, nosebleeds and sore throat.    Eyes: Negative for pain.   Cardiovascular: Positive for chest pain and palpitations. Negative for claudication, cyanosis, dyspnea on exertion, irregular heartbeat, leg swelling, near-syncope, orthopnea, paroxysmal nocturnal dyspnea and syncope.   Respiratory: Negative for shortness of breath and snoring.     Endocrine: Negative for cold intolerance, heat intolerance, polydipsia, polyphagia and polyuria.   Skin: Negative for itching and rash.   Musculoskeletal: Negative for arthritis, falls, joint pain, joint swelling, muscle cramps, muscle weakness and myalgias.   Gastrointestinal: Negative for constipation, diarrhea, dysphagia, heartburn, hematemesis, hematochezia, melena, nausea and vomiting.   Genitourinary: Negative for frequency, hematuria and hesitancy.   Neurological: Negative for excessive daytime sleepiness, dizziness, headaches, light-headedness, numbness and weakness.   Psychiatric/Behavioral: Negative for depression. The patient is not nervous/anxious.          Current Outpatient Medications:   •  Accu-Chek Flory Plus test strip, TO CHECK 3 - 4 TIMES A DAY, Disp: 200 each, Rfl: 11  •  Accu-Chek FastClix Lancets misc, Use to test blood sugar 4 times daily  E11.8, Disp: 400 each, Rfl: 1  •  atorvastatin (LIPITOR) 80 MG tablet, Take 1 tablet by mouth Daily., Disp: 90 tablet, Rfl: 0  •  Blood Glucose Monitoring Suppl (Accu-Chek Guide) w/Device kit, Inject 1 Device under the skin into the appropriate area as directed Daily., Disp: 1 kit, Rfl: 0  •  carvedilol (COREG) 25 MG tablet, Take 1 tablet by mouth 2 (Two) Times a Day With Meals., Disp: 180 tablet, Rfl: 2  •  Cholecalciferol (VITAMIN D3) 2000 UNITS capsule, Take 1,000 Units by mouth Daily., Disp: , Rfl:   •  clopidogrel (PLAVIX) 75 MG tablet, Take 1 tablet by mouth Daily., Disp: 90 tablet, Rfl: 0  •  furosemide (LASIX) 40 MG tablet, Take 1 tablet by mouth Daily. Resume on 1/7/20, Disp: 90 tablet, Rfl: 1  •  glucose blood (Accu-Chek Guide) test strip, Use to test blood sugar 4 times daily E11.8, Disp: 400 each, Rfl: 1  •  Insulin Glargine, 1 Unit Dial, (TOUJEO SOLOSTAR) 300 UNIT/ML solution pen-injector injection, Inject 45 Units under the skin into the appropriate area as directed Daily., Disp: 9 mL, Rfl: 3  •  Insulin Pen Needle 32G X 4 MM misc, USE 4  TIMES A DAY, Disp: 400 each, Rfl: 3  •  Lancets (ACCU-CHEK MULTICLIX) lancets, To check 3 - 4 times a day, Disp: 200 each, Rfl: 2  •  metFORMIN (GLUCOPHAGE) 1000 MG tablet, Take 1 tablet by mouth Daily With Breakfast. Resume on 1/8/20, Disp: , Rfl:   •  Multiple Vitamins-Minerals (MULTIVITAL PO), Take 1 tablet by mouth Daily., Disp: , Rfl:   •  nitroglycerin (Nitrostat) 0.4 MG SL tablet, Place 1 tablet under the tongue Every 5 (Five) Minutes As Needed for Chest Pain. Take no more than 3 doses in 15 minutes., Disp: 30 tablet, Rfl: 11  •  NOVOLOG FLEXPEN 100 UNIT/ML solution pen-injector sc pen, INJECT 10 UNITS UNDER THE SKIN THREE TIMES DAILY WITH MEALS, Disp: 10 pen, Rfl: 3  •  Omega-3 Fatty Acids (FISH OIL) 1200 MG capsule capsule, Take 1,200 mg by mouth Daily With Breakfast., Disp: , Rfl:   •  potassium chloride (K-DUR) 10 MEQ CR tablet, Take 1 tablet by mouth Daily. Resume on 1/7/20, Disp: 90 tablet, Rfl: 1  •  rivaroxaban (Xarelto) 20 MG tablet, Take 1 tablet by mouth Daily With Dinner., Disp: 90 tablet, Rfl: 1  •  spironolactone (ALDACTONE) 25 MG tablet, Take 1 tablet by mouth Daily., Disp: 90 tablet, Rfl: 1  •  vitamin B-12 (CYANOCOBALAMIN) 500 MCG tablet, Take 500 mcg by mouth Daily., Disp: , Rfl:     Past Medical History:   Diagnosis Date   • Abdominal pain    • Abnormal liver function test    • Acute bronchitis    • Benign essential hypertension    • CAD (coronary artery disease)    • CHF (congestive heart failure) (CMS/HCC)    • Colon polyp    • Congestive heart disease (CMS/HCC)    • COPD (chronic obstructive pulmonary disease) (CMS/HCC)    • Cough    • Diabetes (CMS/HCC)    • Diabetes mellitus (CMS/HCC)    • Diarrhea    • Gastroenteritis    • Health care maintenance    • Hyperlipidemia    • Hypertension    • IFG (impaired fasting glucose)    • Ischemic cardiomyopathy    • Myocardial infarction (CMS/HCC)    • SHELLIE (obstructive sleep apnea)    • Sore throat    • Type 2 diabetes mellitus (CMS/HCC)    •  Vaginal yeast infection    • Vitamin D deficiency        Past Surgical History:   Procedure Laterality Date   • CARDIAC CATHETERIZATION     • CARDIAC CATHETERIZATION N/A 1/6/2020    Procedure: Coronary angiography;  Surgeon: Oneida Saldivar MD;  Location:  [facility];  Service: Cardiovascular   • CARDIAC CATHETERIZATION N/A 1/6/2020    Procedure: Left heart cath;  Surgeon: Oneida Saldivar MD;  Location:  [facility];  Service: Cardiovascular   • CARDIAC CATHETERIZATION N/A 1/6/2020    Procedure: Left ventriculography;  Surgeon: Oneida Saldivar MD;  Location:  [facility];  Service: Cardiovascular   • CARDIAC CATHETERIZATION N/A 1/6/2020    Procedure: Percutaneous Coronary Intervention;  Surgeon: Oneida Saldivar MD;  Location: [facility];  Service: Cardiovascular   • CARDIAC CATHETERIZATION N/A 1/6/2020    Procedure: Stent HUGO coronary;  Surgeon: Oneida Saldivar MD;  Location: [facility] C[facility] LOC[facility];  Service: Cardiovascular   • CARDIAC CATHETERIZATION  1/6/2020    Procedure: Functional Flow [place];  Surgeon: Oneida Saldivar MD;  Location: [facility];  Service: Cardiovascular   • CARDIAC ELECTROPHYSIOLOGY PROCEDURE N/A 1/12/2018    Procedure: ICD DC generator change  medtronic;  Surgeon: Hany Ornelas MD;  Location:  [facility];  Service:    • PACEMAKER IMPLANTATION     • TUBAL ABDOMINAL LIGATION         Family History   Problem Relation Age of Onset   • Diabetes Mother    • Heart disease Mother    • Hyperlipidemia Mother    • Diabetes Father    • Heart disease Father    • Hyperlipidemia Father    • Heart disease Brother    • Hyperlipidemia Brother    • Hypertension Father    • Colon cancer Father    • Heart attack Father    • Emphysema Mother    • Heart disease Brother    • Heart attack Brother    • Heart disease Maternal Grandmother    • Heart disease Maternal Grandfather    • Heart  "disease Paternal Grandmother    • Heart disease Paternal Grandfather        Social History     Tobacco Use   • Smoking status: Former Smoker     Packs/day: 1.00     Years: 30.00     Pack years: 30.00     Types: Cigarettes     Start date: 11/1/1979     Quit date: 11/1/2009     Years since quitting: 10.9   • Smokeless tobacco: Never Used   • Tobacco comment: QUIT 10 YRS   Substance Use Topics   • Alcohol use: Yes     Comment: occasionally/ caffeine use    • Drug use: No         ECG 12 Lead    Date/Time: 10/22/2020 4:17 PM  Performed by: Oneida Saldivar MD  Authorized by: Oneida Saldivar MD   Comparison: compared with previous ECG   Similar to previous ECG  Rhythm: sinus rhythm  Q waves: V1 and V2                   Objective:     Visit Vitals  /62   Pulse 65   Ht 152.4 cm (60\")   Wt 96.2 kg (212 lb)   BMI 41.40 kg/m²         Constitutional:       Appearance: Normal appearance. Well-developed.   Eyes:      General: Lids are normal.      Conjunctiva/sclera: Conjunctivae normal.      Pupils: Pupils are equal, round, and reactive to light.   HENT:      Head: Normocephalic and atraumatic.   Neck:      Musculoskeletal: Full passive range of motion without pain, normal range of motion and neck supple.      Vascular: No carotid bruit or JVD.      Lymphadenopathy: No cervical adenopathy.   Pulmonary:      Effort: Pulmonary effort is normal.      Breath sounds: Normal breath sounds.   Cardiovascular:      Normal rate. Regular rhythm.      No gallop.   Pulses:     Radial: 2+ bilaterally.  Edema:     Peripheral edema absent.   Abdominal:      Palpations: Abdomen is soft.   Skin:     General: Skin is warm and dry.   Neurological:      Mental Status: Alert and oriented to person, place, and time.             Assessment:          Diagnosis Plan   1. Chronic coronary artery disease     2. Ischemic cardiomyopathy     3. Mixed hyperlipidemia     4. History of anterior myocardial infarction      5. ICD (implantable " cardioverter-defibrillator) in place     6. Type 2 diabetes mellitus with complication (CMS/[facility])     7. S/P coronary artery stent placement            Plan:       1.  Angina at rest.  Her symptoms have progressed since her last office visit a couple months ago.  Discussed the option of trying medical management versus pursuing further work-up either with a stress test or a repeat cardiac catheterization.  The patient would prefer to proceed directly with a cardiac catheterization in order to get a definitive answer.  2.  Coronary artery disease.  History of anterior myocardial infarction in stent placement of her mid LAD followed by repeat stenting 3 years later.  Now status post drug-eluting stent placement of her proximal to mid LAD for in-stent restenosis in 1/2020.  Continue current medical management.  Otherwise plan for cardiac catheterization as per #1.  3.  Ischemic cardiomyopathy.  No evidence of volume overload.  Continue current medical management with carvedilol.  She is unable to tolerate an ACE inhibitor or an ARB due to low blood pressures.  4.  History of left MCA stroke.  On chronic anticoagulation with rivaroxaban.  5.  History of AICD.  Continue routine device checks.  6.  Diabetes mellitus type 2.  Poorly controlled.  It appears that her most recent A1c was around 11.  She has follow-up with endocrinology coming up.  7.  Hypertension.  Remains well controlled.  8.  Obstructive sleep apnea.    Further recommendations based on the results of her cardiac catheterization.

## 2020-10-23 ENCOUNTER — TRANSCRIBE ORDERS (OUTPATIENT)
Dept: SLEEP MEDICINE | Facility: HOSPITAL | Age: 60
End: 2020-10-23

## 2020-10-23 ENCOUNTER — LAB (OUTPATIENT)
Dept: LAB | Facility: HOSPITAL | Age: 60
End: 2020-10-23

## 2020-10-23 ENCOUNTER — OFFICE VISIT (OUTPATIENT)
Dept: ENDOCRINOLOGY | Age: 60
End: 2020-10-23

## 2020-10-23 VITALS
SYSTOLIC BLOOD PRESSURE: 118 MMHG | HEIGHT: 60 IN | HEART RATE: 89 BPM | BODY MASS INDEX: 41.74 KG/M2 | WEIGHT: 212.6 LBS | RESPIRATION RATE: 16 BRPM | DIASTOLIC BLOOD PRESSURE: 66 MMHG | OXYGEN SATURATION: 97 %

## 2020-10-23 DIAGNOSIS — I25.10 CHRONIC CORONARY ARTERY DISEASE: ICD-10-CM

## 2020-10-23 DIAGNOSIS — I25.5 ISCHEMIC CARDIOMYOPATHY: ICD-10-CM

## 2020-10-23 DIAGNOSIS — Z79.4 LONG-TERM INSULIN USE (HCC): Primary | ICD-10-CM

## 2020-10-23 DIAGNOSIS — IMO0002 DM (DIABETES MELLITUS), TYPE 2, UNCONTROLLED W/NEUROLOGIC COMPLICATION: ICD-10-CM

## 2020-10-23 DIAGNOSIS — E78.5 HYPERLIPIDEMIA, UNSPECIFIED HYPERLIPIDEMIA TYPE: ICD-10-CM

## 2020-10-23 DIAGNOSIS — Z01.818 OTHER SPECIFIED PRE-OPERATIVE EXAMINATION: ICD-10-CM

## 2020-10-23 DIAGNOSIS — Z01.818 OTHER SPECIFIED PRE-OPERATIVE EXAMINATION: Primary | ICD-10-CM

## 2020-10-23 PROCEDURE — C9803 HOPD COVID-19 SPEC COLLECT: HCPCS

## 2020-10-23 PROCEDURE — U0004 COV-19 TEST NON-CDC HGH THRU: HCPCS | Performed by: INTERNAL MEDICINE

## 2020-10-23 PROCEDURE — 99215 OFFICE O/P EST HI 40 MIN: CPT | Performed by: INTERNAL MEDICINE

## 2020-10-23 RX ORDER — POTASSIUM CHLORIDE 750 MG/1
10 TABLET, FILM COATED, EXTENDED RELEASE ORAL DAILY
Qty: 90 TABLET | Refills: 1 | Status: SHIPPED | OUTPATIENT
Start: 2020-10-23 | End: 2021-10-11 | Stop reason: SDUPTHER

## 2020-10-23 RX ORDER — BLOOD SUGAR DIAGNOSTIC
STRIP MISCELLANEOUS
Qty: 200 EACH | Refills: 11 | Status: SHIPPED | OUTPATIENT
Start: 2020-10-23 | End: 2020-10-27 | Stop reason: SDUPTHER

## 2020-10-23 RX ORDER — INSULIN ASPART 100 [IU]/ML
15 INJECTION, SOLUTION INTRAVENOUS; SUBCUTANEOUS
Qty: 10 PEN | Refills: 3 | Status: SHIPPED | OUTPATIENT
Start: 2020-10-23 | End: 2021-07-13

## 2020-10-23 RX ORDER — ATORVASTATIN CALCIUM 80 MG/1
80 TABLET, FILM COATED ORAL DAILY
Qty: 90 TABLET | Refills: 1 | Status: SHIPPED | OUTPATIENT
Start: 2020-10-23 | End: 2021-05-13 | Stop reason: SDUPTHER

## 2020-10-23 RX ORDER — EXENATIDE 2 MG/.65ML
2 INJECTION, SUSPENSION, EXTENDED RELEASE SUBCUTANEOUS WEEKLY
Qty: 4 PEN | Refills: 11 | Status: SHIPPED | OUTPATIENT
Start: 2020-10-23 | End: 2020-10-27 | Stop reason: SDUPTHER

## 2020-10-23 RX ORDER — SPIRONOLACTONE 25 MG/1
25 TABLET ORAL DAILY
Qty: 90 TABLET | Refills: 1 | Status: SHIPPED | OUTPATIENT
Start: 2020-10-23 | End: 2021-04-19

## 2020-10-23 RX ORDER — INSULIN GLARGINE 300 U/ML
50 INJECTION, SOLUTION SUBCUTANEOUS DAILY
Qty: 9 ML | Refills: 3 | Status: SHIPPED | OUTPATIENT
Start: 2020-10-23 | End: 2021-01-05

## 2020-10-24 LAB — SARS-COV-2 RNA RESP QL NAA+PROBE: NOT DETECTED

## 2020-10-26 ENCOUNTER — HOSPITAL ENCOUNTER (OUTPATIENT)
Facility: HOSPITAL | Age: 60
Setting detail: HOSPITAL OUTPATIENT SURGERY
Discharge: HOME | End: 2020-10-26
Attending: INTERNAL MEDICINE | Admitting: INTERNAL MEDICINE

## 2020-10-26 VITALS
HEIGHT: 59 IN | SYSTOLIC BLOOD PRESSURE: 104 MMHG | WEIGHT: 212 LBS | TEMPERATURE: 98.3 F | HEART RATE: 66 BPM | OXYGEN SATURATION: 94 % | RESPIRATION RATE: 16 BRPM | BODY MASS INDEX: 42.74 KG/M2 | DIASTOLIC BLOOD PRESSURE: 59 MMHG

## 2020-10-26 DIAGNOSIS — I25.10 CHRONIC CORONARY ARTERY DISEASE: ICD-10-CM

## 2020-10-26 DIAGNOSIS — Z95.5 S/P CORONARY ARTERY STENT PLACEMENT: ICD-10-CM

## 2020-10-26 DIAGNOSIS — I20.8 ANGINA AT REST (HCC): ICD-10-CM

## 2020-10-26 LAB
ACT BLD: 384 SECONDS (ref 82–152)
GLUCOSE BLDC GLUCOMTR-MCNC: 257 MG/DL (ref 70–130)

## 2020-10-26 PROCEDURE — 99152 MOD SED SAME PHYS/QHP 5/>YRS: CPT | Performed by: INTERNAL MEDICINE

## 2020-10-26 PROCEDURE — C1769 GUIDE WIRE: HCPCS | Performed by: INTERNAL MEDICINE

## 2020-10-26 PROCEDURE — C1887 CATHETER, GUIDING: HCPCS | Performed by: INTERNAL MEDICINE

## 2020-10-26 PROCEDURE — 82962 GLUCOSE BLOOD TEST: CPT

## 2020-10-26 PROCEDURE — 25010000002 HEPARIN (PORCINE) PER 1000 UNITS: Performed by: INTERNAL MEDICINE

## 2020-10-26 PROCEDURE — 25010000002 FENTANYL CITRATE (PF) 100 MCG/2ML SOLUTION: Performed by: INTERNAL MEDICINE

## 2020-10-26 PROCEDURE — 93571 IV DOP VEL&/PRESS C FLO 1ST: CPT | Performed by: INTERNAL MEDICINE

## 2020-10-26 PROCEDURE — 99153 MOD SED SAME PHYS/QHP EA: CPT | Performed by: INTERNAL MEDICINE

## 2020-10-26 PROCEDURE — 85347 COAGULATION TIME ACTIVATED: CPT

## 2020-10-26 PROCEDURE — 93458 L HRT ARTERY/VENTRICLE ANGIO: CPT | Performed by: INTERNAL MEDICINE

## 2020-10-26 PROCEDURE — C1894 INTRO/SHEATH, NON-LASER: HCPCS | Performed by: INTERNAL MEDICINE

## 2020-10-26 PROCEDURE — 25010000002 BH (CUPID ONLY) ADENOSINE 6 MG/100ML MIXTURE: Performed by: INTERNAL MEDICINE

## 2020-10-26 PROCEDURE — 25010000002 MIDAZOLAM PER 1 MG: Performed by: INTERNAL MEDICINE

## 2020-10-26 PROCEDURE — 0 IOPAMIDOL PER 1 ML: Performed by: INTERNAL MEDICINE

## 2020-10-26 RX ORDER — MIDAZOLAM HYDROCHLORIDE 1 MG/ML
INJECTION INTRAMUSCULAR; INTRAVENOUS AS NEEDED
Status: DISCONTINUED | OUTPATIENT
Start: 2020-10-26 | End: 2020-10-26 | Stop reason: HOSPADM

## 2020-10-26 RX ORDER — LIDOCAINE HYDROCHLORIDE 20 MG/ML
INJECTION, SOLUTION INFILTRATION; PERINEURAL AS NEEDED
Status: DISCONTINUED | OUTPATIENT
Start: 2020-10-26 | End: 2020-10-26 | Stop reason: HOSPADM

## 2020-10-26 RX ORDER — FENTANYL CITRATE 50 UG/ML
INJECTION, SOLUTION INTRAMUSCULAR; INTRAVENOUS AS NEEDED
Status: DISCONTINUED | OUTPATIENT
Start: 2020-10-26 | End: 2020-10-26 | Stop reason: HOSPADM

## 2020-10-26 RX ORDER — SODIUM CHLORIDE 0.9 % (FLUSH) 0.9 %
10 SYRINGE (ML) INJECTION AS NEEDED
Status: DISCONTINUED | OUTPATIENT
Start: 2020-10-26 | End: 2020-10-26 | Stop reason: HOSPADM

## 2020-10-26 RX ORDER — ACETAMINOPHEN 325 MG/1
650 TABLET ORAL EVERY 4 HOURS PRN
Status: DISCONTINUED | OUTPATIENT
Start: 2020-10-26 | End: 2020-10-26 | Stop reason: HOSPADM

## 2020-10-26 RX ORDER — SODIUM CHLORIDE 9 MG/ML
75 INJECTION, SOLUTION INTRAVENOUS CONTINUOUS
Status: DISCONTINUED | OUTPATIENT
Start: 2020-10-26 | End: 2020-10-26 | Stop reason: HOSPADM

## 2020-10-26 RX ORDER — SODIUM CHLORIDE 0.9 % (FLUSH) 0.9 %
3 SYRINGE (ML) INJECTION EVERY 12 HOURS SCHEDULED
Status: DISCONTINUED | OUTPATIENT
Start: 2020-10-26 | End: 2020-10-26 | Stop reason: HOSPADM

## 2020-10-26 RX ORDER — HEPARIN SODIUM 1000 [USP'U]/ML
INJECTION, SOLUTION INTRAVENOUS; SUBCUTANEOUS AS NEEDED
Status: DISCONTINUED | OUTPATIENT
Start: 2020-10-26 | End: 2020-10-26 | Stop reason: HOSPADM

## 2020-10-26 RX ORDER — LIDOCAINE HYDROCHLORIDE 10 MG/ML
0.1 INJECTION, SOLUTION EPIDURAL; INFILTRATION; INTRACAUDAL; PERINEURAL ONCE AS NEEDED
Status: DISCONTINUED | OUTPATIENT
Start: 2020-10-26 | End: 2020-10-26 | Stop reason: HOSPADM

## 2020-10-26 RX ADMIN — SODIUM CHLORIDE 75 ML/HR: 9 INJECTION, SOLUTION INTRAVENOUS at 08:36

## 2020-10-27 RX ORDER — EXENATIDE 2 MG/.65ML
2 INJECTION, SUSPENSION, EXTENDED RELEASE SUBCUTANEOUS WEEKLY
Qty: 4 PEN | Refills: 11 | Status: SHIPPED | OUTPATIENT
Start: 2020-10-27 | End: 2020-10-29 | Stop reason: CLARIF

## 2020-10-27 RX ORDER — BLOOD SUGAR DIAGNOSTIC
STRIP MISCELLANEOUS
Qty: 400 EACH | Refills: 1 | Status: SHIPPED | OUTPATIENT
Start: 2020-10-27 | End: 2020-12-21 | Stop reason: SDUPTHER

## 2020-10-29 RX ORDER — DULAGLUTIDE 1.5 MG/.5ML
1 INJECTION, SOLUTION SUBCUTANEOUS WEEKLY
Qty: 4 PEN | Refills: 2 | Status: SHIPPED | OUTPATIENT
Start: 2020-10-29 | End: 2020-11-16 | Stop reason: SDUPTHER

## 2020-10-30 ENCOUNTER — TRANSCRIBE ORDERS (OUTPATIENT)
Dept: ADMINISTRATIVE | Facility: HOSPITAL | Age: 60
End: 2020-10-30

## 2020-10-30 DIAGNOSIS — Z12.2 ENCOUNTER FOR SCREENING FOR LUNG CANCER: Primary | ICD-10-CM

## 2020-11-10 ENCOUNTER — OFFICE VISIT (OUTPATIENT)
Dept: CARDIOLOGY | Facility: CLINIC | Age: 60
End: 2020-11-10

## 2020-11-10 VITALS
BODY MASS INDEX: 41.62 KG/M2 | DIASTOLIC BLOOD PRESSURE: 70 MMHG | HEIGHT: 60 IN | SYSTOLIC BLOOD PRESSURE: 132 MMHG | WEIGHT: 212 LBS | HEART RATE: 74 BPM

## 2020-11-10 DIAGNOSIS — Z95.810 ICD (IMPLANTABLE CARDIOVERTER-DEFIBRILLATOR) IN PLACE: ICD-10-CM

## 2020-11-10 DIAGNOSIS — I21.09 ANTERIOR MYOCARDIAL INFARCTION (HCC): ICD-10-CM

## 2020-11-10 DIAGNOSIS — E78.2 MIXED HYPERLIPIDEMIA: ICD-10-CM

## 2020-11-10 DIAGNOSIS — Z95.5 S/P CORONARY ARTERY STENT PLACEMENT: ICD-10-CM

## 2020-11-10 DIAGNOSIS — I25.10 CHRONIC CORONARY ARTERY DISEASE: Primary | ICD-10-CM

## 2020-11-10 DIAGNOSIS — E11.8 TYPE 2 DIABETES MELLITUS WITH COMPLICATION (HCC): ICD-10-CM

## 2020-11-10 DIAGNOSIS — I25.5 ISCHEMIC CARDIOMYOPATHY: ICD-10-CM

## 2020-11-10 DIAGNOSIS — I10 BENIGN ESSENTIAL HYPERTENSION: ICD-10-CM

## 2020-11-10 PROBLEM — R94.39 ABNORMAL STRESS TEST: Status: RESOLVED | Noted: 2019-12-27 | Resolved: 2020-11-10

## 2020-11-10 PROCEDURE — 99214 OFFICE O/P EST MOD 30 MIN: CPT | Performed by: INTERNAL MEDICINE

## 2020-11-10 PROCEDURE — 93000 ELECTROCARDIOGRAM COMPLETE: CPT | Performed by: INTERNAL MEDICINE

## 2020-11-10 NOTE — PROGRESS NOTES
Subjective:     Encounter Date:11/10/2020      Patient ID: Daisy Dent is a 60 y.o. female.    Chief Complaint:  History of Present Illness    This is a 60-year-old female with a history of ischemic cardiomyopathy, last ejection fraction of 40-45%, diabetes mellitus type 2, obstructive sleep apnea on CPAP, hypertension, status post dual-chamber AICD placement, dyslipidemia, coronary artery disease with a history of prior anterior myocardial infarction and LAD stent placement, status post left MCA stroke, who presents for follow-up.     She presents today for follow-up.  Since her cardiac catheterization she is only had brief episode of discomfort lasting 30 seconds at the most.  She denies any shortness of breath, palpitations, lower extremity edema, orthopnea, near-syncope or syncope.  She is working with Dr. Fitch to get her diabetes under better control.    Prior History:  I began following the patient in 11/2015 when she presented to establish care.  Prior to that she was followed by Dr. Ayala with [facility].  Her prior cardiac history includes a non-ST elevation MI and 7/2006 at which time she received a Cypher 3.0 x 23 mm stent to her mid LAD.  She then presented in 10/2009 with a late in-stent thrombosis that required repeat stenting of her mid LAD in addition to a second drug-eluting stent placement of her left circumflex artery.  Following that she had a repeat cardiac catheterization in 2010 that was reportedly unremarkable.  Following her myocardial infarction in 2009 she developed ischemic myopathy with an EF of around 25% and ultimately underwent AICD placement and 7/2010.  At some point in the course of her follow-ups her left ventricular function improved with an ejection fraction of 40-45%.  A stress test showed evidence of a large anterior infarct and minimal elva-infarct ischemia.     In her follow-ups she has done fairly well recently when she reported more fatigued than  normal and episodes of chest discomfort that occurred while she was exercising on the treadmill.  She underwent  an echocardiogram in 3/2017 that showed an ejection fraction of 40-45%.  Due to continued symptoms we went ahead and proceeded with a PET stress test which was performed in 8/2017 that showed medium sized anterior wall infarct with mild elva-infarct ischemia and a post stress ejection fraction of 58%.      In 11/2017 her AICD was noted to be at [facility].  She subsequently underwent generator replacement on 1/12/2018 with Dr. Ornelas. When I saw her back in follow up in 9/2018 she reported that she denied any new or worsening symptoms.       She was admitted and 12/2018 after she presented with aphasia.  On arrival to the hospital she underwent a CT of the head that showed no acute stroke but a perfusion scan showed a large perfusion mismatch.  A CTA showed evidence of a left MCA thrombus.  She was given TPA at this time with improvement in her symptoms.  Neurology felt that her stroke was likely embolic.  She underwent a repeat echocardiogram during her admission that showed moderately depressed left ventricular systolic function with an EF of 30%, grade 1 diastolic dysfunction, and no significant valvular disease.  Based on the likelihood that this was an embolic stroke I opted to go ahead and recommend anticoagulation.  She was subsequently started on rivaroxaban 20 mg a day.  Her aspirin was stopped and she was continued on clopidogrel.     In 11/2019 she reported a couple weeks of episodes of substernal chest tightness lasting 1 to 2 minutes and dyspnea on exertion.  She reported that the symptoms were similar to what she had at the time of her prior myocardial infarctions but reports that its much milder than what she experienced at that time.    Following that office visit she underwent a stress test that showed evidence of a medium anterior infarct with moderate elva-infarct ischemia.  The findings  were similar to prior stress test however the amount of elva-infarct ischemia had impaired to increase.  We initially tried to treat her medically however she continued to have recurrent symptoms so we opted to proceed with a cardiac catheterization.  She presented to the hospital on 1/6/2020 for the procedure.  She was found to have severe in-stent restenosis of her proximal LAD stent and ultimately underwent repeat drug-eluting stent placement of the in-stent restenosis.  She was also noted to have moderate nonobstructive disease of the first obtuse marginal branch for which she underwent FFR evaluation which was normal at 0.96.     In follow-up she had some mild episodes of chest discomfort that we decided to monitor.  She then returned for urgent follow-up on 10/22/2020 at which time she reported more severe episodes of chest discomfort that were occurring primarily at rest.  Due to the severity of her symptoms she was quite concerned and we decided to proceed with a cardiac catheterization.    This was performed on 10/26/2020 and showed stable moderate nonobstructive coronary artery disease.    Review of Systems   Constitution: Negative for malaise/fatigue.   HENT: Negative for hearing loss, hoarse voice, nosebleeds and sore throat.    Eyes: Negative for pain.   Cardiovascular: Positive for chest pain. Negative for claudication, cyanosis, dyspnea on exertion, irregular heartbeat, leg swelling, near-syncope, orthopnea, palpitations, paroxysmal nocturnal dyspnea and syncope.   Respiratory: Negative for shortness of breath and snoring.    Endocrine: Negative for cold intolerance, heat intolerance, polydipsia, polyphagia and polyuria.   Skin: Negative for itching and rash.   Musculoskeletal: Negative for arthritis, falls, joint pain, joint swelling, muscle cramps, muscle weakness and myalgias.   Gastrointestinal: Negative for constipation, diarrhea, dysphagia, heartburn, hematemesis, hematochezia, melena, nausea  and vomiting.   Genitourinary: Negative for frequency, hematuria and hesitancy.   Neurological: Negative for excessive daytime sleepiness, dizziness, headaches, light-headedness, numbness and weakness.   Psychiatric/Behavioral: Negative for depression. The patient is not nervous/anxious.          Current Outpatient Medications:   •  Accu-Chek FastClix Lancets misc, Use to test blood sugar 4 times daily  E11.8, Disp: 400 each, Rfl: 1  •  Accu-Chek Guide test strip, Use 4 times a day; e11.8, Disp: 400 each, Rfl: 1  •  atorvastatin (LIPITOR) 80 MG tablet, Take 1 tablet by mouth Daily., Disp: 90 tablet, Rfl: 1  •  Blood Glucose Monitoring Suppl (Accu-Chek Guide) w/Device kit, Inject 1 Device under the skin into the appropriate area as directed Daily., Disp: 1 kit, Rfl: 0  •  carvedilol (COREG) 25 MG tablet, Take 1 tablet by mouth 2 (Two) Times a Day With Meals., Disp: 180 tablet, Rfl: 2  •  Cholecalciferol (VITAMIN D3) 2000 UNITS capsule, Take 1,000 Units by mouth Daily., Disp: , Rfl:   •  clopidogrel (PLAVIX) 75 MG tablet, Take 1 tablet by mouth Daily., Disp: 90 tablet, Rfl: 0  •  furosemide (LASIX) 40 MG tablet, Take 1 tablet by mouth Daily. Resume on 1/7/20, Disp: 90 tablet, Rfl: 1  •  insulin aspart (NovoLOG FlexPen) 100 UNIT/ML solution pen-injector sc pen, Inject 15 Units under the skin into the appropriate area as directed 3 (Three) Times a Day With Meals., Disp: 10 pen, Rfl: 3  •  Insulin Glargine, 1 Unit Dial, (Toujeo SoloStar) 300 UNIT/ML solution pen-injector injection, Inject 50 Units under the skin into the appropriate area as directed Daily., Disp: 9 mL, Rfl: 3  •  Insulin Pen Needle 32G X 4 MM misc, USE 4 TIMES A DAY, Disp: 400 each, Rfl: 3  •  metFORMIN (GLUCOPHAGE) 1000 MG tablet, Take 1 tablet by mouth Daily With Breakfast. Resume on 10/28/20, Disp:  , Rfl:   •  Multiple Vitamins-Minerals (MULTIVITAL PO), Take 1 tablet by mouth Daily., Disp: , Rfl:   •  nitroglycerin (Nitrostat) 0.4 MG SL tablet, Place  1 tablet under the tongue Every 5 (Five) Minutes As Needed for Chest Pain. Take no more than 3 doses in 15 minutes., Disp: 30 tablet, Rfl: 11  •  Omega-3 Fatty Acids (FISH OIL) 1200 MG capsule capsule, Take 1,200 mg by mouth Daily With Breakfast., Disp: , Rfl:   •  potassium chloride 10 MEQ CR tablet, Take 1 tablet by mouth Daily. Resume on 1/7/20, Disp: 90 tablet, Rfl: 1  •  rivaroxaban (Xarelto) 20 MG tablet, Take 1 tablet by mouth Daily With Dinner. Resume on 10/28/2020, Disp: 90 tablet, Rfl: 1  •  spironolactone (ALDACTONE) 25 MG tablet, Take 1 tablet by mouth Daily., Disp: 90 tablet, Rfl: 1  •  Trulicity 1.5 MG/0.5ML solution pen-injector, Inject 1.5 mg under the skin into the appropriate area as directed 1 (One) Time Per Week., Disp: 4 pen, Rfl: 2  •  vitamin B-12 (CYANOCOBALAMIN) 500 MCG tablet, Take 500 mcg by mouth Daily., Disp: , Rfl:     Past Medical History:   Diagnosis Date   • Abdominal pain    • Abnormal liver function test    • Acute bronchitis    • Benign essential hypertension    • CAD (coronary artery disease)    • CHF (congestive heart failure) (CMS/HCC)    • Colon polyp    • Congestive heart disease (CMS/HCC)    • COPD (chronic obstructive pulmonary disease) (CMS/HCC)    • Cough    • Diabetes (CMS/HCC)    • Diabetes mellitus (CMS/HCC)    • Diarrhea    • Gastroenteritis    • Health care maintenance    • Hyperlipidemia    • Hypertension    • IFG (impaired fasting glucose)    • Ischemic cardiomyopathy    • Myocardial infarction (CMS/HCC)    • SHELLIE (obstructive sleep apnea)    • Sore throat    • Type 2 diabetes mellitus (CMS/HCC)    • Vaginal yeast infection    • Vitamin D deficiency        Past Surgical History:   Procedure Laterality Date   • CARDIAC CATHETERIZATION     • CARDIAC CATHETERIZATION N/A 1/6/2020    Procedure: Coronary angiography;  Surgeon: Oneida Saldivar MD;  Location: [facility] INVASIVE LOCATION;  Service: Cardiovascular   • CARDIAC CATHETERIZATION N/A 1/6/2020    Procedure: Left  heart cath;  Surgeon: Oneida Saldivar MD;  Location:  [facility] CATH INVASIVE LOCATION;  Service: Cardiovascular   • CARDIAC CATHETERIZATION N/A 1/6/2020    Procedure: Left ventriculography;  Surgeon: Oneida Saldivar MD;  Location:  [facility] CATH INVASIVE LOCATION;  Service: Cardiovascular   • CARDIAC CATHETERIZATION N/A 1/6/2020    Procedure: Percutaneous Coronary Intervention;  Surgeon: Oneida Saldivar MD;  Location: [facility] CATH INVASIVE LOCATION;  Service: Cardiovascular   • CARDIAC CATHETERIZATION N/A 1/6/2020    Procedure: Stent HUGO coronary;  Surgeon: Oneida Saldivar MD;  Location:  [facility] CATH INVASIVE LOCATION;  Service: Cardiovascular   • CARDIAC CATHETERIZATION  1/6/2020    Procedure: Functional Flow [place];  Surgeon: Oneida Saldivar MD;  Location: [facility] CATH INVASIVE LOCATION;  Service: Cardiovascular   • CARDIAC CATHETERIZATION N/A 10/26/2020    Procedure: Coronary angiography;  Surgeon: Oneida Saldivar MD;  Location: [facility] CATH INVASIVE LOCATION;  Service: Cardiovascular;  Laterality: N/A;   • CARDIAC CATHETERIZATION N/A 10/26/2020    Procedure: Left heart cath;  Surgeon: Oneida Saldivar MD;  Location: [facility] CATH INVASIVE LOCATION;  Service: Cardiovascular;  Laterality: N/A;   • CARDIAC CATHETERIZATION N/A 10/26/2020    Procedure: Left ventriculography;  Surgeon: Oneida Saldivar MD;  Location: [facility] CATH INVASIVE LOCATION;  Service: Cardiovascular;  Laterality: N/A;   • CARDIAC CATHETERIZATION  10/26/2020    Procedure: Functional Flow [place];  Surgeon: Oneida Saldivar MD;  Location: [facility] CATH INVASIVE LOCATION;  Service: Cardiovascular;;   • CARDIAC ELECTROPHYSIOLOGY PROCEDURE N/A 1/12/2018    Procedure: ICD DC generator change  medtronic;  Surgeon: Hany Ornelas MD;  Location: [facility] CATH INVASIVE LOCATION;  Service:    • PACEMAKER IMPLANTATION     • TUBAL ABDOMINAL LIGATION         Family History   Problem Relation Age of Onset   • Diabetes Mother    • Heart disease Mother    •  "Hyperlipidemia Mother    • Diabetes Father    • Heart disease Father    • Hyperlipidemia Father    • Heart disease Brother    • Hyperlipidemia Brother    • Hypertension Father    • Colon cancer Father    • Heart attack Father    • Emphysema Mother    • Heart disease Brother    • Heart attack Brother    • Heart disease Maternal Grandmother    • Heart disease Maternal Grandfather    • Heart disease Paternal Grandmother    • Heart disease Paternal Grandfather        Social History     Tobacco Use   • Smoking status: Former Smoker     Packs/day: 1.00     Years: 30.00     Pack years: 30.00     Types: Cigarettes     Start date: 1979     Quit date: 2009     Years since quittin.0   • Smokeless tobacco: Never Used   • Tobacco comment: QUIT 10 YRS   Substance Use Topics   • Alcohol use: Yes     Comment: rarely uses ETOH   • Drug use: No         ECG 12 Lead    Date/Time: 11/10/2020 3:58 PM  Performed by: Oneida Saldivar MD  Authorized by: Oneida Saldivar MD   Comparison: compared with previous ECG   Similar to previous ECG  Rhythm: sinus rhythm               Objective:     Visit Vitals  /70   Pulse 74   Ht 152.4 cm (60\")   Wt 96.2 kg (212 lb)   BMI 41.40 kg/m²         Constitutional:       Appearance: Normal appearance. Well-developed.   Eyes:      General: Lids are normal.      Conjunctiva/sclera: Conjunctivae normal.      Pupils: Pupils are equal, round, and reactive to light.   HENT:      Head: Normocephalic and atraumatic.   Neck:      Musculoskeletal: Full passive range of motion without pain, normal range of motion and neck supple.      Vascular: No carotid bruit or JVD.      Lymphadenopathy: No cervical adenopathy.   Pulmonary:      Effort: Pulmonary effort is normal.      Breath sounds: Normal breath sounds.   Cardiovascular:      Normal rate. Regular rhythm.      No gallop.   Pulses:     Radial: 2+ bilaterally.  Edema:     Peripheral edema absent.   Abdominal:      Palpations: Abdomen is soft. "   Skin:     General: Skin is warm and dry.   Neurological:      Mental Status: Alert and oriented to person, place, and time.             Assessment:          Diagnosis Plan   1. Chronic coronary artery disease     2. Benign essential hypertension     3. Ischemic cardiomyopathy     4. Mixed hyperlipidemia     5. ICD (implantable cardioverter-defibrillator) in place     6. Type 2 diabetes mellitus with complication (CMS/[facility])     7. S/P coronary artery stent placement     8. History of anterior myocardial infarction (CMS/[facility])            Plan:       1.  Coronary artery disease.  Cardiac catheterization on 10/22/2020 showed moderate nonobstructive disease.  Continue medical management with statin, clopidogrel, and beta-blocker.  2.  Ischemic cardiomyopathy.  EF of about 25 to 30%.  No evidence of volume overload.  On medical management with carvedilol.  In the past she has been unable to tolerate an ACE inhibitor or ARB due to low blood pressures.  3.  History of left MCA stroke.  On chronic anticoagulation with rivaroxaban.  4.  History of AICD.  Continue routine device checks through the office.  5.  Hypertension.  Well-controlled  6.  Diabetes mellitus type 2.  Poorly controlled and followed closely by endocrinology.  7.  Obstructive sleep apnea.  Compliant with CPAP.    We will plan on seeing the patient back again in 6 months.

## 2020-11-18 ENCOUNTER — HOSPITAL ENCOUNTER (OUTPATIENT)
Dept: CT IMAGING | Facility: HOSPITAL | Age: 60
Discharge: HOME | End: 2020-11-18
Admitting: INTERNAL MEDICINE

## 2020-11-18 DIAGNOSIS — I25.5 ISCHEMIC CARDIOMYOPATHY: ICD-10-CM

## 2020-11-18 DIAGNOSIS — Z12.2 ENCOUNTER FOR SCREENING FOR LUNG CANCER: ICD-10-CM

## 2020-11-18 PROCEDURE — G0297 LDCT FOR LUNG CA SCREEN: HCPCS

## 2020-11-18 RX ORDER — DULAGLUTIDE 1.5 MG/.5ML
1 INJECTION, SOLUTION SUBCUTANEOUS WEEKLY
Qty: 4 PEN | Refills: 2 | Status: SHIPPED | OUTPATIENT
Start: 2020-11-18 | End: 2021-02-16

## 2020-11-19 RX ORDER — CARVEDILOL 25 MG/1
25 TABLET ORAL 2 TIMES DAILY WITH MEALS
Qty: 180 TABLET | Refills: 2 | Status: SHIPPED | OUTPATIENT
Start: 2020-11-19 | End: 2021-08-06

## 2020-12-01 ENCOUNTER — OFFICE VISIT (OUTPATIENT)
Dept: ENDOCRINOLOGY | Age: 60
End: 2020-12-01

## 2020-12-01 VITALS
HEART RATE: 72 BPM | SYSTOLIC BLOOD PRESSURE: 118 MMHG | OXYGEN SATURATION: 96 % | HEIGHT: 60 IN | WEIGHT: 215 LBS | BODY MASS INDEX: 42.21 KG/M2 | DIASTOLIC BLOOD PRESSURE: 80 MMHG

## 2020-12-01 DIAGNOSIS — IMO0002 DM (DIABETES MELLITUS), TYPE 2, UNCONTROLLED W/NEUROLOGIC COMPLICATION: Primary | ICD-10-CM

## 2020-12-01 PROCEDURE — 99213 OFFICE O/P EST LOW 20 MIN: CPT | Performed by: NURSE PRACTITIONER

## 2020-12-01 RX ORDER — ALBUTEROL SULFATE 90 UG/1
AEROSOL, METERED RESPIRATORY (INHALATION)
COMMUNITY
Start: 2020-10-29

## 2020-12-01 NOTE — PROGRESS NOTES
60 y.o.female     Patient Care Team:  Nusrat Dent APRN as PCP - General (Family Medicine)    Chief Complaint:  Hyperglycemia f/u    Subjective   Blood sugars have dramatically improved   Fasting range 120-170 which has improved from the 300s  During the day her blood sugar range is .  She is doing very well since her last visit.     HPI     59 y/o  female with pmhx of Type 2 dm, HLP is here for the follow up.   Pt was hospitalized in Dec 2018 with CVA.       Type 2 diabetes mellitus-diagnosed about 2 years ago.  Insulin was started on patient during hospitalization in December 2018.  Today in clinic patient reports that she is on Toujeo 50units units at bedtime, NovoLog 15 units with each meal, metformin 1000 mg oral daily.  Could not increase the dosage of metformin due to diarrhea and stomach cramps.  Has started trulicity since her last visit and she is tolerating it mostly well.  No known history of diabetic retinopathy  Does complain of diabetic neuropathy.  Hx of CAD, no hx of CKD,hx of CVA per pt.   Pt is physically active. weight has been stable.   Pt tries to follow DM diet for most part       Hyperlipidemia  On Lipitor 80 mg oral daily.       Reviewed primary care physician's/consulting physician documentation and lab results :     Interval History      The following portions of the patient's history were reviewed and updated as appropriate: allergies, current medications, past family history, past medical history, past social history, past surgical history and problem list.    Past Medical History:   Diagnosis Date   • Abdominal pain    • Abnormal liver function test    • Acute bronchitis    • Benign essential hypertension    • CAD (coronary artery disease)    • CHF (congestive heart failure) (CMS/HCC)    • Colon polyp    • Congestive heart disease (CMS/HCC)    • COPD (chronic obstructive pulmonary disease) (CMS/HCC)    • Cough    • Diabetes (CMS/HCC)    • Diabetes mellitus (CMS/HCC)     • Diarrhea    • Gastroenteritis    • Health care maintenance    • Hyperlipidemia    • Hypertension    • IFG (impaired fasting glucose)    • Ischemic cardiomyopathy    • Myocardial infarction (CMS/HCC)    • SHELLIE (obstructive sleep apnea)    • Sore throat    • Type 2 diabetes mellitus (CMS/HCC)    • Vaginal yeast infection    • Vitamin D deficiency      Family History   Problem Relation Age of Onset   • Diabetes Mother    • Heart disease Mother    • Hyperlipidemia Mother    • Diabetes Father    • Heart disease Father    • Hyperlipidemia Father    • Heart disease Brother    • Hyperlipidemia Brother    • Hypertension Father    • Colon cancer Father    • Heart attack Father    • Emphysema Mother    • Heart disease Brother    • Heart attack Brother    • Heart disease Maternal Grandmother    • Heart disease Maternal Grandfather    • Heart disease Paternal Grandmother    • Heart disease Paternal Grandfather      Social History     Socioeconomic History   • Marital status:      Spouse name: Not on file   • Number of children: Not on file   • Years of education: Not on file   • Highest education level: Not on file   Tobacco Use   • Smoking status: Former Smoker     Packs/day: 1.00     Years: 30.00     Pack years: 30.00     Types: Cigarettes     Start date: 1979     Quit date: 2009     Years since quittin.0   • Smokeless tobacco: Never Used   • Tobacco comment: QUIT 10 YRS   Substance and Sexual Activity   • Alcohol use: Yes     Comment: rarely uses ETOH   • Drug use: No   • Sexual activity: Yes     Partners: Male   Social History Narrative    ** Merged History Encounter **          No Known Allergies    Current Outpatient Medications:   •  Accu-Chek FastClix Lancets misc, Use to test blood sugar 4 times daily  E11.8, Disp: 400 each, Rfl: 1  •  Accu-Chek Guide test strip, Use 4 times a day; e11.8, Disp: 400 each, Rfl: 1  •  albuterol sulfate  (90 Base) MCG/ACT inhaler, TAKE 2 PUFFS BY MOUTH EVERY  4 HOURS AS NEEDED, Disp: , Rfl:   •  atorvastatin (LIPITOR) 80 MG tablet, Take 1 tablet by mouth Daily., Disp: 90 tablet, Rfl: 1  •  Blood Glucose Monitoring Suppl (Accu-Chek Guide) w/Device kit, Inject 1 Device under the skin into the appropriate area as directed Daily., Disp: 1 kit, Rfl: 0  •  carvedilol (COREG) 25 MG tablet, Take 1 tablet by mouth 2 (Two) Times a Day With Meals., Disp: 180 tablet, Rfl: 2  •  Cholecalciferol (VITAMIN D3) 2000 UNITS capsule, Take 1,000 Units by mouth Daily., Disp: , Rfl:   •  clopidogrel (PLAVIX) 75 MG tablet, Take 1 tablet by mouth Daily., Disp: 90 tablet, Rfl: 0  •  furosemide (LASIX) 40 MG tablet, Take 1 tablet by mouth Daily. Resume on 1/7/20, Disp: 90 tablet, Rfl: 1  •  insulin aspart (NovoLOG FlexPen) 100 UNIT/ML solution pen-injector sc pen, Inject 15 Units under the skin into the appropriate area as directed 3 (Three) Times a Day With Meals., Disp: 10 pen, Rfl: 3  •  Insulin Glargine, 1 Unit Dial, (Toujeo SoloStar) 300 UNIT/ML solution pen-injector injection, Inject 50 Units under the skin into the appropriate area as directed Daily., Disp: 9 mL, Rfl: 3  •  Insulin Pen Needle 32G X 4 MM misc, USE 4 TIMES A DAY, Disp: 400 each, Rfl: 3  •  metFORMIN (GLUCOPHAGE) 1000 MG tablet, TAKE 1 TABLET BY MOUTH EVERY DAY IN THE EVENING, Disp: 90 tablet, Rfl: 3  •  Multiple Vitamins-Minerals (MULTIVITAL PO), Take 1 tablet by mouth Daily., Disp: , Rfl:   •  nitroglycerin (Nitrostat) 0.4 MG SL tablet, Place 1 tablet under the tongue Every 5 (Five) Minutes As Needed for Chest Pain. Take no more than 3 doses in 15 minutes., Disp: 30 tablet, Rfl: 11  •  Omega-3 Fatty Acids (FISH OIL) 1200 MG capsule capsule, Take 1,200 mg by mouth Daily With Breakfast., Disp: , Rfl:   •  potassium chloride 10 MEQ CR tablet, Take 1 tablet by mouth Daily. Resume on 1/7/20, Disp: 90 tablet, Rfl: 1  •  rivaroxaban (Xarelto) 20 MG tablet, Take 1 tablet by mouth Daily With Dinner. Resume on 10/28/2020, Disp: 90  "tablet, Rfl: 1  •  spironolactone (ALDACTONE) 25 MG tablet, Take 1 tablet by mouth Daily., Disp: 90 tablet, Rfl: 1  •  Trulicity 1.5 MG/0.5ML solution pen-injector, Inject 1.5 mg under the skin into the appropriate area as directed 1 (One) Time Per Week., Disp: 4 pen, Rfl: 2  •  vitamin B-12 (CYANOCOBALAMIN) 500 MCG tablet, Take 500 mcg by mouth Daily., Disp: , Rfl:         Review of Systems   Constitutional: Negative for fatigue and unexpected weight change.   Eyes: Negative for photophobia and visual disturbance.   Endocrine: Negative for polydipsia, polyphagia and polyuria.         Objective       Vitals:    12/01/20 1056   BP: 118/80   Pulse: 72   SpO2: 96%   Weight: 97.5 kg (215 lb)   Height: 152.4 cm (60\")     Body mass index is 41.99 kg/m².      Physical Exam  Vitals signs reviewed.   Constitutional:       General: She is not in acute distress.  HENT:      Head: Normocephalic and atraumatic.   Neck:      Musculoskeletal: Normal range of motion and neck supple.   Cardiovascular:      Rate and Rhythm: Normal rate and regular rhythm.   Pulmonary:      Effort: Pulmonary effort is normal. No respiratory distress.   Musculoskeletal: Normal range of motion.         General: No signs of injury.   Skin:     General: Skin is warm and dry.   Neurological:      Mental Status: She is alert and oriented to person, place, and time. Mental status is at baseline.   Psychiatric:         Mood and Affect: Mood normal.         Behavior: Behavior normal.         Thought Content: Thought content normal.         Judgment: Judgment normal.       Results Review:        Admission on 10/26/2020, Discharged on 10/26/2020   Component Date Value Ref Range Status   • Glucose 10/26/2020 257* 70 - 130 mg/dL Final   • Activated Clotting Time  10/26/2020 384* 82 - 152 Seconds Final    Serial Number: [id number]:  [id number]     Lab Results   Component Value Date    HGBA1C 11.10 (H) 10/16/2020    HGBA1C 9.10 (H) 03/12/2020    HGBA1C 8.60 (H) " "10/24/2019     Lab Results   Component Value Date    CREATININE 1.01 (H) 10/22/2020     Imaging Results (Most Recent)     None                Assessment and Plan:    Diagnoses and all orders for this visit:    1. DM (diabetes mellitus), type 2, uncontrolled w/neurologic complication (CMS/HCC) (Primary)        Type 2 diabetes mellitus-improving since last visit   HbA1c was greater than 10% during her last visit   with her prior history of coronary artery disease and stroke patient is at high risk for diabetic complications.  Continued to Emphasized with the patient the importance of glycemic control in order to prevent these cardiac complications from worsening.     Continue Toujeo to 50 units daily to maintain fbg <120  continue NovoLog to 15 units with each meal  Continue trulicity     Hyperlipidemia  Continue statin.     Obesity  Continue with diet and exercise as discussed at lsst visit     Reviewed Lab results with the patient.       Josephine Saleem, APRN  12/01/20    EMR Dragon / transcription disclaimer:     \"Dictated utilizing Dragon dictation\".  "

## 2020-12-16 DIAGNOSIS — I25.10 CORONARY ARTERY DISEASE INVOLVING NATIVE CORONARY ARTERY OF NATIVE HEART WITHOUT ANGINA PECTORIS: ICD-10-CM

## 2020-12-17 RX ORDER — CLOPIDOGREL BISULFATE 75 MG/1
TABLET ORAL
Qty: 90 TABLET | Refills: 3 | Status: SHIPPED | OUTPATIENT
Start: 2020-12-17 | End: 2022-01-05

## 2020-12-21 RX ORDER — FUROSEMIDE 40 MG/1
40 TABLET ORAL DAILY
Qty: 90 TABLET | Refills: 1 | Status: SHIPPED | OUTPATIENT
Start: 2020-12-21 | End: 2021-07-06

## 2020-12-21 RX ORDER — BLOOD SUGAR DIAGNOSTIC
STRIP MISCELLANEOUS
Qty: 400 EACH | Refills: 1 | Status: SHIPPED | OUTPATIENT
Start: 2020-12-21 | End: 2021-10-10 | Stop reason: SDUPTHER

## 2021-01-05 RX ORDER — INSULIN GLARGINE 300 U/ML
INJECTION, SOLUTION SUBCUTANEOUS
Qty: 9 PEN | Refills: 2 | Status: SHIPPED | OUTPATIENT
Start: 2021-01-05 | End: 2021-07-13

## 2021-02-16 RX ORDER — DULAGLUTIDE 1.5 MG/.5ML
1 INJECTION, SOLUTION SUBCUTANEOUS WEEKLY
Qty: 4 PEN | Refills: 2 | Status: SHIPPED | OUTPATIENT
Start: 2021-02-16 | End: 2021-03-26

## 2021-03-04 ENCOUNTER — TELEPHONE (OUTPATIENT)
Dept: CARDIOLOGY | Facility: CLINIC | Age: 61
End: 2021-03-04

## 2021-03-04 NOTE — TELEPHONE ENCOUNTER
"----- Message from Bri Wellington MA sent at 3/2/2021  7:52 AM EST -----  Regarding: FW: Non-Urgent Medical Question  Contact: [phone]    ----- Message -----  From: Daisy Dent  Sent: 3/1/2021   5:02 PM EST  To: Varun Osei [facility]  Subject: Non-Urgent Medical Question                      Can you please provide a letter stating that I am a \"high risk\" so that I can try to schedule the vaccine.    "

## 2021-03-04 NOTE — TELEPHONE ENCOUNTER
I created a letter in [facility] stating she is high risk and should receive COVID-19 vaccine.  Thanks

## 2021-03-05 NOTE — TELEPHONE ENCOUNTER
Pt notified and I [facility] chart[facility]   She should have quite a bit of this at home (filled for 90 days last month). I would recommend that she just half the tablet for now and let us know when she needs refills for lower dose.     Thanks,  Miguel Ángel Parham

## 2021-03-10 ENCOUNTER — PATIENT MESSAGE (OUTPATIENT)
Dept: CARDIOLOGY | Facility: CLINIC | Age: 61
End: 2021-03-10

## 2021-03-10 NOTE — TELEPHONE ENCOUNTER
From: Daisy Dent  To: Oneida Saldivar MD  Sent: 3/10/2021 2:33 PM EST  Subject: Non-Urgent Medical Question    I have an appt for tomorrow to get the vaccine and I haven't received the letter yet. can you please attach it on my chart and I can print it out for tomorrow. Thank you

## 2021-03-10 NOTE — TELEPHONE ENCOUNTER
Letter by Oneida Saldivar MD on 3/4/2021               [facility] CARDIOLOGY  [address] [facility] [address]  [facility] [place] [address]  Phone: [phone]           March 4, 2021      Patient: Daisy Dent   YOB: 1960   Date of Visit: 3/4/2021         To Whom It May Concern:     Daisy Dent is under my care for longstanding history of cardiac disease and would be high risk if she were to contract COVID-19.  Due to her high risk status I would recommend that she receive the COVID-19 vaccine as soon as possible.     Sincerely,           Oneida Saldivar MD     CC: No Recipients

## 2021-03-15 RX ORDER — RIVAROXABAN 20 MG/1
TABLET, FILM COATED ORAL
Qty: 90 TABLET | Refills: 1 | Status: SHIPPED | OUTPATIENT
Start: 2021-03-15 | End: 2021-05-12 | Stop reason: SDUPTHER

## 2021-03-18 LAB
ALBUMIN/CREAT UR: 6 MG/G CREAT (ref 0–29)
CHOLEST SERPL-MCNC: 161 MG/DL (ref 100–199)
CREAT SERPL-MCNC: 0.93 MG/DL (ref 0.57–1)
CREAT UR-MCNC: 320.5 MG/DL
HBA1C MFR BLD: 7.4 % (ref 4.8–5.6)
HDLC SERPL-MCNC: 34 MG/DL
IMP & REVIEW OF LAB RESULTS: NORMAL
LDLC SERPL CALC-MCNC: 95 MG/DL (ref 0–99)
Lab: NORMAL
MICROALBUMIN UR-MCNC: 18.7 UG/ML
T4 FREE SERPL-MCNC: 1.22 NG/DL (ref 0.82–1.77)
TRIGL SERPL-MCNC: 183 MG/DL (ref 0–149)
TSH SERPL DL<=0.005 MIU/L-ACNC: 1.34 UIU/ML (ref 0.45–4.5)
VLDLC SERPL CALC-MCNC: 32 MG/DL (ref 5–40)

## 2021-03-26 ENCOUNTER — BULK ORDERING (OUTPATIENT)
Dept: CASE MANAGEMENT | Facility: OTHER | Age: 61
End: 2021-03-26

## 2021-03-26 ENCOUNTER — OFFICE VISIT (OUTPATIENT)
Dept: ENDOCRINOLOGY | Age: 61
End: 2021-03-26

## 2021-03-26 VITALS
WEIGHT: 210.6 LBS | BODY MASS INDEX: 41.35 KG/M2 | SYSTOLIC BLOOD PRESSURE: 110 MMHG | DIASTOLIC BLOOD PRESSURE: 60 MMHG | HEIGHT: 60 IN

## 2021-03-26 DIAGNOSIS — E11.65 TYPE 2 DIABETES MELLITUS WITH HYPERGLYCEMIA, UNSPECIFIED WHETHER LONG TERM INSULIN USE (HCC): Primary | ICD-10-CM

## 2021-03-26 DIAGNOSIS — Z23 IMMUNIZATION DUE: ICD-10-CM

## 2021-03-26 PROCEDURE — 99214 OFFICE O/P EST MOD 30 MIN: CPT | Performed by: NURSE PRACTITIONER

## 2021-03-26 RX ORDER — DULAGLUTIDE 3 MG/.5ML
3 INJECTION, SOLUTION SUBCUTANEOUS WEEKLY
Qty: 4 PEN | Refills: 5 | Status: SHIPPED | OUTPATIENT
Start: 2021-03-26 | End: 2021-05-12 | Stop reason: SDUPTHER

## 2021-03-26 NOTE — PROGRESS NOTES
"Chief Complaint  Diabetes (type 2 diabetes, checks blood sugar a couple times a day)    Subjective          Daisy Dent presents to [facility] ENDOCRINOLOGY  History of Present Illness  61 y/o  female with pmhx of Type 2 dm, HLP is here for the follow up.   Pt was hospitalized in Dec 2018 with CVA.      She has made drastic food choices change and a1c had drastically improved.  toujeo and novolog were causing hypoglycemia and nausea so she stopped this.  Her A1c has improved from 11% to 7% on Trulicity along with lifestyle choices     Type 2 diabetes mellitus-diagnosed around 2019.  Insulin was started on patient during hospitalization in December 2018 and patient has stopped this insulin since her last visit.  Today in clinic patient reports that she is on trulicity 1.5mg and metformin 1000mg QD   No known history of diabetic retinopathy.   Does complain of diabetic neuropathy.  Hx of CAD, no hx of CKD,hx of CVA per pt.   Pt is more physically active. Weight is down 5 pounds .   Pt tries to follow DM diet for most part  Lab Results   Component Value Date    HGBA1C 7.4 (H) 03/17/2021         Hyperlipidemia  On Lipitor 80 mg oral daily.  Tolerating well and improving low-cholesterol diet  Lab Results   Component Value Date    CHOL 279 (H) 12/09/2018    CHLPL 161 03/17/2021    TRIG 183 (H) 03/17/2021    HDL 34 (L) 03/17/2021    LDL 95 03/17/2021         Objective   Vital Signs:   /60   Ht 152.4 cm (60\")   Wt 95.5 kg (210 lb 9.6 oz)   BMI 41.13 kg/m²     Physical Exam  Vitals reviewed.   Constitutional:       General: She is not in acute distress.  HENT:      Head: Normocephalic and atraumatic.   Cardiovascular:      Rate and Rhythm: Normal rate and regular rhythm.   Pulmonary:      Effort: Pulmonary effort is normal. No respiratory distress.   Musculoskeletal:         General: No signs of injury. Normal range of motion.      Cervical back: Normal range of motion and neck " supple.   Skin:     General: Skin is warm and dry.   Neurological:      Mental Status: She is alert and oriented to person, place, and time. Mental status is at baseline.   Psychiatric:         Mood and Affect: Mood normal.         Behavior: Behavior normal.         Thought Content: Thought content normal.         Judgment: Judgment normal.        Result Review :   The following data was reviewed by: KEYONA Hillman on 03/26/2021:  Common labs    Common Labsle 10/16/20 10/16/20 10/16/20 10/22/20 10/22/20 3/17/21 3/17/21 3/17/21 3/17/21    0857 0857 0857 1453 1453 0832 0832 0832 0832   Glucose     150 (A)       Glucose   309 (A)         BUN   12  8       Creatinine   0.88  1.01 (A)  0.93     eGFR Non  Am   66  56 (A)  67     eGFR  Am   80  68  78     Sodium   135 (A)  141       Potassium   4.7  3.7       Chloride   99  101       Calcium   10.0  9.6       Total Protein   6.8         Albumin   4.40         Total Bilirubin   0.6         Alkaline Phosphatase   120 (A)         AST (SGOT)   34 (A)         ALT (SGPT)   64 (A)         WBC    8.25        Hemoglobin    13.8        Hematocrit    42.8        Platelets    302        Total Cholesterol 147     161      Triglycerides 207 (A)     183 (A)      HDL Cholesterol 33 (A)     34 (A)      LDL Cholesterol  79     95      Hemoglobin A1C  11.10 (A)       7.4 (A)   Microalbumin, Urine        18.7    (A) Abnormal value       Comments are available for some flowsheets but are not being displayed.                     Assessment and Plan    Diagnoses and all orders for this visit:    1. Type 2 diabetes mellitus with hyperglycemia, unspecified whether long term insulin use (CMS/[facility]) (Primary)  -     Hemoglobin A1c; Future  -     Lipid Panel; Future    Other orders  -     Dulaglutide (Trulicity) 3 MG/0.5ML solution pen-injector; Inject 3 mg under the skin into the appropriate area as directed 1 (One) Time Per Week.  Dispense: 4 pen; Refill: 5        Follow Up    Return in about 3 months (around 6/26/2021).   Increase trulicity to 3mg   Continue statin  Continue diabetic diet and low-cholesterol diet  Continue to increase activity level  Low hypoglycemia risk  Patient's goal A1c is 6% and would like to lose 5 pounds before her next visit    Patient was given instructions and counseling regarding her condition or for health maintenance advice. Please see specific information pulled into the AVS if appropriate.     Josephine Saleem APRN

## 2021-04-19 RX ORDER — SPIRONOLACTONE 25 MG/1
TABLET ORAL
Qty: 90 TABLET | Refills: 1 | Status: SHIPPED | OUTPATIENT
Start: 2021-04-19 | End: 2021-10-05

## 2021-05-11 ENCOUNTER — OFFICE VISIT (OUTPATIENT)
Dept: CARDIOLOGY | Facility: CLINIC | Age: 61
End: 2021-05-11

## 2021-05-11 VITALS
WEIGHT: 206 LBS | HEART RATE: 67 BPM | HEIGHT: 60 IN | SYSTOLIC BLOOD PRESSURE: 122 MMHG | BODY MASS INDEX: 40.44 KG/M2 | DIASTOLIC BLOOD PRESSURE: 78 MMHG

## 2021-05-11 DIAGNOSIS — E11.8 TYPE 2 DIABETES MELLITUS WITH COMPLICATION (HCC): ICD-10-CM

## 2021-05-11 DIAGNOSIS — Z95.5 S/P CORONARY ARTERY STENT PLACEMENT: ICD-10-CM

## 2021-05-11 DIAGNOSIS — E66.01 MORBID OBESITY WITH BMI OF 40.0-44.9, ADULT (HCC): ICD-10-CM

## 2021-05-11 DIAGNOSIS — G47.33 OBSTRUCTIVE SLEEP APNEA SYNDROME: ICD-10-CM

## 2021-05-11 DIAGNOSIS — Z95.810 ICD (IMPLANTABLE CARDIOVERTER-DEFIBRILLATOR) IN PLACE: ICD-10-CM

## 2021-05-11 DIAGNOSIS — Z86.73 HISTORY OF CVA (CEREBROVASCULAR ACCIDENT) WITHOUT RESIDUAL DEFICITS: ICD-10-CM

## 2021-05-11 DIAGNOSIS — I25.5 ISCHEMIC CARDIOMYOPATHY: ICD-10-CM

## 2021-05-11 DIAGNOSIS — I21.09 ANTERIOR MYOCARDIAL INFARCTION (HCC): ICD-10-CM

## 2021-05-11 DIAGNOSIS — E78.2 MIXED HYPERLIPIDEMIA: ICD-10-CM

## 2021-05-11 DIAGNOSIS — I25.10 CHRONIC CORONARY ARTERY DISEASE: Primary | ICD-10-CM

## 2021-05-11 PROCEDURE — 99214 OFFICE O/P EST MOD 30 MIN: CPT | Performed by: INTERNAL MEDICINE

## 2021-05-11 PROCEDURE — 93000 ELECTROCARDIOGRAM COMPLETE: CPT | Performed by: INTERNAL MEDICINE

## 2021-05-11 RX ORDER — ISOSORBIDE MONONITRATE 30 MG/1
30 TABLET, EXTENDED RELEASE ORAL DAILY
Qty: 30 TABLET | Refills: 5 | Status: SHIPPED | OUTPATIENT
Start: 2021-05-11 | End: 2021-07-19 | Stop reason: HOSPADM

## 2021-05-11 NOTE — PROGRESS NOTES
Subjective:     Encounter Date:05/11/2021      Patient ID: Daisy Dent is a 60 y.o. female.    Chief Complaint:  History of Present Illness    This is a 60-year-old female with a history of ischemic cardiomyopathy, last ejection fraction of 40-45%, diabetes mellitus type 2, obstructive sleep apnea on CPAP, hypertension, status post dual-chamber AICD placement, dyslipidemia, coronary artery disease with a history of prior anterior myocardial infarction and LAD stent placement, status post left MCA stroke, who presents for follow-up.     She presents today for routine 6-month follow-up.  Since her last office visit in 11/2020 she has continue to work on her diabetes management.  It appears that her recent hemoglobin A1c is improved to 7.4 from 11.  Her LDL however is crept up to around 95.  She reports in the last month she has noted increasing frequency of chest tightness.  Symptoms are similar to what she experienced before her prior stents.  She reports that initially occurred mainly at nighttime and would sometimes wake her up from sleep.  Now she is having episodes about 2 or 3 times a week occurring both at night and during the daytime.  It does not appear to be necessarily associated with activity although it can occur with exertion.  She does admit that she is usually feeling stressed or anxious when the symptoms occur during the daytime.  The symptoms can last anywhere from 30 minutes to an hour.  Not associated with any shortness of breath or nausea.  She otherwise denies any palpitations, orthopnea, near-syncope or syncope, or lower extremity edema.  She did use a sublingual nitroglycerin on 1 occasion when the episode was particularly bad with improvement.    Prior History:  I began following the patient in 11/2015 when she presented to establish care.  Prior to that she was followed by Dr. Ayala with [facility].  Her prior cardiac history includes a non-ST elevation MI and 7/2006 at  which time she received a Cypher 3.0 x 23 mm stent to her mid LAD.  She then presented in 10/2009 with a late in-stent thrombosis that required repeat stenting of her mid LAD in addition to a second drug-eluting stent placement of her left circumflex artery.  Following that she had a repeat cardiac catheterization in 2010 that was reportedly unremarkable.  Following her myocardial infarction in 2009 she developed ischemic myopathy with an EF of around 25% and ultimately underwent AICD placement and 7/2010.  At some point in the course of her follow-ups her left ventricular function improved with an ejection fraction of 40-45%.  A stress test showed evidence of a large anterior infarct and minimal elva-infarct ischemia.     In her follow-ups she has done fairly well recently when she reported more fatigued than normal and episodes of chest discomfort that occurred while she was exercising on the treadmill.  She underwent  an echocardiogram in 3/2017 that showed an ejection fraction of 40-45%.  Due to continued symptoms we went ahead and proceeded with a PET stress test which was performed in 8/2017 that showed medium sized anterior wall infarct with mild elva-infarct ischemia and a post stress ejection fraction of 58%.      In 11/2017 her AICD was noted to be at [facility].  She subsequently underwent generator replacement on 1/12/2018 with Dr. Ornelas. When I saw her back in follow up in 9/2018 she reported that she denied any new or worsening symptoms.       She was admitted and 12/2018 after she presented with aphasia.  On arrival to the hospital she underwent a CT of the head that showed no acute stroke but a perfusion scan showed a large perfusion mismatch.  A CTA showed evidence of a left MCA thrombus.  She was given TPA at this time with improvement in her symptoms.  Neurology felt that her stroke was likely embolic.  She underwent a repeat echocardiogram during her admission that showed moderately depressed left  ventricular systolic function with an EF of 30%, grade 1 diastolic dysfunction, and no significant valvular disease.  Based on the likelihood that this was an embolic stroke I opted to go ahead and recommend anticoagulation.  She was subsequently started on rivaroxaban 20 mg a day.  Her aspirin was stopped and she was continued on clopidogrel.     In 11/2019 she reported a couple weeks of episodes of substernal chest tightness lasting 1 to 2 minutes and dyspnea on exertion.  She reported that the symptoms were similar to what she had at the time of her prior myocardial infarctions but reports that its much milder than what she experienced at that time.    Following that office visit she underwent a stress test that showed evidence of a medium anterior infarct with moderate elva-infarct ischemia.  The findings were similar to prior stress test however the amount of elva-infarct ischemia had impaired to increase.  We initially tried to treat her medically however she continued to have recurrent symptoms so we opted to proceed with a cardiac catheterization.  She presented to the hospital on 1/6/2020 for the procedure.  She was found to have severe in-stent restenosis of her proximal LAD stent and ultimately underwent repeat drug-eluting stent placement of the in-stent restenosis.  She was also noted to have moderate nonobstructive disease of the first obtuse marginal branch for which she underwent FFR evaluation which was normal at 0.96.     In follow-up she had some mild episodes of chest discomfort that we decided to monitor.  She then returned for urgent follow-up on 10/22/2020 at which time she reported more severe episodes of chest discomfort that were occurring primarily at rest.  Due to the severity of her symptoms she was quite concerned and we decided to proceed with a cardiac catheterization.     This was performed on 10/26/2020 and showed stable moderate nonobstructive coronary artery disease.    Review of  Systems   Constitutional: Positive for malaise/fatigue.   HENT: Negative for hearing loss, hoarse voice, nosebleeds and sore throat.    Eyes: Negative for pain.   Cardiovascular: Positive for chest pain. Negative for claudication, cyanosis, dyspnea on exertion, irregular heartbeat, leg swelling, near-syncope, orthopnea, palpitations, paroxysmal nocturnal dyspnea and syncope.   Respiratory: Negative for shortness of breath and snoring.    Endocrine: Negative for cold intolerance, heat intolerance, polydipsia, polyphagia and polyuria.   Skin: Negative for itching and rash.   Musculoskeletal: Negative for arthritis, falls, joint pain, joint swelling, muscle cramps, muscle weakness and myalgias.   Gastrointestinal: Negative for constipation, diarrhea, dysphagia, heartburn, hematemesis, hematochezia, melena, nausea and vomiting.   Genitourinary: Negative for frequency, hematuria and hesitancy.   Neurological: Negative for excessive daytime sleepiness, dizziness, headaches, light-headedness, numbness and weakness.   Psychiatric/Behavioral: Negative for depression. The patient is not nervous/anxious.          Current Outpatient Medications:   •  Accu-Chek FastClix Lancets misc, Use to test blood sugar 4 times daily  E11.8, Disp: 400 each, Rfl: 1  •  Accu-Chek Guide test strip, Use 4 times a day; e11.8, Disp: 400 each, Rfl: 1  •  albuterol sulfate  (90 Base) MCG/ACT inhaler, TAKE 2 PUFFS BY MOUTH EVERY 4 HOURS AS NEEDED, Disp: , Rfl:   •  Blood Glucose Monitoring Suppl (Accu-Chek Guide) w/Device kit, Inject 1 Device under the skin into the appropriate area as directed Daily., Disp: 1 kit, Rfl: 0  •  carvedilol (COREG) 25 MG tablet, Take 1 tablet by mouth 2 (Two) Times a Day With Meals., Disp: 180 tablet, Rfl: 2  •  Cholecalciferol (VITAMIN D3) 2000 UNITS capsule, Take 1,000 Units by mouth Daily., Disp: , Rfl:   •  clopidogrel (PLAVIX) 75 MG tablet, TAKE 1 TABLET BY MOUTH EVERY DAY, Disp: 90 tablet, Rfl: 3  •   Dulaglutide (Trulicity) 3 MG/0.5ML solution pen-injector, Inject 3 mg under the skin into the appropriate area as directed 1 (One) Time Per Week., Disp: 4 pen, Rfl: 5  •  furosemide (LASIX) 40 MG tablet, Take 1 tablet by mouth Daily. Resume on 1/7/20, Disp: 90 tablet, Rfl: 1  •  insulin aspart (NovoLOG FlexPen) 100 UNIT/ML solution pen-injector sc pen, Inject 15 Units under the skin into the appropriate area as directed 3 (Three) Times a Day With Meals., Disp: 10 pen, Rfl: 3  •  Insulin Pen Needle 32G X 4 MM misc, USE 4 TIMES A DAY, Disp: 400 each, Rfl: 3  •  metFORMIN (GLUCOPHAGE) 1000 MG tablet, TAKE 1 TABLET BY MOUTH EVERY DAY IN THE EVENING, Disp: 90 tablet, Rfl: 3  •  Multiple Vitamins-Minerals (MULTIVITAL PO), Take 1 tablet by mouth Daily., Disp: , Rfl:   •  nitroglycerin (Nitrostat) 0.4 MG SL tablet, Place 1 tablet under the tongue Every 5 (Five) Minutes As Needed for Chest Pain. Take no more than 3 doses in 15 minutes., Disp: 30 tablet, Rfl: 11  •  Omega-3 Fatty Acids (FISH OIL) 1200 MG capsule capsule, Take 1,200 mg by mouth Daily With Breakfast., Disp: , Rfl:   •  potassium chloride 10 MEQ CR tablet, Take 1 tablet by mouth Daily. Resume on 1/7/20, Disp: 90 tablet, Rfl: 1  •  spironolactone (ALDACTONE) 25 MG tablet, TAKE 1 TABLET BY MOUTH EVERY DAY, Disp: 90 tablet, Rfl: 1  •  vitamin B-12 (CYANOCOBALAMIN) 500 MCG tablet, Take 500 mcg by mouth Daily., Disp: , Rfl:   •  Xarelto 20 MG tablet, TAKE 1 TABLET BY MOUTH EVERY DAY WITH DINNER, Disp: 90 tablet, Rfl: 1  •  atorvastatin (LIPITOR) 80 MG tablet, Take 1 tablet by mouth Daily., Disp: 90 tablet, Rfl: 1  •  Toujeo SoloStar 300 UNIT/ML solution pen-injector injection, INJECT 45 UNITS UNDER THE SKIN TO THE APPROPRIATE AREA DAILY USE AS DIRECTED DAILY, Disp: 9 pen, Rfl: 2    Past Medical History:   Diagnosis Date   • Abdominal pain    • Abnormal liver function test    • Acute bronchitis    • Benign essential hypertension    • CAD (coronary artery disease)    •  CHF (congestive heart failure) (CMS/[facility])    • Colon polyp    • Congestive heart disease (CMS/[facility])    • COPD (chronic obstructive pulmonary disease) (CMS/[facility])    • Cough    • Diabetes (CMS/[facility])    • Diabetes mellitus (CMS/[facility])    • Diarrhea    • Gastroenteritis    • Health care maintenance    • Hyperlipidemia    • Hypertension    • IFG (impaired fasting glucose)    • Ischemic cardiomyopathy    • Myocardial infarction (CMS/[facility])    • SHELLIE (obstructive sleep apnea)    • Sore throat    • Type 2 diabetes mellitus (CMS/[facility])    • Vaginal yeast infection    • Vitamin D deficiency        Past Surgical History:   Procedure Laterality Date   • CARDIAC CATHETERIZATION     • CARDIAC CATHETERIZATION N/A 1/6/2020    Procedure: Coronary angiography;  Surgeon: Oneida Saldivar MD;  Location:  [facility];  Service: Cardiovascular   • CARDIAC CATHETERIZATION N/A 1/6/2020    Procedure: Left heart cath;  Surgeon: Oneida Saldivar MD;  Location:  [facility];  Service: Cardiovascular   • CARDIAC CATHETERIZATION N/A 1/6/2020    Procedure: Left ventriculography;  Surgeon: Oneida Saldivar MD;  Location:  [facility];  Service: Cardiovascular   • CARDIAC CATHETERIZATION N/A 1/6/2020    Procedure: Percutaneous Coronary Intervention;  Surgeon: Oneida Saldivar MD;  Location:  [facility];  Service: Cardiovascular   • CARDIAC CATHETERIZATION N/A 1/6/2020    Procedure: Stent HUGO coronary;  Surgeon: Oneida Saldivar MD;  Location:  [facility];  Service: Cardiovascular   • CARDIAC CATHETERIZATION  1/6/2020    Procedure: Functional Flow [place];  Surgeon: Oneida Saldivar MD;  Location:  [facility];  Service: Cardiovascular   • CARDIAC CATHETERIZATION N/A 10/26/2020    Procedure: Coronary angiography;  Surgeon: Oneida Saldivar MD;  Location:  [facility];  Service: Cardiovascular;  Laterality: N/A;   • CARDIAC CATHETERIZATION  N/A 10/26/2020    Procedure: Left heart cath;  Surgeon: Oneida Saldivar MD;  Location:  [facility];  Service: Cardiovascular;  Laterality: N/A;   • CARDIAC CATHETERIZATION N/A 10/26/2020    Procedure: Left ventriculography;  Surgeon: Oneida Saldivar MD;  Location:  [facility];  Service: Cardiovascular;  Laterality: N/A;   • CARDIAC CATHETERIZATION  10/26/2020    Procedure: Functional Flow [place];  Surgeon: Oneida Saldivar MD;  Location:  [facility];  Service: Cardiovascular;;   • CARDIAC ELECTROPHYSIOLOGY PROCEDURE N/A 2018    Procedure: ICD DC generator change  medtronic;  Surgeon: Hany Ornelas MD;  Location:  [facility];  Service:    • PACEMAKER IMPLANTATION     • TUBAL ABDOMINAL LIGATION         Family History   Problem Relation Age of Onset   • Diabetes Mother    • Heart disease Mother    • Hyperlipidemia Mother    • Diabetes Father    • Heart disease Father    • Hyperlipidemia Father    • Heart disease Brother    • Hyperlipidemia Brother    • Hypertension Father    • Colon cancer Father    • Heart attack Father    • Emphysema Mother    • Heart disease Brother    • Heart attack Brother    • Heart disease Maternal Grandmother    • Heart disease Maternal Grandfather    • Heart disease Paternal Grandmother    • Heart disease Paternal Grandfather        Social History     Tobacco Use   • Smoking status: Former Smoker     Packs/day: 1.00     Years: 30.00     Pack years: 30.00     Types: Cigarettes     Start date: 1979     Quit date: 2009     Years since quittin.5   • Smokeless tobacco: Never Used   • Tobacco comment: QUIT 10 YRS   Substance Use Topics   • Alcohol use: Yes     Comment: rarely uses ETOH   • Drug use: No         ECG 12 Lead    Date/Time: 2021 12:36 PM  Performed by: Oneida Saldivar MD  Authorized by: Oneida Saldivar MD   Comparison: compared with previous ECG   Similar to previous ECG  Rhythm:  "sinus rhythm  Q waves: V1, V2, V3 and V4                   Objective:     Visit Vitals  /78 (BP Location: Right arm, Patient Position: Sitting, Cuff Size: Large Adult)   Pulse 67   Ht 152.4 cm (60\")   Wt 93.4 kg (206 lb)   BMI 40.23 kg/m²         Constitutional:       Appearance: Normal appearance. Well-developed.   Eyes:      General: Lids are normal.      Conjunctiva/sclera: Conjunctivae normal.      Pupils: Pupils are equal, round, and reactive to light.   HENT:      Head: Normocephalic and atraumatic.   Neck:      Vascular: No carotid bruit or JVD.      Lymphadenopathy: No cervical adenopathy.   Pulmonary:      Effort: Pulmonary effort is normal.      Breath sounds: Normal breath sounds.   Cardiovascular:      Normal rate. Regular rhythm.      No gallop.   Pulses:     Radial: 2+ bilaterally.  Edema:     Peripheral edema absent.   Abdominal:      Palpations: Abdomen is soft.   Musculoskeletal:      Cervical back: Full passive range of motion without pain, normal range of motion and neck supple. Skin:     General: Skin is warm and dry.   Neurological:      Mental Status: Alert and oriented to person, place, and time.             Assessment:          Diagnosis Plan   1. Chronic coronary artery disease     2. Ischemic cardiomyopathy     3. ICD (implantable cardioverter-defibrillator) in place     4. History of anterior myocardial infarction (CMS/[facility])     5. S/P coronary artery stent placement     6. Mixed hyperlipidemia     7. Type 2 diabetes mellitus with complication (CMS/[facility])     8. Morbid obesity with BMI of 40.0-44.9, adult (CMS/[facility])     9. History of CVA (cerebrovascular accident) without residual deficits     10. Obstructive sleep apnea syndrome            Plan:       1.  Chest tightness.  Symptoms are concerning for angina especially because her similar to what she is experienced in the past before prior stents.  She does underwent a cardiac catheterization in 10/2020 which showed moderate " nonobstructive disease and borderline RFR and FFR of proximal LAD stenosis.  Discussed options with the patient.  I do not think a stress test would be helpful because of her prior anterior infarct makes determining ischemia in that region difficult.  I think her options at this point are to start with medical management and proceed with a cardiac catheterization if her symptoms do not improve versus proceeding directly with a cardiac catheterization.  After discussion with the patient we opted to start with medical management.  I will start her on isosorbide mononitrate 30 mg daily.  2.  Coronary artery disease.  Plan as per #1.  Otherwise continue current medical management including clopidogrel.  3.  Ischemic cardiomyopathy.  EF of 26 to 30%.  No evidence of volume overload.  Unable to tolerate ACE inhibitor or ARB in the past because of low blood pressures.  On carvedilol.  4.  History of left MCA stroke.  On chronic anticoagulation with rivaroxaban.  5.  History of AICD.  Continue routine device checks through the office.  6.  Hypertension.  Well-controlled on her current regimen of medications.  7.  Diabetes mellitus type 2.  Improved control with a reduction of her hemoglobin A1c to 7.4 recently.  8.  Obstructive sleep apnea.  Compliant with CPAP.    I will see her back in 1 month to follow-up on her symptoms with the addition of isosorbide mononitrate.

## 2021-05-13 DIAGNOSIS — I25.10 CHRONIC CORONARY ARTERY DISEASE: ICD-10-CM

## 2021-05-13 DIAGNOSIS — E78.5 HYPERLIPIDEMIA, UNSPECIFIED HYPERLIPIDEMIA TYPE: ICD-10-CM

## 2021-05-13 RX ORDER — DULAGLUTIDE 3 MG/.5ML
3 INJECTION, SOLUTION SUBCUTANEOUS WEEKLY
Qty: 4 PEN | Refills: 5 | Status: SHIPPED | OUTPATIENT
Start: 2021-05-13 | End: 2021-11-12 | Stop reason: SDUPTHER

## 2021-05-13 NOTE — TELEPHONE ENCOUNTER
Pt last seen 5/11/21  F/u 7/8/21    Please refill Atorvastatin 80mg qd, qty of 90, with appropriate refills.  [facility] - [address]    Thank you,    Ritika Aguilera, CMA

## 2021-05-14 RX ORDER — ATORVASTATIN CALCIUM 80 MG/1
80 TABLET, FILM COATED ORAL DAILY
Qty: 90 TABLET | Refills: 1 | Status: SHIPPED | OUTPATIENT
Start: 2021-05-14 | End: 2021-11-03

## 2021-07-06 RX ORDER — FUROSEMIDE 40 MG/1
40 TABLET ORAL DAILY
Qty: 90 TABLET | Refills: 1 | Status: SHIPPED | OUTPATIENT
Start: 2021-07-06 | End: 2022-01-05

## 2021-07-08 ENCOUNTER — OFFICE VISIT (OUTPATIENT)
Dept: CARDIOLOGY | Facility: CLINIC | Age: 61
End: 2021-07-08

## 2021-07-08 VITALS
SYSTOLIC BLOOD PRESSURE: 132 MMHG | BODY MASS INDEX: 39.85 KG/M2 | HEIGHT: 60 IN | DIASTOLIC BLOOD PRESSURE: 82 MMHG | WEIGHT: 203 LBS | OXYGEN SATURATION: 99 % | HEART RATE: 76 BPM

## 2021-07-08 DIAGNOSIS — G47.33 OBSTRUCTIVE SLEEP APNEA SYNDROME: ICD-10-CM

## 2021-07-08 DIAGNOSIS — I20.8 ANGINA AT REST (HCC): ICD-10-CM

## 2021-07-08 DIAGNOSIS — Z95.810 AUTOMATIC IMPLANTABLE CARDIOVERTER-DEFIBRILLATOR IN SITU: ICD-10-CM

## 2021-07-08 DIAGNOSIS — I25.10 CHRONIC CORONARY ARTERY DISEASE: Primary | ICD-10-CM

## 2021-07-08 DIAGNOSIS — I50.22 CHRONIC SYSTOLIC CONGESTIVE HEART FAILURE (HCC): ICD-10-CM

## 2021-07-08 DIAGNOSIS — I25.5 ISCHEMIC CARDIOMYOPATHY: ICD-10-CM

## 2021-07-08 DIAGNOSIS — E11.8 TYPE 2 DIABETES MELLITUS WITH COMPLICATION (HCC): ICD-10-CM

## 2021-07-08 DIAGNOSIS — Z95.810 ICD (IMPLANTABLE CARDIOVERTER-DEFIBRILLATOR) IN PLACE: ICD-10-CM

## 2021-07-08 DIAGNOSIS — Z86.73 HISTORY OF CVA (CEREBROVASCULAR ACCIDENT) WITHOUT RESIDUAL DEFICITS: ICD-10-CM

## 2021-07-08 DIAGNOSIS — E78.2 MIXED HYPERLIPIDEMIA: ICD-10-CM

## 2021-07-08 DIAGNOSIS — I21.09 ANTERIOR MYOCARDIAL INFARCTION (HCC): ICD-10-CM

## 2021-07-08 DIAGNOSIS — Z95.5 S/P CORONARY ARTERY STENT PLACEMENT: ICD-10-CM

## 2021-07-08 DIAGNOSIS — I21.4 NON-ST ELEVATED MYOCARDIAL INFARCTION (NON-STEMI) (HCC): ICD-10-CM

## 2021-07-08 DIAGNOSIS — I21.09 ACUTE MYOCARDIAL INFARCTION OF ANTERIOR WALL (HCC): ICD-10-CM

## 2021-07-08 PROCEDURE — 93000 ELECTROCARDIOGRAM COMPLETE: CPT | Performed by: INTERNAL MEDICINE

## 2021-07-08 PROCEDURE — 99214 OFFICE O/P EST MOD 30 MIN: CPT | Performed by: INTERNAL MEDICINE

## 2021-07-08 NOTE — PROGRESS NOTES
Subjective:     Encounter Date:07/08/2021      Patient ID: Daisy Dent is a 60 y.o. female.    Chief Complaint:  History of Present Illness    This is a 60-year-old female with a history of ischemic cardiomyopathy, last ejection fraction of 40-45%, diabetes mellitus type 2, obstructive sleep apnea on CPAP, hypertension, status post dual-chamber AICD placement, dyslipidemia, coronary artery disease with a history of prior anterior myocardial infarction and LAD stent placement, status post left MCA stroke, who presents for follow-up.     I have a follow-up appointment in 5/2021 she reported she was having increasing frequency of chest tightness that primarily occurred at nighttime and woke her up from sleep.  This was similar to what she experienced prior to her previous stents.  She was not having any exertional symptoms.  Symptoms can last anywhere from 30 minutes to an hour at a time.  She had only had to use nitroglycerin once.  We discussed options at that time we opted to monitor her symptoms before considering further work-up.  I did start her on isosorbide mononitrate 30 mg.      She presents today for follow-up.  She was unable to tolerate the isosorbide due to the onset of severe headaches.  She continues to have episodes of chest discomfort rarely occurring at nighttime and waking up from sleep.  She does feel like her symptoms have progressed over the last couple months.  Couple nights ago she had a severe episode that lasted a couple hours and resolved after she took a nitroglycerin.  After that she has just been feeling more fatigued and tired.  She otherwise denies any dizziness or lightheadedness, near-syncope or syncope, lower extremity edema, or palpitations.     Prior History:  I began following the patient in 11/2015 when she presented to establish care.  Prior to that she was followed by Dr. Ayala with [facility].  Her prior cardiac history includes a non-ST elevation MI and  7/2006 at which time she received a Cypher 3.0 x 23 mm stent to her mid LAD.  She then presented in 10/2009 with a late in-stent thrombosis that required repeat stenting of her mid LAD in addition to a second drug-eluting stent placement of her left circumflex artery.  Following that she had a repeat cardiac catheterization in 2010 that was reportedly unremarkable.  Following her myocardial infarction in 2009 she developed ischemic myopathy with an EF of around 25% and ultimately underwent AICD placement and 7/2010.  At some point in the course of her follow-ups her left ventricular function improved with an ejection fraction of 40-45%.  A stress test showed evidence of a large anterior infarct and minimal elva-infarct ischemia.     In her follow-ups she has done fairly well recently when she reported more fatigued than normal and episodes of chest discomfort that occurred while she was exercising on the treadmill.  She underwent  an echocardiogram in 3/2017 that showed an ejection fraction of 40-45%.  Due to continued symptoms we went ahead and proceeded with a PET stress test which was performed in 8/2017 that showed medium sized anterior wall infarct with mild elva-infarct ischemia and a post stress ejection fraction of 58%.      In 11/2017 her AICD was noted to be at [facility].  She subsequently underwent generator replacement on 1/12/2018 with Dr. Ornelas. When I saw her back in follow up in 9/2018 she reported that she denied any new or worsening symptoms.       She was admitted and 12/2018 after she presented with aphasia.  On arrival to the hospital she underwent a CT of the head that showed no acute stroke but a perfusion scan showed a large perfusion mismatch.  A CTA showed evidence of a left MCA thrombus.  She was given TPA at this time with improvement in her symptoms.  Neurology felt that her stroke was likely embolic.  She underwent a repeat echocardiogram during her admission that showed moderately  depressed left ventricular systolic function with an EF of 30%, grade 1 diastolic dysfunction, and no significant valvular disease.  Based on the likelihood that this was an embolic stroke I opted to go ahead and recommend anticoagulation.  She was subsequently started on rivaroxaban 20 mg a day.  Her aspirin was stopped and she was continued on clopidogrel.     In 11/2019 she reported a couple weeks of episodes of substernal chest tightness lasting 1 to 2 minutes and dyspnea on exertion.  She reported that the symptoms were similar to what she had at the time of her prior myocardial infarctions but reports that its much milder than what she experienced at that time.    Following that office visit she underwent a stress test that showed evidence of a medium anterior infarct with moderate elva-infarct ischemia.  The findings were similar to prior stress test however the amount of elva-infarct ischemia had impaired to increase.  We initially tried to treat her medically however she continued to have recurrent symptoms so we opted to proceed with a cardiac catheterization.  She presented to the hospital on 1/6/2020 for the procedure.  She was found to have severe in-stent restenosis of her proximal LAD stent and ultimately underwent repeat drug-eluting stent placement of the in-stent restenosis.  She was also noted to have moderate nonobstructive disease of the first obtuse marginal branch for which she underwent FFR evaluation which was normal at 0.96.     In follow-up she had some mild episodes of chest discomfort that we decided to monitor.  She then returned for urgent follow-up on 10/22/2020 at which time she reported more severe episodes of chest discomfort that were occurring primarily at rest.  Due to the severity of her symptoms she was quite concerned and we decided to proceed with a cardiac catheterization.  This was performed on 10/26/2020 and showed stable moderate nonobstructive coronary artery  disease.    Review of Systems   Constitutional: Positive for malaise/fatigue.   HENT: Negative for hearing loss, hoarse voice, nosebleeds and sore throat.    Eyes: Negative for pain.   Cardiovascular: Positive for chest pain and dyspnea on exertion. Negative for claudication, cyanosis, irregular heartbeat, leg swelling, near-syncope, orthopnea, palpitations, paroxysmal nocturnal dyspnea and syncope.   Respiratory: Negative for shortness of breath and snoring.    Endocrine: Negative for cold intolerance, heat intolerance, polydipsia, polyphagia and polyuria.   Skin: Negative for itching and rash.   Musculoskeletal: Negative for arthritis, falls, joint pain, joint swelling, muscle cramps, muscle weakness and myalgias.   Gastrointestinal: Negative for constipation, diarrhea, dysphagia, heartburn, hematemesis, hematochezia, melena, nausea and vomiting.   Genitourinary: Negative for frequency, hematuria and hesitancy.   Neurological: Negative for excessive daytime sleepiness, dizziness, headaches, light-headedness, numbness and weakness.   Psychiatric/Behavioral: Negative for depression. The patient is not nervous/anxious.          Current Outpatient Medications:   •  Accu-Chek FastClix Lancets misc, Use to test blood sugar 4 times daily  E11.8, Disp: 400 each, Rfl: 1  •  Accu-Chek Guide test strip, Use 4 times a day; e11.8, Disp: 400 each, Rfl: 1  •  albuterol sulfate  (90 Base) MCG/ACT inhaler, TAKE 2 PUFFS BY MOUTH EVERY 4 HOURS AS NEEDED, Disp: , Rfl:   •  atorvastatin (LIPITOR) 80 MG tablet, Take 1 tablet by mouth Daily., Disp: 90 tablet, Rfl: 1  •  Blood Glucose Monitoring Suppl (Accu-Chek Guide) w/Device kit, Inject 1 Device under the skin into the appropriate area as directed Daily., Disp: 1 kit, Rfl: 0  •  carvedilol (COREG) 25 MG tablet, Take 1 tablet by mouth 2 (Two) Times a Day With Meals., Disp: 180 tablet, Rfl: 2  •  Cholecalciferol (VITAMIN D3) 2000 UNITS capsule, Take 1,000 Units by mouth Daily.,  Disp: , Rfl:   •  clopidogrel (PLAVIX) 75 MG tablet, TAKE 1 TABLET BY MOUTH EVERY DAY, Disp: 90 tablet, Rfl: 3  •  Dulaglutide (Trulicity) 3 MG/0.5ML solution pen-injector, Inject 3 mg under the skin into the appropriate area as directed 1 (One) Time Per Week., Disp: 4 pen, Rfl: 5  •  furosemide (LASIX) 40 MG tablet, TAKE 1 TABLET BY MOUTH DAILY. RESUME ON 1/7/20, Disp: 90 tablet, Rfl: 1  •  insulin aspart (NovoLOG FlexPen) 100 UNIT/ML solution pen-injector sc pen, Inject 15 Units under the skin into the appropriate area as directed 3 (Three) Times a Day With Meals., Disp: 10 pen, Rfl: 3  •  Insulin Pen Needle 32G X 4 MM misc, USE 4 TIMES A DAY, Disp: 400 each, Rfl: 3  •  isosorbide mononitrate (IMDUR) 30 MG 24 hr tablet, Take 1 tablet by mouth Daily., Disp: 30 tablet, Rfl: 5  •  metFORMIN (GLUCOPHAGE) 1000 MG tablet, TAKE 1 TABLET BY MOUTH EVERY DAY IN THE EVENING, Disp: 90 tablet, Rfl: 3  •  Multiple Vitamins-Minerals (MULTIVITAL PO), Take 1 tablet by mouth Daily., Disp: , Rfl:   •  nitroglycerin (Nitrostat) 0.4 MG SL tablet, Place 1 tablet under the tongue Every 5 (Five) Minutes As Needed for Chest Pain. Take no more than 3 doses in 15 minutes., Disp: 30 tablet, Rfl: 11  •  Omega-3 Fatty Acids (FISH OIL) 1200 MG capsule capsule, Take 1,200 mg by mouth Daily With Breakfast., Disp: , Rfl:   •  potassium chloride 10 MEQ CR tablet, Take 1 tablet by mouth Daily. Resume on 1/7/20, Disp: 90 tablet, Rfl: 1  •  rivaroxaban (Xarelto) 20 MG tablet, Take 1 tablet by mouth Daily With Dinner., Disp: 90 tablet, Rfl: 1  •  spironolactone (ALDACTONE) 25 MG tablet, TAKE 1 TABLET BY MOUTH EVERY DAY, Disp: 90 tablet, Rfl: 1  •  Toujeo SoloStar 300 UNIT/ML solution pen-injector injection, INJECT 45 UNITS UNDER THE SKIN TO THE APPROPRIATE AREA DAILY USE AS DIRECTED DAILY, Disp: 9 pen, Rfl: 2  •  vitamin B-12 (CYANOCOBALAMIN) 500 MCG tablet, Take 500 mcg by mouth Daily., Disp: , Rfl:     Past Medical History:   Diagnosis Date   •  Abdominal pain    • Abnormal liver function test    • Acute bronchitis    • Benign essential hypertension    • CAD (coronary artery disease)    • CHF (congestive heart failure) (CMS/[facility])    • Colon polyp    • Congestive heart disease (CMS/HCC)    • COPD (chronic obstructive pulmonary disease) (CMS/[facility])    • Cough    • Diabetes (CMS/[facility])    • Diabetes mellitus (CMS/[facility])    • Diarrhea    • Gastroenteritis    • Health care maintenance    • Hyperlipidemia    • Hypertension    • IFG (impaired fasting glucose)    • Ischemic cardiomyopathy    • Myocardial infarction (CMS/[facility])    • SHELLIE (obstructive sleep apnea)    • Sore throat    • Type 2 diabetes mellitus (CMS/[facility])    • Vaginal yeast infection    • Vitamin D deficiency        Past Surgical History:   Procedure Laterality Date   • CARDIAC CATHETERIZATION     • CARDIAC CATHETERIZATION N/A 1/6/2020    Procedure: Coronary angiography;  Surgeon: Oneida Saldivar MD;  Location:  [facility];  Service: Cardiovascular   • CARDIAC CATHETERIZATION N/A 1/6/2020    Procedure: Left heart cath;  Surgeon: Oneida Saldivar MD;  Location:  [facility];  Service: Cardiovascular   • CARDIAC CATHETERIZATION N/A 1/6/2020    Procedure: Left ventriculography;  Surgeon: Oneida Saldivar MD;  Location:  [facility];  Service: Cardiovascular   • CARDIAC CATHETERIZATION N/A 1/6/2020    Procedure: Percutaneous Coronary Intervention;  Surgeon: Oneida Saldivar MD;  Location:  [facility];  Service: Cardiovascular   • CARDIAC CATHETERIZATION N/A 1/6/2020    Procedure: Stent HUGO coronary;  Surgeon: Oneida Saldivar MD;  Location:  [facility];  Service: Cardiovascular   • CARDIAC CATHETERIZATION  1/6/2020    Procedure: Functional Flow [place];  Surgeon: Oneida Saldivar MD;  [facility];  Service: Cardiovascular   • CARDIAC CATHETERIZATION N/A 10/26/2020    Procedure: Coronary angiography;   Surgeon: Oneida Saldivar MD;  Location:  [facility];  Service: Cardiovascular;  Laterality: N/A;   • CARDIAC CATHETERIZATION N/A 10/26/2020    Procedure: Left heart cath;  Surgeon: Oneida Saldivar MD;  Location:  [facility];  Service: Cardiovascular;  Laterality: N/A;   • CARDIAC CATHETERIZATION N/A 10/26/2020    Procedure: Left ventriculography;  Surgeon: Oneida Saldivar MD;  Location:  [facility];  Service: Cardiovascular;  Laterality: N/A;   • CARDIAC CATHETERIZATION  10/26/2020    Procedure: Functional Flow [place];  Surgeon: Oneida Saldivar MD;  Location:  [facility];  Service: Cardiovascular;;   • CARDIAC ELECTROPHYSIOLOGY PROCEDURE N/A 2018    Procedure: ICD DC generator change  medtronic;  Surgeon: Hany Ornelas MD;  Location:  [facility];  Service:    • PACEMAKER IMPLANTATION     • TUBAL ABDOMINAL LIGATION         Family History   Problem Relation Age of Onset   • Diabetes Mother    • Heart disease Mother    • Hyperlipidemia Mother    • Diabetes Father    • Heart disease Father    • Hyperlipidemia Father    • Heart disease Brother    • Hyperlipidemia Brother    • Hypertension Father    • Colon cancer Father    • Heart attack Father    • Emphysema Mother    • Heart disease Brother    • Heart attack Brother    • Heart disease Maternal Grandmother    • Heart disease Maternal Grandfather    • Heart disease Paternal Grandmother    • Heart disease Paternal Grandfather        Social History     Tobacco Use   • Smoking status: Former Smoker     Packs/day: 1.00     Years: 30.00     Pack years: 30.00     Types: Cigarettes     Start date: 1979     Quit date: 2009     Years since quittin.6   • Smokeless tobacco: Never Used   • Tobacco comment: QUIT 10 YRS   Substance Use Topics   • Alcohol use: Yes     Comment: rarely uses ETOH/caffeine use   • Drug use: No         ECG 12 Lead    Date/Time: 2021  "4:20 PM  Performed by: Oneida Saldivar MD  Authorized by: Oneida Saldivar MD   Comparison: compared with previous ECG   Similar to previous ECG  Rhythm: sinus rhythm  Q waves: V1 and V2                   Objective:     Visit Vitals  /82 (BP Location: Right arm, Patient Position: Sitting, Cuff Size: Large Adult)   Pulse 76   Ht 152.4 cm (60\")   Wt 92.1 kg (203 lb)   SpO2 99%   BMI 39.65 kg/m²         Constitutional:       Appearance: Normal appearance. Well-developed.   Eyes:      General: Lids are normal.      Conjunctiva/sclera: Conjunctivae normal.      Pupils: Pupils are equal, round, and reactive to light.   HENT:      Head: Normocephalic and atraumatic.   Neck:      Vascular: No carotid bruit or JVD.      Lymphadenopathy: No cervical adenopathy.   Pulmonary:      Effort: Pulmonary effort is normal.      Breath sounds: Normal breath sounds.   Cardiovascular:      Normal rate. Regular rhythm.      No gallop.   Pulses:     Radial: 2+ bilaterally.  Edema:     Peripheral edema absent.   Abdominal:      Palpations: Abdomen is soft.   Musculoskeletal:      Cervical back: Full passive range of motion without pain, normal range of motion and neck supple. Skin:     General: Skin is warm and dry.   Neurological:      Mental Status: Alert and oriented to person, place, and time.           Assessment:          Diagnosis Plan   1. Chronic coronary artery disease     2. S/P coronary artery stent placement     3. History of anterior myocardial infarction (CMS/HCC)     4. Non-ST elevated myocardial infarction (non-STEMI) (CMS/HCC)     5. ICD (implantable cardioverter-defibrillator) in place     6. Chronic systolic congestive heart failure (CMS/HCC)     7. Ischemic cardiomyopathy     8. Mixed hyperlipidemia     9. Automatic implantable cardioverter-defibrillator in situ     10. History of anterior myocardial infarction      11. Type 2 diabetes mellitus with complication (CMS/HCC)     12. History of CVA (cerebrovascular " accident) without residual deficits     13. Obstructive sleep apnea syndrome            Plan:       1.  Unstable angina.  Unable to tolerate isosorbide mononitrate.  On high-dose carvedilol.  Recommend proceeding with cardiac catheterization.  2.  Coronary artery disease.  Plan as per #1.  Otherwise continue current medical management.  3.  Ischemic cardiomyopathy.  EF of 26 to 30%.  No evidence of volume overload.  No ACE inhibitor or ARB in the past due to low blood pressures.  Remains on carvedilol and spironolactone.  4.  History of left MCA stroke.  On chronic anticoagulation with rivaroxaban.  5.  History of AICD placement.  Continue routine device checks through the office.  6.  Hypertension.  Well-controlled on her current medications.  7.  Diabetes mellitus type 2.  8.  Obstructive sleep apnea.  Compliant with CPAP.    Further recommendations based on the results of her cardiac catheterization.  I asked her to come to the emergency room if she has any recurrent episodes of chest pain that do not respond to sublingual nitroglycerin.

## 2021-07-08 NOTE — H&P (VIEW-ONLY)
Subjective:     Encounter Date:07/08/2021      Patient ID: Daisy Dent is a 60 y.o. female.    Chief Complaint:  History of Present Illness    This is a 60-year-old female with a history of ischemic cardiomyopathy, last ejection fraction of 40-45%, diabetes mellitus type 2, obstructive sleep apnea on CPAP, hypertension, status post dual-chamber AICD placement, dyslipidemia, coronary artery disease with a history of prior anterior myocardial infarction and LAD stent placement, status post left MCA stroke, who presents for follow-up.     I have a follow-up appointment in 5/2021 she reported she was having increasing frequency of chest tightness that primarily occurred at nighttime and woke her up from sleep.  This was similar to what she experienced prior to her previous stents.  She was not having any exertional symptoms.  Symptoms can last anywhere from 30 minutes to an hour at a time.  She had only had to use nitroglycerin once.  We discussed options at that time we opted to monitor her symptoms before considering further work-up.  I did start her on isosorbide mononitrate 30 mg.      She presents today for follow-up.  She was unable to tolerate the isosorbide due to the onset of severe headaches.  She continues to have episodes of chest discomfort rarely occurring at nighttime and waking up from sleep.  She does feel like her symptoms have progressed over the last couple months.  Couple nights ago she had a severe episode that lasted a couple hours and resolved after she took a nitroglycerin.  After that she has just been feeling more fatigued and tired.  She otherwise denies any dizziness or lightheadedness, near-syncope or syncope, lower extremity edema, or palpitations.     Prior History:  I began following the patient in 11/2015 when she presented to establish care.  Prior to that she was followed by Dr. Ayala with [facility].  Her prior cardiac history includes a non-ST elevation MI and  7/2006 at which time she received a Cypher 3.0 x 23 mm stent to her mid LAD.  She then presented in 10/2009 with a late in-stent thrombosis that required repeat stenting of her mid LAD in addition to a second drug-eluting stent placement of her left circumflex artery.  Following that she had a repeat cardiac catheterization in 2010 that was reportedly unremarkable.  Following her myocardial infarction in 2009 she developed ischemic myopathy with an EF of around 25% and ultimately underwent AICD placement and 7/2010.  At some point in the course of her follow-ups her left ventricular function improved with an ejection fraction of 40-45%.  A stress test showed evidence of a large anterior infarct and minimal elva-infarct ischemia.     In her follow-ups she has done fairly well recently when she reported more fatigued than normal and episodes of chest discomfort that occurred while she was exercising on the treadmill.  She underwent  an echocardiogram in 3/2017 that showed an ejection fraction of 40-45%.  Due to continued symptoms we went ahead and proceeded with a PET stress test which was performed in 8/2017 that showed medium sized anterior wall infarct with mild elva-infarct ischemia and a post stress ejection fraction of 58%.      In 11/2017 her AICD was noted to be at [facility].  She subsequently underwent generator replacement on 1/12/2018 with Dr. Ornelas. When I saw her back in follow up in 9/2018 she reported that she denied any new or worsening symptoms.       She was admitted and 12/2018 after she presented with aphasia.  On arrival to the hospital she underwent a CT of the head that showed no acute stroke but a perfusion scan showed a large perfusion mismatch.  A CTA showed evidence of a left MCA thrombus.  She was given TPA at this time with improvement in her symptoms.  Neurology felt that her stroke was likely embolic.  She underwent a repeat echocardiogram during her admission that showed moderately  depressed left ventricular systolic function with an EF of 30%, grade 1 diastolic dysfunction, and no significant valvular disease.  Based on the likelihood that this was an embolic stroke I opted to go ahead and recommend anticoagulation.  She was subsequently started on rivaroxaban 20 mg a day.  Her aspirin was stopped and she was continued on clopidogrel.     In 11/2019 she reported a couple weeks of episodes of substernal chest tightness lasting 1 to 2 minutes and dyspnea on exertion.  She reported that the symptoms were similar to what she had at the time of her prior myocardial infarctions but reports that its much milder than what she experienced at that time.    Following that office visit she underwent a stress test that showed evidence of a medium anterior infarct with moderate elva-infarct ischemia.  The findings were similar to prior stress test however the amount of elva-infarct ischemia had impaired to increase.  We initially tried to treat her medically however she continued to have recurrent symptoms so we opted to proceed with a cardiac catheterization.  She presented to the hospital on 1/6/2020 for the procedure.  She was found to have severe in-stent restenosis of her proximal LAD stent and ultimately underwent repeat drug-eluting stent placement of the in-stent restenosis.  She was also noted to have moderate nonobstructive disease of the first obtuse marginal branch for which she underwent FFR evaluation which was normal at 0.96.     In follow-up she had some mild episodes of chest discomfort that we decided to monitor.  She then returned for urgent follow-up on 10/22/2020 at which time she reported more severe episodes of chest discomfort that were occurring primarily at rest.  Due to the severity of her symptoms she was quite concerned and we decided to proceed with a cardiac catheterization.  This was performed on 10/26/2020 and showed stable moderate nonobstructive coronary artery  disease.    Review of Systems   Constitutional: Positive for malaise/fatigue.   HENT: Negative for hearing loss, hoarse voice, nosebleeds and sore throat.    Eyes: Negative for pain.   Cardiovascular: Positive for chest pain and dyspnea on exertion. Negative for claudication, cyanosis, irregular heartbeat, leg swelling, near-syncope, orthopnea, palpitations, paroxysmal nocturnal dyspnea and syncope.   Respiratory: Negative for shortness of breath and snoring.    Endocrine: Negative for cold intolerance, heat intolerance, polydipsia, polyphagia and polyuria.   Skin: Negative for itching and rash.   Musculoskeletal: Negative for arthritis, falls, joint pain, joint swelling, muscle cramps, muscle weakness and myalgias.   Gastrointestinal: Negative for constipation, diarrhea, dysphagia, heartburn, hematemesis, hematochezia, melena, nausea and vomiting.   Genitourinary: Negative for frequency, hematuria and hesitancy.   Neurological: Negative for excessive daytime sleepiness, dizziness, headaches, light-headedness, numbness and weakness.   Psychiatric/Behavioral: Negative for depression. The patient is not nervous/anxious.          Current Outpatient Medications:   •  Accu-Chek FastClix Lancets misc, Use to test blood sugar 4 times daily  E11.8, Disp: 400 each, Rfl: 1  •  Accu-Chek Guide test strip, Use 4 times a day; e11.8, Disp: 400 each, Rfl: 1  •  albuterol sulfate  (90 Base) MCG/ACT inhaler, TAKE 2 PUFFS BY MOUTH EVERY 4 HOURS AS NEEDED, Disp: , Rfl:   •  atorvastatin (LIPITOR) 80 MG tablet, Take 1 tablet by mouth Daily., Disp: 90 tablet, Rfl: 1  •  Blood Glucose Monitoring Suppl (Accu-Chek Guide) w/Device kit, Inject 1 Device under the skin into the appropriate area as directed Daily., Disp: 1 kit, Rfl: 0  •  carvedilol (COREG) 25 MG tablet, Take 1 tablet by mouth 2 (Two) Times a Day With Meals., Disp: 180 tablet, Rfl: 2  •  Cholecalciferol (VITAMIN D3) 2000 UNITS capsule, Take 1,000 Units by mouth Daily.,  Disp: , Rfl:   •  clopidogrel (PLAVIX) 75 MG tablet, TAKE 1 TABLET BY MOUTH EVERY DAY, Disp: 90 tablet, Rfl: 3  •  Dulaglutide (Trulicity) 3 MG/0.5ML solution pen-injector, Inject 3 mg under the skin into the appropriate area as directed 1 (One) Time Per Week., Disp: 4 pen, Rfl: 5  •  furosemide (LASIX) 40 MG tablet, TAKE 1 TABLET BY MOUTH DAILY. RESUME ON 1/7/20, Disp: 90 tablet, Rfl: 1  •  insulin aspart (NovoLOG FlexPen) 100 UNIT/ML solution pen-injector sc pen, Inject 15 Units under the skin into the appropriate area as directed 3 (Three) Times a Day With Meals., Disp: 10 pen, Rfl: 3  •  Insulin Pen Needle 32G X 4 MM misc, USE 4 TIMES A DAY, Disp: 400 each, Rfl: 3  •  isosorbide mononitrate (IMDUR) 30 MG 24 hr tablet, Take 1 tablet by mouth Daily., Disp: 30 tablet, Rfl: 5  •  metFORMIN (GLUCOPHAGE) 1000 MG tablet, TAKE 1 TABLET BY MOUTH EVERY DAY IN THE EVENING, Disp: 90 tablet, Rfl: 3  •  Multiple Vitamins-Minerals (MULTIVITAL PO), Take 1 tablet by mouth Daily., Disp: , Rfl:   •  nitroglycerin (Nitrostat) 0.4 MG SL tablet, Place 1 tablet under the tongue Every 5 (Five) Minutes As Needed for Chest Pain. Take no more than 3 doses in 15 minutes., Disp: 30 tablet, Rfl: 11  •  Omega-3 Fatty Acids (FISH OIL) 1200 MG capsule capsule, Take 1,200 mg by mouth Daily With Breakfast., Disp: , Rfl:   •  potassium chloride 10 MEQ CR tablet, Take 1 tablet by mouth Daily. Resume on 1/7/20, Disp: 90 tablet, Rfl: 1  •  rivaroxaban (Xarelto) 20 MG tablet, Take 1 tablet by mouth Daily With Dinner., Disp: 90 tablet, Rfl: 1  •  spironolactone (ALDACTONE) 25 MG tablet, TAKE 1 TABLET BY MOUTH EVERY DAY, Disp: 90 tablet, Rfl: 1  •  Toujeo SoloStar 300 UNIT/ML solution pen-injector injection, INJECT 45 UNITS UNDER THE SKIN TO THE APPROPRIATE AREA DAILY USE AS DIRECTED DAILY, Disp: 9 pen, Rfl: 2  •  vitamin B-12 (CYANOCOBALAMIN) 500 MCG tablet, Take 500 mcg by mouth Daily., Disp: , Rfl:     Past Medical History:   Diagnosis Date   •  Abdominal pain    • Abnormal liver function test    • Acute bronchitis    • Benign essential hypertension    • CAD (coronary artery disease)    • CHF (congestive heart failure) (CMS/[facility])    • Colon polyp    • Congestive heart disease (CMS/HCC)    • COPD (chronic obstructive pulmonary disease) (CMS/[facility])    • Cough    • Diabetes (CMS/[facility])    • Diabetes mellitus (CMS/[facility])    • Diarrhea    • Gastroenteritis    • Health care maintenance    • Hyperlipidemia    • Hypertension    • IFG (impaired fasting glucose)    • Ischemic cardiomyopathy    • Myocardial infarction (CMS/[facility])    • SHELLIE (obstructive sleep apnea)    • Sore throat    • Type 2 diabetes mellitus (CMS/[facility])    • Vaginal yeast infection    • Vitamin D deficiency        Past Surgical History:   Procedure Laterality Date   • CARDIAC CATHETERIZATION     • CARDIAC CATHETERIZATION N/A 1/6/2020    Procedure: Coronary angiography;  Surgeon: Oneida Saldivar MD;  Location:  [facility];  Service: Cardiovascular   • CARDIAC CATHETERIZATION N/A 1/6/2020    Procedure: Left heart cath;  Surgeon: Oneida Saldivar MD;  Location:  [facility];  Service: Cardiovascular   • CARDIAC CATHETERIZATION N/A 1/6/2020    Procedure: Left ventriculography;  Surgeon: Oneida Saldivar MD;  Location:  [facility];  Service: Cardiovascular   • CARDIAC CATHETERIZATION N/A 1/6/2020    Procedure: Percutaneous Coronary Intervention;  Surgeon: Oneida Saldivar MD;  Location:  [facility];  Service: Cardiovascular   • CARDIAC CATHETERIZATION N/A 1/6/2020    Procedure: Stent HUGO coronary;  Surgeon: Oneida Saldivar MD;  Location:  [facility];  Service: Cardiovascular   • CARDIAC CATHETERIZATION  1/6/2020    Procedure: Functional Flow [place];  Surgeon: Oneida Saldivar MD;  [facility];  Service: Cardiovascular   • CARDIAC CATHETERIZATION N/A 10/26/2020    Procedure: Coronary angiography;   Surgeon: Oneida Saldivar MD;  Location:  [facility];  Service: Cardiovascular;  Laterality: N/A;   • CARDIAC CATHETERIZATION N/A 10/26/2020    Procedure: Left heart cath;  Surgeon: Oneida Saldivar MD;  Location:  [facility];  Service: Cardiovascular;  Laterality: N/A;   • CARDIAC CATHETERIZATION N/A 10/26/2020    Procedure: Left ventriculography;  Surgeon: Oneida Saldivar MD;  Location:  [facility];  Service: Cardiovascular;  Laterality: N/A;   • CARDIAC CATHETERIZATION  10/26/2020    Procedure: Functional Flow [place];  Surgeon: Oneida Saldivar MD;  Location:  [facility];  Service: Cardiovascular;;   • CARDIAC ELECTROPHYSIOLOGY PROCEDURE N/A 2018    Procedure: ICD DC generator change  medtronic;  Surgeon: Hany Ornelas MD;  Location:  [facility];  Service:    • PACEMAKER IMPLANTATION     • TUBAL ABDOMINAL LIGATION         Family History   Problem Relation Age of Onset   • Diabetes Mother    • Heart disease Mother    • Hyperlipidemia Mother    • Diabetes Father    • Heart disease Father    • Hyperlipidemia Father    • Heart disease Brother    • Hyperlipidemia Brother    • Hypertension Father    • Colon cancer Father    • Heart attack Father    • Emphysema Mother    • Heart disease Brother    • Heart attack Brother    • Heart disease Maternal Grandmother    • Heart disease Maternal Grandfather    • Heart disease Paternal Grandmother    • Heart disease Paternal Grandfather        Social History     Tobacco Use   • Smoking status: Former Smoker     Packs/day: 1.00     Years: 30.00     Pack years: 30.00     Types: Cigarettes     Start date: 1979     Quit date: 2009     Years since quittin.6   • Smokeless tobacco: Never Used   • Tobacco comment: QUIT 10 YRS   Substance Use Topics   • Alcohol use: Yes     Comment: rarely uses ETOH/caffeine use   • Drug use: No         ECG 12 Lead    Date/Time: 2021  "4:20 PM  Performed by: Oneida Saldivar MD  Authorized by: Oneida Saldivar MD   Comparison: compared with previous ECG   Similar to previous ECG  Rhythm: sinus rhythm  Q waves: V1 and V2                   Objective:     Visit Vitals  /82 (BP Location: Right arm, Patient Position: Sitting, Cuff Size: Large Adult)   Pulse 76   Ht 152.4 cm (60\")   Wt 92.1 kg (203 lb)   SpO2 99%   BMI 39.65 kg/m²         Constitutional:       Appearance: Normal appearance. Well-developed.   Eyes:      General: Lids are normal.      Conjunctiva/sclera: Conjunctivae normal.      Pupils: Pupils are equal, round, and reactive to light.   HENT:      Head: Normocephalic and atraumatic.   Neck:      Vascular: No carotid bruit or JVD.      Lymphadenopathy: No cervical adenopathy.   Pulmonary:      Effort: Pulmonary effort is normal.      Breath sounds: Normal breath sounds.   Cardiovascular:      Normal rate. Regular rhythm.      No gallop.   Pulses:     Radial: 2+ bilaterally.  Edema:     Peripheral edema absent.   Abdominal:      Palpations: Abdomen is soft.   Musculoskeletal:      Cervical back: Full passive range of motion without pain, normal range of motion and neck supple. Skin:     General: Skin is warm and dry.   Neurological:      Mental Status: Alert and oriented to person, place, and time.           Assessment:          Diagnosis Plan   1. Chronic coronary artery disease     2. S/P coronary artery stent placement     3. History of anterior myocardial infarction (CMS/HCC)     4. Non-ST elevated myocardial infarction (non-STEMI) (CMS/HCC)     5. ICD (implantable cardioverter-defibrillator) in place     6. Chronic systolic congestive heart failure (CMS/HCC)     7. Ischemic cardiomyopathy     8. Mixed hyperlipidemia     9. Automatic implantable cardioverter-defibrillator in situ     10. History of anterior myocardial infarction      11. Type 2 diabetes mellitus with complication (CMS/HCC)     12. History of CVA (cerebrovascular " accident) without residual deficits     13. Obstructive sleep apnea syndrome            Plan:       1.  Unstable angina.  Unable to tolerate isosorbide mononitrate.  On high-dose carvedilol.  Recommend proceeding with cardiac catheterization.  2.  Coronary artery disease.  Plan as per #1.  Otherwise continue current medical management.  3.  Ischemic cardiomyopathy.  EF of 26 to 30%.  No evidence of volume overload.  No ACE inhibitor or ARB in the past due to low blood pressures.  Remains on carvedilol and spironolactone.  4.  History of left MCA stroke.  On chronic anticoagulation with rivaroxaban.  5.  History of AICD placement.  Continue routine device checks through the office.  6.  Hypertension.  Well-controlled on her current medications.  7.  Diabetes mellitus type 2.  8.  Obstructive sleep apnea.  Compliant with CPAP.    Further recommendations based on the results of her cardiac catheterization.  I asked her to come to the emergency room if she has any recurrent episodes of chest pain that do not respond to sublingual nitroglycerin.

## 2021-07-13 ENCOUNTER — MEDICATION THERAPY MANAGEMENT (OUTPATIENT)
Dept: ENDOCRINOLOGY | Age: 61
End: 2021-07-13

## 2021-07-13 ENCOUNTER — OFFICE VISIT (OUTPATIENT)
Dept: ENDOCRINOLOGY | Age: 61
End: 2021-07-13

## 2021-07-13 VITALS
WEIGHT: 203 LBS | HEIGHT: 60 IN | BODY MASS INDEX: 39.85 KG/M2 | SYSTOLIC BLOOD PRESSURE: 122 MMHG | DIASTOLIC BLOOD PRESSURE: 80 MMHG

## 2021-07-13 DIAGNOSIS — E11.65 TYPE 2 DIABETES MELLITUS WITH HYPERGLYCEMIA, UNSPECIFIED WHETHER LONG TERM INSULIN USE (HCC): Primary | ICD-10-CM

## 2021-07-13 DIAGNOSIS — E78.2 MIXED HYPERLIPIDEMIA: ICD-10-CM

## 2021-07-13 PROCEDURE — 99214 OFFICE O/P EST MOD 30 MIN: CPT | Performed by: NURSE PRACTITIONER

## 2021-07-13 NOTE — PROGRESS NOTES
MTM-[facility]M Pharmacy Visit [facility]    Daisy Dent is a 60 y.o. female seen by Endocrinology and assessed by the [facility] Management [facility] Pharmacist at [facility].  This was an initial MTM visit for Daisy Dent.    Daisy Dent was introduced to the [facility] and her allergies, PMH, and medications were reviewed.       Past Medical History:   Diagnosis Date   • Abdominal pain    • Abnormal liver function test    • Acute bronchitis    • Benign essential hypertension    • CAD (coronary artery disease)    • CHF (congestive heart failure) (CMS/HCC)    • Colon polyp    • Congestive heart disease (CMS/HCC)    • COPD (chronic obstructive pulmonary disease) (CMS/HCC)    • Cough    • Diabetes (CMS/HCC)    • Diabetes mellitus (CMS/HCC)    • Diarrhea    • Gastroenteritis    • Health care maintenance    • Hyperlipidemia    • Hypertension    • IFG (impaired fasting glucose)    • Ischemic cardiomyopathy    • Myocardial infarction (CMS/HCC)    • SHELLIE (obstructive sleep apnea)    • Sore throat    • Type 2 diabetes mellitus (CMS/[facility])    • Vaginal yeast infection    • Vitamin D deficiency      Social History     Socioeconomic History   • Marital status:      Spouse name: Not on file   • Number of children: Not on file   • Years of education: Not on file   • Highest education level: Not on file   Tobacco Use   • Smoking status: Former Smoker     Packs/day: 1.00     Years: 30.00     Pack years: 30.00     Types: Cigarettes     Start date: 1979     Quit date: 2009     Years since quittin.7   • Smokeless tobacco: Never Used   • Tobacco comment: QUIT 10 YRS   Substance and Sexual Activity   • Alcohol use: Yes     Comment: rarely uses ETOH/caffeine use   • Drug use: No   • Sexual activity: Yes     Partners: Male       Medication Allergies:  Patient has no known allergies.        Current Outpatient Medications:   •  Accu-Chek  FastClix Lancets misc, Use to test blood sugar 4 times daily  E11.8, Disp: 400 each, Rfl: 1  •  Accu-Chek Guide test strip, Use 4 times a day; e11.8, Disp: 400 each, Rfl: 1  •  albuterol sulfate  (90 Base) MCG/ACT inhaler, TAKE 2 PUFFS BY MOUTH EVERY 4 HOURS AS NEEDED, Disp: , Rfl:   •  atorvastatin (LIPITOR) 80 MG tablet, Take 1 tablet by mouth Daily., Disp: 90 tablet, Rfl: 1  •  Blood Glucose Monitoring Suppl (Accu-Chek Guide) w/Device kit, Inject 1 Device under the skin into the appropriate area as directed Daily., Disp: 1 kit, Rfl: 0  •  carvedilol (COREG) 25 MG tablet, Take 1 tablet by mouth 2 (Two) Times a Day With Meals., Disp: 180 tablet, Rfl: 2  •  Cholecalciferol (VITAMIN D3) 2000 UNITS capsule, Take 1,000 Units by mouth Daily., Disp: , Rfl:   •  clopidogrel (PLAVIX) 75 MG tablet, TAKE 1 TABLET BY MOUTH EVERY DAY, Disp: 90 tablet, Rfl: 3  •  Dulaglutide (Trulicity) 3 MG/0.5ML solution pen-injector, Inject 3 mg under the skin into the appropriate area as directed 1 (One) Time Per Week., Disp: 4 pen, Rfl: 5  •  furosemide (LASIX) 40 MG tablet, TAKE 1 TABLET BY MOUTH DAILY. RESUME ON 1/7/20, Disp: 90 tablet, Rfl: 1  •  Insulin Pen Needle 32G X 4 MM misc, USE 4 TIMES A DAY, Disp: 400 each, Rfl: 3  •  isosorbide mononitrate (IMDUR) 30 MG 24 hr tablet, Take 1 tablet by mouth Daily., Disp: 30 tablet, Rfl: 5  •  metFORMIN (GLUCOPHAGE) 1000 MG tablet, TAKE 1 TABLET BY MOUTH EVERY DAY IN THE EVENING, Disp: 90 tablet, Rfl: 3  •  Multiple Vitamins-Minerals (MULTIVITAL PO), Take 1 tablet by mouth Daily., Disp: , Rfl:   •  nitroglycerin (Nitrostat) 0.4 MG SL tablet, Place 1 tablet under the tongue Every 5 (Five) Minutes As Needed for Chest Pain. Take no more than 3 doses in 15 minutes., Disp: 30 tablet, Rfl: 11  •  Omega-3 Fatty Acids (FISH OIL) 1200 MG capsule capsule, Take 1,200 mg by mouth Daily With Breakfast., Disp: , Rfl:   •  potassium chloride 10 MEQ CR tablet, Take 1 tablet by mouth Daily. Resume on  1/7/20, Disp: 90 tablet, Rfl: 1  •  rivaroxaban (Xarelto) 20 MG tablet, Take 1 tablet by mouth Daily With Dinner., Disp: 90 tablet, Rfl: 1  •  spironolactone (ALDACTONE) 25 MG tablet, TAKE 1 TABLET BY MOUTH EVERY DAY, Disp: 90 tablet, Rfl: 1  •  vitamin B-12 (CYANOCOBALAMIN) 500 MCG tablet, Take 500 mcg by mouth Daily., Disp: , Rfl:       Labs:  There were no vitals filed for this visit.  There were no vitals filed for this visit.  Lab Results   Component Value Date    HGBA1C 6.8 (H) 07/07/2021     Lab Results   Component Value Date    GLUCOSE 150 (H) 10/22/2020    CALCIUM 9.9 07/07/2021     07/07/2021    K 4.7 07/07/2021    CO2 26 07/07/2021    CL 97 (L) 07/07/2021    BUN 12 07/07/2021    CREATININE 1.0 07/07/2021    EGFRIFAFRI 78 03/17/2021    EGFRIFNONA 67 03/17/2021    BCR 13.0 07/07/2021    ANIONGAP 11.6 10/22/2020     Lab Results   Component Value Date    CHOL 279 (H) 12/09/2018    CHLPL 161 03/17/2021    TRIG 183 (H) 03/17/2021    HDL 34 (L) 03/17/2021    LDL 95 03/17/2021         Drug-Drug Interactions:   No significant DDIs were noted.        Medication Assessment:   1. Aspirin - Contraindicated (Xarelto and clopidogrel)  2. Statin - Currently Taking  3. ACEi/ARB - Contraindicated (intolerant)      Medication Education on Specialty Medication:  Trulicity  · Discussed the medication's MOA and that it helps you feel full, helps your body release more insulin and helps your body make less sugar after meals.  · Also discussed that N/V/D tend to be the most common adverse effects, especially if larger meals are eaten.  Encouraged smaller meals throughout the day.  · Do not start the medication if you have h/o pancreatitis or thyroid cancer.  · Take missed dose as soon as remember and if it is less than 3 days until the next dose, skip the missed dose and get back on track; do not take 2 doses or extra doses.        Assessment / Plan:  1. The patient was provided medication education via verbal information.  Patient expressed understanding and had no further questions at this time.  2. Recommend to add a SGLT2 inhibitor given her HFrEF.  3. Discussed the [facility] pharmacy services that are available to her, including monitoring of refills, prior authorizations, and copay coupon cards, as applicable, as well as curb-side pick-up or mail-order as needed.  4. Contact information for the pharmacy team was provided to the patient.        Chiquita Hood, OneilD  7/13/2021  09:30 EDT

## 2021-07-13 NOTE — PROGRESS NOTES
"Chief Complaint  Diabetes    Subjective          Daisy Dent presents to [facility] ENDOCRINOLOGY  History of Present Illness     Type 2 diabetes mellitus    a1c improving \" everything is going really good\"  diagnosed around 2019  Insulin was started on patient during hospitalization in December 2018 but the patient stopped this insulin around December 2020    Today in clinic patient reports that she is on trulicity 1.5mg and metformin 1000mg QD     Checking blood sugars bid, bs RUN AROUND 140    No known history of diabetic retinopathy.  Had eye exam last month     Does complain of diabetic neuropathy.     Hx of CAD, no hx of CKD  Pt was hospitalized in Dec 2018 with CVA.    Pt is more physically active. Weight is down 7 pounds since march   Pt tries to follow DM diet for most part  Lab Results   Component Value Date    HGBA1C 6.8 (H) 07/07/2021       HLP  On statin but has been skipping/forgetting some pills   Lab Results   Component Value Date    CHOL 279 (H) 12/09/2018    CHLPL 161 03/17/2021    TRIG 183 (H) 03/17/2021    HDL 34 (L) 03/17/2021    LDL 95 03/17/2021           Objective   Vital Signs:   /80 (BP Location: Right arm, Patient Position: Sitting, Cuff Size: Adult)   Ht 152.4 cm (60\")   Wt 92.1 kg (203 lb)   BMI 39.65 kg/m²     Physical Exam  Vitals reviewed.   Constitutional:       General: She is not in acute distress.  HENT:      Head: Normocephalic and atraumatic.   Cardiovascular:      Rate and Rhythm: Normal rate and regular rhythm.   Pulmonary:      Effort: Pulmonary effort is normal. No respiratory distress.   Musculoskeletal:         General: No signs of injury. Normal range of motion.      Cervical back: Normal range of motion and neck supple.   Skin:     General: Skin is warm and dry.   Neurological:      Mental Status: She is alert and oriented to person, place, and time. Mental status is at baseline.   Psychiatric:         Mood and Affect: Mood normal.    "      Behavior: Behavior normal.         Thought Content: Thought content normal.         Judgment: Judgment normal.          Result Review :   The following data was reviewed by: KEYONA Hillman on 07/13/2021:  Common labs    Common Labsle 3/17/21 3/17/21 3/17/21 3/17/21 6/3/21 7/7/21 7/7/21 7/7/21    0832 0832 0832 0832  1342 1342 1342   Glucose      109 (A)     BUN      12     Creatinine  0.93    1.0     eGFR Non  Am  67         eGFR African Am  78         Sodium      137     Potassium      4.7     Chloride      97 (A)     Calcium      9.9     Albumin      4.7     Total Bilirubin      0.5     Alkaline Phosphatase      111     AST (SGOT)      48 (A)     ALT (SGPT)      83 (A)     WBC     9.03  8.77    Hemoglobin     14.0  14.4    Hematocrit     44.1  45.6    Platelets     342  346    Total Cholesterol 161          Triglycerides 183 (A)          HDL Cholesterol 34 (A)          LDL Cholesterol  95          Hemoglobin A1C    7.4 (A)    6.8 (A)   Microalbumin, Urine   18.7        (A) Abnormal value       Comments are available for some flowsheets but are not being displayed.                     Assessment and Plan    Diagnoses and all orders for this visit:    1. Type 2 diabetes mellitus with hyperglycemia, unspecified whether long term insulin use (CMS/formerly [facility]) (Primary)  -     Hemoglobin A1c; Future  -     Comprehensive Metabolic Panel; Future  -     Lipid Panel; Future  -     Microalbumin / Creatinine Urine Ratio - Urine, Clean Catch; Future    2. Mixed hyperlipidemia        Follow Up   Return in about 6 months (around 1/13/2022).     Resume daily statin, avoid skipping doses  May consider acei or arb  Continue trulicity and metformin  Off insulin if a1c remains less than 8%  Pharmacy education today  Continue with diabetic lifestyle which has improved glycemic control and continue with weight loss     Patient was given instructions and counseling regarding her condition or for health maintenance advice.  Please see specific information pulled into the AVS if appropriate.     Josephine Saleem APRN

## 2021-07-15 ENCOUNTER — TRANSCRIBE ORDERS (OUTPATIENT)
Dept: CARDIOLOGY | Facility: CLINIC | Age: 61
End: 2021-07-15

## 2021-07-15 DIAGNOSIS — Z13.6 SCREENING FOR CARDIOVASCULAR CONDITION: Primary | ICD-10-CM

## 2021-07-15 DIAGNOSIS — Z01.810 PREPROCEDURAL CARDIOVASCULAR EXAMINATION: ICD-10-CM

## 2021-07-16 ENCOUNTER — LAB (OUTPATIENT)
Dept: LAB | Facility: HOSPITAL | Age: 61
End: 2021-07-16

## 2021-07-16 DIAGNOSIS — Z13.6 SCREENING FOR CARDIOVASCULAR CONDITION: ICD-10-CM

## 2021-07-16 DIAGNOSIS — Z01.810 PREPROCEDURAL CARDIOVASCULAR EXAMINATION: ICD-10-CM

## 2021-07-16 LAB
ANION GAP SERPL CALCULATED.3IONS-SCNC: 8.1 MMOL/L (ref 5–15)
BASOPHILS # BLD AUTO: 0.07 10*3/MM3 (ref 0–0.2)
BASOPHILS NFR BLD AUTO: 0.9 % (ref 0–1.5)
BUN SERPL-MCNC: 10 MG/DL (ref 6–20)
BUN/CREAT SERPL: 11.4 (ref 7–25)
CALCIUM SPEC-SCNC: 9.4 MG/DL (ref 8.6–10.5)
CHLORIDE SERPL-SCNC: 105 MMOL/L (ref 98–107)
CO2 SERPL-SCNC: 24.9 MMOL/L (ref 22–29)
CREAT SERPL-MCNC: 0.88 MG/DL (ref 0.57–1)
DEPRECATED RDW RBC AUTO: 42 FL (ref 37–54)
EOSINOPHIL # BLD AUTO: 0.14 10*3/MM3 (ref 0–0.4)
EOSINOPHIL NFR BLD AUTO: 1.8 % (ref 0.3–6.2)
ERYTHROCYTE [DISTWIDTH] IN BLOOD BY AUTOMATED COUNT: 13.7 % (ref 12.3–15.4)
GFR SERPL CREATININE-BSD FRML MDRD: 66 ML/MIN/1.73
GFR SERPL CREATININE-BSD FRML MDRD: 80 ML/MIN/1.73
GLUCOSE SERPL-MCNC: 138 MG/DL (ref 65–99)
HCT VFR BLD AUTO: 44.1 % (ref 34–46.6)
HGB BLD-MCNC: 14.3 G/DL (ref 12–15.9)
IMM GRANULOCYTES # BLD AUTO: 0.03 10*3/MM3 (ref 0–0.05)
IMM GRANULOCYTES NFR BLD AUTO: 0.4 % (ref 0–0.5)
LYMPHOCYTES # BLD AUTO: 2.9 10*3/MM3 (ref 0.7–3.1)
LYMPHOCYTES NFR BLD AUTO: 37.1 % (ref 19.6–45.3)
MCH RBC QN AUTO: 27 PG (ref 26.6–33)
MCHC RBC AUTO-ENTMCNC: 32.4 G/DL (ref 31.5–35.7)
MCV RBC AUTO: 83.4 FL (ref 79–97)
MONOCYTES # BLD AUTO: 0.77 10*3/MM3 (ref 0.1–0.9)
MONOCYTES NFR BLD AUTO: 9.9 % (ref 5–12)
NEUTROPHILS NFR BLD AUTO: 3.9 10*3/MM3 (ref 1.7–7)
NEUTROPHILS NFR BLD AUTO: 49.9 % (ref 42.7–76)
NRBC BLD AUTO-RTO: 0 /100 WBC (ref 0–0.2)
PLATELET # BLD AUTO: 335 10*3/MM3 (ref 140–450)
PMV BLD AUTO: 11 FL (ref 6–12)
POTASSIUM SERPL-SCNC: 3.7 MMOL/L (ref 3.5–5.2)
RBC # BLD AUTO: 5.29 10*6/MM3 (ref 3.77–5.28)
SODIUM SERPL-SCNC: 138 MMOL/L (ref 136–145)
WBC # BLD AUTO: 7.81 10*3/MM3 (ref 3.4–10.8)

## 2021-07-16 PROCEDURE — 36415 COLL VENOUS BLD VENIPUNCTURE: CPT

## 2021-07-16 PROCEDURE — 80048 BASIC METABOLIC PNL TOTAL CA: CPT

## 2021-07-16 PROCEDURE — 85025 COMPLETE CBC W/AUTO DIFF WBC: CPT

## 2021-07-19 ENCOUNTER — HOSPITAL ENCOUNTER (OUTPATIENT)
Facility: HOSPITAL | Age: 61
Setting detail: HOSPITAL OUTPATIENT SURGERY
Discharge: HOME | End: 2021-07-19
Attending: INTERNAL MEDICINE | Admitting: INTERNAL MEDICINE

## 2021-07-19 VITALS
SYSTOLIC BLOOD PRESSURE: 105 MMHG | WEIGHT: 203 LBS | HEART RATE: 66 BPM | DIASTOLIC BLOOD PRESSURE: 51 MMHG | TEMPERATURE: 98.2 F | BODY MASS INDEX: 39.85 KG/M2 | OXYGEN SATURATION: 95 % | RESPIRATION RATE: 16 BRPM | HEIGHT: 60 IN

## 2021-07-19 DIAGNOSIS — I25.10 CHRONIC CORONARY ARTERY DISEASE: ICD-10-CM

## 2021-07-19 DIAGNOSIS — I20.8 ANGINA AT REST (HCC): ICD-10-CM

## 2021-07-19 DIAGNOSIS — Z95.5 S/P CORONARY ARTERY STENT PLACEMENT: ICD-10-CM

## 2021-07-19 LAB
ACT BLD: 296 SECONDS (ref 82–152)
ACT BLD: 411 SECONDS (ref 82–152)
GLUCOSE BLDC GLUCOMTR-MCNC: 134 MG/DL (ref 70–130)
GLUCOSE BLDC GLUCOMTR-MCNC: 134 MG/DL (ref 70–130)
QT INTERVAL: 459 MS

## 2021-07-19 PROCEDURE — 93005 ELECTROCARDIOGRAM TRACING: CPT | Performed by: INTERNAL MEDICINE

## 2021-07-19 PROCEDURE — 25010000002 HEPARIN (PORCINE) PER 1000 UNITS: Performed by: INTERNAL MEDICINE

## 2021-07-19 PROCEDURE — 92978 ENDOLUMINL IVUS OCT C 1ST: CPT | Performed by: INTERNAL MEDICINE

## 2021-07-19 PROCEDURE — C1769 GUIDE WIRE: HCPCS | Performed by: INTERNAL MEDICINE

## 2021-07-19 PROCEDURE — 85347 COAGULATION TIME ACTIVATED: CPT

## 2021-07-19 PROCEDURE — 99153 MOD SED SAME PHYS/QHP EA: CPT | Performed by: INTERNAL MEDICINE

## 2021-07-19 PROCEDURE — 92928 PRQ TCAT PLMT NTRAC ST 1 LES: CPT | Performed by: INTERNAL MEDICINE

## 2021-07-19 PROCEDURE — 99152 MOD SED SAME PHYS/QHP 5/>YRS: CPT | Performed by: INTERNAL MEDICINE

## 2021-07-19 PROCEDURE — C1874 STENT, COATED/COV W/DEL SYS: HCPCS | Performed by: INTERNAL MEDICINE

## 2021-07-19 PROCEDURE — 93458 L HRT ARTERY/VENTRICLE ANGIO: CPT | Performed by: INTERNAL MEDICINE

## 2021-07-19 PROCEDURE — C1725 CATH, TRANSLUMIN NON-LASER: HCPCS | Performed by: INTERNAL MEDICINE

## 2021-07-19 PROCEDURE — C1753 CATH, INTRAVAS ULTRASOUND: HCPCS | Performed by: INTERNAL MEDICINE

## 2021-07-19 PROCEDURE — C1887 CATHETER, GUIDING: HCPCS | Performed by: INTERNAL MEDICINE

## 2021-07-19 PROCEDURE — 25010000002 MIDAZOLAM PER 1 MG: Performed by: INTERNAL MEDICINE

## 2021-07-19 PROCEDURE — 93010 ELECTROCARDIOGRAM REPORT: CPT | Performed by: INTERNAL MEDICINE

## 2021-07-19 PROCEDURE — 93571 IV DOP VEL&/PRESS C FLO 1ST: CPT | Performed by: INTERNAL MEDICINE

## 2021-07-19 PROCEDURE — 0 IOPAMIDOL PER 1 ML: Performed by: INTERNAL MEDICINE

## 2021-07-19 PROCEDURE — 82962 GLUCOSE BLOOD TEST: CPT

## 2021-07-19 PROCEDURE — C1894 INTRO/SHEATH, NON-LASER: HCPCS | Performed by: INTERNAL MEDICINE

## 2021-07-19 PROCEDURE — C9600 PERC DRUG-EL COR STENT SING: HCPCS | Performed by: INTERNAL MEDICINE

## 2021-07-19 PROCEDURE — 25010000002 FENTANYL CITRATE (PF) 50 MCG/ML SOLUTION: Performed by: INTERNAL MEDICINE

## 2021-07-19 DEVICE — XIENCE SIERRA™ EVEROLIMUS ELUTING CORONARY STENT SYSTEM 3.00 MM X 12 MM / RAPID-EXCHANGE
Type: IMPLANTABLE DEVICE | Status: FUNCTIONAL
Brand: XIENCE SIERRA™

## 2021-07-19 RX ORDER — SODIUM CHLORIDE 0.9 % (FLUSH) 0.9 %
10 SYRINGE (ML) INJECTION AS NEEDED
Status: DISCONTINUED | OUTPATIENT
Start: 2021-07-19 | End: 2021-07-19 | Stop reason: HOSPADM

## 2021-07-19 RX ORDER — SODIUM CHLORIDE 0.9 % (FLUSH) 0.9 %
3 SYRINGE (ML) INJECTION EVERY 12 HOURS SCHEDULED
Status: DISCONTINUED | OUTPATIENT
Start: 2021-07-19 | End: 2021-07-19 | Stop reason: HOSPADM

## 2021-07-19 RX ORDER — ASPIRIN 325 MG
TABLET ORAL AS NEEDED
Status: DISCONTINUED | OUTPATIENT
Start: 2021-07-19 | End: 2021-07-19 | Stop reason: HOSPADM

## 2021-07-19 RX ORDER — LIDOCAINE HYDROCHLORIDE 20 MG/ML
INJECTION, SOLUTION INFILTRATION; PERINEURAL AS NEEDED
Status: DISCONTINUED | OUTPATIENT
Start: 2021-07-19 | End: 2021-07-19 | Stop reason: HOSPADM

## 2021-07-19 RX ORDER — HEPARIN SODIUM 1000 [USP'U]/ML
INJECTION, SOLUTION INTRAVENOUS; SUBCUTANEOUS AS NEEDED
Status: DISCONTINUED | OUTPATIENT
Start: 2021-07-19 | End: 2021-07-19 | Stop reason: HOSPADM

## 2021-07-19 RX ORDER — FENTANYL CITRATE 50 UG/ML
INJECTION, SOLUTION INTRAMUSCULAR; INTRAVENOUS AS NEEDED
Status: DISCONTINUED | OUTPATIENT
Start: 2021-07-19 | End: 2021-07-19 | Stop reason: HOSPADM

## 2021-07-19 RX ORDER — MIDAZOLAM HYDROCHLORIDE 1 MG/ML
INJECTION INTRAMUSCULAR; INTRAVENOUS AS NEEDED
Status: DISCONTINUED | OUTPATIENT
Start: 2021-07-19 | End: 2021-07-19 | Stop reason: HOSPADM

## 2021-07-19 RX ORDER — ACETAMINOPHEN 325 MG/1
650 TABLET ORAL EVERY 4 HOURS PRN
Status: DISCONTINUED | OUTPATIENT
Start: 2021-07-19 | End: 2021-07-19 | Stop reason: HOSPADM

## 2021-07-19 RX ORDER — SODIUM CHLORIDE 9 MG/ML
75 INJECTION, SOLUTION INTRAVENOUS CONTINUOUS
Status: DISCONTINUED | OUTPATIENT
Start: 2021-07-19 | End: 2021-07-19 | Stop reason: HOSPADM

## 2021-07-19 RX ORDER — FENTANYL CITRATE 50 UG/ML
50 INJECTION, SOLUTION INTRAMUSCULAR; INTRAVENOUS ONCE
Status: COMPLETED | OUTPATIENT
Start: 2021-07-19 | End: 2021-07-19

## 2021-07-19 RX ORDER — CLOPIDOGREL BISULFATE 75 MG/1
TABLET ORAL AS NEEDED
Status: DISCONTINUED | OUTPATIENT
Start: 2021-07-19 | End: 2021-07-19 | Stop reason: HOSPADM

## 2021-07-19 RX ADMIN — FENTANYL CITRATE 50 MCG: 0.05 INJECTION, SOLUTION INTRAMUSCULAR; INTRAVENOUS at 11:03

## 2021-07-19 RX ADMIN — ACETAMINOPHEN 650 MG: 325 TABLET, FILM COATED ORAL at 13:49

## 2021-07-19 RX ADMIN — SODIUM CHLORIDE 75 ML/HR: 9 INJECTION, SOLUTION INTRAVENOUS at 07:28

## 2021-07-19 NOTE — DISCHARGE INSTRUCTIONS
[facility]  [address] [place] [address]    Coronary Angioplasty with or without  Stent (Radial Approach) After Care    Refer to this sheet in the next few weeks. These instructions provide you with information on caring for yourself after your procedure. Your health care provider may also give you more specific instructions. Your treatment has been planned according to current medical practices, but problems sometimes occur. Call your health care provider if you have any problems or questions after your procedure.       Home Care Instructions:  · You may shower the day after the procedure. Remove the bandage (dressing) and gently wash the site with plain soap and water. Gently pat the site dry. You may apply a band aid daily for 2 days if desired.    · Do not apply powder or lotion to the site.  · Do not submerge the affected site in water for 3 to 5 days or until the site is completely healed.   · Do not flex or bend at the wrist with affected arm for 24 hours.  · Do not lift, push or pull anything over 5 pounds for 5 days after your procedure or as directed by your physician.  For a reference, a gallon of milk weighs 8 pounds.    · Do not operate machinery or power tools for 24 hours.  · Inspect the site at least twice daily. You may notice some bruising at the site and it may be tender for 1 to 2 weeks.     · Increase your fluid intake for the next 2 days.    · Keep arm elevated for 24 hours.  For the remainder of the day, keep your arm in the “Pledge of Allegiance” position when up and about.    · Limit your activity for the next 48 hours and avoid strenuous activity as directed by your physician.   · Cardiac Rehab may or may not be ordered.  Please consult with your physician  · You may drive 24 hours after the procedure unless otherwise instructed by your caregiver.  · A responsible adult should be with you for the first 24 hours after you arrive home.   · Do not make any important  legal decisions or sign legal papers for 24 hours. Do not drink alcohol for 24 hours.    · Take medicines only as directed by your health care provider.  Blood thinners may be prescribed after your procedure to improve blood flow through the stent.    · Metformin or any medications containing Metformin should not be taken for 48 hours after your procedure.    · Eat a heart-healthy diet. This should include plenty of fresh fruits and vegetables. Meat should be lean cuts. Avoid the following types of food:    ¨ Food that is high in salt.    ¨ Canned or highly processed food.    ¨ Food that is high in saturated fat or sugar.    ¨ Fried food.    · Make any other lifestyle changes recommended by your health care provider. This may include:    ¨ Not using any tobacco products including cigarettes, chewing tobacco, or electronic cigarettes.   ¨ Managing your weight.    ¨ Getting regular exercise.    ¨ Managing your blood pressure.    ¨ Limiting your alcohol intake.    ¨ Managing other health problems, such as diabetes.    · If you need an MRI after your heart stent was placed, be sure to tell the health care provider who orders the MRI that you have a heart stent.    · Keep all follow-up visits as directed by your health care provider.    ·   Call Your Doctor If:  · You have unusual pain at the radial/ulnar (wrist) site.  · You have redness, warmth, swelling, or pain at the radial/ulnar (wrist) site.  · You have drainage (other than a small amount of blood on the dressing).  · `You have chills or a fever > 101.  · Your arm becomes pale or dark, cool, tingly, or numb.  · You develop chest pain, shortness of breath, feel faint or pass out.    · You have any symptoms of a stroke.  Remember BE FAST  · B-balance. Sudden trouble walking or loss of balance.  · E-eyes.  Sudden changes in how you see or a sudden onset of a very bad headache.   · F-face. Sudden weakness or loss of feeling of the face or facial droop on one side.    · A-arms Sudden weakness or numbness in one arm.  One arm drifts down if they are both held out in front of you.   · S-speech.  Sudden trouble speaking, slurred speech or trouble understanding what are saying.   · T-time  Time to call emergency services.  Write down the symptoms and the time they started.   of loss of feeling in an arm.  This happens suddenly and usually on one side of the body.  · You have heavy bleeding from the site, hold pressure on the site for 20 minutes.  If the bleeding stops, apply a fresh bandage and call your cardiologist.  However, if you continue to have bleeding, call 911.        Make Sure You:   · Understand these instructions.  · Will watch your condition.  · Will get help right away if you are not doing well or get worse.

## 2021-07-20 ENCOUNTER — PATIENT MESSAGE (OUTPATIENT)
Dept: CARDIOLOGY | Facility: CLINIC | Age: 61
End: 2021-07-20

## 2021-07-22 NOTE — TELEPHONE ENCOUNTER
From: Daisy Dent  To: Oneida Saldivar MD  Sent: 7/20/2021 10:38 AM EDT  Subject: Non-Urgent Medical Question    Please fill out the attach form for my insurance company in regards to the Heart Cath. I can pick it up when available    Thank you   primary team

## 2021-07-30 ENCOUNTER — OFFICE VISIT (OUTPATIENT)
Dept: CARDIOLOGY | Facility: CLINIC | Age: 61
End: 2021-07-30

## 2021-07-30 ENCOUNTER — CLINICAL SUPPORT NO REQUIREMENTS (OUTPATIENT)
Dept: CARDIOLOGY | Facility: CLINIC | Age: 61
End: 2021-07-30

## 2021-07-30 VITALS
SYSTOLIC BLOOD PRESSURE: 124 MMHG | HEART RATE: 73 BPM | DIASTOLIC BLOOD PRESSURE: 80 MMHG | WEIGHT: 201.8 LBS | BODY MASS INDEX: 39.62 KG/M2 | HEIGHT: 60 IN | OXYGEN SATURATION: 96 %

## 2021-07-30 DIAGNOSIS — E78.2 MIXED HYPERLIPIDEMIA: ICD-10-CM

## 2021-07-30 DIAGNOSIS — I10 BENIGN ESSENTIAL HYPERTENSION: ICD-10-CM

## 2021-07-30 DIAGNOSIS — Z95.5 S/P CORONARY ARTERY STENT PLACEMENT: ICD-10-CM

## 2021-07-30 DIAGNOSIS — I25.5 ISCHEMIC CARDIOMYOPATHY: ICD-10-CM

## 2021-07-30 DIAGNOSIS — I21.09 ACUTE MYOCARDIAL INFARCTION OF ANTERIOR WALL (HCC): ICD-10-CM

## 2021-07-30 DIAGNOSIS — E11.8 TYPE 2 DIABETES MELLITUS WITH COMPLICATION (HCC): ICD-10-CM

## 2021-07-30 DIAGNOSIS — I25.5 ISCHEMIC CARDIOMYOPATHY: Primary | ICD-10-CM

## 2021-07-30 DIAGNOSIS — E66.01 MORBID OBESITY WITH BMI OF 40.0-44.9, ADULT (HCC): ICD-10-CM

## 2021-07-30 DIAGNOSIS — I63.412 CEREBROVASCULAR ACCIDENT (CVA) DUE TO EMBOLISM OF LEFT MIDDLE CEREBRAL ARTERY (HCC): ICD-10-CM

## 2021-07-30 DIAGNOSIS — I25.10 CHRONIC CORONARY ARTERY DISEASE: Primary | ICD-10-CM

## 2021-07-30 DIAGNOSIS — G47.33 OBSTRUCTIVE SLEEP APNEA SYNDROME: ICD-10-CM

## 2021-07-30 DIAGNOSIS — Z95.810 ICD (IMPLANTABLE CARDIOVERTER-DEFIBRILLATOR) IN PLACE: ICD-10-CM

## 2021-07-30 PROCEDURE — 99214 OFFICE O/P EST MOD 30 MIN: CPT | Performed by: NURSE PRACTITIONER

## 2021-07-30 PROCEDURE — 93000 ELECTROCARDIOGRAM COMPLETE: CPT | Performed by: NURSE PRACTITIONER

## 2021-07-30 PROCEDURE — 93290 INTERROG DEV EVAL ICPMS IP: CPT | Performed by: INTERNAL MEDICINE

## 2021-07-30 PROCEDURE — 93283 PRGRMG EVAL IMPLANTABLE DFB: CPT | Performed by: INTERNAL MEDICINE

## 2021-07-30 NOTE — PROGRESS NOTES
Date of Office Visit: 21  Encounter Provider: KEYONA Arvizu  Primary Cardiologist: Dr. Saldivar  Place of Service: [facility] CARDIOLOGY  Patient Name: Daisy Dent  :1960      Subjective:     Chief Complaint:  1 week post cath follow-up.    History of Present Illness:  Daisy Dent is a pleasant 60 y.o. female who I have seen once before.  Outside records have been requested and reviewed by me if available.     This is a patient of Dr. Saldivar with an extensive past medical history including coronary artery disease with multiple stents and recently 2021 had drug-eluting stent placement to another rein-stent stenosis of the proximal LAD for unstable angina, ischemic cardiomyopathy EF 35% with wall motion abnormalities on LV gram 2019, type 2 diabetes, obstructive sleep apnea, hypertension, dual-chamber ICD, dyslipidemia, prior left MCA stroke felt to be embolic now anticoagulated on apixaban, obesity    Dr. Saldivar began following the patient 2015 when she presented to [facility] care.       Dr. Ayala with [facility].  Her prior cardiac history includes a non-ST elevation MI and 2006 at which time she received a Cypher 3.0 x 23 mm stent to her mid LAD.  She then presented in 10/2009 with a late in-stent thrombosis that required repeat stenting of her mid LAD in addition to a second drug-eluting stent placement of her left circumflex artery.  Following that she had a repeat cardiac catheterization in  that was reportedly unremarkable.  Following her myocardial infarction in  she developed ischemic myopathy with an EF of around 25% and ultimately underwent AICD placement and 2010.  At some point in the course of her follow-ups her left ventricular function improved with an ejection fraction of 40-45%.  A stress test showed evidence of a large anterior infarct and minimal elva-infarct ischemia.    In follow-up she did fairly well  initially but then reported more episodes of chest discomfort and fatigue while exercising on treadmill and she underwent echocardiogram 3/2017 that showed EF of 40 to 45%.  She had a PET stress test performed 8/2017 that showed medium sized anterior wall infarct with mild elva-infarct ischemia and a post stress ejection fraction of 58%.    11/2017 her AICD was noted to be at CHELLE and she subsequently generator replacement 1/12/2018 with Dr. Ornelas.    9/2018 follow-up she was overall doing okay without new or worsening symptoms.    12/2018 patient presented with aphasia.  On arrival to the hospital she had CTA of the head that showed no acute stroke but perfusion scan showed a large perfusion mismatch and CTA showed evidence of a left MCA thrombus.  She was given TPA with improvement in her symptoms.  Was felt her stroke was likely embolic.  She had a repeat echocardiogram during that admission that showed moderately depressed LV systolic function with an EF of 30%, grade 1 diastolic dysfunction, no significant valvular disease.  Due to concerns for embolic stroke she was placed on rivaroxaban and her aspirin was stopped and she was continued on clopidogrel.    11/2019 patient reported substernal chest tightness and dyspnea on exertion similar to what she had with her prior MI but much milder.  She underwent a stress test that showed evidence of medium anterior infarct with moderate elva-infarct ischemia and were similar to prior stress test however the elva-infarct ischemia area appeared to have increased.  Initially medical management was tried but she continued to have symptoms so she had a repeat catheterization but presented to the hospital 1/6/2020 for procedure and was found to have severe in-stent restenosis of proximal LAD and ultimately underwent repeat drug-eluting stent placement of the in-stent restenosis.  She was noted to have moderate nonobstructive disease of the first obtuse marginal branch for  which she underwent FFR evaluation which was normal at 0.96    1/13/2020 I first saw her for a post PCI follow-up visit.  She had been feeling better since recent stenting.  Her cath site looked okay but she picked up a heavy box and appeared to have a muscle strain.  He was advised to follow-up with PCP up persisted.  She had intermittent nosebleeds happening every 2 weeks and her nasal passages were dry.  She was encouraged to try saline.  She was not measuring her blood pressure at home but was encouraged to do so.    In follow-up she had some mild episodes of chest discomfort that we decided to monitor.  She then returned for urgent follow-up on 10/22/2020 at which time she reported more severe episodes of chest discomfort that were occurring primarily at rest.  Due to the severity of her symptoms she was quite concerned she had repeat cardiac catheterization on 10/26/2020 and showed stable moderate nonobstructive coronary artery disease.    At follow-up appointment in 5/2021 she reported she was having increasing frequency of chest tightness that primarily occurred at nighttime and woke her up from sleep.  This was similar to what she experienced prior to her previous stents.  She was not having any exertional symptoms.  Symptoms can last anywhere from 30 minutes to an hour at a time.  She had only had to use nitroglycerin once. Dr. Saldivar discussed options at that time we opted to monitor her symptoms before considering further work-up and she was started on isosorbide mononitrate 30 mg.    7/8/2021 patient was last in the office to see Dr. Saldivar.  She continues to have episodes of chest discomfort.  She had been feeling more fatigued and tired.  She was unable to tolerate the isosorbide due to onset of severe headaches.   Due to her progressive unstable angina she was set up for cardiac catheterization advised to come to the ED if she had any chest discomfort not relieved with nitroglycerin.     7/19/2021  patient had cardiac catheterization that showed a 90% proximal in-stent restenosis of the proximal LAD, she had stents extending through the proximal to mid vessel and the remainder of the stents appeared to have 10 to 20% distal in-stent restenosis mid LAD beyond the stent was a small caliber with luminal irregularities and distal LAD was small caliber with no significant disease she had mid vessel circumflex stenting of the OM1 with no significant in-stent restenosis but the distal edge of the stent there was a 50 to 60% stenosis of the mid OM1 RFR of that lesion was unremarkable at 0.96 she had 20 to 30% diffuse proximal to mid RCA disease.  She had mildly elevated filling pressures and ischemic cardiomyopathy with an EF of around 35% with wall superior mildly hypokinetic and akinetic distal anterior wall and apex.    She is presenting today for post-cath follow-up.  She is overall feeling better.  She is not having any issues with her right radial catheterization site.  Her primary complaint is fatigue.  She states that she is just not sleeping well.  Her CPAP dries her nose out.  Sometimes she does not wear this.  Even on the night she wears it she is not sleeping well.  She denies any bleeding issues, falls, syncope, near syncope, lower extremity edema, palpitations, dyspnea, PND.  Her chest pain is overall better.  She still has some intermittent mild chest tightness rated 2/10 nonradiating that only last a couple seconds.  On occasion she will get a sharp pain in her left upper arm 2 that does not appear correlated.  She has not required any nitroglycerin.  This is much improved since her prior stent.  She has some dizziness which is primarily described as vertigo and not positional or orthostatic.  This is overall been better.  Apparently she did see ENT for this.  She has not had any strokelike symptoms but she reports that she will get a sharp stabbing pain lasting just a few seconds in the left side of  her head starting this month.  No other extremity weakness tingling speech difficulties or symptoms similar to when she presented with her MCA stroke.  She is not still following with neurology.  She is not checking her blood pressure and I asked her to do so.  She will follow up with her PCP.  She was not informed that her cholesterol was bad but saw her readings were high.  She states she has been compliant with her atorvastatin 80 mg nightly which has been managed by endocrinologist Dr. Fitch.  She was not aware of elevated liver enzymes.  She has been trying to lose weight and has lost a couple pounds.  Her diabetes has been under better control she plans on participating in cardiac rehab and is going to stop by there today to get her appointment set up.    Past Medical History:   Diagnosis Date   • Abdominal pain    • Abnormal liver function test    • Acute bronchitis    • Benign essential hypertension    • CAD (coronary artery disease)    • CHF (congestive heart failure) (CMS/HCC)    • Colon polyp    • Congestive heart disease (CMS/HCC)    • COPD (chronic obstructive pulmonary disease) (CMS/HCC)    • Cough    • Diabetes (CMS/HCC)    • Diabetes mellitus (CMS/HCC)    • Diarrhea    • Gastroenteritis    • Health care maintenance    • Hyperlipidemia    • Hypertension    • IFG (impaired fasting glucose)    • Ischemic cardiomyopathy    • Myocardial infarction (CMS/HCC)    • SHELLIE (obstructive sleep apnea)    • Sore throat    • Type 2 diabetes mellitus (CMS/HCC)    • Vaginal yeast infection    • Vitamin D deficiency      Past Surgical History:   Procedure Laterality Date   • CARDIAC CATHETERIZATION     • CARDIAC CATHETERIZATION N/A 1/6/2020    Procedure: Coronary angiography;  Surgeon: Oneida Saldivar MD;  Location: [facility] INVASIVE LOCATION;  Service: Cardiovascular   • CARDIAC CATHETERIZATION N/A 1/6/2020    Procedure: Left heart cath;  Surgeon: Oneida Saldivar MD;  Location: [facility] CATH INVASIVE LOCATION;   Service: Cardiovascular   • CARDIAC CATHETERIZATION N/A 1/6/2020    Procedure: Left ventriculography;  Surgeon: Oneida Saldivar MD;  Location[facility] C[facility]VASIVE LOC[facility];  Service: Cardiovascular   • CARDIAC CATHETERIZATION N/A 1/6/2020    Procedure: Percutaneous Coronary Intervention;  Surgeon: Oneida Saldivar MD;  Location: [facility] C[facility] INVASIVE LOCATION;  Service: Cardiovascular   • CARDIAC CATHETERIZATION N/A 1/6/2020    Procedure: Stent HUGO coronary;  Surgeon: Oneida Saldivar MD;  Location: [facility] CATH INVASIVE LOCATION;  Service: Cardiovascular   • CARDIAC CATHETERIZATION  1/6/2020    Procedure: Functional Flow [place];  Surgeon: Oneida Saldivar MD;  Location: [facility] C[facility] INVASIVE LOCATION;  Service: Cardiovascular   • CARDIAC CATHETERIZATION N/A 10/26/2020    Procedure: Coronary angiography;  Surgeon: Oneida Saldivar MD;  Location: [facility] C[facility] INVASIVE LOCATION;  Service: Cardiovascular;  Laterality: N/A;   • CARDIAC CATHETERIZATION N/A 10/26/2020    Procedure: Left heart cath;  Surgeon: Oneida Saldivar MD;  Location: [facility] CATH INVASIVE LOCATION;  Service: Cardiovascular;  Laterality: N/A;   • CARDIAC CATHETERIZATION N/A 10/26/2020    Procedure: Left ventriculography;  Surgeon: Oneida Saldivar MD;  Location: [facility] C[facility] INVASIVE LOCATION;  Service: Cardiovascular;  Laterality: N/A;   • CARDIAC CATHETERIZATION  10/26/2020    Procedure: Functional Flow [place];  Surgeon: Oneida Saldivar MD;  Location: [facility] CATH INVASIVE LOCATION;  Service: Cardiovascular;;   • CARDIAC CATHETERIZATION N/A 7/19/2021    Procedure: Coronary angiography;  Surgeon: Oneida Saldivar MD;  Location: [facility] CATH INVASIVE LOCATION;  Service: Cardiovascular;  Laterality: N/A;   • CARDIAC CATHETERIZATION N/A 7/19/2021    Procedure: Left heart cath;  Surgeon: Oneida Saldivar MD;  Location: [facility] CATH INVASIVE LOCATION;  Service: Cardiovascular;  Laterality: N/A;   • CARDIAC CATHETERIZATION N/A 7/19/2021    Procedure: Left  ventriculography;  Surgeon: Oneida Saldivar MD;  Location:  [facility] CATH INVASIVE LOCATION;  Service: Cardiovascular;  Laterality: N/A;   • CARDIAC CATHETERIZATION N/A 7/19/2021    Procedure: Percutaneous Coronary Intervention;  Surgeon: Oneida Saldivar MD;  [facility];  Service: Cardiovascular;  Laterality: N/A;   • CARDIAC CATHETERIZATION N/A 7/19/2021    Procedure: Optical Coherent Tomography;  Surgeon: Oneida Saldivar MD;  [facility];  Service: Cardiovascular;  Laterality: N/A;   • CARDIAC CATHETERIZATION N/A 7/19/2021    Procedure: Stent HUGO coronary;  Surgeon: Oneida Saldivar MD;  [facility];  Service: Cardiovascular;  Laterality: N/A;   • CARDIAC CATHETERIZATION  7/19/2021    Procedure: RESTING FULL CYCLE RATIO;  Surgeon: Oneida Saldivar MD;  Location: [facility] CATH INVASIVE LOCATION;  Service: Cardiovascular;;  RFR   • CARDIAC ELECTROPHYSIOLOGY PROCEDURE N/A 1/12/2018    Procedure: ICD DC generator change  medtronic;  Surgeon: Hany Ornelas MD;  Location: [facility] CATH INVASIVE LOCATION;  Service:    • PACEMAKER IMPLANTATION     • TUBAL ABDOMINAL LIGATION       Outpatient Medications Prior to Visit   Medication Sig Dispense Refill   • Accu-Chek FastClix Lancets misc Use to test blood sugar 4 times daily  E11.8 400 each 1   • Accu-Chek Guide test strip Use 4 times a day; e11.8 400 each 1   • albuterol sulfate  (90 Base) MCG/ACT inhaler TAKE 2 PUFFS BY MOUTH EVERY 4 HOURS AS NEEDED     • atorvastatin (LIPITOR) 80 MG tablet Take 1 tablet by mouth Daily. 90 tablet 1   • Blood Glucose Monitoring Suppl (Accu-Chek Guide) w/Device kit Inject 1 Device under the skin into the appropriate area as directed Daily. 1 kit 0   • carvedilol (COREG) 25 MG tablet Take 1 tablet by mouth 2 (Two) Times a Day With Meals. 180 tablet 2   • Cholecalciferol (VITAMIN D3) 2000 UNITS capsule Take 1,000 Units by mouth Daily.     •  clopidogrel (PLAVIX) 75 MG tablet TAKE 1 TABLET BY MOUTH EVERY DAY 90 tablet 3   • Dulaglutide (Trulicity) 3 MG/0.5ML solution pen-injector Inject 3 mg under the skin into the appropriate area as directed 1 (One) Time Per Week. 4 pen 5   • furosemide (LASIX) 40 MG tablet TAKE 1 TABLET BY MOUTH DAILY. RESUME ON 20 90 tablet 1   • Insulin Pen Needle 32G X 4 MM misc USE 4 TIMES A  each 3   • metFORMIN (GLUCOPHAGE) 1000 MG tablet Take 1 tablet by mouth Every Evening. Resume on 2021. 90 tablet 3   • Multiple Vitamins-Minerals (MULTIVITAL PO) Take 1 tablet by mouth Daily.     • nitroglycerin (Nitrostat) 0.4 MG SL tablet Place 1 tablet under the tongue Every 5 (Five) Minutes As Needed for Chest Pain. Take no more than 3 doses in 15 minutes. 30 tablet 11   • Omega-3 Fatty Acids (FISH OIL) 1200 MG capsule capsule Take 1,200 mg by mouth Daily With Breakfast.     • potassium chloride 10 MEQ CR tablet Take 1 tablet by mouth Daily. Resume on 20 90 tablet 1   • rivaroxaban (Xarelto) 20 MG tablet Take 1 tablet by mouth Daily With Dinner. Resume on 2021 90 tablet 1   • spironolactone (ALDACTONE) 25 MG tablet TAKE 1 TABLET BY MOUTH EVERY DAY 90 tablet 1   • vitamin B-12 (CYANOCOBALAMIN) 500 MCG tablet Take 500 mcg by mouth Daily.       No facility-administered medications prior to visit.       Allergies as of 2021   • (No Known Allergies)     Social History     Socioeconomic History   • Marital status:      Spouse name: Not on file   • Number of children: Not on file   • Years of education: Not on file   • Highest education level: Not on file   Tobacco Use   • Smoking status: Former Smoker     Packs/day: 1.00     Years: 30.00     Pack years: 30.00     Types: Cigarettes     Start date: 1979     Quit date: 2009     Years since quittin.7   • Smokeless tobacco: Never Used   • Tobacco comment: QUIT 10 YRS   Substance and Sexual Activity   • Alcohol use: Yes     Comment: rarely uses  "ETOH/caffeine use   • Drug use: No   • Sexual activity: Yes     Partners: Male     Family History   Problem Relation Age of Onset   • Diabetes Mother    • Heart disease Mother    • Hyperlipidemia Mother    • Diabetes Father    • Heart disease Father    • Hyperlipidemia Father    • Heart disease Brother    • Hyperlipidemia Brother    • Hypertension Father    • Colon cancer Father    • Heart attack Father    • Emphysema Mother    • Heart disease Brother    • Heart attack Brother    • Heart disease Maternal Grandmother    • Heart disease Maternal Grandfather    • Heart disease Paternal Grandmother    • Heart disease Paternal Grandfather      Review of Systems   Constitutional: Positive for malaise/fatigue. Negative for chills, fever, weight gain and weight loss.   Eyes: Negative for visual disturbance.   Cardiovascular: Positive for chest pain. Negative for dyspnea on exertion, irregular heartbeat, leg swelling, near-syncope, orthopnea, palpitations, paroxysmal nocturnal dyspnea and syncope.   Respiratory: Negative for cough, shortness of breath, snoring and wheezing.    Hematologic/Lymphatic: Negative for bleeding problem. Does not bruise/bleed easily.   Skin: Negative for color change.   Musculoskeletal: Negative for falls, joint pain and myalgias.   Gastrointestinal: Negative for diarrhea, melena, nausea and vomiting.   Genitourinary: Negative for hematuria.   Neurological: Positive for excessive daytime sleepiness, dizziness, headaches and vertigo.   Psychiatric/Behavioral: Negative for depression. The patient is not nervous/anxious.           Objective:     Vitals:    07/30/21 1012   BP: 124/80   BP Location: Left arm   Patient Position: Sitting   Pulse: 73   SpO2: 96%   Weight: 91.5 kg (201 lb 12.8 oz)   Height: 152.4 cm (60\")     Body mass index is 39.41 kg/m².    PHYSICAL EXAM:  Vitals and nursing note reviewed.   Constitutional:       General: Not in acute distress.     Appearance: Well-developed and not in " distress. Obese. Not diaphoretic.   HENT:      Head: Normocephalic and atraumatic.   Neck:      Vascular: No carotid bruit.      Comments: Difficult to assess for JVD due to body habitus  Pulmonary:      Effort: Pulmonary effort is normal. No respiratory distress.      Breath sounds: Normal breath sounds. No decreased breath sounds. No wheezing. No rhonchi. No rales.   Cardiovascular:      Normal rate. Regular rhythm.      Murmurs: There is no murmur.      Comments: Right radial cath site well healed without erythema or ecchymosis, palpable proximal and distal pulses, good capillary refill, good motor function of hand, no thrill or bruit detected, site is soft and non-tender with no hematoma, no sensory deficits noted.    Pulses:     Radial: 2+ bilaterally.  Edema:     Peripheral edema absent.   Abdominal:      General: Bowel sounds are normal. There is no distension.      Palpations: Abdomen is soft.      Tenderness: There is no abdominal tenderness.   Skin:     General: Skin is warm and dry.      Findings: No erythema.   Neurological:      Mental Status: Alert and oriented to person, place, and time.   Psychiatric:         Attention and Perception: Attention normal.         Mood and Affect: Mood normal.         Speech: Speech normal.         Behavior: Behavior normal.         Thought Content: Thought content normal.         Judgment: Judgment normal.           ECG 12 Lead    Date/Time: 7/30/2021 10:08 AM  Performed by: Tahmina Lacy APRN  Authorized by: Tahmina Lacy APRN   Comparison: compared with previous ECG from 7/19/2021  Similar to previous ECG  Rhythm: sinus rhythm  Rate: normal  BPM: 73  Q waves: V2 and V3    T inversion: aVL  QRS axis: normal    Clinical impression: abnormal EKG  Comments: Indication: CAD with recent stenting, ischemic cardiomyopathy, ICD            Most recent lab review:  7/7/2021 lipid panel very poorly controlled , HDL 30, total cholesterol 241, triglycerides 255, VLDL  51, CBC with mildly elevated RBCs 5.39 the rest within normal limit, CMP GFR slightly low 57% LFTs elevated AST 48 (46), ALT 83 (69), hemoglobin A1c 6.8%, TSH normal 1.070  7/16/2021 BMP normal glucose elevated 138, CBC normal except for minimal elevation of RBCs 5.29  Assessment:       Diagnosis Plan   1. Chronic coronary artery disease  ECG 12 Lead   2. S/P coronary artery stent placement  ECG 12 Lead   3. Ischemic cardiomyopathy  ECG 12 Lead   4. ICD (implantable cardioverter-defibrillator) in place  ECG 12 Lead   5. History of anterior myocardial infarction   ECG 12 Lead   6. Benign essential hypertension  ECG 12 Lead   7. Mixed hyperlipidemia  ECG 12 Lead   8. Cerebrovascular accident (CVA) due to embolism of left middle cerebral artery (CMS/HCC)  ECG 12 Lead   9. Obstructive sleep apnea syndrome  ECG 12 Lead   10. Type 2 diabetes mellitus with complication (CMS/[facility])  ECG 12 Lead   11. Morbid obesity with BMI of 40.0-44.9, adult (CMS/[facility])  ECG 12 Lead       Plan:     1. Coronary artery disease with recurrent angina and abnormal stress test: She has had multiple stents placed and most recently had drug-eluting stent placement to the in-stent restenosis of the proximal LAD 7/19/2021.  She is on Plavix and rivaroxaban.  She is on high intensity statin. She still gets a intermittent more mild nonradiating chest tightness that lasts just a couple seconds but overall better. This is happening with rest not with exertion.   2.  Ischemic cardiomyopathy: EF was around 35% with wall motion abnormalities on 7/19 LV gram.  She is on carvedilol and spironolactone but has not been on ACE inhibitor or ARB in the past due to low blood pressures.  Her blood pressure stable today and I would like her to check her blood pressures at home and let us know what they are running as with her diabetes she would likely benefit from an ACE inhibitor.  Her renal function And potassium were normal on her most recent labs 7/16/2021. Volume  status stable on furosemide.   3.  Hypertension: Blood pressure is stable today.  Encouraged her to monitor at home.  4.  Hyperlipidemia: Very poorly controlled on 7/2021 labs.  She is on atorvastatin 80 mg daily managed by endocrinology.  5.  Type 2 diabetes: Managed by endocrinology Dr. Fitch. Last A1c stable 6.8% 7/2021  6.  AICD placement: For cardiomyopathy.  Continue with routine device checks through the pacemaker department. She has not had a check in a wihle so we will have this checked today.  Device report today per verbal from Lucia pacemaker RN there were no events and device function was normal and no concerns with battery life.  She did apparently have an episode where her fluid levels looked up a little bit a while back but this has returned to normal.  They have now got her set up to make sure home monitoring is working properly.  7.  History of left MCA stroke: [place] to be embolic.  She is on anticoagulated with rivaroxaban.  Denies any recurrent strokelike symptoms.  She did have some headaches prior to her stroke so she is concerned.  I have asked her to follow-up with her PCP.  8.  SHELLIE: Mostly compliant with CPAP.  She follows with sleep medicine Dr. Blackburn and is due to see him at the end of the year.  Given her fatigue and trouble with her mask drying her out I recommended that she follow-up with him sooner.  9.  Morbid obesity: Would benefit from regular physical activity and weight loss.  BMI is 39.41.  10. Elevated LFTS: This appears to have been ongoing since 2018 at least but patient reports she was not aware.  ALT was 83, AST 48 7/2021 labs.  I have asked her to follow up with PCP to further investigate.  Possibly fatty liver due to her obesity and comorbid conditions?.  She needs better cholesterol control but I am a little hesitant to add medications due to her elevated LFTs that had trended up from the time prior.  In the past it appears that her AST and ALT had been even higher than it  was her recent labs.  11. Fatigue: Likely multifactorial.  She was not anemic.  Could be medication related or due to her not sleeping well.  She will discuss with sleep medicine.  12. Dizziness: Questionably related to vertigo. Chronic. Unchanged. No syncope or near syncope or falls.  Does not sound positional or orthostatic.  Monitor blood pressure as below.  13. Headaches: Sharp pain intermittent pain left posterior head lasting a few minutes. Not checking blood pressure at home.  I have asked her to do so and let us know as high or low readings however her blood pressure is stable today.      I advised on the importance of medication compliance, good blood pressure, blood sugar and cholesterol control, as well as heart healthy diet, regular exercise and avoidance of tobacco for cardiovascular disease risk factor modification.   Bleeding and fall/safety precautions with anticoagulation reviewed.  Fortunately she denies any bleeding issues or falls on her Plavix and rivaroxaban.  She is overall doing better.  I have encouraged her to follow-up with her PCP for noncardiac complaints and to look into her elevated liver enzymes.  She needs better cholesterol control especially with her cardiac history and recurrent in-stent restenosis and there would be consideration for adding Zetia and/or PCSK9 inhibitor to get her LDL to goal of less than 70.  She is going to discuss this with endocrinology who has been following her lipid panel.  When she lets us know about her blood pressure consider adding ACE inhibitor for cardiomyopathy and history of diabetes if her blood pressure is not too low.    Follow up with Dr. Saldivar in 4 weeks, unless otherwise needed sooner.  I advised the patient to contact our office with any questions or concerns.  She denied the need for any medication refills today.    It has been a pleasure to participate in this patient's care. Please feel free to contact me with any questions or concerns.      Tahmina Lacy, KEYONA  07/30/21             Your medication list          Accurate as of July 30, 2021 10:29 AM. If you have any questions, ask your nurse or doctor.            CONTINUE taking these medications      Instructions Last Dose Given Next Dose Due   Accu-Chek FastClix Lancets misc      Use to test blood sugar 4 times daily  E11.8       Accu-Chek Guide test strip  Generic drug: glucose blood      Use 4 times a day; e11.8       Accu-Chek Guide w/Device kit      Inject 1 Device under the skin into the appropriate area as directed Daily.       albuterol sulfate  (90 Base) MCG/ACT inhaler  Commonly known as: PROVENTIL HFA;VENTOLIN HFA;PROAIR HFA      TAKE 2 PUFFS BY MOUTH EVERY 4 HOURS AS NEEDED       atorvastatin 80 MG tablet  Commonly known as: LIPITOR      Take 1 tablet by mouth Daily.       carvedilol 25 MG tablet  Commonly known as: COREG      Take 1 tablet by mouth 2 (Two) Times a Day With Meals.       clopidogrel 75 MG tablet  Commonly known as: PLAVIX      TAKE 1 TABLET BY MOUTH EVERY DAY       fish oil 1200 MG capsule capsule      Take 1,200 mg by mouth Daily With Breakfast.       furosemide 40 MG tablet  Commonly known as: LASIX      TAKE 1 TABLET BY MOUTH DAILY. RESUME ON 1/7/20       Insulin Pen Needle 32G X 4 MM misc      USE 4 TIMES A DAY       metFORMIN 1000 MG tablet  Commonly known as: GLUCOPHAGE      Take 1 tablet by mouth Every Evening. Resume on 7/21/2021.       multivitamin with minerals tablet tablet      Take 1 tablet by mouth Daily.       nitroglycerin 0.4 MG SL tablet  Commonly known as: Nitrostat      Place 1 tablet under the tongue Every 5 (Five) Minutes As Needed for Chest Pain. Take no more than 3 doses in 15 minutes.       potassium chloride 10 MEQ CR tablet      Take 1 tablet by mouth Daily. Resume on 1/7/20       rivaroxaban 20 MG tablet  Commonly known as: Xarelto      Take 1 tablet by mouth Daily With Dinner. Resume on 7/21/2021       spironolactone 25 MG  tablet  Commonly known as: ALDACTONE      TAKE 1 TABLET BY MOUTH EVERY DAY       Trulicity 3 MG/0.5ML solution pen-injector  Generic drug: Dulaglutide      Inject 3 mg under the skin into the appropriate area as directed 1 (One) Time Per Week.       vitamin B-12 500 MCG tablet  Commonly known as: CYANOCOBALAMIN      Take 500 mcg by mouth Daily.       Vitamin D3 50 MCG (2000 UT) capsule      Take 1,000 Units by mouth Daily.              The above medication changes may not have been made by this provider.  Medication list was updated to reflect medications patient is currently taking including medication changes and discontinuations made by other healthcare providers or the patients themselves.     Dictated utilizing Dragon Dictation System.

## 2021-08-04 ENCOUNTER — TELEPHONE (OUTPATIENT)
Dept: CARDIAC REHAB | Facility: HOSPITAL | Age: 61
End: 2021-08-04

## 2021-08-06 DIAGNOSIS — I25.5 ISCHEMIC CARDIOMYOPATHY: ICD-10-CM

## 2021-08-06 RX ORDER — CARVEDILOL 25 MG/1
TABLET ORAL
Qty: 180 TABLET | Refills: 2 | Status: SHIPPED | OUTPATIENT
Start: 2021-08-06 | End: 2022-05-09

## 2021-08-20 PROCEDURE — 93296 REM INTERROG EVL PM/IDS: CPT | Performed by: INTERNAL MEDICINE

## 2021-08-20 PROCEDURE — 93295 DEV INTERROG REMOTE 1/2/MLT: CPT | Performed by: INTERNAL MEDICINE

## 2021-09-03 ENCOUNTER — OFFICE VISIT (OUTPATIENT)
Dept: CARDIOLOGY | Facility: CLINIC | Age: 61
End: 2021-09-03

## 2021-09-03 VITALS
WEIGHT: 200.8 LBS | BODY MASS INDEX: 39.42 KG/M2 | DIASTOLIC BLOOD PRESSURE: 82 MMHG | HEART RATE: 74 BPM | HEIGHT: 60 IN | OXYGEN SATURATION: 97 % | SYSTOLIC BLOOD PRESSURE: 108 MMHG

## 2021-09-03 DIAGNOSIS — I21.09 ANTERIOR MYOCARDIAL INFARCTION (HCC): ICD-10-CM

## 2021-09-03 DIAGNOSIS — E11.8 TYPE 2 DIABETES MELLITUS WITH COMPLICATION (HCC): ICD-10-CM

## 2021-09-03 DIAGNOSIS — E78.2 MIXED HYPERLIPIDEMIA: ICD-10-CM

## 2021-09-03 DIAGNOSIS — Z95.810 ICD (IMPLANTABLE CARDIOVERTER-DEFIBRILLATOR) IN PLACE: ICD-10-CM

## 2021-09-03 DIAGNOSIS — I25.5 ISCHEMIC CARDIOMYOPATHY: ICD-10-CM

## 2021-09-03 DIAGNOSIS — Z95.5 S/P CORONARY ARTERY STENT PLACEMENT: ICD-10-CM

## 2021-09-03 DIAGNOSIS — I50.22 CHRONIC SYSTOLIC CONGESTIVE HEART FAILURE (HCC): ICD-10-CM

## 2021-09-03 DIAGNOSIS — Z86.73 HISTORY OF CVA (CEREBROVASCULAR ACCIDENT) WITHOUT RESIDUAL DEFICITS: ICD-10-CM

## 2021-09-03 DIAGNOSIS — G47.33 OBSTRUCTIVE SLEEP APNEA SYNDROME: ICD-10-CM

## 2021-09-03 DIAGNOSIS — I25.10 CHRONIC CORONARY ARTERY DISEASE: Primary | ICD-10-CM

## 2021-09-03 PROCEDURE — 99214 OFFICE O/P EST MOD 30 MIN: CPT | Performed by: INTERNAL MEDICINE

## 2021-09-03 PROCEDURE — 93000 ELECTROCARDIOGRAM COMPLETE: CPT | Performed by: INTERNAL MEDICINE

## 2021-09-03 NOTE — PROGRESS NOTES
Subjective:     Encounter Date:09/03/2021      Patient ID: Daisy Dent is a 60 y.o. female.    Chief Complaint:  History of Present Illness    This is a 60-year-old female with a history of ischemic cardiomyopathy, last ejection fraction of 40-45%, diabetes mellitus type 2, obstructive sleep apnea on CPAP, hypertension, status post dual-chamber AICD placement, dyslipidemia, status post left MCA stroke, coronary artery disease with a history of prior anterior myocardial infarction and LAD stent placement (in 7/2006, 10/2009, 11/2019, and 7/2021), who presents for follow-up.     She began reporting increasing frequency of chest tightness that primarily occurred at night and woke her up from sleep starting in 5/2021.  She reported this was similar to her prior anginal symptoms.  I started her on isosorbide mononitrate 30 mg daily but she is unable to tolerate it due to the onset of severe headaches.  When she presented back for follow-up in 7/2021 she was continuing to have episodes of nocturnal discomfort that had progressed.  At that point I recommended proceeding with a repeat cardiac catheterization.     She presented for cardiac catheterization on 7/19/2021.  This showed 90% proximal in-stent restenosis and she underwent repeat drug-eluting stent placement using a 3.0 x 12 mm stent.  She was also noted to have moderate nonobstructive disease of the first obtuse marginal branch with a normal RFR.    She was seen back in the office by KEYONA Demarco on 7/30/2021 at which time she was feeling better.  She was still having issues with fatigue and some intermittent chest tightness that only lasted a couple seconds.  It was noted around the time that she had mildly elevated liver enzymes which were unchanged compared to prior evaluations.  Was also noted that her last lipid panel on 7/7/2021 showed that her cholesterol was poorly controlled with an LDL of around 160 despite being on high-dose  atorvastatin.    Today she reports she has had no further episodes of chest tightness.  She has brief episodes of palpitations.  She denies any shortness of breath, orthopnea, near-syncope or syncope, or lower extremity edema.  She has been having issues with dizziness which is similar to symptoms she has had in the past with vertigo.  She is planning on making an appointment with her ENT.  She is been having issues with fatigue.  She reports that she can go out grocery shopping but have to come home and lay down.  She has been checking her blood pressures and reports that they have been well controlled.  She does not recall if she has been having lower blood pressures than normal.    Prior History:  I began following the patient in 11/2015 when she presented to establish care.  Prior to that she was followed by Dr. Ayala with [facility].  Her prior cardiac history includes a non-ST elevation MI and 7/2006 at which time she received a Cypher 3.0 x 23 mm stent to her mid LAD.  She then presented in 10/2009 with a late in-stent thrombosis that required repeat stenting of her mid LAD in addition to a second drug-eluting stent placement of her left circumflex artery.  Following that she had a repeat cardiac catheterization in 2010 that was reportedly unremarkable.  Following her myocardial infarction in 2009 she developed ischemic myopathy with an EF of around 25% and ultimately underwent AICD placement and 7/2010.  At some point in the course of her follow-ups her left ventricular function improved with an ejection fraction of 40-45%.  A stress test showed evidence of a large anterior infarct and minimal elva-infarct ischemia.     In her follow-ups she has done fairly well recently when she reported more fatigued than normal and episodes of chest discomfort that occurred while she was exercising on the treadmill.  She underwent  an echocardiogram in 3/2017 that showed an ejection fraction of 40-45%.  Due to  continued symptoms we went ahead and proceeded with a PET stress test which was performed in 8/2017 that showed medium sized anterior wall infarct with mild elva-infarct ischemia and a post stress ejection fraction of 58%.      In 11/2017 her AICD was noted to be at [facility].  She subsequently underwent generator replacement on 1/12/2018 with Dr. Ornelas. When I saw her back in follow up in 9/2018 she reported that she denied any new or worsening symptoms.       She was admitted and 12/2018 after she presented with aphasia.  On arrival to the hospital she underwent a CT of the head that showed no acute stroke but a perfusion scan showed a large perfusion mismatch.  A CTA showed evidence of a left MCA thrombus.  She was given TPA at this time with improvement in her symptoms.  Neurology felt that her stroke was likely embolic.  She underwent a repeat echocardiogram during her admission that showed moderately depressed left ventricular systolic function with an EF of 30%, grade 1 diastolic dysfunction, and no significant valvular disease.  Based on the likelihood that this was an embolic stroke I opted to go ahead and recommend anticoagulation.  She was subsequently started on rivaroxaban 20 mg a day.  Her aspirin was stopped and she was continued on clopidogrel.     In 11/2019 she reported a couple weeks of episodes of substernal chest tightness lasting 1 to 2 minutes and dyspnea on exertion.  She reported that the symptoms were similar to what she had at the time of her prior myocardial infarctions but reports that its much milder than what she experienced at that time.    Following that office visit she underwent a stress test that showed evidence of a medium anterior infarct with moderate elva-infarct ischemia.  The findings were similar to prior stress test however the amount of elva-infarct ischemia had impaired to increase.  We initially tried to treat her medically however she continued to have recurrent symptoms  so we opted to proceed with a cardiac catheterization.  She presented to the hospital on 1/6/2020 for the procedure.  She was found to have severe in-stent restenosis of her proximal LAD stent and ultimately underwent repeat drug-eluting stent placement of the in-stent restenosis.  She was also noted to have moderate nonobstructive disease of the first obtuse marginal branch for which she underwent FFR evaluation which was normal at 0.96.     In follow-up she had some mild episodes of chest discomfort that we decided to monitor.  She then returned for urgent follow-up on 10/22/2020 at which time she reported more severe episodes of chest discomfort that were occurring primarily at rest.  Due to the severity of her symptoms she was quite concerned and we decided to proceed with a cardiac catheterization.  This was performed on 10/26/2020 and showed stable moderate nonobstructive coronary artery disease.    Review of Systems   Constitutional: Negative for malaise/fatigue.   HENT: Negative for hearing loss, hoarse voice, nosebleeds and sore throat.    Eyes: Negative for pain.   Cardiovascular: Positive for palpitations. Negative for chest pain, claudication, cyanosis, dyspnea on exertion, irregular heartbeat, leg swelling, near-syncope, orthopnea, paroxysmal nocturnal dyspnea and syncope.   Respiratory: Negative for shortness of breath and snoring.    Endocrine: Negative for cold intolerance, heat intolerance, polydipsia, polyphagia and polyuria.   Skin: Negative for itching and rash.   Musculoskeletal: Negative for arthritis, falls, joint pain, joint swelling, muscle cramps, muscle weakness and myalgias.   Gastrointestinal: Negative for constipation, diarrhea, dysphagia, heartburn, hematemesis, hematochezia, melena, nausea and vomiting.   Genitourinary: Negative for frequency, hematuria and hesitancy.   Neurological: Positive for dizziness. Negative for excessive daytime sleepiness, headaches, light-headedness,  numbness and weakness.   Psychiatric/Behavioral: Negative for depression. The patient is not nervous/anxious.          Current Outpatient Medications:   •  Accu-Chek FastClix Lancets misc, Use to test blood sugar 4 times daily  E11.8, Disp: 400 each, Rfl: 1  •  Accu-Chek Guide test strip, Use 4 times a day; e11.8, Disp: 400 each, Rfl: 1  •  albuterol sulfate  (90 Base) MCG/ACT inhaler, TAKE 2 PUFFS BY MOUTH EVERY 4 HOURS AS NEEDED, Disp: , Rfl:   •  atorvastatin (LIPITOR) 80 MG tablet, Take 1 tablet by mouth Daily., Disp: 90 tablet, Rfl: 1  •  Blood Glucose Monitoring Suppl (Accu-Chek Guide) w/Device kit, Inject 1 Device under the skin into the appropriate area as directed Daily., Disp: 1 kit, Rfl: 0  •  carvedilol (COREG) 25 MG tablet, TAKE 1 TABLET BY MOUTH TWICE A DAY WITH MEALS, Disp: 180 tablet, Rfl: 2  •  Cholecalciferol (VITAMIN D3) 2000 UNITS capsule, Take 1,000 Units by mouth Daily., Disp: , Rfl:   •  clopidogrel (PLAVIX) 75 MG tablet, TAKE 1 TABLET BY MOUTH EVERY DAY, Disp: 90 tablet, Rfl: 3  •  Dulaglutide (Trulicity) 3 MG/0.5ML solution pen-injector, Inject 3 mg under the skin into the appropriate area as directed 1 (One) Time Per Week., Disp: 4 pen, Rfl: 5  •  furosemide (LASIX) 40 MG tablet, TAKE 1 TABLET BY MOUTH DAILY. RESUME ON 1/7/20, Disp: 90 tablet, Rfl: 1  •  Insulin Pen Needle 32G X 4 MM misc, USE 4 TIMES A DAY, Disp: 400 each, Rfl: 3  •  metFORMIN (GLUCOPHAGE) 1000 MG tablet, Take 1 tablet by mouth Every Evening. Resume on 7/21/2021., Disp: 90 tablet, Rfl: 3  •  Multiple Vitamins-Minerals (MULTIVITAL PO), Take 1 tablet by mouth Daily., Disp: , Rfl:   •  nitroglycerin (Nitrostat) 0.4 MG SL tablet, Place 1 tablet under the tongue Every 5 (Five) Minutes As Needed for Chest Pain. Take no more than 3 doses in 15 minutes., Disp: 30 tablet, Rfl: 11  •  Omega-3 Fatty Acids (FISH OIL) 1200 MG capsule capsule, Take 1,200 mg by mouth Daily With Breakfast., Disp: , Rfl:   •  potassium chloride 10  MEQ CR tablet, Take 1 tablet by mouth Daily. Resume on 1/7/20, Disp: 90 tablet, Rfl: 1  •  rivaroxaban (Xarelto) 20 MG tablet, Take 1 tablet by mouth Daily With Dinner. Resume on 7/21/2021, Disp: 90 tablet, Rfl: 1  •  spironolactone (ALDACTONE) 25 MG tablet, TAKE 1 TABLET BY MOUTH EVERY DAY, Disp: 90 tablet, Rfl: 1  •  vitamin B-12 (CYANOCOBALAMIN) 500 MCG tablet, Take 500 mcg by mouth Daily., Disp: , Rfl:     Past Medical History:   Diagnosis Date   • Abdominal pain    • Abnormal liver function test    • Acute bronchitis    • Benign essential hypertension    • CAD (coronary artery disease)    • CHF (congestive heart failure) (CMS/HCC)    • Colon polyp    • Congestive heart disease (CMS/HCC)    • COPD (chronic obstructive pulmonary disease) (CMS/HCC)    • Cough    • Diabetes (CMS/HCC)    • Diabetes mellitus (CMS/HCC)    • Diarrhea    • Gastroenteritis    • Health care maintenance    • Hyperlipidemia    • Hypertension    • IFG (impaired fasting glucose)    • Ischemic cardiomyopathy    • Myocardial infarction (CMS/HCC)    • SHELLIE (obstructive sleep apnea)    • Sore throat    • Type 2 diabetes mellitus (CMS/HCC)    • Vaginal yeast infection    • Vitamin D deficiency        Past Surgical History:   Procedure Laterality Date   • CARDIAC CATHETERIZATION     • CARDIAC CATHETERIZATION N/A 1/6/2020    Procedure: Coronary angiography;  Surgeon: Oneida Saldivar MD;  Location: [facility] CATH INVASIVE LOCATION;  Service: Cardiovascular   • CARDIAC CATHETERIZATION N/A 1/6/2020    Procedure: Left heart cath;  Surgeon: Oneida Saldivar MD;  Location: [facility] CATH INVASIVE LOCATION;  Service: Cardiovascular   • CARDIAC CATHETERIZATION N/A 1/6/2020    Procedure: Left ventriculography;  Surgeon: Oneida Sladivar MD;  Location: [facility] CATH INVASIVE LOCATION;  Service: Cardiovascular   • CARDIAC CATHETERIZATION N/A 1/6/2020    Procedure: Percutaneous Coronary Intervention;  Surgeon: Oneida Saldivar MD;  Location: [facility] CATH INVASIVE  LOCATION;  Service: Cardiovascular   • CARDIAC CATHETERIZATION N/A 1/6/2020    Procedure: Stent HUGO coronary;  Surgeon: Oneida Saldivar MD;  Location:  [facility] CATH INVASIVE LOCATION;  Service: Cardiovascular   • CARDIAC CATHETERIZATION  1/6/2020    Procedure: Functional Flow [place];  Surgeon: Oneida Saldivar MD;  Location:  [facility] CATH INVASIVE LOCATION;  Service: Cardiovascular   • CARDIAC CATHETERIZATION N/A 10/26/2020    Procedure: Coronary angiography;  Surgeon: Oneida Saldivar MD;  [facility]AS[facility] LOC[facility];  Service: Cardiovascular;  Laterality: N/A;   • CARDIAC CATHETERIZATION N/A 10/26/2020    Procedure: Left heart cath;  Surgeon: Oneida Saldivar MD;  [facility]ASIVE LOC[facility];  Service: Cardiovascular;  Laterality: N/A;   • CARDIAC CATHETERIZATION N/A 10/26/2020    Procedure: Left ventriculography;  Surgeon: Oneida Saldivar MD;  [facility] C[facility]VASIVE LOC[facility];  Service: Cardiovascular;  Laterality: N/A;   • CARDIAC CATHETERIZATION  10/26/2020    Procedure: Functional Flow [place];  Surgeon: Oneida Saldivar MD;  [facility]ASIVE LOC[facility];  Service: Cardiovascular;;   • CARDIAC CATHETERIZATION N/A 7/19/2021    Procedure: Coronary angiography;  Surgeon: Oneida Saldivar MD;  Location: [facility] CATH INVASIVE LOCATION;  Service: Cardiovascular;  Laterality: N/A;   • CARDIAC CATHETERIZATION N/A 7/19/2021    Procedure: Left heart cath;  Surgeon: Oneida Saldivar MD;  Location: [facility] CATH INVASIVE LOCATION;  Service: Cardiovascular;  Laterality: N/A;   • CARDIAC CATHETERIZATION N/A 7/19/2021    Procedure: Left ventriculography;  Surgeon: Oneida Saldivar MD;  [facility];  Service: Cardiovascular;  Laterality: N/A;   • CARDIAC CATHETERIZATION N/A 7/19/2021    Procedure: Percutaneous Coronary Intervention;  Surgeon: Oneida Saldivar MD;  [facility];  Service: Cardiovascular;  Laterality: N/A;   • CARDIAC  CATHETERIZATION N/A 2021    Procedure: Optical Coherent Tomography;  Surgeon: Oneida Saldivar MD;  Location:  [facility] CATH INVASIVE LOCATION;  Service: Cardiovascular;  Laterality: N/A;   • CARDIAC CATHETERIZATION N/A 2021    Procedure: Stent HUGO coronary;  Surgeon: Oneida Saldivar MD;  Location:  [facility] CATH INVASIVE LOCATION;  Service: Cardiovascular;  Laterality: N/A;   • CARDIAC CATHETERIZATION  2021    Procedure: RESTING FULL CYCLE RATIO;  Surgeon: Oneida Saldivar MD;  Location:  [facility] CATH INVASIVE LOCATION;  Service: Cardiovascular;;  RFR   • CARDIAC ELECTROPHYSIOLOGY PROCEDURE N/A 2018    Procedure: ICD DC generator change  medtronic;  Surgeon: Hany Ornelas MD;  Location:  [facility] CATH INVASIVE LOCATION;  Service:    • PACEMAKER IMPLANTATION     • TUBAL ABDOMINAL LIGATION         Family History   Problem Relation Age of Onset   • Diabetes Mother    • Heart disease Mother    • Hyperlipidemia Mother    • Diabetes Father    • Heart disease Father    • Hyperlipidemia Father    • Heart disease Brother    • Hyperlipidemia Brother    • Hypertension Father    • Colon cancer Father    • Heart attack Father    • Emphysema Mother    • Heart disease Brother    • Heart attack Brother    • Heart disease Maternal Grandmother    • Heart disease Maternal Grandfather    • Heart disease Paternal Grandmother    • Heart disease Paternal Grandfather        Social History     Tobacco Use   • Smoking status: Former Smoker     Packs/day: 1.00     Years: 30.00     Pack years: 30.00     Types: Cigarettes     Start date: 1979     Quit date: 2009     Years since quittin.8   • Smokeless tobacco: Never Used   • Tobacco comment: QUIT 10 YRS   Substance Use Topics   • Alcohol use: Yes     Comment: rarely uses ETOH/caffeine use   • Drug use: No         ECG 12 Lead    Date/Time: 9/3/2021 1:03 PM  Performed by: Oneida Saldivar MD  Authorized by: Oneida Saldivar MD   Comparison: compared with previous  "ECG   Similar to previous ECG  Rhythm: sinus rhythm  Q waves: V1 and V2                   Objective:     Visit Vitals  /82 (BP Location: Left arm, Patient Position: Sitting, Cuff Size: Large Adult)   Pulse 74   Ht 152.4 cm (60\")   Wt 91.1 kg (200 lb 12.8 oz)   SpO2 97%   BMI 39.22 kg/m²         Constitutional:       Appearance: Normal appearance. Well-developed.   Eyes:      General: Lids are normal.      Conjunctiva/sclera: Conjunctivae normal.      Pupils: Pupils are equal, round, and reactive to light.   HENT:      Head: Normocephalic and atraumatic.   Neck:      Vascular: No carotid bruit or JVD.      Lymphadenopathy: No cervical adenopathy.   Pulmonary:      Effort: Pulmonary effort is normal.      Breath sounds: Normal breath sounds.   Cardiovascular:      Normal rate. Regular rhythm.      No gallop.   Pulses:     Radial: 2+ bilaterally.  Edema:     Peripheral edema absent.   Abdominal:      Palpations: Abdomen is soft.   Musculoskeletal:      Cervical back: Full passive range of motion without pain, normal range of motion and neck supple. Skin:     General: Skin is warm and dry.   Neurological:      Mental Status: Alert and oriented to person, place, and time.             Assessment:          Diagnosis Plan   1. Chronic coronary artery disease     2. S/P coronary artery stent placement     3. History of anterior myocardial infarction (CMS/HCC)     4. ICD (implantable cardioverter-defibrillator) in place     5. Chronic systolic congestive heart failure (CMS/HCC)     6. Ischemic cardiomyopathy     7. Mixed hyperlipidemia     8. History of CVA (cerebrovascular accident) without residual deficits     9. Type 2 diabetes mellitus with complication (CMS/HCC)     10. Obstructive sleep apnea syndrome            Plan:       1.  Coronary artery disease.  Status post multiple stent placement of the proximal and mid LAD.  Most recently she is status post drug-eluting stent placement of the proximal LAD for recurrent " in-stent restenosis.  She is doing well in terms of symptoms.  Continue management with clopidogrel.  2.  Hypertension.  Blood pressures are well controlled.  I asked her to monitor her blood pressures especially when she notes symptoms of fatigue to see if we are over controlling her blood pressures.  3.  Ischemic cardiomyopathy.  Stable with an EF of 35%.  No evidence of volume overload.  She is on carvedilol and spironolactone.  She has not been on an ACE inhibitor or an ARB due to low blood pressures.  4.  Hyperlipidemia.  Poorly controlled with an LDL of 160 despite high-dose atorvastatin.  Recommend follow-up on her next lipid and liver panel.  If her liver function remains stable and LDL is still poorly controlled I would recommend starting ezetimibe.  If we cannot get her LDL better controlled with ezetimibe we may need to consider PCSK9 inhibitor.  5.  Diabetes mellitus type 2.  Managed by endocrinology.  6.  AICD placement.  Continue routine device checks through the office.  7.  History of left MCA stroke.  On rivaroxaban for anticoagulation since this was felt to be embolic.  8.  Obstructive sleep apnea.  Compliant with CPAP.  She is managed by Dr. Blackburn.  9.  Elevated liver transaminases.  Mildly elevated but fairly stable.  10.  Dizziness.  Patient states this is similar to the symptoms she is in the past with vertigo.  She is planning on making an appointment with her ENT.    I will plan to see the patient back again in 3 months.  At that time we will discuss additional therapy for her hyperlipidemia.

## 2021-10-05 RX ORDER — SPIRONOLACTONE 25 MG/1
TABLET ORAL
Qty: 90 TABLET | Refills: 3 | Status: SHIPPED | OUTPATIENT
Start: 2021-10-05 | End: 2022-10-17

## 2021-10-10 DIAGNOSIS — I25.5 ISCHEMIC CARDIOMYOPATHY: ICD-10-CM

## 2021-10-10 DIAGNOSIS — I25.10 CORONARY ARTERY DISEASE INVOLVING NATIVE CORONARY ARTERY OF NATIVE HEART WITHOUT ANGINA PECTORIS: ICD-10-CM

## 2021-10-11 DIAGNOSIS — I25.5 ISCHEMIC CARDIOMYOPATHY: ICD-10-CM

## 2021-10-11 RX ORDER — POTASSIUM CHLORIDE 750 MG/1
10 TABLET, FILM COATED, EXTENDED RELEASE ORAL DAILY
Qty: 90 TABLET | Refills: 1 | Status: SHIPPED | OUTPATIENT
Start: 2021-10-11

## 2021-10-11 RX ORDER — NITROGLYCERIN 0.4 MG/1
0.4 TABLET SUBLINGUAL
Qty: 30 TABLET | Refills: 11 | Status: SHIPPED | OUTPATIENT
Start: 2021-10-11

## 2021-10-11 RX ORDER — POTASSIUM CHLORIDE 750 MG/1
10 TABLET, FILM COATED, EXTENDED RELEASE ORAL DAILY
Qty: 90 TABLET | Refills: 1 | Status: SHIPPED | OUTPATIENT
Start: 2021-10-11 | End: 2022-07-18

## 2021-10-11 RX ORDER — BLOOD SUGAR DIAGNOSTIC
STRIP MISCELLANEOUS
Qty: 400 EACH | Refills: 1 | Status: SHIPPED | OUTPATIENT
Start: 2021-10-11 | End: 2022-03-07

## 2021-11-03 DIAGNOSIS — I25.10 CHRONIC CORONARY ARTERY DISEASE: ICD-10-CM

## 2021-11-03 DIAGNOSIS — E78.5 HYPERLIPIDEMIA, UNSPECIFIED HYPERLIPIDEMIA TYPE: ICD-10-CM

## 2021-11-03 RX ORDER — ISOSORBIDE MONONITRATE 30 MG/1
TABLET, EXTENDED RELEASE ORAL
Qty: 90 TABLET | Refills: 1 | Status: SHIPPED | OUTPATIENT
Start: 2021-11-03

## 2021-11-03 RX ORDER — ATORVASTATIN CALCIUM 80 MG/1
TABLET, FILM COATED ORAL
Qty: 90 TABLET | Refills: 1 | Status: SHIPPED | OUTPATIENT
Start: 2021-11-03 | End: 2022-05-09

## 2021-11-03 NOTE — TELEPHONE ENCOUNTER
Last OV 09/03/21.  Next OV 12/30/21.  Labs 7/16/21.  Does not meet protocol.  Please advise. [facility]

## 2021-11-03 NOTE — TELEPHONE ENCOUNTER
Can you separate the prescription so I can refill the atorvastatin and refused the isosorbide mononitrate?

## 2021-11-12 RX ORDER — DULAGLUTIDE 3 MG/.5ML
3 INJECTION, SOLUTION SUBCUTANEOUS WEEKLY
Refills: 5 | OUTPATIENT
Start: 2021-11-12

## 2021-11-12 RX ORDER — DULAGLUTIDE 3 MG/.5ML
3 INJECTION, SOLUTION SUBCUTANEOUS WEEKLY
Qty: 4 PEN | Refills: 5 | Status: SHIPPED | OUTPATIENT
Start: 2021-11-12 | End: 2022-01-27 | Stop reason: CLARIF

## 2021-12-30 ENCOUNTER — OFFICE VISIT (OUTPATIENT)
Dept: CARDIOLOGY | Facility: CLINIC | Age: 61
End: 2021-12-30

## 2021-12-30 VITALS
WEIGHT: 197.4 LBS | BODY MASS INDEX: 38.76 KG/M2 | HEART RATE: 83 BPM | OXYGEN SATURATION: 96 % | SYSTOLIC BLOOD PRESSURE: 122 MMHG | DIASTOLIC BLOOD PRESSURE: 72 MMHG | HEIGHT: 60 IN

## 2021-12-30 DIAGNOSIS — Z86.73 HISTORY OF CVA (CEREBROVASCULAR ACCIDENT) WITHOUT RESIDUAL DEFICITS: ICD-10-CM

## 2021-12-30 DIAGNOSIS — E78.2 MIXED HYPERLIPIDEMIA: ICD-10-CM

## 2021-12-30 DIAGNOSIS — Z95.5 S/P CORONARY ARTERY STENT PLACEMENT: ICD-10-CM

## 2021-12-30 DIAGNOSIS — G47.33 OBSTRUCTIVE SLEEP APNEA SYNDROME: ICD-10-CM

## 2021-12-30 DIAGNOSIS — I21.09 ANTERIOR MYOCARDIAL INFARCTION (HCC): ICD-10-CM

## 2021-12-30 DIAGNOSIS — Z95.810 ICD (IMPLANTABLE CARDIOVERTER-DEFIBRILLATOR) IN PLACE: ICD-10-CM

## 2021-12-30 DIAGNOSIS — E11.8 TYPE 2 DIABETES MELLITUS WITH COMPLICATION (HCC): ICD-10-CM

## 2021-12-30 DIAGNOSIS — I25.10 CHRONIC CORONARY ARTERY DISEASE: Primary | ICD-10-CM

## 2021-12-30 DIAGNOSIS — I25.5 ISCHEMIC CARDIOMYOPATHY: ICD-10-CM

## 2021-12-30 PROCEDURE — 99214 OFFICE O/P EST MOD 30 MIN: CPT | Performed by: INTERNAL MEDICINE

## 2021-12-30 PROCEDURE — 93000 ELECTROCARDIOGRAM COMPLETE: CPT | Performed by: INTERNAL MEDICINE

## 2021-12-30 NOTE — PROGRESS NOTES
Subjective:     Encounter Date:12/30/2021      Patient ID: Daisy Dent is a 61 y.o. female.    Chief Complaint:  History of Present Illness    This is a 61-year-old female with a history of ischemic cardiomyopathy, last ejection fraction of 40-45%, diabetes mellitus type 2, obstructive sleep apnea on CPAP, hypertension, status post dual-chamber AICD placement, dyslipidemia, status post left MCA stroke, coronary artery disease with a history of prior anterior myocardial infarction and LAD stent placement (in 7/2006, 10/2009, 11/2019, and 7/2021), who presents for follow-up.     She presents today for routine 6-month follow-up.  Overall she is doing well.  She reports intermittent palpitations that are brief and usually occur when she first lays down at night.  She denies any shortness of breath, chest pain, orthopnea, near-syncope or syncope.  She continues to have issues with dizziness but is overall improved.  She denies any lower extremity edema.      Prior History:  I began following the patient in 11/2015 when she presented to establish care.  Prior to that she was followed by Dr. Ayala with [facility].  Her prior cardiac history includes a non-ST elevation MI and 7/2006 at which time she received a Cypher 3.0 x 23 mm stent to her mid LAD.  She then presented in 10/2009 with a late in-stent thrombosis that required repeat stenting of her mid LAD in addition to a second drug-eluting stent placement of her left circumflex artery.  Following that she had a repeat cardiac catheterization in 2010 that was reportedly unremarkable.  Following her myocardial infarction in 2009 she developed ischemic myopathy with an EF of around 25% and ultimately underwent AICD placement and 7/2010.  At some point in the course of her follow-ups her left ventricular function improved with an ejection fraction of 40-45%.  A stress test showed evidence of a large anterior infarct and minimal elva-infarct  ischemia.     In her follow-ups she has done fairly well recently when she reported more fatigued than normal and episodes of chest discomfort that occurred while she was exercising on the treadmill.  She underwent  an echocardiogram in 3/2017 that showed an ejection fraction of 40-45%.  Due to continued symptoms we went ahead and proceeded with a PET stress test which was performed in 8/2017 that showed medium sized anterior wall infarct with mild elva-infarct ischemia and a post stress ejection fraction of 58%.      In 11/2017 her AICD was noted to be at [facility].  She subsequently underwent generator replacement on 1/12/2018 with Dr. Ornelas. When I saw her back in follow up in 9/2018 she reported that she denied any new or worsening symptoms.       She was admitted and 12/2018 after she presented with aphasia.  On arrival to the hospital she underwent a CT of the head that showed no acute stroke but a perfusion scan showed a large perfusion mismatch.  A CTA showed evidence of a left MCA thrombus.  She was given TPA at this time with improvement in her symptoms.  Neurology felt that her stroke was likely embolic.  She underwent a repeat echocardiogram during her admission that showed moderately depressed left ventricular systolic function with an EF of 30%, grade 1 diastolic dysfunction, and no significant valvular disease.  Based on the likelihood that this was an embolic stroke I opted to go ahead and recommend anticoagulation.  She was subsequently started on rivaroxaban 20 mg a day.  Her aspirin was stopped and she was continued on clopidogrel.     In 11/2019 she reported a couple weeks of episodes of substernal chest tightness lasting 1 to 2 minutes and dyspnea on exertion.  She reported that the symptoms were similar to what she had at the time of her prior myocardial infarctions but reports that its much milder than what she experienced at that time.    Following that office visit she underwent a stress test  that showed evidence of a medium anterior infarct with moderate elva-infarct ischemia.  The findings were similar to prior stress test however the amount of elva-infarct ischemia had impaired to increase.  We initially tried to treat her medically however she continued to have recurrent symptoms so we opted to proceed with a cardiac catheterization.  She presented to the hospital on 1/6/2020 for the procedure.  She was found to have severe in-stent restenosis of her proximal LAD stent and ultimately underwent repeat drug-eluting stent placement of the in-stent restenosis.  She was also noted to have moderate nonobstructive disease of the first obtuse marginal branch for which she underwent FFR evaluation which was normal at 0.96.     In follow-up she had some mild episodes of chest discomfort that we decided to monitor.  She then returned for urgent follow-up on 10/22/2020 at which time she reported more severe episodes of chest discomfort that were occurring primarily at rest.  Due to the severity of her symptoms she was quite concerned and we decided to proceed with a cardiac catheterization.  This was performed on 10/26/2020 and showed stable moderate nonobstructive coronary artery disease.     She reported increasing frequency of chest tightness that primarily occurred at night and woke her up from sleep starting in 5/2021.  She reported this was similar to her prior anginal symptoms.  I started her on isosorbide mononitrate 30 mg daily but she is unable to tolerate it due to the onset of severe headaches.  When she presented back for follow-up in 7/2021 she was continuing to have episodes of nocturnal discomfort that had progressed.  At that point I recommended proceeding with a repeat cardiac catheterization.      She presented for cardiac catheterization on 7/19/2021.  This showed 90% proximal in-stent restenosis and she underwent repeat drug-eluting stent placement using a 3.0 x 12 mm stent.  She was also  noted to have moderate nonobstructive disease of the first obtuse marginal branch with a normal RFR.     She was seen back in the office by KEYONA Demarco on 7/30/2021 at which time she was feeling better.   It was noted around the time that she had mildly elevated liver enzymes which were unchanged compared to prior evaluations.  Was also noted that her last lipid panel on 7/7/2021 showed that her cholesterol was poorly controlled with an LDL of around 160 despite being on high-dose atorvastatin.    Review of Systems   Constitutional: Positive for malaise/fatigue.   HENT: Negative for hearing loss, hoarse voice, nosebleeds and sore throat.    Eyes: Negative for pain.   Cardiovascular: Positive for palpitations. Negative for chest pain, claudication, cyanosis, dyspnea on exertion, irregular heartbeat, leg swelling, near-syncope, orthopnea, paroxysmal nocturnal dyspnea and syncope.   Respiratory: Negative for shortness of breath and snoring.    Endocrine: Negative for cold intolerance, heat intolerance, polydipsia, polyphagia and polyuria.   Skin: Negative for itching and rash.   Musculoskeletal: Negative for arthritis, falls, joint pain, joint swelling, muscle cramps, muscle weakness and myalgias.   Gastrointestinal: Negative for constipation, diarrhea, dysphagia, heartburn, hematemesis, hematochezia, melena, nausea and vomiting.   Genitourinary: Negative for frequency, hematuria and hesitancy.   Neurological: Positive for light-headedness. Negative for excessive daytime sleepiness, dizziness, headaches, numbness and weakness.   Psychiatric/Behavioral: Negative for depression. The patient is not nervous/anxious.          Current Outpatient Medications:   •  Accu-Chek FastClix Lancets misc, Use to test blood sugar 4 times daily  E11.8, Disp: 400 each, Rfl: 1  •  Accu-Chek Guide test strip, Use 4 times a day; e11.8, Disp: 400 each, Rfl: 1  •  albuterol sulfate  (90 Base) MCG/ACT inhaler, TAKE 2 PUFFS BY  MOUTH EVERY 4 HOURS AS NEEDED, Disp: , Rfl:   •  atorvastatin (LIPITOR) 80 MG tablet, TAKE 1 TABLET BY MOUTH EVERY DAY, Disp: 90 tablet, Rfl: 1  •  Blood Glucose Monitoring Suppl (Accu-Chek Guide) w/Device kit, Inject 1 Device under the skin into the appropriate area as directed Daily., Disp: 1 kit, Rfl: 0  •  carvedilol (COREG) 25 MG tablet, TAKE 1 TABLET BY MOUTH TWICE A DAY WITH MEALS, Disp: 180 tablet, Rfl: 2  •  Cholecalciferol (VITAMIN D3) 2000 UNITS capsule, Take 1,000 Units by mouth Daily., Disp: , Rfl:   •  clopidogrel (PLAVIX) 75 MG tablet, TAKE 1 TABLET BY MOUTH EVERY DAY, Disp: 90 tablet, Rfl: 3  •  Dulaglutide (Trulicity) 3 MG/0.5ML solution pen-injector, Inject 3 mg under the skin into the appropriate area as directed 1 (One) Time Per Week., Disp: 4 pen, Rfl: 5  •  furosemide (LASIX) 40 MG tablet, TAKE 1 TABLET BY MOUTH DAILY. RESUME ON 1/7/20, Disp: 90 tablet, Rfl: 1  •  Insulin Pen Needle 32G X 4 MM misc, USE 4 TIMES A DAY, Disp: 400 each, Rfl: 3  •  isosorbide mononitrate (IMDUR) 30 MG 24 hr tablet, TAKE 1 TABLET BY MOUTH EVERY DAY, Disp: 90 tablet, Rfl: 1  •  metFORMIN (GLUCOPHAGE) 1000 MG tablet, TAKE 1 TABLET BY MOUTH EVERY DAY IN THE EVENING, Disp: 90 tablet, Rfl: 3  •  Multiple Vitamins-Minerals (MULTIVITAL PO), Take 1 tablet by mouth Daily., Disp: , Rfl:   •  nitroglycerin (Nitrostat) 0.4 MG SL tablet, Place 1 tablet under the tongue Every 5 (Five) Minutes As Needed for Chest Pain. Take no more than 3 doses in 15 minutes., Disp: 30 tablet, Rfl: 11  •  Omega-3 Fatty Acids (FISH OIL) 1200 MG capsule capsule, Take 1,200 mg by mouth Daily With Breakfast., Disp: , Rfl:   •  potassium chloride 10 MEQ CR tablet, Take 1 tablet by mouth Daily. Resume on 1/7/20, Disp: 90 tablet, Rfl: 1  •  potassium chloride 10 MEQ CR tablet, Take 1 tablet by mouth Daily. Resume on 1/7/20, Disp: 90 tablet, Rfl: 1  •  rivaroxaban (Xarelto) 20 MG tablet, Take 1 tablet by mouth Daily With Dinner. Resume on 7/21/2021, Disp:  90 tablet, Rfl: 1  •  spironolactone (ALDACTONE) 25 MG tablet, TAKE 1 TABLET BY MOUTH EVERY DAY, Disp: 90 tablet, Rfl: 3  •  vitamin B-12 (CYANOCOBALAMIN) 500 MCG tablet, Take 500 mcg by mouth Daily., Disp: , Rfl:     Past Medical History:   Diagnosis Date   • Abdominal pain    • Abnormal liver function test    • Acute bronchitis    • Benign essential hypertension    • CAD (coronary artery disease)    • CHF (congestive heart failure) (HCC)    • Colon polyp    • Congestive heart disease (HCC)    • COPD (chronic obstructive pulmonary disease) (HCC)    • Cough    • Diabetes (HCC)    • Diabetes mellitus (HCC)    • Diarrhea    • Gastroenteritis    • Health care maintenance    • Hyperlipidemia    • Hypertension    • IFG (impaired fasting glucose)    • Ischemic cardiomyopathy    • Myocardial infarction (HCC)    • SHELLIE (obstructive sleep apnea)    • Sore throat    • Type 2 diabetes mellitus (HCC)    • Vaginal yeast infection    • Vitamin D deficiency        Past Surgical History:   Procedure Laterality Date   • CARDIAC CATHETERIZATION     • CARDIAC CATHETERIZATION N/A 1/6/2020    Procedure: Coronary angiography;  Surgeon: Oneida Saldivar MD;  Location: [facility] CATH INVASIVE LOCATION;  Service: Cardiovascular   • CARDIAC CATHETERIZATION N/A 1/6/2020    Procedure: Left heart cath;  Surgeon: Oneida Saldivar MD;  Location:  [facility] CATH INVASIVE LOCATION;  Service: Cardiovascular   • CARDIAC CATHETERIZATION N/A 1/6/2020    Procedure: Left ventriculography;  Surgeon: Oneida Saldivar MD;  [facility];  Service: Cardiovascular   • CARDIAC CATHETERIZATION N/A 1/6/2020    Procedure: Percutaneous Coronary Intervention;  Surgeon: Oneida Saldivar MD;  Location: [facility] CATH INVASIVE LOCATION;  Service: Cardiovascular   • CARDIAC CATHETERIZATION N/A 1/6/2020    Procedure: Stent HUGO coronary;  Surgeon: Oneida Saldivar MD;  Location: [facility] CATH INVASIVE LOCATION;  Service: Cardiovascular   • CARDIAC  CATHETERIZATION  1/6/2020    Procedure: Functional Flow [place];  Surgeon: Oneida Saldivar MD;  Location:  [facility] CATH INVASIVE LOCATION;  Service: Cardiovascular   • CARDIAC CATHETERIZATION N/A 10/26/2020    Procedure: Coronary angiography;  Surgeon: Oneida Saldivar MD;  Location[facility] CATH INVASIVE LOC[facility];  Service: Cardiovascular;  Laterality: N/A;   • CARDIAC CATHETERIZATION N/A 10/26/2020    Procedure: Left heart cath;  Surgeon: Oneida Saldivar MD;  [facility];  Service: Cardiovascular;  Laterality: N/A;   • CARDIAC CATHETERIZATION N/A 10/26/2020    Procedure: Left ventriculography;  Surgeon: Oneida Saldivar MD;  [facility];  Service: Cardiovascular;  Laterality: N/A;   • CARDIAC CATHETERIZATION  10/26/2020    Procedure: Functional Flow [place];  Surgeon: Oneida Saldivar MD;  Location: [facility] CATH INVASIVE LOCATION;  Service: Cardiovascular;;   • CARDIAC CATHETERIZATION N/A 7/19/2021    Procedure: Coronary angiography;  Surgeon: Oneida Saldivar MD;  [facility];  Service: Cardiovascular;  Laterality: N/A;   • CARDIAC CATHETERIZATION N/A 7/19/2021    Procedure: Left heart cath;  Surgeon: Oneida Saldivar MD;  Location: [facility] CATH INVASIVE LOCATION;  Service: Cardiovascular;  Laterality: N/A;   • CARDIAC CATHETERIZATION N/A 7/19/2021    Procedure: Left ventriculography;  Surgeon: Oneida Saldivar MD;  Location: [facility] CATH INVASIVE LOCATION;  Service: Cardiovascular;  Laterality: N/A;   • CARDIAC CATHETERIZATION N/A 7/19/2021    Procedure: Percutaneous Coronary Intervention;  Surgeon: Oneida Saldivar MD;  [facility];  Service: Cardiovascular;  Laterality: N/A;   • CARDIAC CATHETERIZATION N/A 7/19/2021    Procedure: Optical Coherent Tomography;  Surgeon: Oneida Saldivar MD;  Location: [facility] CATH INVASIVE LOCATION;  Service: Cardiovascular;  Laterality: N/A;   • CARDIAC CATHETERIZATION N/A  2021    Procedure: Stent HUGO coronary;  Surgeon: Oneida Saldivar MD;  Location:  [facility] CATH INVASIVE LOCATION;  Service: Cardiovascular;  Laterality: N/A;   • CARDIAC CATHETERIZATION  2021    Procedure: RESTING FULL CYCLE RATIO;  Surgeon: Oneida Saldivar MD;  Location:  [facility] CATH INVASIVE LOCATION;  Service: Cardiovascular;;  RFR   • CARDIAC ELECTROPHYSIOLOGY PROCEDURE N/A 2018    Procedure: ICD DC generator change  medtronic;  Surgeon: Hany Ornelas MD;  Location:  [facility] CATH INVASIVE LOCATION;  Service:    • PACEMAKER IMPLANTATION     • TUBAL ABDOMINAL LIGATION         Family History   Problem Relation Age of Onset   • Diabetes Mother    • Heart disease Mother    • Hyperlipidemia Mother    • Diabetes Father    • Heart disease Father    • Hyperlipidemia Father    • Heart disease Brother    • Hyperlipidemia Brother    • Hypertension Father    • Colon cancer Father    • Heart attack Father    • Emphysema Mother    • Heart disease Brother    • Heart attack Brother    • Heart disease Maternal Grandmother    • Heart disease Maternal Grandfather    • Heart disease Paternal Grandmother    • Heart disease Paternal Grandfather        Social History     Tobacco Use   • Smoking status: Former Smoker     Packs/day: 1.00     Years: 30.00     Pack years: 30.00     Types: Cigarettes     Start date: 1979     Quit date: 2009     Years since quittin.1   • Smokeless tobacco: Never Used   • Tobacco comment: QUIT 10 YRS   Substance Use Topics   • Alcohol use: Yes     Comment: rarely uses ETOH/caffeine use   • Drug use: No         ECG 12 Lead    Date/Time: 2021 1:57 PM  Performed by: Oneida Saldivar MD  Authorized by: Oneida Saldivar MD   Comparison: compared with previous ECG   Similar to previous ECG  Rhythm: sinus rhythm  Q waves: V1 and V2                   Objective:     Visit Vitals  /72 (BP Location: Right arm, Patient Position: Sitting, Cuff Size: Adult)   Pulse 83   Ht 152.4  "cm (60\")   Wt 89.5 kg (197 lb 6.4 oz)   SpO2 96%   BMI 38.55 kg/m²         Constitutional:       Appearance: Normal appearance. Well-developed.   Eyes:      General: Lids are normal.      Conjunctiva/sclera: Conjunctivae normal.      Pupils: Pupils are equal, round, and reactive to light.   HENT:      Head: Normocephalic and atraumatic.   Neck:      Vascular: No carotid bruit or JVD.      Lymphadenopathy: No cervical adenopathy.   Pulmonary:      Effort: Pulmonary effort is normal.      Breath sounds: Normal breath sounds.   Cardiovascular:      Normal rate. Regular rhythm.      No gallop.   Pulses:     Radial: 2+ bilaterally.  Edema:     Peripheral edema absent.   Abdominal:      Palpations: Abdomen is soft.   Musculoskeletal:      Cervical back: Full passive range of motion without pain, normal range of motion and neck supple. Skin:     General: Skin is warm and dry.   Neurological:      Mental Status: Alert and oriented to person, place, and time.             Assessment:          Diagnosis Plan   1. Chronic coronary artery disease     2. S/P coronary artery stent placement     3. History of anterior myocardial infarction (CMS/HCC)     4. ICD (implantable cardioverter-defibrillator) in place     5. Ischemic cardiomyopathy     6. Mixed hyperlipidemia     7. Type 2 diabetes mellitus with complication (HCC)     8. Obstructive sleep apnea syndrome     9. History of CVA (cerebrovascular accident) without residual deficits            Plan:       1.  Coronary artery disease.  Appears to be stable and asymptomatic.  EKG is unchanged.  Continue current medical management.  2.  Hypertension.  Well-controlled on her current regimen of medications.  Continue the same.  3.  Ischemic cardiomyopathy.  Stable with an EF of 35%.  No evidence of volume overload.  She has been on carvedilol and spironolactone.  Has not been on ACE inhibitor or ARB due to low normal blood pressures.  4.  Hyperlipidemia.  Most recent lipid panel " 11/2021 showed significant improvement her LDL to around 85 on a high dose of atorvastatin.  I recommended starting ezetimibe at her last office visit.  It does not appear that this was started.  Regardless I think we can hold off for now.  5.  Diabetes mellitus type 2  6.  AICD placement.  Continue routine device checks through the office.  7.  History of left MCA stroke.  On rivaroxaban for anticoagulation since was felt to be embolic.  8.  Elevated liver transaminases.  Recent lipid panel shows normalization of her liver enzymes.  9.  Obstructive sleep apnea.  On CPAP and managed by sleep medicine.    I will plan on seeing the patient back again in 6 months.

## 2022-01-05 DIAGNOSIS — I25.10 CORONARY ARTERY DISEASE INVOLVING NATIVE CORONARY ARTERY OF NATIVE HEART WITHOUT ANGINA PECTORIS: ICD-10-CM

## 2022-01-05 RX ORDER — CLOPIDOGREL BISULFATE 75 MG/1
TABLET ORAL
Qty: 90 TABLET | Refills: 3 | Status: SHIPPED | OUTPATIENT
Start: 2022-01-05 | End: 2023-01-31

## 2022-01-05 RX ORDER — FUROSEMIDE 40 MG/1
TABLET ORAL
Qty: 90 TABLET | Refills: 3 | Status: SHIPPED | OUTPATIENT
Start: 2022-01-05 | End: 2023-01-31

## 2022-01-06 ENCOUNTER — DOCUMENTATION (OUTPATIENT)
Dept: ENDOCRINOLOGY | Age: 62
End: 2022-01-06

## 2022-01-06 NOTE — PROGRESS NOTES
Benefits Investigation Summary    Prescription: Renewal     Dispensing pharmacy: [facility]    Copay amount: TRULICITY $[id number]    PLAN: RAÚL  BIN: [id number]  PCN: [id number]  RX GROUP: [id number]    Prior Auth and Med Assistance notes:

## 2022-01-27 RX ORDER — SEMAGLUTIDE 1.34 MG/ML
1 INJECTION, SOLUTION SUBCUTANEOUS WEEKLY
Qty: 4 PEN | Refills: 2 | Status: SHIPPED | OUTPATIENT
Start: 2022-01-27 | End: 2022-04-01 | Stop reason: SDUPTHER

## 2022-02-10 ENCOUNTER — TELEPHONE (OUTPATIENT)
Dept: CARDIOLOGY | Facility: CLINIC | Age: 62
End: 2022-02-10

## 2022-02-10 NOTE — TELEPHONE ENCOUNTER
Patient has a DDD/ICD on 2/1/ 22  She had 1 labeled NSVT event 19 beats lasting 3 seconds @ an average rate 170 BPM. These events are new to patient. Enclosed is the event from 2/1 22      v

## 2022-02-11 NOTE — TELEPHONE ENCOUNTER
Called patient to see if she had any symptoms at the time of the event and to see how she was doing.  I left a message asking for a call back.

## 2022-02-18 PROCEDURE — 93295 DEV INTERROG REMOTE 1/2/MLT: CPT | Performed by: INTERNAL MEDICINE

## 2022-02-18 PROCEDURE — 93296 REM INTERROG EVL PM/IDS: CPT | Performed by: INTERNAL MEDICINE

## 2022-02-21 RX ORDER — RIVAROXABAN 20 MG/1
TABLET, FILM COATED ORAL
Qty: 90 TABLET | Refills: 1 | Status: SHIPPED | OUTPATIENT
Start: 2022-02-21 | End: 2022-07-18

## 2022-03-07 RX ORDER — BLOOD SUGAR DIAGNOSTIC
STRIP MISCELLANEOUS
Qty: 300 EACH | Refills: 2 | Status: SHIPPED | OUTPATIENT
Start: 2022-03-07

## 2022-03-16 NOTE — TELEPHONE ENCOUNTER
12/23 Pt called complaining of chest tightness and to schedule Cardiac Cath.  I transferred her to triage nurse.  Please place order for Cath.    Thank you    Laith APONTE   [de-identified] : Patient presents today c/o  dizziness .    Patient has   been  having   dizziness 2/25/22  was  taken to ED .    Room  was spinning . Dizziness  when  lifting head .   No more  severe  episodes.   Denies any ear  discomfort or  pressure.   She had  CT  performed  , normal .  After hydration in ER, she reports she was feeling better

## 2022-04-01 RX ORDER — SEMAGLUTIDE 1.34 MG/ML
1 INJECTION, SOLUTION SUBCUTANEOUS WEEKLY
Qty: 12 PEN | Refills: 2 | Status: SHIPPED | OUTPATIENT
Start: 2022-04-01

## 2022-04-15 ENCOUNTER — OFFICE VISIT (OUTPATIENT)
Dept: CARDIOLOGY | Facility: CLINIC | Age: 62
End: 2022-04-15

## 2022-04-15 ENCOUNTER — CLINICAL SUPPORT NO REQUIREMENTS (OUTPATIENT)
Dept: CARDIOLOGY | Facility: CLINIC | Age: 62
End: 2022-04-15

## 2022-04-15 VITALS
WEIGHT: 194.2 LBS | SYSTOLIC BLOOD PRESSURE: 134 MMHG | BODY MASS INDEX: 38.13 KG/M2 | DIASTOLIC BLOOD PRESSURE: 78 MMHG | HEART RATE: 79 BPM | HEIGHT: 60 IN | OXYGEN SATURATION: 99 %

## 2022-04-15 DIAGNOSIS — Z95.810 ICD (IMPLANTABLE CARDIOVERTER-DEFIBRILLATOR) IN PLACE: ICD-10-CM

## 2022-04-15 DIAGNOSIS — I25.5 ISCHEMIC CARDIOMYOPATHY: ICD-10-CM

## 2022-04-15 DIAGNOSIS — I10 BENIGN ESSENTIAL HYPERTENSION: ICD-10-CM

## 2022-04-15 DIAGNOSIS — E78.2 MIXED HYPERLIPIDEMIA: ICD-10-CM

## 2022-04-15 DIAGNOSIS — I25.10 CHRONIC CORONARY ARTERY DISEASE: Primary | ICD-10-CM

## 2022-04-15 DIAGNOSIS — G47.33 OBSTRUCTIVE SLEEP APNEA SYNDROME: ICD-10-CM

## 2022-04-15 DIAGNOSIS — I50.22 CHRONIC SYSTOLIC CONGESTIVE HEART FAILURE: ICD-10-CM

## 2022-04-15 DIAGNOSIS — I21.09 ANTERIOR MYOCARDIAL INFARCTION: ICD-10-CM

## 2022-04-15 DIAGNOSIS — Z86.73 HISTORY OF CVA (CEREBROVASCULAR ACCIDENT) WITHOUT RESIDUAL DEFICITS: ICD-10-CM

## 2022-04-15 DIAGNOSIS — E11.8 TYPE 2 DIABETES MELLITUS WITH COMPLICATION: ICD-10-CM

## 2022-04-15 DIAGNOSIS — I25.5 ISCHEMIC CARDIOMYOPATHY: Primary | ICD-10-CM

## 2022-04-15 DIAGNOSIS — Z95.5 S/P CORONARY ARTERY STENT PLACEMENT: ICD-10-CM

## 2022-04-15 PROCEDURE — 93283 PRGRMG EVAL IMPLANTABLE DFB: CPT | Performed by: INTERNAL MEDICINE

## 2022-04-15 PROCEDURE — 99214 OFFICE O/P EST MOD 30 MIN: CPT | Performed by: INTERNAL MEDICINE

## 2022-04-15 PROCEDURE — 93000 ELECTROCARDIOGRAM COMPLETE: CPT | Performed by: INTERNAL MEDICINE

## 2022-04-15 PROCEDURE — 93290 INTERROG DEV EVAL ICPMS IP: CPT | Performed by: INTERNAL MEDICINE

## 2022-04-15 RX ORDER — TRAZODONE HYDROCHLORIDE 50 MG/1
50 TABLET ORAL
COMMUNITY
Start: 2022-02-21

## 2022-04-15 NOTE — PROGRESS NOTES
Subjective:     Encounter Date:04/15/2022      Patient ID: Daisy Dent is a 61 y.o. female.    Chief Complaint:  History of Present Illness    This is a 61-year-old female with a history of ischemic cardiomyopathy, last ejection fraction of 40-45%, diabetes mellitus type 2, obstructive sleep apnea on CPAP, hypertension, status post dual-chamber AICD placement, dyslipidemia, status post left MCA stroke, coronary artery disease with a history of prior anterior myocardial infarction and LAD stent placement (in 7/2006, 10/2009, 11/2019, and 7/2021), who presents for follow-up.     She presents today for routine follow-up.  Since her last office visit in 12/2021 at which time her symptoms overall have improved she reports that her chest tightness has started to recur.  It is occurring now about twice a month.  The symptoms last about 30 minutes at a time.  They can occur at night when she is lying down or during the daytime.  They can occur both at rest and with activity.  The symptoms are similar to what she experienced before her last PCI.  Sometimes she has associated lightheadedness and palpitations with the symptoms.  In 2/2022 she underwent routine device interrogation remotely which did show a 19 beat episode of ventricular tachycardia.    She otherwise denies any shortness of breath, orthopnea, near-syncope or syncope, or lower extremity edema.  He does admit that she is been under increased stress due to work.     Prior History:  I began following the patient in 11/2015 when she presented to establish care.  Prior to that she was followed by Dr. Ayala with [facility].  Her prior cardiac history includes a non-ST elevation MI and 7/2006 at which time she received a Cypher 3.0 x 23 mm stent to her mid LAD.  She then presented in 10/2009 with a late in-stent thrombosis that required repeat stenting of her mid LAD in addition to a second drug-eluting stent placement of her left circumflex  artery.  Following that she had a repeat cardiac catheterization in 2010 that was reportedly unremarkable.  Following her myocardial infarction in 2009 she developed ischemic myopathy with an EF of around 25% and ultimately underwent AICD placement and 7/2010.  At some point in the course of her follow-ups her left ventricular function improved with an ejection fraction of 40-45%.  A stress test showed evidence of a large anterior infarct and minimal elva-infarct ischemia.     In her follow-ups she has done fairly well recently when she reported more fatigued than normal and episodes of chest discomfort that occurred while she was exercising on the treadmill.  She underwent  an echocardiogram in 3/2017 that showed an ejection fraction of 40-45%.  Due to continued symptoms we went ahead and proceeded with a PET stress test which was performed in 8/2017 that showed medium sized anterior wall infarct with mild elva-infarct ischemia and a post stress ejection fraction of 58%.      In 11/2017 her AICD was noted to be at [facility].  She subsequently underwent generator replacement on 1/12/2018 with Dr. Ornelas. When I saw her back in follow up in 9/2018 she reported that she denied any new or worsening symptoms.       She was admitted and 12/2018 after she presented with aphasia.  On arrival to the hospital she underwent a CT of the head that showed no acute stroke but a perfusion scan showed a large perfusion mismatch.  A CTA showed evidence of a left MCA thrombus.  She was given TPA at this time with improvement in her symptoms.  Neurology felt that her stroke was likely embolic.  She underwent a repeat echocardiogram during her admission that showed moderately depressed left ventricular systolic function with an EF of 30%, grade 1 diastolic dysfunction, and no significant valvular disease.  Based on the likelihood that this was an embolic stroke I opted to go ahead and recommend anticoagulation.  She was subsequently  started on rivaroxaban 20 mg a day.  Her aspirin was stopped and she was continued on clopidogrel.     In 11/2019 she reported a couple weeks of episodes of substernal chest tightness lasting 1 to 2 minutes and dyspnea on exertion.  She reported that the symptoms were similar to what she had at the time of her prior myocardial infarctions but reports that its much milder than what she experienced at that time.    Following that office visit she underwent a stress test that showed evidence of a medium anterior infarct with moderate elva-infarct ischemia.  The findings were similar to prior stress test however the amount of elva-infarct ischemia had impaired to increase.  We initially tried to treat her medically however she continued to have recurrent symptoms so we opted to proceed with a cardiac catheterization.  She presented to the hospital on 1/6/2020 for the procedure.  She was found to have severe in-stent restenosis of her proximal LAD stent and ultimately underwent repeat drug-eluting stent placement of the in-stent restenosis.  She was also noted to have moderate nonobstructive disease of the first obtuse marginal branch for which she underwent FFR evaluation which was normal at 0.96.     In follow-up she had some mild episodes of chest discomfort that we decided to monitor.  She then returned for urgent follow-up on 10/22/2020 at which time she reported more severe episodes of chest discomfort that were occurring primarily at rest.  Due to the severity of her symptoms she was quite concerned and we decided to proceed with a cardiac catheterization.  This was performed on 10/26/2020 and showed stable moderate nonobstructive coronary artery disease.     She reported increasing frequency of chest tightness that primarily occurred at night and woke her up from sleep starting in 5/2021.  She reported this was similar to her prior anginal symptoms.  I started her on isosorbide mononitrate 30 mg daily but she is  unable to tolerate it due to the onset of severe headaches.  When she presented back for follow-up in 7/2021 she was continuing to have episodes of nocturnal discomfort that had progressed.  At that point I recommended proceeding with a repeat cardiac catheterization.      She presented for cardiac catheterization on 7/19/2021.  This showed 90% proximal in-stent restenosis and she underwent repeat drug-eluting stent placement using a 3.0 x 12 mm stent.  She was also noted to have moderate nonobstructive disease of the first obtuse marginal branch with a normal RFR.     She was seen back in the office by KEYONA Demarco on 7/30/2021 at which time she was feeling better.   It was noted around the time that she had mildly elevated liver enzymes which were unchanged compared to prior evaluations.  Was also noted that her last lipid panel on 7/7/2021 showed that her cholesterol was poorly controlled with an LDL of around 160 despite being on high-dose atorvastatin.    Review of Systems   Constitutional: Negative for malaise/fatigue.   HENT: Negative for hearing loss, hoarse voice, nosebleeds and sore throat.    Eyes: Negative for pain.   Cardiovascular: Positive for chest pain and palpitations. Negative for claudication, cyanosis, dyspnea on exertion, irregular heartbeat, leg swelling, near-syncope, orthopnea, paroxysmal nocturnal dyspnea and syncope.   Respiratory: Negative for shortness of breath and snoring.    Endocrine: Negative for cold intolerance, heat intolerance, polydipsia, polyphagia and polyuria.   Skin: Negative for itching and rash.   Musculoskeletal: Negative for arthritis, falls, joint pain, joint swelling, muscle cramps, muscle weakness and myalgias.   Gastrointestinal: Negative for constipation, diarrhea, dysphagia, heartburn, hematemesis, hematochezia, melena, nausea and vomiting.   Genitourinary: Negative for frequency, hematuria and hesitancy.   Neurological: Positive for light-headedness.  Negative for excessive daytime sleepiness, dizziness, headaches, numbness and weakness.   Psychiatric/Behavioral: Negative for depression. The patient is not nervous/anxious.          Current Outpatient Medications:   •  Accu-Chek FastClix Lancets misc, Use to test blood sugar 4 times daily  E11.8, Disp: 400 each, Rfl: 1  •  Accu-Chek Guide test strip, USE 4 TIMES A DAY E11.8, Disp: 300 each, Rfl: 2  •  albuterol sulfate  (90 Base) MCG/ACT inhaler, TAKE 2 PUFFS BY MOUTH EVERY 4 HOURS AS NEEDED, Disp: , Rfl:   •  atorvastatin (LIPITOR) 80 MG tablet, TAKE 1 TABLET BY MOUTH EVERY DAY, Disp: 90 tablet, Rfl: 1  •  Blood Glucose Monitoring Suppl (Accu-Chek Guide) w/Device kit, Inject 1 Device under the skin into the appropriate area as directed Daily., Disp: 1 kit, Rfl: 0  •  carvedilol (COREG) 25 MG tablet, TAKE 1 TABLET BY MOUTH TWICE A DAY WITH MEALS, Disp: 180 tablet, Rfl: 2  •  Cholecalciferol (VITAMIN D3) 2000 UNITS capsule, Take 1,000 Units by mouth Daily., Disp: , Rfl:   •  clopidogrel (PLAVIX) 75 MG tablet, TAKE 1 TABLET BY MOUTH EVERY DAY, Disp: 90 tablet, Rfl: 3  •  furosemide (LASIX) 40 MG tablet, TAKE 1 TABLET BY MOUTH DAILY., Disp: 90 tablet, Rfl: 3  •  Insulin Pen Needle 32G X 4 MM misc, USE 4 TIMES A DAY, Disp: 400 each, Rfl: 3  •  isosorbide mononitrate (IMDUR) 30 MG 24 hr tablet, TAKE 1 TABLET BY MOUTH EVERY DAY, Disp: 90 tablet, Rfl: 1  •  metFORMIN (GLUCOPHAGE) 1000 MG tablet, TAKE 1 TABLET BY MOUTH EVERY DAY IN THE EVENING, Disp: 90 tablet, Rfl: 3  •  Multiple Vitamins-Minerals (MULTIVITAL PO), Take 1 tablet by mouth Daily., Disp: , Rfl:   •  nitroglycerin (Nitrostat) 0.4 MG SL tablet, Place 1 tablet under the tongue Every 5 (Five) Minutes As Needed for Chest Pain. Take no more than 3 doses in 15 minutes., Disp: 30 tablet, Rfl: 11  •  Omega-3 Fatty Acids (FISH OIL) 1200 MG capsule capsule, Take 1,200 mg by mouth Daily With Breakfast., Disp: , Rfl:   •  potassium chloride 10 MEQ CR tablet, Take  1 tablet by mouth Daily. Resume on 1/7/20, Disp: 90 tablet, Rfl: 1  •  potassium chloride 10 MEQ CR tablet, Take 1 tablet by mouth Daily. Resume on 1/7/20, Disp: 90 tablet, Rfl: 1  •  Semaglutide, 1 MG/DOSE, (Ozempic, 1 MG/DOSE,) 4 MG/3ML solution pen-injector, Inject 1 mg under the skin into the appropriate area as directed 1 (One) Time Per Week., Disp: 12 pen, Rfl: 2  •  spironolactone (ALDACTONE) 25 MG tablet, TAKE 1 TABLET BY MOUTH EVERY DAY, Disp: 90 tablet, Rfl: 3  •  traZODone (DESYREL) 50 MG tablet, Take 50 mg by mouth every night at bedtime., Disp: , Rfl:   •  vitamin B-12 (CYANOCOBALAMIN) 500 MCG tablet, Take 500 mcg by mouth Daily., Disp: , Rfl:   •  Xarelto 20 MG tablet, TAKE 1 TABLET BY MOUTH EVERY DAY WITH DINNER, Disp: 90 tablet, Rfl: 1    Past Medical History:   Diagnosis Date   • Abdominal pain    • Abnormal liver function test    • Acute bronchitis    • Benign essential hypertension    • CAD (coronary artery disease)    • CHF (congestive heart failure) (HCC)    • Colon polyp    • Congestive heart disease (HCC)    • COPD (chronic obstructive pulmonary disease) (HCC)    • Cough    • Diabetes (HCC)    • Diabetes mellitus (HCC)    • Diarrhea    • Gastroenteritis    • Health care maintenance    • Hyperlipidemia    • Hypertension    • IFG (impaired fasting glucose)    • Ischemic cardiomyopathy    • Myocardial infarction (HCC)    • SHELLIE (obstructive sleep apnea)    • Sore throat    • Type 2 diabetes mellitus (HCC)    • Vaginal yeast infection    • Vitamin D deficiency        Past Surgical History:   Procedure Laterality Date   • CARDIAC CATHETERIZATION     • CARDIAC CATHETERIZATION N/A 1/6/2020    Procedure: Coronary angiography;  Surgeon: Oneida Saldivar MD;  Location: [facility] CATH INVASIVE LOCATION;  Service: Cardiovascular   • CARDIAC CATHETERIZATION N/A 1/6/2020    Procedure: Left heart cath;  Surgeon: Oneida Saldivar MD;  Location: [facility] CATH INVASIVE LOCATION;  Service: Cardiovascular   • CARDIAC  CATHETERIZATION N/A 1/6/2020    Procedure: Left ventriculography;  Surgeon: Oneida Saldivar MD;  [facility];  Service: Cardiovascular   • CARDIAC CATHETERIZATION N/A 1/6/2020    Procedure: Percutaneous Coronary Intervention;  Surgeon: Oneida Saldivar MD;  [facility];  Service: Cardiovascular   • CARDIAC CATHETERIZATION N/A 1/6/2020    Procedure: Stent HUGO coronary;  Surgeon: Oneida Saldivar MD;  [facility];  Service: Cardiovascular   • CARDIAC CATHETERIZATION  1/6/2020    Procedure: Functional Flow [place];  Surgeon: Oneida Saldivar MD;  [facility];  Service: Cardiovascular   • CARDIAC CATHETERIZATION N/A 10/26/2020    Procedure: Coronary angiography;  Surgeon: Oneida Saldivar MD;  [facility];  Service: Cardiovascular;  Laterality: N/A;   • CARDIAC CATHETERIZATION N/A 10/26/2020    Procedure: Left heart cath;  Surgeon: Oneida Saldivar MD;  Location: [facility] CATH INVASIVE LOCATION;  Service: Cardiovascular;  Laterality: N/A;   • CARDIAC CATHETERIZATION N/A 10/26/2020    Procedure: Left ventriculography;  Surgeon: Oneida Saldivar MD;  [facility];  Service: Cardiovascular;  Laterality: N/A;   • CARDIAC CATHETERIZATION  10/26/2020    Procedure: Functional Flow [place];  Surgeon: Oneida Saldivar MD;  Location: [facility] CATH INVASIVE LOCATION;  Service: Cardiovascular;;   • CARDIAC CATHETERIZATION N/A 7/19/2021    Procedure: Coronary angiography;  Surgeon: Oneida Saldivar MD;  Location: [facility] CATH INVASIVE LOCATION;  Service: Cardiovascular;  Laterality: N/A;   • CARDIAC CATHETERIZATION N/A 7/19/2021    Procedure: Left heart cath;  Surgeon: Oneida Saldivar MD;  Location: [facility] CATH INVASIVE LOCATION;  Service: Cardiovascular;  Laterality: N/A;   • CARDIAC CATHETERIZATION N/A 7/19/2021    Procedure: Left ventriculography;  Surgeon: Janna  MD Oneida;  Location:  [facility] CATH INVASIVE LOCATION;  Service: Cardiovascular;  Laterality: N/A;   • CARDIAC CATHETERIZATION N/A 2021    Procedure: Percutaneous Coronary Intervention;  Surgeon: Oneida Saldivar MD;  [facility];  Service: Cardiovascular;  Laterality: N/A;   • CARDIAC CATHETERIZATION N/A 2021    Procedure: Optical Coherent Tomography;  Surgeon: Oneida Saldivar MD;  [facility];  Service: Cardiovascular;  Laterality: N/A;   • CARDIAC CATHETERIZATION N/A 2021    Procedure: Stent HUGO coronary;  Surgeon: Oneida Saldivar MD;  [facility];  Service: Cardiovascular;  Laterality: N/A;   • CARDIAC CATHETERIZATION  2021    Procedure: RESTING FULL CYCLE RATIO;  Surgeon: Oneida Saldivar MD;  Location: [facility] CATH INVASIVE LOCATION;  Service: Cardiovascular;;  RFR   • CARDIAC ELECTROPHYSIOLOGY PROCEDURE N/A 2018    Procedure: ICD DC generator change  [employer];  Surgeon: Hany Ornelas MD;  Location: [facility] CATH INVASIVE LOCATION;  Service:    • PACEMAKER IMPLANTATION     • TUBAL ABDOMINAL LIGATION         Family History   Problem Relation Age of Onset   • Diabetes Mother    • Heart disease Mother    • Hyperlipidemia Mother    • Diabetes Father    • Heart disease Father    • Hyperlipidemia Father    • Heart disease Brother    • Hyperlipidemia Brother    • Hypertension Father    • Colon cancer Father    • Heart attack Father    • Emphysema Mother    • Heart disease Brother    • Heart attack Brother    • Heart disease Maternal Grandmother    • Heart disease Maternal Grandfather    • Heart disease Paternal Grandmother    • Heart disease Paternal Grandfather        Social History     Tobacco Use   • Smoking status: Former Smoker     Packs/day: 1.00     Years: 30.00     Pack years: 30.00     Types: Cigarettes     Start date: 1979     Quit date: 2009     Years since quittin.4   •  "Smokeless tobacco: Never Used   • Tobacco comment: QUIT 10 YRS   Substance Use Topics   • Alcohol use: Yes     Comment: rarely uses ETOH/caffeine use   • Drug use: No         ECG 12 Lead    Date/Time: 4/15/2022 1:35 PM  Performed by: Oneida Saldivar MD  Authorized by: Oneida Saldivar MD   Comparison: compared with previous ECG   Similar to previous ECG  Rhythm: sinus rhythm  Q waves: V1 and V2                   Objective:     Visit Vitals  /78 (BP Location: Right arm, Patient Position: Sitting, Cuff Size: Large Adult)   Pulse 79   Ht 152.4 cm (60\")   Wt 88.1 kg (194 lb 3.2 oz)   SpO2 99%   BMI 37.93 kg/m²         Constitutional:       Appearance: Normal appearance. Well-developed.   Eyes:      General: Lids are normal.      Conjunctiva/sclera: Conjunctivae normal.      Pupils: Pupils are equal, round, and reactive to light.   HENT:      Head: Normocephalic and atraumatic.   Neck:      Vascular: No carotid bruit or JVD.      Lymphadenopathy: No cervical adenopathy.   Pulmonary:      Effort: Pulmonary effort is normal.      Breath sounds: Normal breath sounds.   Cardiovascular:      Normal rate. Regular rhythm.      No gallop.   Pulses:     Radial: 2+ bilaterally.  Edema:     Peripheral edema absent.   Abdominal:      Palpations: Abdomen is soft.   Musculoskeletal:      Cervical back: Full passive range of motion without pain, normal range of motion and neck supple. Skin:     General: Skin is warm and dry.   Neurological:      Mental Status: Alert and oriented to person, place, and time.             Assessment:          Diagnosis Plan   1. Chronic coronary artery disease     2. S/P coronary artery stent placement     3. History of anterior myocardial infarction (CMS/HCC)     4. ICD (implantable cardioverter-defibrillator) in place     5. Chronic systolic congestive heart failure (HCC)     6. Ischemic cardiomyopathy     7. Mixed hyperlipidemia     8. Benign essential hypertension     9. Type 2 diabetes mellitus " with complication (HCC)     10. History of CVA (cerebrovascular accident) without residual deficits     11. Obstructive sleep apnea syndrome            Plan:       1.  Coronary artery disease.  She is starting to have recurrent chest discomfort.  Right now is only happen a couple times a month.  Her EKG is unchanged.  She is just status post repeat PCI and drug-eluting stent placement of her LAD and 7/2021.  I am hesitant to repeat a cardiac catheterization so soon.  I do not think a stress test this can be very useful because her history of anterior infarct.  I went ahead and had her device interrogated again today and it showed no evidence of recurrent ventricular arrhythmias.  At this point the patient and I decided that we would continue to monitor her symptoms.  If her symptoms start increasing in frequency will consider proceeding with a repeat cardiac catheterization.  As long as they remain fairly stable we will continue to manage unclear.  2.  Hypertension.  Well-controlled on current regimen medications.  Continue same.  3.  Ischemic cardiomyopathy.  EF of 35%.  No evidence of volume overload.  She is on guideline directed management with carvedilol and spironolactone.  Has not been on ACE inhibitor or an ARB due to relatively low blood pressures.  4.  Hyperlipidemia.  On high-dose atorvastatin.  Last lipid panel in 11/2021 showed that her LDL was near goal around 85.  5.  Diabetes mellitus type 2  6.  AICD placement.  Device check today showed no recurrence of ventricular arrhythmias.  Continue routine device checks.  7.  History of left MCA stroke.  On rivaroxaban for anticoagulation since it was felt to be embolic.  8.  Obstructive sleep apnea.  On CPAP which is managed by sleep medicine.    I will plan on seeing the patient back again in 3 months.  I instructed her to notify me if she has any worsening symptoms prior to that.

## 2022-05-09 DIAGNOSIS — I25.10 CHRONIC CORONARY ARTERY DISEASE: ICD-10-CM

## 2022-05-09 DIAGNOSIS — I25.5 ISCHEMIC CARDIOMYOPATHY: ICD-10-CM

## 2022-05-09 DIAGNOSIS — E78.5 HYPERLIPIDEMIA, UNSPECIFIED HYPERLIPIDEMIA TYPE: ICD-10-CM

## 2022-05-09 RX ORDER — CARVEDILOL 25 MG/1
TABLET ORAL
Qty: 180 TABLET | Refills: 3 | Status: SHIPPED | OUTPATIENT
Start: 2022-05-09 | End: 2022-11-14

## 2022-05-09 RX ORDER — ATORVASTATIN CALCIUM 80 MG/1
TABLET, FILM COATED ORAL
Qty: 90 TABLET | Refills: 3 | Status: SHIPPED | OUTPATIENT
Start: 2022-05-09

## 2022-05-09 NOTE — TELEPHONE ENCOUNTER
Last OV 4/15/22.  Next OV 7/12/22.  Labs 11/17/21.  [facility] [place]

## 2022-05-20 PROCEDURE — 93296 REM INTERROG EVL PM/IDS: CPT | Performed by: INTERNAL MEDICINE

## 2022-05-20 PROCEDURE — G2066 INTER DEVC REMOTE 30D: HCPCS | Performed by: INTERNAL MEDICINE

## 2022-05-20 PROCEDURE — 93295 DEV INTERROG REMOTE 1/2/MLT: CPT | Performed by: INTERNAL MEDICINE

## 2022-05-20 PROCEDURE — 93297 REM INTERROG DEV EVAL ICPMS: CPT | Performed by: INTERNAL MEDICINE

## 2022-07-14 ENCOUNTER — OFFICE VISIT (OUTPATIENT)
Dept: ENDOCRINOLOGY | Age: 62
End: 2022-07-14

## 2022-07-14 VITALS
TEMPERATURE: 97.3 F | DIASTOLIC BLOOD PRESSURE: 62 MMHG | SYSTOLIC BLOOD PRESSURE: 124 MMHG | BODY MASS INDEX: 35.95 KG/M2 | HEIGHT: 61 IN | WEIGHT: 190.4 LBS | OXYGEN SATURATION: 94 % | HEART RATE: 98 BPM

## 2022-07-14 DIAGNOSIS — E11.69 HYPERLIPIDEMIA ASSOCIATED WITH TYPE 2 DIABETES MELLITUS: ICD-10-CM

## 2022-07-14 DIAGNOSIS — E78.5 HYPERLIPIDEMIA ASSOCIATED WITH TYPE 2 DIABETES MELLITUS: ICD-10-CM

## 2022-07-14 DIAGNOSIS — E11.65 TYPE 2 DIABETES MELLITUS WITH HYPERGLYCEMIA, UNSPECIFIED WHETHER LONG TERM INSULIN USE: Primary | ICD-10-CM

## 2022-07-14 PROCEDURE — 99214 OFFICE O/P EST MOD 30 MIN: CPT | Performed by: INTERNAL MEDICINE

## 2022-07-14 NOTE — PROGRESS NOTES
"Chief Complaint  Chief Complaint   Patient presents with   • Diabetes     Type 2       Subjective          History of Present Illness    Daisy Dent 61 y.o. presents with Type 2 dm as a F/u patient.    Type 2 dm - Diagnosed about 4 - 5 years ago.   Today in clinic pt reports being on metformin 1000 mg po bid, ozempic 1 mg subq once week.   Avg bg - 120 - 130  Checks BG - every other day  Sensor - x  Dm retinopathy - x,Last eye exam - due for exam  Dm nephropathy - x  Dm neuropathy - x,Dm neuropathy meds - x  CAD -x  CVA -x  Episodes of hypoglycemia - x  Pt is physically active. weight has been stable.   Pt tries to follow DM diet for most part.         Reviewed primary care physician's/consulting physician documentation and lab results         I have reviewed the patient's allergies, medicines, past medical hx, family hx and social hx in detail.    Objective   Vital Signs:   /62   Pulse 98   Temp 97.3 °F (36.3 °C)   Ht 154.2 cm (60.71\")   Wt 86.4 kg (190 lb 6.4 oz)   SpO2 94%   BMI 36.32 kg/m²   Physical Exam     General appearance - no distress  Eyes- anicteric sclera  Ear nose and throat-external ears and nose normal.    Respiratory-normal chest on inspection.  No respiratory distress noted.  Skin-no rashes.  Neuro-alert and oriented x3          Result Review :   The following data was reviewed by: Abraham Fitch MD on 07/14/2022:  Results Encounter on 01/13/2022   Component Date Value Ref Range Status   • Hemoglobin A1C 06/30/2022 7.0 (A) 4.8 - 5.6 % Final    Comment:          Prediabetes: 5.7 - 6.4           Diabetes: >6.4           Glycemic control for adults with diabetes: <7.0     • Glucose 06/30/2022 147 (A) 65 - 99 mg/dL Final   • BUN 06/30/2022 13  8 - 27 mg/dL Final   • Creatinine 06/30/2022 1.04 (A) 0.57 - 1.00 mg/dL Final   • EGFR Result 06/30/2022 62  >59 mL/min/1.73 Final   • BUN/Creatinine Ratio 06/30/2022 13  12 - 28 Final   • Sodium 06/30/2022 140  134 - 144 mmol/L Final   • " Potassium 06/30/2022 4.3  3.5 - 5.2 mmol/L Final   • Chloride 06/30/2022 104  96 - 106 mmol/L Final   • Total CO2 06/30/2022 24  20 - 29 mmol/L Final   • Calcium 06/30/2022 10.0  8.7 - 10.3 mg/dL Final   • Total Protein 06/30/2022 7.1  6.0 - 8.5 g/dL Final   • Albumin 06/30/2022 4.3  3.8 - 4.9 g/dL Final   • Globulin 06/30/2022 2.8  1.5 - 4.5 g/dL Final   • A/G Ratio 06/30/2022 1.5  1.2 - 2.2 Final   • Total Bilirubin 06/30/2022 0.4  0.0 - 1.2 mg/dL Final   • Alkaline Phosphatase 06/30/2022 114  44 - 121 IU/L Final   • AST (SGOT) 06/30/2022 29  0 - 40 IU/L Final   • ALT (SGPT) 06/30/2022 40 (A) 0 - 32 IU/L Final   • Total Cholesterol 06/30/2022 270 (A) 100 - 199 mg/dL Final   • Triglycerides 06/30/2022 282 (A) 0 - 149 mg/dL Final   • HDL Cholesterol 06/30/2022 33 (A) >39 mg/dL Final   • VLDL Cholesterol Fam 06/30/2022 55 (A) 5 - 40 mg/dL Final   • LDL Chol Calc (NIH) 06/30/2022 182 (A) 0 - 99 mg/dL Final   • Creatinine, Urine 06/30/2022 369.0  Not Estab. mg/dL Final   • Microalbumin, Urine 06/30/2022 15.6  Not Estab. ug/mL Final   • Microalbumin/Creatinine Ratio 06/30/2022 4  0 - 29 mg/g creat Final    Comment:                        Normal:                0 -  29                         Moderately increased: 30 - 300                         Severely increased:       >300     • Interpretation 06/30/2022 Note   Final    Supplemental report is available.     Data reviewed: PCP notes       Results Review:    I reviewed the patient's new clinical results.     Assessment and Plan    Problem List Items Addressed This Visit    None     Visit Diagnoses     Type 2 diabetes mellitus with hyperglycemia, unspecified whether long term insulin use (HCC)    -  Primary    Relevant Orders    TSH    T4, Free    Basic Metabolic Panel    Hemoglobin A1c    Lipid Panel    Vitamin B12 & Folate    Vitamin D 25 Hydroxy    Hyperlipidemia associated with type 2 diabetes mellitus (HCC)        Relevant Orders    TSH    T4, Free    Basic  "Metabolic Panel    Hemoglobin A1c    Lipid Panel    Vitamin B12 & Folate    Vitamin D 25 Hydroxy        Type 2 diabetes mellitus-uncontrolled with hyperglycemia  Continue metformin, Ozempic.    Obesity  Discussed about exercise regimen, limitations and also about calorie restrictions with calorie counting with the apps - like calorie kind and my fitness pal.    Hyperlipidemia  Continue Lipitor 80 mg oral daily.  Emphasized being compliant on the medication.    Interpreted the blood work-up/imaging results performed by the primary care/consulting physician -    Refills sent to pharmacy    Follow Up     Patient was given instructions and counseling regarding her condition or for health maintenance advice. Please see specific information pulled into the AVS if appropriate.       Thank you for asking me to see your patient, Daisy Dent in consultation.         Abraham Fitch MD  07/14/22      [facility] Dragon / transcription disclaimer:     \"Dictated utilizing Dragon dictation\".         "

## 2022-07-16 DIAGNOSIS — I25.5 ISCHEMIC CARDIOMYOPATHY: ICD-10-CM

## 2022-07-18 RX ORDER — POTASSIUM CHLORIDE 750 MG/1
10 TABLET, FILM COATED, EXTENDED RELEASE ORAL DAILY
Qty: 90 TABLET | Refills: 1 | Status: SHIPPED | OUTPATIENT
Start: 2022-07-18 | End: 2022-08-03 | Stop reason: SDUPTHER

## 2022-07-18 RX ORDER — RIVAROXABAN 20 MG/1
TABLET, FILM COATED ORAL
Qty: 90 TABLET | Refills: 1 | Status: SHIPPED | OUTPATIENT
Start: 2022-07-18 | End: 2023-01-16

## 2022-07-18 NOTE — TELEPHONE ENCOUNTER
Last OV 4/15/22.  Next OV 7/21/22.  Labs 6/30/22.  [facility]IMMY

## 2022-08-03 DIAGNOSIS — I25.5 ISCHEMIC CARDIOMYOPATHY: ICD-10-CM

## 2022-08-03 RX ORDER — POTASSIUM CHLORIDE 750 MG/1
10 TABLET, FILM COATED, EXTENDED RELEASE ORAL DAILY
Qty: 90 TABLET | Refills: 1 | Status: SHIPPED | OUTPATIENT
Start: 2022-08-03

## 2022-08-18 ENCOUNTER — OFFICE VISIT (OUTPATIENT)
Dept: CARDIOLOGY | Facility: CLINIC | Age: 62
End: 2022-08-18

## 2022-08-18 ENCOUNTER — CLINICAL SUPPORT NO REQUIREMENTS (OUTPATIENT)
Dept: CARDIOLOGY | Facility: CLINIC | Age: 62
End: 2022-08-18

## 2022-08-18 VITALS
OXYGEN SATURATION: 99 % | WEIGHT: 188 LBS | BODY MASS INDEX: 36.91 KG/M2 | DIASTOLIC BLOOD PRESSURE: 70 MMHG | SYSTOLIC BLOOD PRESSURE: 110 MMHG | HEART RATE: 86 BPM | HEIGHT: 60 IN

## 2022-08-18 DIAGNOSIS — I50.22 CHRONIC SYSTOLIC CONGESTIVE HEART FAILURE: ICD-10-CM

## 2022-08-18 DIAGNOSIS — G47.33 OBSTRUCTIVE SLEEP APNEA SYNDROME: ICD-10-CM

## 2022-08-18 DIAGNOSIS — E11.8 TYPE 2 DIABETES MELLITUS WITH COMPLICATION: ICD-10-CM

## 2022-08-18 DIAGNOSIS — Z95.810 ICD (IMPLANTABLE CARDIOVERTER-DEFIBRILLATOR) IN PLACE: ICD-10-CM

## 2022-08-18 DIAGNOSIS — I25.10 CHRONIC CORONARY ARTERY DISEASE: Primary | ICD-10-CM

## 2022-08-18 DIAGNOSIS — I21.09 ANTERIOR MYOCARDIAL INFARCTION: ICD-10-CM

## 2022-08-18 DIAGNOSIS — I63.412 CEREBROVASCULAR ACCIDENT (CVA) DUE TO EMBOLISM OF LEFT MIDDLE CEREBRAL ARTERY: ICD-10-CM

## 2022-08-18 DIAGNOSIS — I25.5 ISCHEMIC CARDIOMYOPATHY: Primary | ICD-10-CM

## 2022-08-18 DIAGNOSIS — I25.5 ISCHEMIC CARDIOMYOPATHY: ICD-10-CM

## 2022-08-18 DIAGNOSIS — I10 BENIGN ESSENTIAL HYPERTENSION: ICD-10-CM

## 2022-08-18 DIAGNOSIS — E78.2 MIXED HYPERLIPIDEMIA: ICD-10-CM

## 2022-08-18 DIAGNOSIS — Z95.5 S/P CORONARY ARTERY STENT PLACEMENT: ICD-10-CM

## 2022-08-18 PROCEDURE — 93283 PRGRMG EVAL IMPLANTABLE DFB: CPT | Performed by: INTERNAL MEDICINE

## 2022-08-18 PROCEDURE — 93000 ELECTROCARDIOGRAM COMPLETE: CPT | Performed by: INTERNAL MEDICINE

## 2022-08-18 PROCEDURE — 99214 OFFICE O/P EST MOD 30 MIN: CPT | Performed by: INTERNAL MEDICINE

## 2022-08-18 NOTE — PROGRESS NOTES
Subjective:     Encounter Date:08/18/2022      Patient ID: Daisy Dent is a 61 y.o. female.    Chief Complaint:  History of Present Illness    This is a 61-year-old female with a history of ischemic cardiomyopathy, last ejection fraction of 40-45%, diabetes mellitus type 2, obstructive sleep apnea on CPAP, hypertension, status post dual-chamber AICD placement, dyslipidemia, status post left MCA stroke, coronary artery disease with a history of prior anterior myocardial infarction and LAD stent placement (in 7/2006, 10/2009, 11/2019, and 7/2021), who presents for follow-up.     In 12/2021 she reported improvement in her symptoms overall.  However in follow-up in 4/2022 she reported that she was starting to have recurrent episodes of chest tightness they are occurring about twice a month and lasting for 30 minutes at a time.  Again they usually occurred after laying down to sleep at night.  Symptoms are similar to what she experienced before her last PCI but not as frequent or as intense.  In 2/2022 she had a routine device interrogation that showed a 19 beat episode of ventricular tachycardia.  Repeat in device interrogation in 4/2022 showed no further episodes.  Since her symptoms were still fairly infrequent we opted to monitor her symptoms before considering any further work-up.     She presents today for 3-month follow-up.  She continues to have symptoms now about 3 times a week but only lasting about 5 to 10 minutes at a time.  The only occur at night when she first lays down.  This is associated with some palpitations.  She does not have any symptoms during the daytime.  She otherwise denies any shortness of breath, orthopnea, near-syncope or syncope, or lower extremity edema.     Prior History:  I began following the patient in 11/2015 when she presented to establish care.  Prior to that she was followed by Dr. Ayala with [place] [facility].  Her prior cardiac history includes a non-ST elevation  MI and 7/2006 at which time she received a Cypher 3.0 x 23 mm stent to her mid LAD.  She then presented in 10/2009 with a late in-stent thrombosis that required repeat stenting of her mid LAD in addition to a second drug-eluting stent placement of her left circumflex artery.  Following that she had a repeat cardiac catheterization in 2010 that was reportedly unremarkable.  Following her myocardial infarction in 2009 she developed ischemic myopathy with an EF of around 25% and ultimately underwent AICD placement and 7/2010.  At some point in the course of her follow-ups her left ventricular function improved with an ejection fraction of 40-45%.  A stress test showed evidence of a large anterior infarct and minimal elva-infarct ischemia.     In her follow-ups she has done fairly well recently when she reported more fatigued than normal and episodes of chest discomfort that occurred while she was exercising on the treadmill.  She underwent  an echocardiogram in 3/2017 that showed an ejection fraction of 40-45%.  Due to continued symptoms we went ahead and proceeded with a PET stress test which was performed in 8/2017 that showed medium sized anterior wall infarct with mild elva-infarct ischemia and a post stress ejection fraction of 58%.      In 11/2017 her AICD was noted to be at [facility].  She subsequently underwent generator replacement on 1/12/2018 with Dr. Ornelas. When I saw her back in follow up in 9/2018 she reported that she denied any new or worsening symptoms.       She was admitted and 12/2018 after she presented with aphasia.  On arrival to the hospital she underwent a CT of the head that showed no acute stroke but a perfusion scan showed a large perfusion mismatch.  A CTA showed evidence of a left MCA thrombus.  She was given TPA at this time with improvement in her symptoms.  Neurology felt that her stroke was likely embolic.  She underwent a repeat echocardiogram during her admission that showed moderately  depressed left ventricular systolic function with an EF of 30%, grade 1 diastolic dysfunction, and no significant valvular disease.  Based on the likelihood that this was an embolic stroke I opted to go ahead and recommend anticoagulation.  She was subsequently started on rivaroxaban 20 mg a day.  Her aspirin was stopped and she was continued on clopidogrel.     In 11/2019 she reported a couple weeks of episodes of substernal chest tightness lasting 1 to 2 minutes and dyspnea on exertion.  She reported that the symptoms were similar to what she had at the time of her prior myocardial infarctions but reports that its much milder than what she experienced at that time.    Following that office visit she underwent a stress test that showed evidence of a medium anterior infarct with moderate elva-infarct ischemia.  The findings were similar to prior stress test however the amount of elva-infarct ischemia had impaired to increase.  We initially tried to treat her medically however she continued to have recurrent symptoms so we opted to proceed with a cardiac catheterization.  She presented to the hospital on 1/6/2020 for the procedure.  She was found to have severe in-stent restenosis of her proximal LAD stent and ultimately underwent repeat drug-eluting stent placement of the in-stent restenosis.  She was also noted to have moderate nonobstructive disease of the first obtuse marginal branch for which she underwent FFR evaluation which was normal at 0.96.     In follow-up she had some mild episodes of chest discomfort that we decided to monitor.  She then returned for urgent follow-up on 10/22/2020 at which time she reported more severe episodes of chest discomfort that were occurring primarily at rest.  Due to the severity of her symptoms she was quite concerned and we decided to proceed with a cardiac catheterization.  This was performed on 10/26/2020 and showed stable moderate nonobstructive coronary artery  disease.     She reported increasing frequency of chest tightness that primarily occurred at night and woke her up from sleep starting in 5/2021.  She reported this was similar to her prior anginal symptoms.  I started her on isosorbide mononitrate 30 mg daily but she is unable to tolerate it due to the onset of severe headaches.  When she presented back for follow-up in 7/2021 she was continuing to have episodes of nocturnal discomfort that had progressed.  At that point I recommended proceeding with a repeat cardiac catheterization.      She presented for cardiac catheterization on 7/19/2021.  This showed 90% proximal in-stent restenosis and she underwent repeat drug-eluting stent placement using a 3.0 x 12 mm stent.  She was also noted to have moderate nonobstructive disease of the first obtuse marginal branch with a normal RFR.     She was seen back in the office by KEYONA Demarco on 7/30/2021 at which time she was feeling better.   It was noted around the time that she had mildly elevated liver enzymes which were unchanged compared to prior evaluations.  Was also noted that her last lipid panel on 7/7/2021 showed that her cholesterol was poorly controlled with an LDL of around 160 despite being on high-dose atorvastatin.      Review of Systems   Constitutional: Positive for malaise/fatigue.   HENT: Negative for hearing loss, hoarse voice, nosebleeds and sore throat.    Eyes: Negative for pain.   Cardiovascular: Positive for chest pain and palpitations. Negative for claudication, cyanosis, dyspnea on exertion, irregular heartbeat, leg swelling, near-syncope, orthopnea, paroxysmal nocturnal dyspnea and syncope.   Respiratory: Negative for shortness of breath and snoring.    Endocrine: Negative for cold intolerance, heat intolerance, polydipsia, polyphagia and polyuria.   Skin: Negative for itching and rash.   Musculoskeletal: Negative for arthritis, falls, joint pain, joint swelling, muscle cramps, muscle  weakness and myalgias.   Gastrointestinal: Negative for constipation, diarrhea, dysphagia, heartburn, hematemesis, hematochezia, melena, nausea and vomiting.   Genitourinary: Negative for frequency, hematuria and hesitancy.   Neurological: Positive for light-headedness. Negative for excessive daytime sleepiness, dizziness, headaches, numbness and weakness.   Psychiatric/Behavioral: Negative for depression. The patient is not nervous/anxious.          Current Outpatient Medications:   •  Accu-Chek FastClix Lancets misc, Use to test blood sugar 4 times daily  E11.8, Disp: 400 each, Rfl: 1  •  Accu-Chek Guide test strip, USE 4 TIMES A DAY E11.8, Disp: 300 each, Rfl: 2  •  albuterol sulfate  (90 Base) MCG/ACT inhaler, TAKE 2 PUFFS BY MOUTH EVERY 4 HOURS AS NEEDED, Disp: , Rfl:   •  atorvastatin (LIPITOR) 80 MG tablet, TAKE 1 TABLET BY MOUTH EVERY DAY, Disp: 90 tablet, Rfl: 3  •  Blood Glucose Monitoring Suppl (Accu-Chek Guide) w/Device kit, Inject 1 Device under the skin into the appropriate area as directed Daily., Disp: 1 kit, Rfl: 0  •  carvedilol (COREG) 25 MG tablet, TAKE 1 TABLET BY MOUTH TWICE A DAY WITH MEALS, Disp: 180 tablet, Rfl: 3  •  Cholecalciferol (VITAMIN D3) 2000 UNITS capsule, Take 1,000 Units by mouth Daily., Disp: , Rfl:   •  clopidogrel (PLAVIX) 75 MG tablet, TAKE 1 TABLET BY MOUTH EVERY DAY, Disp: 90 tablet, Rfl: 3  •  furosemide (LASIX) 40 MG tablet, TAKE 1 TABLET BY MOUTH DAILY., Disp: 90 tablet, Rfl: 3  •  Insulin Pen Needle 32G X 4 MM misc, USE 4 TIMES A DAY, Disp: 400 each, Rfl: 3  •  isosorbide mononitrate (IMDUR) 30 MG 24 hr tablet, TAKE 1 TABLET BY MOUTH EVERY DAY, Disp: 90 tablet, Rfl: 1  •  metFORMIN (GLUCOPHAGE) 1000 MG tablet, TAKE 1 TABLET BY MOUTH EVERY DAY IN THE EVENING, Disp: 90 tablet, Rfl: 3  •  Multiple Vitamins-Minerals (MULTIVITAL PO), Take 1 tablet by mouth Daily., Disp: , Rfl:   •  nitroglycerin (Nitrostat) 0.4 MG SL tablet, Place 1 tablet under the tongue Every 5  (Five) Minutes As Needed for Chest Pain. Take no more than 3 doses in 15 minutes., Disp: 30 tablet, Rfl: 11  •  Omega-3 Fatty Acids (FISH OIL) 1200 MG capsule capsule, Take 1,200 mg by mouth Daily With Breakfast., Disp: , Rfl:   •  potassium chloride 10 MEQ CR tablet, Take 1 tablet by mouth Daily. Resume on 1/7/20, Disp: 90 tablet, Rfl: 1  •  potassium chloride 10 MEQ CR tablet, Take 1 tablet by mouth Daily. Resume on 1/7/20, Disp: 90 tablet, Rfl: 1  •  Semaglutide, 1 MG/DOSE, (Ozempic, 1 MG/DOSE,) 4 MG/3ML solution pen-injector, Inject 1 mg under the skin into the appropriate area as directed 1 (One) Time Per Week., Disp: 12 pen, Rfl: 2  •  spironolactone (ALDACTONE) 25 MG tablet, TAKE 1 TABLET BY MOUTH EVERY DAY, Disp: 90 tablet, Rfl: 3  •  traZODone (DESYREL) 50 MG tablet, Take 50 mg by mouth every night at bedtime., Disp: , Rfl:   •  vitamin B-12 (CYANOCOBALAMIN) 500 MCG tablet, Take 500 mcg by mouth Daily., Disp: , Rfl:   •  Xarelto 20 MG tablet, TAKE 1 TABLET BY MOUTH EVERY DAY WITH DINNER, Disp: 90 tablet, Rfl: 1    Past Medical History:   Diagnosis Date   • Abdominal pain    • Abnormal liver function test    • Acute bronchitis    • Benign essential hypertension    • CAD (coronary artery disease)    • CHF (congestive heart failure) (HCC)    • Colon polyp    • Congestive heart disease (HCC)    • COPD (chronic obstructive pulmonary disease) (HCC)    • Cough    • Diabetes (HCC)    • Diabetes mellitus (HCC)    • Diarrhea    • Gastroenteritis    • Health care maintenance    • Hyperlipidemia    • Hypertension    • IFG (impaired fasting glucose)    • Ischemic cardiomyopathy    • Myocardial infarction (HCC)    • SHELLIE (obstructive sleep apnea)    • Sore throat    • Type 2 diabetes mellitus (HCC)    • Vaginal yeast infection    • Vitamin D deficiency        Past Surgical History:   Procedure Laterality Date   • CARDIAC CATHETERIZATION     • CARDIAC CATHETERIZATION N/A 1/6/2020    Procedure: Coronary angiography;   Surgeon: Oneida Saldivar MD;  Location:  [facility];  Service: Cardiovascular   • CARDIAC CATHETERIZATION N/A 1/6/2020    Procedure: Left heart cath;  Surgeon: Oneida Saldivar MD;  Location:  [facility];  Service: Cardiovascular   • CARDIAC CATHETERIZATION N/A 1/6/2020    Procedure: Left ventriculography;  Surgeon: Oneida Saldivar MD;  Location:  [facility];  Service: Cardiovascular   • CARDIAC CATHETERIZATION N/A 1/6/2020    Procedure: Percutaneous Coronary Intervention;  Surgeon: Oneida Saldivar MD;  Location:  [facility];  Service: Cardiovascular   • CARDIAC CATHETERIZATION N/A 1/6/2020    Procedure: Stent HUGO coronary;  Surgeon: Oneida Saldivar MD;  [facility];  Service: Cardiovascular   • CARDIAC CATHETERIZATION  1/6/2020    Procedure: Functional Flow Dragoon;  Surgeon: Oneida Saldivar MD;  Location: [facility] CATH INVASIVE LOCATION;  Service: Cardiovascular   • CARDIAC CATHETERIZATION N/A 10/26/2020    Procedure: Coronary angiography;  Surgeon: Oneida Saldivar MD;  [facility];  Service: Cardiovascular;  Laterality: N/A;   • CARDIAC CATHETERIZATION N/A 10/26/2020    Procedure: Left heart cath;  Surgeon: Oneida Saldivar MD;  [facility];  Service: Cardiovascular;  Laterality: N/A;   • CARDIAC CATHETERIZATION N/A 10/26/2020    Procedure: Left ventriculography;  Surgeon: Oneida Saldivar MD;  Location: [facility] CATH INVASIVE LOCATION;  Service: Cardiovascular;  Laterality: N/A;   • CARDIAC CATHETERIZATION  10/26/2020    Procedure: Functional Flow Dragoon;  Surgeon: Oneida Saldivar MD;  [facility];  Service: Cardiovascular;;   • CARDIAC CATHETERIZATION N/A 7/19/2021    Procedure: Coronary angiography;  Surgeon: Oneida Saldivar MD;  [facility];  Service: Cardiovascular;  Laterality: N/A;   • CARDIAC  CATHETERIZATION N/A 7/19/2021    Procedure: Left heart cath;  Surgeon: Oneida Saldivar MD;  Location:  [facility];  Service: Cardiovascular;  Laterality: N/A;   • CARDIAC CATHETERIZATION N/A 7/19/2021    Procedure: Left ventriculography;  Surgeon: Oneida Saldivar MD;  [facility];  Service: Cardiovascular;  Laterality: N/A;   • CARDIAC CATHETERIZATION N/A 7/19/2021    Procedure: Percutaneous Coronary Intervention;  Surgeon: Oneida Saldivar MD;  [facility];  Service: Cardiovascular;  Laterality: N/A;   • CARDIAC CATHETERIZATION N/A 7/19/2021    Procedure: Optical Coherent Tomography;  Surgeon: Oneida Saldivar MD;  [facility];  Service: Cardiovascular;  Laterality: N/A;   • CARDIAC CATHETERIZATION N/A 7/19/2021    Procedure: Stent HUGO coronary;  Surgeon: Oneida Saldivar MD;  Location: [facility] CATH INVASIVE LOCATION;  Service: Cardiovascular;  Laterality: N/A;   • CARDIAC CATHETERIZATION  7/19/2021    Procedure: RESTING FULL CYCLE RATIO;  Surgeon: Oneida Saldivar MD;  Location: [facility] CATH INVASIVE LOCATION;  Service: Cardiovascular;;  RFR   • CARDIAC ELECTROPHYSIOLOGY PROCEDURE N/A 1/12/2018    Procedure: ICD DC generator change  medtronic;  Surgeon: Hany Ornelas MD;  Location: [facility] CATH INVASIVE LOCATION;  Service:    • PACEMAKER IMPLANTATION     • TUBAL ABDOMINAL LIGATION         Family History   Problem Relation Age of Onset   • Diabetes Mother    • Heart disease Mother    • Hyperlipidemia Mother    • Diabetes Father    • Heart disease Father    • Hyperlipidemia Father    • Heart disease Brother    • Hyperlipidemia Brother    • Hypertension Father    • Colon cancer Father    • Heart attack Father    • Emphysema Mother    • Heart disease Brother    • Heart attack Brother    • Heart disease Maternal Grandmother    • Heart disease Maternal Grandfather    • Heart disease Paternal Grandmother    • Heart  "disease Paternal Grandfather        Social History     Tobacco Use   • Smoking status: Former Smoker     Packs/day: 1.00     Years: 30.00     Pack years: 30.00     Types: Cigarettes     Start date: 1979     Quit date: 2009     Years since quittin.8   • Smokeless tobacco: Never Used   • Tobacco comment: QUIT 10 YRS   Substance Use Topics   • Alcohol use: Yes     Comment: rarely uses ETOH/caffeine use   • Drug use: No         ECG 12 Lead    Date/Time: 2022 10:20 AM  Performed by: Oneida Saldivar MD  Authorized by: Oneida Saldivar MD   Comparison: compared with previous ECG   Similar to previous ECG  Rhythm: sinus rhythm  Q waves: V1 and V2                   Objective:     Visit Vitals  /70 (BP Location: Left arm, Patient Position: Sitting, Cuff Size: Large Adult)   Pulse 86   Ht 152.4 cm (60\")   Wt 85.3 kg (188 lb)   SpO2 99%   BMI 36.72 kg/m²         Constitutional:       Appearance: Normal appearance. Well-developed.   Eyes:      General: Lids are normal.      Conjunctiva/sclera: Conjunctivae normal.      Pupils: Pupils are equal, round, and reactive to light.   HENT:      Head: Normocephalic and atraumatic.   Neck:      Vascular: No carotid bruit or JVD.      Lymphadenopathy: No cervical adenopathy.   Pulmonary:      Effort: Pulmonary effort is normal.      Breath sounds: Normal breath sounds.   Cardiovascular:      Normal rate. Regular rhythm.      No gallop.   Pulses:     Radial: 2+ bilaterally.  Edema:     Peripheral edema absent.   Abdominal:      Palpations: Abdomen is soft.   Musculoskeletal:      Cervical back: Full passive range of motion without pain, normal range of motion and neck supple. Skin:     General: Skin is warm and dry.   Neurological:      Mental Status: Alert and oriented to person, place, and time.             Assessment:          Diagnosis Plan   1. Chronic coronary artery disease     2. S/P coronary artery stent placement     3. History of anterior myocardial " infarction (CMS/[facility])     4. ICD (implantable cardioverter-defibrillator) in place     5. Chronic systolic congestive heart failure (HCC)     6. Ischemic cardiomyopathy     7. Mixed hyperlipidemia     8. Benign essential hypertension     9. Type 2 diabetes mellitus with complication ([facility])     10. Obstructive sleep apnea syndrome     11. Cerebrovascular accident (CVA) due to embolism of left middle cerebral artery ([facility])            Plan:         1.  Stable angina.  Symptoms although little bit more frequent Arst still fairly stable.  I recommended that she try taking her isosorbide in the evening rather than in the morning to see if this would prevent the episodes at night.  Otherwise since her symptoms are still somewhat stable we decided to continue to monitor her symptoms for now.  If her symptoms increase in frequency or if she has an episode that is prolonged and requires her to take a sublingual nitroglycerin I would have a low threshold to pursue further cardiac work-up with a repeat cardiac catheterization.  Unfortunately stress test in the past have not been helpful because of her prior anterior infarct.  2.  Coronary artery disease.  Plan as per #1.  Otherwise continue current medical management.  3.  Ischemic cardiomyopathy.  EF of 35%.  No evidence of volume overload.  On guideline directed management carvedilol and spironolactone.  She has not been on an ACE inhibitor or an ARB due to relatively low blood pressures.  4.  Hypertension.  Well-controlled on her current regimen medications.  5.  Hyperlipidemia.  On high-dose atorvastatin for goal LDL of around 70 or below.  6.  ICD placement.  Device interrogation today showed normal function and no recent events.  7.  History of left MCA stroke.  On chronic anticoagulation with rivaroxaban due to concerns this was an embolic stroke.  8.  Obstructive sleep apnea.  On CPAP.  He  9.  Diabetes mellitus type 2.    I will plan on seeing the patient back again in  about 3 months.

## 2022-08-19 PROCEDURE — 93295 DEV INTERROG REMOTE 1/2/MLT: CPT | Performed by: INTERNAL MEDICINE

## 2022-08-19 PROCEDURE — 93296 REM INTERROG EVL PM/IDS: CPT | Performed by: INTERNAL MEDICINE

## 2022-10-17 RX ORDER — SPIRONOLACTONE 25 MG/1
TABLET ORAL
Qty: 90 TABLET | Refills: 3 | Status: SHIPPED | OUTPATIENT
Start: 2022-10-17

## 2022-11-12 DIAGNOSIS — I25.5 ISCHEMIC CARDIOMYOPATHY: ICD-10-CM

## 2022-11-14 RX ORDER — CARVEDILOL 25 MG/1
TABLET ORAL
Qty: 180 TABLET | Refills: 2 | Status: SHIPPED | OUTPATIENT
Start: 2022-11-14

## 2022-11-14 NOTE — TELEPHONE ENCOUNTER
Last OV 8/18/22.  Next OV 11/18/22.  Labs 6/30/22.  [facility]

## 2022-11-18 ENCOUNTER — OFFICE VISIT (OUTPATIENT)
Dept: CARDIOLOGY | Facility: CLINIC | Age: 62
End: 2022-11-18

## 2022-11-18 VITALS
SYSTOLIC BLOOD PRESSURE: 120 MMHG | DIASTOLIC BLOOD PRESSURE: 66 MMHG | HEART RATE: 72 BPM | BODY MASS INDEX: 37.89 KG/M2 | WEIGHT: 193 LBS | HEIGHT: 60 IN

## 2022-11-18 DIAGNOSIS — I10 BENIGN ESSENTIAL HYPERTENSION: ICD-10-CM

## 2022-11-18 DIAGNOSIS — E78.2 MIXED HYPERLIPIDEMIA: Primary | ICD-10-CM

## 2022-11-18 DIAGNOSIS — Z95.810 AUTOMATIC IMPLANTABLE CARDIOVERTER-DEFIBRILLATOR IN SITU: ICD-10-CM

## 2022-11-18 DIAGNOSIS — I25.10 CHRONIC CORONARY ARTERY DISEASE: ICD-10-CM

## 2022-11-18 DIAGNOSIS — I21.09 ACUTE MYOCARDIAL INFARCTION OF ANTERIOR WALL: ICD-10-CM

## 2022-11-18 DIAGNOSIS — I25.5 ISCHEMIC CARDIOMYOPATHY: ICD-10-CM

## 2022-11-18 PROCEDURE — 99214 OFFICE O/P EST MOD 30 MIN: CPT | Performed by: NURSE PRACTITIONER

## 2022-11-18 PROCEDURE — 93000 ELECTROCARDIOGRAM COMPLETE: CPT | Performed by: NURSE PRACTITIONER

## 2022-11-18 NOTE — PROGRESS NOTES
Subjective:     Encounter Date:11/18/2022      Patient ID: Daisy Dent is a 62 y.o. female.    Chief Complaint:follow up CAD  History of Present Illness  This is a 63 y/o female know to Dr. Saldivar who is new to me today. She has a pmhx of ischemic cardiomyopathy with last EF of 40-45%, diabetes, SHELLIE on CPAP, hypertension, s/p dual-chamber AICD, hyperlipidemia, s/p left MCA CVA, coronary artery disease s/o anterior MI and LAD stent placement in 2006, 2009, 2019, and 2021. She was last seen in the office by Dr. Saldivar in August and was having some chest tightness and palpitations, particularly at night. It was recommended that she take isosorbide at night and this seemed to help.    She is here today for a follow up visit. She denies any chest pain. She continues to have some shortness of breath and palpitations but it is at baseline. She denies any swelling in her legs, orthopnea or PND. She denies dizziness or syncope. She does have fatigue but again, not any worse than her usual. Her blood pressure is well controlled at home. Overall, she is feeling well. She was recently diagnosed with anemia in October following a hospitalization when her hemoglobin was noted to be 7.1. Hemoccult was positive.  She underwent an EGD that was normal, colonoscopy showed a small area of nonbleeding colonic angiectasis. Plavix and Xarelto were resumed and plans are for a capsule study as an outpatient. She denies any melena or bloody bowel movements.    I have reviewed and updated as appropriate allergies, current medications, past family history, past medical history, past surgical history and problem list.    Review of Systems   Constitutional: Negative for fever, malaise/fatigue, weight gain and weight loss.   HENT: Negative for congestion, hoarse voice and sore throat.    Eyes: Negative for blurred vision and double vision.   Cardiovascular: Positive for palpitations. Negative for chest pain, dyspnea on exertion, leg  swelling, orthopnea and syncope.   Respiratory: Positive for shortness of breath. Negative for cough and wheezing.    Gastrointestinal: Negative for abdominal pain, hematemesis, hematochezia and melena.   Genitourinary: Negative for dysuria and hematuria.   Neurological: Negative for dizziness, headaches, light-headedness and numbness.   Psychiatric/Behavioral: Negative for depression. The patient is not nervous/anxious.          Current Outpatient Medications:   •  Accu-Chek FastClix Lancets misc, Use to test blood sugar 4 times daily  E11.8, Disp: 400 each, Rfl: 1  •  Accu-Chek Guide test strip, USE 4 TIMES A DAY E11.8, Disp: 300 each, Rfl: 2  •  albuterol sulfate  (90 Base) MCG/ACT inhaler, TAKE 2 PUFFS BY MOUTH EVERY 4 HOURS AS NEEDED, Disp: , Rfl:   •  atorvastatin (LIPITOR) 80 MG tablet, TAKE 1 TABLET BY MOUTH EVERY DAY, Disp: 90 tablet, Rfl: 3  •  Blood Glucose Monitoring Suppl (Accu-Chek Guide) w/Device kit, Inject 1 Device under the skin into the appropriate area as directed Daily., Disp: 1 kit, Rfl: 0  •  carvedilol (COREG) 25 MG tablet, TAKE 1 TABLET BY MOUTH TWICE A DAY WITH MEALS, Disp: 180 tablet, Rfl: 2  •  Cholecalciferol (VITAMIN D3) 2000 UNITS capsule, Take 1,000 Units by mouth Daily., Disp: , Rfl:   •  clopidogrel (PLAVIX) 75 MG tablet, TAKE 1 TABLET BY MOUTH EVERY DAY, Disp: 90 tablet, Rfl: 3  •  furosemide (LASIX) 40 MG tablet, TAKE 1 TABLET BY MOUTH DAILY., Disp: 90 tablet, Rfl: 3  •  Insulin Pen Needle 32G X 4 MM misc, USE 4 TIMES A DAY, Disp: 400 each, Rfl: 3  •  isosorbide mononitrate (IMDUR) 30 MG 24 hr tablet, TAKE 1 TABLET BY MOUTH EVERY DAY, Disp: 90 tablet, Rfl: 1  •  metFORMIN (GLUCOPHAGE) 1000 MG tablet, TAKE 1 TABLET BY MOUTH EVERY DAY IN THE EVENING, Disp: 90 tablet, Rfl: 3  •  Multiple Vitamins-Minerals (MULTIVITAL PO), Take 1 tablet by mouth Daily., Disp: , Rfl:   •  nitroglycerin (Nitrostat) 0.4 MG SL tablet, Place 1 tablet under the tongue Every 5 (Five) Minutes As Needed  for Chest Pain. Take no more than 3 doses in 15 minutes., Disp: 30 tablet, Rfl: 11  •  Omega-3 Fatty Acids (FISH OIL) 1200 MG capsule capsule, Take 1,200 mg by mouth Daily With Breakfast., Disp: , Rfl:   •  potassium chloride 10 MEQ CR tablet, Take 1 tablet by mouth Daily. Resume on 1/7/20, Disp: 90 tablet, Rfl: 1  •  Semaglutide, 1 MG/DOSE, (Ozempic, 1 MG/DOSE,) 4 MG/3ML solution pen-injector, Inject 1 mg under the skin into the appropriate area as directed 1 (One) Time Per Week., Disp: 12 pen, Rfl: 2  •  spironolactone (ALDACTONE) 25 MG tablet, TAKE 1 TABLET BY MOUTH EVERY DAY, Disp: 90 tablet, Rfl: 3  •  traZODone (DESYREL) 50 MG tablet, Take 50 mg by mouth every night at bedtime., Disp: , Rfl:   •  vitamin B-12 (CYANOCOBALAMIN) 500 MCG tablet, Take 500 mcg by mouth Daily., Disp: , Rfl:   •  Xarelto 20 MG tablet, TAKE 1 TABLET BY MOUTH EVERY DAY WITH DINNER, Disp: 90 tablet, Rfl: 1  •  potassium chloride 10 MEQ CR tablet, Take 1 tablet by mouth Daily. Resume on 1/7/20, Disp: 90 tablet, Rfl: 1    Past Medical History:   Diagnosis Date   • Abdominal pain    • Abnormal liver function test    • Acute bronchitis    • Benign essential hypertension    • CAD (coronary artery disease)    • CHF (congestive heart failure) (HCC)    • Colon polyp    • Congestive heart disease (HCC)    • COPD (chronic obstructive pulmonary disease) (HCC)    • Cough    • Diabetes (HCC)    • Diabetes mellitus (HCC)    • Diarrhea    • Gastroenteritis    • Health care maintenance    • Hyperlipidemia    • Hypertension    • IFG (impaired fasting glucose)    • Ischemic cardiomyopathy    • Myocardial infarction (HCC)    • SHELLIE (obstructive sleep apnea)    • Sore throat    • Type 2 diabetes mellitus (HCC)    • Vaginal yeast infection    • Vitamin D deficiency        Past Surgical History:   Procedure Laterality Date   • CARDIAC CATHETERIZATION     • CARDIAC CATHETERIZATION N/A 1/6/2020    Procedure: Coronary angiography;  Surgeon: Oneida Saldivar MD;   [facility];  Service: Cardiovascular   • CARDIAC CATHETERIZATION N/A 1/6/2020    Procedure: Left heart cath;  Surgeon: Oneida Saldivar MD;  [facility];  Service: Cardiovascular   • CARDIAC CATHETERIZATION N/A 1/6/2020    Procedure: Left ventriculography;  Surgeon: Oneida Saldivar MD;  [facility];  Service: Cardiovascular   • CARDIAC CATHETERIZATION N/A 1/6/2020    Procedure: Percutaneous Coronary Intervention;  Surgeon: Oneida Saldivar MD;  [facility];  Service: Cardiovascular   • CARDIAC CATHETERIZATION N/A 1/6/2020    Procedure: Stent HUGO coronary;  Surgeon: Oneida Saldivar MD;  [facility];  Service: Cardiovascular   • CARDIAC CATHETERIZATION  1/6/2020    Procedure: Functional Flow [place];  Surgeon: Oneida Saldivar MD;  Location: [facility] CATH INVASIVE LOCATION;  Service: Cardiovascular   • CARDIAC CATHETERIZATION N/A 10/26/2020    Procedure: Coronary angiography;  Surgeon: Oneida Saldivar MD;  Location: [facility] CATH INVASIVE LOCATION;  Service: Cardiovascular;  Laterality: N/A;   • CARDIAC CATHETERIZATION N/A 10/26/2020    Procedure: Left heart cath;  Surgeon: Oneida Saldivar MD;  [facility];  Service: Cardiovascular;  Laterality: N/A;   • CARDIAC CATHETERIZATION N/A 10/26/2020    Procedure: Left ventriculography;  Surgeon: Oneida Saldivar MD;  Location: [facility] CATH INVASIVE LOCATION;  Service: Cardiovascular;  Laterality: N/A;   • CARDIAC CATHETERIZATION  10/26/2020    Procedure: Functional Flow [place];  Surgeon: Oneida Saldivar MD;  [facility];  Service: Cardiovascular;;   • CARDIAC CATHETERIZATION N/A 7/19/2021    Procedure: Coronary angiography;  Surgeon: Oneida Saldivar MD;  [facility];  Service: Cardiovascular;  Laterality: N/A;   • CARDIAC CATHETERIZATION N/A 7/19/2021     Procedure: Left heart cath;  Surgeon: Oneida Saldivar MD;  Location:  [facility] CATH INVASIVE LOCATION;  Service: Cardiovascular;  Laterality: N/A;   • CARDIAC CATHETERIZATION N/A 7/19/2021    Procedure: Left ventriculography;  Surgeon: nOeida Saldivar MD;  [facility];  Service: Cardiovascular;  Laterality: N/A;   • CARDIAC CATHETERIZATION N/A 7/19/2021    Procedure: Percutaneous Coronary Intervention;  Surgeon: Oneida Saldivar MD;  [facility];  Service: Cardiovascular;  Laterality: N/A;   • CARDIAC CATHETERIZATION N/A 7/19/2021    Procedure: Optical Coherent Tomography;  Surgeon: Oneida Saldivar MD;  Location: [facility] CATH INVASIVE LOCATION;  Service: Cardiovascular;  Laterality: N/A;   • CARDIAC CATHETERIZATION N/A 7/19/2021    Procedure: Stent HUGO coronary;  Surgeon: Oneida Saldivar MD;  Location: [facility] CATH INVASIVE LOCATION;  Service: Cardiovascular;  Laterality: N/A;   • CARDIAC CATHETERIZATION  7/19/2021    Procedure: RESTING FULL CYCLE RATIO;  Surgeon: Oneida Saldivar MD;  Location: [facility] CATH INVASIVE LOCATION;  Service: Cardiovascular;;  RFR   • CARDIAC ELECTROPHYSIOLOGY PROCEDURE N/A 1/12/2018    Procedure: ICD DC generator change  medtronic;  Surgeon: Hany Ornelas MD;  Location: [facility] CATH INVASIVE LOCATION;  Service:    • PACEMAKER IMPLANTATION     • TUBAL ABDOMINAL LIGATION         Family History   Problem Relation Age of Onset   • Diabetes Mother    • Heart disease Mother    • Hyperlipidemia Mother    • Diabetes Father    • Heart disease Father    • Hyperlipidemia Father    • Heart disease Brother    • Hyperlipidemia Brother    • Hypertension Father    • Colon cancer Father    • Heart attack Father    • Emphysema Mother    • Heart disease Brother    • Heart attack Brother    • Heart disease Maternal Grandmother    • Heart disease Maternal Grandfather    • Heart disease Paternal Grandmother    • Heart disease Paternal Grandfather   "      Social History     Tobacco Use   • Smoking status: Former     Packs/day: 1.00     Years: 30.00     Pack years: 30.00     Types: Cigarettes     Start date: 1979     Quit date: 2009     Years since quittin.0   • Smokeless tobacco: Never   • Tobacco comments:     QUIT 10 YRS   Substance Use Topics   • Alcohol use: Yes     Comment: rarely uses ETOH/caffeine use   • Drug use: No         ECG 12 Lead    Date/Time: 2022 4:01 PM  Performed by: Linda Manrique APRN  Authorized by: Linda Manrique APRN   Comparison: compared with previous ECG from 2022  Similar to previous ECG  Rhythm: sinus rhythm  Q waves: V1 and V2    Comments: No significant change from previous EKG               Objective:     Visit Vitals  /66   Pulse 72   Ht 152.4 cm (60\")   Wt 87.5 kg (193 lb)   BMI 37.69 kg/m²             Physical Exam  Constitutional:       Appearance: Normal appearance. She is obese.   HENT:      Head: Normocephalic.   Neck:      Vascular: No carotid bruit.   Cardiovascular:      Rate and Rhythm: Normal rate and regular rhythm.      Chest Wall: PMI is not displaced.      Pulses: Normal pulses.           Radial pulses are 2+ on the right side and 2+ on the left side.        Posterior tibial pulses are 2+ on the right side and 2+ on the left side.      Heart sounds: Normal heart sounds. No murmur heard.    No friction rub. No gallop.   Pulmonary:      Effort: Pulmonary effort is normal.      Breath sounds: Normal breath sounds.   Abdominal:      General: Bowel sounds are normal. There is no distension.      Palpations: Abdomen is soft.   Musculoskeletal:      Right lower leg: No edema.      Left lower leg: No edema.   Skin:     General: Skin is warm and dry.      Capillary Refill: Capillary refill takes less than 2 seconds.   Neurological:      Mental Status: She is alert and oriented to person, place, and time.   Psychiatric:         Mood and Affect: Mood normal.         Behavior: Behavior " normal.         Thought Content: Thought content normal.          Lab Review:   Lipid Panel    Lipid Panel 6/30/22   Total Cholesterol 270 (A)   Triglycerides 282 (A)   HDL Cholesterol 33 (A)   VLDL Cholesterol 55 (A)   LDL Cholesterol  182 (A)   (A) Abnormal value                Cardiac Procedures:   1.     Assessment:         Diagnoses and all orders for this visit:    1. Mixed hyperlipidemia (Primary)    2. Ischemic cardiomyopathy    3. Automatic implantable cardioverter-defibrillator in situ    4. Chronic coronary artery disease    5. Benign essential hypertension    6. History of anterior myocardial infarction     Other orders  -     ECG 12 Lead            Plan:         1. CAD: s/p stent to LAD, most recently in 2019. She is on Plavix, statin and beta blocker. EKG does not show any ischemic changes. She is also on isosorbide. She denies any anginal symptoms. Continue with current medical therapy.  2. Ischemic cardiomyopathy: on GDMT with carvedilol, furosemide, spironolactone. Euvolemic on exam today. Continue with current treatment.  3. S/p AICD  4. H/o CVA: on Xarelto for CVA.  5. Anemia: requiring transfusions. She is requesting to stop either Plavix or Xarelto in light of her newly diagnosed anemia. I will discuss with Dr. Saldivar and let her know the recommendations.    Thank you for allowing me to participate in this patient's care. Please call with any questions or concerns. Ms. Dent will follow up with Dr. Saldivar in 3 months.          Your medication list          Accurate as of November 18, 2022  4:03 PM. If you have any questions, ask your nurse or doctor.            CONTINUE taking these medications      Instructions Last Dose Given Next Dose Due   Accu-Chek FastClix Lancets misc      Use to test blood sugar 4 times daily  E11.8       Accu-Chek Guide test strip  Generic drug: glucose blood      USE 4 TIMES A DAY E11.8       Accu-Chek Guide w/Device kit      Inject 1 Device under the skin into the  appropriate area as directed Daily.       albuterol sulfate  (90 Base) MCG/ACT inhaler  Commonly known as: PROVENTIL HFA;VENTOLIN HFA;PROAIR HFA      TAKE 2 PUFFS BY MOUTH EVERY 4 HOURS AS NEEDED       atorvastatin 80 MG tablet  Commonly known as: LIPITOR      TAKE 1 TABLET BY MOUTH EVERY DAY       carvedilol 25 MG tablet  Commonly known as: COREG      TAKE 1 TABLET BY MOUTH TWICE A DAY WITH MEALS       clopidogrel 75 MG tablet  Commonly known as: PLAVIX      TAKE 1 TABLET BY MOUTH EVERY DAY       fish oil 1200 MG capsule capsule      Take 1,200 mg by mouth Daily With Breakfast.       furosemide 40 MG tablet  Commonly known as: LASIX      TAKE 1 TABLET BY MOUTH DAILY.       Insulin Pen Needle 32G X 4 MM misc      USE 4 TIMES A DAY       isosorbide mononitrate 30 MG 24 hr tablet  Commonly known as: IMDUR      TAKE 1 TABLET BY MOUTH EVERY DAY       metFORMIN 1000 MG tablet  Commonly known as: GLUCOPHAGE      TAKE 1 TABLET BY MOUTH EVERY DAY IN THE EVENING       multivitamin with minerals tablet tablet      Take 1 tablet by mouth Daily.       nitroglycerin 0.4 MG SL tablet  Commonly known as: Nitrostat      Place 1 tablet under the tongue Every 5 (Five) Minutes As Needed for Chest Pain. Take no more than 3 doses in 15 minutes.       Ozempic (1 MG/DOSE) 4 MG/3ML solution pen-injector  Generic drug: Semaglutide (1 MG/DOSE)      Inject 1 mg under the skin into the appropriate area as directed 1 (One) Time Per Week.       potassium chloride 10 MEQ CR tablet      Take 1 tablet by mouth Daily. Resume on 1/7/20       potassium chloride 10 MEQ CR tablet      Take 1 tablet by mouth Daily. Resume on 1/7/20       spironolactone 25 MG tablet  Commonly known as: ALDACTONE      TAKE 1 TABLET BY MOUTH EVERY DAY       traZODone 50 MG tablet  Commonly known as: DESYREL      Take 50 mg by mouth every night at bedtime.       vitamin B-12 500 MCG tablet  Commonly known as: CYANOCOBALAMIN      Take 500 mcg by mouth Daily.        Vitamin D3 50 MCG (2000 UT) capsule      Take 1,000 Units by mouth Daily.       Xarelto 20 MG tablet  Generic drug: rivaroxaban      TAKE 1 TABLET BY MOUTH EVERY DAY WITH DINNER                KEYONA Calix  11/18/22  2:04 PM EST

## 2023-01-03 DIAGNOSIS — E11.65 TYPE 2 DIABETES MELLITUS WITH HYPERGLYCEMIA, UNSPECIFIED WHETHER LONG TERM INSULIN USE: ICD-10-CM

## 2023-01-03 DIAGNOSIS — E11.69 HYPERLIPIDEMIA ASSOCIATED WITH TYPE 2 DIABETES MELLITUS: ICD-10-CM

## 2023-01-03 DIAGNOSIS — E78.5 HYPERLIPIDEMIA ASSOCIATED WITH TYPE 2 DIABETES MELLITUS: ICD-10-CM

## 2023-01-03 LAB
25(OH)D3+25(OH)D2 SERPL-MCNC: 17.5 NG/ML (ref 30–100)
BUN SERPL-MCNC: 9 MG/DL (ref 8–23)
BUN/CREAT SERPL: 10.2 (ref 7–25)
CALCIUM SERPL-MCNC: 9.2 MG/DL (ref 8.6–10.5)
CHLORIDE SERPL-SCNC: 106 MMOL/L (ref 98–107)
CHOLEST SERPL-MCNC: 191 MG/DL (ref 0–200)
CO2 SERPL-SCNC: 26.3 MMOL/L (ref 22–29)
CREAT SERPL-MCNC: 0.88 MG/DL (ref 0.57–1)
EGFRCR SERPLBLD CKD-EPI 2021: 74.9 ML/MIN/1.73
FOLATE SERPL-MCNC: 5.79 NG/ML (ref 4.78–24.2)
GLUCOSE SERPL-MCNC: 156 MG/DL (ref 65–99)
HBA1C MFR BLD: 6.5 % (ref 4.8–5.6)
HDLC SERPL-MCNC: 42 MG/DL (ref 40–60)
IMP & REVIEW OF LAB RESULTS: NORMAL
LDLC SERPL CALC-MCNC: 122 MG/DL (ref 0–100)
POTASSIUM SERPL-SCNC: 4.5 MMOL/L (ref 3.5–5.2)
SODIUM SERPL-SCNC: 139 MMOL/L (ref 136–145)
T4 FREE SERPL-MCNC: 1.11 NG/DL (ref 0.93–1.7)
TRIGL SERPL-MCNC: 152 MG/DL (ref 0–150)
TSH SERPL DL<=0.005 MIU/L-ACNC: 1.48 UIU/ML (ref 0.27–4.2)
VIT B12 SERPL-MCNC: 444 PG/ML (ref 211–946)
VLDLC SERPL CALC-MCNC: 27 MG/DL (ref 5–40)

## 2023-01-09 ENCOUNTER — OFFICE VISIT (OUTPATIENT)
Dept: ENDOCRINOLOGY | Age: 63
End: 2023-01-09
Payer: COMMERCIAL

## 2023-01-09 VITALS
TEMPERATURE: 96.7 F | DIASTOLIC BLOOD PRESSURE: 80 MMHG | HEART RATE: 72 BPM | HEIGHT: 60 IN | OXYGEN SATURATION: 98 % | BODY MASS INDEX: 37.5 KG/M2 | WEIGHT: 191 LBS | SYSTOLIC BLOOD PRESSURE: 120 MMHG

## 2023-01-09 DIAGNOSIS — E11.65 TYPE 2 DIABETES MELLITUS WITH HYPERGLYCEMIA, WITHOUT LONG-TERM CURRENT USE OF INSULIN: Primary | ICD-10-CM

## 2023-01-09 DIAGNOSIS — E78.5 HYPERLIPIDEMIA ASSOCIATED WITH TYPE 2 DIABETES MELLITUS: ICD-10-CM

## 2023-01-09 DIAGNOSIS — E11.69 HYPERLIPIDEMIA ASSOCIATED WITH TYPE 2 DIABETES MELLITUS: ICD-10-CM

## 2023-01-09 PROCEDURE — 99214 OFFICE O/P EST MOD 30 MIN: CPT | Performed by: NURSE PRACTITIONER

## 2023-01-09 RX ORDER — EZETIMIBE 10 MG/1
10 TABLET ORAL DAILY
Qty: 30 TABLET | Refills: 5 | Status: SHIPPED | OUTPATIENT
Start: 2023-01-09 | End: 2024-01-09

## 2023-01-09 NOTE — PROGRESS NOTES
Chief Complaint  Follow-up and Diabetes (Pt doesn't have meter. Pt check blood sugar once or twice a week.eye exam is update. No retinopathy. No neuropathy)    Subjective        Daisy Dent presents to [facility] ENDOCRINOLOGY  History of Present Illness     Lab Results   Component Value Date    HGBA1C 6.50 (H) 01/03/2023   .  Type 2 dm    Diagnosed about 5 years ago   Today in clinic pt reports being on metformin 1000 mg po bid  She has been off Ozempic for about 3 months   Patient was dealing with blood loss anemia requiring transfusion which is why her Ozempic has been on hold  Dm retinopathy - denies  Dm nephropathy - denies  Dm neuropathy - denies  CAD -yes  CVA -yes  Patient has had stroke and heart attack in the past  High risk of cardiovascular disease  Episodes of hypoglycemia - denies  On atorvastatin 80 mg daily     Objective   Vital Signs:  /80 (BP Location: Left arm, Patient Position: Sitting, Cuff Size: Large Adult)   Pulse 72   Temp 96.7 °F (35.9 °C) (Infrared)   Ht 152.4 cm (60\")   Wt 86.6 kg (191 lb)   SpO2 98%   BMI 37.30 kg/m²   Estimated body mass index is 37.3 kg/m² as calculated from the following:    Height as of this encounter: 152.4 cm (60\").    Weight as of this encounter: 86.6 kg (191 lb).          Physical Exam  Vitals reviewed.   Constitutional:       General: She is not in acute distress.  HENT:      Head: Normocephalic and atraumatic.   Eyes:      General:         Right eye: No discharge.         Left eye: No discharge.   Pulmonary:      Effort: Pulmonary effort is normal. No respiratory distress.   Musculoskeletal:         General: No signs of injury. Normal range of motion.      Cervical back: Normal range of motion and neck supple.   Skin:     General: Skin is warm and dry.   Neurological:      Mental Status: She is alert and oriented to person, place, and time. Mental status is at baseline.   Psychiatric:         Mood and Affect: Mood normal.          Behavior: Behavior normal.         Thought Content: Thought content normal.         Judgment: Judgment normal.        Result Review :  The following data was reviewed by: KEYONA Hillman on 01/09/2023:  Common labs    Common Labs 10/13/22 10/13/22 10/18/22 1/3/23 1/3/23 1/3/23    1319 2133  0819 0819 0819   Glucose      156 (A)   BUN      9   Creatinine      0.88   Sodium      139   Potassium      4.5   Chloride      106   Calcium      9.2   Hemoglobin  8.8 (A)       Hematocrit  30.7 (A)       Total Cholesterol    191     Triglycerides    152 (A)     HDL Cholesterol    42     LDL Cholesterol     122 (A)     Hemoglobin A1C 6.1 (A)  5.9 (A)  6.50 (A)    (A) Abnormal value       Comments are available for some flowsheets but are not being displayed.                     Assessment and Plan   Diagnoses and all orders for this visit:    1. Type 2 diabetes mellitus with hyperglycemia, without long-term current use of insulin (HCC) (Primary)  -     Hemoglobin A1c; Future  -     Lipid Panel; Future  -     Basic Metabolic Panel; Future    2. Hyperlipidemia associated with type 2 diabetes mellitus (HCC)  -     Hemoglobin A1c; Future  -     Lipid Panel; Future  -     Basic Metabolic Panel; Future    Other orders  -     ezetimibe (Zetia) 10 MG tablet; Take 1 tablet by mouth Daily.  Dispense: 30 tablet; Refill: 5             Follow Up   Return in about 6 months (around 7/9/2023).     A1c worsening, slowly resume Ozempic starting with 0.25 mg weekly, then increasing to 0.5 mg weekly and finally 1 mg if tolerating okay  Given her high risk of cardiovascular disease, we will Add zetia   Patient is going to try to improve her diet and activity levels but she is not sure if this will make a difference or if she will be able to make this change  Will consider Repatha or Praluent if necessary to reduce additional cardiovascular complications      Patient was given instructions and counseling regarding her condition or for  health maintenance advice. Please see specific information pulled into the AVS if appropriate.     KEYONA Hillman

## 2023-01-11 ENCOUNTER — PRIOR AUTHORIZATION (OUTPATIENT)
Dept: ENDOCRINOLOGY | Age: 63
End: 2023-01-11
Payer: COMMERCIAL

## 2023-01-16 RX ORDER — RIVAROXABAN 20 MG/1
TABLET, FILM COATED ORAL
Qty: 90 TABLET | Refills: 1 | Status: SHIPPED | OUTPATIENT
Start: 2023-01-16

## 2023-01-31 DIAGNOSIS — I25.10 CORONARY ARTERY DISEASE INVOLVING NATIVE CORONARY ARTERY OF NATIVE HEART WITHOUT ANGINA PECTORIS: ICD-10-CM

## 2023-01-31 RX ORDER — CLOPIDOGREL BISULFATE 75 MG/1
TABLET ORAL
Qty: 90 TABLET | Refills: 1 | Status: SHIPPED | OUTPATIENT
Start: 2023-01-31

## 2023-01-31 RX ORDER — FUROSEMIDE 40 MG/1
TABLET ORAL
Qty: 90 TABLET | Refills: 1 | Status: SHIPPED | OUTPATIENT
Start: 2023-01-31

## 2023-01-31 NOTE — TELEPHONE ENCOUNTER
Last OV 11/18/22.  Next OV 03/24/23.  Labs 1/3/23.  [facility] [place]

## 2023-02-04 PROCEDURE — G2066 INTER DEVC REMOTE 30D: HCPCS | Performed by: INTERNAL MEDICINE

## 2023-02-04 PROCEDURE — 93297 REM INTERROG DEV EVAL ICPMS: CPT | Performed by: INTERNAL MEDICINE

## 2023-02-17 PROCEDURE — 93295 DEV INTERROG REMOTE 1/2/MLT: CPT | Performed by: INTERNAL MEDICINE

## 2023-02-17 PROCEDURE — 93296 REM INTERROG EVL PM/IDS: CPT | Performed by: INTERNAL MEDICINE

## 2023-03-24 ENCOUNTER — OFFICE VISIT (OUTPATIENT)
Dept: CARDIOLOGY | Facility: CLINIC | Age: 63
End: 2023-03-24
Payer: COMMERCIAL

## 2023-03-24 VITALS
WEIGHT: 192.8 LBS | BODY MASS INDEX: 37.85 KG/M2 | OXYGEN SATURATION: 97 % | HEIGHT: 60 IN | DIASTOLIC BLOOD PRESSURE: 80 MMHG | SYSTOLIC BLOOD PRESSURE: 120 MMHG | HEART RATE: 75 BPM

## 2023-03-24 DIAGNOSIS — I25.5 ISCHEMIC CARDIOMYOPATHY: ICD-10-CM

## 2023-03-24 DIAGNOSIS — I63.412 CEREBROVASCULAR ACCIDENT (CVA) DUE TO EMBOLISM OF LEFT MIDDLE CEREBRAL ARTERY: ICD-10-CM

## 2023-03-24 DIAGNOSIS — I10 BENIGN ESSENTIAL HYPERTENSION: ICD-10-CM

## 2023-03-24 DIAGNOSIS — Z95.5 S/P CORONARY ARTERY STENT PLACEMENT: ICD-10-CM

## 2023-03-24 DIAGNOSIS — I21.09 ANTERIOR MYOCARDIAL INFARCTION: ICD-10-CM

## 2023-03-24 DIAGNOSIS — I25.10 CHRONIC CORONARY ARTERY DISEASE: Primary | ICD-10-CM

## 2023-03-24 DIAGNOSIS — E11.8 TYPE 2 DIABETES MELLITUS WITH COMPLICATION: ICD-10-CM

## 2023-03-24 DIAGNOSIS — Z95.810 ICD (IMPLANTABLE CARDIOVERTER-DEFIBRILLATOR) IN PLACE: ICD-10-CM

## 2023-03-24 DIAGNOSIS — I21.09 ACUTE MYOCARDIAL INFARCTION OF ANTERIOR WALL: ICD-10-CM

## 2023-03-24 DIAGNOSIS — G47.33 OBSTRUCTIVE SLEEP APNEA SYNDROME: ICD-10-CM

## 2023-03-24 PROCEDURE — 93000 ELECTROCARDIOGRAM COMPLETE: CPT | Performed by: INTERNAL MEDICINE

## 2023-03-24 PROCEDURE — 99214 OFFICE O/P EST MOD 30 MIN: CPT | Performed by: INTERNAL MEDICINE

## 2023-03-24 NOTE — PROGRESS NOTES
Subjective:     Encounter Date:03/24/2023      Patient ID: Daisy Dent is a 62 y.o. female.    Chief Complaint:  History of Present Illness    This is a 61-year-old female with ischemic cardiomyopathy, last ejection fraction of 40-45%, diabetes mellitus type 2, obstructive sleep apnea on CPAP, hypertension, status post dual-chamber AICD placement, dyslipidemia, status post left MCA stroke, coronary artery disease with a history of prior anterior myocardial infarction and LAD stent placement (in 7/2006, 10/2009, 11/2019, and 7/2021), who presents for follow-up.     In 12/2021 she reported improvement in her symptoms overall.  However in follow-up in 4/2022 she reported that she was starting to have recurrent episodes of chest tightness they are occurring about twice a month and lasting for 30 minutes at a time.  Again they usually occurred after laying down to sleep at night.  Symptoms are similar to what she experienced before her last PCI but not as frequent or as intense.  In 2/2022 she had a routine device interrogation that showed a 19 beat episode of ventricular tachycardia.  Repeat in device interrogation in 4/2022 showed no further episodes.  Since her symptoms were still fairly infrequent we opted to monitor her symptoms before considering any further work-up.  When I saw her back in follow-up in 8/2022 she was having symptoms about 3 times a week lasting 5 to 10 minutes at a time.  I recommended that she start taking her isosorbide mononitrate in the evening.      She was hospitalized in 10/2022 with anemia with a hemoglobin of 7.1.  Her Hemoccult stool was positive.  However endoscopy was unremarkable except for small area of nonbleeding colonic angiectasia's.  Her clopidogrel and rivaroxaban were resumed.  She saw KEYONA Calix in follow-up in 11/2022.  At the time of that visit she reported improvement in her symptoms.    She presents today for routine follow-up.  She reports only it  occasional brief episodes of discomfort lasting just seconds at a time.  She denies any exertional shortness of breath or chest pain.  She denies any palpitations, near-syncope or syncope or lower extremity edema.  She remains on rivaroxaban and clopidogrel.  Despite this her hemoglobin has been normal and stable.        Prior History:  I began following the patient in 11/2015 when she presented to establish care.  Prior to that she was followed by Dr. Ayala with [facility].  Her prior cardiac history includes a non-ST elevation MI and 7/2006 at which time she received a Cypher 3.0 x 23 mm stent to her mid LAD.  She then presented in 10/2009 with a late in-stent thrombosis that required repeat stenting of her mid LAD in addition to a second drug-eluting stent placement of her left circumflex artery.  Following that she had a repeat cardiac catheterization in 2010 that was reportedly unremarkable.  Following her myocardial infarction in 2009 she developed ischemic myopathy with an EF of around 25% and ultimately underwent AICD placement and 7/2010.  At some point in the course of her follow-ups her left ventricular function improved with an ejection fraction of 40-45%.  A stress test showed evidence of a large anterior infarct and minimal elva-infarct ischemia.     In her follow-ups she has done fairly well recently when she reported more fatigued than normal and episodes of chest discomfort that occurred while she was exercising on the treadmill.  She underwent  an echocardiogram in 3/2017 that showed an ejection fraction of 40-45%.  Due to continued symptoms we went ahead and proceeded with a PET stress test which was performed in 8/2017 that showed medium sized anterior wall infarct with mild elva-infarct ischemia and a post stress ejection fraction of 58%.      In 11/2017 her AICD was noted to be at [facility].  She subsequently underwent generator replacement on 1/12/2018 with Dr. Ornelas. When I saw her back  in follow up in 9/2018 she reported that she denied any new or worsening symptoms.       She was admitted and 12/2018 after she presented with aphasia.  On arrival to the hospital she underwent a CT of the head that showed no acute stroke but a perfusion scan showed a large perfusion mismatch.  A CTA showed evidence of a left MCA thrombus.  She was given TPA at this time with improvement in her symptoms.  Neurology felt that her stroke was likely embolic.  She underwent a repeat echocardiogram during her admission that showed moderately depressed left ventricular systolic function with an EF of 30%, grade 1 diastolic dysfunction, and no significant valvular disease.  Based on the likelihood that this was an embolic stroke I opted to go ahead and recommend anticoagulation.  She was subsequently started on rivaroxaban 20 mg a day.  Her aspirin was stopped and she was continued on clopidogrel.     In 11/2019 she reported a couple weeks of episodes of substernal chest tightness lasting 1 to 2 minutes and dyspnea on exertion.  She reported that the symptoms were similar to what she had at the time of her prior myocardial infarctions but reports that its much milder than what she experienced at that time.    Following that office visit she underwent a stress test that showed evidence of a medium anterior infarct with moderate elva-infarct ischemia.  The findings were similar to prior stress test however the amount of elva-infarct ischemia had impaired to increase.  We initially tried to treat her medically however she continued to have recurrent symptoms so we opted to proceed with a cardiac catheterization.  She presented to the hospital on 1/6/2020 for the procedure.  She was found to have severe in-stent restenosis of her proximal LAD stent and ultimately underwent repeat drug-eluting stent placement of the in-stent restenosis.  She was also noted to have moderate nonobstructive disease of the first obtuse marginal  branch for which she underwent FFR evaluation which was normal at 0.96.     In follow-up she had some mild episodes of chest discomfort that we decided to monitor.  She then returned for urgent follow-up on 10/22/2020 at which time she reported more severe episodes of chest discomfort that were occurring primarily at rest.  Due to the severity of her symptoms she was quite concerned and we decided to proceed with a cardiac catheterization.  This was performed on 10/26/2020 and showed stable moderate nonobstructive coronary artery disease.     She reported increasing frequency of chest tightness that primarily occurred at night and woke her up from sleep starting in 5/2021.  She reported this was similar to her prior anginal symptoms.  I started her on isosorbide mononitrate 30 mg daily but she is unable to tolerate it due to the onset of severe headaches.  When she presented back for follow-up in 7/2021 she was continuing to have episodes of nocturnal discomfort that had progressed.  At that point I recommended proceeding with a repeat cardiac catheterization.      She presented for cardiac catheterization on 7/19/2021.  This showed 90% proximal in-stent restenosis and she underwent repeat drug-eluting stent placement using a 3.0 x 12 mm stent.  She was also noted to have moderate nonobstructive disease of the first obtuse marginal branch with a normal RFR.     She was seen back in the office by KEYONA Demarco on 7/30/2021 at which time she was feeling better.   It was noted around the time that she had mildly elevated liver enzymes which were unchanged compared to prior evaluations.  Was also noted that her last lipid panel on 7/7/2021 showed that her cholesterol was poorly controlled with an LDL of around 160 despite being on high-dose atorvastatin.       Review of Systems   Constitutional: Positive for malaise/fatigue.   HENT: Negative for hearing loss, hoarse voice, nosebleeds and sore throat.    Eyes:  Negative for pain.   Cardiovascular: Positive for chest pain. Negative for claudication, cyanosis, dyspnea on exertion, irregular heartbeat, leg swelling, near-syncope, orthopnea, palpitations, paroxysmal nocturnal dyspnea and syncope.   Respiratory: Negative for shortness of breath and snoring.    Endocrine: Negative for cold intolerance, heat intolerance, polydipsia, polyphagia and polyuria.   Skin: Negative for itching and rash.   Musculoskeletal: Negative for arthritis, falls, joint pain, joint swelling, muscle cramps, muscle weakness and myalgias.   Gastrointestinal: Negative for constipation, diarrhea, dysphagia, heartburn, hematemesis, hematochezia, melena, nausea and vomiting.   Genitourinary: Negative for frequency, hematuria and hesitancy.   Neurological: Positive for light-headedness. Negative for excessive daytime sleepiness, dizziness, headaches, numbness and weakness.   Psychiatric/Behavioral: Negative for depression. The patient is not nervous/anxious.          Current Outpatient Medications:   •  Accu-Chek FastClix Lancets misc, Use to test blood sugar 4 times daily  E11.8, Disp: 400 each, Rfl: 1  •  Accu-Chek Guide test strip, USE 4 TIMES A DAY E11.8, Disp: 300 each, Rfl: 2  •  albuterol sulfate  (90 Base) MCG/ACT inhaler, TAKE 2 PUFFS BY MOUTH EVERY 4 HOURS AS NEEDED, Disp: , Rfl:   •  atorvastatin (LIPITOR) 80 MG tablet, TAKE 1 TABLET BY MOUTH EVERY DAY, Disp: 90 tablet, Rfl: 3  •  Blood Glucose Monitoring Suppl (Accu-Chek Guide) w/Device kit, Inject 1 Device under the skin into the appropriate area as directed Daily., Disp: 1 kit, Rfl: 0  •  carvedilol (COREG) 25 MG tablet, TAKE 1 TABLET BY MOUTH TWICE A DAY WITH MEALS, Disp: 180 tablet, Rfl: 2  •  Cholecalciferol (VITAMIN D3) 2000 UNITS capsule, Take 1,000 Units by mouth Daily., Disp: , Rfl:   •  clopidogrel (PLAVIX) 75 MG tablet, TAKE 1 TABLET BY MOUTH EVERY DAY, Disp: 90 tablet, Rfl: 1  •  ezetimibe (Zetia) 10 MG tablet, Take 1 tablet  by mouth Daily., Disp: 30 tablet, Rfl: 5  •  furosemide (LASIX) 40 MG tablet, TAKE 1 TABLET BY MOUTH EVERY DAY, Disp: 90 tablet, Rfl: 1  •  Insulin Pen Needle 32G X 4 MM misc, USE 4 TIMES A DAY, Disp: 400 each, Rfl: 3  •  isosorbide mononitrate (IMDUR) 30 MG 24 hr tablet, TAKE 1 TABLET BY MOUTH EVERY DAY, Disp: 90 tablet, Rfl: 1  •  metFORMIN (GLUCOPHAGE) 1000 MG tablet, TAKE 1 TABLET BY MOUTH EVERY DAY IN THE EVENING, Disp: 90 tablet, Rfl: 3  •  Multiple Vitamins-Minerals (MULTIVITAL PO), Take 1 tablet by mouth Daily., Disp: , Rfl:   •  nitroglycerin (Nitrostat) 0.4 MG SL tablet, Place 1 tablet under the tongue Every 5 (Five) Minutes As Needed for Chest Pain. Take no more than 3 doses in 15 minutes., Disp: 30 tablet, Rfl: 11  •  nystatin-triamcinolone (MYCOLOG II) 430230-5.1 UNIT/GM-% cream, APPLY TOPICALLY 3 TIMES DAILY TO AFFECTED AREA(S)., Disp: , Rfl:   •  Omega-3 Fatty Acids (FISH OIL) 1200 MG capsule capsule, Take 1 capsule by mouth Daily With Breakfast., Disp: , Rfl:   •  potassium chloride 10 MEQ CR tablet, Take 1 tablet by mouth Daily. Resume on 1/7/20, Disp: 90 tablet, Rfl: 1  •  potassium chloride 10 MEQ CR tablet, Take 1 tablet by mouth Daily. Resume on 1/7/20, Disp: 90 tablet, Rfl: 1  •  Semaglutide, 1 MG/DOSE, (Ozempic, 1 MG/DOSE,) 4 MG/3ML solution pen-injector, Inject 1 mg under the skin into the appropriate area as directed 1 (One) Time Per Week., Disp: 12 pen, Rfl: 2  •  spironolactone (ALDACTONE) 25 MG tablet, TAKE 1 TABLET BY MOUTH EVERY DAY, Disp: 90 tablet, Rfl: 3  •  traZODone (DESYREL) 50 MG tablet, Take 1 tablet by mouth every night at bedtime., Disp: , Rfl:   •  vitamin B-12 (CYANOCOBALAMIN) 500 MCG tablet, Take 1 tablet by mouth Daily., Disp: , Rfl:   •  Xarelto 20 MG tablet, TAKE 1 TABLET BY MOUTH EVERY DAY WITH DINNER, Disp: 90 tablet, Rfl: 1    Past Medical History:   Diagnosis Date   • Abdominal pain    • Abnormal liver function test    • Acute bronchitis    • Benign essential  hypertension    • CAD (coronary artery disease)    • CHF (congestive heart failure) (HCC)    • Colon polyp    • Congestive heart disease (HCC)    • COPD (chronic obstructive pulmonary disease) (HCC)    • Cough    • Diabetes (HCC)    • Diabetes mellitus (HCC)    • Diarrhea    • Gastroenteritis    • Health care maintenance    • Hyperlipidemia    • Hypertension    • IFG (impaired fasting glucose)    • Ischemic cardiomyopathy    • Myocardial infarction (HCC)    • SHELLIE (obstructive sleep apnea)    • Sore throat    • Type 2 diabetes mellitus (HCC)    • Vaginal yeast infection    • Vitamin D deficiency        Past Surgical History:   Procedure Laterality Date   • CARDIAC CATHETERIZATION     • CARDIAC CATHETERIZATION N/A 1/6/2020    Procedure: Coronary angiography;  Surgeon: Oneida Saldivar MD;  Location:  [facility];  Service: Cardiovascular   • CARDIAC CATHETERIZATION N/A 1/6/2020    Procedure: Left heart cath;  Surgeon: Oneida Saldivar MD;  Location:  [facility];  Service: Cardiovascular   • CARDIAC CATHETERIZATION N/A 1/6/2020    Procedure: Left ventriculography;  Surgeon: Oneida Saldivar MD;  Location:  [facility];  Service: Cardiovascular   • CARDIAC CATHETERIZATION N/A 1/6/2020    Procedure: Percutaneous Coronary Intervention;  Surgeon: Oneida Saldivar MD;  Location:  [facility];  Service: Cardiovascular   • CARDIAC CATHETERIZATION N/A 1/6/2020    Procedure: Stent HUGO coronary;  Surgeon: Oneida Saldivar MD;  Location:  [facility];  Service: Cardiovascular   • CARDIAC CATHETERIZATION  1/6/2020    Procedure: Functional Flow [place];  Surgeon: Oneida Saldivar MD;  Location:  [facility];  Service: Cardiovascular   • CARDIAC CATHETERIZATION N/A 10/26/2020    Procedure: Coronary angiography;  Surgeon: Oneida Saldivar MD;  Location:  [facility];  Service: Cardiovascular;  Laterality: N/A;   •  CARDIAC CATHETERIZATION N/A 10/26/2020    Procedure: Left heart cath;  Surgeon: Oneida Saldivar MD;  Location:  [facility];  Service: Cardiovascular;  Laterality: N/A;   • CARDIAC CATHETERIZATION N/A 10/26/2020    Procedure: Left ventriculography;  Surgeon: Oneida Saldivar MD;  Location:  [facility];  Service: Cardiovascular;  Laterality: N/A;   • CARDIAC CATHETERIZATION  10/26/2020    Procedure: Functional Flow [place];  Surgeon: Oneida Saldivar MD;  [facility];  Service: Cardiovascular;;   • CARDIAC CATHETERIZATION N/A 7/19/2021    Procedure: Coronary angiography;  Surgeon: Oneida Saldivar MD;  [facility];  Service: Cardiovascular;  Laterality: N/A;   • CARDIAC CATHETERIZATION N/A 7/19/2021    Procedure: Left heart cath;  Surgeon: Oneida Saldivar MD;  [facility];  Service: Cardiovascular;  Laterality: N/A;   • CARDIAC CATHETERIZATION N/A 7/19/2021    Procedure: Left ventriculography;  Surgeon: Oneida Saldivar MD;  [facility];  Service: Cardiovascular;  Laterality: N/A;   • CARDIAC CATHETERIZATION N/A 7/19/2021    Procedure: Percutaneous Coronary Intervention;  Surgeon: Oneida Saldivar MD;  [facility];  Service: Cardiovascular;  Laterality: N/A;   • CARDIAC CATHETERIZATION N/A 7/19/2021    Procedure: Optical Coherent Tomography;  Surgeon: Oneida Saldivar MD;  Location: [facility] CATH INVASIVE LOCATION;  Service: Cardiovascular;  Laterality: N/A;   • CARDIAC CATHETERIZATION N/A 7/19/2021    Procedure: Stent HUGO coronary;  Surgeon: Oneida Saldivar MD;  [facility];  Service: Cardiovascular;  Laterality: N/A;   • CARDIAC CATHETERIZATION  7/19/2021    Procedure: RESTING FULL CYCLE RATIO;  Surgeon: Oneida Saldivar MD;  [facility];  Service: Cardiovascular;;  RFR   • CARDIAC  "ELECTROPHYSIOLOGY PROCEDURE N/A 2018    Procedure: ICD DC generator change  medtronic;  Surgeon: Hany Ornelas MD;  Location: [facility] INVASIVE LOCATION;  Service:    • PACEMAKER IMPLANTATION     • TUBAL ABDOMINAL LIGATION         Family History   Problem Relation Age of Onset   • Diabetes Mother    • Heart disease Mother    • Hyperlipidemia Mother    • Diabetes Father    • Heart disease Father    • Hyperlipidemia Father    • Heart disease Brother    • Hyperlipidemia Brother    • Hypertension Father    • Colon cancer Father    • Heart attack Father    • Emphysema Mother    • Heart disease Brother    • Heart attack Brother    • Heart disease Maternal Grandmother    • Heart disease Maternal Grandfather    • Heart disease Paternal Grandmother    • Heart disease Paternal Grandfather        Social History     Tobacco Use   • Smoking status: Former     Packs/day: 1.00     Years: 30.00     Pack years: 30.00     Types: Cigarettes     Start date: 1979     Quit date: 2009     Years since quittin.4   • Smokeless tobacco: Never   • Tobacco comments:     QUIT 10 YRS   Vaping Use   • Vaping Use: Never used   Substance Use Topics   • Alcohol use: Yes     Comment: rarely uses ETOH/caffeine use   • Drug use: No         ECG 12 Lead    Date/Time: 3/24/2023 12:04 PM  Performed by: Oneida Saldivar MD  Authorized by: Oneida Saldivar MD   Comparison: compared with previous ECG   Similar to previous ECG  Rhythm: sinus rhythm  Q waves: V1, V2 and V3                   Objective:     Visit Vitals  /80 (BP Location: Right arm, Patient Position: Sitting, Cuff Size: Adult)   Pulse 75   Ht 152.4 cm (60\")   Wt 87.5 kg (192 lb 12.8 oz)   SpO2 97%   BMI 37.65 kg/m²         Constitutional:       Appearance: Normal appearance. Well-developed.   Eyes:      General: Lids are normal.      Conjunctiva/sclera: Conjunctivae normal.      Pupils: Pupils are equal, round, and reactive to light.   HENT:      Head: " Normocephalic and atraumatic.   Neck:      Vascular: No carotid bruit or JVD.      Lymphadenopathy: No cervical adenopathy.   Pulmonary:      Effort: Pulmonary effort is normal.      Breath sounds: Normal breath sounds.   Cardiovascular:      Normal rate. Regular rhythm.      No gallop.   Pulses:     Radial: 2+ bilaterally.  Edema:     Peripheral edema absent.   Abdominal:      Palpations: Abdomen is soft.   Musculoskeletal:      Cervical back: Full passive range of motion without pain, normal range of motion and neck supple. Skin:     General: Skin is warm and dry.   Neurological:      Mental Status: Alert and oriented to person, place, and time.             Assessment:          Diagnosis Plan   1. Chronic coronary artery disease        2. History of anterior myocardial infarction         3. Benign essential hypertension        4. S/P coronary artery stent placement        5. History of anterior myocardial infarction (CMS/HCC)        6. ICD (implantable cardioverter-defibrillator) in place        7. Ischemic cardiomyopathy        8. Type 2 diabetes mellitus with complication (HCC)        9. Cerebrovascular accident (CVA) due to embolism of left middle cerebral artery (HCC)        10. Obstructive sleep apnea syndrome               Plan:       1.  Coronary artery disease.  Her nocturnal symptoms have improved with adjustment of her isosorbide.  EKG remains unchanged.  Continue current management.  2.  Hypertension.  Well-controlled on current regimen medications.  3.  Ischemic cardiomyopathy.  EF of 35%.  On guideline directed management carvedilol and spironolactone.  She has not been on ACE inhibitor or an ARB due to relatively low blood pressures.  4.  Hypertension.  Well-controlled on current regimen medications.  Continue same.  5.  Hyperlipidemia.  On high dose atorvastatin and ezetimibe for goal LDL of 70 or below.  6.  ICD placement.  Continue routine device interrogations.  7.  History of left MCA stroke.   On chronic anticoagulation with rivaroxaban due to concerns this was an embolic stroke.  8.  Obstructive sleep apnea.  Compliant with CPAP.  9.  Diabetes mellitus type 2.  10.  Recent acute anemia.  Currently resolved and stable.  Monitor for recurrence on antiplatelet and anticoagulation therapy.    We will see her back again in 6 months.

## 2023-05-01 RX ORDER — SPIRONOLACTONE 25 MG/1
25 TABLET ORAL DAILY
Qty: 90 TABLET | Refills: 3 | Status: SHIPPED | OUTPATIENT
Start: 2023-05-01

## 2023-05-09 ENCOUNTER — TELEPHONE (OUTPATIENT)
Dept: CARDIOLOGY | Facility: CLINIC | Age: 63
End: 2023-05-09
Payer: COMMERCIAL

## 2023-05-09 NOTE — TELEPHONE ENCOUNTER
Patient with DDDR/ICD, remote transmission reviewed today documented slight elevation in OptiVol HF dx. starting the first week of May. No other events documented on remote. I LVM with patient to call if she has symptoms of HF or develops any in the future.

## 2023-06-11 DIAGNOSIS — I25.5 ISCHEMIC CARDIOMYOPATHY: ICD-10-CM

## 2023-06-12 RX ORDER — POTASSIUM CHLORIDE 750 MG/1
10 TABLET, FILM COATED, EXTENDED RELEASE ORAL DAILY
Qty: 90 TABLET | Refills: 1 | Status: SHIPPED | OUTPATIENT
Start: 2023-06-12

## 2023-07-27 DIAGNOSIS — I25.10 CORONARY ARTERY DISEASE INVOLVING NATIVE CORONARY ARTERY OF NATIVE HEART WITHOUT ANGINA PECTORIS: ICD-10-CM

## 2023-07-27 RX ORDER — FUROSEMIDE 40 MG/1
TABLET ORAL
Qty: 90 TABLET | Refills: 1 | Status: SHIPPED | OUTPATIENT
Start: 2023-07-27

## 2023-07-27 RX ORDER — CLOPIDOGREL BISULFATE 75 MG/1
TABLET ORAL
Qty: 90 TABLET | Refills: 1 | Status: SHIPPED | OUTPATIENT
Start: 2023-07-27

## 2023-08-18 DIAGNOSIS — E11.65 TYPE 2 DIABETES MELLITUS WITH HYPERGLYCEMIA, WITHOUT LONG-TERM CURRENT USE OF INSULIN: ICD-10-CM

## 2023-08-18 PROCEDURE — 93296 REM INTERROG EVL PM/IDS: CPT | Performed by: INTERNAL MEDICINE

## 2023-08-18 PROCEDURE — 93295 DEV INTERROG REMOTE 1/2/MLT: CPT | Performed by: INTERNAL MEDICINE

## 2023-08-18 RX ORDER — SEMAGLUTIDE 1.34 MG/ML
1 INJECTION, SOLUTION SUBCUTANEOUS WEEKLY
Qty: 9 ML | Refills: 1 | Status: SHIPPED | OUTPATIENT
Start: 2023-08-18

## 2023-08-29 DIAGNOSIS — E78.5 HYPERLIPIDEMIA, UNSPECIFIED HYPERLIPIDEMIA TYPE: ICD-10-CM

## 2023-08-29 DIAGNOSIS — I25.10 CHRONIC CORONARY ARTERY DISEASE: ICD-10-CM

## 2023-08-29 RX ORDER — ATORVASTATIN CALCIUM 80 MG/1
80 TABLET, FILM COATED ORAL DAILY
Qty: 90 TABLET | Refills: 3 | Status: SHIPPED | OUTPATIENT
Start: 2023-08-29

## 2023-09-02 DIAGNOSIS — I25.10 CORONARY ARTERY DISEASE INVOLVING NATIVE CORONARY ARTERY OF NATIVE HEART WITHOUT ANGINA PECTORIS: ICD-10-CM

## 2023-09-05 RX ORDER — NITROGLYCERIN 0.4 MG/1
0.4 TABLET SUBLINGUAL
Qty: 30 TABLET | Refills: 11 | Status: SHIPPED | OUTPATIENT
Start: 2023-09-05

## 2023-10-04 ENCOUNTER — CLINICAL SUPPORT NO REQUIREMENTS (OUTPATIENT)
Age: 63
End: 2023-10-04
Payer: COMMERCIAL

## 2023-10-04 ENCOUNTER — OFFICE VISIT (OUTPATIENT)
Age: 63
End: 2023-10-04
Payer: COMMERCIAL

## 2023-10-04 VITALS
SYSTOLIC BLOOD PRESSURE: 122 MMHG | DIASTOLIC BLOOD PRESSURE: 70 MMHG | WEIGHT: 183.4 LBS | OXYGEN SATURATION: 96 % | BODY MASS INDEX: 36.01 KG/M2 | HEART RATE: 78 BPM | HEIGHT: 60 IN

## 2023-10-04 DIAGNOSIS — E78.2 MIXED HYPERLIPIDEMIA: ICD-10-CM

## 2023-10-04 DIAGNOSIS — Z95.5 S/P CORONARY ARTERY STENT PLACEMENT: ICD-10-CM

## 2023-10-04 DIAGNOSIS — I25.5 ISCHEMIC CARDIOMYOPATHY: ICD-10-CM

## 2023-10-04 DIAGNOSIS — I25.10 CHRONIC CORONARY ARTERY DISEASE: ICD-10-CM

## 2023-10-04 DIAGNOSIS — I50.20 CONGESTIVE HEART FAILURE WITH LEFT VENTRICULAR SYSTOLIC DYSFUNCTION: ICD-10-CM

## 2023-10-04 DIAGNOSIS — I21.09 ANTERIOR MYOCARDIAL INFARCTION: Primary | ICD-10-CM

## 2023-10-04 DIAGNOSIS — I77.9 CAROTID ARTERY DISEASE, UNSPECIFIED LATERALITY, UNSPECIFIED TYPE: ICD-10-CM

## 2023-10-04 DIAGNOSIS — Z95.810 AUTOMATIC IMPLANTABLE CARDIOVERTER-DEFIBRILLATOR IN SITU: ICD-10-CM

## 2023-10-04 DIAGNOSIS — I25.5 ISCHEMIC CARDIOMYOPATHY: Primary | ICD-10-CM

## 2023-10-04 DIAGNOSIS — I10 BENIGN ESSENTIAL HYPERTENSION: ICD-10-CM

## 2023-10-04 RX ORDER — LISINOPRIL 10 MG/1
TABLET ORAL EVERY 24 HOURS
COMMUNITY

## 2023-10-04 NOTE — PROGRESS NOTES
Subjective:     Encounter Date:10/4/2023      Patient ID: Daisy Dent is a 62 y.o. female.    Chief Complaint:follow up CAD  History of Present Illness  This is a 63 y/o female know to Dr. Saldivar who is known to me. She has a pmhx of ischemic cardiomyopathy with last EF of 40-45%, diabetes, SHELLIE on CPAP, hypertension, s/p dual-chamber AICD, hyperlipidemia, s/p left MCA CVA, coronary artery disease s/o anterior MI and LAD stent placement in 2006, 2009, 2019, and 2021. She was last seen in the office by Dr. Saldivar in August and was having some chest tightness and palpitations, particularly at night. It was recommended that she take isosorbide at night and this seemed to help.    I saw her for the first time in November 2022. She had been hospitalized in October 2022 with anemia and a hemoglobin of 7.1. She underwent an EGD that was normal and colonoscopy showed a small area of nonbleeding colonic angiectasis. Plavix and Xarelto were both resumed.    She then saw Dr. Saldivar in March 2023 and was doing well. Her hemoglobin was remaining stable on both Xarelto and Plavix. No changes were made to her treatment plan.     She is here today for a follow up visit. She has been struggling with nosebleeds lately and was seen by her PCP a couple of weeks ago and again yesterday. It was felt the nosebleeds were due to sinus/ear problems. She has also been having dizziness but this has been attributed to inner ear problems. She has been taking steroids with improvement in her symptoms. She denies any chest pain but has chest tightness that feels like heartburn about 1-2 times a week. She takes Tums and it resolves. It is not similar to what she experienced before her heart attack. She denies any shortness of breath, palpitations, or syncope. She denies any swelling in her lower extremities, orthopnea or PND.     I have reviewed and updated as appropriate allergies, current medications, past family history, past medical  history, past surgical history and problem list.    Review of Systems   Constitutional: Negative for fever, malaise/fatigue, weight gain and weight loss.   HENT:  Positive for nosebleeds. Negative for congestion, hoarse voice and sore throat.    Eyes:  Negative for blurred vision and double vision.   Cardiovascular:  Positive for chest pain. Negative for dyspnea on exertion, leg swelling, orthopnea, palpitations and syncope.   Respiratory:  Negative for cough, shortness of breath and wheezing.    Gastrointestinal:  Positive for heartburn. Negative for abdominal pain, hematemesis, hematochezia and melena.   Genitourinary:  Negative for dysuria and hematuria.   Neurological:  Positive for dizziness. Negative for headaches, light-headedness and numbness.   Psychiatric/Behavioral:  Negative for depression. The patient is not nervous/anxious.        Current Outpatient Medications:     Accu-Chek FastClix Lancets misc, Use to test blood sugar 4 times daily  E11.8, Disp: 400 each, Rfl: 1    Accu-Chek Guide test strip, USE 4 TIMES A DAY E11.8, Disp: 300 each, Rfl: 2    albuterol sulfate  (90 Base) MCG/ACT inhaler, TAKE 2 PUFFS BY MOUTH EVERY 4 HOURS AS NEEDED, Disp: , Rfl:     atorvastatin (LIPITOR) 80 MG tablet, Take 1 tablet by mouth Daily., Disp: 90 tablet, Rfl: 3    Blood Glucose Monitoring Suppl (Accu-Chek Guide) w/Device kit, Inject 1 Device under the skin into the appropriate area as directed Daily., Disp: 1 kit, Rfl: 0    carvedilol (COREG) 25 MG tablet, TAKE 1 TABLET BY MOUTH TWICE A DAY WITH MEALS, Disp: 180 tablet, Rfl: 2    Cholecalciferol (VITAMIN D3) 2000 UNITS capsule, Take 1,000 Units by mouth Daily., Disp: , Rfl:     clopidogrel (PLAVIX) 75 MG tablet, TAKE 1 TABLET BY MOUTH EVERY DAY, Disp: 90 tablet, Rfl: 1    ezetimibe (Zetia) 10 MG tablet, Take 1 tablet by mouth Daily., Disp: 90 tablet, Rfl: 1    furosemide (LASIX) 40 MG tablet, TAKE 1 TABLET BY MOUTH EVERY DAY, Disp: 90 tablet, Rfl: 1    Insulin  Pen Needle 32G X 4 MM misc, USE 4 TIMES A DAY, Disp: 400 each, Rfl: 3    isosorbide mononitrate (IMDUR) 30 MG 24 hr tablet, TAKE 1 TABLET BY MOUTH EVERY DAY, Disp: 90 tablet, Rfl: 1    lisinopril (PRINIVIL,ZESTRIL) 10 MG tablet, Daily., Disp: , Rfl:     metFORMIN (GLUCOPHAGE) 1000 MG tablet, Take 1 tablet by mouth Every Evening., Disp: 90 tablet, Rfl: 1    Multiple Vitamins-Minerals (MULTIVITAL PO), Take 1 tablet by mouth Daily., Disp: , Rfl:     nitroglycerin (Nitrostat) 0.4 MG SL tablet, Place 1 tablet under the tongue Every 5 (Five) Minutes As Needed for Chest Pain. Take no more than 3 doses in 15 minutes., Disp: 30 tablet, Rfl: 11    nystatin-triamcinolone (MYCOLOG II) 119197-1.1 UNIT/GM-% cream, APPLY TOPICALLY 3 TIMES DAILY TO AFFECTED AREA(S)., Disp: , Rfl:     Omega-3 Fatty Acids (FISH OIL) 1200 MG capsule capsule, Take 1 capsule by mouth Daily With Breakfast., Disp: , Rfl:     potassium chloride 10 MEQ CR tablet, Take 1 tablet by mouth Daily. Resume on 1/7/20, Disp: 90 tablet, Rfl: 1    potassium chloride 10 MEQ CR tablet, TAKE 1 TABLET BY MOUTH DAILY. RESUME ON 1/7/20, Disp: 90 tablet, Rfl: 1    Semaglutide, 1 MG/DOSE, (Ozempic, 1 MG/DOSE,) 4 MG/3ML solution pen-injector, Inject 1 mg under the skin into the appropriate area as directed 1 (One) Time Per Week., Disp: 9 mL, Rfl: 1    spironolactone (ALDACTONE) 25 MG tablet, Take 1 tablet by mouth Daily., Disp: 90 tablet, Rfl: 3    traZODone (DESYREL) 50 MG tablet, Take 1 tablet by mouth every night at bedtime., Disp: , Rfl:     vitamin B-12 (CYANOCOBALAMIN) 500 MCG tablet, Take 1 tablet by mouth Daily., Disp: , Rfl:     Xarelto 20 MG tablet, TAKE 1 TABLET BY MOUTH EVERY DAY WITH DINNER, Disp: 90 tablet, Rfl: 1    Past Medical History:   Diagnosis Date    Abdominal pain     Abnormal liver function test     Acute bronchitis     Benign essential hypertension     CAD (coronary artery disease)     CHF (congestive heart failure)     Colon polyp     Congestive heart  disease     COPD (chronic obstructive pulmonary disease)     Cough     Diabetes     Diabetes mellitus     Diarrhea     Gastroenteritis     Health care maintenance     Hyperlipidemia     Hypertension     IFG (impaired fasting glucose)     Ischemic cardiomyopathy     Myocardial infarction     SHELLIE (obstructive sleep apnea)     Sore throat     Type 2 diabetes mellitus     Vaginal yeast infection     Vitamin D deficiency        Past Surgical History:   Procedure Laterality Date    CARDIAC CATHETERIZATION      CARDIAC CATHETERIZATION N/A 1/6/2020    Procedure: Coronary angiography;  Surgeon: Oneida Saldivar MD;  Location:  [facility];  Service: Cardiovascular    CARDIAC CATHETERIZATION N/A 1/6/2020    Procedure: Left heart cath;  Surgeon: Oneida Saldivar MD;  Location:  [facility];  Service: Cardiovascular    CARDIAC CATHETERIZATION N/A 1/6/2020    Procedure: Left ventriculography;  Surgeon: Oneida Saldivar MD;  Location:  [facility];  Service: Cardiovascular    CARDIAC CATHETERIZATION N/A 1/6/2020    Procedure: Percutaneous Coronary Intervention;  Surgeon: Oneida Saldivar MD;  Location:  [facility];  Service: Cardiovascular    CARDIAC CATHETERIZATION N/A 1/6/2020    Procedure: Stent HUGO coronary;  Surgeon: Oneida Saldivar MD;  Location:  [facility];  Service: Cardiovascular    CARDIAC CATHETERIZATION  1/6/2020    Procedure: Functional Flow [place];  Surgeon: Oneida Saldivar MD;  Location:  [facility];  Service: Cardiovascular    CARDIAC CATHETERIZATION N/A 10/26/2020    Procedure: Coronary angiography;  Surgeon: Oneida Saldivar MD;  Location:  [facility];  Service: Cardiovascular;  Laterality: N/A;    CARDIAC CATHETERIZATION N/A 10/26/2020    Procedure: Left heart cath;  Surgeon: Oneida Saldivar MD;  Location:  [facility];  Service: Cardiovascular;  Laterality: N/A;     CARDIAC CATHETERIZATION N/A 10/26/2020    Procedure: Left ventriculography;  Surgeon: Oneida Saldivar MD;  [facility];  Service: Cardiovascular;  Laterality: N/A;    CARDIAC CATHETERIZATION  10/26/2020    Procedure: Functional Flow [place];  Surgeon: Oneida Saldivar MD;  [facility];  Service: Cardiovascular;;    CARDIAC CATHETERIZATION N/A 7/19/2021    Procedure: Coronary angiography;  Surgeon: Oneida Saldivar MD;  [facility];  Service: Cardiovascular;  Laterality: N/A;    CARDIAC CATHETERIZATION N/A 7/19/2021    Procedure: Left heart cath;  Surgeon: Oneida Saldivar MD;  [facility];  Service: Cardiovascular;  Laterality: N/A;    CARDIAC CATHETERIZATION N/A 7/19/2021    Procedure: Left ventriculography;  Surgeon: Oneida Saldivar MD;  Location: [facility] CATH INVASIVE LOCATION;  Service: Cardiovascular;  Laterality: N/A;    CARDIAC CATHETERIZATION N/A 7/19/2021    Procedure: Percutaneous Coronary Intervention;  Surgeon: Oneida Saldivar MD;  Location: [facility] CATH INVASIVE LOCATION;  Service: Cardiovascular;  Laterality: N/A;    CARDIAC CATHETERIZATION N/A 7/19/2021    Procedure: Optical Coherent Tomography;  Surgeon: Oneida Saldivar MD;  Location: [facility] CATH INVASIVE LOCATION;  Service: Cardiovascular;  Laterality: N/A;    CARDIAC CATHETERIZATION N/A 7/19/2021    Procedure: Stent HUGO coronary;  Surgeon: Oneida Saldivar MD;  Location: [facility] CATH INVASIVE LOCATION;  Service: Cardiovascular;  Laterality: N/A;    CARDIAC CATHETERIZATION  7/19/2021    Procedure: RESTING FULL CYCLE RATIO;  Surgeon: Oneida Saldivar MD;  [facility];  Service: Cardiovascular;;  RFR    CARDIAC ELECTROPHYSIOLOGY PROCEDURE N/A 1/12/2018    Procedure: ICD DC generator change  medtronic;  Surgeon: Hany Ornelas MD;  Location: [facility] CATH INVASIVE LOCATION;  Service:     PACEMAKER IMPLANTATION   "    TUBAL ABDOMINAL LIGATION         Family History   Problem Relation Age of Onset    Diabetes Mother     Heart disease Mother     Hyperlipidemia Mother     Diabetes Father     Heart disease Father     Hyperlipidemia Father     Heart disease Brother     Hyperlipidemia Brother     Hypertension Father     Colon cancer Father     Heart attack Father     Emphysema Mother     Heart disease Brother     Heart attack Brother     Heart disease Maternal Grandmother     Heart disease Maternal Grandfather     Heart disease Paternal Grandmother     Heart disease Paternal Grandfather        Social History     Tobacco Use    Smoking status: Former     Packs/day: 1.00     Years: 30.00     Pack years: 30.00     Types: Cigarettes     Start date: 1979     Quit date: 2009     Years since quittin.9    Smokeless tobacco: Never    Tobacco comments:     QUIT 10 YRS   Vaping Use    Vaping Use: Never used   Substance Use Topics    Alcohol use: Yes     Comment: rarely uses ETOH/caffeine use    Drug use: No         ECG 12 Lead    Date/Time: 10/4/2023 12:15 PM  Performed by: Linda Manrique APRN  Authorized by: Linda Manrique APRN   Comparison: compared with previous ECG from 3/24/2023  Similar to previous ECG  Rhythm: sinus rhythm  Q waves: V1 and V2    T inversion: V1 and V2           Objective:     Visit Vitals  /70 (BP Location: Left arm, Patient Position: Sitting, Cuff Size: Adult)   Pulse 78   Ht 152.4 cm (60\")   Wt 83.2 kg (183 lb 6.4 oz)   SpO2 96%   BMI 35.82 kg/m²             Physical Exam  Constitutional:       Appearance: Normal appearance. She is obese.   HENT:      Head: Normocephalic.   Neck:      Vascular: No carotid bruit.   Cardiovascular:      Rate and Rhythm: Normal rate and regular rhythm.      Chest Wall: PMI is not displaced.      Pulses: Normal pulses.           Radial pulses are 2+ on the right side and 2+ on the left side.        Posterior tibial pulses are 2+ on the right side and 2+ on the left " side.      Heart sounds: Normal heart sounds. No murmur heard.    No friction rub. No gallop.   Pulmonary:      Effort: Pulmonary effort is normal.      Breath sounds: Normal breath sounds.   Abdominal:      General: Bowel sounds are normal. There is no distension.      Palpations: Abdomen is soft.   Musculoskeletal:      Right lower leg: No edema.      Left lower leg: No edema.   Skin:     General: Skin is warm and dry.      Capillary Refill: Capillary refill takes less than 2 seconds.   Neurological:      Mental Status: She is alert and oriented to person, place, and time.   Psychiatric:         Mood and Affect: Mood normal.         Behavior: Behavior normal.         Thought Content: Thought content normal.        Lab Review:   Lipid Panel          1/3/2023    08:19 7/12/2023    08:03   Lipid Panel   Total Cholesterol 191  156    Triglycerides 152  197    HDL Cholesterol 42  37    VLDL Cholesterol 27  34    LDL Cholesterol  122  85        Cardiac Procedures:       Assessment:         Diagnoses and all orders for this visit:    1. Anterior myocardial infarction (Primary)    2. Automatic implantable cardioverter-defibrillator in situ    3. Benign essential hypertension    4. Ischemic cardiomyopathy    5. Carotid artery disease, unspecified laterality, unspecified type    6. Chronic coronary artery disease    7. Congestive heart failure with left ventricular systolic dysfunction    8. Mixed hyperlipidemia    9. S/P coronary artery stent placement    Other orders  -     ECG 12 Lead            Plan:         1. CAD: s/p stent to LAD, most recently in 2019. She is on Plavix, statin and beta blocker. EKG does not show any ischemic changes. She is also on isosorbide. She denies any anginal symptoms. Continue with current medical therapy.  2. Ischemic cardiomyopathy: on GDMT with carvedilol, furosemide, spironolactone. Euvolemic on exam today. Continue with current treatment.  3. S/p AICD: interrogation today showed no  alerts or events, 5.5 years battery life, device functioning appropraitely  4. H/o CVA: on Xarelto for CVA.  6. Epistaxis: hopefully her nosebleeds will continue to improve with treatment of her sinus/ear issues. She is on Xarelto and Plavix which ideally we would like to continue both. If bleeding does become more problematic, she should let us know.    Thank you for allowing me to participate in this patient's care. Please call with any questions or concerns. Ms. Dent will follow up with Dr. Saldivar in 6 months.          Your medication list            Accurate as of October 4, 2023 12:17 PM. If you have any questions, ask your nurse or doctor.                CONTINUE taking these medications        Instructions Last Dose Given Next Dose Due   Accu-Chek FastClix Lancets misc      Use to test blood sugar 4 times daily  E11.8       Accu-Chek Guide test strip  Generic drug: glucose blood      USE 4 TIMES A DAY E11.8       Accu-Chek Guide w/Device kit      Inject 1 Device under the skin into the appropriate area as directed Daily.       albuterol sulfate  (90 Base) MCG/ACT inhaler  Commonly known as: PROVENTIL HFA;VENTOLIN HFA;PROAIR HFA      TAKE 2 PUFFS BY MOUTH EVERY 4 HOURS AS NEEDED       atorvastatin 80 MG tablet  Commonly known as: LIPITOR      Take 1 tablet by mouth Daily.       carvedilol 25 MG tablet  Commonly known as: COREG      TAKE 1 TABLET BY MOUTH TWICE A DAY WITH MEALS       clopidogrel 75 MG tablet  Commonly known as: PLAVIX      TAKE 1 TABLET BY MOUTH EVERY DAY       ezetimibe 10 MG tablet  Commonly known as: Zetia      Take 1 tablet by mouth Daily.       fish oil 1200 MG capsule capsule      Take 1 capsule by mouth Daily With Breakfast.       furosemide 40 MG tablet  Commonly known as: LASIX      TAKE 1 TABLET BY MOUTH EVERY DAY       Insulin Pen Needle 32G X 4 MM misc      USE 4 TIMES A DAY       isosorbide mononitrate 30 MG 24 hr tablet  Commonly known as: IMDUR      TAKE 1 TABLET BY  MOUTH EVERY DAY       lisinopril 10 MG tablet  Commonly known as: PRINIVIL,ZESTRIL      Daily.       metFORMIN 1000 MG tablet  Commonly known as: GLUCOPHAGE      Take 1 tablet by mouth Every Evening.       multivitamin with minerals tablet tablet      Take 1 tablet by mouth Daily.       nitroglycerin 0.4 MG SL tablet  Commonly known as: Nitrostat      Place 1 tablet under the tongue Every 5 (Five) Minutes As Needed for Chest Pain. Take no more than 3 doses in 15 minutes.       nystatin-triamcinolone 004706-7.1 UNIT/GM-% cream  Commonly known as: MYCOLOG II      APPLY TOPICALLY 3 TIMES DAILY TO AFFECTED AREA(S).       Ozempic (1 MG/DOSE) 4 MG/3ML solution pen-injector  Generic drug: Semaglutide (1 MG/DOSE)      Inject 1 mg under the skin into the appropriate area as directed 1 (One) Time Per Week.       potassium chloride 10 MEQ CR tablet      Take 1 tablet by mouth Daily. Resume on 1/7/20       potassium chloride 10 MEQ CR tablet      TAKE 1 TABLET BY MOUTH DAILY. RESUME ON 1/7/20       spironolactone 25 MG tablet  Commonly known as: ALDACTONE      Take 1 tablet by mouth Daily.       traZODone 50 MG tablet  Commonly known as: DESYREL      Take 1 tablet by mouth every night at bedtime.       vitamin B-12 500 MCG tablet  Commonly known as: CYANOCOBALAMIN      Take 1 tablet by mouth Daily.       Vitamin D3 50 MCG (2000 UT) capsule      Take 1,000 Units by mouth Daily.       Xarelto 20 MG tablet  Generic drug: rivaroxaban      TAKE 1 TABLET BY MOUTH EVERY DAY WITH DINNER                  KEYONA Calix  10/04/23  2:04 PM EST

## 2023-10-05 RX ORDER — RIVAROXABAN 20 MG/1
TABLET, FILM COATED ORAL
Qty: 90 TABLET | Refills: 1 | Status: SHIPPED | OUTPATIENT
Start: 2023-10-05

## 2023-11-13 ENCOUNTER — TELEPHONE (OUTPATIENT)
Dept: ENDOCRINOLOGY | Age: 63
End: 2023-11-13
Payer: COMMERCIAL

## 2023-12-08 DIAGNOSIS — I25.5 ISCHEMIC CARDIOMYOPATHY: ICD-10-CM

## 2023-12-08 RX ORDER — POTASSIUM CHLORIDE 750 MG/1
10 TABLET, FILM COATED, EXTENDED RELEASE ORAL DAILY
Qty: 90 TABLET | Refills: 1 | Status: SHIPPED | OUTPATIENT
Start: 2023-12-08

## 2024-01-29 ENCOUNTER — OFFICE VISIT (OUTPATIENT)
Dept: FAMILY MEDICINE CLINIC | Facility: CLINIC | Age: 64
End: 2024-01-29
Payer: COMMERCIAL

## 2024-01-29 VITALS
WEIGHT: 180 LBS | RESPIRATION RATE: 16 BRPM | BODY MASS INDEX: 35.34 KG/M2 | HEIGHT: 60 IN | OXYGEN SATURATION: 99 % | DIASTOLIC BLOOD PRESSURE: 58 MMHG | SYSTOLIC BLOOD PRESSURE: 120 MMHG | HEART RATE: 70 BPM

## 2024-01-29 DIAGNOSIS — E11.8 TYPE 2 DIABETES MELLITUS WITH COMPLICATION: Primary | ICD-10-CM

## 2024-01-29 DIAGNOSIS — I25.5 ISCHEMIC CARDIOMYOPATHY: ICD-10-CM

## 2024-01-29 DIAGNOSIS — R53.83 OTHER FATIGUE: ICD-10-CM

## 2024-01-29 DIAGNOSIS — D50.9 IRON DEFICIENCY ANEMIA, UNSPECIFIED IRON DEFICIENCY ANEMIA TYPE: ICD-10-CM

## 2024-01-29 DIAGNOSIS — R04.0 EPISTAXIS: ICD-10-CM

## 2024-01-29 DIAGNOSIS — Z86.73 HISTORY OF CVA (CEREBROVASCULAR ACCIDENT) WITHOUT RESIDUAL DEFICITS: ICD-10-CM

## 2024-01-29 DIAGNOSIS — E55.9 VITAMIN D DEFICIENCY: ICD-10-CM

## 2024-01-29 DIAGNOSIS — I10 BENIGN ESSENTIAL HYPERTENSION: ICD-10-CM

## 2024-01-29 DIAGNOSIS — E53.9 VITAMIN B DEFICIENCY: ICD-10-CM

## 2024-01-29 DIAGNOSIS — E66.01 CLASS 2 SEVERE OBESITY DUE TO EXCESS CALORIES WITH SERIOUS COMORBIDITY AND BODY MASS INDEX (BMI) OF 35.0 TO 35.9 IN ADULT: ICD-10-CM

## 2024-01-29 DIAGNOSIS — I77.9 CAROTID ARTERY DISEASE, UNSPECIFIED LATERALITY, UNSPECIFIED TYPE: ICD-10-CM

## 2024-01-29 DIAGNOSIS — G47.33 OBSTRUCTIVE SLEEP APNEA SYNDROME: ICD-10-CM

## 2024-01-29 DIAGNOSIS — Z95.810 ICD (IMPLANTABLE CARDIOVERTER-DEFIBRILLATOR) IN PLACE: ICD-10-CM

## 2024-01-29 DIAGNOSIS — J41.1 MUCOPURULENT CHRONIC BRONCHITIS: ICD-10-CM

## 2024-01-29 DIAGNOSIS — E78.2 MIXED HYPERLIPIDEMIA: ICD-10-CM

## 2024-01-29 DIAGNOSIS — I50.20 CONGESTIVE HEART FAILURE WITH LEFT VENTRICULAR SYSTOLIC DYSFUNCTION: ICD-10-CM

## 2024-01-29 PROCEDURE — 99214 OFFICE O/P EST MOD 30 MIN: CPT | Performed by: NURSE PRACTITIONER

## 2024-01-29 RX ORDER — ALBUTEROL SULFATE 90 UG/1
2 AEROSOL, METERED RESPIRATORY (INHALATION) EVERY 4 HOURS PRN
Qty: 8 G | Refills: 3 | Status: SHIPPED | OUTPATIENT
Start: 2024-01-29 | End: 2024-07-27

## 2024-01-29 NOTE — PROGRESS NOTES
Chief Complaint  Fatigue (Gets very fatigued.  She has to stop and take breaks and by 3pm she is ready for bet and makes it till about 5pm) and Asthma (Needs rescue inhaler refilled- COPD)    Subjective          HECTOR presents to [facility] as a 63-year-old female to establish care and to discuss follow-up on ongoing medical concerns.  To refill medications, order labs.  Overall doing okay     Type 2 diabetes.  Has been very well-controlled last hemoglobin A1c 7/2023 at 6.4  Currently taking metformin and Ozempic.  Tolerating medication well without any side effects.  She plans to continue current doses.  Denies any polys.  No open ulcerations or changes in numbness and tingling.  She is watching her carb and sugar intake closely and trying to remain active    CAD, hypertension, CHF, cardiomyopathy, ICD in place, history of CVA-sees cardiology.  Has next follow-up appointment scheduled for Friday, 2/2/2024  She has been having occasional headaches and some fatigue.  She states that she has always had some dyspnea on exertion.  She does have a history of iron deficiency anemia and unsure if this is related.  She denies any blurred vision no dizziness no flushing no chest pain or pressure  She taking all her medication as directed  Denies any swelling or wheezing  No recent illness    She has had iron infusion/blood transfusion in the past related to anemia.  Last labs were 7/2023.    Hyperlipidemia-takes atorvastatin as directed.  Denies any muscle weakness or myalgias.  Continues to work on a lower cholesterol diet and exercise  She is fasting and agreeable to labs today    COPD needs a refill on her albuterol inhaler no increase or changes in usage.  Denies any tobacco use    Sleep apnea-uses CPAP no changes    She has been having some nosebleeds recently.  She tries to use saline and Flonase as needed.  Nosebleeds happening a couple of times per month.  Usually well-controlled within  "a few minutes.    She denies any other significant history or any other acute complaints of today    The following portions of the patient's history were reviewed and updated as appropriate: allergies, current medications, past family history, past medical history, past social history, past surgical history, and problem list          Review of Systems   Constitutional:  Positive for fatigue. Negative for chills and fever.   HENT:  Positive for nosebleeds. Negative for congestion.    Eyes:  Negative for visual disturbance.   Respiratory:  Negative for cough, shortness of breath and wheezing.    Gastrointestinal:  Negative for abdominal pain, diarrhea, nausea and vomiting.   Endocrine: Negative for cold intolerance, heat intolerance, polydipsia, polyphagia and polyuria.   Skin:  Negative for rash.   Neurological:  Negative for dizziness and light-headedness.   Psychiatric/Behavioral:  Negative for self-injury and suicidal ideas.         Objective   Vital Signs:   Vitals:    01/29/24 1329   BP: 120/58   Pulse: 70   Resp: 16   SpO2: 99%   Weight: 81.6 kg (180 lb)   Height: 152.4 cm (60\")                  Physical Exam  Vitals reviewed.   Constitutional:       General: She is not in acute distress.  Eyes:      Conjunctiva/sclera: Conjunctivae normal.   Neck:      Thyroid: No thyromegaly.      Vascular: No carotid bruit.   Cardiovascular:      Rate and Rhythm: Normal rate and regular rhythm.      Heart sounds: Normal heart sounds. No murmur heard.  Pulmonary:      Effort: Pulmonary effort is normal. No respiratory distress.      Breath sounds: Normal breath sounds. No stridor. No wheezing, rhonchi or rales.   Chest:      Chest wall: No tenderness.   Lymphadenopathy:      Cervical: No cervical adenopathy.   Neurological:      Mental Status: She is alert and oriented to person, place, and time.   Psychiatric:         Attention and Perception: Attention normal.         Mood and Affect: Mood normal.          Result Review : "     The following data was reviewed by: KEYONA Taylor on 01/29/2024:      Hemoglobin A1c (07/12/2023 08:03) 6.4  Lipid Panel (07/12/2023 08:03) total 156 triglycerides 197 LDL 85  Basic Metabolic Panel (07/12/2023 08:03) glucose 133 normal liver and kidney      VITAMIN D,25-HYDROXY (01/24/2023 08:09) low vitamin D  CBC AND DIFFERENTIAL (01/24/2023 08:09) H&H 13.2/42.8  TSH (01/24/2023 08:09) thyroid normal  HEMOGLOBIN A1C (01/24/2023 08:09) 6.9  VITAMIN B12 (01/24/2023 08:09) normal  LIPID PANEL (01/24/2023 08:09) triglycerides 159 total cholesterol 148 LDL 82  COMPREHENSIVE METABOLIC PANEL (01/24/2023 08:09) glucose 140 GFR 58 liver enzymes normal     Assessment and Plan    Diagnoses and all orders for this visit:    1. Type 2 diabetes mellitus with complication (Primary)  -     Comprehensive Metabolic Panel  -     CBC & Differential  -     Lipid Panel  -     Hemoglobin A1c  -     Vitamin D,25-Hydroxy  -     Vitamin B12  -     Folate  -     TSH  -     T4, Free  -     Ferritin  -     Iron Profile    2. History of CVA (cerebrovascular accident) without residual deficits  -     Comprehensive Metabolic Panel  -     CBC & Differential  -     Lipid Panel  -     Hemoglobin A1c  -     Vitamin D,25-Hydroxy  -     Vitamin B12  -     Folate  -     TSH  -     T4, Free  -     Ferritin  -     Iron Profile    3. Mucopurulent chronic bronchitis  -     Comprehensive Metabolic Panel  -     CBC & Differential  -     Lipid Panel  -     Hemoglobin A1c  -     Vitamin D,25-Hydroxy  -     Vitamin B12  -     Folate  -     TSH  -     T4, Free  -     Ferritin  -     Iron Profile  -     albuterol sulfate  (90 Base) MCG/ACT inhaler; Inhale 2 puffs Every 4 (Four) Hours As Needed for Wheezing for up to 180 days.  Dispense: 8 g; Refill: 3    4. Obstructive sleep apnea syndrome  -     Comprehensive Metabolic Panel  -     CBC & Differential  -     Lipid Panel  -     Hemoglobin A1c  -     Vitamin D,25-Hydroxy  -     Vitamin B12  -      Folate  -     TSH  -     T4, Free  -     Ferritin  -     Iron Profile    5. Carotid artery disease, unspecified laterality, unspecified type  -     Comprehensive Metabolic Panel  -     CBC & Differential  -     Lipid Panel  -     Hemoglobin A1c  -     Vitamin D,25-Hydroxy  -     Vitamin B12  -     Folate  -     TSH  -     T4, Free  -     Ferritin  -     Iron Profile    6. Benign essential hypertension  -     Comprehensive Metabolic Panel  -     CBC & Differential  -     Lipid Panel  -     Hemoglobin A1c  -     Vitamin D,25-Hydroxy  -     Vitamin B12  -     Folate  -     TSH  -     T4, Free  -     Ferritin  -     Iron Profile    7. ICD (implantable cardioverter-defibrillator) in place  -     Comprehensive Metabolic Panel  -     CBC & Differential  -     Lipid Panel  -     Hemoglobin A1c  -     Vitamin D,25-Hydroxy  -     Vitamin B12  -     Folate  -     TSH  -     T4, Free  -     Ferritin  -     Iron Profile    8. Mixed hyperlipidemia  -     Comprehensive Metabolic Panel  -     CBC & Differential  -     Lipid Panel  -     Hemoglobin A1c  -     Vitamin D,25-Hydroxy  -     Vitamin B12  -     Folate  -     TSH  -     T4, Free  -     Ferritin  -     Iron Profile    9. Ischemic cardiomyopathy  -     Comprehensive Metabolic Panel  -     CBC & Differential  -     Lipid Panel  -     Hemoglobin A1c  -     Vitamin D,25-Hydroxy  -     Vitamin B12  -     Folate  -     TSH  -     T4, Free  -     Ferritin  -     Iron Profile    10. Congestive heart failure with left ventricular systolic dysfunction  -     Comprehensive Metabolic Panel  -     CBC & Differential  -     Lipid Panel  -     Hemoglobin A1c  -     Vitamin D,25-Hydroxy  -     Vitamin B12  -     Folate  -     TSH  -     T4, Free  -     Ferritin  -     Iron Profile    11. Class 2 severe obesity due to excess calories with serious comorbidity and body mass index (BMI) of 35.0 to 35.9 in adult  -     Comprehensive Metabolic Panel  -     CBC & Differential  -     Lipid  Panel  -     Hemoglobin A1c  -     Vitamin D,25-Hydroxy  -     Vitamin B12  -     Folate  -     TSH  -     T4, Free  -     Ferritin  -     Iron Profile    12. Other fatigue  -     Comprehensive Metabolic Panel  -     CBC & Differential  -     Lipid Panel  -     Hemoglobin A1c  -     Vitamin D,25-Hydroxy  -     Vitamin B12  -     Folate  -     TSH  -     T4, Free  -     Ferritin  -     Iron Profile    13. Vitamin D deficiency  -     Comprehensive Metabolic Panel  -     CBC & Differential  -     Lipid Panel  -     Hemoglobin A1c  -     Vitamin D,25-Hydroxy  -     Vitamin B12  -     Folate  -     TSH  -     T4, Free  -     Ferritin  -     Iron Profile    14. Vitamin B deficiency  -     Comprehensive Metabolic Panel  -     CBC & Differential  -     Lipid Panel  -     Hemoglobin A1c  -     Vitamin D,25-Hydroxy  -     Vitamin B12  -     Folate  -     TSH  -     T4, Free  -     Ferritin  -     Iron Profile    15. Iron deficiency anemia, unspecified iron deficiency anemia type  -     Comprehensive Metabolic Panel  -     CBC & Differential  -     Lipid Panel  -     Hemoglobin A1c  -     Vitamin D,25-Hydroxy  -     Vitamin B12  -     Folate  -     TSH  -     T4, Free  -     Ferritin  -     Iron Profile    16. Epistaxis  -     Comprehensive Metabolic Panel  -     CBC & Differential  -     Lipid Panel  -     Hemoglobin A1c  -     Vitamin D,25-Hydroxy  -     Vitamin B12  -     Folate  -     TSH  -     T4, Free  -     Ferritin  -     Iron Profile        Follow Up   Return in about 6 months (around 7/29/2024) for Annual physical.  Patient was given instructions and counseling regarding her condition or for health maintenance advice. Please see specific information pulled into the AVS if appropriate.

## 2024-01-30 DIAGNOSIS — D64.9 HEMOGLOBIN LOW: ICD-10-CM

## 2024-01-30 DIAGNOSIS — D50.9 IRON DEFICIENCY ANEMIA, UNSPECIFIED IRON DEFICIENCY ANEMIA TYPE: Primary | ICD-10-CM

## 2024-01-30 LAB
25(OH)D3+25(OH)D2 SERPL-MCNC: 17.6 NG/ML (ref 30–100)
ALBUMIN SERPL-MCNC: 4.6 G/DL (ref 3.5–5.2)
ALBUMIN/GLOB SERPL: 1.9 G/DL
ALP SERPL-CCNC: 112 U/L (ref 39–117)
ALT SERPL-CCNC: 15 U/L (ref 1–33)
AST SERPL-CCNC: 16 U/L (ref 1–32)
BASOPHILS # BLD AUTO: ABNORMAL 10*3/UL
BILIRUB SERPL-MCNC: 0.7 MG/DL (ref 0–1.2)
BUN SERPL-MCNC: 10 MG/DL (ref 8–23)
BUN/CREAT SERPL: 9.6 (ref 7–25)
CALCIUM SERPL-MCNC: 9.3 MG/DL (ref 8.6–10.5)
CHLORIDE SERPL-SCNC: 103 MMOL/L (ref 98–107)
CHOLEST SERPL-MCNC: 119 MG/DL (ref 0–200)
CO2 SERPL-SCNC: 25.8 MMOL/L (ref 22–29)
CREAT SERPL-MCNC: 1.04 MG/DL (ref 0.57–1)
DIFFERENTIAL COMMENT: ABNORMAL
EGFRCR SERPLBLD CKD-EPI 2021: 60.9 ML/MIN/1.73
EOSINOPHIL # BLD AUTO: ABNORMAL 10*3/UL
EOSINOPHIL # BLD MANUAL: 0.1 10*3/MM3 (ref 0–0.4)
EOSINOPHIL NFR BLD AUTO: ABNORMAL %
EOSINOPHIL NFR BLD MANUAL: 1 % (ref 0.3–6.2)
ERYTHROCYTE [DISTWIDTH] IN BLOOD BY AUTOMATED COUNT: 20.3 % (ref 12.3–15.4)
FERRITIN SERPL-MCNC: 42.7 NG/ML (ref 13–150)
FOLATE SERPL-MCNC: 15.1 NG/ML (ref 4.78–24.2)
GLOBULIN SER CALC-MCNC: 2.4 GM/DL
GLUCOSE SERPL-MCNC: 106 MG/DL (ref 65–99)
HBA1C MFR BLD: 5.9 % (ref 4.8–5.6)
HCT VFR BLD AUTO: 26 % (ref 34–46.6)
HDLC SERPL-MCNC: 31 MG/DL (ref 40–60)
HGB BLD-MCNC: 7.2 G/DL (ref 12–15.9)
IRON SATN MFR SERPL: 4 % (ref 20–50)
IRON SERPL-MCNC: 23 MCG/DL (ref 37–145)
LDLC SERPL CALC-MCNC: 51 MG/DL (ref 0–100)
LYMPHOCYTES # BLD AUTO: ABNORMAL 10*3/UL
LYMPHOCYTES # BLD MANUAL: 2.32 10*3/MM3 (ref 0.7–3.1)
LYMPHOCYTES NFR BLD AUTO: ABNORMAL %
LYMPHOCYTES NFR BLD MANUAL: 23.7 % (ref 19.6–45.3)
MCH RBC QN AUTO: 20.2 PG (ref 26.6–33)
MCHC RBC AUTO-ENTMCNC: 27.7 G/DL (ref 31.5–35.7)
MCV RBC AUTO: 72.8 FL (ref 79–97)
MONOCYTES # BLD MANUAL: 0.1 10*3/MM3 (ref 0.1–0.9)
MONOCYTES NFR BLD AUTO: ABNORMAL %
MONOCYTES NFR BLD MANUAL: 1 % (ref 5–12)
NEUTROPHILS # BLD MANUAL: 7.28 10*3/MM3 (ref 1.7–7)
NEUTROPHILS NFR BLD AUTO: ABNORMAL %
NEUTROPHILS NFR BLD MANUAL: 74.2 % (ref 42.7–76)
PLATELET # BLD AUTO: 382 10*3/MM3 (ref 140–450)
PLATELET BLD QL SMEAR: ABNORMAL
POTASSIUM SERPL-SCNC: 4.3 MMOL/L (ref 3.5–5.2)
PROT SERPL-MCNC: 7 G/DL (ref 6–8.5)
RBC # BLD AUTO: 3.57 10*6/MM3 (ref 3.77–5.28)
RBC MORPH BLD: ABNORMAL
SODIUM SERPL-SCNC: 140 MMOL/L (ref 136–145)
T4 FREE SERPL-MCNC: 1.31 NG/DL (ref 0.93–1.7)
TIBC SERPL-MCNC: 592 MCG/DL
TRIGL SERPL-MCNC: 228 MG/DL (ref 0–150)
TSH SERPL DL<=0.005 MIU/L-ACNC: 1.05 UIU/ML (ref 0.27–4.2)
UIBC SERPL-MCNC: 569 MCG/DL (ref 112–346)
VIT B12 SERPL-MCNC: 315 PG/ML (ref 211–946)
VLDLC SERPL CALC-MCNC: 37 MG/DL (ref 5–40)
WBC # BLD AUTO: 9.81 10*3/MM3 (ref 3.4–10.8)

## 2024-01-30 RX ORDER — FERROUS SULFATE 325(65) MG
TABLET ORAL
Qty: 30 TABLET | Refills: 5 | Status: SHIPPED | OUTPATIENT
Start: 2024-01-30

## 2024-01-30 NOTE — PROGRESS NOTES
Please call patient and make sure she understands, and follows up with hematology    Good afternoon Marcelina,  Your lab results are back    Your hemoglobin A1c has decreased to 5.9-this is fantastic  Continue to watch her carb and sugar intake no changes in medication needed    Vitamin D is still low make sure you continue to take your supplement we can switch to a weekly supplement if you would prefer-this is a higher dose that you only take once per week    Your kidney function and liver function look good  You are pretty anemic I am placing an order for hematology to see what treatment they suggest/possible infusion  Let me know if you do not get a call right away to schedule an appointment  Also want you to go to the ER if you start having any severe shortness of breath or fatigue gets worse    Your thyroid is normal your vitamin B and folate are normal    Cholesterol looks pretty good only elevation is your triglycerides    Please let me know if you have any questions  Shannon

## 2024-02-01 ENCOUNTER — LAB (OUTPATIENT)
Dept: LAB | Facility: HOSPITAL | Age: 64
End: 2024-02-01
Payer: COMMERCIAL

## 2024-02-01 ENCOUNTER — CONSULT (OUTPATIENT)
Dept: ONCOLOGY | Facility: CLINIC | Age: 64
End: 2024-02-01
Payer: COMMERCIAL

## 2024-02-01 VITALS
RESPIRATION RATE: 18 BRPM | DIASTOLIC BLOOD PRESSURE: 71 MMHG | OXYGEN SATURATION: 98 % | BODY MASS INDEX: 34.04 KG/M2 | SYSTOLIC BLOOD PRESSURE: 122 MMHG | TEMPERATURE: 98.7 F | WEIGHT: 180.3 LBS | HEIGHT: 61 IN | HEART RATE: 84 BPM

## 2024-02-01 DIAGNOSIS — D50.9 IRON DEFICIENCY ANEMIA, UNSPECIFIED IRON DEFICIENCY ANEMIA TYPE: Primary | ICD-10-CM

## 2024-02-01 LAB
BASOPHILS # BLD AUTO: 0.08 10*3/MM3 (ref 0–0.2)
BASOPHILS NFR BLD AUTO: 0.8 % (ref 0–1.5)
DEPRECATED RDW RBC AUTO: 66.3 FL (ref 37–54)
EOSINOPHIL # BLD AUTO: 0.14 10*3/MM3 (ref 0–0.4)
EOSINOPHIL NFR BLD AUTO: 1.4 % (ref 0.3–6.2)
ERYTHROCYTE [DISTWIDTH] IN BLOOD BY AUTOMATED COUNT: 25.9 % (ref 12.3–15.4)
HCT VFR BLD AUTO: 30.3 % (ref 34–46.6)
HGB BLD-MCNC: 8.3 G/DL (ref 12–15.9)
IMM GRANULOCYTES # BLD AUTO: 0.1 10*3/MM3 (ref 0–0.05)
IMM GRANULOCYTES NFR BLD AUTO: 1 % (ref 0–0.5)
LYMPHOCYTES # BLD AUTO: 1.75 10*3/MM3 (ref 0.7–3.1)
LYMPHOCYTES NFR BLD AUTO: 17.3 % (ref 19.6–45.3)
MCH RBC QN AUTO: 21.1 PG (ref 26.6–33)
MCHC RBC AUTO-ENTMCNC: 27.4 G/DL (ref 31.5–35.7)
MCV RBC AUTO: 76.9 FL (ref 79–97)
MONOCYTES # BLD AUTO: 0.66 10*3/MM3 (ref 0.1–0.9)
MONOCYTES NFR BLD AUTO: 6.5 % (ref 5–12)
NEUTROPHILS NFR BLD AUTO: 7.38 10*3/MM3 (ref 1.7–7)
NEUTROPHILS NFR BLD AUTO: 73 % (ref 42.7–76)
NRBC BLD AUTO-RTO: 0 /100 WBC (ref 0–0.2)
PLATELET # BLD AUTO: 272 10*3/MM3 (ref 140–450)
PMV BLD AUTO: 10.3 FL (ref 6–12)
RBC # BLD AUTO: 3.94 10*6/MM3 (ref 3.77–5.28)
WBC NRBC COR # BLD AUTO: 10.11 10*3/MM3 (ref 3.4–10.8)

## 2024-02-01 PROCEDURE — 36415 COLL VENOUS BLD VENIPUNCTURE: CPT

## 2024-02-01 PROCEDURE — 85025 COMPLETE CBC W/AUTO DIFF WBC: CPT

## 2024-02-01 RX ORDER — DIPHENHYDRAMINE HCL 25 MG
25 CAPSULE ORAL ONCE
OUTPATIENT
Start: 2024-02-08

## 2024-02-01 RX ORDER — SODIUM CHLORIDE 9 MG/ML
20 INJECTION, SOLUTION INTRAVENOUS ONCE
OUTPATIENT
Start: 2024-02-08

## 2024-02-01 RX ORDER — ACETAMINOPHEN 325 MG/1
650 TABLET ORAL ONCE
OUTPATIENT
Start: 2024-02-22

## 2024-02-01 RX ORDER — DIPHENHYDRAMINE HCL 25 MG
25 CAPSULE ORAL ONCE
OUTPATIENT
Start: 2024-02-22

## 2024-02-01 RX ORDER — DIPHENHYDRAMINE HCL 25 MG
25 CAPSULE ORAL ONCE
OUTPATIENT
Start: 2024-02-15

## 2024-02-01 RX ORDER — ACETAMINOPHEN 325 MG/1
650 TABLET ORAL ONCE
OUTPATIENT
Start: 2024-02-08

## 2024-02-01 RX ORDER — SODIUM CHLORIDE 9 MG/ML
20 INJECTION, SOLUTION INTRAVENOUS ONCE
OUTPATIENT
Start: 2024-02-22

## 2024-02-01 RX ORDER — ACETAMINOPHEN 325 MG/1
650 TABLET ORAL ONCE
OUTPATIENT
Start: 2024-02-15

## 2024-02-01 RX ORDER — SODIUM CHLORIDE 9 MG/ML
20 INJECTION, SOLUTION INTRAVENOUS ONCE
OUTPATIENT
Start: 2024-02-15

## 2024-02-01 NOTE — LETTER
February 1, 2024       No Recipients    Patient: Daisy Dent   YOB: 1960   Date of Visit: 2/1/2024     Dear KEYONA Taylor:       Thank you for referring Daisy Dent to me for evaluation. Below are the relevant portions of my assessment and plan of care.    If you have questions, please do not hesitate to call me. I look forward to following Daisy along with you.         Sincerely,        Gideon Hood MD        CC:   No Recipients    Gideon Hood MD  02/01/24 0855  Sign when Signing Visit    Subjective    REASON FOR CONSULTATION:  anemia  Provide an opinion on any further workup or treatment                             REQUESTING PHYSICIAN:  Douglas    RECORDS OBTAINED:  Records of the patients history including those obtained from the referring provider were reviewed and summarized in detail.    History of Present Illness   This is a 63-year-old woman with type 2 diabetes, prior cerebrovascular accident, chronic bronchitis, obstructive sleep apnea, coronary artery disease, hypertension, hyperlipidemia, ischemic cardiomyopathy on anticoagulation with Xarelto and Plavix and AICD in place (most recent EF 40-45%).  The patient is referred for evaluation of anemia.  The patient has prior history of microcytic, hypochromic anemia in October 2022 requiring transfusion support.  Apparently she had endoscopy during hospitalization which showed some nonbleeding colonic angioectasias (per cardiology notes).  When checked on 1/24/2023 the patient had normal hemoglobin 13.2 with MCV 81.8.  A CBC with her primary care on 1/29/2024 showed substantial drop in the hemoglobin to 7.2 with MCV 72.8/MCHC 27.7 and iron studies showed a ferritin of 42 and iron saturation of 4% with elevated TIBC 592 consistent with severe iron deficiency.    The patient states she has been on oral iron over-the-counter for about 1 year.  She does not see blood in the stool including melena.    The  patient complains of fatigue chest tightness dyspnea on exertion.    Past Medical History:   Diagnosis Date   • Abdominal pain    • Abnormal liver function test    • Acute bronchitis    • Benign essential hypertension    • CAD (coronary artery disease)    • CHF (congestive heart failure)    • Colon polyp    • Congestive heart disease    • COPD (chronic obstructive pulmonary disease)    • Cough    • Diabetes    • Diabetes mellitus    • Diarrhea    • Gastroenteritis    • Health care maintenance    • Hyperlipidemia    • Hypertension    • IFG (impaired fasting glucose)    • Ischemic cardiomyopathy    • Myocardial infarction    • SHELLIE (obstructive sleep apnea)    • Sore throat    • Type 2 diabetes mellitus    • Vaginal yeast infection    • Vitamin D deficiency         Past Surgical History:   Procedure Laterality Date   • CARDIAC CATHETERIZATION     • CARDIAC CATHETERIZATION N/A 01/06/2020    Procedure: Coronary angiography;  Surgeon: Oneida Saldivar MD;  Location:  [facility];  Service: Cardiovascular   • CARDIAC CATHETERIZATION N/A 01/06/2020    Procedure: Left heart cath;  Surgeon: Oneida Saldivar MD;  Location:  [facility];  Service: Cardiovascular   • CARDIAC CATHETERIZATION N/A 01/06/2020    Procedure: Left ventriculography;  Surgeon: Oneida Saldivar MD;  Location:  [facility];  Service: Cardiovascular   • CARDIAC CATHETERIZATION N/A 01/06/2020    Procedure: Percutaneous Coronary Intervention;  Surgeon: Oneida Saldivar MD;  Location:  [facility];  Service: Cardiovascular   • CARDIAC CATHETERIZATION N/A 01/06/2020    Procedure: Stent HUGO coronary;  Surgeon: Oneida Saldivar MD;  Location:  [facility];  Service: Cardiovascular   • CARDIAC CATHETERIZATION  01/06/2020    Procedure: Functional Flow [place];  Surgeon: Oneida Saldivar MD;  Location:  [facility];  Service: Cardiovascular   • CARDIAC  CATHETERIZATION N/A 10/26/2020    Procedure: Coronary angiography;  Surgeon: Oneida Saldivar MD;  Location:  [facility];  Service: Cardiovascular;  Laterality: N/A;   • CARDIAC CATHETERIZATION N/A 10/26/2020    Procedure: Left heart cath;  Surgeon: Oneida Saldivar MD;  Location:  [facility];  Service: Cardiovascular;  Laterality: N/A;   • CARDIAC CATHETERIZATION N/A 10/26/2020    Procedure: Left ventriculography;  Surgeon: Oneida Saldivar MD;  Location:  [facility];  Service: Cardiovascular;  Laterality: N/A;   • CARDIAC CATHETERIZATION  10/26/2020    Procedure: Functional Flow [place];  Surgeon: Oneida Saldivar MD;  Location: [facility];  Service: Cardiovascular;;   • CARDIAC CATHETERIZATION N/A 07/19/2021    Procedure: Coronary angiography;  Surgeon: Oneida Saldivar MD;  Location: [facility];  Service: Cardiovascular;  Laterality: N/A;   • CARDIAC CATHETERIZATION N/A 07/19/2021    Procedure: Left heart cath;  Surgeon: Oneida Saldivar MD;  Location: [facility];  Service: Cardiovascular;  Laterality: N/A;   • CARDIAC CATHETERIZATION N/A 07/19/2021    Procedure: Left ventriculography;  Surgeon: Oneida Saldivar MD;  Location: [facility];  Service: Cardiovascular;  Laterality: N/A;   • CARDIAC CATHETERIZATION N/A 07/19/2021    Procedure: Percutaneous Coronary Intervention;  Surgeon: Oneida Saldivar MD;  Location: [facility];  Service: Cardiovascular;  Laterality: N/A;   • CARDIAC CATHETERIZATION N/A 07/19/2021    Procedure: Optical Coherent Tomography;  Surgeon: Oneida Saldivar MD;  Location: [facility];  Service: Cardiovascular;  Laterality: N/A;   • CARDIAC CATHETERIZATION N/A 07/19/2021    Procedure: Stent HUGO coronary;  Surgeon: Oneida Saldivar MD;  Location: [facility];  Service: Cardiovascular;  Laterality: N/A;   •  CARDIAC CATHETERIZATION  07/19/2021    Procedure: RESTING FULL CYCLE RATIO;  Surgeon: Oneida Saldivar MD;  Location:  [facility];  Service: Cardiovascular;;  RFR   • CARDIAC DEFIBRILLATOR PLACEMENT  2010    Medtronic dual chamber/Dr. Valentin   • CARDIAC ELECTROPHYSIOLOGY PROCEDURE N/A 01/12/2018    Procedure: ICD DC generator change  medtronic;  Surgeon: Hany Ornelas MD;  Location:  [facility];  Service:    • PACEMAKER IMPLANTATION     • TUBAL ABDOMINAL LIGATION          Current Outpatient Medications on File Prior to Visit   Medication Sig Dispense Refill   • Accu-Chek FastClix Lancets misc Use to test blood sugar 4 times daily  E11.8 400 each 1   • Accu-Chek Guide test strip USE 4 TIMES A DAY E11.8 300 each 2   • albuterol sulfate  (90 Base) MCG/ACT inhaler Inhale 2 puffs Every 4 (Four) Hours As Needed for Wheezing for up to 180 days. 8 g 3   • atorvastatin (LIPITOR) 80 MG tablet Take 1 tablet by mouth Daily. 90 tablet 3   • Blood Glucose Monitoring Suppl (Accu-Chek Guide) w/Device kit Inject 1 Device under the skin into the appropriate area as directed Daily. 1 kit 0   • carvedilol (COREG) 25 MG tablet TAKE 1 TABLET BY MOUTH TWICE A DAY WITH MEALS 180 tablet 2   • Cholecalciferol (VITAMIN D3) 2000 UNITS capsule Take 1,000 Units by mouth Daily.     • clopidogrel (PLAVIX) 75 MG tablet TAKE 1 TABLET BY MOUTH EVERY DAY 90 tablet 1   • Insulin Pen Needle 32G X 4 MM misc USE 4 TIMES A  each 3   • Multiple Vitamins-Minerals (MULTIVITAL PO) Take 1 tablet by mouth Daily.     • nitroglycerin (Nitrostat) 0.4 MG SL tablet Place 1 tablet under the tongue Every 5 (Five) Minutes As Needed for Chest Pain. Take no more than 3 doses in 15 minutes. 30 tablet 11   • Omega-3 Fatty Acids (FISH OIL) 1200 MG capsule capsule Take 1 capsule by mouth Daily With Breakfast.     • potassium chloride 10 MEQ CR tablet Take 1 tablet by mouth Daily. Resume on 1/7/20 90 tablet 1   •  Semaglutide, 1 MG/DOSE, (Ozempic, 1 MG/DOSE,) 4 MG/3ML solution pen-injector Inject 1 mg under the skin into the appropriate area as directed 1 (One) Time Per Week. 9 mL 1   • spironolactone (ALDACTONE) 25 MG tablet Take 1 tablet by mouth Daily. 90 tablet 3   • traZODone (DESYREL) 50 MG tablet Take 1 tablet by mouth every night at bedtime.     • vitamin B-12 (CYANOCOBALAMIN) 500 MCG tablet Take 1 tablet by mouth Daily.     • Xarelto 20 MG tablet TAKE 1 TABLET BY MOUTH EVERY DAY WITH DINNER 90 tablet 1   • ferrous sulfate 325 (65 FE) MG tablet One PO daily with food for anemia (Patient not taking: Reported on 2024) 30 tablet 5   • potassium chloride 10 MEQ CR tablet TAKE 1 TABLET BY MOUTH DAILY (Patient not taking: Reported on 2024) 90 tablet 1   • [DISCONTINUED] nystatin-triamcinolone (MYCOLOG II) 290688-4.1 UNIT/GM-% cream APPLY TOPICALLY 3 TIMES DAILY TO AFFECTED AREA(S).       No current facility-administered medications on file prior to visit.        ALLERGIES:  No Known Allergies     Social History     Socioeconomic History   • Marital status:    Tobacco Use   • Smoking status: Former     Packs/day: 1.00     Years: 30.00     Additional pack years: 0.00     Total pack years: 30.00     Types: Cigarettes     Start date: 1979     Quit date: 2009     Years since quittin.2   • Smokeless tobacco: Never   • Tobacco comments:     QUIT 10 YRS   Vaping Use   • Vaping Use: Never used   Substance and Sexual Activity   • Alcohol use: Yes     Comment: rarely uses ETOH/caffeine use   • Drug use: No   • Sexual activity: Yes     Partners: Male        Family History   Problem Relation Age of Onset   • Diabetes Mother    • Heart disease Mother    • Hyperlipidemia Mother    • Diabetes Father    • Heart disease Father    • Hyperlipidemia Father    • Heart disease Brother    • Hyperlipidemia Brother    • Hypertension Father    • Colon cancer Father    • Heart attack Father    • Emphysema Mother    •  "Heart disease Brother    • Heart attack Brother    • Heart disease Maternal Grandmother    • Heart disease Maternal Grandfather    • Heart disease Paternal Grandmother    • Heart disease Paternal Grandfather         Review of Systems   Constitutional:  Positive for fatigue. Negative for unexpected weight change.   HENT: Negative.     Respiratory:  Positive for chest tightness and shortness of breath.    Cardiovascular:  Positive for palpitations. Negative for chest pain.   Gastrointestinal: Negative.    Musculoskeletal: Negative.    Allergic/Immunologic: Negative.    Neurological:  Positive for weakness and light-headedness. Negative for syncope.   Hematological: Negative.    Psychiatric/Behavioral: Negative.            Objective    Vitals:    02/01/24 0821   BP: 122/71   Pulse: 84   Resp: 18   Temp: 98.7 °F (37.1 °C)   TempSrc: Temporal   SpO2: 98%   Weight: 81.8 kg (180 lb 4.8 oz)   Height: 155 cm (61.02\")  Comment: new ht   PainSc: 0-No pain         2/1/2024     8:18 AM   Current Status   ECOG score 0       Physical Exam    CONSTITUTIONAL: pleasant well-developed adult woman  HEENT: no icterus, no thrush, moist membranes  LYMPH: no cervical or supraclavicular lad  CV: RRR, S1S2, no murmur  RESP: cta bilat, no wheezing, no rales  GI: soft, non-tender, no splenomegaly, +bs  MUSC: no edema, normal gait  NEURO: alert and oriented x3, normal strength  PSYCH: normal mood and affect      RECENT LABS:  Hematology WBC   Date Value Ref Range Status   02/01/2024 10.11 3.40 - 10.80 10*3/mm3 Final   01/29/2024 9.81 3.40 - 10.80 10*3/mm3 Final   01/24/2023 8.27 4.5 - 11.0 10*3/uL Final     RBC   Date Value Ref Range Status   02/01/2024 3.94 3.77 - 5.28 10*6/mm3 Final   01/29/2024 3.57 (L) 3.77 - 5.28 10*6/mm3 Final   01/24/2023 5.23 (H) 4.0 - 5.2 10*6/uL Final     Hemoglobin   Date Value Ref Range Status   02/01/2024 8.3 (L) 12.0 - 15.9 g/dL Final   01/24/2023 13.2 12.0 - 16.0 g/dL Final     Hematocrit   Date Value Ref Range " Status   02/01/2024 30.3 (L) 34.0 - 46.6 % Final   01/24/2023 42.8 36.0 - 46.0 % Final     Platelets   Date Value Ref Range Status   02/01/2024 272 140 - 450 10*3/mm3 Final   01/24/2023 378 140 - 440 10*3/uL Final        Ferritin 42/sat 4%    Assessment & Plan    *Severe iron deficiency anemia  The patient has prior history of iron deficiency anemia October 2022 requiring PRBC and IV iron during hospitalization  EGD/colonoscopy October 2022 showed nonbleeding colonic angioectasias  Recent hemoglobin 7.2 with MCV 72.8 and iron sat 4% consistent with severe iron deficiency despite taking daily iron tablet OTC    *Case complicated by need of chronic anticoagulation with Xarelto and antiplatelet therapy with Plavix for ischemic cardiomyopathy/CAD/prior stroke    Hematology plan/recommendations:  Given the lack of response to oral iron I recommended IV iron replacement with Venofer 300 mg x 3 doses followed by repeat CBC ferritin iron profile 2 months to see if adequately replaced  We will check her CBC the third dose of IV iron  Patient needs repeat GI assessment as most likely cause of recurrent iron deficiency in the setting of her anticoagulation is GI blood loss.  A referral to GI was made today.    Thank you for allowing me to participate in the care of this pleasant patient.

## 2024-02-01 NOTE — PROGRESS NOTES
Subjective     REASON FOR CONSULTATION:  anemia  Provide an opinion on any further workup or treatment                             REQUESTING PHYSICIAN:  Douglas    RECORDS OBTAINED:  Records of the patients history including those obtained from the referring provider were reviewed and summarized in detail.    History of Present Illness   This is a 63-year-old woman with type 2 diabetes, prior cerebrovascular accident, chronic bronchitis, obstructive sleep apnea, coronary artery disease, hypertension, hyperlipidemia, ischemic cardiomyopathy on anticoagulation with Xarelto and Plavix and AICD in place (most recent EF 40-45%).  The patient is referred for evaluation of anemia.  The patient has prior history of microcytic, hypochromic anemia in October 2022 requiring transfusion support.  Apparently she had endoscopy during hospitalization which showed some nonbleeding colonic angioectasias (per cardiology notes).  When checked on 1/24/2023 the patient had normal hemoglobin 13.2 with MCV 81.8.  A CBC with her primary care on 1/29/2024 showed substantial drop in the hemoglobin to 7.2 with MCV 72.8/MCHC 27.7 and iron studies showed a ferritin of 42 and iron saturation of 4% with elevated TIBC 592 consistent with severe iron deficiency.    The patient states she has been on oral iron over-the-counter for about 1 year.  She does not see blood in the stool including melena.    The patient complains of fatigue chest tightness dyspnea on exertion.    Past Medical History:   Diagnosis Date    Abdominal pain     Abnormal liver function test     Acute bronchitis     Benign essential hypertension     CAD (coronary artery disease)     CHF (congestive heart failure)     Colon polyp     Congestive heart disease     COPD (chronic obstructive pulmonary disease)     Cough     Diabetes     Diabetes mellitus     Diarrhea     Gastroenteritis     Health care maintenance     Hyperlipidemia     Hypertension     IFG (impaired fasting  glucose)     Ischemic cardiomyopathy     Myocardial infarction     SHELLIE (obstructive sleep apnea)     Sore throat     Type 2 diabetes mellitus     Vaginal yeast infection     Vitamin D deficiency         Past Surgical History:   Procedure Laterality Date    CARDIAC CATHETERIZATION      CARDIAC CATHETERIZATION N/A 01/06/2020    Procedure: Coronary angiography;  Surgeon: Oneida Saldivar MD;  Location:  [facility];  Service: Cardiovascular    CARDIAC CATHETERIZATION N/A 01/06/2020    Procedure: Left heart cath;  Surgeon: Oneida Saldivar MD;  Location:  [facility];  Service: Cardiovascular    CARDIAC CATHETERIZATION N/A 01/06/2020    Procedure: Left ventriculography;  Surgeon: Oneida Saldivar MD;  Location:  [facility];  Service: Cardiovascular    CARDIAC CATHETERIZATION N/A 01/06/2020    Procedure: Percutaneous Coronary Intervention;  Surgeon: Oneida Saldivar MD;  Location:  [facility];  Service: Cardiovascular    CARDIAC CATHETERIZATION N/A 01/06/2020    Procedure: Stent HUGO coronary;  Surgeon: Oneida Saldivar MD;  Location:  [facility];  Service: Cardiovascular    CARDIAC CATHETERIZATION  01/06/2020    Procedure: Functional Flow [place];  Surgeon: Oneida Saldivar MD;  Location:  [facility];  Service: Cardiovascular    CARDIAC CATHETERIZATION N/A 10/26/2020    Procedure: Coronary angiography;  Surgeon: Oneida Saldivar MD;  Location:  [facility];  Service: Cardiovascular;  Laterality: N/A;    CARDIAC CATHETERIZATION N/A 10/26/2020    Procedure: Left heart cath;  Surgeon: Oneida Saldivar MD;  Location:  [facility];  Service: Cardiovascular;  Laterality: N/A;    CARDIAC CATHETERIZATION N/A 10/26/2020    Procedure: Left ventriculography;  Surgeon: Oneida Saldivar MD;  Location:  [facility];  Service: Cardiovascular;  Laterality: N/A;    CARDIAC  CATHETERIZATION  10/26/2020    Procedure: Functional Flow [place];  Surgeon: Oneida Saldivar MD;  [facility];  Service: Cardiovascular;;    CARDIAC CATHETERIZATION N/A 07/19/2021    Procedure: Coronary angiography;  Surgeon: Oneida Saldivar MD;  Location[facility];  Service: Cardiovascular;  Laterality: N/A;    CARDIAC CATHETERIZATION N/A 07/19/2021    Procedure: Left heart cath;  Surgeon: Oneida Saldivar MD;  Location: [facility];  Service: Cardiovascular;  Laterality: N/A;    CARDIAC CATHETERIZATION N/A 07/19/2021    Procedure: Left ventriculography;  Surgeon: Oneida Saldivar MD;  Location: [facility];  Service: Cardiovascular;  Laterality: N/A;    CARDIAC CATHETERIZATION N/A 07/19/2021    Procedure: Percutaneous Coronary Intervention;  Surgeon: Oneida Saldivar MD;  Location: [facility] CATH INVASIVE LOCATION;  Service: Cardiovascular;  Laterality: N/A;    CARDIAC CATHETERIZATION N/A 07/19/2021    Procedure: Optical Coherent Tomography;  Surgeon: Oneida Saldivar MD;  Location: [facility] CATH INVASIVE LOCATION;  Service: Cardiovascular;  Laterality: N/A;    CARDIAC CATHETERIZATION N/A 07/19/2021    Procedure: Stent HUGO coronary;  Surgeon: Oneida Saldivar MD;  Location: [facility] CATH INVASIVE LOCATION;  Service: Cardiovascular;  Laterality: N/A;    CARDIAC CATHETERIZATION  07/19/2021    Procedure: RESTING FULL CYCLE RATIO;  Surgeon: Oneida Saldivar MD;  Location: [facility] CATH INVASIVE LOCATION;  Service: Cardiovascular;;  RFR    CARDIAC DEFIBRILLATOR PLACEMENT  2010    Medtronic dual chamber/Dr. Valentin    CARDIAC ELECTROPHYSIOLOGY PROCEDURE N/A 01/12/2018    Procedure: ICD DC generator change  medtronic;  Surgeon: Hany Ornelas MD;  Location: [facility] CATH INVASIVE LOCATION;  Service:     PACEMAKER IMPLANTATION      TUBAL ABDOMINAL LIGATION          Current Outpatient Medications on File Prior to Visit   Medication Sig Dispense  Refill    Accu-Chek FastClix Lancets misc Use to test blood sugar 4 times daily  E11.8 400 each 1    Accu-Chek Guide test strip USE 4 TIMES A DAY E11.8 300 each 2    albuterol sulfate  (90 Base) MCG/ACT inhaler Inhale 2 puffs Every 4 (Four) Hours As Needed for Wheezing for up to 180 days. 8 g 3    atorvastatin (LIPITOR) 80 MG tablet Take 1 tablet by mouth Daily. 90 tablet 3    Blood Glucose Monitoring Suppl (Accu-Chek Guide) w/Device kit Inject 1 Device under the skin into the appropriate area as directed Daily. 1 kit 0    carvedilol (COREG) 25 MG tablet TAKE 1 TABLET BY MOUTH TWICE A DAY WITH MEALS 180 tablet 2    Cholecalciferol (VITAMIN D3) 2000 UNITS capsule Take 1,000 Units by mouth Daily.      clopidogrel (PLAVIX) 75 MG tablet TAKE 1 TABLET BY MOUTH EVERY DAY 90 tablet 1    Insulin Pen Needle 32G X 4 MM misc USE 4 TIMES A  each 3    Multiple Vitamins-Minerals (MULTIVITAL PO) Take 1 tablet by mouth Daily.      nitroglycerin (Nitrostat) 0.4 MG SL tablet Place 1 tablet under the tongue Every 5 (Five) Minutes As Needed for Chest Pain. Take no more than 3 doses in 15 minutes. 30 tablet 11    Omega-3 Fatty Acids (FISH OIL) 1200 MG capsule capsule Take 1 capsule by mouth Daily With Breakfast.      potassium chloride 10 MEQ CR tablet Take 1 tablet by mouth Daily. Resume on 1/7/20 90 tablet 1    Semaglutide, 1 MG/DOSE, (Ozempic, 1 MG/DOSE,) 4 MG/3ML solution pen-injector Inject 1 mg under the skin into the appropriate area as directed 1 (One) Time Per Week. 9 mL 1    spironolactone (ALDACTONE) 25 MG tablet Take 1 tablet by mouth Daily. 90 tablet 3    traZODone (DESYREL) 50 MG tablet Take 1 tablet by mouth every night at bedtime.      vitamin B-12 (CYANOCOBALAMIN) 500 MCG tablet Take 1 tablet by mouth Daily.      Xarelto 20 MG tablet TAKE 1 TABLET BY MOUTH EVERY DAY WITH DINNER 90 tablet 1    ferrous sulfate 325 (65 FE) MG tablet One PO daily with food for anemia (Patient not taking: Reported on 2/1/2024)  30 tablet 5    potassium chloride 10 MEQ CR tablet TAKE 1 TABLET BY MOUTH DAILY (Patient not taking: Reported on 2024) 90 tablet 1    [DISCONTINUED] nystatin-triamcinolone (MYCOLOG II) 813127-6.1 UNIT/GM-% cream APPLY TOPICALLY 3 TIMES DAILY TO AFFECTED AREA(S).       No current facility-administered medications on file prior to visit.        ALLERGIES:  No Known Allergies     Social History     Socioeconomic History    Marital status:    Tobacco Use    Smoking status: Former     Packs/day: 1.00     Years: 30.00     Additional pack years: 0.00     Total pack years: 30.00     Types: Cigarettes     Start date: 1979     Quit date: 2009     Years since quittin.2    Smokeless tobacco: Never    Tobacco comments:     QUIT 10 YRS   Vaping Use    Vaping Use: Never used   Substance and Sexual Activity    Alcohol use: Yes     Comment: rarely uses ETOH/caffeine use    Drug use: No    Sexual activity: Yes     Partners: Male        Family History   Problem Relation Age of Onset    Diabetes Mother     Heart disease Mother     Hyperlipidemia Mother     Diabetes Father     Heart disease Father     Hyperlipidemia Father     Heart disease Brother     Hyperlipidemia Brother     Hypertension Father     Colon cancer Father     Heart attack Father     Emphysema Mother     Heart disease Brother     Heart attack Brother     Heart disease Maternal Grandmother     Heart disease Maternal Grandfather     Heart disease Paternal Grandmother     Heart disease Paternal Grandfather         Review of Systems   Constitutional:  Positive for fatigue. Negative for unexpected weight change.   HENT: Negative.     Respiratory:  Positive for chest tightness and shortness of breath.    Cardiovascular:  Positive for palpitations. Negative for chest pain.   Gastrointestinal: Negative.    Musculoskeletal: Negative.    Allergic/Immunologic: Negative.    Neurological:  Positive for weakness and light-headedness. Negative for syncope.  "  Hematological: Negative.    Psychiatric/Behavioral: Negative.            Objective     Vitals:    02/01/24 0821   BP: 122/71   Pulse: 84   Resp: 18   Temp: 98.7 °F (37.1 °C)   TempSrc: Temporal   SpO2: 98%   Weight: 81.8 kg (180 lb 4.8 oz)   Height: 155 cm (61.02\")  Comment: new ht   PainSc: 0-No pain         2/1/2024     8:18 AM   Current Status   ECOG score 0       Physical Exam    CONSTITUTIONAL: pleasant well-developed adult woman  HEENT: no icterus, no thrush, moist membranes  LYMPH: no cervical or supraclavicular lad  CV: RRR, S1S2, no murmur  RESP: cta bilat, no wheezing, no rales  GI: soft, non-tender, no splenomegaly, +bs  MUSC: no edema, normal gait  NEURO: alert and oriented x3, normal strength  PSYCH: normal mood and affect      RECENT LABS:  Hematology WBC   Date Value Ref Range Status   02/01/2024 10.11 3.40 - 10.80 10*3/mm3 Final   01/29/2024 9.81 3.40 - 10.80 10*3/mm3 Final   01/24/2023 8.27 4.5 - 11.0 10*3/uL Final     RBC   Date Value Ref Range Status   02/01/2024 3.94 3.77 - 5.28 10*6/mm3 Final   01/29/2024 3.57 (L) 3.77 - 5.28 10*6/mm3 Final   01/24/2023 5.23 (H) 4.0 - 5.2 10*6/uL Final     Hemoglobin   Date Value Ref Range Status   02/01/2024 8.3 (L) 12.0 - 15.9 g/dL Final   01/24/2023 13.2 12.0 - 16.0 g/dL Final     Hematocrit   Date Value Ref Range Status   02/01/2024 30.3 (L) 34.0 - 46.6 % Final   01/24/2023 42.8 36.0 - 46.0 % Final     Platelets   Date Value Ref Range Status   02/01/2024 272 140 - 450 10*3/mm3 Final   01/24/2023 378 140 - 440 10*3/uL Final        Ferritin 42/sat 4%    Assessment & Plan     *Severe iron deficiency anemia  The patient has prior history of iron deficiency anemia October 2022 requiring PRBC and IV iron during hospitalization  EGD/colonoscopy October 2022 showed nonbleeding colonic angioectasias  Recent hemoglobin 7.2 with MCV 72.8 and iron sat 4% consistent with severe iron deficiency despite taking daily iron tablet OTC    *Case complicated by need of chronic " anticoagulation with Xarelto and antiplatelet therapy with Plavix for ischemic cardiomyopathy/CAD/prior stroke    Hematology plan/recommendations:  Given the lack of response to oral iron I recommended IV iron replacement with Venofer 300 mg x 3 doses followed by repeat CBC ferritin iron profile 2 months to see if adequately replaced  We will check her CBC the third dose of IV iron  Patient needs repeat GI assessment as most likely cause of recurrent iron deficiency in the setting of her anticoagulation is GI blood loss.  A referral to GI was made today.    Thank you for allowing me to participate in the care of this pleasant patient.

## 2024-02-01 NOTE — PROGRESS NOTES
Subjective:     Encounter Date:02/02/2024      Patient ID: Daisy Dent is a 63 y.o. female.    Chief Complaint:  History of Present Illness    This is a 63-year-old female with ischemic cardiomyopathy, last ejection fraction of 40-45%, diabetes mellitus type 2, obstructive sleep apnea on CPAP, hypertension, status post dual-chamber AICD placement, dyslipidemia, status post left MCA stroke, coronary artery disease with a history of prior anterior myocardial infarction and LAD stent placement (in 7/2006, 10/2009, 11/2019, and 7/2021), who presents for follow-up.     At a follow-up visit in 3/2023 she was doing well.  She denied any issues with worsening anemia despite the fact that she was on clopidogrel and rivaroxaban.  She reported improvement in her chest pain with taking the isosorbide in the evening.    She was last seen in the office by KEYONA Dumont in 10/2023.  She reports she was having more issues with nosebleeds.  She had been having episodes of chest tightness that felt like heartburn that improved with taking Tums.  The symptoms were different from her prior anginal symptoms.  It was recommended that she monitor the nosebleeds and otherwise no changes were made to her management.    Today she presents for routine follow-up.  She reports that about 3 weeks ago she been having more issues with fatigue, shortness of breath with exertion, palpitations at rest and activity, and chest tightness both at rest and with activity.  She underwent lab work around this time which shows significant anemia with a hemoglobin of 7.2.  She is subsequently evaluated by Dr. Hood and was found to have significant iron deficiency anemia.  Since she was unresponsive to oral iron he recommended IV Venofer for infusions.  She is also been referred back to GI.    She is back on the oral iron with some improvement in her symptoms.  The chest discomfort and fatigue has improved some.  She continues to have some  palpitations and shortness of breath.  She has lost about 40 pounds in part due to Ozempic use but also her appetite has declined since the onset of her recent symptoms.     Prior History:  I began following the patient in 11/2015 when she presented to establish care.  Prior to that she was followed by Dr. Ayala with [facility].  Her prior cardiac history includes a non-ST elevation MI and 7/2006 at which time she received a Cypher 3.0 x 23 mm stent to her mid LAD.  She then presented in 10/2009 with a late in-stent thrombosis that required repeat stenting of her mid LAD in addition to a second drug-eluting stent placement of her left circumflex artery.  Following that she had a repeat cardiac catheterization in 2010 that was reportedly unremarkable.  Following her myocardial infarction in 2009 she developed ischemic myopathy with an EF of around 25% and ultimately underwent AICD placement and 7/2010.  At some point in the course of her follow-ups her left ventricular function improved with an ejection fraction of 40-45%.  A stress test showed evidence of a large anterior infarct and minimal elva-infarct ischemia.     In her follow-ups she has done fairly well recently when she reported more fatigued than normal and episodes of chest discomfort that occurred while she was exercising on the treadmill.  She underwent  an echocardiogram in 3/2017 that showed an ejection fraction of 40-45%.  Due to continued symptoms we went ahead and proceeded with a PET stress test which was performed in 8/2017 that showed medium sized anterior wall infarct with mild elva-infarct ischemia and a post stress ejection fraction of 58%.      In 11/2017 her AICD was noted to be at [facility].  She subsequently underwent generator replacement on 1/12/2018 with Dr. Ornelas. When I saw her back in follow up in 9/2018 she reported that she denied any new or worsening symptoms.       She was admitted and 12/2018 after she presented with  aphasia.  On arrival to the hospital she underwent a CT of the head that showed no acute stroke but a perfusion scan showed a large perfusion mismatch.  A CTA showed evidence of a left MCA thrombus.  She was given TPA at this time with improvement in her symptoms.  Neurology felt that her stroke was likely embolic.  She underwent a repeat echocardiogram during her admission that showed moderately depressed left ventricular systolic function with an EF of 30%, grade 1 diastolic dysfunction, and no significant valvular disease.  Based on the likelihood that this was an embolic stroke I opted to go ahead and recommend anticoagulation.  She was subsequently started on rivaroxaban 20 mg a day.  Her aspirin was stopped and she was continued on clopidogrel.     In 11/2019 she reported a couple weeks of episodes of substernal chest tightness lasting 1 to 2 minutes and dyspnea on exertion.  She reported that the symptoms were similar to what she had at the time of her prior myocardial infarctions but reports that its much milder than what she experienced at that time.    Following that office visit she underwent a stress test that showed evidence of a medium anterior infarct with moderate elva-infarct ischemia.  The findings were similar to prior stress test however the amount of elva-infarct ischemia had impaired to increase.  We initially tried to treat her medically however she continued to have recurrent symptoms so we opted to proceed with a cardiac catheterization.  She presented to the hospital on 1/6/2020 for the procedure.  She was found to have severe in-stent restenosis of her proximal LAD stent and ultimately underwent repeat drug-eluting stent placement of the in-stent restenosis.  She was also noted to have moderate nonobstructive disease of the first obtuse marginal branch for which she underwent FFR evaluation which was normal at 0.96.     In follow-up she had some mild episodes of chest discomfort that we  decided to monitor.  She then returned for urgent follow-up on 10/22/2020 at which time she reported more severe episodes of chest discomfort that were occurring primarily at rest.  Due to the severity of her symptoms she was quite concerned and we decided to proceed with a cardiac catheterization.  This was performed on 10/26/2020 and showed stable moderate nonobstructive coronary artery disease.     She reported increasing frequency of chest tightness that primarily occurred at night and woke her up from sleep starting in 5/2021.  She reported this was similar to her prior anginal symptoms.  I started her on isosorbide mononitrate 30 mg daily but she is unable to tolerate it due to the onset of severe headaches.  When she presented back for follow-up in 7/2021 she was continuing to have episodes of nocturnal discomfort that had progressed.  At that point I recommended proceeding with a repeat cardiac catheterization.      She presented for cardiac catheterization on 7/19/2021.  This showed 90% proximal in-stent restenosis and she underwent repeat drug-eluting stent placement using a 3.0 x 12 mm stent.  She was also noted to have moderate nonobstructive disease of the first obtuse marginal branch with a normal RFR.     She was seen back in the office by KEYONA Demarco on 7/30/2021 at which time she was feeling better.   It was noted around the time that she had mildly elevated liver enzymes which were unchanged compared to prior evaluations.  Was also noted that her last lipid panel on 7/7/2021 showed that her cholesterol was poorly controlled with an LDL of around 160 despite being on high-dose atorvastatin.    In 12/2021 she reported improvement in her symptoms overall.  However in follow-up in 4/2022 she reported that she was starting to have recurrent episodes of chest tightness they are occurring about twice a month and lasting for 30 minutes at a time.  Again they usually occurred after laying down to  sleep at night.  Symptoms are similar to what she experienced before her last PCI but not as frequent or as intense.  In 2/2022 she had a routine device interrogation that showed a 19 beat episode of ventricular tachycardia.  Repeat in device interrogation in 4/2022 showed no further episodes.  Since her symptoms were still fairly infrequent we opted to monitor her symptoms before considering any further work-up.  When I saw her back in follow-up in 8/2022 she was having symptoms about 3 times a week lasting 5 to 10 minutes at a time.  I recommended that she start taking her isosorbide mononitrate in the evening.       She was hospitalized in 10/2022 with anemia with a hemoglobin of 7.1.  Her Hemoccult stool was positive.  However endoscopy was unremarkable except for small area of nonbleeding colonic angiectasia's.  Her clopidogrel and rivaroxaban were resumed.  She saw KEYONA Calix in follow-up in 11/2022.  At the time of that visit she reported improvement in her symptoms.    Review of Systems   Constitutional: Positive for malaise/fatigue.   HENT:  Negative for hearing loss, hoarse voice, nosebleeds and sore throat.    Eyes:  Negative for pain.   Cardiovascular:  Positive for chest pain, dyspnea on exertion and palpitations. Negative for claudication, cyanosis, irregular heartbeat, leg swelling, near-syncope, orthopnea, paroxysmal nocturnal dyspnea and syncope.   Respiratory:  Negative for shortness of breath and snoring.    Endocrine: Negative for cold intolerance, heat intolerance, polydipsia, polyphagia and polyuria.   Skin:  Negative for itching and rash.   Musculoskeletal:  Negative for arthritis, falls, joint pain, joint swelling, muscle cramps, muscle weakness and myalgias.   Gastrointestinal:  Negative for constipation, diarrhea, dysphagia, heartburn, hematemesis, hematochezia, melena, nausea and vomiting.   Genitourinary:  Negative for frequency, hematuria and hesitancy.   Neurological:  Positive  for light-headedness. Negative for excessive daytime sleepiness, dizziness, headaches, numbness and weakness.   Psychiatric/Behavioral:  Negative for depression. The patient is not nervous/anxious.          Current Outpatient Medications:     Accu-Chek FastClix Lancets misc, Use to test blood sugar 4 times daily  E11.8, Disp: 400 each, Rfl: 1    Accu-Chek Guide test strip, USE 4 TIMES A DAY E11.8, Disp: 300 each, Rfl: 2    albuterol sulfate  (90 Base) MCG/ACT inhaler, Inhale 2 puffs Every 4 (Four) Hours As Needed for Wheezing for up to 180 days., Disp: 8 g, Rfl: 3    atorvastatin (LIPITOR) 80 MG tablet, Take 1 tablet by mouth Daily., Disp: 90 tablet, Rfl: 3    Blood Glucose Monitoring Suppl (Accu-Chek Guide) w/Device kit, Inject 1 Device under the skin into the appropriate area as directed Daily., Disp: 1 kit, Rfl: 0    carvedilol (COREG) 25 MG tablet, TAKE 1 TABLET BY MOUTH TWICE A DAY WITH MEALS, Disp: 180 tablet, Rfl: 2    Cholecalciferol (VITAMIN D3) 2000 UNITS capsule, Take 1,000 Units by mouth Daily., Disp: , Rfl:     clopidogrel (PLAVIX) 75 MG tablet, TAKE 1 TABLET BY MOUTH EVERY DAY, Disp: 90 tablet, Rfl: 1    ferrous sulfate 325 (65 FE) MG tablet, One PO daily with food for anemia, Disp: 30 tablet, Rfl: 5    Insulin Pen Needle 32G X 4 MM misc, USE 4 TIMES A DAY, Disp: 400 each, Rfl: 3    Multiple Vitamins-Minerals (MULTIVITAL PO), Take 1 tablet by mouth Daily., Disp: , Rfl:     nitroglycerin (Nitrostat) 0.4 MG SL tablet, Place 1 tablet under the tongue Every 5 (Five) Minutes As Needed for Chest Pain. Take no more than 3 doses in 15 minutes., Disp: 30 tablet, Rfl: 11    Omega-3 Fatty Acids (FISH OIL) 1200 MG capsule capsule, Take 1 capsule by mouth Daily With Breakfast., Disp: , Rfl:     potassium chloride 10 MEQ CR tablet, Take 1 tablet by mouth Daily. Resume on 1/7/20, Disp: 90 tablet, Rfl: 1    potassium chloride 10 MEQ CR tablet, TAKE 1 TABLET BY MOUTH DAILY, Disp: 90 tablet, Rfl: 1     Semaglutide, 1 MG/DOSE, (Ozempic, 1 MG/DOSE,) 4 MG/3ML solution pen-injector, Inject 1 mg under the skin into the appropriate area as directed 1 (One) Time Per Week., Disp: 9 mL, Rfl: 1    spironolactone (ALDACTONE) 25 MG tablet, Take 1 tablet by mouth Daily., Disp: 90 tablet, Rfl: 3    traZODone (DESYREL) 50 MG tablet, Take 1 tablet by mouth every night at bedtime., Disp: , Rfl:     vitamin B-12 (CYANOCOBALAMIN) 500 MCG tablet, Take 1 tablet by mouth Daily., Disp: , Rfl:     Xarelto 20 MG tablet, TAKE 1 TABLET BY MOUTH EVERY DAY WITH DINNER, Disp: 90 tablet, Rfl: 1    Past Medical History:   Diagnosis Date    Abdominal pain     Abnormal liver function test     Acute bronchitis     Benign essential hypertension     CAD (coronary artery disease)     CHF (congestive heart failure)     Colon polyp     Congestive heart disease     COPD (chronic obstructive pulmonary disease)     Cough     Diabetes     Diabetes mellitus     Diarrhea     Gastroenteritis     Health care maintenance     Hyperlipidemia     Hypertension     IFG (impaired fasting glucose)     Ischemic cardiomyopathy     Myocardial infarction     SHELLIE (obstructive sleep apnea)     Sore throat     Type 2 diabetes mellitus     Vaginal yeast infection     Vitamin D deficiency        Past Surgical History:   Procedure Laterality Date    CARDIAC CATHETERIZATION      CARDIAC CATHETERIZATION N/A 01/06/2020    Procedure: Coronary angiography;  Surgeon: Oneida Saldivar MD;  Location: [facility] CATH INVASIVE LOCATION;  Service: Cardiovascular    CARDIAC CATHETERIZATION N/A 01/06/2020    Procedure: Left heart cath;  Surgeon: Oneida Saldivar MD;  Location: [facility] CATH INVASIVE LOCATION;  Service: Cardiovascular    CARDIAC CATHETERIZATION N/A 01/06/2020    Procedure: Left ventriculography;  Surgeon: Oneida Saldivar MD;  Location:  [facility] CATH INVASIVE LOCATION;  Service: Cardiovascular    CARDIAC CATHETERIZATION N/A 01/06/2020    Procedure: Percutaneous Coronary Intervention;   Surgeon: Oneida Saldivar MD;  Location: [facility] CATH INVASIVE LOCATION;  Service: Cardiovascular    CARDIAC CATHETERIZATION N/A 01/06/2020    Procedure: Stent HUGO coronary;  Surgeon: Oneida Saldivar MD;  Location:  [facility] CATH INVASIVE LOCATION;  Service: Cardiovascular    CARDIAC CATHETERIZATION  01/06/2020    Procedure: Functional Flow [place];  Surgeon: Oneida Saldivar MD;  Location:  [facility] CATH INVASIVE LOCATION;  Service: Cardiovascular    CARDIAC CATHETERIZATION N/A 10/26/2020    Procedure: Coronary angiography;  Surgeon: Oneida Saldivar MD;  Location:  [facility] CATH INVASIVE LOCATION;  Service: Cardiovascular;  Laterality: N/A;    CARDIAC CATHETERIZATION N/A 10/26/2020    Procedure: Left heart cath;  Surgeon: Oneida Saldivar MD;  Location: [facility] CATH INVASIVE LOCATION;  Service: Cardiovascular;  Laterality: N/A;    CARDIAC CATHETERIZATION N/A 10/26/2020    Procedure: Left ventriculography;  Surgeon: Oneida Saldivar MD;  Location: [facility] CATH INVASIVE LOCATION;  Service: Cardiovascular;  Laterality: N/A;    CARDIAC CATHETERIZATION  10/26/2020    Procedure: Functional Flow [place];  Surgeon: Oneida Saldivar MD;  Location: [facility] CATH INVASIVE LOCATION;  Service: Cardiovascular;;    CARDIAC CATHETERIZATION N/A 07/19/2021    Procedure: Coronary angiography;  Surgeon: Oneida Saldivar MD;  Location: [facility] CATH INVASIVE LOCATION;  Service: Cardiovascular;  Laterality: N/A;    CARDIAC CATHETERIZATION N/A 07/19/2021    Procedure: Left heart cath;  Surgeon: Oneida Saldivar MD;  Location: [facility] CATH INVASIVE LOCATION;  Service: Cardiovascular;  Laterality: N/A;    CARDIAC CATHETERIZATION N/A 07/19/2021    Procedure: Left ventriculography;  Surgeon: Oneida Saldivar MD;  Location: [facility] CATH INVASIVE LOCATION;  Service: Cardiovascular;  Laterality: N/A;    CARDIAC CATHETERIZATION N/A 07/19/2021    Procedure: Percutaneous Coronary Intervention;  Surgeon: Oneida Saldivar MD;  Location: [facility] CATH INVASIVE  LOCATION;  Service: Cardiovascular;  Laterality: N/A;    CARDIAC CATHETERIZATION N/A 2021    Procedure: Optical Coherent Tomography;  Surgeon: Oneida Saldivar MD;  Location:  [facility] CATH INVASIVE LOCATION;  Service: Cardiovascular;  Laterality: N/A;    CARDIAC CATHETERIZATION N/A 2021    Procedure: Stent HUGO coronary;  Surgeon: Oneida Saldivar MD;  Location:  [facility] CATH INVASIVE LOCATION;  Service: Cardiovascular;  Laterality: N/A;    CARDIAC CATHETERIZATION  2021    Procedure: RESTING FULL CYCLE RATIO;  Surgeon: Oneida Saldivar MD;  Location:  [facility] CATH INVASIVE LOCATION;  Service: Cardiovascular;;  RFR    CARDIAC DEFIBRILLATOR PLACEMENT      Medtronic dual chamber/Dr. Valentin    CARDIAC ELECTROPHYSIOLOGY PROCEDURE N/A 2018    Procedure: ICD DC generator change  medtronic;  Surgeon: aHny Ornelas MD;  Location:  [facility] CATH INVASIVE LOCATION;  Service:     PACEMAKER IMPLANTATION      TUBAL ABDOMINAL LIGATION         Family History   Problem Relation Age of Onset    Diabetes Mother     Heart disease Mother     Hyperlipidemia Mother     Diabetes Father     Heart disease Father     Hyperlipidemia Father     Heart disease Brother     Hyperlipidemia Brother     Hypertension Father     Colon cancer Father     Heart attack Father     Emphysema Mother     Heart disease Brother     Heart attack Brother     Heart disease Maternal Grandmother     Heart disease Maternal Grandfather     Heart disease Paternal Grandmother     Heart disease Paternal Grandfather        Social History     Tobacco Use    Smoking status: Former     Packs/day: 1.00     Years: 30.00     Additional pack years: 0.00     Total pack years: 30.00     Types: Cigarettes     Start date: 1979     Quit date: 2009     Years since quittin.2    Smokeless tobacco: Never    Tobacco comments:     QUIT 10 YRS   Vaping Use    Vaping Use: Never used   Substance Use Topics    Alcohol use: Yes     Comment: rarely uses  "ETOH/caffeine use    Drug use: No         ECG 12 Lead    Date/Time: 2/2/2024 12:42 PM  Performed by: Oneida Saldivar MD    Authorized by: Oneida Saldivar MD  Comparison: compared with previous ECG   Similar to previous ECG  Rhythm: sinus rhythm  Q waves: V1, V2 and V3               Objective:     Visit Vitals  /70 (BP Location: Left arm, Patient Position: Sitting, Cuff Size: Adult)   Pulse 89   Ht 152.4 cm (60\")   Wt 81.9 kg (180 lb 9.6 oz)   SpO2 98%   BMI 35.27 kg/m²         Constitutional:       Appearance: Normal appearance. Well-developed.   Eyes:      General: Lids are normal.      Conjunctiva/sclera: Conjunctivae normal.      Pupils: Pupils are equal, round, and reactive to light.   HENT:      Head: Normocephalic and atraumatic.   Neck:      Vascular: No carotid bruit or JVD.      Lymphadenopathy: No cervical adenopathy.   Pulmonary:      Effort: Pulmonary effort is normal.      Breath sounds: Normal breath sounds.   Cardiovascular:      Normal rate. Regular rhythm.      No gallop.    Pulses:     Radial: 2+ bilaterally.  Edema:     Peripheral edema absent.   Abdominal:      Palpations: Abdomen is soft.   Musculoskeletal:      Cervical back: Full passive range of motion without pain, normal range of motion and neck supple. Skin:     General: Skin is warm and dry.   Neurological:      Mental Status: Alert and oriented to person, place, and time.             Assessment:          Diagnosis Plan   1. Chronic coronary artery disease        2. Ischemic cardiomyopathy        3. History of anterior myocardial infarction         4. ICD (implantable cardioverter-defibrillator) in place        5. Mixed hyperlipidemia        6. S/P coronary artery stent placement        7. Type 2 diabetes mellitus with complication        8. Obstructive sleep apnea syndrome        9. Cerebrovascular accident (CVA) due to embolism of left middle cerebral artery               Plan:       1.  Fatigue/dyspnea/chest pain/palpitations.  " Her symptoms are most likely due to her significant anemia.  She has had some improvement in her symptoms with improvement in her hemoglobin.    2.  Recurrent iron deficiency anemia.  She had an issue with this in 10/2022 that improved and was stable for the last year.  Now with recurrence she is to undergo iron infusions per hematology.  GI referral also planned.  No apparent bleeding at this point.  She remains on clopidogrel due to recurrent in-stent stenosis/thrombosis and on and rivaroxaban due to embolic stroke.  For now would like to continue both of these but may need to hold 1 or both depending on the outcome of her treatment and the results of her workup.  3.  Coronary artery disease.  Again suspect her symptoms are due to her iron deficiency anemia.  EKG shows no acute changes.  Continue current management and monitor her closely for now.  4.  Ischemic cardiomyopathy.  On guideline directed management with carvedilol, spironolactone.  Blood pressures are too low for an ACE or an ARB at this point.  No evidence of volume overload.  5.  Diabetes mellitus type 2  6.  Obstructive sleep apnea  7.  History of embolic stroke.  Never proved to have atrial fibrillation but she has been treated with anticoagulation with rivaroxaban since.  8.  ICD.  Last device interrogation and 11/2023 showed normal function and no events.    Will plan on seeing the patient back again in 3 months.

## 2024-02-02 ENCOUNTER — PATIENT ROUNDING (BHMG ONLY) (OUTPATIENT)
Dept: FAMILY MEDICINE CLINIC | Facility: CLINIC | Age: 64
End: 2024-02-02
Payer: COMMERCIAL

## 2024-02-02 ENCOUNTER — OFFICE VISIT (OUTPATIENT)
Age: 64
End: 2024-02-02
Payer: COMMERCIAL

## 2024-02-02 VITALS
HEART RATE: 89 BPM | SYSTOLIC BLOOD PRESSURE: 125 MMHG | WEIGHT: 180.6 LBS | DIASTOLIC BLOOD PRESSURE: 70 MMHG | HEIGHT: 60 IN | OXYGEN SATURATION: 98 % | BODY MASS INDEX: 35.46 KG/M2

## 2024-02-02 DIAGNOSIS — Z95.5 S/P CORONARY ARTERY STENT PLACEMENT: ICD-10-CM

## 2024-02-02 DIAGNOSIS — I21.09 ACUTE MYOCARDIAL INFARCTION OF ANTERIOR WALL: ICD-10-CM

## 2024-02-02 DIAGNOSIS — I25.10 CHRONIC CORONARY ARTERY DISEASE: Primary | ICD-10-CM

## 2024-02-02 DIAGNOSIS — Z95.810 ICD (IMPLANTABLE CARDIOVERTER-DEFIBRILLATOR) IN PLACE: ICD-10-CM

## 2024-02-02 DIAGNOSIS — E11.8 TYPE 2 DIABETES MELLITUS WITH COMPLICATION: ICD-10-CM

## 2024-02-02 DIAGNOSIS — G47.33 OBSTRUCTIVE SLEEP APNEA SYNDROME: ICD-10-CM

## 2024-02-02 DIAGNOSIS — E78.2 MIXED HYPERLIPIDEMIA: ICD-10-CM

## 2024-02-02 DIAGNOSIS — I25.5 ISCHEMIC CARDIOMYOPATHY: ICD-10-CM

## 2024-02-02 DIAGNOSIS — I63.412 CEREBROVASCULAR ACCIDENT (CVA) DUE TO EMBOLISM OF LEFT MIDDLE CEREBRAL ARTERY: ICD-10-CM

## 2024-02-02 PROBLEM — I50.20 CONGESTIVE HEART FAILURE WITH LEFT VENTRICULAR SYSTOLIC DYSFUNCTION: Status: RESOLVED | Noted: 2017-03-07 | Resolved: 2024-02-02

## 2024-02-02 PROCEDURE — 93000 ELECTROCARDIOGRAM COMPLETE: CPT | Performed by: INTERNAL MEDICINE

## 2024-02-02 PROCEDURE — 99214 OFFICE O/P EST MOD 30 MIN: CPT | Performed by: INTERNAL MEDICINE

## 2024-02-02 NOTE — PROGRESS NOTES
A My-Chart message has been sent to the patient for PATIENT ROUNDING with [facility] [place]

## 2024-02-02 NOTE — LETTER
February 2, 2024       No Recipients    Patient: Daisy Dent   YOB: 1960   Date of Visit: 2/2/2024       Dear KEYONA Taylor,    Daisy Dent was in my office today. Below are the relevant portions of my assessment and plan of care.           If you have questions, please do not hesitate to call me. I look forward to following Levyyvonzechariahaysha along with you.         Sincerely,        Oneida Saldivar MD        CC:   No Recipients

## 2024-02-05 ENCOUNTER — TELEPHONE (OUTPATIENT)
Dept: ONCOLOGY | Facility: CLINIC | Age: 64
End: 2024-02-05
Payer: COMMERCIAL

## 2024-02-05 NOTE — TELEPHONE ENCOUNTER
Relay     LM for patient to call me at [phone] regarding appt on 2/8/24-[facility]

## 2024-02-08 ENCOUNTER — INFUSION (OUTPATIENT)
Dept: ONCOLOGY | Facility: HOSPITAL | Age: 64
End: 2024-02-08
Payer: COMMERCIAL

## 2024-02-08 VITALS
TEMPERATURE: 98.4 F | DIASTOLIC BLOOD PRESSURE: 71 MMHG | BODY MASS INDEX: 34.96 KG/M2 | HEART RATE: 73 BPM | RESPIRATION RATE: 16 BRPM | WEIGHT: 179 LBS | SYSTOLIC BLOOD PRESSURE: 116 MMHG | OXYGEN SATURATION: 98 %

## 2024-02-08 DIAGNOSIS — D50.9 IRON DEFICIENCY ANEMIA, UNSPECIFIED IRON DEFICIENCY ANEMIA TYPE: Primary | ICD-10-CM

## 2024-02-08 PROCEDURE — 25010000002 IRON SUCROSE PER 1 MG: Performed by: INTERNAL MEDICINE

## 2024-02-08 PROCEDURE — 96365 THER/PROPH/DIAG IV INF INIT: CPT

## 2024-02-08 PROCEDURE — 63710000001 DIPHENHYDRAMINE PER 50 MG: Performed by: INTERNAL MEDICINE

## 2024-02-08 PROCEDURE — 25810000003 SODIUM CHLORIDE 0.9 % SOLUTION: Performed by: INTERNAL MEDICINE

## 2024-02-08 PROCEDURE — 96366 THER/PROPH/DIAG IV INF ADDON: CPT

## 2024-02-08 RX ORDER — DIPHENHYDRAMINE HCL 25 MG
25 CAPSULE ORAL ONCE
Status: COMPLETED | OUTPATIENT
Start: 2024-02-08 | End: 2024-02-08

## 2024-02-08 RX ORDER — ACETAMINOPHEN 325 MG/1
650 TABLET ORAL ONCE
Status: COMPLETED | OUTPATIENT
Start: 2024-02-08 | End: 2024-02-08

## 2024-02-08 RX ORDER — SODIUM CHLORIDE 9 MG/ML
20 INJECTION, SOLUTION INTRAVENOUS ONCE
Status: COMPLETED | OUTPATIENT
Start: 2024-02-08 | End: 2024-02-08

## 2024-02-08 RX ADMIN — SODIUM CHLORIDE 20 ML/HR: 9 INJECTION, SOLUTION INTRAVENOUS at 10:06

## 2024-02-08 RX ADMIN — ACETAMINOPHEN 650 MG: 325 TABLET ORAL at 10:10

## 2024-02-08 RX ADMIN — IRON SUCROSE 300 MG: 20 INJECTION, SOLUTION INTRAVENOUS at 10:41

## 2024-02-08 RX ADMIN — DIPHENHYDRAMINE HYDROCHLORIDE 25 MG: 25 CAPSULE ORAL at 10:10

## 2024-02-08 NOTE — NURSING NOTE
Pt tolerated first time iron infusion without complications. Instructed pt that she may take tylenol 650 mg and benadryl 25 mg prior to coming in for her next appt for venofer to save some time. Pt vu, instructed to call the office for any concerns or issues prior to next appt and discharged in stable condition.

## 2024-02-15 ENCOUNTER — INFUSION (OUTPATIENT)
Dept: ONCOLOGY | Facility: HOSPITAL | Age: 64
End: 2024-02-15
Payer: COMMERCIAL

## 2024-02-15 VITALS
RESPIRATION RATE: 16 BRPM | DIASTOLIC BLOOD PRESSURE: 66 MMHG | TEMPERATURE: 98 F | BODY MASS INDEX: 34.88 KG/M2 | OXYGEN SATURATION: 98 % | HEART RATE: 75 BPM | SYSTOLIC BLOOD PRESSURE: 107 MMHG | WEIGHT: 178.6 LBS

## 2024-02-15 DIAGNOSIS — D50.9 IRON DEFICIENCY ANEMIA, UNSPECIFIED IRON DEFICIENCY ANEMIA TYPE: Primary | ICD-10-CM

## 2024-02-15 PROCEDURE — 25810000003 SODIUM CHLORIDE 0.9 % SOLUTION: Performed by: INTERNAL MEDICINE

## 2024-02-15 PROCEDURE — 96366 THER/PROPH/DIAG IV INF ADDON: CPT

## 2024-02-15 PROCEDURE — 25010000002 IRON SUCROSE PER 1 MG: Performed by: INTERNAL MEDICINE

## 2024-02-15 PROCEDURE — 63710000001 DIPHENHYDRAMINE PER 50 MG: Performed by: INTERNAL MEDICINE

## 2024-02-15 PROCEDURE — 96365 THER/PROPH/DIAG IV INF INIT: CPT

## 2024-02-15 RX ORDER — DIPHENHYDRAMINE HCL 25 MG
25 CAPSULE ORAL ONCE
Status: COMPLETED | OUTPATIENT
Start: 2024-02-15 | End: 2024-02-15

## 2024-02-15 RX ORDER — ACETAMINOPHEN 325 MG/1
650 TABLET ORAL ONCE
Status: COMPLETED | OUTPATIENT
Start: 2024-02-15 | End: 2024-02-15

## 2024-02-15 RX ORDER — SODIUM CHLORIDE 9 MG/ML
20 INJECTION, SOLUTION INTRAVENOUS ONCE
Status: COMPLETED | OUTPATIENT
Start: 2024-02-15 | End: 2024-02-15

## 2024-02-15 RX ADMIN — SODIUM CHLORIDE 20 ML/HR: 9 INJECTION, SOLUTION INTRAVENOUS at 08:53

## 2024-02-15 RX ADMIN — SODIUM CHLORIDE 300 MG: 900 INJECTION, SOLUTION INTRAVENOUS at 08:53

## 2024-02-15 RX ADMIN — DIPHENHYDRAMINE HYDROCHLORIDE 25 MG: 25 CAPSULE ORAL at 08:23

## 2024-02-15 RX ADMIN — ACETAMINOPHEN 650 MG: 325 TABLET ORAL at 08:23

## 2024-02-22 ENCOUNTER — INFUSION (OUTPATIENT)
Dept: ONCOLOGY | Facility: HOSPITAL | Age: 64
End: 2024-02-22
Payer: COMMERCIAL

## 2024-02-22 ENCOUNTER — LAB (OUTPATIENT)
Dept: LAB | Facility: HOSPITAL | Age: 64
End: 2024-02-22
Payer: COMMERCIAL

## 2024-02-22 VITALS
BODY MASS INDEX: 34.57 KG/M2 | TEMPERATURE: 98 F | OXYGEN SATURATION: 96 % | SYSTOLIC BLOOD PRESSURE: 110 MMHG | DIASTOLIC BLOOD PRESSURE: 65 MMHG | HEART RATE: 67 BPM | WEIGHT: 177 LBS | RESPIRATION RATE: 16 BRPM

## 2024-02-22 DIAGNOSIS — D50.9 IRON DEFICIENCY ANEMIA, UNSPECIFIED IRON DEFICIENCY ANEMIA TYPE: ICD-10-CM

## 2024-02-22 DIAGNOSIS — D50.9 IRON DEFICIENCY ANEMIA, UNSPECIFIED IRON DEFICIENCY ANEMIA TYPE: Primary | ICD-10-CM

## 2024-02-22 LAB
BASOPHILS # BLD AUTO: 0.07 10*3/MM3 (ref 0–0.2)
BASOPHILS NFR BLD AUTO: 1 % (ref 0–1.5)
DEPRECATED RDW RBC AUTO: 68.9 FL (ref 37–54)
EOSINOPHIL # BLD AUTO: 0.11 10*3/MM3 (ref 0–0.4)
EOSINOPHIL NFR BLD AUTO: 1.6 % (ref 0.3–6.2)
ERYTHROCYTE [DISTWIDTH] IN BLOOD BY AUTOMATED COUNT: 24.2 % (ref 12.3–15.4)
HCT VFR BLD AUTO: 38.2 % (ref 34–46.6)
HGB BLD-MCNC: 11.4 G/DL (ref 12–15.9)
IMM GRANULOCYTES # BLD AUTO: 0.04 10*3/MM3 (ref 0–0.05)
IMM GRANULOCYTES NFR BLD AUTO: 0.6 % (ref 0–0.5)
LYMPHOCYTES # BLD AUTO: 1.91 10*3/MM3 (ref 0.7–3.1)
LYMPHOCYTES NFR BLD AUTO: 27.3 % (ref 19.6–45.3)
MCH RBC QN AUTO: 24.8 PG (ref 26.6–33)
MCHC RBC AUTO-ENTMCNC: 29.8 G/DL (ref 31.5–35.7)
MCV RBC AUTO: 83.2 FL (ref 79–97)
MONOCYTES # BLD AUTO: 0.68 10*3/MM3 (ref 0.1–0.9)
MONOCYTES NFR BLD AUTO: 9.7 % (ref 5–12)
NEUTROPHILS NFR BLD AUTO: 4.18 10*3/MM3 (ref 1.7–7)
NEUTROPHILS NFR BLD AUTO: 59.8 % (ref 42.7–76)
NRBC BLD AUTO-RTO: 0 /100 WBC (ref 0–0.2)
PLATELET # BLD AUTO: 325 10*3/MM3 (ref 140–450)
PMV BLD AUTO: 10.1 FL (ref 6–12)
RBC # BLD AUTO: 4.59 10*6/MM3 (ref 3.77–5.28)
WBC NRBC COR # BLD AUTO: 6.99 10*3/MM3 (ref 3.4–10.8)

## 2024-02-22 PROCEDURE — 25810000003 SODIUM CHLORIDE 0.9 % SOLUTION: Performed by: INTERNAL MEDICINE

## 2024-02-22 PROCEDURE — 25010000002 IRON SUCROSE PER 1 MG: Performed by: INTERNAL MEDICINE

## 2024-02-22 PROCEDURE — 63710000001 DIPHENHYDRAMINE PER 50 MG: Performed by: INTERNAL MEDICINE

## 2024-02-22 PROCEDURE — 96366 THER/PROPH/DIAG IV INF ADDON: CPT

## 2024-02-22 PROCEDURE — 96365 THER/PROPH/DIAG IV INF INIT: CPT

## 2024-02-22 PROCEDURE — 85025 COMPLETE CBC W/AUTO DIFF WBC: CPT

## 2024-02-22 PROCEDURE — 36415 COLL VENOUS BLD VENIPUNCTURE: CPT

## 2024-02-22 RX ORDER — ACETAMINOPHEN 325 MG/1
650 TABLET ORAL ONCE
Status: COMPLETED | OUTPATIENT
Start: 2024-02-22 | End: 2024-02-22

## 2024-02-22 RX ORDER — SODIUM CHLORIDE 9 MG/ML
20 INJECTION, SOLUTION INTRAVENOUS ONCE
Status: COMPLETED | OUTPATIENT
Start: 2024-02-22 | End: 2024-02-22

## 2024-02-22 RX ORDER — DIPHENHYDRAMINE HCL 25 MG
25 CAPSULE ORAL ONCE
Status: COMPLETED | OUTPATIENT
Start: 2024-02-22 | End: 2024-02-22

## 2024-02-22 RX ADMIN — SODIUM CHLORIDE 300 MG: 900 INJECTION, SOLUTION INTRAVENOUS at 08:44

## 2024-02-22 RX ADMIN — DIPHENHYDRAMINE HYDROCHLORIDE 25 MG: 25 CAPSULE ORAL at 08:26

## 2024-02-22 RX ADMIN — ACETAMINOPHEN 650 MG: 325 TABLET ORAL at 08:26

## 2024-02-22 RX ADMIN — SODIUM CHLORIDE 20 ML/HR: 9 INJECTION, SOLUTION INTRAVENOUS at 08:26

## 2024-03-04 DIAGNOSIS — E11.65 TYPE 2 DIABETES MELLITUS WITH HYPERGLYCEMIA, WITHOUT LONG-TERM CURRENT USE OF INSULIN: ICD-10-CM

## 2024-03-04 RX ORDER — SEMAGLUTIDE 1.34 MG/ML
1 INJECTION, SOLUTION SUBCUTANEOUS WEEKLY
Qty: 9 ML | Refills: 1 | OUTPATIENT
Start: 2024-03-04

## 2024-03-04 NOTE — TELEPHONE ENCOUNTER
Rx Refill Note  Requested Prescriptions     Pending Prescriptions Disp Refills    Ozempic, 1 MG/DOSE, 4 MG/3ML solution pen-injector [Pharmacy Med Name: OZEMPIC 1 MG/DOSE (4 MG/3 ML)] 9 mL 1     Sig: DIAL AND INJECT UNDER THE SKIN 1 MG WEEKLY      Last office visit with prescribing clinician: 7/19/2023   Last telemedicine visit with prescribing clinician: Visit date not found   Next office visit with prescribing clinician: Visit date not found                         Would you like a call back once the refill request has been completed: [] Yes [] No    If the office needs to give you a call back, can they leave a voicemail: [] Yes [] No    Mimi Delacruz MA  03/04/24, 09:21 EST

## 2024-03-07 DIAGNOSIS — E11.65 TYPE 2 DIABETES MELLITUS WITH HYPERGLYCEMIA, WITHOUT LONG-TERM CURRENT USE OF INSULIN: ICD-10-CM

## 2024-03-07 RX ORDER — SEMAGLUTIDE 1.34 MG/ML
1 INJECTION, SOLUTION SUBCUTANEOUS WEEKLY
Qty: 9 ML | Refills: 1 | OUTPATIENT
Start: 2024-03-07

## 2024-03-14 ENCOUNTER — TELEPHONE (OUTPATIENT)
Dept: FAMILY MEDICINE CLINIC | Facility: CLINIC | Age: 64
End: 2024-03-14
Payer: COMMERCIAL

## 2024-03-14 DIAGNOSIS — E11.65 TYPE 2 DIABETES MELLITUS WITH HYPERGLYCEMIA, WITHOUT LONG-TERM CURRENT USE OF INSULIN: ICD-10-CM

## 2024-03-14 RX ORDER — SEMAGLUTIDE 1.34 MG/ML
1 INJECTION, SOLUTION SUBCUTANEOUS WEEKLY
Qty: 9 ML | Refills: 1 | Status: SHIPPED | OUTPATIENT
Start: 2024-03-14

## 2024-03-14 NOTE — TELEPHONE ENCOUNTER
----- Message from Daisy Dent sent at 3/14/2024 11:03 AM EDT -----  Regarding: Ozempic  Contact: [phone]  [place] to [facility]

## 2024-03-15 DIAGNOSIS — I25.10 CORONARY ARTERY DISEASE INVOLVING NATIVE CORONARY ARTERY OF NATIVE HEART WITHOUT ANGINA PECTORIS: ICD-10-CM

## 2024-03-15 RX ORDER — NITROGLYCERIN 0.4 MG/1
0.4 TABLET SUBLINGUAL
Qty: 30 TABLET | Refills: 11 | Status: SHIPPED | OUTPATIENT
Start: 2024-03-15

## 2024-03-15 RX ORDER — CLOPIDOGREL BISULFATE 75 MG/1
75 TABLET ORAL DAILY
Qty: 90 TABLET | Refills: 1 | Status: SHIPPED | OUTPATIENT
Start: 2024-03-15

## 2024-03-15 RX ORDER — TRAZODONE HYDROCHLORIDE 50 MG/1
50 TABLET ORAL
Qty: 90 TABLET | Refills: 2 | Status: SHIPPED | OUTPATIENT
Start: 2024-03-15

## 2024-03-15 RX ORDER — CLOPIDOGREL BISULFATE 75 MG/1
75 TABLET ORAL DAILY
Qty: 90 TABLET | Refills: 1 | Status: CANCELLED | OUTPATIENT
Start: 2024-03-15

## 2024-03-15 RX ORDER — NITROGLYCERIN 0.4 MG/1
0.4 TABLET SUBLINGUAL
Qty: 30 TABLET | Refills: 11 | Status: CANCELLED | OUTPATIENT
Start: 2024-03-15

## 2024-03-19 RX ORDER — FUROSEMIDE 40 MG/1
40 TABLET ORAL DAILY
Qty: 90 TABLET | Refills: 1 | Status: SHIPPED | OUTPATIENT
Start: 2024-03-19

## 2024-03-25 ENCOUNTER — LAB (OUTPATIENT)
Dept: LAB | Facility: HOSPITAL | Age: 64
End: 2024-03-25
Payer: COMMERCIAL

## 2024-03-25 ENCOUNTER — OFFICE VISIT (OUTPATIENT)
Dept: ONCOLOGY | Facility: CLINIC | Age: 64
End: 2024-03-25
Payer: COMMERCIAL

## 2024-03-25 VITALS
OXYGEN SATURATION: 98 % | HEIGHT: 60 IN | WEIGHT: 178.5 LBS | RESPIRATION RATE: 16 BRPM | HEART RATE: 86 BPM | TEMPERATURE: 97.8 F | DIASTOLIC BLOOD PRESSURE: 74 MMHG | SYSTOLIC BLOOD PRESSURE: 131 MMHG | BODY MASS INDEX: 35.05 KG/M2

## 2024-03-25 DIAGNOSIS — D50.9 IRON DEFICIENCY ANEMIA, UNSPECIFIED IRON DEFICIENCY ANEMIA TYPE: ICD-10-CM

## 2024-03-25 DIAGNOSIS — D50.0 IRON DEFICIENCY ANEMIA DUE TO CHRONIC BLOOD LOSS: Primary | ICD-10-CM

## 2024-03-25 LAB
BASOPHILS # BLD AUTO: 0.04 10*3/MM3 (ref 0–0.2)
BASOPHILS NFR BLD AUTO: 0.5 % (ref 0–1.5)
DEPRECATED RDW RBC AUTO: 47.1 FL (ref 37–54)
EOSINOPHIL # BLD AUTO: 0.17 10*3/MM3 (ref 0–0.4)
EOSINOPHIL NFR BLD AUTO: 2.3 % (ref 0.3–6.2)
ERYTHROCYTE [DISTWIDTH] IN BLOOD BY AUTOMATED COUNT: 16.2 % (ref 12.3–15.4)
FERRITIN SERPL-MCNC: 129 NG/ML (ref 13–150)
HCT VFR BLD AUTO: 43.9 % (ref 34–46.6)
HGB BLD-MCNC: 13.7 G/DL (ref 12–15.9)
IMM GRANULOCYTES # BLD AUTO: 0.03 10*3/MM3 (ref 0–0.05)
IMM GRANULOCYTES NFR BLD AUTO: 0.4 % (ref 0–0.5)
IRON 24H UR-MRATE: 60 MCG/DL (ref 37–145)
IRON SATN MFR SERPL: 13 % (ref 20–50)
LYMPHOCYTES # BLD AUTO: 1.69 10*3/MM3 (ref 0.7–3.1)
LYMPHOCYTES NFR BLD AUTO: 22.4 % (ref 19.6–45.3)
MCH RBC QN AUTO: 26.8 PG (ref 26.6–33)
MCHC RBC AUTO-ENTMCNC: 31.2 G/DL (ref 31.5–35.7)
MCV RBC AUTO: 85.7 FL (ref 79–97)
MONOCYTES # BLD AUTO: 0.5 10*3/MM3 (ref 0.1–0.9)
MONOCYTES NFR BLD AUTO: 6.6 % (ref 5–12)
NEUTROPHILS NFR BLD AUTO: 5.11 10*3/MM3 (ref 1.7–7)
NEUTROPHILS NFR BLD AUTO: 67.8 % (ref 42.7–76)
NRBC BLD AUTO-RTO: 0 /100 WBC (ref 0–0.2)
PLATELET # BLD AUTO: 339 10*3/MM3 (ref 140–450)
PMV BLD AUTO: 10 FL (ref 6–12)
RBC # BLD AUTO: 5.12 10*6/MM3 (ref 3.77–5.28)
TIBC SERPL-MCNC: 459 MCG/DL (ref 298–536)
TRANSFERRIN SERPL-MCNC: 308 MG/DL (ref 200–360)
WBC NRBC COR # BLD AUTO: 7.54 10*3/MM3 (ref 3.4–10.8)

## 2024-03-25 PROCEDURE — 83540 ASSAY OF IRON: CPT

## 2024-03-25 PROCEDURE — 99213 OFFICE O/P EST LOW 20 MIN: CPT | Performed by: INTERNAL MEDICINE

## 2024-03-25 PROCEDURE — 85025 COMPLETE CBC W/AUTO DIFF WBC: CPT

## 2024-03-25 PROCEDURE — 84466 ASSAY OF TRANSFERRIN: CPT

## 2024-03-25 PROCEDURE — 36415 COLL VENOUS BLD VENIPUNCTURE: CPT

## 2024-03-25 PROCEDURE — 82728 ASSAY OF FERRITIN: CPT

## 2024-04-04 NOTE — PROGRESS NOTES
Subjective:     Encounter Date:04/05/2024      Patient ID: Daisy Dent is a 63 y.o. female.    Chief Complaint:  History of Present Illness    This is a 63-year-old female with ischemic cardiomyopathy, last ejection fraction of 40-45%, diabetes mellitus type 2, obstructive sleep apnea on CPAP, hypertension, status post dual-chamber AICD placement, dyslipidemia, status post left MCA stroke, coronary artery disease with a history of prior anterior myocardial infarction and LAD stent placement (in 7/2006, 10/2009, 11/2019, and 7/2021), who presents for follow-up.     At a follow-up visit in 3/2023 she was doing well.  She denied any issues with worsening anemia despite the fact that she was on clopidogrel and rivaroxaban.  She reported improvement in her chest pain with taking the isosorbide in the evening.     She was last seen in the office by KEYONA Dumont in 10/2023.  She reports she was having more issues with nosebleeds.  She had been having episodes of chest tightness that felt like heartburn that improved with taking Tums.  The symptoms were different from her prior anginal symptoms.  It was recommended that she monitor the nosebleeds and otherwise no changes were made to her management.     In 2/2024 she presented for routine follow-up.  About 3 weeks prior she began having more issues with fatigue, shortness of breath, palpitations at rest and with activity, and chest tightness.  She was found to be anemic with a hemoglobin of 7.2.  She was evaluated by Dr. Hood and was found to have significant iron deficiency anemia.  She was started on IV Venofer for infusions and referred to GI.  She was then started on oral iron.  She reported improvement in her symptoms by the time of that office visit including her chest discomfort and fatigue.  She still having palpitations and shortness of breath.  She had also lost about 40 pounds in part due to Ozempic use and also because her appetite had  declined because of her symptoms.  Did not make any changes in her management.  Opted to monitor symptoms for further improvement before considering any further cardiac workup.  She was last seen by Dr. Hood in 3/2024 at which time her hemoglobin was up to 13.7.  She is still awaiting GI appointment at that time.    She reports that she continues to have issues with dizziness.  This usually occurs with position changes sometimes rising from a sitting to standing position and sometimes when laying down.  This gives her a sensation of things spinning and can cause some balance issues.  1 such episode resulted in a fall and she ended up breaking some fingers on her left hand.  She continues to have episodes of chest tightness associated with palpitations that was occurring once or twice a week previously although this week she has not had any episodes.  She was hopeful that the symptoms would improve once her anemia improved.  Instead her symptoms remain although they have been stable and have not worsened.      Prior History:  I began following the patient in 11/2015 when she presented to establish care.  Prior to that she was followed by Dr. Ayala with [facility].  Her prior cardiac history includes a non-ST elevation MI and 7/2006 at which time she received a Cypher 3.0 x 23 mm stent to her mid LAD.  She then presented in 10/2009 with a late in-stent thrombosis that required repeat stenting of her mid LAD in addition to a second drug-eluting stent placement of her left circumflex artery.  Following that she had a repeat cardiac catheterization in 2010 that was reportedly unremarkable.  Following her myocardial infarction in 2009 she developed ischemic myopathy with an EF of around 25% and ultimately underwent AICD placement and 7/2010.  At some point in the course of her follow-ups her left ventricular function improved with an ejection fraction of 40-45%.  A stress test showed evidence of a large  anterior infarct and minimal elva-infarct ischemia.     In her follow-ups she has done fairly well recently when she reported more fatigued than normal and episodes of chest discomfort that occurred while she was exercising on the treadmill.  She underwent  an echocardiogram in 3/2017 that showed an ejection fraction of 40-45%.  Due to continued symptoms we went ahead and proceeded with a PET stress test which was performed in 8/2017 that showed medium sized anterior wall infarct with mild elva-infarct ischemia and a post stress ejection fraction of 58%.      In 11/2017 her AICD was noted to be at [facility].  She subsequently underwent generator replacement on 1/12/2018 with Dr. Ornelas. When I saw her back in follow up in 9/2018 she reported that she denied any new or worsening symptoms.       She was admitted and 12/2018 after she presented with aphasia.  On arrival to the hospital she underwent a CT of the head that showed no acute stroke but a perfusion scan showed a large perfusion mismatch.  A CTA showed evidence of a left MCA thrombus.  She was given TPA at this time with improvement in her symptoms.  Neurology felt that her stroke was likely embolic.  She underwent a repeat echocardiogram during her admission that showed moderately depressed left ventricular systolic function with an EF of 30%, grade 1 diastolic dysfunction, and no significant valvular disease.  Based on the likelihood that this was an embolic stroke I opted to go ahead and recommend anticoagulation.  She was subsequently started on rivaroxaban 20 mg a day.  Her aspirin was stopped and she was continued on clopidogrel.     In 11/2019 she reported a couple weeks of episodes of substernal chest tightness lasting 1 to 2 minutes and dyspnea on exertion.  She reported that the symptoms were similar to what she had at the time of her prior myocardial infarctions but reports that its much milder than what she experienced at that time.    Following that  office visit she underwent a stress test that showed evidence of a medium anterior infarct with moderate elva-infarct ischemia.  The findings were similar to prior stress test however the amount of elva-infarct ischemia had impaired to increase.  We initially tried to treat her medically however she continued to have recurrent symptoms so we opted to proceed with a cardiac catheterization.  She presented to the hospital on 1/6/2020 for the procedure.  She was found to have severe in-stent restenosis of her proximal LAD stent and ultimately underwent repeat drug-eluting stent placement of the in-stent restenosis.  She was also noted to have moderate nonobstructive disease of the first obtuse marginal branch for which she underwent FFR evaluation which was normal at 0.96.     In follow-up she had some mild episodes of chest discomfort that we decided to monitor.  She then returned for urgent follow-up on 10/22/2020 at which time she reported more severe episodes of chest discomfort that were occurring primarily at rest.  Due to the severity of her symptoms she was quite concerned and we decided to proceed with a cardiac catheterization.  This was performed on 10/26/2020 and showed stable moderate nonobstructive coronary artery disease.     She reported increasing frequency of chest tightness that primarily occurred at night and woke her up from sleep starting in 5/2021.  She reported this was similar to her prior anginal symptoms.  I started her on isosorbide mononitrate 30 mg daily but she is unable to tolerate it due to the onset of severe headaches.  When she presented back for follow-up in 7/2021 she was continuing to have episodes of nocturnal discomfort that had progressed.  At that point I recommended proceeding with a repeat cardiac catheterization.      She presented for cardiac catheterization on 7/19/2021.  This showed 90% proximal in-stent restenosis and she underwent repeat drug-eluting stent placement  using a 3.0 x 12 mm stent.  She was also noted to have moderate nonobstructive disease of the first obtuse marginal branch with a normal RFR.     She was seen back in the office by KEYONA Demarco on 7/30/2021 at which time she was feeling better.   It was noted around the time that she had mildly elevated liver enzymes which were unchanged compared to prior evaluations.  Was also noted that her last lipid panel on 7/7/2021 showed that her cholesterol was poorly controlled with an LDL of around 160 despite being on high-dose atorvastatin.     In 12/2021 she reported improvement in her symptoms overall.  However in follow-up in 4/2022 she reported that she was starting to have recurrent episodes of chest tightness they are occurring about twice a month and lasting for 30 minutes at a time.  Again they usually occurred after laying down to sleep at night.  Symptoms are similar to what she experienced before her last PCI but not as frequent or as intense.  In 2/2022 she had a routine device interrogation that showed a 19 beat episode of ventricular tachycardia.  Repeat in device interrogation in 4/2022 showed no further episodes.  Since her symptoms were still fairly infrequent we opted to monitor her symptoms before considering any further work-up.  When I saw her back in follow-up in 8/2022 she was having symptoms about 3 times a week lasting 5 to 10 minutes at a time.  I recommended that she start taking her isosorbide mononitrate in the evening.       She was hospitalized in 10/2022 with anemia with a hemoglobin of 7.1.  Her Hemoccult stool was positive.  However endoscopy was unremarkable except for small area of nonbleeding colonic angiectasia's.  Her clopidogrel and rivaroxaban were resumed.  She saw KEYONA Calix in follow-up in 11/2022.  At the time of that visit she reported improvement in her symptoms.    Review of Systems   Constitutional: Positive for malaise/fatigue.   HENT:  Negative for hearing  loss, hoarse voice, nosebleeds and sore throat.    Eyes:  Negative for pain.   Cardiovascular:  Positive for chest pain, dyspnea on exertion and palpitations. Negative for claudication, cyanosis, irregular heartbeat, leg swelling, near-syncope, orthopnea, paroxysmal nocturnal dyspnea and syncope.   Respiratory:  Negative for shortness of breath and snoring.    Endocrine: Negative for cold intolerance, heat intolerance, polydipsia, polyphagia and polyuria.   Skin:  Negative for itching and rash.   Musculoskeletal:  Negative for arthritis, falls, joint pain, joint swelling, muscle cramps, muscle weakness and myalgias.   Gastrointestinal:  Negative for constipation, diarrhea, dysphagia, heartburn, hematemesis, hematochezia, melena, nausea and vomiting.   Genitourinary:  Negative for frequency, hematuria and hesitancy.   Neurological:  Positive for dizziness and loss of balance. Negative for excessive daytime sleepiness, headaches, light-headedness, numbness and weakness.   Psychiatric/Behavioral:  Negative for depression. The patient is not nervous/anxious.          Current Outpatient Medications:     Accu-Chek FastClix Lancets misc, Use to test blood sugar 4 times daily  E11.8, Disp: 400 each, Rfl: 1    Accu-Chek Guide test strip, USE 4 TIMES A DAY E11.8, Disp: 300 each, Rfl: 2    albuterol sulfate  (90 Base) MCG/ACT inhaler, Inhale 2 puffs Every 4 (Four) Hours As Needed for Wheezing for up to 180 days., Disp: 8 g, Rfl: 3    atorvastatin (LIPITOR) 80 MG tablet, Take 1 tablet by mouth Daily., Disp: 90 tablet, Rfl: 3    Blood Glucose Monitoring Suppl (Accu-Chek Guide) w/Device kit, Inject 1 Device under the skin into the appropriate area as directed Daily., Disp: 1 kit, Rfl: 0    carvedilol (COREG) 25 MG tablet, TAKE 1 TABLET BY MOUTH TWICE A DAY WITH MEALS, Disp: 180 tablet, Rfl: 2    Cholecalciferol (VITAMIN D3) 2000 UNITS capsule, Take 1,000 Units by mouth Daily., Disp: , Rfl:     clopidogrel (PLAVIX) 75 MG  tablet, Take 1 tablet by mouth Daily., Disp: 90 tablet, Rfl: 1    furosemide (LASIX) 40 MG tablet, Take 1 tablet by mouth Daily., Disp: 90 tablet, Rfl: 1    HYDROcodone-acetaminophen (NORCO) 5-325 MG per tablet, Take 1 tablet by mouth Every 6 (Six) Hours As Needed for Moderate Pain., Disp: 10 tablet, Rfl: 0    Insulin Pen Needle 32G X 4 MM misc, USE 4 TIMES A DAY, Disp: 400 each, Rfl: 3    Multiple Vitamins-Minerals (MULTIVITAL PO), Take 1 tablet by mouth Daily., Disp: , Rfl:     nitroglycerin (Nitrostat) 0.4 MG SL tablet, Place 1 tablet under the tongue Every 5 (Five) Minutes As Needed for Chest Pain. Take no more than 3 doses in 15 minutes., Disp: 30 tablet, Rfl: 11    Omega-3 Fatty Acids (FISH OIL) 1200 MG capsule capsule, Take 1 capsule by mouth Daily With Breakfast., Disp: , Rfl:     potassium chloride 10 MEQ CR tablet, Take 1 tablet by mouth Daily. Resume on 1/7/20, Disp: 90 tablet, Rfl: 1    potassium chloride 10 MEQ CR tablet, TAKE 1 TABLET BY MOUTH DAILY, Disp: 90 tablet, Rfl: 1    rivaroxaban (Xarelto) 20 MG tablet, Take 1 tablet by mouth Daily With Dinner., Disp: 90 tablet, Rfl: 1    Semaglutide, 1 MG/DOSE, (Ozempic, 1 MG/DOSE,) 4 MG/3ML solution pen-injector, Inject 1 mg under the skin into the appropriate area as directed 1 (One) Time Per Week., Disp: 9 mL, Rfl: 1    spironolactone (ALDACTONE) 25 MG tablet, Take 1 tablet by mouth Daily., Disp: 90 tablet, Rfl: 3    traZODone (DESYREL) 50 MG tablet, Take 1 tablet by mouth every night at bedtime., Disp: 90 tablet, Rfl: 2    vitamin B-12 (CYANOCOBALAMIN) 500 MCG tablet, Take 1 tablet by mouth Daily., Disp: , Rfl:     Past Medical History:   Diagnosis Date    Abdominal pain     Abnormal liver function test     Acute bronchitis     Anemia     Benign essential hypertension     CAD (coronary artery disease)     CHF (congestive heart failure)     Colon polyp     Congestive heart disease     COPD (chronic obstructive pulmonary disease)     Cough     Diabetes      Diabetes mellitus     Diarrhea     Gastroenteritis     Health care maintenance     Hyperlipidemia     Hypertension     IFG (impaired fasting glucose)     Ischemic cardiomyopathy     Myocardial infarction     SHELLIE (obstructive sleep apnea)     Sore throat     Stroke 2020    Type 2 diabetes mellitus     Vaginal yeast infection     Vitamin D deficiency        Past Surgical History:   Procedure Laterality Date    CARDIAC CATHETERIZATION      CARDIAC CATHETERIZATION N/A 01/06/2020    Procedure: Coronary angiography;  Surgeon: Oneida Saldivar MD;  Location:  [facility];  Service: Cardiovascular    CARDIAC CATHETERIZATION N/A 01/06/2020    Procedure: Left heart cath;  Surgeon: Oneida Saldivar MD;  Location:  [facility];  Service: Cardiovascular    CARDIAC CATHETERIZATION N/A 01/06/2020    Procedure: Left ventriculography;  Surgeon: Oneida Saldivar MD;  Location:  [facility];  Service: Cardiovascular    CARDIAC CATHETERIZATION N/A 01/06/2020    Procedure: Percutaneous Coronary Intervention;  Surgeon: Oneida Saldivar MD;  Location:  [facility];  Service: Cardiovascular    CARDIAC CATHETERIZATION N/A 01/06/2020    Procedure: Stent HUGO coronary;  Surgeon: Oneida Saldivar MD;  Location:  [facility];  Service: Cardiovascular    CARDIAC CATHETERIZATION  01/06/2020    Procedure: Functional Flow [place];  Surgeon: Oneida Saldivar MD;  Location:  [facility];  Service: Cardiovascular    CARDIAC CATHETERIZATION N/A 10/26/2020    Procedure: Coronary angiography;  Surgeon: Oneida Saldivar MD;  Location:  [facility];  Service: Cardiovascular;  Laterality: N/A;    CARDIAC CATHETERIZATION N/A 10/26/2020    Procedure: Left heart cath;  Surgeon: Oneida Saldivar MD;  Location:  [facility];  Service: Cardiovascular;  Laterality: N/A;    CARDIAC CATHETERIZATION N/A 10/26/2020    Procedure: Left  ventriculography;  Surgeon: Oneida Saldivar MD;  [facility];  Service: Cardiovascular;  Laterality: N/A;    CARDIAC CATHETERIZATION  10/26/2020    Procedure: Functional Flow [place];  Surgeon: Oneida Saldivar MD;  [facility];  Service: Cardiovascular;;    CARDIAC CATHETERIZATION N/A 07/19/2021    Procedure: Coronary angiography;  Surgeon: Oneida Saldivar MD;  Location: [facility] CATH INVASIVE LOCATION;  Service: Cardiovascular;  Laterality: N/A;    CARDIAC CATHETERIZATION N/A 07/19/2021    Procedure: Left heart cath;  Surgeon: Oneida Saldivar MD;  [facility];  Service: Cardiovascular;  Laterality: N/A;    CARDIAC CATHETERIZATION N/A 07/19/2021    Procedure: Left ventriculography;  Surgeon: Oneida Saldivar MD;  Location: [facility] CATH INVASIVE LOCATION;  Service: Cardiovascular;  Laterality: N/A;    CARDIAC CATHETERIZATION N/A 07/19/2021    Procedure: Percutaneous Coronary Intervention;  Surgeon: Oneida Saldivar MD;  Location: [facility] CATH INVASIVE LOCATION;  Service: Cardiovascular;  Laterality: N/A;    CARDIAC CATHETERIZATION N/A 07/19/2021    Procedure: Optical Coherent Tomography;  Surgeon: Oneida Saldivar MD;  Location: [facility] CATH INVASIVE LOCATION;  Service: Cardiovascular;  Laterality: N/A;    CARDIAC CATHETERIZATION N/A 07/19/2021    Procedure: Stent HUGO coronary;  Surgeon: Oneida Saldivar MD;  Location: [facility] CATH INVASIVE LOCATION;  Service: Cardiovascular;  Laterality: N/A;    CARDIAC CATHETERIZATION  07/19/2021    Procedure: RESTING FULL CYCLE RATIO;  Surgeon: Oneida Saldivar MD;  Location: [facility] CATH INVASIVE LOCATION;  Service: Cardiovascular;;  RFR    CARDIAC DEFIBRILLATOR PLACEMENT  2010    Medtronic dual chamber/Dr. Valentin    CARDIAC ELECTROPHYSIOLOGY PROCEDURE N/A 01/12/2018    Procedure: ICD DC generator change  medtronic;  Surgeon: Hany Ornelas MD;  Location: [facility] CATH INVASIVE LOCATION;  Service:   "   PACEMAKER IMPLANTATION      TUBAL ABDOMINAL LIGATION         Family History   Problem Relation Age of Onset    Heart attack Mother     Diabetes Mother     Heart disease Mother     Hyperlipidemia Mother     Emphysema Mother     Diabetes Father     Heart disease Father     Hyperlipidemia Father     Hypertension Father     Colon cancer Father     Heart attack Father     Hypertension Sister     Hypertension Brother     Heart disease Brother     Hyperlipidemia Brother     Heart disease Brother     Heart attack Brother     Heart disease Maternal Grandmother     Heart disease Maternal Grandfather     Heart disease Paternal Grandmother     Heart disease Paternal Grandfather        Social History     Tobacco Use    Smoking status: Former     Current packs/day: 0.00     Average packs/day: 1 pack/day for 30.0 years (30.0 ttl pk-yrs)     Types: Cigarettes     Start date: 1979     Quit date: 2009     Years since quittin.4    Smokeless tobacco: Never    Tobacco comments:     QUIT 10 YRS   Vaping Use    Vaping status: Never Used   Substance Use Topics    Alcohol use: Yes     Comment: rarely uses ETOH/caffeine use    Drug use: No         ECG 12 Lead    Date/Time: 2024 12:26 PM  Performed by: Oneida Saldivar MD    Authorized by: Oneida Saldivar MD  Comparison: compared with previous ECG   Similar to previous ECG  Rhythm: sinus rhythm  Q waves: V1, V2 and V3               Objective:     Visit Vitals  /70 (BP Location: Left arm, Patient Position: Sitting, Cuff Size: Adult)   Pulse 77   Ht 152.4 cm (60\")   Wt 82.4 kg (181 lb 9.6 oz)   SpO2 99%   BMI 35.47 kg/m²         Constitutional:       Appearance: Normal appearance. Well-developed.   Eyes:      General: Lids are normal.      Conjunctiva/sclera: Conjunctivae normal.      Pupils: Pupils are equal, round, and reactive to light.   HENT:      Head: Normocephalic and atraumatic.   Neck:      Vascular: No carotid bruit or JVD.      Lymphadenopathy: No " cervical adenopathy.   Pulmonary:      Effort: Pulmonary effort is normal.      Breath sounds: Normal breath sounds.   Cardiovascular:      Normal rate. Regular rhythm.      No gallop.    Pulses:     Radial: 2+ bilaterally.  Edema:     Peripheral edema absent.   Abdominal:      Palpations: Abdomen is soft.   Musculoskeletal:      Cervical back: Full passive range of motion without pain, normal range of motion and neck supple. Skin:     General: Skin is warm and dry.   Neurological:      Mental Status: Alert and oriented to person, place, and time.             Assessment:          Diagnosis Plan   1. Chronic coronary artery disease        2. History of anterior myocardial infarction         3. Mixed hyperlipidemia        4. Benign essential hypertension        5. Ischemic cardiomyopathy        6. Automatic implantable cardioverter-defibrillator in situ        7. S/P coronary artery stent placement        8. Chronic systolic congestive heart failure        9. Diabetes mellitus type 2, noninsulin dependent        10. Obstructive sleep apnea syndrome               Plan:       1.  Dizziness/balance issues.  Symptoms have not really improved nor have they worsened despite correction of her anemia.  Unfortunately symptoms can sometimes result in falls which has recently resulted in injury.  She is being cautious about position changes at this point.  Not associated with any decline in her blood pressures.  Cussed the possibility of proceeding with the monitor.  At this time the patient would like to watch her symptoms for a little bit longer before proceeding with a monitor.  2.  Chest tightness/palpitations.  Unclear if her chest tightness is the cause of her palpitations over the palpitations as a cause of her chest tightness.  The symptoms are rather brief and occur only at rest.  Symptoms are stable.  She has no EKG changes.  Plan regarding a monitor as above.  Otherwise since her symptoms are rather stable I think we  can continue to hold off on further ischemic testing.  3.  Coronary artery disease.  Plan as per #2.  Otherwise continue current medical management.  4.  Ischemic cardiomyopathy.  No evidence of volume overload.  Continue current medical management.  5.  Recurrent iron deficiency anemia.  Currently improved.  6.  Obstructive sleep apnea  7.  Diabetes mellitus type 2  8.  History of embolic stroke.  On chronic anticoagulation with rivaroxaban.  9.  Single-chamber ICD.  Continue routine device interrogations.    Will plan on seeing the patient back again in 3 months.  Asked the patient to notify me if she has any worsening symptoms or if she wants to proceed with a monitor in the meanwhile.

## 2024-04-05 ENCOUNTER — OFFICE VISIT (OUTPATIENT)
Age: 64
End: 2024-04-05
Payer: COMMERCIAL

## 2024-04-05 VITALS
SYSTOLIC BLOOD PRESSURE: 138 MMHG | BODY MASS INDEX: 35.65 KG/M2 | OXYGEN SATURATION: 99 % | DIASTOLIC BLOOD PRESSURE: 70 MMHG | HEART RATE: 77 BPM | WEIGHT: 181.6 LBS | HEIGHT: 60 IN

## 2024-04-05 DIAGNOSIS — E11.9 DIABETES MELLITUS TYPE 2, NONINSULIN DEPENDENT: ICD-10-CM

## 2024-04-05 DIAGNOSIS — I50.22 CHRONIC SYSTOLIC CONGESTIVE HEART FAILURE: ICD-10-CM

## 2024-04-05 DIAGNOSIS — I21.09 ACUTE MYOCARDIAL INFARCTION OF ANTERIOR WALL: ICD-10-CM

## 2024-04-05 DIAGNOSIS — I25.5 ISCHEMIC CARDIOMYOPATHY: ICD-10-CM

## 2024-04-05 DIAGNOSIS — I10 BENIGN ESSENTIAL HYPERTENSION: ICD-10-CM

## 2024-04-05 DIAGNOSIS — I25.10 CHRONIC CORONARY ARTERY DISEASE: Primary | ICD-10-CM

## 2024-04-05 DIAGNOSIS — Z95.810 AUTOMATIC IMPLANTABLE CARDIOVERTER-DEFIBRILLATOR IN SITU: ICD-10-CM

## 2024-04-05 DIAGNOSIS — G47.33 OBSTRUCTIVE SLEEP APNEA SYNDROME: ICD-10-CM

## 2024-04-05 DIAGNOSIS — Z95.5 S/P CORONARY ARTERY STENT PLACEMENT: ICD-10-CM

## 2024-04-05 DIAGNOSIS — E78.2 MIXED HYPERLIPIDEMIA: ICD-10-CM

## 2024-04-05 NOTE — LETTER
April 5, 2024       No Recipients    Patient: Daisy Dent   YOB: 1960   Date of Visit: 4/5/2024       Dear KEYONA Taylor    Daisy Dent was in my office today. Below is a copy of my note.    If you have questions, please do not hesitate to call me. I look forward to following Daisy along with you.         Sincerely,        Oneida Saldivar MD        CC:   No Recipients        Subjective:     Encounter Date:04/05/2024      Patient ID: Daisy Dent is a 63 y.o. female.    Chief Complaint:  History of Present Illness    This is a 63-year-old female with ischemic cardiomyopathy, last ejection fraction of 40-45%, diabetes mellitus type 2, obstructive sleep apnea on CPAP, hypertension, status post dual-chamber AICD placement, dyslipidemia, status post left MCA stroke, coronary artery disease with a history of prior anterior myocardial infarction and LAD stent placement (in 7/2006, 10/2009, 11/2019, and 7/2021), who presents for follow-up.     At a follow-up visit in 3/2023 she was doing well.  She denied any issues with worsening anemia despite the fact that she was on clopidogrel and rivaroxaban.  She reported improvement in her chest pain with taking the isosorbide in the evening.     She was last seen in the office by KEYONA Dumont in 10/2023.  She reports she was having more issues with nosebleeds.  She had been having episodes of chest tightness that felt like heartburn that improved with taking Tums.  The symptoms were different from her prior anginal symptoms.  It was recommended that she monitor the nosebleeds and otherwise no changes were made to her management.     In 2/2024 she presented for routine follow-up.  About 3 weeks prior she began having more issues with fatigue, shortness of breath, palpitations at rest and with activity, and chest tightness.  She was found to be anemic with a hemoglobin of 7.2.  She was evaluated by Dr. Hood and was found to have  significant iron deficiency anemia.  She was started on IV Venofer for infusions and referred to GI.  She was then started on oral iron.  She reported improvement in her symptoms by the time of that office visit including her chest discomfort and fatigue.  She still having palpitations and shortness of breath.  She had also lost about 40 pounds in part due to Ozempic use and also because her appetite had declined because of her symptoms.  Did not make any changes in her management.  Opted to monitor symptoms for further improvement before considering any further cardiac workup.  She was last seen by Dr. Hood in 3/2024 at which time her hemoglobin was up to 13.7.  She is still awaiting GI appointment at that time.       Prior History:  I began following the patient in 11/2015 when she presented to establish care.  Prior to that she was followed by Dr. Ayala with [facility].  Her prior cardiac history includes a non-ST elevation MI and 7/2006 at which time she received a Cypher 3.0 x 23 mm stent to her mid LAD.  She then presented in 10/2009 with a late in-stent thrombosis that required repeat stenting of her mid LAD in addition to a second drug-eluting stent placement of her left circumflex artery.  Following that she had a repeat cardiac catheterization in 2010 that was reportedly unremarkable.  Following her myocardial infarction in 2009 she developed ischemic myopathy with an EF of around 25% and ultimately underwent AICD placement and 7/2010.  At some point in the course of her follow-ups her left ventricular function improved with an ejection fraction of 40-45%.  A stress test showed evidence of a large anterior infarct and minimal elva-infarct ischemia.     In her follow-ups she has done fairly well recently when she reported more fatigued than normal and episodes of chest discomfort that occurred while she was exercising on the treadmill.  She underwent  an echocardiogram in 3/2017 that showed an  ejection fraction of 40-45%.  Due to continued symptoms we went ahead and proceeded with a PET stress test which was performed in 8/2017 that showed medium sized anterior wall infarct with mild elva-infarct ischemia and a post stress ejection fraction of 58%.      In 11/2017 her AICD was noted to be at [facility].  She subsequently underwent generator replacement on 1/12/2018 with Dr. Ornelas. When I saw her back in follow up in 9/2018 she reported that she denied any new or worsening symptoms.       She was admitted and 12/2018 after she presented with aphasia.  On arrival to the hospital she underwent a CT of the head that showed no acute stroke but a perfusion scan showed a large perfusion mismatch.  A CTA showed evidence of a left MCA thrombus.  She was given TPA at this time with improvement in her symptoms.  Neurology felt that her stroke was likely embolic.  She underwent a repeat echocardiogram during her admission that showed moderately depressed left ventricular systolic function with an EF of 30%, grade 1 diastolic dysfunction, and no significant valvular disease.  Based on the likelihood that this was an embolic stroke I opted to go ahead and recommend anticoagulation.  She was subsequently started on rivaroxaban 20 mg a day.  Her aspirin was stopped and she was continued on clopidogrel.     In 11/2019 she reported a couple weeks of episodes of substernal chest tightness lasting 1 to 2 minutes and dyspnea on exertion.  She reported that the symptoms were similar to what she had at the time of her prior myocardial infarctions but reports that its much milder than what she experienced at that time.    Following that office visit she underwent a stress test that showed evidence of a medium anterior infarct with moderate elva-infarct ischemia.  The findings were similar to prior stress test however the amount of elva-infarct ischemia had impaired to increase.  We initially tried to treat her medically however she  continued to have recurrent symptoms so we opted to proceed with a cardiac catheterization.  She presented to the hospital on 1/6/2020 for the procedure.  She was found to have severe in-stent restenosis of her proximal LAD stent and ultimately underwent repeat drug-eluting stent placement of the in-stent restenosis.  She was also noted to have moderate nonobstructive disease of the first obtuse marginal branch for which she underwent FFR evaluation which was normal at 0.96.     In follow-up she had some mild episodes of chest discomfort that we decided to monitor.  She then returned for urgent follow-up on 10/22/2020 at which time she reported more severe episodes of chest discomfort that were occurring primarily at rest.  Due to the severity of her symptoms she was quite concerned and we decided to proceed with a cardiac catheterization.  This was performed on 10/26/2020 and showed stable moderate nonobstructive coronary artery disease.     She reported increasing frequency of chest tightness that primarily occurred at night and woke her up from sleep starting in 5/2021.  She reported this was similar to her prior anginal symptoms.  I started her on isosorbide mononitrate 30 mg daily but she is unable to tolerate it due to the onset of severe headaches.  When she presented back for follow-up in 7/2021 she was continuing to have episodes of nocturnal discomfort that had progressed.  At that point I recommended proceeding with a repeat cardiac catheterization.      She presented for cardiac catheterization on 7/19/2021.  This showed 90% proximal in-stent restenosis and she underwent repeat drug-eluting stent placement using a 3.0 x 12 mm stent.  She was also noted to have moderate nonobstructive disease of the first obtuse marginal branch with a normal RFR.     She was seen back in the office by KEYONA Demarco on 7/30/2021 at which time she was feeling better.   It was noted around the time that she had mildly  elevated liver enzymes which were unchanged compared to prior evaluations.  Was also noted that her last lipid panel on 7/7/2021 showed that her cholesterol was poorly controlled with an LDL of around 160 despite being on high-dose atorvastatin.     In 12/2021 she reported improvement in her symptoms overall.  However in follow-up in 4/2022 she reported that she was starting to have recurrent episodes of chest tightness they are occurring about twice a month and lasting for 30 minutes at a time.  Again they usually occurred after laying down to sleep at night.  Symptoms are similar to what she experienced before her last PCI but not as frequent or as intense.  In 2/2022 she had a routine device interrogation that showed a 19 beat episode of ventricular tachycardia.  Repeat in device interrogation in 4/2022 showed no further episodes.  Since her symptoms were still fairly infrequent we opted to monitor her symptoms before considering any further work-up.  When I saw her back in follow-up in 8/2022 she was having symptoms about 3 times a week lasting 5 to 10 minutes at a time.  I recommended that she start taking her isosorbide mononitrate in the evening.       She was hospitalized in 10/2022 with anemia with a hemoglobin of 7.1.  Her Hemoccult stool was positive.  However endoscopy was unremarkable except for small area of nonbleeding colonic angiectasia's.  Her clopidogrel and rivaroxaban were resumed.  She saw KEYONA Calix in follow-up in 11/2022.  At the time of that visit she reported improvement in her symptoms.    ROS      Current Outpatient Medications:   •  Accu-Chek FastClix Lancets misc, Use to test blood sugar 4 times daily  E11.8, Disp: 400 each, Rfl: 1  •  Accu-Chek Guide test strip, USE 4 TIMES A DAY E11.8, Disp: 300 each, Rfl: 2  •  albuterol sulfate  (90 Base) MCG/ACT inhaler, Inhale 2 puffs Every 4 (Four) Hours As Needed for Wheezing for up to 180 days., Disp: 8 g, Rfl: 3  •   atorvastatin (LIPITOR) 80 MG tablet, Take 1 tablet by mouth Daily., Disp: 90 tablet, Rfl: 3  •  Blood Glucose Monitoring Suppl (Accu-Chek Guide) w/Device kit, Inject 1 Device under the skin into the appropriate area as directed Daily., Disp: 1 kit, Rfl: 0  •  carvedilol (COREG) 25 MG tablet, TAKE 1 TABLET BY MOUTH TWICE A DAY WITH MEALS, Disp: 180 tablet, Rfl: 2  •  Cholecalciferol (VITAMIN D3) 2000 UNITS capsule, Take 1,000 Units by mouth Daily., Disp: , Rfl:   •  clopidogrel (PLAVIX) 75 MG tablet, Take 1 tablet by mouth Daily., Disp: 90 tablet, Rfl: 1  •  furosemide (LASIX) 40 MG tablet, Take 1 tablet by mouth Daily., Disp: 90 tablet, Rfl: 1  •  HYDROcodone-acetaminophen (NORCO) 5-325 MG per tablet, Take 1 tablet by mouth Every 6 (Six) Hours As Needed for Moderate Pain., Disp: 10 tablet, Rfl: 0  •  Insulin Pen Needle 32G X 4 MM misc, USE 4 TIMES A DAY, Disp: 400 each, Rfl: 3  •  Multiple Vitamins-Minerals (MULTIVITAL PO), Take 1 tablet by mouth Daily., Disp: , Rfl:   •  nitroglycerin (Nitrostat) 0.4 MG SL tablet, Place 1 tablet under the tongue Every 5 (Five) Minutes As Needed for Chest Pain. Take no more than 3 doses in 15 minutes., Disp: 30 tablet, Rfl: 11  •  Omega-3 Fatty Acids (FISH OIL) 1200 MG capsule capsule, Take 1 capsule by mouth Daily With Breakfast., Disp: , Rfl:   •  potassium chloride 10 MEQ CR tablet, Take 1 tablet by mouth Daily. Resume on 1/7/20, Disp: 90 tablet, Rfl: 1  •  potassium chloride 10 MEQ CR tablet, TAKE 1 TABLET BY MOUTH DAILY, Disp: 90 tablet, Rfl: 1  •  rivaroxaban (Xarelto) 20 MG tablet, Take 1 tablet by mouth Daily With Dinner., Disp: 90 tablet, Rfl: 1  •  Semaglutide, 1 MG/DOSE, (Ozempic, 1 MG/DOSE,) 4 MG/3ML solution pen-injector, Inject 1 mg under the skin into the appropriate area as directed 1 (One) Time Per Week., Disp: 9 mL, Rfl: 1  •  spironolactone (ALDACTONE) 25 MG tablet, Take 1 tablet by mouth Daily., Disp: 90 tablet, Rfl: 3  •  traZODone (DESYREL) 50 MG tablet, Take 1  tablet by mouth every night at bedtime., Disp: 90 tablet, Rfl: 2  •  vitamin B-12 (CYANOCOBALAMIN) 500 MCG tablet, Take 1 tablet by mouth Daily., Disp: , Rfl:     Past Medical History:   Diagnosis Date   • Abdominal pain    • Abnormal liver function test    • Acute bronchitis    • Anemia    • Benign essential hypertension    • CAD (coronary artery disease)    • CHF (congestive heart failure)    • Colon polyp    • Congestive heart disease    • COPD (chronic obstructive pulmonary disease)    • Cough    • Diabetes    • Diabetes mellitus    • Diarrhea    • Gastroenteritis    • Health care maintenance    • Hyperlipidemia    • Hypertension    • IFG (impaired fasting glucose)    • Ischemic cardiomyopathy    • Myocardial infarction    • SHELLIE (obstructive sleep apnea)    • Sore throat    • Stroke 2020   • Type 2 diabetes mellitus    • Vaginal yeast infection    • Vitamin D deficiency        Past Surgical History:   Procedure Laterality Date   • CARDIAC CATHETERIZATION     • CARDIAC CATHETERIZATION N/A 01/06/2020    Procedure: Coronary angiography;  Surgeon: Oneida Saldivar MD;  Location:  [facility];  Service: Cardiovascular   • CARDIAC CATHETERIZATION N/A 01/06/2020    Procedure: Left heart cath;  Surgeon: Oneida Saldivar MD;  Location:  [facility];  Service: Cardiovascular   • CARDIAC CATHETERIZATION N/A 01/06/2020    Procedure: Left ventriculography;  Surgeon: Oneida Saldivar MD;  Location:  [facility];  Service: Cardiovascular   • CARDIAC CATHETERIZATION N/A 01/06/2020    Procedure: Percutaneous Coronary Intervention;  Surgeon: Oneida Saldivar MD;  Location:  [facility];  Service: Cardiovascular   • CARDIAC CATHETERIZATION N/A 01/06/2020    Procedure: Stent HUGO coronary;  Surgeon: Oneida Saldivar MD;  Location:  [facility];  Service: Cardiovascular   • CARDIAC CATHETERIZATION  01/06/2020    Procedure: Functional Flow [place];   Surgeon: Oneida Saldivar MD;  Location:  [facility];  Service: Cardiovascular   • CARDIAC CATHETERIZATION N/A 10/26/2020    Procedure: Coronary angiography;  Surgeon: Oneida Saldivar MD;  Location: [facility];  Service: Cardiovascular;  Laterality: N/A;   • CARDIAC CATHETERIZATION N/A 10/26/2020    Procedure: Left heart cath;  Surgeon: Oneida Saldivar MD;  Location: [facility];  Service: Cardiovascular;  Laterality: N/A;   • CARDIAC CATHETERIZATION N/A 10/26/2020    Procedure: Left ventriculography;  Surgeon: Oneida Saldivar MD;  Location:  [facility];  Service: Cardiovascular;  Laterality: N/A;   • CARDIAC CATHETERIZATION  10/26/2020    Procedure: Functional Flow [place];  Surgeon: Oneida Saldivar MD;  Location: [facility];  Service: Cardiovascular;;   • CARDIAC CATHETERIZATION N/A 07/19/2021    Procedure: Coronary angiography;  Surgeon: Oneida Saldivar MD;  Location: [facility] CATH INVASIVE LOCATION;  Service: Cardiovascular;  Laterality: N/A;   • CARDIAC CATHETERIZATION N/A 07/19/2021    Procedure: Left heart cath;  Surgeon: Oneida Saldivar MD;  Location: [facility] CATH INVASIVE LOCATION;  Service: Cardiovascular;  Laterality: N/A;   • CARDIAC CATHETERIZATION N/A 07/19/2021    Procedure: Left ventriculography;  Surgeon: Oneida Saldivar MD;  Location: [facility] CATH INVASIVE LOCATION;  Service: Cardiovascular;  Laterality: N/A;   • CARDIAC CATHETERIZATION N/A 07/19/2021    Procedure: Percutaneous Coronary Intervention;  Surgeon: Oneida Saldivar MD;  Location: [facility];  Service: Cardiovascular;  Laterality: N/A;   • CARDIAC CATHETERIZATION N/A 07/19/2021    Procedure: Optical Coherent Tomography;  Surgeon: Oneida Saldivar MD;  Location: [facility] CATH INVASIVE LOCATION;  Service: Cardiovascular;  Laterality: N/A;   • CARDIAC CATHETERIZATION N/A 07/19/2021    Procedure: Stent HUGO coronary;  Surgeon:  "Oneida Saldivar MD;  Location:  [facility];  Service: Cardiovascular;  Laterality: N/A;   • CARDIAC CATHETERIZATION  2021    Procedure: RESTING FULL CYCLE RATIO;  Surgeon: Oneida Saldivar MD;  Location:  [facility];  Service: Cardiovascular;;  RFR   • CARDIAC DEFIBRILLATOR PLACEMENT      Medtronic dual chamber/Dr. Valentin   • CARDIAC ELECTROPHYSIOLOGY PROCEDURE N/A 2018    Procedure: ICD DC generator change  medtronic;  Surgeon: Hany Ornelas MD;  Location:  [facility];  Service:    • PACEMAKER IMPLANTATION     • TUBAL ABDOMINAL LIGATION         Family History   Problem Relation Age of Onset   • Heart attack Mother    • Diabetes Mother    • Heart disease Mother    • Hyperlipidemia Mother    • Emphysema Mother    • Diabetes Father    • Heart disease Father    • Hyperlipidemia Father    • Hypertension Father    • Colon cancer Father    • Heart attack Father    • Hypertension Sister    • Hypertension Brother    • Heart disease Brother    • Hyperlipidemia Brother    • Heart disease Brother    • Heart attack Brother    • Heart disease Maternal Grandmother    • Heart disease Maternal Grandfather    • Heart disease Paternal Grandmother    • Heart disease Paternal Grandfather        Social History     Tobacco Use   • Smoking status: Former     Current packs/day: 0.00     Average packs/day: 1 pack/day for 30.0 years (30.0 ttl pk-yrs)     Types: Cigarettes     Start date: 1979     Quit date: 2009     Years since quittin.4   • Smokeless tobacco: Never   • Tobacco comments:     QUIT 10 YRS   Vaping Use   • Vaping status: Never Used   Substance Use Topics   • Alcohol use: Yes     Comment: rarely uses ETOH/caffeine use   • Drug use: No       Procedures       Objective:     Visit Vitals  /70 (BP Location: Left arm, Patient Position: Sitting, Cuff Size: Adult)   Pulse 77   Ht 152.4 cm (60\")   Wt 82.4 kg (181 lb 9.6 oz)   SpO2 99%   BMI " 35.47 kg/m²         Physical Exam    Lab Review:       Assessment:          Diagnosis Plan   1. Chronic coronary artery disease        2. History of anterior myocardial infarction         3. Mixed hyperlipidemia        4. Benign essential hypertension        5. Ischemic cardiomyopathy        6. Automatic implantable cardioverter-defibrillator in situ        7. S/P coronary artery stent placement        8. Chronic systolic congestive heart failure        9. Diabetes mellitus type 2, noninsulin dependent        10. Obstructive sleep apnea syndrome               Plan:

## 2024-05-08 RX ORDER — SPIRONOLACTONE 25 MG/1
25 TABLET ORAL DAILY
Qty: 90 TABLET | Refills: 3 | Status: SHIPPED | OUTPATIENT
Start: 2024-05-08

## 2024-05-31 ENCOUNTER — TELEPHONE (OUTPATIENT)
Dept: ONCOLOGY | Facility: CLINIC | Age: 64
End: 2024-05-31
Payer: COMMERCIAL

## 2024-05-31 NOTE — TELEPHONE ENCOUNTER
CALLED AND SPOKE WITH THE PT -[phone]. SHE ADVISED THAT HER INS HAS CHANGED TO [place] ID # [id number].

## 2024-06-02 DIAGNOSIS — I25.5 ISCHEMIC CARDIOMYOPATHY: ICD-10-CM

## 2024-06-03 RX ORDER — POTASSIUM CHLORIDE 750 MG/1
10 TABLET, FILM COATED, EXTENDED RELEASE ORAL DAILY
Qty: 90 TABLET | Refills: 1 | Status: SHIPPED | OUTPATIENT
Start: 2024-06-03

## 2024-06-07 DIAGNOSIS — E11.65 TYPE 2 DIABETES MELLITUS WITH HYPERGLYCEMIA, WITHOUT LONG-TERM CURRENT USE OF INSULIN: ICD-10-CM

## 2024-06-07 RX ORDER — SEMAGLUTIDE 1.34 MG/ML
1 INJECTION, SOLUTION SUBCUTANEOUS WEEKLY
Qty: 9 ML | Refills: 1 | Status: SHIPPED | OUTPATIENT
Start: 2024-06-07

## 2024-06-12 DIAGNOSIS — E11.65 TYPE 2 DIABETES MELLITUS WITH HYPERGLYCEMIA, WITHOUT LONG-TERM CURRENT USE OF INSULIN: ICD-10-CM

## 2024-06-13 RX ORDER — SEMAGLUTIDE 1.34 MG/ML
1 INJECTION, SOLUTION SUBCUTANEOUS WEEKLY
Qty: 9 ML | Refills: 1 | OUTPATIENT
Start: 2024-06-13

## 2024-06-18 ENCOUNTER — TELEPHONE (OUTPATIENT)
Dept: CARDIOLOGY | Facility: CLINIC | Age: 64
End: 2024-06-18
Payer: COMMERCIAL

## 2024-06-18 DIAGNOSIS — E11.65 TYPE 2 DIABETES MELLITUS WITH HYPERGLYCEMIA, WITHOUT LONG-TERM CURRENT USE OF INSULIN: ICD-10-CM

## 2024-06-18 RX ORDER — SEMAGLUTIDE 1.34 MG/ML
1 INJECTION, SOLUTION SUBCUTANEOUS WEEKLY
Qty: 9 ML | Refills: 1 | OUTPATIENT
Start: 2024-06-18

## 2024-06-21 ENCOUNTER — PRIOR AUTHORIZATION (OUTPATIENT)
Dept: FAMILY MEDICINE CLINIC | Facility: CLINIC | Age: 64
End: 2024-06-21
Payer: COMMERCIAL

## 2024-06-21 NOTE — TELEPHONE ENCOUNTER
Daisy Dent (Key: [id number])  Rx #: [id number]  Ozempic (1 MG/DOSE) 4MG/3ML pen-injectors  Form  [employer] Commercial Electronic PA Form (2017 NCPDP)  Created  14 days ago  Sent to Plan  29 minutes ago  Plan Response  28 minutes ago  Submit Clinical Questions  less than a minute ago  Determination  less than a minute ago  Message from Plan  No Prior Authorization is required at this time based on the alternative drug you chose to prescribe.;CaseId:[id number];Status:Cancelled;  Ozempic not covered by plan

## 2024-06-24 ENCOUNTER — TELEPHONE (OUTPATIENT)
Dept: FAMILY MEDICINE CLINIC | Facility: CLINIC | Age: 64
End: 2024-06-24

## 2024-06-24 ENCOUNTER — PRIOR AUTHORIZATION (OUTPATIENT)
Dept: FAMILY MEDICINE CLINIC | Facility: CLINIC | Age: 64
End: 2024-06-24
Payer: COMMERCIAL

## 2024-06-24 ENCOUNTER — LAB (OUTPATIENT)
Dept: LAB | Facility: HOSPITAL | Age: 64
End: 2024-06-24
Payer: COMMERCIAL

## 2024-06-24 ENCOUNTER — OFFICE VISIT (OUTPATIENT)
Dept: ONCOLOGY | Facility: CLINIC | Age: 64
End: 2024-06-24
Payer: COMMERCIAL

## 2024-06-24 VITALS
HEIGHT: 60 IN | TEMPERATURE: 98.1 F | HEART RATE: 70 BPM | SYSTOLIC BLOOD PRESSURE: 98 MMHG | WEIGHT: 183.6 LBS | OXYGEN SATURATION: 98 % | RESPIRATION RATE: 18 BRPM | BODY MASS INDEX: 36.05 KG/M2 | DIASTOLIC BLOOD PRESSURE: 64 MMHG

## 2024-06-24 DIAGNOSIS — D50.0 IRON DEFICIENCY ANEMIA DUE TO CHRONIC BLOOD LOSS: ICD-10-CM

## 2024-06-24 DIAGNOSIS — D50.9 IRON DEFICIENCY ANEMIA, UNSPECIFIED IRON DEFICIENCY ANEMIA TYPE: ICD-10-CM

## 2024-06-24 DIAGNOSIS — D50.0 IRON DEFICIENCY ANEMIA DUE TO CHRONIC BLOOD LOSS: Primary | ICD-10-CM

## 2024-06-24 LAB
BASOPHILS # BLD AUTO: 0.04 10*3/MM3 (ref 0–0.2)
BASOPHILS NFR BLD AUTO: 0.6 % (ref 0–1.5)
DEPRECATED RDW RBC AUTO: 46.3 FL (ref 37–54)
EOSINOPHIL # BLD AUTO: 0.1 10*3/MM3 (ref 0–0.4)
EOSINOPHIL NFR BLD AUTO: 1.5 % (ref 0.3–6.2)
ERYTHROCYTE [DISTWIDTH] IN BLOOD BY AUTOMATED COUNT: 15.4 % (ref 12.3–15.4)
FERRITIN SERPL-MCNC: 9.85 NG/ML (ref 13–150)
HCT VFR BLD AUTO: 29.1 % (ref 34–46.6)
HGB BLD-MCNC: 8.9 G/DL (ref 12–15.9)
IMM GRANULOCYTES # BLD AUTO: 0.05 10*3/MM3 (ref 0–0.05)
IMM GRANULOCYTES NFR BLD AUTO: 0.7 % (ref 0–0.5)
IRON 24H UR-MRATE: 25 MCG/DL (ref 37–145)
IRON SATN MFR SERPL: 5 % (ref 20–50)
LYMPHOCYTES # BLD AUTO: 2.08 10*3/MM3 (ref 0.7–3.1)
LYMPHOCYTES NFR BLD AUTO: 30.3 % (ref 19.6–45.3)
MCH RBC QN AUTO: 25.3 PG (ref 26.6–33)
MCHC RBC AUTO-ENTMCNC: 30.6 G/DL (ref 31.5–35.7)
MCV RBC AUTO: 82.7 FL (ref 79–97)
MONOCYTES # BLD AUTO: 0.7 10*3/MM3 (ref 0.1–0.9)
MONOCYTES NFR BLD AUTO: 10.2 % (ref 5–12)
NEUTROPHILS NFR BLD AUTO: 3.9 10*3/MM3 (ref 1.7–7)
NEUTROPHILS NFR BLD AUTO: 56.7 % (ref 42.7–76)
NRBC BLD AUTO-RTO: 0 /100 WBC (ref 0–0.2)
PLATELET # BLD AUTO: 341 10*3/MM3 (ref 140–450)
PMV BLD AUTO: 9.7 FL (ref 6–12)
RBC # BLD AUTO: 3.52 10*6/MM3 (ref 3.77–5.28)
TIBC SERPL-MCNC: 468 MCG/DL (ref 298–536)
TRANSFERRIN SERPL-MCNC: 314 MG/DL (ref 200–360)
WBC NRBC COR # BLD AUTO: 6.87 10*3/MM3 (ref 3.4–10.8)

## 2024-06-24 PROCEDURE — 84466 ASSAY OF TRANSFERRIN: CPT

## 2024-06-24 PROCEDURE — 82728 ASSAY OF FERRITIN: CPT

## 2024-06-24 PROCEDURE — 83540 ASSAY OF IRON: CPT

## 2024-06-24 PROCEDURE — 36415 COLL VENOUS BLD VENIPUNCTURE: CPT

## 2024-06-24 PROCEDURE — 85025 COMPLETE CBC W/AUTO DIFF WBC: CPT

## 2024-06-24 PROCEDURE — 99214 OFFICE O/P EST MOD 30 MIN: CPT | Performed by: NURSE PRACTITIONER

## 2024-06-24 RX ORDER — SODIUM CHLORIDE 9 MG/ML
20 INJECTION, SOLUTION INTRAVENOUS ONCE
Status: CANCELLED | OUTPATIENT
Start: 2024-06-25

## 2024-06-24 RX ORDER — FAMOTIDINE 10 MG/ML
20 INJECTION, SOLUTION INTRAVENOUS ONCE
Status: CANCELLED | OUTPATIENT
Start: 2024-06-25

## 2024-06-24 NOTE — PROGRESS NOTES
Subjective     REASON FOR CONSULTATION:  anemia  Provide an opinion on any further workup or treatment                             REQUESTING PHYSICIAN:  Douglas    RECORDS OBTAINED:  Records of the patients history including those obtained from the referring provider were reviewed and summarized in detail.    History of Present Illness   This is a 63-year-old woman with type 2 diabetes, prior cerebrovascular accident, chronic bronchitis, obstructive sleep apnea, coronary artery disease, hypertension, hyperlipidemia, ischemic cardiomyopathy on anticoagulation with Xarelto and Plavix and AICD in place (most recent EF 40-45%).  The patient is referred for evaluation of anemia.  The patient has prior history of microcytic, hypochromic anemia in October 2022 requiring transfusion support.  Apparently she had endoscopy during hospitalization which showed some nonbleeding colonic angioectasias (per cardiology notes).  When checked on 1/24/2023 the patient had normal hemoglobin 13.2 with MCV 81.8.  A CBC with her primary care on 1/29/2024 showed substantial drop in the hemoglobin to 7.2 with MCV 72.8/MCHC 27.7 and iron studies showed a ferritin of 42 and iron saturation of 4% with elevated TIBC 592 consistent with severe iron deficiency.    The patient states she has been on oral iron over-the-counter for about 1 year. Because of poor response to oral iron therapy the patient was given IV iron with Venofer 300 mg x 3 doses completed on 2/22/2024.  She had symptomatic improvement in her fatigue and subsequently her hemoglobin and iron stores.    Patient returns today for follow-up of her iron deficiency.  She reports since the last office visit she has not seen gastroenterology.  She states her insurance changed and she needs to schedule that visit.  She denies any known bleeding or dark, tarry stools.  She does continue on her chronic anticoagulation.  She does note her fatigue has increased recently.    Past Medical  History:   Diagnosis Date    Abdominal pain     Abnormal liver function test     Acute bronchitis     Anemia     Benign essential hypertension     CAD (coronary artery disease)     CHF (congestive heart failure)     Colon polyp     Congestive heart disease     COPD (chronic obstructive pulmonary disease)     Cough     Diabetes     Diabetes mellitus     Diarrhea     Gastroenteritis     Health care maintenance     Hyperlipidemia     Hypertension     IFG (impaired fasting glucose)     Ischemic cardiomyopathy     Myocardial infarction     SHELLIE (obstructive sleep apnea)     Sore throat     Stroke 2020    Type 2 diabetes mellitus     Vaginal yeast infection     Vitamin D deficiency         Past Surgical History:   Procedure Laterality Date    CARDIAC CATHETERIZATION      CARDIAC CATHETERIZATION N/A 01/06/2020    Procedure: Coronary angiography;  Surgeon: Oneida Saldivar MD;  Location:  [facility];  Service: Cardiovascular    CARDIAC CATHETERIZATION N/A 01/06/2020    Procedure: Left heart cath;  Surgeon: Oneida Saldivar MD;  Location:  [facility];  Service: Cardiovascular    CARDIAC CATHETERIZATION N/A 01/06/2020    Procedure: Left ventriculography;  Surgeon: Oneida Saldivar MD;  Location:  [facility];  Service: Cardiovascular    CARDIAC CATHETERIZATION N/A 01/06/2020    Procedure: Percutaneous Coronary Intervention;  Surgeon: Oneida Saldivar MD;  Location:  [facility];  Service: Cardiovascular    CARDIAC CATHETERIZATION N/A 01/06/2020    Procedure: Stent HUGO coronary;  Surgeon: Oneida Saldivar MD;  Location:  [facility];  Service: Cardiovascular    CARDIAC CATHETERIZATION  01/06/2020    Procedure: Functional Flow [place];  Surgeon: Oneida Saldivar MD;  Location:  [facility];  Service: Cardiovascular    CARDIAC CATHETERIZATION N/A 10/26/2020    Procedure: Coronary angiography;  Surgeon: Oneida Saldivar MD;   [facility];  Service: Cardiovascular;  Laterality: N/A;    CARDIAC CATHETERIZATION N/A 10/26/2020    Procedure: Left heart cath;  Surgeon: Oneida Saldivar MD;  [facility];  Service: Cardiovascular;  Laterality: N/A;    CARDIAC CATHETERIZATION N/A 10/26/2020    Procedure: Left ventriculography;  Surgeon: Oneida Saldivar MD;  [facility];  Service: Cardiovascular;  Laterality: N/A;    CARDIAC CATHETERIZATION  10/26/2020    Procedure: Functional Flow [place];  Surgeon: Oneida Saldivar MD;  Location:  [facility];  Service: Cardiovascular;;    CARDIAC CATHETERIZATION N/A 07/19/2021    Procedure: Coronary angiography;  Surgeon: Oneida Saldivar MD;  Location: [facility] CATH INVASIVE LOCATION;  Service: Cardiovascular;  Laterality: N/A;    CARDIAC CATHETERIZATION N/A 07/19/2021    Procedure: Left heart cath;  Surgeon: Oneida Saldivar MD;  Location: [facility] CATH INVASIVE LOCATION;  Service: Cardiovascular;  Laterality: N/A;    CARDIAC CATHETERIZATION N/A 07/19/2021    Procedure: Left ventriculography;  Surgeon: Oneida Saldivar MD;  Location: [facility] CATH INVASIVE LOCATION;  Service: Cardiovascular;  Laterality: N/A;    CARDIAC CATHETERIZATION N/A 07/19/2021    Procedure: Percutaneous Coronary Intervention;  Surgeon: Oneida Saldivar MD;  Location: [facility] CATH INVASIVE LOCATION;  Service: Cardiovascular;  Laterality: N/A;    CARDIAC CATHETERIZATION N/A 07/19/2021    Procedure: Optical Coherent Tomography;  Surgeon: Oneida Saldivar MD;  Location: [facility] CATH INVASIVE LOCATION;  Service: Cardiovascular;  Laterality: N/A;    CARDIAC CATHETERIZATION N/A 07/19/2021    Procedure: Stent HUGO coronary;  Surgeon: Oneida Saldivar MD;  Location: [facility] CATH INVASIVE LOCATION;  Service: Cardiovascular;  Laterality: N/A;    CARDIAC CATHETERIZATION  07/19/2021    Procedure: RESTING FULL CYCLE RATIO;  Surgeon: Oneida Saldivar MD;   Location:  [facility];  Service: Cardiovascular;;  RFR    CARDIAC DEFIBRILLATOR PLACEMENT  2010    Medtronic dual chamber/Dr. Valentin    CARDIAC ELECTROPHYSIOLOGY PROCEDURE N/A 01/12/2018    Procedure: ICD DC generator change  medtronic;  Surgeon: Hany Ornelas MD;  Location: [facility] CATH INVASIVE LOC[facility];  Service:     PACEMAKER IMPLANTATION      TUBAL ABDOMINAL LIGATION          Current Outpatient Medications on File Prior to Visit   Medication Sig Dispense Refill    Accu-Chek FastClix Lancets misc Use to test blood sugar 4 times daily  E11.8 400 each 1    Accu-Chek Guide test strip USE 4 TIMES A DAY E11.8 300 each 2    albuterol sulfate  (90 Base) MCG/ACT inhaler Inhale 2 puffs Every 4 (Four) Hours As Needed for Wheezing for up to 180 days. 8 g 3    atorvastatin (LIPITOR) 80 MG tablet Take 1 tablet by mouth Daily. 90 tablet 3    Blood Glucose Monitoring Suppl (Accu-Chek Guide) w/Device kit Inject 1 Device under the skin into the appropriate area as directed Daily. 1 kit 0    carvedilol (COREG) 25 MG tablet TAKE 1 TABLET BY MOUTH TWICE A DAY WITH MEALS 180 tablet 2    Cholecalciferol (VITAMIN D3) 2000 UNITS capsule Take 1,000 Units by mouth Daily.      clopidogrel (PLAVIX) 75 MG tablet Take 1 tablet by mouth Daily. 90 tablet 1    furosemide (LASIX) 40 MG tablet Take 1 tablet by mouth Daily. 90 tablet 1    Insulin Pen Needle 32G X 4 MM misc USE 4 TIMES A  each 3    Multiple Vitamins-Minerals (MULTIVITAL PO) Take 1 tablet by mouth Daily.      nitroglycerin (Nitrostat) 0.4 MG SL tablet Place 1 tablet under the tongue Every 5 (Five) Minutes As Needed for Chest Pain. Take no more than 3 doses in 15 minutes. 30 tablet 11    Omega-3 Fatty Acids (FISH OIL) 1200 MG capsule capsule Take 1 capsule by mouth Daily With Breakfast.      potassium chloride 10 MEQ CR tablet TAKE 1 TABLET BY MOUTH EVERY DAY 90 tablet 1    rivaroxaban (Xarelto) 20 MG tablet Take 1 tablet by mouth Daily With Dinner.  90 tablet 3    Semaglutide, 1 MG/DOSE, (Ozempic, 1 MG/DOSE,) 4 MG/3ML solution pen-injector Inject 1 mg under the skin into the appropriate area as directed 1 (One) Time Per Week. 9 mL 1    spironolactone (ALDACTONE) 25 MG tablet TAKE 1 TABLET BY MOUTH EVERY DAY 90 tablet 3    traZODone (DESYREL) 50 MG tablet Take 1 tablet by mouth every night at bedtime. 90 tablet 2    vitamin B-12 (CYANOCOBALAMIN) 500 MCG tablet Take 1 tablet by mouth Daily.      HYDROcodone-acetaminophen (NORCO) 5-325 MG per tablet Take 1 tablet by mouth Every 6 (Six) Hours As Needed for Moderate Pain. (Patient not taking: Reported on 2024) 10 tablet 0    potassium chloride 10 MEQ CR tablet Take 1 tablet by mouth Daily. Resume on 20 (Patient not taking: Reported on 2024) 90 tablet 1     No current facility-administered medications on file prior to visit.        ALLERGIES:  No Known Allergies     Social History     Socioeconomic History    Marital status:     Number of children: 2   Tobacco Use    Smoking status: Former     Current packs/day: 0.00     Average packs/day: 1 pack/day for 30.0 years (30.0 ttl pk-yrs)     Types: Cigarettes     Start date: 1979     Quit date: 2009     Years since quittin.6    Smokeless tobacco: Never    Tobacco comments:     QUIT 10 YRS   Vaping Use    Vaping status: Never Used   Substance and Sexual Activity    Alcohol use: Yes     Comment: rarely uses ETOH/caffeine use    Drug use: No    Sexual activity: Yes     Partners: Male        Family History   Problem Relation Age of Onset    Heart attack Mother     Diabetes Mother     Heart disease Mother     Hyperlipidemia Mother     Emphysema Mother     Diabetes Father     Heart disease Father     Hyperlipidemia Father     Hypertension Father     Colon cancer Father     Heart attack Father     Hypertension Sister     Hypertension Brother     Heart disease Brother     Hyperlipidemia Brother     Heart disease Brother     Heart attack Brother  "    Heart disease Maternal Grandmother     Heart disease Maternal Grandfather     Heart disease Paternal Grandmother     Heart disease Paternal Grandfather         Review of Systems   Constitutional:  Positive for fatigue. Negative for unexpected weight change.   HENT: Negative.     Respiratory:  Negative for chest tightness and shortness of breath.    Cardiovascular:  Negative for chest pain and palpitations.   Gastrointestinal: Negative.    Musculoskeletal: Negative.    Allergic/Immunologic: Negative.    Neurological:  Negative for syncope, weakness and light-headedness.   Hematological: Negative.    Psychiatric/Behavioral: Negative.            Objective     Vitals:    06/24/24 1202   BP: 98/64   Pulse: 70   Resp: 18   Temp: 98.1 °F (36.7 °C)   TempSrc: Oral   SpO2: 98%   Weight: 83.3 kg (183 lb 9.6 oz)   Height: 152.4 cm (60\")   PainSc: 0-No pain           6/24/2024    12:02 PM   Current Status   ECOG score 0       Physical Exam    CONSTITUTIONAL: pleasant well-developed adult woman  HEENT: no icterus, no thrush, moist membranes  LYMPH: no cervical or supraclavicular lad  CV: RRR, S1S2, no murmur  RESP: cta bilat, no wheezing, no rales  GI: soft, non-tender, no splenomegaly, +bs  MUSC: no edema, normal gait  NEURO: alert and oriented x3, normal strength  PSYCH: normal mood and affect  Exam is unchanged- 6/24/2024    RECENT LABS:  Hematology WBC   Date Value Ref Range Status   06/24/2024 6.87 3.40 - 10.80 10*3/mm3 Final   01/29/2024 9.81 3.40 - 10.80 10*3/mm3 Final   01/24/2023 8.27 4.5 - 11.0 10*3/uL Final     RBC   Date Value Ref Range Status   06/24/2024 3.52 (L) 3.77 - 5.28 10*6/mm3 Final   01/29/2024 3.57 (L) 3.77 - 5.28 10*6/mm3 Final   01/24/2023 5.23 (H) 4.0 - 5.2 10*6/uL Final     Hemoglobin   Date Value Ref Range Status   06/24/2024 8.9 (L) 12.0 - 15.9 g/dL Final   01/24/2023 13.2 12.0 - 16.0 g/dL Final     Hematocrit   Date Value Ref Range Status   06/24/2024 29.1 (L) 34.0 - 46.6 % Final   01/24/2023 " 42.8 36.0 - 46.0 % Final     Platelets   Date Value Ref Range Status   06/24/2024 341 140 - 450 10*3/mm3 Final   01/24/2023 378 140 - 440 10*3/uL Final            Assessment & Plan     *Severe iron deficiency anemia  The patient has prior history of iron deficiency anemia October 2022 requiring PRBC and IV iron during hospitalization  EGD/colonoscopy October 2022 showed nonbleeding colonic angioectasias  hemoglobin 7.2 with MCV 72.8 and iron sat 4% consistent with severe iron deficiency despite taking daily iron tablet OTC  Status post Venofer 300 mg x 3 doses completed 2/22/2024  Hemoglobin has declined to 8.9.  Patient denies any known bleeding or dark tarry stools.  He ferritin has declined to 9 and iron saturation 5% with TIBC 468.  We will need to proceed with additional iron and given the Venofer shortage we will request Monoferric as the patient is diabetic so would prefer not to need steroid premedication with Ferrlecit.  Patient also has known cardiovascular disease as below.    *Case complicated by need of chronic anticoagulation with Xarelto and antiplatelet therapy with Plavix for ischemic cardiomyopathy/CAD/prior stroke    Hematology plan/recommendations:  Request Monoferric x 1 to be given this week.  Patient does leave town on Friday for 2 weeks.  Request referral again to gastroenterology for evaluation of recurrent iron deficiency as patient now has new insurance and did not schedule an appointment in the interim.  Pending referral of Monoferric, will plan repeat CBC, ferritin, iron profile 4 weeks after Monoferric with NP/MD.  Request the same 3 months thereafter.

## 2024-06-24 NOTE — TELEPHONE ENCOUNTER
Daisy Dent ([place]: [id number]) - [id number]  Ozempic (1 MG/DOSE) 4MG/3ML pen-injectors  Status: PA RequestCreated: June 24th, 2024 [phone]: June 24th, 2024    Daisy Dent ([place]: [id number]) - [id number]  Ozempic (1 MG/DOSE) 4MG/3ML pen-injectors  Status: PA Response - ApprovedCreated: June 24th, 2024 [phone]: June 24th, 2024  Open  Archive

## 2024-06-24 NOTE — TELEPHONE ENCOUNTER
Caller: [employer]    Relationship:     Best call back number:     [phone]       Which medication are you concerned about: Semaglutide, 1 MG/DOSE, (Ozempic, 1 MG/DOSE,) 4 MG/3ML solution pen-injector     What are your concerns:  PREAUTHORIZATION REQUIRED  SUBMIT VERBAL  [phone]

## 2024-06-26 ENCOUNTER — TELEPHONE (OUTPATIENT)
Dept: ONCOLOGY | Facility: CLINIC | Age: 64
End: 2024-06-26

## 2024-06-26 NOTE — TELEPHONE ENCOUNTER
"    Caller: Daisy Dent \"HECTOR\"    Relationship: Self    Best call back number: [phone]    What test/procedure requested: IRON INFUSIONS    What is it needed: CHECKING ON THE STATUS OF APPROVAL WITH INSURANCE COMPANY         Additional information or concerns: CONCERN DUE TO GOING OUT OF TOWN FRIDAY , AND MINNIE HAD STATED SHE NEEDED TO GET THIS DONE     WANTED TO KNOW ALSO IF WASN'T APPROVED IF COULD GET MEDICATION SENT INSTEAD.     NEEDING TO KNOW BEFORE FRIDAY , AND IF INFUSION ACCEPTED WOULD LIKE TO GET DONE TOMORROW IF CAN.         "

## 2024-06-27 ENCOUNTER — INFUSION (OUTPATIENT)
Dept: ONCOLOGY | Facility: HOSPITAL | Age: 64
End: 2024-06-27
Payer: COMMERCIAL

## 2024-06-27 VITALS
RESPIRATION RATE: 16 BRPM | SYSTOLIC BLOOD PRESSURE: 103 MMHG | OXYGEN SATURATION: 98 % | BODY MASS INDEX: 35.51 KG/M2 | HEART RATE: 71 BPM | DIASTOLIC BLOOD PRESSURE: 65 MMHG | WEIGHT: 181.8 LBS | TEMPERATURE: 98.2 F

## 2024-06-27 DIAGNOSIS — D50.0 IRON DEFICIENCY ANEMIA DUE TO CHRONIC BLOOD LOSS: Primary | ICD-10-CM

## 2024-06-27 PROCEDURE — 25810000003 SODIUM CHLORIDE 0.9 % SOLUTION: Performed by: NURSE PRACTITIONER

## 2024-06-27 PROCEDURE — 96374 THER/PROPH/DIAG INJ IV PUSH: CPT

## 2024-06-27 PROCEDURE — 63710000001 DIPHENHYDRAMINE PER 50 MG: Performed by: NURSE PRACTITIONER

## 2024-06-27 PROCEDURE — 25010000002 FERUMOXYTOL 510 MG/17ML SOLUTION 17 ML VIAL: Performed by: NURSE PRACTITIONER

## 2024-06-27 RX ORDER — ACETAMINOPHEN 325 MG/1
650 TABLET ORAL ONCE
Status: COMPLETED | OUTPATIENT
Start: 2024-06-27 | End: 2024-06-27

## 2024-06-27 RX ORDER — DIPHENHYDRAMINE HCL 25 MG
25 CAPSULE ORAL ONCE
Status: COMPLETED | OUTPATIENT
Start: 2024-06-27 | End: 2024-06-27

## 2024-06-27 RX ORDER — SODIUM CHLORIDE 9 MG/ML
20 INJECTION, SOLUTION INTRAVENOUS ONCE
Status: COMPLETED | OUTPATIENT
Start: 2024-06-27 | End: 2024-06-27

## 2024-06-27 RX ORDER — SODIUM CHLORIDE 9 MG/ML
20 INJECTION, SOLUTION INTRAVENOUS ONCE
OUTPATIENT
Start: 2024-07-04

## 2024-06-27 RX ORDER — DIPHENHYDRAMINE HCL 25 MG
25 CAPSULE ORAL ONCE
Status: CANCELLED | OUTPATIENT
Start: 2024-06-27

## 2024-06-27 RX ORDER — ACETAMINOPHEN 325 MG/1
650 TABLET ORAL ONCE
Status: CANCELLED | OUTPATIENT
Start: 2024-06-27

## 2024-06-27 RX ORDER — DIPHENHYDRAMINE HCL 25 MG
25 CAPSULE ORAL ONCE
OUTPATIENT
Start: 2024-07-04

## 2024-06-27 RX ORDER — ACETAMINOPHEN 325 MG/1
650 TABLET ORAL ONCE
OUTPATIENT
Start: 2024-07-04

## 2024-06-27 RX ORDER — SODIUM CHLORIDE 9 MG/ML
20 INJECTION, SOLUTION INTRAVENOUS ONCE
Status: CANCELLED | OUTPATIENT
Start: 2024-06-27

## 2024-06-27 RX ADMIN — SODIUM CHLORIDE 20 ML/HR: 9 INJECTION, SOLUTION INTRAVENOUS at 14:54

## 2024-06-27 RX ADMIN — ACETAMINOPHEN 650 MG: 325 TABLET ORAL at 14:55

## 2024-06-27 RX ADMIN — FERUMOXYTOL 510 MG: 510 INJECTION INTRAVENOUS at 15:17

## 2024-06-27 RX ADMIN — DIPHENHYDRAMINE HYDROCHLORIDE 25 MG: 25 CAPSULE ORAL at 14:55

## 2024-06-27 NOTE — TELEPHONE ENCOUNTER
Informed patient that we still do not have an answer on expedited Monoferric request. If we don't hear back by 3pm, we will proceed with Feraheme x2, instead. [facility] Charge Nurse approved add-on for today. Patient will proceed to scheduling desk to arrange second dose of Ferahame if she does not receive Monoferric.

## 2024-07-16 ENCOUNTER — OFFICE VISIT (OUTPATIENT)
Dept: CARDIOLOGY | Facility: CLINIC | Age: 64
End: 2024-07-16
Payer: COMMERCIAL

## 2024-07-16 ENCOUNTER — INFUSION (OUTPATIENT)
Dept: ONCOLOGY | Facility: HOSPITAL | Age: 64
End: 2024-07-16
Payer: COMMERCIAL

## 2024-07-16 VITALS
HEART RATE: 69 BPM | WEIGHT: 184 LBS | BODY MASS INDEX: 36.12 KG/M2 | HEIGHT: 60 IN | SYSTOLIC BLOOD PRESSURE: 118 MMHG | DIASTOLIC BLOOD PRESSURE: 70 MMHG

## 2024-07-16 VITALS
HEART RATE: 76 BPM | SYSTOLIC BLOOD PRESSURE: 118 MMHG | TEMPERATURE: 98.6 F | DIASTOLIC BLOOD PRESSURE: 62 MMHG | BODY MASS INDEX: 35.86 KG/M2 | WEIGHT: 183.6 LBS | RESPIRATION RATE: 16 BRPM | OXYGEN SATURATION: 95 %

## 2024-07-16 DIAGNOSIS — E78.2 MIXED HYPERLIPIDEMIA: ICD-10-CM

## 2024-07-16 DIAGNOSIS — I25.5 ISCHEMIC CARDIOMYOPATHY: ICD-10-CM

## 2024-07-16 DIAGNOSIS — I21.09 ACUTE MYOCARDIAL INFARCTION OF ANTERIOR WALL: ICD-10-CM

## 2024-07-16 DIAGNOSIS — D50.0 IRON DEFICIENCY ANEMIA DUE TO CHRONIC BLOOD LOSS: Primary | ICD-10-CM

## 2024-07-16 DIAGNOSIS — Z95.810 AUTOMATIC IMPLANTABLE CARDIOVERTER-DEFIBRILLATOR IN SITU: ICD-10-CM

## 2024-07-16 DIAGNOSIS — Z95.5 S/P CORONARY ARTERY STENT PLACEMENT: ICD-10-CM

## 2024-07-16 DIAGNOSIS — I10 BENIGN ESSENTIAL HYPERTENSION: ICD-10-CM

## 2024-07-16 PROCEDURE — 96374 THER/PROPH/DIAG INJ IV PUSH: CPT

## 2024-07-16 PROCEDURE — 25010000002 FERUMOXYTOL 510 MG/17ML SOLUTION 17 ML VIAL: Performed by: NURSE PRACTITIONER

## 2024-07-16 PROCEDURE — 25810000003 SODIUM CHLORIDE 0.9 % SOLUTION: Performed by: NURSE PRACTITIONER

## 2024-07-16 PROCEDURE — 63710000001 DIPHENHYDRAMINE PER 50 MG: Performed by: NURSE PRACTITIONER

## 2024-07-16 RX ORDER — DIPHENHYDRAMINE HCL 25 MG
25 CAPSULE ORAL ONCE
Status: COMPLETED | OUTPATIENT
Start: 2024-07-16 | End: 2024-07-16

## 2024-07-16 RX ORDER — ACETAMINOPHEN 325 MG/1
650 TABLET ORAL ONCE
Status: COMPLETED | OUTPATIENT
Start: 2024-07-16 | End: 2024-07-16

## 2024-07-16 RX ORDER — SODIUM CHLORIDE 9 MG/ML
20 INJECTION, SOLUTION INTRAVENOUS ONCE
Status: COMPLETED | OUTPATIENT
Start: 2024-07-16 | End: 2024-07-16

## 2024-07-16 RX ADMIN — FERUMOXYTOL 510 MG: 510 INJECTION INTRAVENOUS at 13:02

## 2024-07-16 RX ADMIN — ACETAMINOPHEN 650 MG: 325 TABLET ORAL at 12:41

## 2024-07-16 RX ADMIN — DIPHENHYDRAMINE HYDROCHLORIDE 25 MG: 25 CAPSULE ORAL at 12:41

## 2024-07-16 RX ADMIN — SODIUM CHLORIDE 20 ML/HR: 9 INJECTION, SOLUTION INTRAVENOUS at 12:44

## 2024-07-16 NOTE — PROGRESS NOTES
Subjective:     Encounter Date:07/16/2024      Patient ID: Daisy Dent is a 63 y.o. female.    Chief Complaint:follow up CAD  History of Present Illness  This is a 62 y/o female know to Dr. Saldivar who is known to me. She has a pmhx of ischemic cardiomyopathy with last EF of 40-45%, diabetes, SHELLIE on CPAP, hypertension, s/p dual-chamber AICD, hyperlipidemia, s/p left MCA CVA, coronary artery disease s/o anterior MI and LAD stent placement in 2006, 2009, 2019, and 2021.     She is here today for follow-up visit.  From a cardiac standpoint she has been doing well with no chest pain or shortness of breath.  She says she has not needed to take any sublingual nitroglycerin tablets.  She is taking her isosorbide at night.  She feels palpitations about every night when she goes to bed.  She says this has been going on since she started having issues with anemia.  She has started receiving iron infusions and is actually scheduled for 1 today.  This will be her third infusion.  She says typically around the time of needing the infusions, she will get dizzy, tired and have epistaxis.  She does note improvement in her symptoms following the infusions.  She denies any swelling in her lower extremities, orthopnea or PND.  She denies any syncope.  Her blood pressures have been very well-controlled at home.    Prior history:  Dr. Saldivar began seeing the patient in 2015 when she presented to [facility].  She had previously been followed by Dr. Jamie Kingsley cardiology.  She had an echocardiogram in 2017 that showed an ejection fraction of 40-45%.  She reported continued symptoms of chest discomfort while exercising so a PET stress test was performed that showed medium sized anterior wall infarct with mild elva-infarct ischemia and post stress ejection fraction of 58%.    She was admitted in December 2018 with aphasia. On arrival to the hospital she underwent a CT of the head that showed no evidence of acute CVA there  was a large perfusion mismatch.  A CTA showed evidence of a left MCA thrombosis.  She was given tPA with improvement in her symptoms.  She underwent a repeat echo that showed moderately depressed left ventricular systolic function with an EF of 30%, grade 1 diastolic dysfunction, and no significant valvular disease.  She was then started on Xarelto.    In 2019 she had episodes of chest tightness similar to what she had with her prior MIs.  Medical management was attempted however she continued to have recurrent symptoms and subsequently underwent a cardiac catheterization.  She was found to have severe in-stent restenosis of her proximal LAD stent and ultimately underwent repeat drug-eluting stent placement of the in-stent restenosis.  She was also noted to have nonobstructive disease of the first obtuse marginal branch for which she underwent FFR evaluation which was normal.      She returned for urgent follow-up in 2020 with complaints of chest discomfort.  Due to the severity of her symptoms they went ahead and performed a cardiac catheterization that showed stable moderate nonobstructive coronary disease.    In 2021 she reported continued chest tightness and she was started on isosorbide 10 nitrate.  She was unable to tolerate this due to headaches.  Follow-up in 2021 she was continued to have episodes of nocturnal chest pain.  Dr. Saldivar recommended proceeding with a repeat catheterization.  This showed 90% proximal in-stent restenosis and she underwent repeat drug-eluting stent placement.    December 2021 she reported improvement in her symptoms.  Then in April 2022 she reported she was having recurrent episodes of chest tightness.  It was decided to monitor for the time being.  In follow-up in August she was having symptoms about 3 times a week lasting 5 to 10 minutes at a time.  They opted to try isosorbide in the evening.    I saw her for the first time in November 2022. She had been hospitalized in October  2022 with anemia and a hemoglobin of 7.1. She underwent an EGD that was normal and colonoscopy showed a small area of nonbleeding colonic angiectasis. Plavix and Xarelto were both resumed.    She then saw Dr. Saldivar in March 2023 and was doing well. Her hemoglobin was remaining stable on both Xarelto and Plavix. No changes were made to her treatment plan.  I then saw her in October 2023 and she reported having more issues with epistaxis.  She also reported episodes of chest tightness that felt like heartburn and was relieved with taking Tums.  At that visit we opted to monitor her symptoms.    She then saw Dr. Saldivar in February.about 3 weeks prior, she reported more issues with fatigue, shortness of breath and palpitations. She was found to be anemic with a hemoglobin of 7.2.  She saw Dr. Hood who diagnosed her with iron deficiency anemia and started her on IV Venofer.  She was referred to GI as well.  By the time of her visit with Dr. Saldivar, she reported improvement in her symptoms.  She was still having issues with palpitations and shortness of breath.  They opted to monitor her symptoms at that time.  She then returned in April 2024 and was continuing to have issues with dizziness with position changes.  Her dizziness was not associated with any decline in her blood pressure.  They discussed placing a monitor but decided to keep an eye on her symptoms at that time.    I have reviewed and updated as appropriate allergies, current medications, past family history, past medical history, past surgical history and problem list.    Review of Systems   Constitutional: Negative for fever, malaise/fatigue, weight gain and weight loss.   HENT:  Negative for congestion, hoarse voice, nosebleeds and sore throat.    Eyes:  Negative for blurred vision and double vision.   Cardiovascular:  Positive for palpitations. Negative for chest pain, dyspnea on exertion, leg swelling, orthopnea and syncope.   Respiratory:  Negative for  cough, shortness of breath and wheezing.    Gastrointestinal:  Negative for abdominal pain, heartburn, hematemesis, hematochezia and melena.   Genitourinary:  Negative for dysuria and hematuria.   Neurological:  Positive for dizziness. Negative for headaches, light-headedness and numbness.   Psychiatric/Behavioral:  Negative for depression. The patient is not nervous/anxious.          Current Outpatient Medications:     Accu-Chek FastClix Lancets misc, Use to test blood sugar 4 times daily  E11.8, Disp: 400 each, Rfl: 1    Accu-Chek Guide test strip, USE 4 TIMES A DAY E11.8, Disp: 300 each, Rfl: 2    albuterol sulfate  (90 Base) MCG/ACT inhaler, Inhale 2 puffs Every 4 (Four) Hours As Needed for Wheezing for up to 180 days., Disp: 8 g, Rfl: 3    atorvastatin (LIPITOR) 80 MG tablet, Take 1 tablet by mouth Daily., Disp: 90 tablet, Rfl: 3    Blood Glucose Monitoring Suppl (Accu-Chek Guide) w/Device kit, Inject 1 Device under the skin into the appropriate area as directed Daily., Disp: 1 kit, Rfl: 0    carvedilol (COREG) 25 MG tablet, TAKE 1 TABLET BY MOUTH TWICE A DAY WITH MEALS, Disp: 180 tablet, Rfl: 2    Cholecalciferol (VITAMIN D3) 2000 UNITS capsule, Take 1,000 Units by mouth Daily., Disp: , Rfl:     clopidogrel (PLAVIX) 75 MG tablet, Take 1 tablet by mouth Daily., Disp: 90 tablet, Rfl: 1    furosemide (LASIX) 40 MG tablet, Take 1 tablet by mouth Daily., Disp: 90 tablet, Rfl: 1    HYDROcodone-acetaminophen (NORCO) 5-325 MG per tablet, Take 1 tablet by mouth Every 6 (Six) Hours As Needed for Moderate Pain., Disp: 10 tablet, Rfl: 0    Insulin Pen Needle 32G X 4 MM misc, USE 4 TIMES A DAY, Disp: 400 each, Rfl: 3    Multiple Vitamins-Minerals (MULTIVITAL PO), Take 1 tablet by mouth Daily., Disp: , Rfl:     nitroglycerin (Nitrostat) 0.4 MG SL tablet, Place 1 tablet under the tongue Every 5 (Five) Minutes As Needed for Chest Pain. Take no more than 3 doses in 15 minutes., Disp: 30 tablet, Rfl: 11    Omega-3 Fatty  Acids (FISH OIL) 1200 MG capsule capsule, Take 1 capsule by mouth Daily With Breakfast., Disp: , Rfl:     potassium chloride 10 MEQ CR tablet, Take 1 tablet by mouth Daily. Resume on 1/7/20, Disp: 90 tablet, Rfl: 1    potassium chloride 10 MEQ CR tablet, TAKE 1 TABLET BY MOUTH EVERY DAY, Disp: 90 tablet, Rfl: 1    rivaroxaban (Xarelto) 20 MG tablet, Take 1 tablet by mouth Daily With Dinner., Disp: 90 tablet, Rfl: 3    Semaglutide, 1 MG/DOSE, (Ozempic, 1 MG/DOSE,) 4 MG/3ML solution pen-injector, Inject 1 mg under the skin into the appropriate area as directed 1 (One) Time Per Week., Disp: 9 mL, Rfl: 1    spironolactone (ALDACTONE) 25 MG tablet, TAKE 1 TABLET BY MOUTH EVERY DAY, Disp: 90 tablet, Rfl: 3    traZODone (DESYREL) 50 MG tablet, Take 1 tablet by mouth every night at bedtime., Disp: 90 tablet, Rfl: 2    vitamin B-12 (CYANOCOBALAMIN) 500 MCG tablet, Take 1 tablet by mouth Daily., Disp: , Rfl:     Past Medical History:   Diagnosis Date    Abdominal pain     Abnormal liver function test     Acute bronchitis     Anemia     Benign essential hypertension     CAD (coronary artery disease)     CHF (congestive heart failure)     Colon polyp     Congestive heart disease     COPD (chronic obstructive pulmonary disease)     Cough     Diabetes     Diabetes mellitus     Diarrhea     Gastroenteritis     Health care maintenance     Hyperlipidemia     Hypertension     IFG (impaired fasting glucose)     Ischemic cardiomyopathy     Myocardial infarction     SHELLIE (obstructive sleep apnea)     Sore throat     Stroke 2020    Type 2 diabetes mellitus     Vaginal yeast infection     Vitamin D deficiency        Past Surgical History:   Procedure Laterality Date    CARDIAC CATHETERIZATION      CARDIAC CATHETERIZATION N/A 01/06/2020    Procedure: Coronary angiography;  Surgeon: Oneida Saldivar MD;  Location:  [facility];  Service: Cardiovascular    CARDIAC CATHETERIZATION N/A 01/06/2020    Procedure: Left heart cath;   Surgeon: Oneida Saldivar MD;  Location:  [facility];  Service: Cardiovascular    CARDIAC CATHETERIZATION N/A 01/06/2020    Procedure: Left ventriculography;  Surgeon: Oneida Saldivar MD;  Location:  [facility];  Service: Cardiovascular    CARDIAC CATHETERIZATION N/A 01/06/2020    Procedure: Percutaneous Coronary Intervention;  Surgeon: Oneida Saldivar MD;  Location:  [facility];  Service: Cardiovascular    CARDIAC CATHETERIZATION N/A 01/06/2020    Procedure: Stent HUGO coronary;  Surgeon: Oneida Saldivar MD;  Location:  [facility];  Service: Cardiovascular    CARDIAC CATHETERIZATION  01/06/2020    Procedure: Functional Flow [place];  Surgeon: Oneida Saldivar MD;  [facility];  Service: Cardiovascular    CARDIAC CATHETERIZATION N/A 10/26/2020    Procedure: Coronary angiography;  Surgeon: Oneida Saldivar MD;  Location: [facility] CATH INVASIVE LOCATION;  Service: Cardiovascular;  Laterality: N/A;    CARDIAC CATHETERIZATION N/A 10/26/2020    Procedure: Left heart cath;  Surgeon: Oneida Saldivar MD;  [facility];  Service: Cardiovascular;  Laterality: N/A;    CARDIAC CATHETERIZATION N/A 10/26/2020    Procedure: Left ventriculography;  Surgeon: Oneida Saldivar MD;  [facility];  Service: Cardiovascular;  Laterality: N/A;    CARDIAC CATHETERIZATION  10/26/2020    Procedure: Functional Flow [place];  Surgeon: Oneida Saldivar MD;  Location: [facility] CATH INVASIVE LOCATION;  Service: Cardiovascular;;    CARDIAC CATHETERIZATION N/A 07/19/2021    Procedure: Coronary angiography;  Surgeon: Oneida Saldivar MD;  [facility];  Service: Cardiovascular;  Laterality: N/A;    CARDIAC CATHETERIZATION N/A 07/19/2021    Procedure: Left heart cath;  Surgeon: Oneida Saldivar MD;  Location: [facility] CATH INVASIVE LOCATION;  Service: Cardiovascular;   Laterality: N/A;    CARDIAC CATHETERIZATION N/A 07/19/2021    Procedure: Left ventriculography;  Surgeon: Oneida Saldivar MD;  Location:  [facility];  Service: Cardiovascular;  Laterality: N/A;    CARDIAC CATHETERIZATION N/A 07/19/2021    Procedure: Percutaneous Coronary Intervention;  Surgeon: Oneida Saldivar MD;  Location:  [facility];  Service: Cardiovascular;  Laterality: N/A;    CARDIAC CATHETERIZATION N/A 07/19/2021    Procedure: Optical Coherent Tomography;  Surgeon: Oneida Saldivar MD;  Location:  [facility];  Service: Cardiovascular;  Laterality: N/A;    CARDIAC CATHETERIZATION N/A 07/19/2021    Procedure: Stent HUGO coronary;  Surgeon: Oneida Saldivar MD;  [facility];  Service: Cardiovascular;  Laterality: N/A;    CARDIAC CATHETERIZATION  07/19/2021    Procedure: RESTING FULL CYCLE RATIO;  Surgeon: Oneida Saldivar MD;  Location: [facility] CATH INVASIVE LOCATION;  Service: Cardiovascular;;  RFR    CARDIAC DEFIBRILLATOR PLACEMENT  2010    Medtronic dual chamber/Dr. Valentin    CARDIAC ELECTROPHYSIOLOGY PROCEDURE N/A 01/12/2018    Procedure: ICD DC generator change  medtronic;  Surgeon: Hany Ornelas MD;  Location: [facility] CATH INVASIVE LOCATION;  Service:     PACEMAKER IMPLANTATION      TUBAL ABDOMINAL LIGATION         Family History   Problem Relation Age of Onset    Heart attack Mother     Diabetes Mother     Heart disease Mother     Hyperlipidemia Mother     Emphysema Mother     Diabetes Father     Heart disease Father     Hyperlipidemia Father     Hypertension Father     Colon cancer Father     Heart attack Father     Hypertension Sister     Hypertension Brother     Heart disease Brother     Hyperlipidemia Brother     Heart disease Brother     Heart attack Brother     Heart disease Maternal Grandmother     Heart disease Maternal Grandfather     Heart disease Paternal Grandmother     Heart disease Paternal Grandfather   "      Social History     Tobacco Use    Smoking status: Former     Current packs/day: 0.00     Average packs/day: 1 pack/day for 30.0 years (30.0 ttl pk-yrs)     Types: Cigarettes     Start date: 1979     Quit date: 2009     Years since quittin.7    Smokeless tobacco: Never    Tobacco comments:     QUIT 10 YRS   Vaping Use    Vaping status: Never Used   Substance Use Topics    Alcohol use: Yes     Comment: rarely uses ETOH/caffeine use    Drug use: No         ECG 12 Lead    Date/Time: 2024 12:05 PM  Performed by: Linda Simmons APRN    Authorized by: Linda Simmons APRN  Comparison: compared with previous ECG from 2024  Similar to previous ECG  Rhythm: sinus rhythm             Objective:     Visit Vitals  /70   Pulse 69   Ht 152.4 cm (60\")   Wt 83.5 kg (184 lb)   BMI 35.94 kg/m²               Physical Exam  Constitutional:       Appearance: Normal appearance. She is obese.   HENT:      Head: Normocephalic.   Neck:      Vascular: No carotid bruit.   Cardiovascular:      Rate and Rhythm: Normal rate and regular rhythm.      Chest Wall: PMI is not displaced.      Pulses: Normal pulses.           Radial pulses are 2+ on the right side and 2+ on the left side.        Posterior tibial pulses are 2+ on the right side and 2+ on the left side.      Heart sounds: Normal heart sounds. No murmur heard.     No friction rub. No gallop.   Pulmonary:      Effort: Pulmonary effort is normal.      Breath sounds: Normal breath sounds.   Abdominal:      General: Bowel sounds are normal. There is no distension.      Palpations: Abdomen is soft.   Musculoskeletal:      Right lower leg: No edema.      Left lower leg: No edema.   Skin:     General: Skin is warm and dry.      Capillary Refill: Capillary refill takes less than 2 seconds.   Neurological:      Mental Status: She is alert and oriented to person, place, and time.   Psychiatric:         Mood and Affect: Mood normal.         Behavior: Behavior " normal.         Thought Content: Thought content normal.          Lab Review:   Lipid Panel          1/29/2024    14:01   Lipid Panel   Total Cholesterol 119    Triglycerides 228    HDL Cholesterol 31    VLDL Cholesterol 37    LDL Cholesterol  51        Cardiac Procedures:       Assessment:         Diagnoses and all orders for this visit:    1. Automatic implantable cardioverter-defibrillator in situ (Primary)    2. Benign essential hypertension    3. Ischemic cardiomyopathy    4. History of anterior myocardial infarction     5. Mixed hyperlipidemia    6. S/P coronary artery stent placement    Other orders  -     ECG 12 Lead            Plan:         1. CAD: s/p stent to LAD, most recently in 2019. She is on Plavix, statin and beta blocker. EKG does not show any ischemic changes. She is also on isosorbide. She denies any anginal symptoms. Continue with current medical therapy.  2. Ischemic cardiomyopathy: on GDMT with carvedilol, furosemide, spironolactone. Euvolemic on exam today. Continue with current treatment.  3. S/p AICD: normal device function on EKG today.  4. H/o CVA: on Xarelto for CVA.  5. Iron deficiency anemia: follows with Dr. Hood and received IV Venofer    Thank you for allowing me to participate in this patient's care. Please call with any questions or concerns. Ms. Dent will follow up with Dr. Saldivar in 6 months.          Your medication list            Accurate as of July 16, 2024 12:05 PM. If you have any questions, ask your nurse or doctor.                CONTINUE taking these medications        Instructions Last Dose Given Next Dose Due   Accu-Chek FastClix Lancets misc      Use to test blood sugar 4 times daily  E11.8       Accu-Chek Guide test strip  Generic drug: glucose blood      USE 4 TIMES A DAY E11.8       Accu-Chek Guide w/Device kit      Inject 1 Device under the skin into the appropriate area as directed Daily.       albuterol sulfate  (90 Base) MCG/ACT inhaler  Commonly  known as: PROVENTIL HFA;VENTOLIN HFA;PROAIR HFA      Inhale 2 puffs Every 4 (Four) Hours As Needed for Wheezing for up to 180 days.       atorvastatin 80 MG tablet  Commonly known as: LIPITOR      Take 1 tablet by mouth Daily.       carvedilol 25 MG tablet  Commonly known as: COREG      TAKE 1 TABLET BY MOUTH TWICE A DAY WITH MEALS       clopidogrel 75 MG tablet  Commonly known as: PLAVIX      Take 1 tablet by mouth Daily.       fish oil 1200 MG capsule capsule      Take 1 capsule by mouth Daily With Breakfast.       furosemide 40 MG tablet  Commonly known as: LASIX      Take 1 tablet by mouth Daily.       HYDROcodone-acetaminophen 5-325 MG per tablet  Commonly known as: NORCO      Take 1 tablet by mouth Every 6 (Six) Hours As Needed for Moderate Pain.       Insulin Pen Needle 32G X 4 MM misc      USE 4 TIMES A DAY       multivitamin with minerals tablet tablet      Take 1 tablet by mouth Daily.       nitroglycerin 0.4 MG SL tablet  Commonly known as: Nitrostat      Place 1 tablet under the tongue Every 5 (Five) Minutes As Needed for Chest Pain. Take no more than 3 doses in 15 minutes.       Ozempic (1 MG/DOSE) 4 MG/3ML solution pen-injector  Generic drug: Semaglutide (1 MG/DOSE)      Inject 1 mg under the skin into the appropriate area as directed 1 (One) Time Per Week.       potassium chloride 10 MEQ CR tablet      Take 1 tablet by mouth Daily. Resume on 1/7/20       potassium chloride 10 MEQ CR tablet      TAKE 1 TABLET BY MOUTH EVERY DAY       rivaroxaban 20 MG tablet  Commonly known as: Xarelto      Take 1 tablet by mouth Daily With Dinner.       spironolactone 25 MG tablet  Commonly known as: ALDACTONE      TAKE 1 TABLET BY MOUTH EVERY DAY       traZODone 50 MG tablet  Commonly known as: DESYREL      Take 1 tablet by mouth every night at bedtime.       vitamin B-12 500 MCG tablet  Commonly known as: CYANOCOBALAMIN      Take 1 tablet by mouth Daily.       Vitamin D3 50 MCG (2000 UT) capsule      Take 1,000  Units by mouth Daily.                  Linda Simmons, [place]  07/16/24  2:04 PM EST

## 2024-08-13 ENCOUNTER — OFFICE VISIT (OUTPATIENT)
Dept: ONCOLOGY | Facility: CLINIC | Age: 64
End: 2024-08-13
Payer: COMMERCIAL

## 2024-08-13 ENCOUNTER — LAB (OUTPATIENT)
Dept: LAB | Facility: HOSPITAL | Age: 64
End: 2024-08-13
Payer: COMMERCIAL

## 2024-08-13 VITALS
HEART RATE: 71 BPM | BODY MASS INDEX: 36.12 KG/M2 | OXYGEN SATURATION: 98 % | SYSTOLIC BLOOD PRESSURE: 116 MMHG | TEMPERATURE: 98.2 F | RESPIRATION RATE: 18 BRPM | WEIGHT: 184 LBS | DIASTOLIC BLOOD PRESSURE: 71 MMHG | HEIGHT: 60 IN

## 2024-08-13 DIAGNOSIS — E53.8 B12 DEFICIENCY: ICD-10-CM

## 2024-08-13 DIAGNOSIS — D50.0 IRON DEFICIENCY ANEMIA DUE TO CHRONIC BLOOD LOSS: Primary | ICD-10-CM

## 2024-08-13 DIAGNOSIS — D50.0 IRON DEFICIENCY ANEMIA DUE TO CHRONIC BLOOD LOSS: ICD-10-CM

## 2024-08-13 LAB
BASOPHILS # BLD AUTO: 0.04 10*3/MM3 (ref 0–0.2)
BASOPHILS NFR BLD AUTO: 0.5 % (ref 0–1.5)
DEPRECATED RDW RBC AUTO: 50.2 FL (ref 37–54)
EOSINOPHIL # BLD AUTO: 0.16 10*3/MM3 (ref 0–0.4)
EOSINOPHIL NFR BLD AUTO: 2 % (ref 0.3–6.2)
ERYTHROCYTE [DISTWIDTH] IN BLOOD BY AUTOMATED COUNT: 16 % (ref 12.3–15.4)
FERRITIN SERPL-MCNC: 90.3 NG/ML (ref 13–150)
HCT VFR BLD AUTO: 40.9 % (ref 34–46.6)
HGB BLD-MCNC: 12.8 G/DL (ref 12–15.9)
IMM GRANULOCYTES # BLD AUTO: 0.03 10*3/MM3 (ref 0–0.05)
IMM GRANULOCYTES NFR BLD AUTO: 0.4 % (ref 0–0.5)
IRON 24H UR-MRATE: 59 MCG/DL (ref 37–145)
IRON SATN MFR SERPL: 15 % (ref 20–50)
LYMPHOCYTES # BLD AUTO: 1.91 10*3/MM3 (ref 0.7–3.1)
LYMPHOCYTES NFR BLD AUTO: 23.6 % (ref 19.6–45.3)
MCH RBC QN AUTO: 26.9 PG (ref 26.6–33)
MCHC RBC AUTO-ENTMCNC: 31.3 G/DL (ref 31.5–35.7)
MCV RBC AUTO: 85.9 FL (ref 79–97)
MONOCYTES # BLD AUTO: 0.72 10*3/MM3 (ref 0.1–0.9)
MONOCYTES NFR BLD AUTO: 8.9 % (ref 5–12)
NEUTROPHILS NFR BLD AUTO: 5.23 10*3/MM3 (ref 1.7–7)
NEUTROPHILS NFR BLD AUTO: 64.6 % (ref 42.7–76)
NRBC BLD AUTO-RTO: 0 /100 WBC (ref 0–0.2)
PLATELET # BLD AUTO: 307 10*3/MM3 (ref 140–450)
PMV BLD AUTO: 10.5 FL (ref 6–12)
RBC # BLD AUTO: 4.76 10*6/MM3 (ref 3.77–5.28)
TIBC SERPL-MCNC: 381 MCG/DL (ref 298–536)
TRANSFERRIN SERPL-MCNC: 256 MG/DL (ref 200–360)
WBC NRBC COR # BLD AUTO: 8.09 10*3/MM3 (ref 3.4–10.8)

## 2024-08-13 PROCEDURE — 82607 VITAMIN B-12: CPT | Performed by: NURSE PRACTITIONER

## 2024-08-13 PROCEDURE — 99214 OFFICE O/P EST MOD 30 MIN: CPT | Performed by: NURSE PRACTITIONER

## 2024-08-13 PROCEDURE — 82728 ASSAY OF FERRITIN: CPT

## 2024-08-13 PROCEDURE — 84466 ASSAY OF TRANSFERRIN: CPT

## 2024-08-13 PROCEDURE — 85025 COMPLETE CBC W/AUTO DIFF WBC: CPT

## 2024-08-13 PROCEDURE — 83540 ASSAY OF IRON: CPT

## 2024-08-13 PROCEDURE — 36415 COLL VENOUS BLD VENIPUNCTURE: CPT | Performed by: NURSE PRACTITIONER

## 2024-08-13 NOTE — PROGRESS NOTES
Subjective     REASON FOR CONSULTATION:  anemia  Provide an opinion on any further workup or treatment                             REQUESTING PHYSICIAN:  Douglas    RECORDS OBTAINED:  Records of the patients history including those obtained from the referring provider were reviewed and summarized in detail.    History of Present Illness   This is a 63-year-old woman with type 2 diabetes, prior cerebrovascular accident, chronic bronchitis, obstructive sleep apnea, coronary artery disease, hypertension, hyperlipidemia, ischemic cardiomyopathy on anticoagulation with Xarelto and Plavix and AICD in place (most recent EF 40-45%).  The patient is referred for evaluation of anemia.  The patient has prior history of microcytic, hypochromic anemia in October 2022 requiring transfusion support.  Apparently she had endoscopy during hospitalization which showed some nonbleeding colonic angioectasias (per cardiology notes).  When checked on 1/24/2023 the patient had normal hemoglobin 13.2 with MCV 81.8.  A CBC with her primary care on 1/29/2024 showed substantial drop in the hemoglobin to 7.2 with MCV 72.8/MCHC 27.7 and iron studies showed a ferritin of 42 and iron saturation of 4% with elevated TIBC 592 consistent with severe iron deficiency.    The patient states she has been on oral iron over-the-counter for about 1 year. Because of poor response to oral iron therapy the patient was given IV iron with Venofer 300 mg x 3 doses completed on 2/22/2024.  She had symptomatic improvement in her fatigue and subsequently her hemoglobin and iron stores.  She is additional IV iron in late June and mid July with Feraheme due to the Venofer shortage.  She tolerated this well.    She has not yet seen gastroenterology but has an appointment upcoming.  She denies any known bleeding or dark tarry stools.    Past Medical History:   Diagnosis Date    Abdominal pain     Abnormal liver function test     Acute bronchitis     Anemia     Benign  essential hypertension     CAD (coronary artery disease)     CHF (congestive heart failure)     Colon polyp     Congestive heart disease     COPD (chronic obstructive pulmonary disease)     Cough     Diabetes     Diabetes mellitus     Diarrhea     Gastroenteritis     Health care maintenance     Hyperlipidemia     Hypertension     IFG (impaired fasting glucose)     Ischemic cardiomyopathy     Myocardial infarction     SHELLIE (obstructive sleep apnea)     Sore throat     Stroke 2020    Type 2 diabetes mellitus     Vaginal yeast infection     Vitamin D deficiency         Past Surgical History:   Procedure Laterality Date    CARDIAC CATHETERIZATION      CARDIAC CATHETERIZATION N/A 01/06/2020    Procedure: Coronary angiography;  Surgeon: Oneida Saldivar MD;  Location:  [facility];  Service: Cardiovascular    CARDIAC CATHETERIZATION N/A 01/06/2020    Procedure: Left heart cath;  Surgeon: Oneida Saldivar MD;  Location:  [facility];  Service: Cardiovascular    CARDIAC CATHETERIZATION N/A 01/06/2020    Procedure: Left ventriculography;  Surgeon: Onedia Saldivar MD;  Location:  [facility];  Service: Cardiovascular    CARDIAC CATHETERIZATION N/A 01/06/2020    Procedure: Percutaneous Coronary Intervention;  Surgeon: Oneida Saldivar MD;  Location:  [facility];  Service: Cardiovascular    CARDIAC CATHETERIZATION N/A 01/06/2020    Procedure: Stent HUGO coronary;  Surgeon: Oneida Saldivar MD;  Location:  [facility];  Service: Cardiovascular    CARDIAC CATHETERIZATION  01/06/2020    Procedure: Functional Flow [place];  Surgeon: Oneida Saldivar MD;  Location:  [facility];  Service: Cardiovascular    CARDIAC CATHETERIZATION N/A 10/26/2020    Procedure: Coronary angiography;  Surgeon: Oneida Saldivar MD;  Location:  [facility];  Service: Cardiovascular;  Laterality: N/A;    CARDIAC CATHETERIZATION N/A  10/26/2020    Procedure: Left heart cath;  Surgeon: Oneida Saldivar MD;  Location: [facility] C[facility] INVASIVE LOC[facility];  Service: Cardiovascular;  Laterality: N/A;    CARDIAC CATHETERIZATION N/A 10/26/2020    Procedure: Left ventriculography;  Surgeon: Oneida Saldivar MD;  Location: [facility] C[facility]VASIVE LOC[facility];  Service: Cardiovascular;  Laterality: N/A;    CARDIAC CATHETERIZATION  10/26/2020    Procedure: Functional Flow [place];  Surgeon: Oneida Saldivar MD;  Location: [facility] CATH [facility]IVE LOC[facility];  Service: Cardiovascular;;    CARDIAC CATHETERIZATION N/A 07/19/2021    Procedure: Coronary angiography;  Surgeon: Oneida Saldivar MD;  Location: [facility] CATH INVASIVE LOCATION;  Service: Cardiovascular;  Laterality: N/A;    CARDIAC CATHETERIZATION N/A 07/19/2021    Procedure: Left heart cath;  Surgeon: Oneida Saldivar MD;  Location: [facility] CATH INVASIVE LOCATION;  Service: Cardiovascular;  Laterality: N/A;    CARDIAC CATHETERIZATION N/A 07/19/2021    Procedure: Left ventriculography;  Surgeon: Oneida Saldivar MD;  Location: [facility] CATH INVASIVE LOCATION;  Service: Cardiovascular;  Laterality: N/A;    CARDIAC CATHETERIZATION N/A 07/19/2021    Procedure: Percutaneous Coronary Intervention;  Surgeon: Oneida Saldivar MD;  Location: [facility] CATH INVASIVE LOCATION;  Service: Cardiovascular;  Laterality: N/A;    CARDIAC CATHETERIZATION N/A 07/19/2021    Procedure: Optical Coherent Tomography;  Surgeon: Oneida Saldivar MD;  Location: [facility] CATH INVASIVE LOCATION;  Service: Cardiovascular;  Laterality: N/A;    CARDIAC CATHETERIZATION N/A 07/19/2021    Procedure: Stent HUGO coronary;  Surgeon: Oneida Saldivar MD;  Location: [facility] CATH INVASIVE LOCATION;  Service: Cardiovascular;  Laterality: N/A;    CARDIAC CATHETERIZATION  07/19/2021    Procedure: RESTING FULL CYCLE RATIO;  Surgeon: Oneida Saldivar MD;  Location: [facility] CATH INVASIVE LOCATION;  Service: Cardiovascular;;  RFR    CARDIAC DEFIBRILLATOR PLACEMENT  2010     Medtronic dual chamber/Dr. Valentin    CARDIAC ELECTROPHYSIOLOGY PROCEDURE N/A 01/12/2018    Procedure: ICD DC generator change  medtronic;  Surgeon: Hany Ornelas MD;  Location: [facility] INVASIVE LOCATION;  Service:     PACEMAKER IMPLANTATION      TUBAL ABDOMINAL LIGATION          Current Outpatient Medications on File Prior to Visit   Medication Sig Dispense Refill    Accu-Chek FastClix Lancets misc Use to test blood sugar 4 times daily  E11.8 400 each 1    Accu-Chek Guide test strip USE 4 TIMES A DAY E11.8 300 each 2    atorvastatin (LIPITOR) 80 MG tablet Take 1 tablet by mouth Daily. 90 tablet 3    Blood Glucose Monitoring Suppl (Accu-Chek Guide) w/Device kit Inject 1 Device under the skin into the appropriate area as directed Daily. 1 kit 0    carvedilol (COREG) 25 MG tablet TAKE 1 TABLET BY MOUTH TWICE A DAY WITH MEALS 180 tablet 2    Cholecalciferol (VITAMIN D3) 2000 UNITS capsule Take 1,000 Units by mouth Daily.      clopidogrel (PLAVIX) 75 MG tablet Take 1 tablet by mouth Daily. 90 tablet 1    furosemide (LASIX) 40 MG tablet Take 1 tablet by mouth Daily. 90 tablet 1    HYDROcodone-acetaminophen (NORCO) 5-325 MG per tablet Take 1 tablet by mouth Every 6 (Six) Hours As Needed for Moderate Pain. 10 tablet 0    Insulin Pen Needle 32G X 4 MM misc USE 4 TIMES A  each 3    Multiple Vitamins-Minerals (MULTIVITAL PO) Take 1 tablet by mouth Daily.      nitroglycerin (Nitrostat) 0.4 MG SL tablet Place 1 tablet under the tongue Every 5 (Five) Minutes As Needed for Chest Pain. Take no more than 3 doses in 15 minutes. 30 tablet 11    Omega-3 Fatty Acids (FISH OIL) 1200 MG capsule capsule Take 1 capsule by mouth Daily With Breakfast.      potassium chloride 10 MEQ CR tablet Take 1 tablet by mouth Daily. Resume on 1/7/20 90 tablet 1    potassium chloride 10 MEQ CR tablet TAKE 1 TABLET BY MOUTH EVERY DAY 90 tablet 1    rivaroxaban (Xarelto) 20 MG tablet Take 1 tablet by mouth Daily With Dinner. 90 tablet 3     Semaglutide, 1 MG/DOSE, (Ozempic, 1 MG/DOSE,) 4 MG/3ML solution pen-injector Inject 1 mg under the skin into the appropriate area as directed 1 (One) Time Per Week. 9 mL 1    spironolactone (ALDACTONE) 25 MG tablet TAKE 1 TABLET BY MOUTH EVERY DAY 90 tablet 3    traZODone (DESYREL) 50 MG tablet Take 1 tablet by mouth every night at bedtime. 90 tablet 2    vitamin B-12 (CYANOCOBALAMIN) 500 MCG tablet Take 1 tablet by mouth Daily.       No current facility-administered medications on file prior to visit.        ALLERGIES:  No Known Allergies     Social History     Socioeconomic History    Marital status:     Number of children: 2   Tobacco Use    Smoking status: Former     Current packs/day: 0.00     Average packs/day: 1 pack/day for 30.0 years (30.0 ttl pk-yrs)     Types: Cigarettes     Start date: 1979     Quit date: 2009     Years since quittin.7    Smokeless tobacco: Never    Tobacco comments:     QUIT 10 YRS   Vaping Use    Vaping status: Never Used   Substance and Sexual Activity    Alcohol use: Yes     Comment: rarely uses ETOH/caffeine use    Drug use: No    Sexual activity: Yes     Partners: Male        Family History   Problem Relation Age of Onset    Heart attack Mother     Diabetes Mother     Heart disease Mother     Hyperlipidemia Mother     Emphysema Mother     Diabetes Father     Heart disease Father     Hyperlipidemia Father     Hypertension Father     Colon cancer Father     Heart attack Father     Hypertension Sister     Hypertension Brother     Heart disease Brother     Hyperlipidemia Brother     Heart disease Brother     Heart attack Brother     Heart disease Maternal Grandmother     Heart disease Maternal Grandfather     Heart disease Paternal Grandmother     Heart disease Paternal Grandfather         Review of Systems   Constitutional:  Positive for fatigue. Negative for unexpected weight change.   HENT: Negative.     Respiratory:  Negative for chest tightness and shortness  "of breath.    Cardiovascular:  Negative for chest pain and palpitations.   Gastrointestinal: Negative.    Musculoskeletal: Negative.    Allergic/Immunologic: Negative.    Neurological:  Negative for syncope, weakness and light-headedness.   Hematological: Negative.    Psychiatric/Behavioral: Negative.            Objective     Vitals:    08/13/24 1148   BP: 116/71   Pulse: 71   Resp: 18   Temp: 98.2 °F (36.8 °C)   TempSrc: Skin   SpO2: 98%   Weight: 83.5 kg (184 lb)   Height: 152.4 cm (60\")           8/13/2024    11:49 AM   Current Status   ECOG score 0       Physical Exam    CONSTITUTIONAL: pleasant well-developed adult woman  HEENT: no icterus, no thrush, moist membranes  LYMPH: no cervical or supraclavicular lad  CV: RRR, S1S2, no murmur  RESP: cta bilat, no wheezing, no rales  GI: soft, non-tender, no splenomegaly, +bs  MUSC: no edema, normal gait  NEURO: alert and oriented x3, normal strength  PSYCH: normal mood and affect  Exam is unchanged-8/13/2024    RECENT LABS:  Hematology WBC   Date Value Ref Range Status   08/13/2024 8.09 3.40 - 10.80 10*3/mm3 Final   01/29/2024 9.81 3.40 - 10.80 10*3/mm3 Final   01/24/2023 8.27 4.5 - 11.0 10*3/uL Final     RBC   Date Value Ref Range Status   08/13/2024 4.76 3.77 - 5.28 10*6/mm3 Final   01/29/2024 3.57 (L) 3.77 - 5.28 10*6/mm3 Final   01/24/2023 5.23 (H) 4.0 - 5.2 10*6/uL Final     Hemoglobin   Date Value Ref Range Status   08/13/2024 12.8 12.0 - 15.9 g/dL Final   01/24/2023 13.2 12.0 - 16.0 g/dL Final     Hematocrit   Date Value Ref Range Status   08/13/2024 40.9 34.0 - 46.6 % Final   01/24/2023 42.8 36.0 - 46.0 % Final     Platelets   Date Value Ref Range Status   08/13/2024 307 140 - 450 10*3/mm3 Final   01/24/2023 378 140 - 440 10*3/uL Final            Assessment & Plan     *Severe iron deficiency anemia  The patient has prior history of iron deficiency anemia October 2022 requiring PRBC and IV iron during hospitalization  EGD/colonoscopy October 2022 showed " nonbleeding colonic angioectasias  hemoglobin 7.2 with MCV 72.8 and iron sat 4% consistent with severe iron deficiency despite taking daily iron tablet OTC  Status post Venofer 300 mg x 3 doses completed 2/22/2024 6/24/24 Hemoglobin has declined to 8.9.  Patient denies any known bleeding or dark tarry stools.  He ferritin has declined to 9 and iron saturation 5% with TIBC 468.  We will need to proceed with additional iron and given the Venofer shortage we will request Monoferric as the patient is diabetic so would prefer not to need steroid premedication with Ferrlecit.  Patient also has known cardiovascular disease as below.  8/13/2024 Returns today with hemoglobin improved to 12.8.  We were ubable to get monoferric approved and therefore gave two doses feraheme on 6/27/24 and 7/16/24.  Iron studies are improved today with ferritin of 90 and iron saturation 15%.  She does not need additional iron currently however we will continue to monitor her in 3 months with repeat CBC, ferritin, iron profile and MD review.  In the interim she will see gastroenterology.    *Case complicated by need of chronic anticoagulation with Xarelto and antiplatelet therapy with Plavix for ischemic cardiomyopathy/CAD/prior stroke    Hematology plan/recommendations:  No IV iron needed currently  B12 level pending  Follow-up in 3 months with CBC, ferritin, iron profile

## 2024-08-14 ENCOUNTER — PREP FOR SURGERY (OUTPATIENT)
Dept: OTHER | Facility: HOSPITAL | Age: 64
End: 2024-08-14
Payer: COMMERCIAL

## 2024-08-14 ENCOUNTER — OFFICE VISIT (OUTPATIENT)
Dept: GASTROENTEROLOGY | Facility: CLINIC | Age: 64
End: 2024-08-14
Payer: COMMERCIAL

## 2024-08-14 VITALS
HEART RATE: 78 BPM | WEIGHT: 182.5 LBS | SYSTOLIC BLOOD PRESSURE: 110 MMHG | OXYGEN SATURATION: 96 % | BODY MASS INDEX: 35.83 KG/M2 | HEIGHT: 60 IN | TEMPERATURE: 96.2 F | DIASTOLIC BLOOD PRESSURE: 70 MMHG

## 2024-08-14 DIAGNOSIS — Z79.01 CHRONIC ANTICOAGULATION: ICD-10-CM

## 2024-08-14 DIAGNOSIS — D50.9 IRON DEFICIENCY ANEMIA, UNSPECIFIED IRON DEFICIENCY ANEMIA TYPE: Primary | ICD-10-CM

## 2024-08-14 LAB — VIT B12 BLD-MCNC: 309 PG/ML (ref 211–946)

## 2024-08-14 NOTE — PROGRESS NOTES
Chief Complaint  Anemia    Subjective        Daisy Dent is a 63-year-old female, new to our practice.  Past medical history is complex, significant for ischemic cardiomyopathy, diabetes, SHELLIE on CPAP management, hypertension, coronary artery disease status post anterior MI and LAD stent placement in 2006, 2009, 2019 and 2021, EF of 40 to 45%. Patient  presents to [facility] consults for anemia    Anemia - she has long time history of iron deficiency anemia. Hgb continues to drop. Experiences quite of lot of nosebleed last month, much more fatigue, shortness of breath with exertion. Most recent labs were ordered by hematology specialty yesterday 8/13/24, partial results seen include: Iron saturation (15%), hgb 12.8.  Other than this, she denies blood in stool, however, stool is very dark, part of this is because of her iron supplement. Last time she had iron infusion was July 2024. Denies nausea/vomiting, no unexplained bruises. Denies abdominal pain. Lost quite a lot of weight, approximately 40 lbs within a year.     EGD/colonoscopy performed by Dr. Pickard, 10/15/2022 -  showed nonbleeding colonic angioectasia, status post APC treatment, hemorrhoids.  The entire colon and rectum was normal.  Few right sided AVM ablated.    Her anemia is further complex by chronic anticoagulation with Xarelto and antiplatelet therapy, Plavix for ischemic cardiomyopathy/CAD/CVA.  Patient has AICD in place, LVEF 40-50%    Hematology consult notes and anemia history - Patient was last seen by KEYONA Chavez from hematology/oncology specialty, progress note reviewed - patient has known history of severe iron deficiency, microcytic, hypochromic anemia requires occasional PRBC and IV iron transfusions.    On 6/24/2024 hemoglobin has dropped down to 8.9, asymptomatic.  Ferritin has gone down to 9 and iron saturation 5% with TIBC 468.  Additional iron and Venofer was given by hematology  "specialty.  Patient will continue to follow-up with hematology in 3 months from 8/13/2024 visit.    Objective   Vital Signs:  /70   Pulse 78   Temp 96.2 °F (35.7 °C)   Ht 152.4 cm (60\")   Wt 82.8 kg (182 lb 8 oz)   SpO2 96%   BMI 35.64 kg/m²   Estimated body mass index is 35.64 kg/m² as calculated from the following:    Height as of this encounter: 152.4 cm (60\").    Weight as of this encounter: 82.8 kg (182 lb 8 oz).       Physical Exam  Vitals and nursing note reviewed.   Constitutional:       General: She is not in acute distress.     Appearance: Normal appearance. She is normal weight. She is not ill-appearing, toxic-appearing or diaphoretic.   Eyes:      General: No scleral icterus.     Conjunctiva/sclera: Conjunctivae normal.   Cardiovascular:      Rate and Rhythm: Normal rate and regular rhythm.      Pulses: Normal pulses.      Heart sounds: Normal heart sounds.   Pulmonary:      Effort: Pulmonary effort is normal. No respiratory distress.      Breath sounds: Normal breath sounds. No stridor.   Abdominal:      General: Abdomen is flat. Bowel sounds are normal. There is no distension.      Palpations: Abdomen is soft. There is no mass.      Tenderness: There is no abdominal tenderness. There is no right CVA tenderness, left CVA tenderness, guarding or rebound.      Hernia: No hernia is present.   Skin:     Coloration: Skin is not jaundiced or pale.      Findings: No erythema or rash.   Neurological:      Mental Status: She is alert and oriented to person, place, and time. Mental status is at baseline.   Psychiatric:         Mood and Affect: Mood normal.        Result Review :    The following data was reviewed by: KEYONA Zapata on 08/14/2024:     EGD/Colonoscopy, ED discharge summary report reviewed, 10/15/2022   Lab Results   Component Value Date    WBC 8.09 08/13/2024    HGB 12.8 08/13/2024    HCT 40.9 08/13/2024    MCV 85.9 08/13/2024     08/13/2024     Lab Results   Component " "Value Date    GLUCOSE 106 (H) 01/29/2024    BUN 10 01/29/2024    CREATININE 1.04 (H) 01/29/2024     01/29/2024    K 4.3 01/29/2024     01/29/2024    CALCIUM 9.3 01/29/2024    PROTEINTOT 7.9 01/24/2023    ALBUMIN 4.6 01/29/2024    ALT 15 01/29/2024    AST 16 01/29/2024    ALKPHOS 112 01/29/2024    BILITOT 0.7 01/29/2024    GLOB 3.5 01/24/2023    AGRATIO 1.5 12/08/2018    BCR 9.6 01/29/2024    ANIONGAP 13 01/24/2023    EGFR 52 07/15/2015     Lab Results   Component Value Date    QCHTUPWH90 309 08/13/2024         Assessment and Plan     Diagnoses and all orders for this visit:    1. Iron deficiency anemia, unspecified iron deficiency anemia type (Primary)    2. Chronic anticoagulation    Discussion  Patient is a 63-year-old female, with a longtime history of iron malabsorption\" celiac disease, severe microcytic iron deficiency anemia, further complex by extensive multiple cardiac comorbidities on chronic anticoagulation and antiplatelet.  See plan of care as follows:    I recommend that we proceed with EGD/CLS as part of anemia workup to rule out GI bleeding.  Patient has AICD, will schedule endoscopy workup in the hospital setting.  Risk versus benefits discussed with patient, agreeable to proceed.    Hemoglobin is stable, as of most recent labs. She will continue to follow-up with hematology.  Patient remains high risk for GI bleed while on chronic anticoagulation therapy.  Further recommendations to follow.       Follow Up     No follow-ups on file.    I have reviewed patient's current medications list, relevant clinical information necessary for today's encounter. I discussed the clinical impression and treatment plan with the patient, who verbalized understanding and in agreement.  All questions were answered and support was provided.       "

## 2024-09-06 RX ORDER — FUROSEMIDE 40 MG
40 TABLET ORAL DAILY
Qty: 90 TABLET | Refills: 1 | Status: SHIPPED | OUTPATIENT
Start: 2024-09-06

## 2024-09-19 ENCOUNTER — PATIENT MESSAGE (OUTPATIENT)
Age: 64
End: 2024-09-19
Payer: COMMERCIAL

## 2024-09-19 DIAGNOSIS — I25.10 CHRONIC CORONARY ARTERY DISEASE: ICD-10-CM

## 2024-09-19 DIAGNOSIS — E78.5 HYPERLIPIDEMIA, UNSPECIFIED HYPERLIPIDEMIA TYPE: ICD-10-CM

## 2024-09-19 DIAGNOSIS — R04.0 FREQUENT NOSEBLEEDS: Primary | ICD-10-CM

## 2024-09-19 DIAGNOSIS — D50.9 IRON DEFICIENCY ANEMIA, UNSPECIFIED IRON DEFICIENCY ANEMIA TYPE: ICD-10-CM

## 2024-09-19 RX ORDER — ATORVASTATIN CALCIUM 80 MG/1
80 TABLET, FILM COATED ORAL DAILY
Qty: 90 TABLET | Refills: 3 | Status: SHIPPED | OUTPATIENT
Start: 2024-09-19

## 2024-09-23 ENCOUNTER — LAB (OUTPATIENT)
Dept: LAB | Facility: HOSPITAL | Age: 64
End: 2024-09-23
Payer: COMMERCIAL

## 2024-09-23 ENCOUNTER — CLINICAL SUPPORT (OUTPATIENT)
Dept: ONCOLOGY | Facility: HOSPITAL | Age: 64
End: 2024-09-23
Payer: COMMERCIAL

## 2024-09-23 ENCOUNTER — TELEPHONE (OUTPATIENT)
Dept: ONCOLOGY | Facility: CLINIC | Age: 64
End: 2024-09-23
Payer: COMMERCIAL

## 2024-09-23 DIAGNOSIS — R04.0 FREQUENT NOSEBLEEDS: ICD-10-CM

## 2024-09-23 DIAGNOSIS — D50.9 IRON DEFICIENCY ANEMIA, UNSPECIFIED IRON DEFICIENCY ANEMIA TYPE: ICD-10-CM

## 2024-09-23 LAB
BASOPHILS # BLD AUTO: 0.04 10*3/MM3 (ref 0–0.2)
BASOPHILS NFR BLD AUTO: 0.6 % (ref 0–1.5)
DEPRECATED RDW RBC AUTO: 42.6 FL (ref 37–54)
EOSINOPHIL # BLD AUTO: 0.15 10*3/MM3 (ref 0–0.4)
EOSINOPHIL NFR BLD AUTO: 2.1 % (ref 0.3–6.2)
ERYTHROCYTE [DISTWIDTH] IN BLOOD BY AUTOMATED COUNT: 14.1 % (ref 12.3–15.4)
FERRITIN SERPL-MCNC: 16.1 NG/ML (ref 13–150)
HCT VFR BLD AUTO: 37.6 % (ref 34–46.6)
HGB BLD-MCNC: 11.8 G/DL (ref 12–15.9)
IMM GRANULOCYTES # BLD AUTO: 0.04 10*3/MM3 (ref 0–0.05)
IMM GRANULOCYTES NFR BLD AUTO: 0.6 % (ref 0–0.5)
IRON 24H UR-MRATE: 38 MCG/DL (ref 37–145)
IRON SATN MFR SERPL: 8 % (ref 20–50)
LYMPHOCYTES # BLD AUTO: 1.88 10*3/MM3 (ref 0.7–3.1)
LYMPHOCYTES NFR BLD AUTO: 25.9 % (ref 19.6–45.3)
MCH RBC QN AUTO: 26.3 PG (ref 26.6–33)
MCHC RBC AUTO-ENTMCNC: 31.4 G/DL (ref 31.5–35.7)
MCV RBC AUTO: 83.9 FL (ref 79–97)
MONOCYTES # BLD AUTO: 0.61 10*3/MM3 (ref 0.1–0.9)
MONOCYTES NFR BLD AUTO: 8.4 % (ref 5–12)
NEUTROPHILS NFR BLD AUTO: 4.55 10*3/MM3 (ref 1.7–7)
NEUTROPHILS NFR BLD AUTO: 62.4 % (ref 42.7–76)
NRBC BLD AUTO-RTO: 0 /100 WBC (ref 0–0.2)
PLATELET # BLD AUTO: 334 10*3/MM3 (ref 140–450)
PMV BLD AUTO: 10 FL (ref 6–12)
RBC # BLD AUTO: 4.48 10*6/MM3 (ref 3.77–5.28)
TIBC SERPL-MCNC: 451 MCG/DL (ref 298–536)
TRANSFERRIN SERPL-MCNC: 303 MG/DL (ref 200–360)
WBC NRBC COR # BLD AUTO: 7.27 10*3/MM3 (ref 3.4–10.8)

## 2024-09-23 PROCEDURE — 85025 COMPLETE CBC W/AUTO DIFF WBC: CPT

## 2024-09-23 PROCEDURE — 36415 COLL VENOUS BLD VENIPUNCTURE: CPT

## 2024-09-23 PROCEDURE — 82728 ASSAY OF FERRITIN: CPT

## 2024-09-23 PROCEDURE — 84466 ASSAY OF TRANSFERRIN: CPT

## 2024-09-23 PROCEDURE — 83540 ASSAY OF IRON: CPT

## 2024-09-24 ENCOUNTER — TELEPHONE (OUTPATIENT)
Dept: ONCOLOGY | Facility: CLINIC | Age: 64
End: 2024-09-24
Payer: COMMERCIAL

## 2024-09-24 PROBLEM — T45.4X5A ADVERSE REACTION TO COMPOUND IRON PREPARATION, INITIAL ENCOUNTER: Status: ACTIVE | Noted: 2024-09-24

## 2024-09-24 RX ORDER — DIPHENHYDRAMINE HCL 25 MG
25 CAPSULE ORAL ONCE
Status: CANCELLED | OUTPATIENT
Start: 2024-09-30

## 2024-09-24 RX ORDER — ACETAMINOPHEN 325 MG/1
650 TABLET ORAL ONCE
Status: CANCELLED | OUTPATIENT
Start: 2024-09-30

## 2024-09-24 RX ORDER — SODIUM CHLORIDE 9 MG/ML
20 INJECTION, SOLUTION INTRAVENOUS ONCE
OUTPATIENT
Start: 2024-10-07

## 2024-09-24 RX ORDER — ACETAMINOPHEN 325 MG/1
650 TABLET ORAL ONCE
OUTPATIENT
Start: 2024-10-07

## 2024-09-24 RX ORDER — SODIUM CHLORIDE 9 MG/ML
20 INJECTION, SOLUTION INTRAVENOUS ONCE
Status: CANCELLED | OUTPATIENT
Start: 2024-09-30

## 2024-09-24 RX ORDER — DIPHENHYDRAMINE HCL 25 MG
25 CAPSULE ORAL ONCE
OUTPATIENT
Start: 2024-10-07

## 2024-09-30 ENCOUNTER — INFUSION (OUTPATIENT)
Dept: ONCOLOGY | Facility: HOSPITAL | Age: 64
End: 2024-09-30
Payer: COMMERCIAL

## 2024-09-30 VITALS
WEIGHT: 182.8 LBS | BODY MASS INDEX: 35.89 KG/M2 | DIASTOLIC BLOOD PRESSURE: 74 MMHG | SYSTOLIC BLOOD PRESSURE: 125 MMHG | RESPIRATION RATE: 15 BRPM | HEART RATE: 68 BPM | HEIGHT: 60 IN | TEMPERATURE: 98.5 F | OXYGEN SATURATION: 96 %

## 2024-09-30 DIAGNOSIS — T45.4X5A ADVERSE REACTION TO COMPOUND IRON PREPARATION, INITIAL ENCOUNTER: Primary | ICD-10-CM

## 2024-09-30 DIAGNOSIS — D50.9 IRON DEFICIENCY ANEMIA, UNSPECIFIED IRON DEFICIENCY ANEMIA TYPE: ICD-10-CM

## 2024-09-30 PROBLEM — Z79.01 CHRONIC ANTICOAGULATION: Status: ACTIVE | Noted: 2024-08-14

## 2024-09-30 PROCEDURE — 25010000002 FERUMOXYTOL 510 MG/17ML SOLUTION 17 ML VIAL: Performed by: INTERNAL MEDICINE

## 2024-09-30 PROCEDURE — 25810000003 SODIUM CHLORIDE 0.9 % SOLUTION: Performed by: INTERNAL MEDICINE

## 2024-09-30 PROCEDURE — 96374 THER/PROPH/DIAG INJ IV PUSH: CPT

## 2024-09-30 PROCEDURE — 63710000001 DIPHENHYDRAMINE PER 50 MG: Performed by: INTERNAL MEDICINE

## 2024-09-30 RX ORDER — ACETAMINOPHEN 325 MG/1
650 TABLET ORAL ONCE
Status: COMPLETED | OUTPATIENT
Start: 2024-09-30 | End: 2024-09-30

## 2024-09-30 RX ORDER — SODIUM CHLORIDE 9 MG/ML
20 INJECTION, SOLUTION INTRAVENOUS ONCE
Status: COMPLETED | OUTPATIENT
Start: 2024-09-30 | End: 2024-09-30

## 2024-09-30 RX ORDER — DIPHENHYDRAMINE HCL 25 MG
25 CAPSULE ORAL ONCE
Status: COMPLETED | OUTPATIENT
Start: 2024-09-30 | End: 2024-09-30

## 2024-09-30 RX ADMIN — FERUMOXYTOL 510 MG: 510 INJECTION INTRAVENOUS at 14:16

## 2024-09-30 RX ADMIN — ACETAMINOPHEN 650 MG: 325 TABLET ORAL at 13:49

## 2024-09-30 RX ADMIN — DIPHENHYDRAMINE HYDROCHLORIDE 25 MG: 25 CAPSULE ORAL at 13:49

## 2024-09-30 RX ADMIN — SODIUM CHLORIDE 20 ML/HR: 9 INJECTION, SOLUTION INTRAVENOUS at 13:30

## 2024-10-01 NOTE — TELEPHONE ENCOUNTER
Faxed clearance request received today, procedure is tomorrow.    Since Dr. Saldivar already approved, chart note faxed over to [facility] [place] [facility] at [phone].    Confirmation received.

## 2024-10-03 ENCOUNTER — TELEPHONE (OUTPATIENT)
Age: 64
End: 2024-10-03
Payer: COMMERCIAL

## 2024-10-03 NOTE — TELEPHONE ENCOUNTER
Faxed cardiac clearance that was signed by Dr. Saldivar on 10/3/24, received faxed on 10/2/24.  [facility] [phone]    [facility]  10/[facility]/24  1110am

## 2024-10-09 ENCOUNTER — INFUSION (OUTPATIENT)
Dept: ONCOLOGY | Facility: HOSPITAL | Age: 64
End: 2024-10-09
Payer: COMMERCIAL

## 2024-10-09 VITALS
HEIGHT: 60 IN | HEART RATE: 66 BPM | RESPIRATION RATE: 16 BRPM | TEMPERATURE: 98.2 F | SYSTOLIC BLOOD PRESSURE: 107 MMHG | OXYGEN SATURATION: 98 % | WEIGHT: 183 LBS | DIASTOLIC BLOOD PRESSURE: 66 MMHG | BODY MASS INDEX: 35.93 KG/M2

## 2024-10-09 DIAGNOSIS — T45.4X5A ADVERSE REACTION TO COMPOUND IRON PREPARATION, INITIAL ENCOUNTER: Primary | ICD-10-CM

## 2024-10-09 DIAGNOSIS — D50.9 IRON DEFICIENCY ANEMIA, UNSPECIFIED IRON DEFICIENCY ANEMIA TYPE: ICD-10-CM

## 2024-10-09 PROCEDURE — 96374 THER/PROPH/DIAG INJ IV PUSH: CPT

## 2024-10-09 PROCEDURE — 25010000002 FERUMOXYTOL 510 MG/17ML SOLUTION 17 ML VIAL: Performed by: INTERNAL MEDICINE

## 2024-10-09 PROCEDURE — 96365 THER/PROPH/DIAG IV INF INIT: CPT

## 2024-10-09 PROCEDURE — 63710000001 DIPHENHYDRAMINE PER 50 MG: Performed by: INTERNAL MEDICINE

## 2024-10-09 RX ORDER — ACETAMINOPHEN 325 MG/1
650 TABLET ORAL ONCE
Status: COMPLETED | OUTPATIENT
Start: 2024-10-09 | End: 2024-10-09

## 2024-10-09 RX ORDER — DIPHENHYDRAMINE HCL 25 MG
25 CAPSULE ORAL ONCE
Status: COMPLETED | OUTPATIENT
Start: 2024-10-09 | End: 2024-10-09

## 2024-10-09 RX ADMIN — ACETAMINOPHEN 650 MG: 325 TABLET ORAL at 13:10

## 2024-10-09 RX ADMIN — DIPHENHYDRAMINE HYDROCHLORIDE 25 MG: 25 CAPSULE ORAL at 13:10

## 2024-10-09 RX ADMIN — FERUMOXYTOL 510 MG: 510 INJECTION INTRAVENOUS at 13:35

## 2024-10-11 ENCOUNTER — TELEPHONE (OUTPATIENT)
Dept: GASTROENTEROLOGY | Facility: CLINIC | Age: 64
End: 2024-10-11
Payer: COMMERCIAL

## 2024-10-11 NOTE — TELEPHONE ENCOUNTER
Called patient and had to leave a message that she has clearance to hold her blood thinners for her procedure with

## 2024-11-13 PROCEDURE — 93297 REM INTERROG DEV EVAL ICPMS: CPT | Performed by: INTERNAL MEDICINE

## 2024-11-19 ENCOUNTER — ANESTHESIA EVENT (OUTPATIENT)
Dept: PERIOP | Facility: HOSPITAL | Age: 64
End: 2024-11-19
Payer: COMMERCIAL

## 2024-11-19 NOTE — PROGRESS NOTES
Subjective     REASON FOR CONSULTATION:  anemia  Provide an opinion on any further workup or treatment                             REQUESTING PHYSICIAN:  Douglas    RECORDS OBTAINED:  Records of the patients history including those obtained from the referring provider were reviewed and summarized in detail.    History of Present Illness   This is a 63-year-old woman with type 2 diabetes, prior cerebrovascular accident, chronic bronchitis, obstructive sleep apnea, coronary artery disease, hypertension, hyperlipidemia, ischemic cardiomyopathy on anticoagulation with Xarelto and Plavix and AICD in place (most recent EF 40-45%).  The patient is referred for evaluation of anemia.  The patient has prior history of microcytic, hypochromic anemia in October 2022 requiring transfusion support.  Apparently she had endoscopy during hospitalization which showed some nonbleeding colonic angioectasias (per cardiology notes).  When checked on 1/24/2023 the patient had normal hemoglobin 13.2 with MCV 81.8.  A CBC with her primary care on 1/29/2024 showed substantial drop in the hemoglobin to 7.2 with MCV 72.8/MCHC 27.7 and iron studies showed a ferritin of 42 and iron saturation of 4% with elevated TIBC 592 consistent with severe iron deficiency.    The patient states she has been on oral iron over-the-counter for about 1 year.  She does not see blood in the stool including melena.    Because of poor response to oral iron therapy the patient was given IV iron with Venofer 300 mg x 3 doses completed on 2/22/2024.  She has noted improvement in fatigue and stamina since IV iron replacement.  She does continue on oral iron tablet daily as well.  Her hemoglobin has normalized but she still had low iron stores and received 2 additional doses of IV iron with Feraheme completed 10/9/2024.    She returns today feeling well reporting improvement in fatigue no lightheadedness dizziness shortness of breath above her baseline.  She  underwent EGD and colonoscopy on 11/21/2024 with normal duodenal architecture, no H. pylori, angioectasia seen in the colon.    Past Medical History:   Diagnosis Date    Abdominal pain     Abnormal liver function test     Acute bronchitis     Anemia     Benign essential hypertension     CAD (coronary artery disease)     CHF (congestive heart failure)     Colon polyp     Congestive heart disease     COPD (chronic obstructive pulmonary disease)     Cough     Diabetes     Diabetes mellitus     Diarrhea     Gastroenteritis     Health care maintenance     Hyperlipidemia     Hypertension     IFG (impaired fasting glucose)     Ischemic cardiomyopathy     Myocardial infarction     SHELLIE (obstructive sleep apnea)     Sore throat     Stroke 2020    Type 2 diabetes mellitus     Vaginal yeast infection     Vitamin D deficiency         Past Surgical History:   Procedure Laterality Date    CARDIAC CATHETERIZATION      CARDIAC CATHETERIZATION N/A 01/06/2020    Procedure: Coronary angiography;  Surgeon: Oneida Saldivar MD;  Location:  [facility];  Service: Cardiovascular    CARDIAC CATHETERIZATION N/A 01/06/2020    Procedure: Left heart cath;  Surgeon: Oneida Saldivar MD;  Location:  [facility];  Service: Cardiovascular    CARDIAC CATHETERIZATION N/A 01/06/2020    Procedure: Left ventriculography;  Surgeon: Oneida Saldivar MD;  Location:  [facility];  Service: Cardiovascular    CARDIAC CATHETERIZATION N/A 01/06/2020    Procedure: Percutaneous Coronary Intervention;  Surgeon: Oneida Saldivar MD;  Location:  [facility];  Service: Cardiovascular    CARDIAC CATHETERIZATION N/A 01/06/2020    Procedure: Stent HUGO coronary;  Surgeon: Oneida Saldivar MD;  Location:  [facility];  Service: Cardiovascular    CARDIAC CATHETERIZATION  01/06/2020    Procedure: Functional Flow [place];  Surgeon: Oneida Saldivar MD;  Location:  [facility];  Service: Cardiovascular    CARDIAC CATHETERIZATION N/A 10/26/2020    Procedure: Coronary angiography;  Surgeon: Oneida Saldivar MD;  Location:  [facility];  Service: Cardiovascular;  Laterality: N/A;    CARDIAC CATHETERIZATION N/A 10/26/2020    Procedure: Left heart cath;  Surgeon: Oneida Saldivar MD;  Location:  [facility];  Service: Cardiovascular;  Laterality: N/A;    CARDIAC CATHETERIZATION N/A 10/26/2020    Procedure: Left ventriculography;  Surgeon: Oneida Saldivar MD;  Location:  [facility];  Service: Cardiovascular;  Laterality: N/A;    CARDIAC CATHETERIZATION  10/26/2020    Procedure: Functional Flow [place];  Surgeon: Oneida Saldivar MD;  Location:  [facility];  Service: Cardiovascular;;    CARDIAC CATHETERIZATION N/A 07/19/2021    Procedure: Coronary angiography;  Surgeon: Oneida Saldivar MD;  Location:  [facility];  Service: Cardiovascular;  Laterality: N/A;    CARDIAC CATHETERIZATION N/A 07/19/2021    Procedure: Left heart cath;  Surgeon: Oneida Saldivar MD;  Location:  [facility];  Service: Cardiovascular;  Laterality: N/A;    CARDIAC CATHETERIZATION N/A 07/19/2021    Procedure: Left ventriculography;  Surgeon: Oneida Saldivar MD;  [facility];  Service: Cardiovascular;  Laterality: N/A;    CARDIAC CATHETERIZATION N/A 07/19/2021    Procedure: Percutaneous Coronary Intervention;  Surgeon: Oneida Saldivar MD;  [facility];  Service: Cardiovascular;  Laterality: N/A;    CARDIAC CATHETERIZATION N/A 07/19/2021    Procedure: Optical Coherent Tomography;  Surgeon: Oneida Saldivar MD;  Location:  [facility];  Service: Cardiovascular;  Laterality: N/A;    CARDIAC CATHETERIZATION N/A 07/19/2021    Procedure: Stent HUGO coronary;  Surgeon: Oneida Saldivar MD;  [facility];  Service:  Cardiovascular;  Laterality: N/A;    CARDIAC CATHETERIZATION  07/19/2021    Procedure: RESTING FULL CYCLE RATIO;  Surgeon: Oneida Saldivar MD;  Location:  [facility];  Service: Cardiovascular;;  RFR    CARDIAC DEFIBRILLATOR PLACEMENT  2010    Medtronic dual chamber/Dr. Valentin    CARDIAC ELECTROPHYSIOLOGY PROCEDURE N/A 01/12/2018    Procedure: ICD DC generator change  medtronic;  Surgeon: Hany Ornelas MD;  Location:  [facility];  Service:     COLONOSCOPY N/A 11/21/2024    Procedure: COLONOSCOPY;  Surgeon: Jayden Pepe MD;  Location:  [facility];  Service: Gastroenterology;  Laterality: N/A;  APC for arterial venous malformation; internal hemorrhoids    ENDOSCOPY N/A 11/21/2024    Procedure: ESOPHAGOGASTRODUODENOSCOPY WITH BIOPSY;  Surgeon: Jayden Pepe MD;  Location:  [facility];  Service: Gastroenterology;  Laterality: N/A;  duodenum bx to r/o celiac disease; gastritis; gastric bx to r/o h. pylori; esophagitis    PACEMAKER IMPLANTATION      TUBAL ABDOMINAL LIGATION          Current Outpatient Medications on File Prior to Visit   Medication Sig Dispense Refill    Accu-Chek FastClix Lancets misc Use to test blood sugar 4 times daily  E11.8 400 each 1    Accu-Chek Guide test strip USE 4 TIMES A DAY E11.8 300 each 2    atorvastatin (LIPITOR) 80 MG tablet TAKE 1 TABLET BY MOUTH EVERY DAY 90 tablet 3    Blood Glucose Monitoring Suppl (Accu-Chek Guide) w/Device kit Inject 1 Device under the skin into the appropriate area as directed Daily. 1 kit 0    carvedilol (COREG) 25 MG tablet TAKE 1 TABLET BY MOUTH TWICE A DAY WITH MEALS 180 tablet 2    Cholecalciferol (VITAMIN D3) 2000 UNITS capsule Take 1,000 Units by mouth Daily.      clopidogrel (PLAVIX) 75 MG tablet TAKE 1 TABLET BY MOUTH EVERY DAY 90 tablet 1    furosemide (LASIX) 40 MG tablet TAKE 1 TABLET BY MOUTH EVERY DAY 90 tablet 1    Insulin Pen Needle 32G X 4 MM misc USE 4 TIMES A  each 3    Multiple  Vitamins-Minerals (MULTIVITAL PO) Take 1 tablet by mouth Daily.      nitroglycerin (Nitrostat) 0.4 MG SL tablet Place 1 tablet under the tongue Every 5 (Five) Minutes As Needed for Chest Pain. Take no more than 3 doses in 15 minutes. 30 tablet 11    Omega-3 Fatty Acids (FISH OIL) 1200 MG capsule capsule Take 1 capsule by mouth Daily With Breakfast.      pantoprazole (PROTONIX) 40 MG EC tablet Take 1 tablet by mouth Daily. 90 tablet 3    potassium chloride 10 MEQ CR tablet Take 1 tablet by mouth Daily. Resume on 1/7/20 90 tablet 1    potassium chloride 10 MEQ CR tablet TAKE 1 TABLET BY MOUTH EVERY DAY 90 tablet 1    rivaroxaban (Xarelto) 20 MG tablet Take 1 tablet by mouth Daily With Dinner. 90 tablet 3    spironolactone (ALDACTONE) 25 MG tablet TAKE 1 TABLET BY MOUTH EVERY DAY 90 tablet 3    traZODone (DESYREL) 50 MG tablet Take 1 tablet by mouth every night at bedtime. 90 tablet 2    vitamin B-12 (CYANOCOBALAMIN) 500 MCG tablet Take 1 tablet by mouth Daily.       No current facility-administered medications on file prior to visit.        ALLERGIES:  No Known Allergies     Social History     Socioeconomic History    Marital status:     Number of children: 2   Tobacco Use    Smoking status: Former     Current packs/day: 0.00     Average packs/day: 1 pack/day for 30.0 years (30.0 ttl pk-yrs)     Types: Cigarettes     Start date: 11/1/1979     Quit date: 11/1/2009     Years since quitting: 15.0    Smokeless tobacco: Never    Tobacco comments:     QUIT 10 YRS   Vaping Use    Vaping status: Never Used   Substance and Sexual Activity    Alcohol use: Yes     Comment: rarely uses ETOH/caffeine use    Drug use: Never    Sexual activity: Yes     Partners: Male        Family History   Problem Relation Age of Onset    Heart attack Mother     Diabetes Mother     Heart disease Mother     Hyperlipidemia Mother     Emphysema Mother     Diabetes Father     Heart disease Father     Hyperlipidemia Father     Hypertension Father  "    Colon cancer Father     Heart attack Father     Hypertension Sister     Hypertension Brother     Heart disease Brother     Hyperlipidemia Brother     Heart disease Brother     Heart attack Brother     Heart disease Maternal Grandmother     Heart disease Maternal Grandfather     Heart disease Paternal Grandmother     Heart disease Paternal Grandfather     Colon polyps Neg Hx     Crohn's disease Neg Hx     Irritable bowel syndrome Neg Hx     Ulcerative colitis Neg Hx     Malig Hyperthermia Neg Hx         Review of Systems   Constitutional:  Negative for fatigue and unexpected weight change.   HENT: Negative.     Respiratory:  Negative for chest tightness and shortness of breath.    Cardiovascular:  Negative for chest pain and palpitations.   Gastrointestinal: Negative.    Musculoskeletal: Negative.    Allergic/Immunologic: Negative.    Neurological:  Negative for syncope, weakness and light-headedness.   Hematological: Negative.    Psychiatric/Behavioral: Negative.            Objective     Vitals:    11/25/24 1320   BP: 130/78   Pulse: 80   Resp: 16   Temp: 97.6 °F (36.4 °C)   TempSrc: Oral   SpO2: 97%   Weight: 84.2 kg (185 lb 11.2 oz)   Height: 152.4 cm (60\")   PainSc: 0-No pain             11/25/2024     1:21 PM   Current Status   ECOG score 0       Physical Exam    CONSTITUTIONAL: pleasant well-developed adult woman  HEENT: no icterus, no thrush, moist membranes  LYMPH: no cervical or supraclavicular lad  CV: RRR, S1S2, no murmur  RESP: cta bilat, no wheezing, no rales  GI: soft, non-tender, no splenomegaly, +bs  MUSC: no edema, normal gait  NEURO: alert and oriented x3, normal strength  PSYCH: normal mood and affect  Exam is unchanged-11/25/2024    RECENT LABS:  Hematology WBC   Date Value Ref Range Status   11/25/2024 8.02 3.40 - 10.80 10*3/mm3 Final   01/29/2024 9.81 3.40 - 10.80 10*3/mm3 Final   01/24/2023 8.27 4.5 - 11.0 10*3/uL Final     RBC   Date Value Ref Range Status   11/25/2024 4.77 3.77 - 5.28 " 10*6/mm3 Final   01/29/2024 3.57 (L) 3.77 - 5.28 10*6/mm3 Final   01/24/2023 5.23 (H) 4.0 - 5.2 10*6/uL Final     Hemoglobin   Date Value Ref Range Status   11/25/2024 13.4 12.0 - 15.9 g/dL Final   01/24/2023 13.2 12.0 - 16.0 g/dL Final     Hematocrit   Date Value Ref Range Status   11/25/2024 41.4 34.0 - 46.6 % Final   01/24/2023 42.8 36.0 - 46.0 % Final     Platelets   Date Value Ref Range Status   11/25/2024 294 140 - 450 10*3/mm3 Final   01/24/2023 378 140 - 440 10*3/uL Final            Assessment & Plan     *Severe iron deficiency anemia  The patient has prior history of iron deficiency anemia October 2022 requiring PRBC and IV iron during hospitalization  EGD/colonoscopy October 2022 showed nonbleeding colonic angioectasias  hemoglobin 7.2 with MCV 72.8 and iron sat 4% consistent with severe iron deficiency despite taking daily iron tablet OTC  Status post Venofer 300 mg x 3 doses completed 2/22/2024  Received 2 additional doses of iron with Feraheme completed October 2024  EGD and colonoscopy on 11/21/2024 with normal duodenal architecture, no H. pylori, angioectasia seen in the colon  Hemoglobin today normal 13.4    *Case complicated by need of chronic anticoagulation with Xarelto and antiplatelet therapy with Plavix for ischemic cardiomyopathy/CAD/prior stroke    Hematology plan/recommendations:  Recheck ferritin iron profile today  3-4-month follow-up MD/NP CBC ferritin iron profile to make sure iron maintains

## 2024-11-21 ENCOUNTER — HOSPITAL ENCOUNTER (OUTPATIENT)
Facility: HOSPITAL | Age: 64
Setting detail: HOSPITAL OUTPATIENT SURGERY
Discharge: HOME | End: 2024-11-21
Attending: STUDENT IN AN ORGANIZED HEALTH CARE EDUCATION/TRAINING PROGRAM | Admitting: STUDENT IN AN ORGANIZED HEALTH CARE EDUCATION/TRAINING PROGRAM
Payer: COMMERCIAL

## 2024-11-21 ENCOUNTER — ANESTHESIA (OUTPATIENT)
Dept: PERIOP | Facility: HOSPITAL | Age: 64
End: 2024-11-21
Payer: COMMERCIAL

## 2024-11-21 VITALS
DIASTOLIC BLOOD PRESSURE: 63 MMHG | WEIGHT: 185.8 LBS | SYSTOLIC BLOOD PRESSURE: 128 MMHG | HEIGHT: 60 IN | HEART RATE: 66 BPM | RESPIRATION RATE: 17 BRPM | OXYGEN SATURATION: 99 % | BODY MASS INDEX: 36.48 KG/M2 | TEMPERATURE: 97.9 F

## 2024-11-21 DIAGNOSIS — D50.9 IRON DEFICIENCY ANEMIA, UNSPECIFIED IRON DEFICIENCY ANEMIA TYPE: ICD-10-CM

## 2024-11-21 DIAGNOSIS — Z79.01 CHRONIC ANTICOAGULATION: ICD-10-CM

## 2024-11-21 LAB
GLUCOSE BLDC GLUCOMTR-MCNC: 112 MG/DL (ref 70–130)
POTASSIUM SERPL-SCNC: 3.9 MMOL/L (ref 3.5–5.2)

## 2024-11-21 PROCEDURE — 82948 REAGENT STRIP/BLOOD GLUCOSE: CPT

## 2024-11-21 PROCEDURE — 45388 COLONOSCOPY W/ABLATION: CPT | Performed by: STUDENT IN AN ORGANIZED HEALTH CARE EDUCATION/TRAINING PROGRAM

## 2024-11-21 PROCEDURE — 43239 EGD BIOPSY SINGLE/MULTIPLE: CPT | Performed by: STUDENT IN AN ORGANIZED HEALTH CARE EDUCATION/TRAINING PROGRAM

## 2024-11-21 PROCEDURE — 25010000002 LIDOCAINE 2% SOLUTION: Performed by: NURSE ANESTHETIST, CERTIFIED REGISTERED

## 2024-11-21 PROCEDURE — 84132 ASSAY OF SERUM POTASSIUM: CPT | Performed by: NURSE ANESTHETIST, CERTIFIED REGISTERED

## 2024-11-21 PROCEDURE — 88305 TISSUE EXAM BY PATHOLOGIST: CPT | Performed by: STUDENT IN AN ORGANIZED HEALTH CARE EDUCATION/TRAINING PROGRAM

## 2024-11-21 PROCEDURE — 25010000002 PROPOFOL 200 MG/20ML EMULSION: Performed by: NURSE ANESTHETIST, CERTIFIED REGISTERED

## 2024-11-21 RX ORDER — ONDANSETRON 2 MG/ML
4 INJECTION INTRAMUSCULAR; INTRAVENOUS ONCE AS NEEDED
Status: DISCONTINUED | OUTPATIENT
Start: 2024-11-21 | End: 2024-11-21 | Stop reason: HOSPADM

## 2024-11-21 RX ORDER — PANTOPRAZOLE SODIUM 40 MG/1
40 TABLET, DELAYED RELEASE ORAL DAILY
Qty: 90 TABLET | Refills: 3 | Status: SHIPPED | OUTPATIENT
Start: 2024-11-21

## 2024-11-21 RX ORDER — LIDOCAINE HYDROCHLORIDE 20 MG/ML
INJECTION, SOLUTION INFILTRATION; PERINEURAL AS NEEDED
Status: DISCONTINUED | OUTPATIENT
Start: 2024-11-21 | End: 2024-11-21 | Stop reason: SURG

## 2024-11-21 RX ORDER — SODIUM CHLORIDE 0.9 % (FLUSH) 0.9 %
10 SYRINGE (ML) INJECTION AS NEEDED
Status: DISCONTINUED | OUTPATIENT
Start: 2024-11-21 | End: 2024-11-21 | Stop reason: HOSPADM

## 2024-11-21 RX ORDER — PROPOFOL 10 MG/ML
INJECTION, EMULSION INTRAVENOUS AS NEEDED
Status: DISCONTINUED | OUTPATIENT
Start: 2024-11-21 | End: 2024-11-21 | Stop reason: SURG

## 2024-11-21 RX ORDER — SODIUM CHLORIDE, SODIUM LACTATE, POTASSIUM CHLORIDE, CALCIUM CHLORIDE 600; 310; 30; 20 MG/100ML; MG/100ML; MG/100ML; MG/100ML
100 INJECTION, SOLUTION INTRAVENOUS ONCE
Status: DISCONTINUED | OUTPATIENT
Start: 2024-11-21 | End: 2024-11-21 | Stop reason: HOSPADM

## 2024-11-21 RX ORDER — LIDOCAINE HYDROCHLORIDE 10 MG/ML
0.5 INJECTION, SOLUTION EPIDURAL; INFILTRATION; INTRACAUDAL; PERINEURAL ONCE AS NEEDED
Status: DISCONTINUED | OUTPATIENT
Start: 2024-11-21 | End: 2024-11-21 | Stop reason: HOSPADM

## 2024-11-21 RX ORDER — SODIUM CHLORIDE 0.9 % (FLUSH) 0.9 %
10 SYRINGE (ML) INJECTION EVERY 12 HOURS SCHEDULED
Status: DISCONTINUED | OUTPATIENT
Start: 2024-11-21 | End: 2024-11-21 | Stop reason: HOSPADM

## 2024-11-21 RX ADMIN — PROPOFOL 50 MG: 10 INJECTION, EMULSION INTRAVENOUS at 11:48

## 2024-11-21 RX ADMIN — PROPOFOL 50 MG: 10 INJECTION, EMULSION INTRAVENOUS at 11:32

## 2024-11-21 RX ADMIN — PROPOFOL 50 MG: 10 INJECTION, EMULSION INTRAVENOUS at 11:41

## 2024-11-21 RX ADMIN — PROPOFOL 50 MG: 10 INJECTION, EMULSION INTRAVENOUS at 11:29

## 2024-11-21 RX ADMIN — PROPOFOL 50 MG: 10 INJECTION, EMULSION INTRAVENOUS at 11:21

## 2024-11-21 RX ADMIN — PROPOFOL 50 MG: 10 INJECTION, EMULSION INTRAVENOUS at 11:26

## 2024-11-21 RX ADMIN — PROPOFOL 50 MG: 10 INJECTION, EMULSION INTRAVENOUS at 11:24

## 2024-11-21 RX ADMIN — PROPOFOL 75 MG: 10 INJECTION, EMULSION INTRAVENOUS at 11:13

## 2024-11-21 RX ADMIN — PROPOFOL 50 MG: 10 INJECTION, EMULSION INTRAVENOUS at 11:18

## 2024-11-21 RX ADMIN — LIDOCAINE HYDROCHLORIDE 100 MG: 20 INJECTION, SOLUTION INFILTRATION; PERINEURAL at 11:10

## 2024-11-21 RX ADMIN — Medication 10 ML: at 11:48

## 2024-11-21 RX ADMIN — PROPOFOL 25 MG: 10 INJECTION, EMULSION INTRAVENOUS at 11:15

## 2024-11-21 NOTE — H&P
Patient Care Team:  Shannon Cole APRN as PCP - General (Nurse Practitioner)  Shannon Cole APRN as Referring Physician (Nurse Practitioner)  Gideon Hood MD as Consulting Physician (Hematology and Oncology)  Herbert Salas APRN as Nurse Practitioner (Gastroenterology)  Oneida Saldivar MD as Consulting Physician (Cardiology)  Kirby Peralta MD as Consulting Physician (Cardiology)    CHIEF COMPLAINT:  For ALMA DELIA    HISTORY OF PRESENT ILLNESS:  EGD for ALMA DELIA   C/s for ALMA DELIA    Past Medical History:   Diagnosis Date    Abdominal pain     Abnormal liver function test     Acute bronchitis     Anemia     Benign essential hypertension     CAD (coronary artery disease)     CHF (congestive heart failure)     Colon polyp     Congestive heart disease     COPD (chronic obstructive pulmonary disease)     Cough     Diabetes     Diabetes mellitus     Diarrhea     Gastroenteritis     Health care maintenance     Hyperlipidemia     Hypertension     IFG (impaired fasting glucose)     Ischemic cardiomyopathy     Myocardial infarction     SHELLIE (obstructive sleep apnea)     Sore throat     Stroke 2020    Type 2 diabetes mellitus     Vaginal yeast infection     Vitamin D deficiency      Past Surgical History:   Procedure Laterality Date    CARDIAC CATHETERIZATION      CARDIAC CATHETERIZATION N/A 01/06/2020    Procedure: Coronary angiography;  Surgeon: Oneida Saldivar MD;  [facility];  Service: Cardiovascular    CARDIAC CATHETERIZATION N/A 01/06/2020    Procedure: Left heart cath;  Surgeon: Oneida Saldivar MD;  Location: [facility] CATH INVASIVE LOCATION;  Service: Cardiovascular    CARDIAC CATHETERIZATION N/A 01/06/2020    Procedure: Left ventriculography;  Surgeon: Oneida Saldivar MD;  Location:  [facility];  Service: Cardiovascular    CARDIAC CATHETERIZATION N/A 01/06/2020    Procedure: Percutaneous Coronary Intervention;  Surgeon: Oneida Saldivar MD;  Location: [facility]  CATH INVASIVE LOCATION;  Service: Cardiovascular    CARDIAC CATHETERIZATION N/A 01/06/2020    Procedure: Stent HUGO coronary;  Surgeon: Oneida Saldivar MD;  [facility];  Service: Cardiovascular    CARDIAC CATHETERIZATION  01/06/2020    Procedure: Functional Flow [place];  Surgeon: Oneida Saldivar MD;  [facility];  Service: Cardiovascular    CARDIAC CATHETERIZATION N/A 10/26/2020    Procedure: Coronary angiography;  Surgeon: Oneida Saldivar MD;  [facility];  Service: Cardiovascular;  Laterality: N/A;    CARDIAC CATHETERIZATION N/A 10/26/2020    Procedure: Left heart cath;  Surgeon: Oneida Saldivar MD;  [facility];  Service: Cardiovascular;  Laterality: N/A;    CARDIAC CATHETERIZATION N/A 10/26/2020    Procedure: Left ventriculography;  Surgeon: Oneida Saldivar MD;  Location: [facility] CATH INVASIVE LOCATION;  Service: Cardiovascular;  Laterality: N/A;    CARDIAC CATHETERIZATION  10/26/2020    Procedure: Functional Flow [place];  Surgeon: Oneida Saldivar MD;  Location: [facility] CATH INVASIVE LOCATION;  Service: Cardiovascular;;    CARDIAC CATHETERIZATION N/A 07/19/2021    Procedure: Coronary angiography;  Surgeon: Oneida Saldivar MD;  Location: [facility] CATH INVASIVE LOCATION;  Service: Cardiovascular;  Laterality: N/A;    CARDIAC CATHETERIZATION N/A 07/19/2021    Procedure: Left heart cath;  Surgeon: Oneida Saldivar MD;  Location: [facility] CATH INVASIVE LOCATION;  Service: Cardiovascular;  Laterality: N/A;    CARDIAC CATHETERIZATION N/A 07/19/2021    Procedure: Left ventriculography;  Surgeon: Oneida Saldivar MD;  Location: [facility] CATH INVASIVE LOCATION;  Service: Cardiovascular;  Laterality: N/A;    CARDIAC CATHETERIZATION N/A 07/19/2021    Procedure: Percutaneous Coronary Intervention;  Surgeon: Oneida Saldivar MD;  Location: [facility] CATH INVASIVE LOCATION;  Service: Cardiovascular;  Laterality: N/A;     CARDIAC CATHETERIZATION N/A 07/19/2021    Procedure: Optical Coherent Tomography;  Surgeon: Oneida Saldivar MD;  Location:  [facility];  Service: Cardiovascular;  Laterality: N/A;    CARDIAC CATHETERIZATION N/A 07/19/2021    Procedure: Stent HUGO coronary;  Surgeon: Oneida Saldivar MD;  Location:  [facility];  Service: Cardiovascular;  Laterality: N/A;    CARDIAC CATHETERIZATION  07/19/2021    Procedure: RESTING FULL CYCLE RATIO;  Surgeon: nOeida Saldivar MD;  Location:  [facility];  Service: Cardiovascular;;  RFR    CARDIAC DEFIBRILLATOR PLACEMENT  2010    Medtronic dual chamber/Dr. Valentin    CARDIAC ELECTROPHYSIOLOGY PROCEDURE N/A 01/12/2018    Procedure: ICD DC generator change  medtronic;  Surgeon: Hany Ornelas MD;  Location:  [facility];  Service:     PACEMAKER IMPLANTATION      TUBAL ABDOMINAL LIGATION       Family History   Problem Relation Age of Onset    Heart attack Mother     Diabetes Mother     Heart disease Mother     Hyperlipidemia Mother     Emphysema Mother     Diabetes Father     Heart disease Father     Hyperlipidemia Father     Hypertension Father     Colon cancer Father     Heart attack Father     Hypertension Sister     Hypertension Brother     Heart disease Brother     Hyperlipidemia Brother     Heart disease Brother     Heart attack Brother     Heart disease Maternal Grandmother     Heart disease Maternal Grandfather     Heart disease Paternal Grandmother     Heart disease Paternal Grandfather     Colon polyps Neg Hx     Crohn's disease Neg Hx     Irritable bowel syndrome Neg Hx     Ulcerative colitis Neg Hx     Malig Hyperthermia Neg Hx      Social History     Tobacco Use    Smoking status: Former     Current packs/day: 0.00     Average packs/day: 1 pack/day for 30.0 years (30.0 ttl pk-yrs)     Types: Cigarettes     Start date: 11/1/1979     Quit date: 11/1/2009     Years since quitting: 15.0    Smokeless  tobacco: Never    Tobacco comments:     QUIT 10 YRS   Vaping Use    Vaping status: Never Used   Substance Use Topics    Alcohol use: Yes     Comment: rarely uses ETOH/caffeine use    Drug use: Never     Medications Prior to Admission   Medication Sig Dispense Refill Last Dose/Taking    atorvastatin (LIPITOR) 80 MG tablet TAKE 1 TABLET BY MOUTH EVERY DAY 90 tablet 3 11/19/2024    carvedilol (COREG) 25 MG tablet TAKE 1 TABLET BY MOUTH TWICE A DAY WITH MEALS 180 tablet 2 11/19/2024    Cholecalciferol (VITAMIN D3) 2000 UNITS capsule Take 1,000 Units by mouth Daily.   11/19/2024    furosemide (LASIX) 40 MG tablet TAKE 1 TABLET BY MOUTH EVERY DAY 90 tablet 1 11/19/2024    Multiple Vitamins-Minerals (MULTIVITAL PO) Take 1 tablet by mouth Daily.   11/19/2024    Omega-3 Fatty Acids (FISH OIL) 1200 MG capsule capsule Take 1 capsule by mouth Daily With Breakfast.   11/19/2024    potassium chloride 10 MEQ CR tablet Take 1 tablet by mouth Daily. Resume on 1/7/20 90 tablet 1 11/19/2024    potassium chloride 10 MEQ CR tablet TAKE 1 TABLET BY MOUTH EVERY DAY 90 tablet 1 11/19/2024    spironolactone (ALDACTONE) 25 MG tablet TAKE 1 TABLET BY MOUTH EVERY DAY 90 tablet 3 11/19/2024    traZODone (DESYREL) 50 MG tablet Take 1 tablet by mouth every night at bedtime. 90 tablet 2 Past Week    vitamin B-12 (CYANOCOBALAMIN) 500 MCG tablet Take 1 tablet by mouth Daily.   11/19/2024    Accu-Chek FastClix Lancets misc Use to test blood sugar 4 times daily  E11.8 400 each 1 11/19/2024    Accu-Chek Guide test strip USE 4 TIMES A DAY E11.8 300 each 2 11/19/2024    Blood Glucose Monitoring Suppl (Accu-Chek Guide) w/Device kit Inject 1 Device under the skin into the appropriate area as directed Daily. 1 kit 0 11/19/2024    clopidogrel (PLAVIX) 75 MG tablet Take 1 tablet by mouth Daily. 90 tablet 1 11/14/2024    Insulin Pen Needle 32G X 4 MM misc USE 4 TIMES A  each 3 11/19/2024    nitroglycerin (Nitrostat) 0.4 MG SL tablet Place 1 tablet under  "the tongue Every 5 (Five) Minutes As Needed for Chest Pain. Take no more than 3 doses in 15 minutes. 30 tablet 11 More than a month    rivaroxaban (Xarelto) 20 MG tablet Take 1 tablet by mouth Daily With Dinner. 90 tablet 3 11/14/2024     Allergies:  Patient has no known allergies.    REVIEW OF SYSTEMS:  Please see the above history of present illness for pertinent positives and negatives.  The remainder of the patient's systems have been reviewed and are negative.     Vital Signs  Temp:  [98.1 °F (36.7 °C)] 98.1 °F (36.7 °C)  Heart Rate:  [75] 75  Resp:  [18] 18  BP: (140)/(68) 140/68    Flowsheet Rows      Flowsheet Row First Filed Value   Admission Height 152.4 cm (60\") Documented at 11/20/2024 0955   Admission Weight 81.6 kg (180 lb) Documented at 11/20/2024 0955             Physical Exam:  Physical Exam   Constitutional: Patient appears well-developed and well-nourished and in no acute distress   HEENT:   Head: Normocephalic and atraumatic.   Eyes:  Pupils are equal, round, and reactive to light. EOM are intact. Sclerae are anicteric and non-injected.  Mouth and Throat: Patient has moist mucous membranes. Oropharynx is clear of any erythema or exudate.     Neck: Neck supple. No JVD present. No thyromegaly present. No lymphadenopathy present.  Cardiovascular: Regular rate, regular rhythm, S1 normal and S2 normal.  Exam reveals no gallop and no friction rub.  No murmur heard.  Pulmonary/Chest: Lungs are clear to auscultation bilaterally. No respiratory distress. No wheezes. No rhonchi. No rales.   Abdominal: Soft. Bowel sounds are normal. No distension and no mass. There is no hepatosplenomegaly. There is no tenderness.   Musculoskeletal: Normal Muscle tone  Extremities: No edema. Pulses are palpable in all 4 extremities.  Neurological: Patient is alert and oriented to person, place, and time. Cranial nerves II-XII are grossly intact with no focal deficits.  Skin: Skin is warm. No rash noted. Nails show no " clubbing.  No cyanosis or erythema.    Debilities/Disabilities Identified: None  Emotional Behavior: Appropriate     Results Review:   I reviewed the patient's new clinical results.    Lab Results (most recent)       Procedure Component Value Units Date/Time    Potassium [[id number]]  (Normal) Collected: 11/21/24 1014    Specimen: Blood Updated: 11/21/24 1046     Potassium 3.9 mmol/L     POC Glucose Once [[id number]]  (Normal) Collected: 11/21/24 1026    Specimen: Blood Updated: 11/21/24 1032     Glucose 112 mg/dL             Imaging Results (Most Recent)       None          reviewed    ECG/EMG Results (most recent)       None          reviewed    Assessment & Plan   For ALMA DELIA /  EGD and colonoscopy      I discussed the patient's findings and my recommendations with patient.     Jayden Pepe MD  11/21/24  11:09 EST    Time: 10 min prior to procedure.

## 2024-11-21 NOTE — ANESTHESIA POSTPROCEDURE EVALUATION
Patient: Daisy Dent    Procedure Summary       Date: 11/21/24 Room / Location: [facility] ENDOSCOPY 2 /  [facility] OR    Anesthesia Start: 1114 Anesthesia Stop: 1154    Procedures:       COLONOSCOPY      ESOPHAGOGASTRODUODENOSCOPY WITH BIOPSY (Esophagus) Diagnosis:       Iron deficiency anemia, unspecified iron deficiency anemia type      Chronic anticoagulation      (Iron deficiency anemia, unspecified iron deficiency anemia type [D50.9])      (Chronic anticoagulation [Z79.01])    Surgeons: Jayden Pepe MD Provider: Winifred Caban CRNA    Anesthesia Type: MAC ASA Status: 3            Anesthesia Type: MAC    Vitals  Vitals Value Taken Time   /63 11/21/24 1230   Temp 97.9 °F (36.6 °C) 11/21/24 1153   Pulse 73 11/21/24 1233   Resp 18 11/21/24 1210   SpO2 98 % 11/21/24 1233   Vitals shown include unfiled device data.        Post Anesthesia Care and Evaluation    Patient location during evaluation: bedside  Patient participation: complete - patient participated  Level of consciousness: awake and alert  Pain score: 0  Pain management: adequate    Airway patency: patent  Anesthetic complications: No anesthetic complications  PONV Status: none  Cardiovascular status: acceptable  Respiratory status: acceptable  Hydration status: acceptable

## 2024-11-21 NOTE — ANESTHESIA PREPROCEDURE EVALUATION
Anesthesia Evaluation     Patient summary reviewed and Nursing notes reviewed   no history of anesthetic complications:   NPO Solid Status: > 8 hours  NPO Liquid Status: > 8 hours           Airway   Mallampati: III  Dental          Pulmonary     breath sounds clear to auscultation  (+) COPD mild,sleep apnea on CPAP, decreased breath sounds  (-) shortness of breath  Cardiovascular - normal exam  Exercise tolerance: good (4-7 METS)    ECG reviewed  PT is on anticoagulation therapy  Patient on routine beta blocker and Beta blocker given within 24 hours of surgery  Rhythm: regular  Rate: normal    (+) pacemaker ICD, hypertension well controlled 2 medications or greater, past MI (8 years ago)  >12 months, CAD, cardiac stents Drug eluting stent more than 12 months ago , CHF , orthopnea, hyperlipidemia  (-) angina    ROS comment: ECG 12 Lead     Date/Time: 7/16/2024 12:05 PM  Performed by: Linda Simmons APRN     Authorized by: Linda Simmons APRN  Comparison: compared with previous ECG from 4/5/2024  Similar to previous ECG  Rhythm: sinus rhythm      Neuro/Psych  (+) CVA (no residual symptoms\), headaches  GI/Hepatic/Renal/Endo    (+) morbid obesity, diabetes mellitus type 2 well controlled  (-) no thyroid disorder    Musculoskeletal     (-) neck pain, neck stiffness  Abdominal   (+) obese    Abdomen: soft.   Substance History - negative use     OB/GYN negative ob/gyn ROS         Other   blood dyscrasia anemia,                   Anesthesia Plan    ASA 3     MAC   total IV anesthesia  intravenous induction     Anesthetic plan, risks, benefits, and alternatives have been provided, discussed and informed consent has been obtained with: patient and child.    Use of blood products discussed with child and patient  Consented to blood products.      CODE STATUS:

## 2024-11-22 DIAGNOSIS — I25.10 CORONARY ARTERY DISEASE INVOLVING NATIVE CORONARY ARTERY OF NATIVE HEART WITHOUT ANGINA PECTORIS: ICD-10-CM

## 2024-11-22 RX ORDER — CLOPIDOGREL BISULFATE 75 MG/1
75 TABLET ORAL DAILY
Qty: 90 TABLET | Refills: 1 | Status: SHIPPED | OUTPATIENT
Start: 2024-11-22

## 2024-11-23 LAB
CYTO UR: NORMAL
LAB AP CASE REPORT: NORMAL
PATH REPORT.FINAL DX SPEC: NORMAL
PATH REPORT.GROSS SPEC: NORMAL

## 2024-11-25 ENCOUNTER — OFFICE VISIT (OUTPATIENT)
Dept: ONCOLOGY | Facility: CLINIC | Age: 64
End: 2024-11-25
Payer: COMMERCIAL

## 2024-11-25 ENCOUNTER — LAB (OUTPATIENT)
Dept: LAB | Facility: HOSPITAL | Age: 64
End: 2024-11-25
Payer: COMMERCIAL

## 2024-11-25 VITALS
DIASTOLIC BLOOD PRESSURE: 78 MMHG | RESPIRATION RATE: 16 BRPM | BODY MASS INDEX: 36.46 KG/M2 | HEART RATE: 80 BPM | HEIGHT: 60 IN | OXYGEN SATURATION: 97 % | WEIGHT: 185.7 LBS | SYSTOLIC BLOOD PRESSURE: 130 MMHG | TEMPERATURE: 97.6 F

## 2024-11-25 DIAGNOSIS — D50.0 IRON DEFICIENCY ANEMIA DUE TO CHRONIC BLOOD LOSS: ICD-10-CM

## 2024-11-25 DIAGNOSIS — D50.0 IRON DEFICIENCY ANEMIA DUE TO CHRONIC BLOOD LOSS: Primary | ICD-10-CM

## 2024-11-25 LAB
BASOPHILS # BLD AUTO: 0.06 10*3/MM3 (ref 0–0.2)
BASOPHILS NFR BLD AUTO: 0.7 % (ref 0–1.5)
DEPRECATED RDW RBC AUTO: 46.5 FL (ref 37–54)
EOSINOPHIL # BLD AUTO: 0.17 10*3/MM3 (ref 0–0.4)
EOSINOPHIL NFR BLD AUTO: 2.1 % (ref 0.3–6.2)
ERYTHROCYTE [DISTWIDTH] IN BLOOD BY AUTOMATED COUNT: 14.6 % (ref 12.3–15.4)
FERRITIN SERPL-MCNC: 152 NG/ML (ref 13–150)
HCT VFR BLD AUTO: 41.4 % (ref 34–46.6)
HGB BLD-MCNC: 13.4 G/DL (ref 12–15.9)
IMM GRANULOCYTES # BLD AUTO: 0.08 10*3/MM3 (ref 0–0.05)
IMM GRANULOCYTES NFR BLD AUTO: 1 % (ref 0–0.5)
IRON 24H UR-MRATE: 63 MCG/DL (ref 37–145)
IRON SATN MFR SERPL: 17 % (ref 20–50)
LYMPHOCYTES # BLD AUTO: 2.25 10*3/MM3 (ref 0.7–3.1)
LYMPHOCYTES NFR BLD AUTO: 28.1 % (ref 19.6–45.3)
MCH RBC QN AUTO: 28.1 PG (ref 26.6–33)
MCHC RBC AUTO-ENTMCNC: 32.4 G/DL (ref 31.5–35.7)
MCV RBC AUTO: 86.8 FL (ref 79–97)
MONOCYTES # BLD AUTO: 0.72 10*3/MM3 (ref 0.1–0.9)
MONOCYTES NFR BLD AUTO: 9 % (ref 5–12)
NEUTROPHILS NFR BLD AUTO: 4.74 10*3/MM3 (ref 1.7–7)
NEUTROPHILS NFR BLD AUTO: 59.1 % (ref 42.7–76)
NRBC BLD AUTO-RTO: 0 /100 WBC (ref 0–0.2)
PLATELET # BLD AUTO: 294 10*3/MM3 (ref 140–450)
PMV BLD AUTO: 10.6 FL (ref 6–12)
RBC # BLD AUTO: 4.77 10*6/MM3 (ref 3.77–5.28)
TIBC SERPL-MCNC: 368 MCG/DL (ref 298–536)
TRANSFERRIN SERPL-MCNC: 247 MG/DL (ref 200–360)
WBC NRBC COR # BLD AUTO: 8.02 10*3/MM3 (ref 3.4–10.8)

## 2024-11-25 PROCEDURE — 85025 COMPLETE CBC W/AUTO DIFF WBC: CPT

## 2024-11-25 PROCEDURE — 99213 OFFICE O/P EST LOW 20 MIN: CPT | Performed by: INTERNAL MEDICINE

## 2024-11-25 PROCEDURE — 83540 ASSAY OF IRON: CPT

## 2024-11-25 PROCEDURE — 84466 ASSAY OF TRANSFERRIN: CPT

## 2024-11-25 PROCEDURE — 82728 ASSAY OF FERRITIN: CPT

## 2024-11-25 PROCEDURE — 36415 COLL VENOUS BLD VENIPUNCTURE: CPT

## 2024-11-29 NOTE — PROGRESS NOTES
- EGD for ALMA DELIA   - Findings/path: grade A esophagitis, mild gastritis (biopsied -- negative for H Pylori), normal duodenum (biopsied -- negative for Celiac disease)  - POC: No bleeding lesions or masses seen. Started Omeprazole 40 mg daily before breakfast for esophagitis and gastritis seen.    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 Etiology of ALMA DELIA could be due to the AVM seen in the cecum which was eradicated with APC.

## 2024-11-30 ENCOUNTER — HOSPITAL ENCOUNTER (OUTPATIENT)
Facility: HOSPITAL | Age: 64
Setting detail: OBSERVATION
Discharge: HOME | End: 2024-12-02
Attending: EMERGENCY MEDICINE | Admitting: INTERNAL MEDICINE
Payer: COMMERCIAL

## 2024-11-30 DIAGNOSIS — D68.32 HEMORRHAGIC DISORDER DUE TO EXTRINSIC CIRCULATING ANTICOAGULANTS: ICD-10-CM

## 2024-11-30 DIAGNOSIS — K92.2 LOWER GI BLEED: Primary | ICD-10-CM

## 2024-11-30 DIAGNOSIS — D50.8 OTHER IRON DEFICIENCY ANEMIA: ICD-10-CM

## 2024-11-30 DIAGNOSIS — I25.10 CORONARY ARTERY DISEASE INVOLVING NATIVE CORONARY ARTERY OF NATIVE HEART WITHOUT ANGINA PECTORIS: ICD-10-CM

## 2024-11-30 PROBLEM — I50.22 CHRONIC SYSTOLIC CONGESTIVE HEART FAILURE: Status: ACTIVE | Noted: 2019-08-26

## 2024-11-30 LAB
ABO GROUP BLD: NORMAL
ALBUMIN SERPL-MCNC: 4.2 G/DL (ref 3.5–5.2)
ALBUMIN/GLOB SERPL: 1.4 G/DL
ALP SERPL-CCNC: 116 U/L (ref 39–117)
ALT SERPL W P-5'-P-CCNC: 28 U/L (ref 1–33)
ANION GAP SERPL CALCULATED.3IONS-SCNC: 11 MMOL/L (ref 5–15)
AST SERPL-CCNC: 26 U/L (ref 1–32)
BASOPHILS # BLD AUTO: 0.06 10*3/MM3 (ref 0–0.2)
BASOPHILS NFR BLD AUTO: 0.6 % (ref 0–1.5)
BILIRUB SERPL-MCNC: 0.6 MG/DL (ref 0–1.2)
BLD GP AB SCN SERPL QL: NEGATIVE
BUN SERPL-MCNC: 11 MG/DL (ref 8–23)
BUN/CREAT SERPL: 10.2 (ref 7–25)
CALCIUM SPEC-SCNC: 9.5 MG/DL (ref 8.6–10.5)
CHLORIDE SERPL-SCNC: 103 MMOL/L (ref 98–107)
CO2 SERPL-SCNC: 24 MMOL/L (ref 22–29)
CREAT SERPL-MCNC: 1.08 MG/DL (ref 0.57–1)
DEPRECATED RDW RBC AUTO: 46.2 FL (ref 37–54)
EGFRCR SERPLBLD CKD-EPI 2021: 57.8 ML/MIN/1.73
EOSINOPHIL # BLD AUTO: 0.18 10*3/MM3 (ref 0–0.4)
EOSINOPHIL NFR BLD AUTO: 1.9 % (ref 0.3–6.2)
ERYTHROCYTE [DISTWIDTH] IN BLOOD BY AUTOMATED COUNT: 14.5 % (ref 12.3–15.4)
GLOBULIN UR ELPH-MCNC: 3 GM/DL
GLUCOSE SERPL-MCNC: 146 MG/DL (ref 65–99)
HCT VFR BLD AUTO: 33.7 % (ref 34–46.6)
HCT VFR BLD AUTO: 41.1 % (ref 34–46.6)
HGB BLD-MCNC: 11.1 G/DL (ref 12–15.9)
HGB BLD-MCNC: 12.9 G/DL (ref 12–15.9)
IMM GRANULOCYTES # BLD AUTO: 0.05 10*3/MM3 (ref 0–0.05)
IMM GRANULOCYTES NFR BLD AUTO: 0.5 % (ref 0–0.5)
LIPASE SERPL-CCNC: 58 U/L (ref 13–60)
LYMPHOCYTES # BLD AUTO: 2.52 10*3/MM3 (ref 0.7–3.1)
LYMPHOCYTES NFR BLD AUTO: 26.1 % (ref 19.6–45.3)
MCH RBC QN AUTO: 27.4 PG (ref 26.6–33)
MCHC RBC AUTO-ENTMCNC: 31.4 G/DL (ref 31.5–35.7)
MCV RBC AUTO: 87.4 FL (ref 79–97)
MONOCYTES # BLD AUTO: 0.69 10*3/MM3 (ref 0.1–0.9)
MONOCYTES NFR BLD AUTO: 7.1 % (ref 5–12)
NEUTROPHILS NFR BLD AUTO: 6.17 10*3/MM3 (ref 1.7–7)
NEUTROPHILS NFR BLD AUTO: 63.8 % (ref 42.7–76)
NRBC BLD AUTO-RTO: 0 /100 WBC (ref 0–0.2)
PLATELET # BLD AUTO: 346 10*3/MM3 (ref 140–450)
PMV BLD AUTO: 10.9 FL (ref 6–12)
POTASSIUM SERPL-SCNC: 4.4 MMOL/L (ref 3.5–5.2)
PROT SERPL-MCNC: 7.2 G/DL (ref 6–8.5)
RBC # BLD AUTO: 4.7 10*6/MM3 (ref 3.77–5.28)
RH BLD: POSITIVE
SODIUM SERPL-SCNC: 138 MMOL/L (ref 136–145)
T&S EXPIRATION DATE: NORMAL
WBC NRBC COR # BLD AUTO: 9.67 10*3/MM3 (ref 3.4–10.8)

## 2024-11-30 PROCEDURE — 96361 HYDRATE IV INFUSION ADD-ON: CPT

## 2024-11-30 PROCEDURE — 99285 EMERGENCY DEPT VISIT HI MDM: CPT

## 2024-11-30 PROCEDURE — G0378 HOSPITAL OBSERVATION PER HR: HCPCS

## 2024-11-30 PROCEDURE — 85018 HEMOGLOBIN: CPT | Performed by: HOSPITALIST

## 2024-11-30 PROCEDURE — 80053 COMPREHEN METABOLIC PANEL: CPT | Performed by: EMERGENCY MEDICINE

## 2024-11-30 PROCEDURE — 86850 RBC ANTIBODY SCREEN: CPT | Performed by: EMERGENCY MEDICINE

## 2024-11-30 PROCEDURE — 83690 ASSAY OF LIPASE: CPT | Performed by: EMERGENCY MEDICINE

## 2024-11-30 PROCEDURE — 86901 BLOOD TYPING SEROLOGIC RH(D): CPT | Performed by: EMERGENCY MEDICINE

## 2024-11-30 PROCEDURE — 86900 BLOOD TYPING SEROLOGIC ABO: CPT | Performed by: EMERGENCY MEDICINE

## 2024-11-30 PROCEDURE — 86923 COMPATIBILITY TEST ELECTRIC: CPT

## 2024-11-30 PROCEDURE — 85025 COMPLETE CBC W/AUTO DIFF WBC: CPT | Performed by: EMERGENCY MEDICINE

## 2024-11-30 PROCEDURE — 85014 HEMATOCRIT: CPT | Performed by: HOSPITALIST

## 2024-11-30 PROCEDURE — 25810000003 SODIUM CHLORIDE 0.9 % SOLUTION: Performed by: HOSPITALIST

## 2024-11-30 RX ORDER — PANTOPRAZOLE SODIUM 40 MG/1
40 TABLET, DELAYED RELEASE ORAL DAILY
Status: DISCONTINUED | OUTPATIENT
Start: 2024-12-01 | End: 2024-12-02 | Stop reason: HOSPADM

## 2024-11-30 RX ORDER — ATORVASTATIN CALCIUM 80 MG/1
80 TABLET, FILM COATED ORAL DAILY
Status: DISCONTINUED | OUTPATIENT
Start: 2024-12-01 | End: 2024-12-02 | Stop reason: HOSPADM

## 2024-11-30 RX ORDER — CARVEDILOL 25 MG/1
25 TABLET ORAL 2 TIMES DAILY WITH MEALS
Status: DISCONTINUED | OUTPATIENT
Start: 2024-12-01 | End: 2024-12-02 | Stop reason: HOSPADM

## 2024-11-30 RX ORDER — TRAZODONE HYDROCHLORIDE 100 MG/1
50 TABLET ORAL NIGHTLY
Status: DISCONTINUED | OUTPATIENT
Start: 2024-12-01 | End: 2024-12-02 | Stop reason: HOSPADM

## 2024-11-30 RX ORDER — ONDANSETRON 2 MG/ML
4 INJECTION INTRAMUSCULAR; INTRAVENOUS EVERY 6 HOURS PRN
Status: DISCONTINUED | OUTPATIENT
Start: 2024-11-30 | End: 2024-12-02 | Stop reason: HOSPADM

## 2024-11-30 RX ORDER — SPIRONOLACTONE 25 MG/1
25 TABLET ORAL DAILY
Status: DISCONTINUED | OUTPATIENT
Start: 2024-12-01 | End: 2024-12-02 | Stop reason: HOSPADM

## 2024-11-30 RX ORDER — IBUPROFEN 600 MG/1
1 TABLET ORAL
Status: DISCONTINUED | OUTPATIENT
Start: 2024-11-30 | End: 2024-12-02 | Stop reason: HOSPADM

## 2024-11-30 RX ORDER — ONDANSETRON 4 MG/1
4 TABLET, ORALLY DISINTEGRATING ORAL EVERY 6 HOURS PRN
Status: DISCONTINUED | OUTPATIENT
Start: 2024-11-30 | End: 2024-12-02 | Stop reason: HOSPADM

## 2024-11-30 RX ORDER — SODIUM CHLORIDE 0.9 % (FLUSH) 0.9 %
10 SYRINGE (ML) INJECTION AS NEEDED
Status: DISCONTINUED | OUTPATIENT
Start: 2024-11-30 | End: 2024-12-02 | Stop reason: HOSPADM

## 2024-11-30 RX ORDER — NITROGLYCERIN 0.4 MG/1
0.4 TABLET SUBLINGUAL
Status: DISCONTINUED | OUTPATIENT
Start: 2024-11-30 | End: 2024-12-02 | Stop reason: HOSPADM

## 2024-11-30 RX ORDER — ACETAMINOPHEN 325 MG/1
650 TABLET ORAL EVERY 4 HOURS PRN
Status: DISCONTINUED | OUTPATIENT
Start: 2024-11-30 | End: 2024-12-02 | Stop reason: HOSPADM

## 2024-11-30 RX ORDER — SODIUM CHLORIDE 9 MG/ML
125 INJECTION, SOLUTION INTRAVENOUS CONTINUOUS
Status: DISCONTINUED | OUTPATIENT
Start: 2024-11-30 | End: 2024-12-02 | Stop reason: HOSPADM

## 2024-11-30 RX ORDER — DEXTROSE MONOHYDRATE 25 G/50ML
25 INJECTION, SOLUTION INTRAVENOUS
Status: DISCONTINUED | OUTPATIENT
Start: 2024-11-30 | End: 2024-12-02 | Stop reason: HOSPADM

## 2024-11-30 RX ORDER — INSULIN LISPRO 100 [IU]/ML
2-9 INJECTION, SOLUTION INTRAVENOUS; SUBCUTANEOUS
Status: DISCONTINUED | OUTPATIENT
Start: 2024-12-01 | End: 2024-12-02 | Stop reason: HOSPADM

## 2024-11-30 RX ORDER — NICOTINE POLACRILEX 4 MG
15 LOZENGE BUCCAL
Status: DISCONTINUED | OUTPATIENT
Start: 2024-11-30 | End: 2024-12-02 | Stop reason: HOSPADM

## 2024-11-30 RX ADMIN — SODIUM CHLORIDE 125 ML/HR: 9 INJECTION, SOLUTION INTRAVENOUS at 22:15

## 2024-11-30 NOTE — ED TRIAGE NOTES
Patient to ED per PV from home w/ reports of bloody stool today; patient had normal BM this morning w/ no blood. Patient states she had a colonoscopy and upper GI scope on Wednesday. Patient is on Plavix.

## 2024-11-30 NOTE — ED PROVIDER NOTES
EMERGENCY DEPARTMENT ENCOUNTER    Room Number:  29/29  PCP: Shannon Cole APRN  Historian: Patient      HPI:  Chief Complaint: Lower GI bleed  A complete HPI/ROS/PMH/PSH/SH/FH are unobtainable due to: None  Context: Daisy Dent is a 64 y.o. female who presents to the ED c/o lower GI bleed patient here recently and had upper and lower endoscopy.  Upper endoscopy showed mild gastritis lower endoscopy had angiectasia that was cauterized.  Patient states she did well until today.  Patient has had 3 episodes of bright red blood per rectum.  Patient states she feels somewhat lightheaded.  Has had no chest pain.  Has had minimal abdominal pain.  Has had no fevers or chills.  Has had no prior GI bleed.  Patient is on Plavix.            PAST MEDICAL HISTORY  Active Ambulatory Problems     Diagnosis Date Noted    Abdominal pain, left upper quadrant 05/18/2016    Abnormal LFTs 05/18/2016    Acute bronchitis 05/18/2016    Colon polyp 05/18/2016    Cough 05/18/2016    Diabetes mellitus type 2, noninsulin dependent 05/18/2016    D (diarrhea) 05/18/2016    Enterogastritis 05/18/2016    Hyperlipidemia 05/18/2016    Abnormal fasting glucose 05/18/2016    Increased thirst 05/18/2016    Sore throat 05/18/2016    Candida vaginitis 05/18/2016    Avitaminosis D 05/18/2016    Cardiomyopathy 11/15/2016    Automatic implantable cardioverter-defibrillator in situ 11/15/2016    Chronic coronary artery disease 11/15/2016    History of anterior myocardial infarction  11/03/2009    Benign essential hypertension 03/07/2017    Carotid artery disease 06/07/2012    Obstructive sleep apnea syndrome 03/07/2017    Precordial pain 03/14/2017    S/P coronary artery stent placement 11/15/2017    Cerebrovascular accident (CVA) due to embolism of left middle cerebral artery 12/08/2018    Chronic paroxysmal hemicrania, not intractable 03/27/2019    History of CVA (cerebrovascular accident) without residual deficits 12/31/2018    ICD  (implantable cardioverter-defibrillator) in place 11/09/2012    Systolic congestive heart failure 08/26/2019    Type 2 diabetes mellitus with complication 05/07/2018    Ischemic cardiomyopathy 08/26/2019    Morbid obesity with BMI of 40.0-44.9, adult 01/13/2020    Angina at rest 10/22/2020    Iron deficiency anemia 02/01/2024    Adverse reaction to compound iron preparation, initial encounter 09/24/2024    Chronic anticoagulation 08/14/2024     Resolved Ambulatory Problems     Diagnosis Date Noted    Generalized ischemic myocardial dysfunction 03/07/2017    Acute non-ST-elevation myocardial infarction 07/29/2006    Congestive heart failure with left ventricular systolic dysfunction 03/07/2017    Chronic post-traumatic headache, not intractable 03/27/2019    Anterior myocardial infarction 11/03/2009    Non-ST elevated myocardial infarction (non-STEMI) 07/29/2006    Abnormal stress test 12/27/2019     Past Medical History:   Diagnosis Date    Abdominal pain     Abnormal liver function test     Anemia     CAD (coronary artery disease)     CHF (congestive heart failure)     Congestive heart disease     COPD (chronic obstructive pulmonary disease)     Diabetes     Diabetes mellitus     Gastroenteritis     Health care maintenance     Hypertension     IFG (impaired fasting glucose)     Myocardial infarction     SHELLIE (obstructive sleep apnea)     Stroke 2020    Type 2 diabetes mellitus     Vaginal yeast infection     Vitamin D deficiency          PAST SURGICAL HISTORY  Past Surgical History:   Procedure Laterality Date    CARDIAC CATHETERIZATION      CARDIAC CATHETERIZATION N/A 01/06/2020    Procedure: Coronary angiography;  Surgeon: Oneida Saldivar MD;  Location:  [facility];  Service: Cardiovascular    CARDIAC CATHETERIZATION N/A 01/06/2020    Procedure: Left heart cath;  Surgeon: Oneida Saldivar MD;  Location:  [facility];  Service: Cardiovascular    CARDIAC CATHETERIZATION N/A  01/06/2020    Procedure: Left ventriculography;  Surgeon: Oneida Saldivar MD;  Location:  [facility];  Service: Cardiovascular    CARDIAC CATHETERIZATION N/A 01/06/2020    Procedure: Percutaneous Coronary Intervention;  Surgeon: Oneida Saldivar MD;  Location:  [facility];  Service: Cardiovascular    CARDIAC CATHETERIZATION N/A 01/06/2020    Procedure: Stent HUGO coronary;  Surgeon: Oneida Saldivar MD;  Location:  [facility];  Service: Cardiovascular    CARDIAC CATHETERIZATION  01/06/2020    Procedure: Functional Flow [place];  Surgeon: Oneida Saldivar MD;  Location:  [facility];  Service: Cardiovascular    CARDIAC CATHETERIZATION N/A 10/26/2020    Procedure: Coronary angiography;  Surgeon: Oneida Saldivar MD;  [facility];  Service: Cardiovascular;  Laterality: N/A;    CARDIAC CATHETERIZATION N/A 10/26/2020    Procedure: Left heart cath;  Surgeon: Oneida Saldivar MD;  [facility];  Service: Cardiovascular;  Laterality: N/A;    CARDIAC CATHETERIZATION N/A 10/26/2020    Procedure: Left ventriculography;  Surgeon: Oneida Saldivar MD;  Location:  [facility];  Service: Cardiovascular;  Laterality: N/A;    CARDIAC CATHETERIZATION  10/26/2020    Procedure: Functional Flow [place];  Surgeon: Oneida Saldivar MD;  [facility];  Service: Cardiovascular;;    CARDIAC CATHETERIZATION N/A 07/19/2021    Procedure: Coronary angiography;  Surgeon: Oneida Saldivar MD;  [facility];  Service: Cardiovascular;  Laterality: N/A;    CARDIAC CATHETERIZATION N/A 07/19/2021    Procedure: Left heart cath;  Surgeon: Oneida Saldivar MD;  [facility];  Service: Cardiovascular;  Laterality: N/A;    CARDIAC CATHETERIZATION N/A 07/19/2021    Procedure: Left ventriculography;  Surgeon: Oneida Saldivar MD;   Location:  [facility];  Service: Cardiovascular;  Laterality: N/A;    CARDIAC CATHETERIZATION N/A 07/19/2021    Procedure: Percutaneous Coronary Intervention;  Surgeon: Oneida Saldivar MD;  Location:  [facility];  Service: Cardiovascular;  Laterality: N/A;    CARDIAC CATHETERIZATION N/A 07/19/2021    Procedure: Optical Coherent Tomography;  Surgeon: Oneida Saldivar MD;  Location:  [facility];  Service: Cardiovascular;  Laterality: N/A;    CARDIAC CATHETERIZATION N/A 07/19/2021    Procedure: Stent HUGO coronary;  Surgeon: Oneida Saldivar MD;  Location:  [facility];  Service: Cardiovascular;  Laterality: N/A;    CARDIAC CATHETERIZATION  07/19/2021    Procedure: RESTING FULL CYCLE RATIO;  Surgeon: Oneida Saldivar MD;  Location:  [facility];  Service: Cardiovascular;;  RFR    CARDIAC DEFIBRILLATOR PLACEMENT  2010    Medtronic dual chamber/Dr. Valentin    CARDIAC ELECTROPHYSIOLOGY PROCEDURE N/A 01/12/2018    Procedure: ICD DC generator change  medtronic;  Surgeon: Hany Ornelas MD;  Location:  [facility];  Service:     COLONOSCOPY N/A 11/21/2024    Procedure: COLONOSCOPY;  Surgeon: Jayden Pepe MD;  Location: [facility] OR;  Service: Gastroenterology;  Laterality: N/A;  APC for arterial venous malformation; internal hemorrhoids    ENDOSCOPY N/A 11/21/2024    Procedure: ESOPHAGOGASTRODUODENOSCOPY WITH BIOPSY;  Surgeon: Jayden Pepe MD;  Location: [facility] OR;  Service: Gastroenterology;  Laterality: N/A;  duodenum bx to r/o celiac disease; gastritis; gastric bx to r/o h. pylori; esophagitis    PACEMAKER IMPLANTATION      TUBAL ABDOMINAL LIGATION           FAMILY HISTORY  Family History   Problem Relation Age of Onset    Heart attack Mother     Diabetes Mother     Heart disease Mother     Hyperlipidemia Mother     Emphysema Mother     Diabetes Father     Heart disease Father     Hyperlipidemia Father      Hypertension Father     Colon cancer Father     Heart attack Father     Hypertension Sister     Hypertension Brother     Heart disease Brother     Hyperlipidemia Brother     Heart disease Brother     Heart attack Brother     Heart disease Maternal Grandmother     Heart disease Maternal Grandfather     Heart disease Paternal Grandmother     Heart disease Paternal Grandfather     Colon polyps Neg Hx     Crohn's disease Neg Hx     Irritable bowel syndrome Neg Hx     Ulcerative colitis Neg Hx     Malig Hyperthermia Neg Hx          SOCIAL HISTORY  Social History     Socioeconomic History    Marital status:     Number of children: 2   Tobacco Use    Smoking status: Former     Current packs/day: 0.00     Average packs/day: 1 pack/day for 30.0 years (30.0 ttl pk-yrs)     Types: Cigarettes     Start date: 11/1/1979     Quit date: 11/1/2009     Years since quitting: 15.0    Smokeless tobacco: Never    Tobacco comments:     QUIT 10 YRS   Vaping Use    Vaping status: Never Used   Substance and Sexual Activity    Alcohol use: Yes     Comment: rarely uses ETOH/caffeine use    Drug use: Never    Sexual activity: Yes     Partners: Male         ALLERGIES  Patient has no known allergies.        REVIEW OF SYSTEMS  Review of Systems   GI bleed      PHYSICAL EXAM  ED Triage Vitals   Temp Heart Rate Resp BP SpO2   11/30/24 1843 11/30/24 1843 11/30/24 1843 11/30/24 1845 11/30/24 1843   96.6 °F (35.9 °C) 96 18 110/72 97 %      Temp src Heart Rate Source Patient Position BP Location FiO2 (%)   11/30/24 1843 11/30/24 1843 11/30/24 1845 11/30/24 1845 --   Tympanic Monitor Sitting Right arm        Physical Exam      GENERAL: no acute distress  HENT: nares patent  EYES: no scleral icterus  CV: regular rhythm, normal rate  RESPIRATORY: normal effort  ABDOMEN: soft, nontender  MUSCULOSKELETAL: no deformity  NEURO: alert, moves all extremities, follows commands  PSYCH:  calm, cooperative  SKIN: warm, dry    Vital signs and nursing notes  reviewed.          LAB RESULTS  Recent Results (from the past 24 hours)   Comprehensive Metabolic Panel    Collection Time: 11/30/24  6:59 PM    Specimen: Blood   Result Value Ref Range    Glucose 146 (H) 65 - 99 mg/dL    BUN 11 8 - 23 mg/dL    Creatinine 1.08 (H) 0.57 - 1.00 mg/dL    Sodium 138 136 - 145 mmol/L    Potassium 4.4 3.5 - 5.2 mmol/L    Chloride 103 98 - 107 mmol/L    CO2 24.0 22.0 - 29.0 mmol/L    Calcium 9.5 8.6 - 10.5 mg/dL    Total Protein 7.2 6.0 - 8.5 g/dL    Albumin 4.2 3.5 - 5.2 g/dL    ALT (SGPT) 28 1 - 33 U/L    AST (SGOT) 26 1 - 32 U/L    Alkaline Phosphatase 116 39 - 117 U/L    Total Bilirubin 0.6 0.0 - 1.2 mg/dL    Globulin 3.0 gm/dL    A/G Ratio 1.4 g/dL    BUN/Creatinine Ratio 10.2 7.0 - 25.0    Anion Gap 11.0 5.0 - 15.0 mmol/L    eGFR 57.8 (L) >60.0 mL/min/1.73   Type & Screen    Collection Time: 11/30/24  6:59 PM    Specimen: Blood   Result Value Ref Range    ABO Type A     RH type Positive     Antibody Screen Negative     T&S Expiration Date 12/3/2024 11:59:59 PM    Lipase    Collection Time: 11/30/24  6:59 PM    Specimen: Blood   Result Value Ref Range    Lipase 58 13 - 60 U/L   CBC Auto Differential    Collection Time: 11/30/24  6:59 PM    Specimen: Blood   Result Value Ref Range    WBC 9.67 3.40 - 10.80 10*3/mm3    RBC 4.70 3.77 - 5.28 10*6/mm3    Hemoglobin 12.9 12.0 - 15.9 g/dL    Hematocrit 41.1 34.0 - 46.6 %    MCV 87.4 79.0 - 97.0 fL    MCH 27.4 26.6 - 33.0 pg    MCHC 31.4 (L) 31.5 - 35.7 g/dL    RDW 14.5 12.3 - 15.4 %    RDW-SD 46.2 37.0 - 54.0 fl    MPV 10.9 6.0 - 12.0 fL    Platelets 346 140 - 450 10*3/mm3    Neutrophil % 63.8 42.7 - 76.0 %    Lymphocyte % 26.1 19.6 - 45.3 %    Monocyte % 7.1 5.0 - 12.0 %    Eosinophil % 1.9 0.3 - 6.2 %    Basophil % 0.6 0.0 - 1.5 %    Immature Grans % 0.5 0.0 - 0.5 %    Neutrophils, Absolute 6.17 1.70 - 7.00 10*3/mm3    Lymphocytes, Absolute 2.52 0.70 - 3.10 10*3/mm3    Monocytes, Absolute 0.69 0.10 - 0.90 10*3/mm3    Eosinophils, Absolute  0.18 0.00 - 0.40 10*3/mm3    Basophils, Absolute 0.06 0.00 - 0.20 10*3/mm3    Immature Grans, Absolute 0.05 0.00 - 0.05 10*3/mm3    nRBC 0.0 0.0 - 0.2 /100 WBC       Ordered the above labs and reviewed the results.        RADIOLOGY  No Radiology Exams Resulted Within Past 24 Hours               PROCEDURES  Procedures          MEDICATIONS GIVEN IN ER  Medications   sodium chloride 0.9 % flush 10 mL (has no administration in time range)                   MEDICAL DECISION MAKING, PROGRESS, and CONSULTS    All labs have been independently reviewed by me.  All radiology studies have been reviewed by me and I have also reviewed the radiology report.   EKG's independently viewed and interpreted by me.  Discussion below represents my analysis of pertinent findings related to patient's condition, differential diagnosis, treatment plan and final disposition.      Additional sources:  - Discussed/ obtained information from independent historians: None    - External (non-ED) record review: Epic reviewed and patient had EGD and colonoscopy here 11/21/2024    - Chronic or social conditions impacting care: None    - Shared decision making: None      Orders placed during this visit:  Orders Placed This Encounter   Procedures    Comprehensive Metabolic Panel    Lipase    CBC Auto Differential    [facility] (on-call MD unless specified) Details    Type & Screen    Insert Peripheral IV    Initiate Observation Status    CBC & Differential         Additional orders considered but not ordered:  None        Differential diagnosis includes but is not limited to:    Lower GI bleed, AVM diverticulosis,      Independent interpretation of labs, radiology studies, and discussions with consultants:  ED Course as of 11/30/24 2036   Sat Nov 30, 2024 2015 20:15 EST  Patient presents for lower GI bleeding.  Patient had EGD 9 days ago as well as colonoscopy 9 days ago.  Patient states she was doing fine until today when she started having bleeding.   Patient's hemoglobin normal and patient's blood pressure normal.  Is on Plavix.  Patient discussed with Dr. Flower who will admit for further eval. [SL]      ED Course User Index  [SL] Arsenio Gonzales MD                 DIAGNOSIS  Final diagnoses:   Lower GI bleed         DISPOSITION  admit            Latest Documented Vital Signs:  As of 20:36 EST  BP- 104/83 HR- 88 Temp- 96.6 °F (35.9 °C) (Tympanic) O2 sat- 96%              --    Please note that portions of this were completed with a voice recognition program.       Note Disclaimer: At [facility], we believe that sharing information builds trust and better relationships. You are receiving this note because you are receiving care at [facility] or recently visited. It is possible you will see health information before a provider has talked with you about it. This kind of information can be easy to misunderstand. To help you fully understand what it means for your health, we urge you to discuss this note with your provider.            Arsenio Gonzales MD  11/30/24 2038

## 2024-12-01 LAB
ANION GAP SERPL CALCULATED.3IONS-SCNC: 7 MMOL/L (ref 5–15)
BASOPHILS # BLD AUTO: 0.03 10*3/MM3 (ref 0–0.2)
BASOPHILS NFR BLD AUTO: 0.4 % (ref 0–1.5)
BUN SERPL-MCNC: 13 MG/DL (ref 8–23)
BUN/CREAT SERPL: 16.7 (ref 7–25)
CALCIUM SPEC-SCNC: 8.3 MG/DL (ref 8.6–10.5)
CHLORIDE SERPL-SCNC: 109 MMOL/L (ref 98–107)
CO2 SERPL-SCNC: 23 MMOL/L (ref 22–29)
CREAT SERPL-MCNC: 0.78 MG/DL (ref 0.57–1)
DEPRECATED RDW RBC AUTO: 45.8 FL (ref 37–54)
DEPRECATED RDW RBC AUTO: 47.3 FL (ref 37–54)
EGFRCR SERPLBLD CKD-EPI 2021: 85.5 ML/MIN/1.73
EOSINOPHIL # BLD AUTO: 0.03 10*3/MM3 (ref 0–0.4)
EOSINOPHIL NFR BLD AUTO: 0.4 % (ref 0.3–6.2)
ERYTHROCYTE [DISTWIDTH] IN BLOOD BY AUTOMATED COUNT: 14.5 % (ref 12.3–15.4)
ERYTHROCYTE [DISTWIDTH] IN BLOOD BY AUTOMATED COUNT: 14.7 % (ref 12.3–15.4)
GLUCOSE BLDC GLUCOMTR-MCNC: 115 MG/DL (ref 70–130)
GLUCOSE BLDC GLUCOMTR-MCNC: 137 MG/DL (ref 70–130)
GLUCOSE BLDC GLUCOMTR-MCNC: 146 MG/DL (ref 70–130)
GLUCOSE BLDC GLUCOMTR-MCNC: 91 MG/DL (ref 70–130)
GLUCOSE SERPL-MCNC: 144 MG/DL (ref 65–99)
HBA1C MFR BLD: 5.7 % (ref 4.8–5.6)
HCT VFR BLD AUTO: 21.8 % (ref 34–46.6)
HCT VFR BLD AUTO: 26.4 % (ref 34–46.6)
HGB BLD-MCNC: 7.1 G/DL (ref 12–15.9)
HGB BLD-MCNC: 8.7 G/DL (ref 12–15.9)
IMM GRANULOCYTES # BLD AUTO: 0.05 10*3/MM3 (ref 0–0.05)
IMM GRANULOCYTES NFR BLD AUTO: 0.6 % (ref 0–0.5)
LYMPHOCYTES # BLD AUTO: 1.97 10*3/MM3 (ref 0.7–3.1)
LYMPHOCYTES NFR BLD AUTO: 23.3 % (ref 19.6–45.3)
MCH RBC QN AUTO: 28.2 PG (ref 26.6–33)
MCH RBC QN AUTO: 28.4 PG (ref 26.6–33)
MCHC RBC AUTO-ENTMCNC: 32.6 G/DL (ref 31.5–35.7)
MCHC RBC AUTO-ENTMCNC: 33 G/DL (ref 31.5–35.7)
MCV RBC AUTO: 86.3 FL (ref 79–97)
MCV RBC AUTO: 86.5 FL (ref 79–97)
MONOCYTES # BLD AUTO: 0.58 10*3/MM3 (ref 0.1–0.9)
MONOCYTES NFR BLD AUTO: 6.8 % (ref 5–12)
NEUTROPHILS NFR BLD AUTO: 5.81 10*3/MM3 (ref 1.7–7)
NEUTROPHILS NFR BLD AUTO: 68.5 % (ref 42.7–76)
NRBC BLD AUTO-RTO: 0 /100 WBC (ref 0–0.2)
PLATELET # BLD AUTO: 206 10*3/MM3 (ref 140–450)
PLATELET # BLD AUTO: 248 10*3/MM3 (ref 140–450)
PMV BLD AUTO: 10.5 FL (ref 6–12)
PMV BLD AUTO: 10.5 FL (ref 6–12)
POTASSIUM SERPL-SCNC: 4.3 MMOL/L (ref 3.5–5.2)
RBC # BLD AUTO: 2.52 10*6/MM3 (ref 3.77–5.28)
RBC # BLD AUTO: 3.06 10*6/MM3 (ref 3.77–5.28)
SODIUM SERPL-SCNC: 139 MMOL/L (ref 136–145)
WBC NRBC COR # BLD AUTO: 8.47 10*3/MM3 (ref 3.4–10.8)
WBC NRBC COR # BLD AUTO: 9.44 10*3/MM3 (ref 3.4–10.8)

## 2024-12-01 PROCEDURE — 82948 REAGENT STRIP/BLOOD GLUCOSE: CPT

## 2024-12-01 PROCEDURE — 25810000003 SODIUM CHLORIDE 0.9 % SOLUTION: Performed by: HOSPITALIST

## 2024-12-01 PROCEDURE — 96374 THER/PROPH/DIAG INJ IV PUSH: CPT

## 2024-12-01 PROCEDURE — 86900 BLOOD TYPING SEROLOGIC ABO: CPT

## 2024-12-01 PROCEDURE — 25010000002 ONDANSETRON PER 1 MG: Performed by: HOSPITALIST

## 2024-12-01 PROCEDURE — 83036 HEMOGLOBIN GLYCOSYLATED A1C: CPT | Performed by: HOSPITALIST

## 2024-12-01 PROCEDURE — 96361 HYDRATE IV INFUSION ADD-ON: CPT

## 2024-12-01 PROCEDURE — G0378 HOSPITAL OBSERVATION PER HR: HCPCS

## 2024-12-01 PROCEDURE — 86901 BLOOD TYPING SEROLOGIC RH(D): CPT

## 2024-12-01 PROCEDURE — P9016 RBC LEUKOCYTES REDUCED: HCPCS

## 2024-12-01 PROCEDURE — 80048 BASIC METABOLIC PNL TOTAL CA: CPT | Performed by: HOSPITALIST

## 2024-12-01 PROCEDURE — 85027 COMPLETE CBC AUTOMATED: CPT | Performed by: INTERNAL MEDICINE

## 2024-12-01 PROCEDURE — 99214 OFFICE O/P EST MOD 30 MIN: CPT | Performed by: INTERNAL MEDICINE

## 2024-12-01 PROCEDURE — 85025 COMPLETE CBC W/AUTO DIFF WBC: CPT | Performed by: HOSPITALIST

## 2024-12-01 PROCEDURE — 36430 TRANSFUSION BLD/BLD COMPNT: CPT

## 2024-12-01 RX ADMIN — SODIUM CHLORIDE 125 ML/HR: 9 INJECTION, SOLUTION INTRAVENOUS at 06:20

## 2024-12-01 RX ADMIN — CARVEDILOL 25 MG: 25 TABLET, FILM COATED ORAL at 08:18

## 2024-12-01 RX ADMIN — ONDANSETRON 4 MG: 2 INJECTION, SOLUTION INTRAMUSCULAR; INTRAVENOUS at 02:32

## 2024-12-01 RX ADMIN — SODIUM CHLORIDE 125 ML/HR: 9 INJECTION, SOLUTION INTRAVENOUS at 14:03

## 2024-12-01 RX ADMIN — TRAZODONE HYDROCHLORIDE 50 MG: 100 TABLET ORAL at 21:28

## 2024-12-01 RX ADMIN — POLYETHYLENE GLYCOL 3350, SODIUM SULFATE ANHYDROUS, SODIUM BICARBONATE, SODIUM CHLORIDE, POTASSIUM CHLORIDE 2000 ML: 236; 22.74; 6.74; 5.86; 2.97 POWDER, FOR SOLUTION ORAL at 16:32

## 2024-12-01 NOTE — CONSULTS
[facility]  Initial Inpatient Consult Note    Referring Provider: Dr. Flower    Reason for Consultation: GI bleed    Subjective     History of present illness:    64 y.o. female admitted with GI bleeding.  Patient underwent colonoscopy and EGD on November 21 of this year.  Mild gastritis found.  Colonoscopy with 1 cecal AVM that was treated with argon plasma coagulation.  Patient restarted Xarelto on the morning of November 22.  On the morning of November 30 she developed bright blood per rectum x 3 episodes.  She has had bright red blood here this morning with a couple clots.  She is hemodynamically stable.  No abdominal pain.  Her last dose of Xarelto was the morning of 11/30    Past Medical History:  Past Medical History:   Diagnosis Date    Abdominal pain     Abnormal liver function test     Acute bronchitis     Anemia     CAD (coronary artery disease)     Colon polyp     COPD (chronic obstructive pulmonary disease)     Cough     Diabetes     Diabetes mellitus     Diarrhea     Gastroenteritis     Health care maintenance     Hyperlipidemia     IFG (impaired fasting glucose)     Ischemic cardiomyopathy     Myocardial infarction     SHELLIE (obstructive sleep apnea)     Sore throat     Stroke 2020    Type 2 diabetes mellitus     Vaginal yeast infection     Vitamin D deficiency      Past Surgical History:  Past Surgical History:   Procedure Laterality Date    CARDIAC CATHETERIZATION      CARDIAC CATHETERIZATION N/A 01/06/2020    Procedure: Coronary angiography;  Surgeon: Oneida Saldivar MD;  Location:  [facility];  Service: Cardiovascular    CARDIAC CATHETERIZATION N/A 01/06/2020    Procedure: Left heart cath;  Surgeon: Oneida Saldivar MD;  Location:  [facility];  Service: Cardiovascular    CARDIAC CATHETERIZATION N/A 01/06/2020    Procedure: Left ventriculography;  Surgeon: Oneida Saldivar MD;  Location:  [facility];  Service: Cardiovascular     CARDIAC CATHETERIZATION N/A 01/06/2020    Procedure: Percutaneous Coronary Intervention;  Surgeon: Oneida Saldivar MD;  Location:  [facility];  Service: Cardiovascular    CARDIAC CATHETERIZATION N/A 01/06/2020    Procedure: Stent HUGO coronary;  Surgeon: Oneida Saldivar MD;  Location:  [facility];  Service: Cardiovascular    CARDIAC CATHETERIZATION  01/06/2020    Procedure: Functional Flow Kaycee;  Surgeon: Oneida Saldivar MD;  [facility];  Service: Cardiovascular    CARDIAC CATHETERIZATION N/A 10/26/2020    Procedure: Coronary angiography;  Surgeon: Oneida Saldivar MD;  Location:  [facility];  Service: Cardiovascular;  Laterality: N/A;    CARDIAC CATHETERIZATION N/A 10/26/2020    Procedure: Left heart cath;  Surgeon: Oneida Saldivar MD;  [facility];  Service: Cardiovascular;  Laterality: N/A;    CARDIAC CATHETERIZATION N/A 10/26/2020    Procedure: Left ventriculography;  Surgeon: Oneida Saldivar MD;  [facility];  Service: Cardiovascular;  Laterality: N/A;    CARDIAC CATHETERIZATION  10/26/2020    Procedure: Functional Flow Kaycee;  Surgeon: Oneida Saldivar MD;  [facility];  Service: Cardiovascular;;    CARDIAC CATHETERIZATION N/A 07/19/2021    Procedure: Coronary angiography;  Surgeon: Oneida Saldivar MD;  Location: [facility] CATH INVASIVE LOCATION;  Service: Cardiovascular;  Laterality: N/A;    CARDIAC CATHETERIZATION N/A 07/19/2021    Procedure: Left heart cath;  Surgeon: Oneida Saldivar MD;  [facility];  Service: Cardiovascular;  Laterality: N/A;    CARDIAC CATHETERIZATION N/A 07/19/2021    Procedure: Left ventriculography;  Surgeon: Oneida Saldivar MD;  Location: [facility] CATH INVASIVE LOCATION;  Service: Cardiovascular;  Laterality: N/A;    CARDIAC CATHETERIZATION N/A 07/19/2021    Procedure: Percutaneous  Coronary Intervention;  Surgeon: Oneida Saldivar MD;  Location:  [facility];  Service: Cardiovascular;  Laterality: N/A;    CARDIAC CATHETERIZATION N/A 07/19/2021    Procedure: Optical Coherent Tomography;  Surgeon: Oneida Saldivar MD;  Location:  [facility];  Service: Cardiovascular;  Laterality: N/A;    CARDIAC CATHETERIZATION N/A 07/19/2021    Procedure: Stent HUGO coronary;  Surgeon: Oneida Saldivar MD;  Location:  [facility];  Service: Cardiovascular;  Laterality: N/A;    CARDIAC CATHETERIZATION  07/19/2021    Procedure: RESTING FULL CYCLE RATIO;  Surgeon: Oneida Saldivar MD;  [facility];  Service: Cardiovascular;;  RFR    CARDIAC DEFIBRILLATOR PLACEMENT  2010    Medtronic dual chamber/Dr. Valentin    CARDIAC ELECTROPHYSIOLOGY PROCEDURE N/A 01/12/2018    Procedure: ICD DC generator change  medtronic;  Surgeon: Hany Ornelas MD;  Location: [facility] CATH INVASIVE LOCATION;  Service:     COLONOSCOPY N/A 11/21/2024    Procedure: COLONOSCOPY;  Surgeon: Jayden Pepe MD;  Location: [facility] OR;  Service: Gastroenterology;  Laterality: N/A;  APC for arterial venous malformation; internal hemorrhoids    ENDOSCOPY N/A 11/21/2024    Procedure: ESOPHAGOGASTRODUODENOSCOPY WITH BIOPSY;  Surgeon: Jayden Pepe MD;  Location: [facility] OR;  Service: Gastroenterology;  Laterality: N/A;  duodenum bx to r/o celiac disease; gastritis; gastric bx to r/o h. pylori; esophagitis    PACEMAKER IMPLANTATION      TUBAL ABDOMINAL LIGATION        Social History:   Social History     Tobacco Use    Smoking status: Former     Current packs/day: 0.00     Average packs/day: 1 pack/day for 30.0 years (30.0 ttl pk-yrs)     Types: Cigarettes     Start date: 11/1/1979     Quit date: 11/1/2009     Years since quitting: 15.0    Smokeless tobacco: Never    Tobacco comments:     QUIT 10 YRS   Substance Use Topics    Alcohol use: Yes     Comment: rarely uses  ETOH/caffeine use      Family History:  Family History   Problem Relation Age of Onset    Heart attack Mother     Diabetes Mother     Heart disease Mother     Hyperlipidemia Mother     Emphysema Mother     Diabetes Father     Heart disease Father     Hyperlipidemia Father     Hypertension Father     Colon cancer Father     Heart attack Father     Hypertension Sister     Hypertension Brother     Heart disease Brother     Hyperlipidemia Brother     Heart disease Brother     Heart attack Brother     Heart disease Maternal Grandmother     Heart disease Maternal Grandfather     Heart disease Paternal Grandmother     Heart disease Paternal Grandfather     Colon polyps Neg Hx     Crohn's disease Neg Hx     Irritable bowel syndrome Neg Hx     Ulcerative colitis Neg Hx     Malig Hyperthermia Neg Hx        Home Meds:  Medications Prior to Admission   Medication Sig Dispense Refill Last Dose/Taking    atorvastatin (LIPITOR) 80 MG tablet TAKE 1 TABLET BY MOUTH EVERY DAY 90 tablet 3 11/30/2024    carvedilol (COREG) 25 MG tablet TAKE 1 TABLET BY MOUTH TWICE A DAY WITH MEALS 180 tablet 2 11/30/2024    Cholecalciferol (VITAMIN D3) 2000 UNITS capsule Take 1,000 Units by mouth Daily.   11/30/2024    clopidogrel (PLAVIX) 75 MG tablet TAKE 1 TABLET BY MOUTH EVERY DAY 90 tablet 1 11/30/2024    furosemide (LASIX) 40 MG tablet TAKE 1 TABLET BY MOUTH EVERY DAY 90 tablet 1 11/30/2024    Multiple Vitamins-Minerals (MULTIVITAL PO) Take 1 tablet by mouth Daily.   11/30/2024    Omega-3 Fatty Acids (FISH OIL) 1200 MG capsule capsule Take 1 capsule by mouth Daily With Breakfast.   11/30/2024    pantoprazole (PROTONIX) 40 MG EC tablet Take 1 tablet by mouth Daily. 90 tablet 3 11/30/2024    potassium chloride 10 MEQ CR tablet Take 1 tablet by mouth Daily. Resume on 1/7/20 90 tablet 1 11/30/2024    rivaroxaban (Xarelto) 20 MG tablet Take 1 tablet by mouth Daily With Dinner. 90 tablet 3 11/30/2024    spironolactone (ALDACTONE) 25 MG tablet TAKE 1  TABLET BY MOUTH EVERY DAY 90 tablet 3 11/30/2024    traZODone (DESYREL) 50 MG tablet Take 1 tablet by mouth every night at bedtime. 90 tablet 2 11/30/2024    vitamin B-12 (CYANOCOBALAMIN) 500 MCG tablet Take 1 tablet by mouth Daily.   11/30/2024    Accu-Chek FastClix Lancets misc Use to test blood sugar 4 times daily  E11.8 400 each 1     Accu-Chek Guide test strip USE 4 TIMES A DAY E11.8 300 each 2     Blood Glucose Monitoring Suppl (Accu-Chek Guide) w/Device kit Inject 1 Device under the skin into the appropriate area as directed Daily. 1 kit 0     Insulin Pen Needle 32G X 4 MM misc USE 4 TIMES A  each 3     nitroglycerin (Nitrostat) 0.4 MG SL tablet Place 1 tablet under the tongue Every 5 (Five) Minutes As Needed for Chest Pain. Take no more than 3 doses in 15 minutes. 30 tablet 11     potassium chloride 10 MEQ CR tablet TAKE 1 TABLET BY MOUTH EVERY DAY 90 tablet 1      Current Meds:   atorvastatin, 80 mg, Oral, Daily  carvedilol, 25 mg, Oral, BID With Meals  insulin lispro, 2-9 Units, Subcutaneous, 4x Daily AC & at Bedtime  pantoprazole, 40 mg, Oral, Daily  polyethylene glycol, 2,000 mL, Oral, Q12H  spironolactone, 25 mg, Oral, Daily  traZODone, 50 mg, Oral, Nightly      Allergies:  No Known Allergies  Review of Systems  There is weakness and fatigue all other systems reviewed and negative     Objective     Vital Signs  Temp:  [96.6 °F (35.9 °C)-98.4 °F (36.9 °C)] 98.1 °F (36.7 °C)  Heart Rate:  [77-96] 77  Resp:  [16-18] 16  BP: ()/(57-83) 117/66  Physical Exam:  General Appearance:    Alert, cooperative, in no acute distress   Head:    Normocephalic, without obvious abnormality, atraumatic   Eyes:          conjunctivae and sclerae normal, no   icterus   Throat:   no thrush, oral mucosa moist   Neck:   Supple, no adenopathy   Lungs:     Clear to auscultation bilaterally    Heart:    Regular rhythm and normal rate    Chest Wall:    No abnormalities observed   Abdomen:     Soft, nondistended,  nontender; normal bowel sounds   Extremities:   no edema, no redness   Skin:   No bruising or rash   Psychiatric:  normal mood and insight     Results Review:   I reviewed the patient's new clinical results.    Results from last 7 days   Lab Units 12/01/24  0616 11/30/24  2229 11/30/24  1859 11/25/24  1314   WBC 10*3/mm3 8.47  --  9.67 8.02   HEMOGLOBIN g/dL 8.7* 11.1* 12.9 13.4   HEMATOCRIT % 26.4* 33.7* 41.1 41.4   PLATELETS 10*3/mm3 248  --  346 294     Results from last 7 days   Lab Units 12/01/24  0616 11/30/24  1859   SODIUM mmol/L 139 138   POTASSIUM mmol/L 4.3 4.4   CHLORIDE mmol/L 109* 103   CO2 mmol/L 23.0 24.0   BUN mg/dL 13 11   CREATININE mg/dL 0.78 1.08*   CALCIUM mg/dL 8.3* 9.5   BILIRUBIN mg/dL  --  0.6   ALK PHOS U/L  --  116   ALT (SGPT) U/L  --  28   AST (SGOT) U/L  --  26   GLUCOSE mg/dL 144* 146*         Lab Results   Lab Value Date/Time    LIPASE 58 11/30/2024 1859    LIPASE 47 10/13/2022 1101    LIPASE 61 (H) 10/10/2022 1202    LIPASE 66 (H) 07/15/2015 1747       Radiology:  No orders to display       Assessment & Plan   Active Hospital Problems    Diagnosis     **Lower GI bleed     Hemorrhagic disorder due to extrinsic circulating anticoagulants     Chronic systolic congestive heart failure     History of CVA (cerebrovascular accident) without residual deficits     Type 2 diabetes mellitus with complication     Benign essential hypertension     Chronic coronary artery disease        Assessment:  Bright red blood per rectum  Anemia  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  Full bowel prep will give us the ability to use all modalities of therapeutics during endoscopy including injection of sclerotherapy if needed, coagulation if needed, and clipping  if needed, Endo clot can also be used as a last resort.  Serial hemoglobin  Transfusion parameters written  Clear liquid diet  Bowel prep 6 PM  Colonoscopy tomorrow      I discussed the patients findings and my recommendations with patient and nursing staff.    Jony Herrera MD

## 2024-12-01 NOTE — PROGRESS NOTES
Name: Daisy Dent ADMIT: 2024   : 1960  PCP: Shannon Cole APRN    MRN: [id number] LOS: 0 days   AGE/SEX: 64 y.o. female  ROOM: [facility]     Subjective   Subjective   She reports fatigue, lightheadedness, shortness of breath as well as ongoing bleeding. Hgb is down to 8.7 from 12.9 on admission.     Objective   Objective   Vital Signs  Temp:  [96.6 °F (35.9 °C)-98.4 °F (36.9 °C)] 97.7 °F (36.5 °C)  Heart Rate:  [77-96] 79  Resp:  [16-18] 16  BP: ()/(49-83) 102/49  SpO2:  [93 %-98 %] 98 %  on   ;   Device (Oxygen Therapy): room air  Body mass index is 36.21 kg/m².  Physical Exam  Constitutional:       General: She is not in acute distress.     Appearance: She is obese. She is not toxic-appearing.   Cardiovascular:      Rate and Rhythm: Normal rate and regular rhythm.      Heart sounds: Normal heart sounds.   Pulmonary:      Effort: Pulmonary effort is normal.      Breath sounds: Normal breath sounds.   Abdominal:      General: Bowel sounds are normal.      Palpations: Abdomen is soft.   Musculoskeletal:         General: No tenderness.      Right lower leg: No edema.      Left lower leg: No edema.   Neurological:      Mental Status: She is alert.   Psychiatric:         Mood and Affect: Mood normal.         Behavior: Behavior normal.         Results Review     I reviewed the patient's new clinical results.  Results from last 7 days   Lab Units 24  0616 24  2229 24  1859 24  1314   WBC 10*3/mm3 8.47  --  9.67 8.02   HEMOGLOBIN g/dL 8.7* 11.1* 12.9 13.4   PLATELETS 10*3/mm3 248  --  346 294     Results from last 7 days   Lab Units 24  0616 24  1859   SODIUM mmol/L 139 138   POTASSIUM mmol/L 4.3 4.4   CHLORIDE mmol/L 109* 103   CO2 mmol/L 23.0 24.0   BUN mg/dL 13 11   CREATININE mg/dL 0.78 1.08*   GLUCOSE mg/dL 144* 146*   Estimated Creatinine Clearance: 71 mL/min (by C-G formula based on SCr of 0.78 mg/dL).  Results from last 7 days   Lab Units  11/30/24  1859   ALBUMIN g/dL 4.2   BILIRUBIN mg/dL 0.6   ALK PHOS U/L 116   AST (SGOT) U/L 26   ALT (SGPT) U/L 28     Results from last 7 days   Lab Units 12/01/24  0616 11/30/24  1859   CALCIUM mg/dL 8.3* 9.5   ALBUMIN g/dL  --  4.2       SARS-CoV-2 PCR   Date Value Ref Range Status   10/23/2020 Not Detected Not Detected Final     Comment:     Nucleic acid specific to SARS-CoV-2 (COVID-19) virus was not detected inthis sample by the TaqPath (TM) COVID-19 Combo Kit.SARS-CoV-2 (COVID-19) nucleic acid testing performed using [employer] Aptima (R) SARS-CoV-2 Assay or Easy [employer] TaqPath   (TM)COVID-19 Combo Kit as indicated above under Emergency Use Authorization (EUA) from the FDA. Aptima (R) and TaqPath (TM) test performancecharacteristics verified by [facility] in accordance with the FDAs Guidance Document (Policy for Diagnostic   Tests for Coronavirus Disease-2019during the Public Health Emergency) issued on March 16, 2020. The laboratory is regulated under CLIA as qualified to perform high-complexity testingUnless otherwise noted, all testing was performed at [facility],   CLIA No. [id number], [place] State Licensee No. [id number]     Hemoglobin A1C   Date/Time Value Ref Range Status   12/01/2024 0616 5.70 (H) 4.80 - 5.60 % Final     Glucose   Date/Time Value Ref Range Status   12/01/2024 1152 146 (H) 70 - 130 mg/dL Final   12/01/2024 0816 137 (H) 70 - 130 mg/dL Final       XR Hand 3+ View Left, XR Wrist 3+ View Left  XR HAND 3+ VW LEFT-, XR WRIST 3+ VW LEFT-     Clinical: Fell with pain     FINDINGS: Obliquely aligned fractures demonstrated through the third and  fourth metacarpals. There is overlapping of the bone ends at each  location, fourth metacarpal to the greatest degree. The remaining  metacarpals are intact. No acute osseous abnormality of any of the  fingers. The distal radius and ulna have a satisfactory appearance. No  carpal bone fracture or dislocation seen.     No soft tissue gas no  radiopaque foreign body seen.     CONCLUSION: Fracture of the third and fourth metacarpals.     This report was finalized on 3/1/2024 2:23 PM by Dr. Justo Mckinney M.D  on Workstation: [id number]       Scheduled Medications  atorvastatin, 80 mg, Oral, Daily  [Held by provider] carvedilol, 25 mg, Oral, BID With Meals  insulin lispro, 2-9 Units, Subcutaneous, 4x Daily AC & at Bedtime  pantoprazole, 40 mg, Oral, Daily  polyethylene glycol, 2,000 mL, Oral, Q12H  [Held by provider] spironolactone, 25 mg, Oral, Daily  traZODone, 50 mg, Oral, Nightly    Infusions  sodium chloride, 125 mL/hr, Last Rate: 125 mL/hr (12/01/24 1336)    Diet  Diet: Liquid; Clear Liquid; Fluid Consistency: Thin (IDDSI 0)  NPO Diet NPO Type: Strict NPO, Sips with Meds       Assessment/Plan     Active Hospital Problems    Diagnosis  POA    **Lower GI bleed [K92.2]  Yes    Hemorrhagic disorder due to extrinsic circulating anticoagulants [D68.32]  Yes    Chronic systolic congestive heart failure [I50.22]  Yes    History of CVA (cerebrovascular accident) without residual deficits [Z86.73]  Not Applicable    Type 2 diabetes mellitus with complication [E11.8]  Yes    Benign essential hypertension [I10]  Yes    Chronic coronary artery disease [I25.10]  Yes      Resolved Hospital Problems   No resolved problems to display.       64 y.o. female admitted with Lower GI bleed.    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 She had an EGD/colonoscopy a little more than a week ago for ALMA DELIA and was found to have a non-bleeding angiectasia in her cecum which was coagulated.     Rectal bleeding-colonoscopy is planned for tomorrow.   Acute blood loss anemia/iron deficiency anemia/asymptomatic anemia-she has lost 4 grams of hgb in less than 12 hours. Luckily she is not tachycardic or hypotensive. If that  should change then she'll need to go to the ICU. I will transfuse 2 units PRBCs. Hold antihypertensives.  Coronary artery disease-home plavix and coreg are held. Continue atorvastatin  Chronic systolic heart failure s/p ICD-diuretics and antihypertensives are on hold as above  Essential hypertension-antihypertensives on hold  Hyperlipidemia-atorvastatin  Type 2 diabetes-A1c 5.7. glucose at goal on sliding scale  History of embolic CVA-xarelto held for GI bleeding  SHELLIE-pap if available  SCDs for DVT prophylaxis.  Full code.  Discussed with patient.  Anticipate discharge home timing yet to be determined.      Db Lua MD  [facility]  12/01/24  13:54 EST

## 2024-12-01 NOTE — H&P
Patient Name:  Daisy Dent  YOB: 1960  MRN:  [id number]  Admit Date:  11/30/2024  Patient Care Team:  Shannon Cole APRN as PCP - General (Nurse Practitioner)  Shannon Cole APRN as Referring Physician (Nurse Practitioner)  Gideon Hood MD as Consulting Physician (Hematology and Oncology)  Herbert Salas APRN as Nurse Practitioner (Gastroenterology)  Oneida Saldivar MD as Consulting Physician (Cardiology)  Kirby Peralta MD as Consulting Physician (Cardiology)      Subjective   History Present Illness     Chief Complaint   Patient presents with    Black or Bloody Stool       Ms. Dent is a 64 y.o. female with a history of HTN, CAD, CHF, COPD, diabetes, ischemic cardiomyopathy, sleep apnea, prior stroke and anemia that presents to [facility] complaining of bloody stools. She underwent EGD and colonoscopy a little over a week ago to evaluate iron deficiency anemia.  She had a nonbleeding angioectasia in her cecum that was coagulated.  No significant findings on upper scope.  She has done well since that time and had a normal brown stool this morning.  Later in the day she had 3 episodes of bright red blood per rectum along with passage of some clots she states.  No nausea or vomiting.  No lightheadedness presyncope or syncope.  No prior history of this.      History of Present Illness      Review of Systems     Personal History     Past Medical History:   Diagnosis Date    Abdominal pain     Abnormal liver function test     Acute bronchitis     Anemia     Benign essential hypertension     CAD (coronary artery disease)     CHF (congestive heart failure)     Colon polyp     Congestive heart disease     COPD (chronic obstructive pulmonary disease)     Cough     Diabetes     Diabetes mellitus     Diarrhea     Gastroenteritis     Health care maintenance     Hyperlipidemia     Hypertension     IFG (impaired fasting glucose)     Ischemic cardiomyopathy      Myocardial infarction     SHELLIE (obstructive sleep apnea)     Sore throat     Stroke 2020    Type 2 diabetes mellitus     Vaginal yeast infection     Vitamin D deficiency      Past Surgical History:   Procedure Laterality Date    CARDIAC CATHETERIZATION      CARDIAC CATHETERIZATION N/A 01/06/2020    Procedure: Coronary angiography;  Surgeon: Oneida Saldivar MD;  Location:  [facility];  Service: Cardiovascular    CARDIAC CATHETERIZATION N/A 01/06/2020    Procedure: Left heart cath;  Surgeon: Oneida Saldivar MD;  Location:  [facility];  Service: Cardiovascular    CARDIAC CATHETERIZATION N/A 01/06/2020    Procedure: Left ventriculography;  Surgeon: Oneida Saldivar MD;  Location:  [facility];  Service: Cardiovascular    CARDIAC CATHETERIZATION N/A 01/06/2020    Procedure: Percutaneous Coronary Intervention;  Surgeon: Oneida Saldivar MD;  Location:  [facility];  Service: Cardiovascular    CARDIAC CATHETERIZATION N/A 01/06/2020    Procedure: Stent HUGO coronary;  Surgeon: Oneida Saldivar MD;  Location:  [facility];  Service: Cardiovascular    CARDIAC CATHETERIZATION  01/06/2020    Procedure: Functional Flow [place];  Surgeon: Oneida Saldivar MD;  Location:  [facility];  Service: Cardiovascular    CARDIAC CATHETERIZATION N/A 10/26/2020    Procedure: Coronary angiography;  Surgeon: Oneida Saldivar MD;  Location:  [facility];  Service: Cardiovascular;  Laterality: N/A;    CARDIAC CATHETERIZATION N/A 10/26/2020    Procedure: Left heart cath;  Surgeon: Oneida Saldivar MD;  Location:  [facility];  Service: Cardiovascular;  Laterality: N/A;    CARDIAC CATHETERIZATION N/A 10/26/2020    Procedure: Left ventriculography;  Surgeon: Oneida Saldivar MD;  Location:  [facility];  Service: Cardiovascular;  Laterality: N/A;    CARDIAC CATHETERIZATION  10/26/2020     Procedure: Functional Flow [place];  Surgeon: Oneida Saldivar MD;  Location: [facility] CATH INVASIVE LOCATION;  Service: Cardiovascular;;    CARDIAC CATHETERIZATION N/A 07/19/2021    Procedure: Coronary angiography;  Surgeon: Oneida Saldivar MD;  Location:  [facility];  Service: Cardiovascular;  Laterality: N/A;    CARDIAC CATHETERIZATION N/A 07/19/2021    Procedure: Left heart cath;  Surgeon: Oneida Saldivar MD;  Location: [facility] CATH INVASIVE LOCATION;  Service: Cardiovascular;  Laterality: N/A;    CARDIAC CATHETERIZATION N/A 07/19/2021    Procedure: Left ventriculography;  Surgeon: Oneida Saldivar MD;  Location: [facility] CATH INVASIVE LOCATION;  Service: Cardiovascular;  Laterality: N/A;    CARDIAC CATHETERIZATION N/A 07/19/2021    Procedure: Percutaneous Coronary Intervention;  Surgeon: Oneida Saldivar MD;  Location: [facility] CATH INVASIVE LOCATION;  Service: Cardiovascular;  Laterality: N/A;    CARDIAC CATHETERIZATION N/A 07/19/2021    Procedure: Optical Coherent Tomography;  Surgeon: Oneida Saldivar MD;  Location: [facility] CATH INVASIVE LOCATION;  Service: Cardiovascular;  Laterality: N/A;    CARDIAC CATHETERIZATION N/A 07/19/2021    Procedure: Stent HUGO coronary;  Surgeon: Oneida Saldivar MD;  Location: [facility] CATH INVASIVE LOCATION;  Service: Cardiovascular;  Laterality: N/A;    CARDIAC CATHETERIZATION  07/19/2021    Procedure: RESTING FULL CYCLE RATIO;  Surgeon: Oneida Saldivar MD;  Location: [facility] CATH INVASIVE LOCATION;  Service: Cardiovascular;;  RFR    CARDIAC DEFIBRILLATOR PLACEMENT  2010    Medtronic dual chamber/Dr. Valentin    CARDIAC ELECTROPHYSIOLOGY PROCEDURE N/A 01/12/2018    Procedure: ICD DC generator change  medtronic;  Surgeon: Hany Ornelas MD;  Location: [facility] CATH INVASIVE LOCATION;  Service:     COLONOSCOPY N/A 11/21/2024    Procedure: COLONOSCOPY;  Surgeon: Jayden Pepe MD;  Location: [facility];  Service: Gastroenterology;  Laterality: N/A;  APC for arterial  venous malformation; internal hemorrhoids    ENDOSCOPY N/A 11/21/2024    Procedure: ESOPHAGOGASTRODUODENOSCOPY WITH BIOPSY;  Surgeon: Jayden Pepe MD;  Location: [facility] OR;  Service: Gastroenterology;  Laterality: N/A;  duodenum bx to r/o celiac disease; gastritis; gastric bx to r/o h. pylori; esophagitis    PACEMAKER IMPLANTATION      TUBAL ABDOMINAL LIGATION       Family History   Problem Relation Age of Onset    Heart attack Mother     Diabetes Mother     Heart disease Mother     Hyperlipidemia Mother     Emphysema Mother     Diabetes Father     Heart disease Father     Hyperlipidemia Father     Hypertension Father     Colon cancer Father     Heart attack Father     Hypertension Sister     Hypertension Brother     Heart disease Brother     Hyperlipidemia Brother     Heart disease Brother     Heart attack Brother     Heart disease Maternal Grandmother     Heart disease Maternal Grandfather     Heart disease Paternal Grandmother     Heart disease Paternal Grandfather     Colon polyps Neg Hx     Crohn's disease Neg Hx     Irritable bowel syndrome Neg Hx     Ulcerative colitis Neg Hx     Malig Hyperthermia Neg Hx      Social History     Tobacco Use    Smoking status: Former     Current packs/day: 0.00     Average packs/day: 1 pack/day for 30.0 years (30.0 ttl pk-yrs)     Types: Cigarettes     Start date: 11/1/1979     Quit date: 11/1/2009     Years since quitting: 15.0    Smokeless tobacco: Never    Tobacco comments:     QUIT 10 YRS   Vaping Use    Vaping status: Never Used   Substance Use Topics    Alcohol use: Yes     Comment: rarely uses ETOH/caffeine use    Drug use: Never     No current facility-administered medications on file prior to encounter.     Current Outpatient Medications on File Prior to Encounter   Medication Sig Dispense Refill    Accu-Chek FastClix Lancets misc Use to test blood sugar 4 times daily  E11.8 400 each 1    Accu-Chek Guide test strip USE 4 TIMES A DAY E11.8 300 each 2     atorvastatin (LIPITOR) 80 MG tablet TAKE 1 TABLET BY MOUTH EVERY DAY 90 tablet 3    Blood Glucose Monitoring Suppl (Accu-Chek Guide) w/Device kit Inject 1 Device under the skin into the appropriate area as directed Daily. 1 kit 0    carvedilol (COREG) 25 MG tablet TAKE 1 TABLET BY MOUTH TWICE A DAY WITH MEALS 180 tablet 2    Cholecalciferol (VITAMIN D3) 2000 UNITS capsule Take 1,000 Units by mouth Daily.      clopidogrel (PLAVIX) 75 MG tablet TAKE 1 TABLET BY MOUTH EVERY DAY 90 tablet 1    furosemide (LASIX) 40 MG tablet TAKE 1 TABLET BY MOUTH EVERY DAY 90 tablet 1    Insulin Pen Needle 32G X 4 MM misc USE 4 TIMES A  each 3    Multiple Vitamins-Minerals (MULTIVITAL PO) Take 1 tablet by mouth Daily.      nitroglycerin (Nitrostat) 0.4 MG SL tablet Place 1 tablet under the tongue Every 5 (Five) Minutes As Needed for Chest Pain. Take no more than 3 doses in 15 minutes. 30 tablet 11    Omega-3 Fatty Acids (FISH OIL) 1200 MG capsule capsule Take 1 capsule by mouth Daily With Breakfast.      pantoprazole (PROTONIX) 40 MG EC tablet Take 1 tablet by mouth Daily. 90 tablet 3    potassium chloride 10 MEQ CR tablet Take 1 tablet by mouth Daily. Resume on 1/7/20 90 tablet 1    potassium chloride 10 MEQ CR tablet TAKE 1 TABLET BY MOUTH EVERY DAY 90 tablet 1    rivaroxaban (Xarelto) 20 MG tablet Take 1 tablet by mouth Daily With Dinner. 90 tablet 3    spironolactone (ALDACTONE) 25 MG tablet TAKE 1 TABLET BY MOUTH EVERY DAY 90 tablet 3    traZODone (DESYREL) 50 MG tablet Take 1 tablet by mouth every night at bedtime. 90 tablet 2    vitamin B-12 (CYANOCOBALAMIN) 500 MCG tablet Take 1 tablet by mouth Daily.       No Known Allergies    Objective    Objective     Vital Signs  Temp:  [96.6 °F (35.9 °C)] 96.6 °F (35.9 °C)  Heart Rate:  [88-96] 88  Resp:  [18] 18  BP: (104-124)/(72-83) 104/83  SpO2:  [94 %-97 %] 96 %  on   ;   Device (Oxygen Therapy): room air  Body mass index is 36.27 kg/m².    Physical Exam  Vitals and nursing  note reviewed.   Constitutional:       General: She is not in acute distress.  HENT:      Head: Normocephalic and atraumatic.   Cardiovascular:      Rate and Rhythm: Normal rate and regular rhythm.   Pulmonary:      Effort: Pulmonary effort is normal.      Breath sounds: Normal breath sounds.   Abdominal:      General: Bowel sounds are normal. There is no distension.      Palpations: Abdomen is soft.      Tenderness: There is no abdominal tenderness.   Musculoskeletal:         General: Normal range of motion.   Skin:     General: Skin is warm and dry.   Neurological:      Mental Status: She is alert and oriented to person, place, and time.   Psychiatric:         Behavior: Behavior normal.         Results Review:  I reviewed the patient's new clinical results.  I reviewed the patient's new imaging results and agree with the interpretation.  I reviewed the patient's other test results and agree with the interpretation  I personally viewed and interpreted the patient's EKG/Telemetry data  Discussed with ED provider.    Lab Results (last 24 hours)       Procedure Component Value Units Date/Time    CBC & Differential [[id number]]  (Abnormal) Collected: 11/30/24 1859    Specimen: Blood Updated: 11/30/24 1924    Narrative:      The following orders were created for panel order CBC & Differential.  Procedure                               Abnormality         Status                     ---------                               -----------         ------                     CBC Auto Differential[[id number]]        Abnormal            Final result                 Please view results for these tests on the individual orders.    Comprehensive Metabolic Panel [[id number]]  (Abnormal) Collected: 11/30/24 1859    Specimen: Blood Updated: 11/30/24 1935     Glucose 146 mg/dL      BUN 11 mg/dL      Creatinine 1.08 mg/dL      Sodium 138 mmol/L      Potassium 4.4 mmol/L      Comment: Slight hemolysis detected by analyzer. Result may be  falsely elevated.        Chloride 103 mmol/L      CO2 24.0 mmol/L      Calcium 9.5 mg/dL      Total Protein 7.2 g/dL      Albumin 4.2 g/dL      ALT (SGPT) 28 U/L      AST (SGOT) 26 U/L      Comment: Slight hemolysis detected by analyzer. Result may be falsely elevated.        Alkaline Phosphatase 116 U/L      Total Bilirubin 0.6 mg/dL      Globulin 3.0 gm/dL      A/G Ratio 1.4 g/dL      BUN/Creatinine Ratio 10.2     Anion Gap 11.0 mmol/L      eGFR 57.8 mL/min/1.73     Narrative:      GFR Normal >60  Chronic Kidney Disease <60  Kidney Failure <15      Lipase [[id number]]  (Normal) Collected: 11/30/24 1859    Specimen: Blood Updated: 11/30/24 1935     Lipase 58 U/L     CBC Auto Differential [[id number]]  (Abnormal) Collected: 11/30/24 1859    Specimen: Blood Updated: 11/30/24 1924     WBC 9.67 10*3/mm3      RBC 4.70 10*6/mm3      Hemoglobin 12.9 g/dL      Hematocrit 41.1 %      MCV 87.4 fL      MCH 27.4 pg      MCHC 31.4 g/dL      RDW 14.5 %      RDW-SD 46.2 fl      MPV 10.9 fL      Platelets 346 10*3/mm3      Neutrophil % 63.8 %      Lymphocyte % 26.1 %      Monocyte % 7.1 %      Eosinophil % 1.9 %      Basophil % 0.6 %      Immature Grans % 0.5 %      Neutrophils, Absolute 6.17 10*3/mm3      Lymphocytes, Absolute 2.52 10*3/mm3      Monocytes, Absolute 0.69 10*3/mm3      Eosinophils, Absolute 0.18 10*3/mm3      Basophils, Absolute 0.06 10*3/mm3      Immature Grans, Absolute 0.05 10*3/mm3      nRBC 0.0 /100 WBC             Imaging Results (Last 24 Hours)       ** No results found for the last 24 hours. **            Results for orders placed during the hospital encounter of 12/08/18    Adult Transthoracic Echo Complete W/ Cont if Necessary Per Protocol    Interpretation Summary  · Calculated EF = 30%. Estimated EF was in agreement with the calculated EF. Estimated EF = 30%. Normal left ventricular cavity size and wall thickness noted. Left ventricular diastolic dysfunction is noted (grade I) consistent with impaired  relaxation.  · Wall motion abnormalities as noted below  · Trace mitral valve regurgitation is present.  · The tricuspid valve is grossly normal. Physiologic tricuspid valve regurgitation is present. Calculated right ventricular systolic pressure from tricuspid regurgitation is 16.5 mmHg.  · Electronic lead present in the ventricle.    No orders to display     Assessment/Plan   Assessment & Plan   Active Hospital Problems    Diagnosis  POA    **Lower GI bleed [K92.2]  Yes    Hemorrhagic disorder due to extrinsic circulating anticoagulants [D68.32]  Yes    Chronic systolic congestive heart failure [I50.22]  Yes    History of CVA (cerebrovascular accident) without residual deficits [Z86.73]  Not Applicable    Type 2 diabetes mellitus with complication [E11.8]  Yes    Benign essential hypertension [I10]  Yes    Chronic coronary artery disease [I25.10]  Yes      Resolved Hospital Problems   No resolved problems to display.       64 y.o. female admitted with Lower GI bleed.  Recent colonoscopy showed nonbleeding angioectasia in her cecum.    She has evidence of acute GI bleeding.  She took Xarelto and Plavix this morning.  Will hold these and continue monitoring hemoglobin.  Will consult gastroenterology.  May require another colonoscopy.  CHF seems compensated.  Will monitor blood glucose.      SCDs for DVT prophylaxis.  Full code.  Discussed with patient and ED provider.      Benigno Flower MD  [facility]  11/30/24  21:04 EST

## 2024-12-01 NOTE — H&P (VIEW-ONLY)
[facility]  Initial Inpatient Consult Note    Referring Provider: Dr. Flower    Reason for Consultation: GI bleed    Subjective     History of present illness:    64 y.o. female admitted with GI bleeding.  Patient underwent colonoscopy and EGD on November 21 of this year.  Mild gastritis found.  Colonoscopy with 1 cecal AVM that was treated with argon plasma coagulation.  Patient restarted Xarelto on the morning of November 22.  On the morning of November 30 she developed bright blood per rectum x 3 episodes.  She has had bright red blood here this morning with a couple clots.  She is hemodynamically stable.  No abdominal pain.  Her last dose of Xarelto was the morning of 11/30    Past Medical History:  Past Medical History:   Diagnosis Date    Abdominal pain     Abnormal liver function test     Acute bronchitis     Anemia     CAD (coronary artery disease)     Colon polyp     COPD (chronic obstructive pulmonary disease)     Cough     Diabetes     Diabetes mellitus     Diarrhea     Gastroenteritis     Health care maintenance     Hyperlipidemia     IFG (impaired fasting glucose)     Ischemic cardiomyopathy     Myocardial infarction     SHELLIE (obstructive sleep apnea)     Sore throat     Stroke 2020    Type 2 diabetes mellitus     Vaginal yeast infection     Vitamin D deficiency      Past Surgical History:  Past Surgical History:   Procedure Laterality Date    CARDIAC CATHETERIZATION      CARDIAC CATHETERIZATION N/A 01/06/2020    Procedure: Coronary angiography;  Surgeon: Oneida Saldivar MD;  Location:  [facility];  Service: Cardiovascular    CARDIAC CATHETERIZATION N/A 01/06/2020    Procedure: Left heart cath;  Surgeon: Oneida Saldivar MD;  Location:  [facility];  Service: Cardiovascular    CARDIAC CATHETERIZATION N/A 01/06/2020    Procedure: Left ventriculography;  Surgeon: Oneida Saldivar MD;  Location:  [facility];  Service: Cardiovascular     CARDIAC CATHETERIZATION N/A 01/06/2020    Procedure: Percutaneous Coronary Intervention;  Surgeon: Oneida Saldivar MD;  Location:  [facility];  Service: Cardiovascular    CARDIAC CATHETERIZATION N/A 01/06/2020    Procedure: Stent HUGO coronary;  Surgeon: Oneida Saldivar MD;  Location:  [facility];  Service: Cardiovascular    CARDIAC CATHETERIZATION  01/06/2020    Procedure: Functional Flow [place];  Surgeon: Oneida Saldivar MD;  [facility];  Service: Cardiovascular    CARDIAC CATHETERIZATION N/A 10/26/2020    Procedure: Coronary angiography;  Surgeon: Oneida Saldivar MD;  Location:  [facility];  Service: Cardiovascular;  Laterality: N/A;    CARDIAC CATHETERIZATION N/A 10/26/2020    Procedure: Left heart cath;  Surgeon: Oneida Saldivar MD;  [facility];  Service: Cardiovascular;  Laterality: N/A;    CARDIAC CATHETERIZATION N/A 10/26/2020    Procedure: Left ventriculography;  Surgeon: Oneida Saldivar MD;  [facility];  Service: Cardiovascular;  Laterality: N/A;    CARDIAC CATHETERIZATION  10/26/2020    Procedure: Functional Flow [place];  Surgeon: Oneida Saldivar MD;  [facility];  Service: Cardiovascular;;    CARDIAC CATHETERIZATION N/A 07/19/2021    Procedure: Coronary angiography;  Surgeon: Oneida Saldivar MD;  Location: [facility] CATH INVASIVE LOCATION;  Service: Cardiovascular;  Laterality: N/A;    CARDIAC CATHETERIZATION N/A 07/19/2021    Procedure: Left heart cath;  Surgeon: Oneida Saldivar MD;  [facility];  Service: Cardiovascular;  Laterality: N/A;    CARDIAC CATHETERIZATION N/A 07/19/2021    Procedure: Left ventriculography;  Surgeon: Oneida Saldivar MD;  Location: [facility] CATH INVASIVE LOCATION;  Service: Cardiovascular;  Laterality: N/A;    CARDIAC CATHETERIZATION N/A 07/19/2021    Procedure: Percutaneous  Coronary Intervention;  Surgeon: Oneida Saldivar MD;  Location:  [facility];  Service: Cardiovascular;  Laterality: N/A;    CARDIAC CATHETERIZATION N/A 07/19/2021    Procedure: Optical Coherent Tomography;  Surgeon: Oneida Saldivar MD;  Location:  [facility];  Service: Cardiovascular;  Laterality: N/A;    CARDIAC CATHETERIZATION N/A 07/19/2021    Procedure: Stent HUGO coronary;  Surgeon: Oneida Saldivar MD;  Location:  [facility];  Service: Cardiovascular;  Laterality: N/A;    CARDIAC CATHETERIZATION  07/19/2021    Procedure: RESTING FULL CYCLE RATIO;  Surgeon: Oneida Saldivar MD;  [facility];  Service: Cardiovascular;;  RFR    CARDIAC DEFIBRILLATOR PLACEMENT  2010    Medtronic dual chamber/Dr. Valentin    CARDIAC ELECTROPHYSIOLOGY PROCEDURE N/A 01/12/2018    Procedure: ICD DC generator change  medtronic;  Surgeon: Hnay Ornelas MD;  Location: [facility] CATH INVASIVE LOCATION;  Service:     COLONOSCOPY N/A 11/21/2024    Procedure: COLONOSCOPY;  Surgeon: Jayden Pepe MD;  Location: [facility] OR;  Service: Gastroenterology;  Laterality: N/A;  APC for arterial venous malformation; internal hemorrhoids    ENDOSCOPY N/A 11/21/2024    Procedure: ESOPHAGOGASTRODUODENOSCOPY WITH BIOPSY;  Surgeon: Jayden Pepe MD;  Location: [facility] OR;  Service: Gastroenterology;  Laterality: N/A;  duodenum bx to r/o celiac disease; gastritis; gastric bx to r/o h. pylori; esophagitis    PACEMAKER IMPLANTATION      TUBAL ABDOMINAL LIGATION        Social History:   Social History     Tobacco Use    Smoking status: Former     Current packs/day: 0.00     Average packs/day: 1 pack/day for 30.0 years (30.0 ttl pk-yrs)     Types: Cigarettes     Start date: 11/1/1979     Quit date: 11/1/2009     Years since quitting: 15.0    Smokeless tobacco: Never    Tobacco comments:     QUIT 10 YRS   Substance Use Topics    Alcohol use: Yes     Comment: rarely uses  ETOH/caffeine use      Family History:  Family History   Problem Relation Age of Onset    Heart attack Mother     Diabetes Mother     Heart disease Mother     Hyperlipidemia Mother     Emphysema Mother     Diabetes Father     Heart disease Father     Hyperlipidemia Father     Hypertension Father     Colon cancer Father     Heart attack Father     Hypertension Sister     Hypertension Brother     Heart disease Brother     Hyperlipidemia Brother     Heart disease Brother     Heart attack Brother     Heart disease Maternal Grandmother     Heart disease Maternal Grandfather     Heart disease Paternal Grandmother     Heart disease Paternal Grandfather     Colon polyps Neg Hx     Crohn's disease Neg Hx     Irritable bowel syndrome Neg Hx     Ulcerative colitis Neg Hx     Malig Hyperthermia Neg Hx        Home Meds:  Medications Prior to Admission   Medication Sig Dispense Refill Last Dose/Taking    atorvastatin (LIPITOR) 80 MG tablet TAKE 1 TABLET BY MOUTH EVERY DAY 90 tablet 3 11/30/2024    carvedilol (COREG) 25 MG tablet TAKE 1 TABLET BY MOUTH TWICE A DAY WITH MEALS 180 tablet 2 11/30/2024    Cholecalciferol (VITAMIN D3) 2000 UNITS capsule Take 1,000 Units by mouth Daily.   11/30/2024    clopidogrel (PLAVIX) 75 MG tablet TAKE 1 TABLET BY MOUTH EVERY DAY 90 tablet 1 11/30/2024    furosemide (LASIX) 40 MG tablet TAKE 1 TABLET BY MOUTH EVERY DAY 90 tablet 1 11/30/2024    Multiple Vitamins-Minerals (MULTIVITAL PO) Take 1 tablet by mouth Daily.   11/30/2024    Omega-3 Fatty Acids (FISH OIL) 1200 MG capsule capsule Take 1 capsule by mouth Daily With Breakfast.   11/30/2024    pantoprazole (PROTONIX) 40 MG EC tablet Take 1 tablet by mouth Daily. 90 tablet 3 11/30/2024    potassium chloride 10 MEQ CR tablet Take 1 tablet by mouth Daily. Resume on 1/7/20 90 tablet 1 11/30/2024    rivaroxaban (Xarelto) 20 MG tablet Take 1 tablet by mouth Daily With Dinner. 90 tablet 3 11/30/2024    spironolactone (ALDACTONE) 25 MG tablet TAKE 1  TABLET BY MOUTH EVERY DAY 90 tablet 3 11/30/2024    traZODone (DESYREL) 50 MG tablet Take 1 tablet by mouth every night at bedtime. 90 tablet 2 11/30/2024    vitamin B-12 (CYANOCOBALAMIN) 500 MCG tablet Take 1 tablet by mouth Daily.   11/30/2024    Accu-Chek FastClix Lancets misc Use to test blood sugar 4 times daily  E11.8 400 each 1     Accu-Chek Guide test strip USE 4 TIMES A DAY E11.8 300 each 2     Blood Glucose Monitoring Suppl (Accu-Chek Guide) w/Device kit Inject 1 Device under the skin into the appropriate area as directed Daily. 1 kit 0     Insulin Pen Needle 32G X 4 MM misc USE 4 TIMES A  each 3     nitroglycerin (Nitrostat) 0.4 MG SL tablet Place 1 tablet under the tongue Every 5 (Five) Minutes As Needed for Chest Pain. Take no more than 3 doses in 15 minutes. 30 tablet 11     potassium chloride 10 MEQ CR tablet TAKE 1 TABLET BY MOUTH EVERY DAY 90 tablet 1      Current Meds:   atorvastatin, 80 mg, Oral, Daily  carvedilol, 25 mg, Oral, BID With Meals  insulin lispro, 2-9 Units, Subcutaneous, 4x Daily AC & at Bedtime  pantoprazole, 40 mg, Oral, Daily  polyethylene glycol, 2,000 mL, Oral, Q12H  spironolactone, 25 mg, Oral, Daily  traZODone, 50 mg, Oral, Nightly      Allergies:  No Known Allergies  Review of Systems  There is weakness and fatigue all other systems reviewed and negative     Objective     Vital Signs  Temp:  [96.6 °F (35.9 °C)-98.4 °F (36.9 °C)] 98.1 °F (36.7 °C)  Heart Rate:  [77-96] 77  Resp:  [16-18] 16  BP: ()/(57-83) 117/66  Physical Exam:  General Appearance:    Alert, cooperative, in no acute distress   Head:    Normocephalic, without obvious abnormality, atraumatic   Eyes:          conjunctivae and sclerae normal, no   icterus   Throat:   no thrush, oral mucosa moist   Neck:   Supple, no adenopathy   Lungs:     Clear to auscultation bilaterally    Heart:    Regular rhythm and normal rate    Chest Wall:    No abnormalities observed   Abdomen:     Soft, nondistended,  nontender; normal bowel sounds   Extremities:   no edema, no redness   Skin:   No bruising or rash   Psychiatric:  normal mood and insight     Results Review:   I reviewed the patient's new clinical results.    Results from last 7 days   Lab Units 12/01/24  0616 11/30/24  2229 11/30/24  1859 11/25/24  1314   WBC 10*3/mm3 8.47  --  9.67 8.02   HEMOGLOBIN g/dL 8.7* 11.1* 12.9 13.4   HEMATOCRIT % 26.4* 33.7* 41.1 41.4   PLATELETS 10*3/mm3 248  --  346 294     Results from last 7 days   Lab Units 12/01/24  0616 11/30/24  1859   SODIUM mmol/L 139 138   POTASSIUM mmol/L 4.3 4.4   CHLORIDE mmol/L 109* 103   CO2 mmol/L 23.0 24.0   BUN mg/dL 13 11   CREATININE mg/dL 0.78 1.08*   CALCIUM mg/dL 8.3* 9.5   BILIRUBIN mg/dL  --  0.6   ALK PHOS U/L  --  116   ALT (SGPT) U/L  --  28   AST (SGOT) U/L  --  26   GLUCOSE mg/dL 144* 146*         Lab Results   Lab Value Date/Time    LIPASE 58 11/30/2024 1859    LIPASE 47 10/13/2022 1101    LIPASE 61 (H) 10/10/2022 1202    LIPASE 66 (H) 07/15/2015 1747       Radiology:  No orders to display       Assessment & Plan   Active Hospital Problems    Diagnosis     **Lower GI bleed     Hemorrhagic disorder due to extrinsic circulating anticoagulants     Chronic systolic congestive heart failure     History of CVA (cerebrovascular accident) without residual deficits     Type 2 diabetes mellitus with complication     Benign essential hypertension     Chronic coronary artery disease        Assessment:  Bright red blood per rectum  Anemia  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  Full bowel prep will give us the ability to use all modalities of therapeutics during endoscopy including injection of sclerotherapy if needed, coagulation if needed, and clipping  if needed, Endo clot can also be used as a last resort.  Serial hemoglobin  Transfusion parameters written  Clear liquid diet  Bowel prep 6 PM  Colonoscopy tomorrow      I discussed the patients findings and my recommendations with patient and nursing staff.    Jony Herrera MD

## 2024-12-01 NOTE — ED NOTES
Nursing report ED to floor  Daisy Dent  64 y.o.  female    HPI :  HPI  Stated Reason for Visit: bloody stool this afternoon, on Plavix  History Obtained From: patient    Chief Complaint  Chief Complaint   Patient presents with    Black or Bloody Stool       Admitting doctor:   Benigno Flower MD    Admitting diagnosis:   The encounter diagnosis was Lower GI bleed.    Code status:   Current Code Status       Date Active Code Status Order ID Comments User Context       Prior            Allergies:   Patient has no known allergies.    Isolation:   No active isolations    Intake and Output  No intake or output data in the 24 hours ending 11/30/24 2019    Weight:   There were no vitals filed for this visit.    Most recent vitals:   Vitals:    11/30/24 1845 11/30/24 1858 11/30/24 1901 11/30/24 2000   BP: 110/72 124/83 119/78 104/83   BP Location: Right arm      Patient Position: Sitting      Pulse:  88 88 88   Resp:    18   Temp:       TempSrc:       SpO2:  95% 94% 96%   Height:           Active LDAs/IV Access:   Lines, Drains & Airways       Active LDAs       Name Placement date Placement time Site Days    Peripheral IV 11/30/24 1858 Left;Posterior Forearm 11/30/24 1858  Forearm  less than 1                    Labs (abnormal labs have a star):   Labs Reviewed   COMPREHENSIVE METABOLIC PANEL - Abnormal; Notable for the following components:       Result Value    Glucose 146 (*)     Creatinine 1.08 (*)     eGFR 57.8 (*)     All other components within normal limits    Narrative:     GFR Normal >60  Chronic Kidney Disease <60  Kidney Failure <15     CBC WITH AUTO DIFFERENTIAL - Abnormal; Notable for the following components:    MCHC 31.4 (*)     All other components within normal limits   LIPASE - Normal   TYPE AND SCREEN   CBC AND DIFFERENTIAL    Narrative:     The following orders were created for panel order CBC & Differential.  Procedure                               Abnormality         Status                    "  ---------                               -----------         ------                     CBC Auto Differential[[id number]]        Abnormal            Final result                 Please view results for these tests on the individual orders.       EKG:   No orders to display       Meds given in ED:   Medications   sodium chloride 0.9 % flush 10 mL (has no administration in time range)       Imaging results:  No radiology results for the last day    Ambulatory status:   - Independent    Social issues:   Social History     Socioeconomic History    Marital status:     Number of children: 2   Tobacco Use    Smoking status: Former     Current packs/day: 0.00     Average packs/day: 1 pack/day for 30.0 years (30.0 ttl pk-yrs)     Types: Cigarettes     Start date: 11/1/1979     Quit date: 11/1/2009     Years since quitting: 15.0    Smokeless tobacco: Never    Tobacco comments:     QUIT 10 YRS   Vaping Use    Vaping status: Never Used   Substance and Sexual Activity    Alcohol use: Yes     Comment: rarely uses ETOH/caffeine use    Drug use: Never    Sexual activity: Yes     Partners: Male       Peripheral Neurovascular  Peripheral Neurovascular (Adult)  Peripheral Neurovascular WDL: WDL    Neuro Cognitive  Neuro Cognitive (Adult)  Cognitive/Neuro/Behavioral WDL: WDL    Learning  Learning Assessment  Learning Readiness and Ability: no barriers identified    Respiratory  Respiratory WDL  Respiratory WDL: WDL    Abdominal Pain       Pain Assessments  Pain (Adult)  (0-10) Pain Rating: Rest: 5  Pain Location: abdomen  Pain Side/Orientation: lower  Pain Onset/Duration: \"a couple of hours ago\"  Pain Description: intermittent, sharp  Pain Assessment Additional Detail  Pain Onset/Duration: \"a couple of hours ago\"    NIH Stroke Scale       Penny Rey RN  11/30/24 20:19 EST   "

## 2024-12-02 ENCOUNTER — ANESTHESIA (OUTPATIENT)
Dept: GASTROENTEROLOGY | Facility: HOSPITAL | Age: 64
End: 2024-12-02
Payer: COMMERCIAL

## 2024-12-02 ENCOUNTER — ANESTHESIA EVENT (OUTPATIENT)
Dept: GASTROENTEROLOGY | Facility: HOSPITAL | Age: 64
End: 2024-12-02
Payer: COMMERCIAL

## 2024-12-02 ENCOUNTER — READMISSION MANAGEMENT (OUTPATIENT)
Dept: CALL CENTER | Facility: HOSPITAL | Age: 64
End: 2024-12-02
Payer: COMMERCIAL

## 2024-12-02 VITALS
TEMPERATURE: 97.7 F | HEART RATE: 85 BPM | BODY MASS INDEX: 36.4 KG/M2 | HEIGHT: 60 IN | OXYGEN SATURATION: 96 % | SYSTOLIC BLOOD PRESSURE: 112 MMHG | WEIGHT: 185.4 LBS | RESPIRATION RATE: 20 BRPM | DIASTOLIC BLOOD PRESSURE: 74 MMHG

## 2024-12-02 LAB
ANION GAP SERPL CALCULATED.3IONS-SCNC: 11 MMOL/L (ref 5–15)
BASOPHILS # BLD AUTO: 0.03 10*3/MM3 (ref 0–0.2)
BASOPHILS NFR BLD AUTO: 0.3 % (ref 0–1.5)
BH BB BLOOD EXPIRATION DATE: NORMAL
BH BB BLOOD EXPIRATION DATE: NORMAL
BH BB BLOOD TYPE BARCODE: 6200
BH BB BLOOD TYPE BARCODE: 6200
BH BB DISPENSE STATUS: NORMAL
BH BB DISPENSE STATUS: NORMAL
BH BB PRODUCT CODE: NORMAL
BH BB PRODUCT CODE: NORMAL
BH BB UNIT NUMBER: NORMAL
BH BB UNIT NUMBER: NORMAL
BUN SERPL-MCNC: 11 MG/DL (ref 8–23)
BUN/CREAT SERPL: 12.5 (ref 7–25)
CALCIUM SPEC-SCNC: 8.6 MG/DL (ref 8.6–10.5)
CHLORIDE SERPL-SCNC: 108 MMOL/L (ref 98–107)
CO2 SERPL-SCNC: 22 MMOL/L (ref 22–29)
CREAT SERPL-MCNC: 0.88 MG/DL (ref 0.57–1)
CROSSMATCH INTERPRETATION: NORMAL
CROSSMATCH INTERPRETATION: NORMAL
DEPRECATED RDW RBC AUTO: 44.1 FL (ref 37–54)
DEPRECATED RDW RBC AUTO: 45.2 FL (ref 37–54)
DEPRECATED RDW RBC AUTO: 45.2 FL (ref 37–54)
EGFRCR SERPLBLD CKD-EPI 2021: 73.9 ML/MIN/1.73
EOSINOPHIL # BLD AUTO: 0.13 10*3/MM3 (ref 0–0.4)
EOSINOPHIL NFR BLD AUTO: 1.5 % (ref 0.3–6.2)
ERYTHROCYTE [DISTWIDTH] IN BLOOD BY AUTOMATED COUNT: 14.2 % (ref 12.3–15.4)
ERYTHROCYTE [DISTWIDTH] IN BLOOD BY AUTOMATED COUNT: 14.4 % (ref 12.3–15.4)
ERYTHROCYTE [DISTWIDTH] IN BLOOD BY AUTOMATED COUNT: 14.4 % (ref 12.3–15.4)
GLUCOSE BLDC GLUCOMTR-MCNC: 104 MG/DL (ref 70–130)
GLUCOSE BLDC GLUCOMTR-MCNC: 112 MG/DL (ref 70–130)
GLUCOSE SERPL-MCNC: 108 MG/DL (ref 65–99)
HCT VFR BLD AUTO: 25.8 % (ref 34–46.6)
HCT VFR BLD AUTO: 27.1 % (ref 34–46.6)
HCT VFR BLD AUTO: 28.8 % (ref 34–46.6)
HGB BLD-MCNC: 8.6 G/DL (ref 12–15.9)
HGB BLD-MCNC: 9.1 G/DL (ref 12–15.9)
HGB BLD-MCNC: 9.5 G/DL (ref 12–15.9)
IMM GRANULOCYTES # BLD AUTO: 0.1 10*3/MM3 (ref 0–0.05)
IMM GRANULOCYTES NFR BLD AUTO: 1.2 % (ref 0–0.5)
INR PPP: 1.17 (ref 0.9–1.1)
LYMPHOCYTES # BLD AUTO: 2.56 10*3/MM3 (ref 0.7–3.1)
LYMPHOCYTES NFR BLD AUTO: 29.5 % (ref 19.6–45.3)
MCH RBC QN AUTO: 28.5 PG (ref 26.6–33)
MCH RBC QN AUTO: 28.6 PG (ref 26.6–33)
MCH RBC QN AUTO: 29.1 PG (ref 26.6–33)
MCHC RBC AUTO-ENTMCNC: 33 G/DL (ref 31.5–35.7)
MCHC RBC AUTO-ENTMCNC: 33.3 G/DL (ref 31.5–35.7)
MCHC RBC AUTO-ENTMCNC: 33.6 G/DL (ref 31.5–35.7)
MCV RBC AUTO: 85.7 FL (ref 79–97)
MCV RBC AUTO: 86.5 FL (ref 79–97)
MCV RBC AUTO: 86.6 FL (ref 79–97)
MONOCYTES # BLD AUTO: 0.61 10*3/MM3 (ref 0.1–0.9)
MONOCYTES NFR BLD AUTO: 7 % (ref 5–12)
NEUTROPHILS NFR BLD AUTO: 5.24 10*3/MM3 (ref 1.7–7)
NEUTROPHILS NFR BLD AUTO: 60.5 % (ref 42.7–76)
NRBC BLD AUTO-RTO: 0 /100 WBC (ref 0–0.2)
PLATELET # BLD AUTO: 178 10*3/MM3 (ref 140–450)
PLATELET # BLD AUTO: 187 10*3/MM3 (ref 140–450)
PLATELET # BLD AUTO: 194 10*3/MM3 (ref 140–450)
PMV BLD AUTO: 10.3 FL (ref 6–12)
PMV BLD AUTO: 10.5 FL (ref 6–12)
PMV BLD AUTO: 10.7 FL (ref 6–12)
POTASSIUM SERPL-SCNC: 4 MMOL/L (ref 3.5–5.2)
PROTHROMBIN TIME: 15.1 SECONDS (ref 11.7–14.2)
RBC # BLD AUTO: 3.01 10*6/MM3 (ref 3.77–5.28)
RBC # BLD AUTO: 3.13 10*6/MM3 (ref 3.77–5.28)
RBC # BLD AUTO: 3.33 10*6/MM3 (ref 3.77–5.28)
SODIUM SERPL-SCNC: 141 MMOL/L (ref 136–145)
UNIT  ABO: NORMAL
UNIT  ABO: NORMAL
UNIT  RH: NORMAL
UNIT  RH: NORMAL
WBC NRBC COR # BLD AUTO: 8.33 10*3/MM3 (ref 3.4–10.8)
WBC NRBC COR # BLD AUTO: 8.67 10*3/MM3 (ref 3.4–10.8)
WBC NRBC COR # BLD AUTO: 9.28 10*3/MM3 (ref 3.4–10.8)

## 2024-12-02 PROCEDURE — 90656 IIV3 VACC NO PRSV 0.5 ML IM: CPT | Performed by: HOSPITALIST

## 2024-12-02 PROCEDURE — 80048 BASIC METABOLIC PNL TOTAL CA: CPT | Performed by: HOSPITALIST

## 2024-12-02 PROCEDURE — G0008 ADMIN INFLUENZA VIRUS VAC: HCPCS | Performed by: HOSPITALIST

## 2024-12-02 PROCEDURE — 25010000002 PROPOFOL 10 MG/ML EMULSION: Performed by: NURSE ANESTHETIST, CERTIFIED REGISTERED

## 2024-12-02 PROCEDURE — 82948 REAGENT STRIP/BLOOD GLUCOSE: CPT

## 2024-12-02 PROCEDURE — G0378 HOSPITAL OBSERVATION PER HR: HCPCS

## 2024-12-02 PROCEDURE — 25010000002 LIDOCAINE PF 2% 2 % SOLUTION: Performed by: NURSE ANESTHETIST, CERTIFIED REGISTERED

## 2024-12-02 PROCEDURE — 85025 COMPLETE CBC W/AUTO DIFF WBC: CPT | Performed by: HOSPITALIST

## 2024-12-02 PROCEDURE — 25010000002 INFLUENZA VIRUS VACC SPLIT PF 0.5 ML SUSPENSION PREFILLED SYRINGE: Performed by: HOSPITALIST

## 2024-12-02 PROCEDURE — 25010000002 PHENYLEPHRINE 10 MG/ML SOLUTION: Performed by: NURSE ANESTHETIST, CERTIFIED REGISTERED

## 2024-12-02 PROCEDURE — 85027 COMPLETE CBC AUTOMATED: CPT | Performed by: INTERNAL MEDICINE

## 2024-12-02 PROCEDURE — 25010000002 EPINEPHRINE PER 0.1 MG: Performed by: INTERNAL MEDICINE

## 2024-12-02 PROCEDURE — 85610 PROTHROMBIN TIME: CPT | Performed by: INTERNAL MEDICINE

## 2024-12-02 DEVICE — REPLAY HEMOSTASIS CLIP, 18 MM
Type: IMPLANTABLE DEVICE | Site: CECUM | Status: FUNCTIONAL
Brand: REPLAY

## 2024-12-02 RX ORDER — PROPOFOL 10 MG/ML
VIAL (ML) INTRAVENOUS AS NEEDED
Status: DISCONTINUED | OUTPATIENT
Start: 2024-12-02 | End: 2024-12-02 | Stop reason: SURG

## 2024-12-02 RX ORDER — CLOPIDOGREL BISULFATE 75 MG/1
75 TABLET ORAL DAILY
Start: 2024-12-05

## 2024-12-02 RX ORDER — LIDOCAINE HYDROCHLORIDE 20 MG/ML
INJECTION, SOLUTION EPIDURAL; INFILTRATION; INTRACAUDAL; PERINEURAL AS NEEDED
Status: DISCONTINUED | OUTPATIENT
Start: 2024-12-02 | End: 2024-12-02 | Stop reason: SURG

## 2024-12-02 RX ORDER — PHENYLEPHRINE HYDROCHLORIDE 10 MG/ML
INJECTION INTRAVENOUS AS NEEDED
Status: DISCONTINUED | OUTPATIENT
Start: 2024-12-02 | End: 2024-12-02 | Stop reason: SURG

## 2024-12-02 RX ADMIN — SODIUM CHLORIDE: 9 INJECTION, SOLUTION INTRAVENOUS at 09:26

## 2024-12-02 RX ADMIN — PHENYLEPHRINE HYDROCHLORIDE 100 MCG: 10 INJECTION INTRAVENOUS at 09:48

## 2024-12-02 RX ADMIN — POLYETHYLENE GLYCOL 3350, SODIUM SULFATE ANHYDROUS, SODIUM BICARBONATE, SODIUM CHLORIDE, POTASSIUM CHLORIDE 2000 ML: 236; 22.74; 6.74; 5.86; 2.97 POWDER, FOR SOLUTION ORAL at 03:31

## 2024-12-02 RX ADMIN — PANTOPRAZOLE SODIUM 40 MG: 40 TABLET, DELAYED RELEASE ORAL at 11:17

## 2024-12-02 RX ADMIN — INFLUENZA A VIRUS A/VICTORIA/4897/2022 IVR-238 (H1N1) ANTIGEN (FORMALDEHYDE INACTIVATED), INFLUENZA A VIRUS A/CALIFORNIA/122/2022 SAN-022 (H3N2) ANTIGEN (FORMALDEHYDE INACTIVATED), AND INFLUENZA B VIRUS B/MICHIGAN/01/2021 ANTIGEN (FORMALDEHYDE INACTIVATED) 0.5 ML: 15; 15; 15 INJECTION, SUSPENSION INTRAMUSCULAR at 12:38

## 2024-12-02 RX ADMIN — ATORVASTATIN CALCIUM 80 MG: 80 TABLET, FILM COATED ORAL at 11:17

## 2024-12-02 RX ADMIN — PROPOFOL 100 MG: 10 INJECTION, EMULSION INTRAVENOUS at 09:31

## 2024-12-02 RX ADMIN — PROPOFOL 200 MCG/KG/MIN: 10 INJECTION, EMULSION INTRAVENOUS at 09:32

## 2024-12-02 RX ADMIN — LIDOCAINE HYDROCHLORIDE 60 MG: 20 INJECTION, SOLUTION EPIDURAL; INFILTRATION; INTRACAUDAL; PERINEURAL at 09:29

## 2024-12-02 NOTE — BRIEF OP NOTE
COLONOSCOPY  Progress Note    Daisy Dent  12/2/2024    Pre-op Diagnosis:   Lower GI bleed [K92.2]  Other iron deficiency anemia [D50.8]       Post-Op Diagnosis Codes:     * Lower GI bleed [K92.2]     * Other iron deficiency anemia [D50.8]    Procedure/CPT® Codes:        Procedure(s):  COLONOSCOPY into cecum with clip placement x3 and 1 cc epi injection      Surgeon(s):  Jony Fraire MD    Anesthesia: Monitored Anesthesia Care    Staff:   Endo Technician: Kimberlee Oneill, PCT  Endo Nurse: Terri Wilks RN       Estimated Blood Loss: minimal    Urine Voided: * No values recorded between 12/2/2024  9:27 AM and 12/2/2024  9:55 AM *    Specimens:                None        Drains: * No LDAs found *    Findings:   Ulcerated lesion in cecum corresponding to site of recent APC.  Small flat pigmented spot but no large vv.   No active bleeding  -endoclip x 3 placed with good results  -surrounding area injected with 1cc epi    Recommendations:  Start GI soft diet  Ok for DC home later today or tomorrow AM if no further bleeding  Ok to resume DOAC and Plavix in 48-72hrs  Pt instructed to call Dr Pepe's office if any further GI bleeding    Complications: None          Jony Fraire MD     Date: 12/2/2024  Time: 09:57 EST

## 2024-12-02 NOTE — PLAN OF CARE
Problem: Adult Inpatient Plan of Care  Goal: Plan of Care Review  Outcome: Progressing  Flowsheets (Taken 12/1/2024 1816)  Progress: no change  Outcome Evaluation: Patient tolerating clear liquids and denies any nausea. She continues to have bloody BMs that are loose/liquid. 2nd unit of blood currently infusing. She denies any needs. Consent signed for planned colonoscopy in the morning. Plan of care ongoing.  Plan of Care Reviewed With: patient     
  Problem: Adult Inpatient Plan of Care  Goal: Plan of Care Review  Outcome: Progressing  Flowsheets (Taken 12/1/2024 5872)  Progress: no change  Outcome Evaluation: Pt admitted from ED last night for lower GI bleed. Pt VS stable. Has had two large bright red bloody BM with clots throughout shift. When attempting to use bedside commode, pt has become nauseous and thrown up green bile. PRN nausea medicine given. On room air. NPO since midnight. Pt safety maintained.  Plan of Care Reviewed With: patient                                
Goal Outcome Evaluation:           Progress: improving  Outcome Evaluation: pt being d/c home with family                             
Goal Outcome Evaluation:      Pt. NPO since midnight except for bowel prep. Stools clearing except for noted red in stools. Pt received 2 units PRBC's Hgb 7.1 prior to given, this am 9.5. No dizziness or weakness reported from patient this shift. She has been getting up to BSC independently. Anticipate Colonoscopy at 0922 today.                                      
Right arm;

## 2024-12-02 NOTE — ANESTHESIA POSTPROCEDURE EVALUATION
Patient: Daisy Dent    Procedure Summary       Date: 12/02/24 Room / Location: [facility] ENDOSCOPY 10 / [facility] ENDOSCOPY    Anesthesia Start: 0926 Anesthesia Stop: 0957    Procedure: COLONOSCOPY into cecum with clip placement x3 and 1 cc epi injection Diagnosis:       Lower GI bleed      Other iron deficiency anemia      (Lower GI bleed [K92.2])      (Other iron deficiency anemia [D50.8])    Surgeons: Jony Fraire MD Provider: Cristopher Art MD    Anesthesia Type: MAC ASA Status: 3            Anesthesia Type: MAC    Vitals  Vitals Value Taken Time   /68 12/02/24 1016   Temp     Pulse 81 12/02/24 1027   Resp 18 12/02/24 1015   SpO2 97 % 12/02/24 1027   Vitals shown include unfiled device data.        Post Anesthesia Care and Evaluation    Patient location during evaluation: PACU  Patient participation: complete - patient participated  Level of consciousness: awake and alert  Pain management: adequate    Airway patency: patent  Anesthetic complications: No anesthetic complications    Cardiovascular status: acceptable  Respiratory status: acceptable  Hydration status: acceptable    Comments: --------------------            12/02/24               1015     --------------------   BP:       114/68     Pulse:      78       Resp:       18       Temp:                SpO2:      98%      --------------------

## 2024-12-02 NOTE — ANESTHESIA PREPROCEDURE EVALUATION
Anesthesia Evaluation     Patient summary reviewed and Nursing notes reviewed                Airway   Mallampati: III  TM distance: <3 FB  Possible difficult intubation  Dental          Pulmonary    (+) a smoker Former, COPD,sleep apnea  Cardiovascular     ECG reviewed  PT is on anticoagulation therapy  Patient on routine beta blocker  Rhythm: regular  Rate: normal    (+) pacemaker ICD, hypertension, past MI , CAD, cardiac stents Drug eluting stent more than 12 months ago , dysrhythmias, angina, CHF , hyperlipidemia,  carotid artery disease      Neuro/Psych  (+) CVA, headaches  GI/Hepatic/Renal/Endo    (+) morbid obesity, GI bleeding , diabetes mellitus type 2 using insulin    Musculoskeletal (-) negative ROS    Abdominal    Substance History   (+) alcohol use     OB/GYN negative ob/gyn ROS         Other                    Anesthesia Plan    ASA 3     MAC     intravenous induction     Anesthetic plan, risks, benefits, and alternatives have been provided, discussed and informed consent has been obtained with: patient.    CODE STATUS:    Level Of Support Discussed With: Patient  Code Status (Patient has no pulse and is not breathing): CPR (Attempt to Resuscitate)  Medical Interventions (Patient has pulse or is breathing): Full

## 2024-12-02 NOTE — CASE MANAGEMENT/SOCIAL WORK
Case Management Discharge Note      Final Note: Dc home         Selected Continued Care - Discharged on 12/2/2024 Admission date: 11/30/2024 - Discharge disposition: Home or Self Care      Destination    No services have been selected for the patient.                Durable Medical Equipment    No services have been selected for the patient.                Dialysis/Infusion    No services have been selected for the patient.                Home Medical Care    No services have been selected for the patient.                Therapy    No services have been selected for the patient.                Community Resources    No services have been selected for the patient.                Community & DME    No services have been selected for the patient.                    Selected Continued Care - Episodes Includes continued care and service providers with selected services from the active episodes listed below      [facility] Episode start date: 7/18/2023   There are no active outsourced providers for this episode.                      Final Discharge Disposition Code: 01 - home or self-care

## 2024-12-02 NOTE — OUTREACH NOTE
Prep Survey      Flowsheet Row Responses   [facility] patient discharged from? [facility]   Is LACE score < 7 ? No   Eligibility [facility]   Date of Admission 11/30/24   Date of Discharge 12/02/24   Discharge diagnosis Lower GI bleed   Does the patient have one of the following disease processes/diagnoses(primary or secondary)? Other   Prep survey completed? Yes            Latrice FENG - Registered Nurse

## 2024-12-02 NOTE — DISCHARGE SUMMARY
Date of Admission: 11/30/2024  Date of Discharge:  12/2/2024  Primary Care Physician: Shannon Cole APRN     Discharge Diagnosis:  Active Hospital Problems    Diagnosis  POA    **Lower GI bleed [K92.2]  Yes    Hemorrhagic disorder due to extrinsic circulating anticoagulants [D68.32]  Yes    Iron deficiency anemia [D50.9]  Yes    Chronic systolic congestive heart failure [I50.22]  Yes    History of CVA (cerebrovascular accident) without residual deficits [Z86.73]  Not Applicable    Type 2 diabetes mellitus with complication [E11.8]  Yes    Benign essential hypertension [I10]  Yes    Chronic coronary artery disease [I25.10]  Yes      Resolved Hospital Problems   No resolved problems to display.       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  Please see H&P for full details.  She had an EGD/colonoscopy a little more than a week ago for ALMA DELIA and was found to have a non-bleeding angiectasia in her cecum which was coagulated.  She did have an initial drop of her hemoglobin required 2 units of packed red blood cells upon admission.  Her Plavix and Xarelto were placed on hold.  GI was consulted.  After washout of the anticoagulants they performed colonoscopy this morning which showed ulceration in the cecum.  A clip was applied.  Her hemoglobin is stable.  GI recommended she hold the Xarelto and Plavix for 3 days and these can be restarted on Thursday of this week.  They have cleared her for discharge.  Patient feels well postprocedure and is anxious for discharge.  She already has follow-up with her primary gastroenterologist this Wednesday and I recommended she keep this appointment.  She is medically stable and will be released back home.    Physical Exam at  Discharge:  General: No acute distress, AAOx3  HEENT: EOMI, PERRL  Cardiovascular: +s1 and s2, RRR  Lungs: No rhonchi or wheezing  Abdomen: soft, nontender    Consults:   Consults       Date and Time Order Name Status Description    11/30/2024 10:02 PM Inpatient Gastroenterology Consult Completed     11/30/2024  7:52 PM [facility] (on-call MD unless specified) Details                Condition on Discharge: Stable    Discharge Disposition  Home or Self Care    Discharge Medications     Discharge Medications        Changes to Medications        Instructions Start Date   clopidogrel 75 MG tablet  Commonly known as: PLAVIX  What changed: These instructions start on December 5, 2024. If you are unsure what to do until then, ask your doctor or other care provider.   75 mg, Oral, Daily   Start Date: December 5, 2024     rivaroxaban 20 MG tablet  Commonly known as: Xarelto  What changed: These instructions start on December 5, 2024. If you are unsure what to do until then, ask your doctor or other care provider.   20 mg, Oral, Daily With Dinner   Start Date: December 5, 2024            Continue These Medications        Instructions Start Date   Accu-Chek FastClix Lancets misc   Use to test blood sugar 4 times daily  E11.8      Accu-Chek Guide test strip  Generic drug: glucose blood   USE 4 TIMES A DAY E11.8      Accu-Chek Guide w/Device kit   1 Device, Subcutaneous, Daily      atorvastatin 80 MG tablet  Commonly known as: LIPITOR   80 mg, Oral, Daily      carvedilol 25 MG tablet  Commonly known as: COREG   TAKE 1 TABLET BY MOUTH TWICE A DAY WITH MEALS      fish oil 1200 MG capsule capsule   1,200 mg, Daily With Breakfast      furosemide 40 MG tablet  Commonly known as: LASIX   40 mg, Oral, Daily      Insulin Pen Needle 32G X 4 MM misc   USE 4 TIMES A DAY      multivitamin with minerals tablet tablet   1 tablet, Daily      nitroglycerin 0.4 MG SL tablet  Commonly known as: Nitrostat   0.4 mg, Sublingual, Every 5 Minutes PRN, Take  no more than 3 doses in 15 minutes.      pantoprazole 40 MG EC tablet  Commonly known as: PROTONIX   40 mg, Oral, Daily      potassium chloride 10 MEQ CR tablet   10 mEq, Oral, Daily, Resume on 1/7/20      potassium chloride 10 MEQ CR tablet   10 mEq, Oral, Daily      spironolactone 25 MG tablet  Commonly known as: ALDACTONE   25 mg, Oral, Daily      traZODone 50 MG tablet  Commonly known as: DESYREL   50 mg, Oral, Every Night at Bedtime      vitamin B-12 500 MCG tablet  Commonly known as: CYANOCOBALAMIN   500 mcg, Daily      Vitamin D3 50 MCG (2000 UT) capsule   1,000 Units, Daily               Discharge Diet:   Diet Instructions       Diet: Regular/House Diet, Gastrointestinal Diets; Fiber-Restricted; Soft to Chew (NDD 3); Whole Meat; Thin (IDDSI 0)      Discharge Diet:  Regular/House Diet  Gastrointestinal Diets       Gastrointestinal Diet: Fiber-Restricted    Texture: Soft to Chew (NDD 3)    Soft to Chew: Whole Meat    Fluid Consistency: Thin (IDDSI 0)            Activity at Discharge:   Activity Instructions       Activity as Tolerated              Follow-up Appointments  Future Appointments   Date Time Provider Department Center   12/4/2024  1:30 PM Herbert Salas APRN [facility]   1/21/2025 11:15 AM Oneida Saldivar MD [id number] None   3/24/2025 12:40 PM LAB CHAIR 6 [facility]  [facility]   3/24/2025  1:00 PM Marly Tapia APRN [facility]     Additional Instructions for the Follow-ups that You Need to Schedule       Discharge Follow-up with PCP   As directed       Currently Documented PCP:    Shannon Cole APRN    PCP Phone Number:    [phone]     Follow Up Details: 1 week        Discharge Follow-up with Specialty: GI on Wednesday of this week as previously scheduled   As directed      Specialty: GI on Wednesday of this week as previously scheduled                  I have examined and discussed discharge planning with the patient today.    Robert Suresh  MD [place]  12/02/24  12:01 EST    Time: Discharge greater than 30 min

## 2024-12-03 ENCOUNTER — TRANSITIONAL CARE MANAGEMENT TELEPHONE ENCOUNTER (OUTPATIENT)
Dept: CALL CENTER | Facility: HOSPITAL | Age: 64
End: 2024-12-03
Payer: COMMERCIAL

## 2024-12-03 NOTE — OUTREACH NOTE
Call Center TCM Note      Flowsheet Row Responses   [facility] patient discharged from? [place]   Does the patient have one of the following disease processes/diagnoses(primary or secondary)? Other   TCM attempt successful? No   Unsuccessful attempts Attempt 1            Lorri King MA    12/3/2024, 10:33 EST

## 2024-12-03 NOTE — OUTREACH NOTE
Call Center TCM Note      Flowsheet Row Responses   [facility] patient discharged from? [place]   Does the patient have one of the following disease processes/diagnoses(primary or secondary)? Other   TCM attempt successful? No   Unsuccessful attempts Attempt 2  [Attempted patient and spouse]            Latrice Mauro RN    12/3/2024, 11:38 EST

## 2024-12-03 NOTE — PROGRESS NOTES
Chief Complaint  GI Bleeding    Subjective        Daisy Dent is a 63-year-old female with a complex past medical history for ischemic cardiomyopathy, diabetes, SHELLIE on CPAP management, hypertension, coronary artery disease status post anterior MI and LAD stent placement in 2006, 2009, 2019 and 2021, EF of 40 to 45%. She was last seen from our practice on 8/14/24 for evaluation of long standing iron deficiency anemia; unintentional weight loss of  approximately 40 lbs within a year.  Her anemia is further complex bychronic anticoagulation with Xarelto and antiplatelet therapy, Plavix for ischemic cardiomyopathy/CAD/CVA.  Patient has AICD in place, LVEF 40-50%.  She has been under the care of hematology and requires occasional iron and blood transfusion.     EGD/colonoscopy performed by Dr. Pickard, 10/15/2022 -  showed nonbleeding colonic angioectasia, status post APC treatment, hemorrhoids.  The entire colon and rectum was normal.  Few right sided AVM ablated.    Previous visit, she was recommended for a repeat endoscopy workups (EGD/CLS),     Patient again went to the ER on 12/1/2024 with another episode of GI bleed.  Colonoscopy and EGD performed on November 21, 2024 shown mild gastritis.  Colonoscopy 1 cecal AVM that has been treated with plasma coagulation.  Xarelto was started the following day, approximately a week later on the 30s she developed rectal bleeding x 3 episodes with some clots.  She was admitted to the hospital underwent another colonoscopy due to AVM finding the month before.     On December 2, 2024 she underwent colonoscopy with clip placement x 3 in the cecum and 1 cc epinephrine injection done by Dr. Bennett.  Discharged home the following day, 12/2/24 advised to follow-up with us.    History of Present Illness  The patient is a 64-year-old female with a complex medical history of iron deficiency anemia, anticoagulation therapy with Xarelto and Plavix for a heart condition, and a history  "of stroke.    She underwent an EGD and colonoscopy on 11/21/2024, which revealed mild gastritis. The colonoscopy identified a cecal AVM, which was treated with plasma coagulation. Her Xarelto was restarted the following day, but three weeks later, she experienced another episode of rectal bleeding. This led to another hospital visit and colonoscopy, during which three clips were placed in the cecum and an epinephrine injection was administered. As of 12/04/2024, her CBC showed a hemoglobin level of 8.6, PT of 15.1, and INR of 1.17. Pathology results indicated grade A esophagitis, mild gastritis, negative for H. pylori, normal duodenum, and negative for celiac disease. Dr. Pepe recommended that she continue on omeprazole 40 mg daily and repeat the colonoscopy in 10 years. The cause of her iron deficiency anemia could be the AVM found in the cecum, which was eradicated with anticoagulation plasma.    She is currently under the care of Dr. Daniel, a hematologist. On 12/02/2024, she received a blood transfusion of 2 units of PRBC. She reports feeling better, although tired and bloated. She has appointments scheduled every 3 months with her hematologist unless any issues arise. She is also taking over-the-counter B12 supplements and pantoprazole 40 mg daily.    Results reviewed:  12/4/24   CBC (hemoglobin 8.6), PT 15.1, INR 1.17  Patient received 2 units of PRBC 12/2/24.      Colonoscopy (12/02/2024 09:17)     Upper GI Endoscopy (11/21/2024 11:07)     Colonoscopy (11/21/2024 11:07)     Tissue Pathology Exam (11/21/2024 11:15)       Objective   Vital Signs:  /74   Pulse 92   Temp 98 °F (36.7 °C)   Ht 152.4 cm (60\")   Wt 85.2 kg (187 lb 12.8 oz)   SpO2 97%   BMI 36.68 kg/m²   Estimated body mass index is 36.68 kg/m² as calculated from the following:    Height as of this encounter: 152.4 cm (60\").    Weight as of this encounter: 85.2 kg (187 lb 12.8 oz).       Physical Exam  Vitals and nursing note reviewed. "   Constitutional:       General: She is not in acute distress.     Appearance: Normal appearance. She is normal weight. She is not ill-appearing, toxic-appearing or diaphoretic.   Eyes:      General: No scleral icterus.     Conjunctiva/sclera: Conjunctivae normal.   Cardiovascular:      Rate and Rhythm: Normal rate and regular rhythm.      Pulses: Normal pulses.      Heart sounds: Normal heart sounds.   Pulmonary:      Effort: Pulmonary effort is normal. No respiratory distress.      Breath sounds: Normal breath sounds. No stridor.   Abdominal:      General: Abdomen is flat. Bowel sounds are normal. There is no distension.      Palpations: Abdomen is soft. There is no mass.      Tenderness: There is no abdominal tenderness. There is no right CVA tenderness, left CVA tenderness, guarding or rebound.      Hernia: No hernia is present.   Skin:     Coloration: Skin is not jaundiced or pale.      Findings: No erythema or rash.   Neurological:      Mental Status: She is alert and oriented to person, place, and time. Mental status is at baseline.   Psychiatric:         Mood and Affect: Mood normal.           Assessment and Plan     Diagnoses and all orders for this visit:    1. Iron deficiency anemia, unspecified iron deficiency anemia type (Primary)    2. Chronic anticoagulation    3. AVM (arteriovenous malformation) of colon      Assessment & Plan  1. Iron Deficiency Anemia.  2. Acute on chronic anemia, required frequent PRBC transfusions  3. 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 Chronic antiplatelet therapy  The etiology of her iron deficiency anemia could be due to the AVM found in the cecum, which was treated with plasma coagulation and clips. She received a blood transfusion of 2 units of PRBC on December 2, 2024.   Her hemoglobin level was 8.6 as of December 4, 2024.   She is advised to continue her current regimen of iron supplements and follow up closely with her hematologist, Dr. Daniel, every 3 months.     2. Mild Gastritis.  3.  Esophagitis, grade A  The EGD performed on November 21, 2024, showed mild gastritis.   She is advised to continue taking omeprazole 40 mg daily as prescribed by Dr. Pepe.   Dietary caution is advised, particularly with acidic, spicy, and greasy foods.  She should continue taking pantoprazole 40 mg daily. She is also advised to continue vitamin D and calcium supplements to mitigate the risk of osteoporosis while on these medications.    4. Health Maintenance.  Her thyroid function was checked in January 2024 and was normal. Her B12 level was recorded at 315, and she is advised to continue taking one B12 supplement over the counter.    Follow-up  Return in 6 months or sooner if necessary.    I have reviewed patient's current medications list, relevant clinical information necessary for today's encounter. I discussed the clinical impression and treatment plan with the patient, who verbalized understanding and in agreement.  All questions were answered and support was provided.     Patient or patient representative verbalized consent for the use of Ambient Listening during the visit with  KEYONA Zapata for chart documentation. 12/4/2024  13:28 EST

## 2024-12-04 ENCOUNTER — TRANSITIONAL CARE MANAGEMENT TELEPHONE ENCOUNTER (OUTPATIENT)
Dept: CALL CENTER | Facility: HOSPITAL | Age: 64
End: 2024-12-04
Payer: COMMERCIAL

## 2024-12-04 ENCOUNTER — OFFICE VISIT (OUTPATIENT)
Dept: GASTROENTEROLOGY | Facility: CLINIC | Age: 64
End: 2024-12-04
Payer: COMMERCIAL

## 2024-12-04 VITALS
SYSTOLIC BLOOD PRESSURE: 138 MMHG | HEIGHT: 60 IN | BODY MASS INDEX: 36.87 KG/M2 | WEIGHT: 187.8 LBS | HEART RATE: 92 BPM | TEMPERATURE: 98 F | OXYGEN SATURATION: 97 % | DIASTOLIC BLOOD PRESSURE: 74 MMHG

## 2024-12-04 DIAGNOSIS — D50.9 IRON DEFICIENCY ANEMIA, UNSPECIFIED IRON DEFICIENCY ANEMIA TYPE: Primary | ICD-10-CM

## 2024-12-04 DIAGNOSIS — Z79.01 CHRONIC ANTICOAGULATION: ICD-10-CM

## 2024-12-04 DIAGNOSIS — K55.20 AVM (ARTERIOVENOUS MALFORMATION) OF COLON: ICD-10-CM

## 2024-12-04 PROCEDURE — 99214 OFFICE O/P EST MOD 30 MIN: CPT | Performed by: NURSE PRACTITIONER

## 2024-12-04 NOTE — OUTREACH NOTE
Call Center TCM Note      Flowsheet Row Responses   [facility] patient discharged from? [place]   Does the patient have one of the following disease processes/diagnoses(primary or secondary)? Other   TCM attempt successful? No   Unsuccessful attempts Attempt 3   Wrap up additional comments D/C DX: GI bleed,  colonoscopy - Unable to reach pt x 3 attempts for TCM call. Pt is not yet scheduled for TCM APPT with PCP KEYONA Cole. Discharged from [facility] 12/02/2024            Lorri King MA    12/4/2024, 14:45 EST

## 2024-12-08 DIAGNOSIS — I25.5 ISCHEMIC CARDIOMYOPATHY: ICD-10-CM

## 2024-12-09 RX ORDER — TRAZODONE HYDROCHLORIDE 50 MG/1
50 TABLET, FILM COATED ORAL
Qty: 90 TABLET | Refills: 0 | Status: SHIPPED | OUTPATIENT
Start: 2024-12-09

## 2024-12-09 RX ORDER — POTASSIUM CHLORIDE 750 MG/1
10 TABLET, EXTENDED RELEASE ORAL DAILY
Qty: 90 TABLET | Refills: 1 | Status: SHIPPED | OUTPATIENT
Start: 2024-12-09

## 2024-12-09 NOTE — TELEPHONE ENCOUNTER
Rx Refill Note  Requested Prescriptions     Pending Prescriptions Disp Refills    traZODone (DESYREL) 50 MG tablet [Pharmacy Med Name: TRAZODONE 50 MG TABLET] 90 tablet 2     Sig: TAKE 1 TABLET BY MOUTH EVERYDAY AT BEDTIME      Last office visit with prescribing clinician: 1/29/2024   Last telemedicine visit with prescribing clinician: Visit date not found   Next office visit with prescribing clinician: 12/13/2024                         Would you like a call back once the refill request has been completed: [] Yes [] No    If the office needs to give you a call back, can they leave a voicemail: [] Yes [] No    Chapito Corado MA  12/09/24, 08:30 EST

## 2024-12-10 ENCOUNTER — CLINICAL SUPPORT NO REQUIREMENTS (OUTPATIENT)
Age: 64
End: 2024-12-10
Payer: COMMERCIAL

## 2024-12-10 ENCOUNTER — OFFICE VISIT (OUTPATIENT)
Age: 64
End: 2024-12-10
Payer: COMMERCIAL

## 2024-12-10 VITALS
SYSTOLIC BLOOD PRESSURE: 121 MMHG | HEART RATE: 78 BPM | DIASTOLIC BLOOD PRESSURE: 78 MMHG | HEIGHT: 60 IN | WEIGHT: 185.6 LBS | OXYGEN SATURATION: 98 % | BODY MASS INDEX: 36.44 KG/M2

## 2024-12-10 DIAGNOSIS — Z95.810 AUTOMATIC IMPLANTABLE CARDIOVERTER-DEFIBRILLATOR IN SITU: ICD-10-CM

## 2024-12-10 DIAGNOSIS — I77.9 CAROTID ARTERY DISEASE, UNSPECIFIED LATERALITY, UNSPECIFIED TYPE: ICD-10-CM

## 2024-12-10 DIAGNOSIS — I50.22 CHRONIC SYSTOLIC CONGESTIVE HEART FAILURE: ICD-10-CM

## 2024-12-10 DIAGNOSIS — Z95.810 ICD (IMPLANTABLE CARDIOVERTER-DEFIBRILLATOR) IN PLACE: ICD-10-CM

## 2024-12-10 DIAGNOSIS — I25.5 ISCHEMIC CARDIOMYOPATHY: ICD-10-CM

## 2024-12-10 DIAGNOSIS — Z95.5 S/P CORONARY ARTERY STENT PLACEMENT: ICD-10-CM

## 2024-12-10 DIAGNOSIS — E78.2 MIXED HYPERLIPIDEMIA: ICD-10-CM

## 2024-12-10 DIAGNOSIS — I10 BENIGN ESSENTIAL HYPERTENSION: ICD-10-CM

## 2024-12-10 DIAGNOSIS — Z95.810 ICD (IMPLANTABLE CARDIOVERTER-DEFIBRILLATOR), DUAL, IN SITU: Primary | ICD-10-CM

## 2024-12-10 DIAGNOSIS — G47.33 OBSTRUCTIVE SLEEP APNEA SYNDROME: ICD-10-CM

## 2024-12-10 DIAGNOSIS — Z86.73 HISTORY OF CVA (CEREBROVASCULAR ACCIDENT) WITHOUT RESIDUAL DEFICITS: ICD-10-CM

## 2024-12-10 DIAGNOSIS — I25.10 CHRONIC CORONARY ARTERY DISEASE: Primary | ICD-10-CM

## 2024-12-10 DIAGNOSIS — I21.09 ACUTE MYOCARDIAL INFARCTION OF ANTERIOR WALL: ICD-10-CM

## 2024-12-10 DIAGNOSIS — E11.8 TYPE 2 DIABETES MELLITUS WITH COMPLICATION: ICD-10-CM

## 2024-12-10 NOTE — LETTER
December 10, 2024     KEYONA Taylor  [id number] [address]  [address]  [place] [address]    Patient: Daisy Dent   YOB: 1960   Date of Visit: 12/10/2024       Dear KEYONA Taylor,    Daisy Vargaslins was in my office today. Below are the relevant portions of my assessment and plan of care.           If you have questions, please do not hesitate to call me. I look forward to following Daisy along with you.         Sincerely,        Oneida Saldivar MD        CC: No Recipients

## 2024-12-10 NOTE — PROGRESS NOTES
Subjective:     Encounter Date:12/10/2024      Patient ID: Daisy Dent is a 64 y.o. female.    Chief Complaint:  History of Present Illness    This is a 64-year-old female with ischemic cardiomyopathy, last ejection fraction of 40-45%, diabetes mellitus type 2, obstructive sleep apnea on CPAP, hypertension, status post dual-chamber AICD placement, dyslipidemia, status post left MCA stroke, coronary artery disease with a history of prior anterior myocardial infarction and LAD stent placement (in 7/2006, 10/2009, 11/2019, and 7/2021), who presents for follow-up.     At a follow-up visit in 3/2023 she was doing well.  She denied any issues with worsening anemia despite the fact that she was on clopidogrel and rivaroxaban.  She reported improvement in her chest pain with taking the isosorbide in the evening.     She was last seen in the office by KEYONA Dumont in 10/2023.  She reports she was having more issues with nosebleeds.  She had been having episodes of chest tightness that felt like heartburn that improved with taking Tums.  The symptoms were different from her prior anginal symptoms.  It was recommended that she monitor the nosebleeds and otherwise no changes were made to her management.     In 2/2024 she presented for routine follow-up.  About 3 weeks prior she began having more issues with fatigue, shortness of breath, palpitations at rest and with activity, and chest tightness.  She was found to be anemic with a hemoglobin of 7.2.  She was evaluated by Dr. Hood and was found to have significant iron deficiency anemia.  She was started on IV Venofer for infusions and referred to GI.  She was then started on oral iron.  She reported improvement in her symptoms by the time of that office visit including her chest discomfort and fatigue.  She still having palpitations and shortness of breath.  She had also lost about 40 pounds in part due to Ozempic use and also because her appetite had  declined because of her symptoms.  Did not make any changes in her management.  Opted to monitor symptoms for further improvement before considering any further cardiac workup.  She was last seen by Dr. Hood in 3/2024 at which time her hemoglobin was up to 13.7.  She was still awaiting GI appointment at that time.     When I saw the patient last in 4/2024 she reported dizziness and balance issues that did not really improve with correction of her anemia.  We opted to monitor her symptoms at that time.  She also reported symptoms of chest tightness and palpitations.  Symptoms appear to be brief and again we opted to monitor.  She return in 7/2024 was seen by KEYONA Dumont.  At that time she reported palpitations at night that had been present since the onset of her anemia.  Otherwise denies any chest pain or worsening shortness of breath.  No changes were made.    The patient was admitted to the hospital earlier this month for anemia.  She had had an EGD and colonoscopy a week prior to admission was found to have a nonbleeding angiectasia for which she underwent argon plasma coagulation.  She restarted her rivaroxaban the following day and then developed bright red blood per rectum about a week later.  She was evaluated by GI and underwent a colonoscopy where she was found to have ulcerated lesion in the cecum for which she underwent clip placement x 3.  She did require 2 unit packed red blood cells transfusion during that admission.  She sent a message yesterday reporting that she was still feeling fatigued and out of breath.  She was scheduled for an appointment today.     She reports that over the weekend she was having dyspnea and fatigue with exertion associated with some chest discomfort and mild diaphoresis.  She became concerned about the symptoms because this reminded her what she experienced prior to her previous stent placements.  She was hoping by that point that her symptoms would have improved.   Fortunately however her symptoms have gotten better the last couple of days and she has been able to do activities such as walking up a flight of stairs without the issues she was experiencing a couple of days ago.  She is due for appointment with KEYONA Taylor later this week at which time she believes she will be getting lab work performed.  She otherwise denies any recurrent bleeding issues back on the rivaroxaban and clopidogrel.    Prior History:  I began following the patient in 11/2015 when she presented to establish care.  Prior to that she was followed by Dr. Ayala with [facility].  Her prior cardiac history includes a non-ST elevation MI and 7/2006 at which time she received a Cypher 3.0 x 23 mm stent to her mid LAD.  She then presented in 10/2009 with a late in-stent thrombosis that required repeat stenting of her mid LAD in addition to a second drug-eluting stent placement of her left circumflex artery.  Following that she had a repeat cardiac catheterization in 2010 that was reportedly unremarkable.  Following her myocardial infarction in 2009 she developed ischemic myopathy with an EF of around 25% and ultimately underwent AICD placement and 7/2010.  At some point in the course of her follow-ups her left ventricular function improved with an ejection fraction of 40-45%.  A stress test showed evidence of a large anterior infarct and minimal elva-infarct ischemia.     In her follow-ups she has done fairly well recently when she reported more fatigued than normal and episodes of chest discomfort that occurred while she was exercising on the treadmill.  She underwent  an echocardiogram in 3/2017 that showed an ejection fraction of 40-45%.  Due to continued symptoms we went ahead and proceeded with a PET stress test which was performed in 8/2017 that showed medium sized anterior wall infarct with mild elva-infarct ischemia and a post stress ejection fraction of 58%.      In 11/2017 her  AICD was noted to be at [facility].  She subsequently underwent generator replacement on 1/12/2018 with Dr. Ornelas. When I saw her back in follow up in 9/2018 she reported that she denied any new or worsening symptoms.       She was admitted and 12/2018 after she presented with aphasia.  On arrival to the hospital she underwent a CT of the head that showed no acute stroke but a perfusion scan showed a large perfusion mismatch.  A CTA showed evidence of a left MCA thrombus.  She was given TPA at this time with improvement in her symptoms.  Neurology felt that her stroke was likely embolic.  She underwent a repeat echocardiogram during her admission that showed moderately depressed left ventricular systolic function with an EF of 30%, grade 1 diastolic dysfunction, and no significant valvular disease.  Based on the likelihood that this was an embolic stroke I opted to go ahead and recommend anticoagulation.  She was subsequently started on rivaroxaban 20 mg a day.  Her aspirin was stopped and she was continued on clopidogrel.     In 11/2019 she reported a couple weeks of episodes of substernal chest tightness lasting 1 to 2 minutes and dyspnea on exertion.  She reported that the symptoms were similar to what she had at the time of her prior myocardial infarctions but reports that its much milder than what she experienced at that time.    Following that office visit she underwent a stress test that showed evidence of a medium anterior infarct with moderate elva-infarct ischemia.  The findings were similar to prior stress test however the amount of elva-infarct ischemia had impaired to increase.  We initially tried to treat her medically however she continued to have recurrent symptoms so we opted to proceed with a cardiac catheterization.  She presented to the hospital on 1/6/2020 for the procedure.  She was found to have severe in-stent restenosis of her proximal LAD stent and ultimately underwent repeat drug-eluting stent  placement of the in-stent restenosis.  She was also noted to have moderate nonobstructive disease of the first obtuse marginal branch for which she underwent FFR evaluation which was normal at 0.96.     In follow-up she had some mild episodes of chest discomfort that we decided to monitor.  She then returned for urgent follow-up on 10/22/2020 at which time she reported more severe episodes of chest discomfort that were occurring primarily at rest.  Due to the severity of her symptoms she was quite concerned and we decided to proceed with a cardiac catheterization.  This was performed on 10/26/2020 and showed stable moderate nonobstructive coronary artery disease.     She reported increasing frequency of chest tightness that primarily occurred at night and woke her up from sleep starting in 5/2021.  She reported this was similar to her prior anginal symptoms.  I started her on isosorbide mononitrate 30 mg daily but she is unable to tolerate it due to the onset of severe headaches.  When she presented back for follow-up in 7/2021 she was continuing to have episodes of nocturnal discomfort that had progressed.  At that point I recommended proceeding with a repeat cardiac catheterization.      She presented for cardiac catheterization on 7/19/2021.  This showed 90% proximal in-stent restenosis and she underwent repeat drug-eluting stent placement using a 3.0 x 12 mm stent.  She was also noted to have moderate nonobstructive disease of the first obtuse marginal branch with a normal RFR.     She was seen back in the office by KEYONA Demarco on 7/30/2021 at which time she was feeling better.   It was noted around the time that she had mildly elevated liver enzymes which were unchanged compared to prior evaluations.  Was also noted that her last lipid panel on 7/7/2021 showed that her cholesterol was poorly controlled with an LDL of around 160 despite being on high-dose atorvastatin.     In 12/2021 she reported  improvement in her symptoms overall.  However in follow-up in 4/2022 she reported that she was starting to have recurrent episodes of chest tightness they are occurring about twice a month and lasting for 30 minutes at a time.  Again they usually occurred after laying down to sleep at night.  Symptoms are similar to what she experienced before her last PCI but not as frequent or as intense.  In 2/2022 she had a routine device interrogation that showed a 19 beat episode of ventricular tachycardia.  Repeat in device interrogation in 4/2022 showed no further episodes.  Since her symptoms were still fairly infrequent we opted to monitor her symptoms before considering any further work-up.  When I saw her back in follow-up in 8/2022 she was having symptoms about 3 times a week lasting 5 to 10 minutes at a time.  I recommended that she start taking her isosorbide mononitrate in the evening.       She was hospitalized in 10/2022 with anemia with a hemoglobin of 7.1.  Her Hemoccult stool was positive.  However endoscopy was unremarkable except for small area of nonbleeding colonic angiectasia's.  Her clopidogrel and rivaroxaban were resumed.  She saw KEYONA Calix in follow-up in 11/2022.  At the time of that visit she reported improvement in her symptoms.      Review of Systems   Constitutional: Positive for malaise/fatigue.   HENT:  Negative for hearing loss, hoarse voice, nosebleeds and sore throat.    Eyes:  Negative for pain.   Cardiovascular:  Positive for chest pain and dyspnea on exertion. Negative for claudication, cyanosis, irregular heartbeat, leg swelling, near-syncope, orthopnea, palpitations, paroxysmal nocturnal dyspnea and syncope.   Respiratory:  Negative for shortness of breath and snoring.    Endocrine: Negative for cold intolerance, heat intolerance, polydipsia, polyphagia and polyuria.   Skin:  Negative for itching and rash.   Musculoskeletal:  Negative for arthritis, falls, joint pain, joint  swelling, muscle cramps, muscle weakness and myalgias.   Gastrointestinal:  Negative for constipation, diarrhea, dysphagia, heartburn, hematemesis, hematochezia, melena, nausea and vomiting.   Genitourinary:  Negative for frequency, hematuria and hesitancy.   Neurological:  Negative for excessive daytime sleepiness, dizziness, headaches, light-headedness, numbness and weakness.   Psychiatric/Behavioral:  Negative for depression. The patient is not nervous/anxious.          Current Outpatient Medications:     Accu-Chek FastClix Lancets misc, Use to test blood sugar 4 times daily  E11.8, Disp: 400 each, Rfl: 1    Accu-Chek Guide test strip, USE 4 TIMES A DAY E11.8, Disp: 300 each, Rfl: 2    atorvastatin (LIPITOR) 80 MG tablet, TAKE 1 TABLET BY MOUTH EVERY DAY, Disp: 90 tablet, Rfl: 3    Blood Glucose Monitoring Suppl (Accu-Chek Guide) w/Device kit, Inject 1 Device under the skin into the appropriate area as directed Daily., Disp: 1 kit, Rfl: 0    carvedilol (COREG) 25 MG tablet, TAKE 1 TABLET BY MOUTH TWICE A DAY WITH MEALS, Disp: 180 tablet, Rfl: 2    Cholecalciferol (VITAMIN D3) 2000 UNITS capsule, Take 1,000 Units by mouth Daily., Disp: , Rfl:     clopidogrel (PLAVIX) 75 MG tablet, Take 1 tablet by mouth Daily., Disp: , Rfl:     furosemide (LASIX) 40 MG tablet, TAKE 1 TABLET BY MOUTH EVERY DAY, Disp: 90 tablet, Rfl: 1    Insulin Pen Needle 32G X 4 MM misc, USE 4 TIMES A DAY, Disp: 400 each, Rfl: 3    Multiple Vitamins-Minerals (MULTIVITAL PO), Take 1 tablet by mouth Daily., Disp: , Rfl:     nitroglycerin (Nitrostat) 0.4 MG SL tablet, Place 1 tablet under the tongue Every 5 (Five) Minutes As Needed for Chest Pain. Take no more than 3 doses in 15 minutes., Disp: 30 tablet, Rfl: 11    Omega-3 Fatty Acids (FISH OIL) 1200 MG capsule capsule, Take 1 capsule by mouth Daily With Breakfast., Disp: , Rfl:     pantoprazole (PROTONIX) 40 MG EC tablet, Take 1 tablet by mouth Daily., Disp: 90 tablet, Rfl: 3    potassium chloride  10 MEQ CR tablet, Take 1 tablet by mouth Daily. Resume on 1/7/20, Disp: 90 tablet, Rfl: 1    potassium chloride 10 MEQ CR tablet, TAKE 1 TABLET BY MOUTH EVERY DAY, Disp: 90 tablet, Rfl: 1    rivaroxaban (Xarelto) 20 MG tablet, Take 1 tablet by mouth Daily With Dinner., Disp: , Rfl:     spironolactone (ALDACTONE) 25 MG tablet, TAKE 1 TABLET BY MOUTH EVERY DAY, Disp: 90 tablet, Rfl: 3    traZODone (DESYREL) 50 MG tablet, TAKE 1 TABLET BY MOUTH EVERYDAY AT BEDTIME, Disp: 90 tablet, Rfl: 0    vitamin B-12 (CYANOCOBALAMIN) 500 MCG tablet, Take 1 tablet by mouth Daily., Disp: , Rfl:     Past Medical History:   Diagnosis Date    Abdominal pain     Abnormal liver function test     Acute bronchitis     Anemia     CAD (coronary artery disease)     Colon polyp     COPD (chronic obstructive pulmonary disease)     Cough     Diabetes     Diabetes mellitus     Diarrhea     Gastroenteritis     Health care maintenance     Hyperlipidemia     IFG (impaired fasting glucose)     Ischemic cardiomyopathy     Myocardial infarction     SHELLIE (obstructive sleep apnea)     Sore throat     Stroke 2020    Type 2 diabetes mellitus     Vaginal yeast infection     Vitamin D deficiency        Past Surgical History:   Procedure Laterality Date    CARDIAC CATHETERIZATION      CARDIAC CATHETERIZATION N/A 01/06/2020    Procedure: Coronary angiography;  Surgeon: Oneida Saldivar MD;  Location: [facility] CATH INVASIVE LOCATION;  Service: Cardiovascular    CARDIAC CATHETERIZATION N/A 01/06/2020    Procedure: Left heart cath;  Surgeon: Oneida Saldivar MD;  Location: [facility] CATH INVASIVE LOCATION;  Service: Cardiovascular    CARDIAC CATHETERIZATION N/A 01/06/2020    Procedure: Left ventriculography;  Surgeon: Oneida Saldivar MD;  Location:  [facility] CATH INVASIVE LOCATION;  Service: Cardiovascular    CARDIAC CATHETERIZATION N/A 01/06/2020    Procedure: Percutaneous Coronary Intervention;  Surgeon: Oneida Saldivar MD;  Location:  [facility] CATH INVASIVE LOCATION;   Service: Cardiovascular    CARDIAC CATHETERIZATION N/A 01/06/2020    Procedure: Stent HUGO coronary;  Surgeon: Oneida Saldivar MD;  Location:  [facility] CATH INVASIVE LOCATION;  Service: Cardiovascular    CARDIAC CATHETERIZATION  01/06/2020    Procedure: Functional Flow [place];  Surgeon: Oneida Saldivar MD;  Location:  [facility] CATH INVASIVE LOCATION;  Service: Cardiovascular    CARDIAC CATHETERIZATION N/A 10/26/2020    Procedure: Coronary angiography;  Surgeon: Oneida Saldivar MD;  Location: [facility] CATH INVASIVE LOC[facility];  Service: Cardiovascular;  Laterality: N/A;    CARDIAC CATHETERIZATION N/A 10/26/2020    Procedure: Left heart cath;  Surgeon: Oneida Saldivar MD;  Location:  [facility] CATH INVASIVE LOCATION;  Service: Cardiovascular;  Laterality: N/A;    CARDIAC CATHETERIZATION N/A 10/26/2020    Procedure: Left ventriculography;  Surgeon: Oneida Saldivar MD;  Location: [facility] CATH [facility]VAS[facility] LOC[facility];  Service: Cardiovascular;  Laterality: N/A;    CARDIAC CATHETERIZATION  10/26/2020    Procedure: Functional Flow [place];  Surgeon: Oneida Saldivar MD;  Location: [facility] CATH [facility]AS[facility] LOC[facility];  Service: Cardiovascular;;    CARDIAC CATHETERIZATION N/A 07/19/2021    Procedure: Coronary angiography;  Surgeon: Oneida Saldivar MD;  Location: [facility] CATH INVASIVE LOC[facility];  Service: Cardiovascular;  Laterality: N/A;    CARDIAC CATHETERIZATION N/A 07/19/2021    Procedure: Left heart cath;  Surgeon: Oneida Saldivar MD;  Location: [facility] CATH INVASIVE LOC[facility];  Service: Cardiovascular;  Laterality: N/A;    CARDIAC CATHETERIZATION N/A 07/19/2021    Procedure: Left ventriculography;  Surgeon: Oneida Saldivar MD;  Location: [facility] CATH INVASIVE LOC[facility];  Service: Cardiovascular;  Laterality: N/A;    CARDIAC CATHETERIZATION N/A 07/19/2021    Procedure: Percutaneous Coronary Intervention;  Surgeon: Oneida Saldivar MD;  Location: [facility] CATH [facility]VASIVE LOC[facility];  Service: Cardiovascular;  Laterality: N/A;    CARDIAC CATHETERIZATION  N/A 07/19/2021    Procedure: Optical Coherent Tomography;  Surgeon: Oneida Saldivar MD;  Location:  [facility] CATH INVASIVE LOCATION;  Service: Cardiovascular;  Laterality: N/A;    CARDIAC CATHETERIZATION N/A 07/19/2021    Procedure: Stent HUGO coronary;  Surgeon: Oneida Saldivar MD;  Location: [facility] CATH [facility]VAS[facility] LOC[facility];  Service: Cardiovascular;  Laterality: N/A;    CARDIAC CATHETERIZATION  07/19/2021    Procedure: RESTING FULL CYCLE RATIO;  Surgeon: Oneida Saldivar MD;  Location[facility];  Service: Cardiovascular;;  RFR    CARDIAC DEFIBRILLATOR PLACEMENT  2010    Medtronic dual chamber/Dr. Valentin    CARDIAC ELECTROPHYSIOLOGY PROCEDURE N/A 01/12/2018    Procedure: ICD DC generator change  medtronic;  Surgeon: Hany Ornelas MD;  Location: [facility] CATH INVASIVE LOCATION;  Service:     COLONOSCOPY N/A 11/21/2024    Procedure: COLONOSCOPY;  Surgeon: Jayden Pepe MD;  Location: [facility] OR;  Service: Gastroenterology;  Laterality: N/A;  APC for arterial venous malformation; internal hemorrhoids    COLONOSCOPY N/A 12/2/2024    Procedure: COLONOSCOPY into cecum with clip placement x3 and 1 cc epi injection;  Surgeon: Jony Fraire MD;  Location: [facility] ENDOSCOPY;  Service: Gastroenterology;  Laterality: N/A;  pre- rectal bleeding  post- cecal ulcer    ENDOSCOPY N/A 11/21/2024    Procedure: ESOPHAGOGASTRODUODENOSCOPY WITH BIOPSY;  Surgeon: Jayden Pepe MD;  Location: [facility] OR;  Service: Gastroenterology;  Laterality: N/A;  duodenum bx to r/o celiac disease; gastritis; gastric bx to r/o h. pylori; esophagitis    PACEMAKER IMPLANTATION      TUBAL ABDOMINAL LIGATION         Family History   Problem Relation Age of Onset    Heart attack Mother     Diabetes Mother     Heart disease Mother     Hyperlipidemia Mother     Emphysema Mother     Diabetes Father     Heart disease Father     Hyperlipidemia Father     Hypertension Father     Colon cancer Father     Heart attack Father      "Hypertension Sister     Hypertension Brother     Heart disease Brother     Hyperlipidemia Brother     Heart disease Brother     Heart attack Brother     Heart disease Maternal Grandmother     Heart disease Maternal Grandfather     Heart disease Paternal Grandmother     Heart disease Paternal Grandfather     Colon polyps Neg Hx     Crohn's disease Neg Hx     Irritable bowel syndrome Neg Hx     Ulcerative colitis Neg Hx     Malig Hyperthermia Neg Hx        Social History     Tobacco Use    Smoking status: Former     Current packs/day: 0.00     Average packs/day: 1 pack/day for 30.0 years (30.0 ttl pk-yrs)     Types: Cigarettes     Start date: 11/1/1979     Quit date: 11/1/2009     Years since quitting: 15.1    Smokeless tobacco: Never    Tobacco comments:     QUIT 10 YRS   Vaping Use    Vaping status: Never Used   Substance Use Topics    Alcohol use: Yes     Comment: rarely uses ETOH/caffeine use    Drug use: Never         ECG 12 Lead    Date/Time: 12/10/2024 2:37 PM  Performed by: Oneida Saldivar MD    Authorized by: Oneida Saldivar MD  Comparison: compared with previous ECG   Similar to previous ECG  Rhythm: sinus rhythm  Q waves: V1, V2 and V3               Objective:     Visit Vitals  /78 (BP Location: Left arm, Patient Position: Sitting, Cuff Size: Adult)   Pulse 78   Ht 152.4 cm (60\")   Wt 84.2 kg (185 lb 9.6 oz)   LMP  (LMP Unknown)   SpO2 98%   BMI 36.25 kg/m²         Constitutional:       Appearance: Normal appearance. Well-developed.   Eyes:      General: Lids are normal.      Conjunctiva/sclera: Conjunctivae normal.      Pupils: Pupils are equal, round, and reactive to light.   HENT:      Head: Normocephalic and atraumatic.   Neck:      Vascular: No carotid bruit or JVD.      Lymphadenopathy: No cervical adenopathy.   Pulmonary:      Effort: Pulmonary effort is normal.      Breath sounds: Normal breath sounds.   Cardiovascular:      Normal rate. Regular rhythm.      No gallop.    Pulses:     Radial: " 2+ bilaterally.  Edema:     Peripheral edema absent.   Abdominal:      Palpations: Abdomen is soft.   Musculoskeletal:      Cervical back: Full passive range of motion without pain, normal range of motion and neck supple. Skin:     General: Skin is warm and dry.   Neurological:      Mental Status: Alert and oriented to person, place, and time.             Assessment:          Diagnosis Plan   1. Chronic coronary artery disease        2. S/P coronary artery stent placement        3. Automatic implantable cardioverter-defibrillator in situ        4. Benign essential hypertension        5. Ischemic cardiomyopathy        6. Carotid artery disease, unspecified laterality, unspecified type        7. Chronic systolic congestive heart failure        8. History of anterior myocardial infarction         9. Mixed hyperlipidemia        10. ICD (implantable cardioverter-defibrillator) in place        11. Type 2 diabetes mellitus with complication        12. History of CVA (cerebrovascular accident) without residual deficits        13. Obstructive sleep apnea syndrome               Plan:         1.  Dyspnea/chest pain/fatigue on exertion.  EKG shows no acute changes.  I suspect her symptoms are due to her recent issues with GI bleed and anemia.  Although her hemoglobin improved and stabilized by the time of her discharge last week it is still lower than what she was running before her GI bleed (which shows a baseline hemoglobin around 12-13).  Since her symptoms have seemed to have gotten better I suspect her ongoing symptoms may be due to the persistent anemia and her body just reequilibrating.  At this point I recommend we just monitor her symptoms for continued improvement.  If her symptoms do not continue to improve and she does not have any evidence of worsening anemia may need to consider further cardiac workup in the future.  2.  Coronary artery disease.  Plan as per #1.  Otherwise continue current management.  3.   Ischemic cardiomyopathy.  EF of 35 to 40%.  On guideline directed management with carvedilol.  Blood pressures have been historically too low for ACE inhibitor or ARB.  No evidence of volume overload.  4.  Single-chamber ICD.  Interrogation today showed normal function with no recent events.  5.  Diabetes mellitus type 2  6.  History of stroke.  Suspected to be embolic although she has never had any evidence of atrial fibrillation.  She remains on anticoagulation with rivaroxaban.  7.  Obstructive sleep apnea.    Will plan on seeing the patient back again in 1 month.

## 2024-12-13 ENCOUNTER — OFFICE VISIT (OUTPATIENT)
Dept: FAMILY MEDICINE CLINIC | Facility: CLINIC | Age: 64
End: 2024-12-13
Payer: COMMERCIAL

## 2024-12-13 VITALS
OXYGEN SATURATION: 99 % | SYSTOLIC BLOOD PRESSURE: 130 MMHG | TEMPERATURE: 96.5 F | HEART RATE: 66 BPM | WEIGHT: 186.6 LBS | BODY MASS INDEX: 36.63 KG/M2 | HEIGHT: 60 IN | DIASTOLIC BLOOD PRESSURE: 80 MMHG

## 2024-12-13 DIAGNOSIS — Z86.73 HISTORY OF CVA (CEREBROVASCULAR ACCIDENT) WITHOUT RESIDUAL DEFICITS: ICD-10-CM

## 2024-12-13 DIAGNOSIS — D50.9 IRON DEFICIENCY ANEMIA, UNSPECIFIED IRON DEFICIENCY ANEMIA TYPE: ICD-10-CM

## 2024-12-13 DIAGNOSIS — D68.32 HEMORRHAGIC DISORDER DUE TO EXTRINSIC CIRCULATING ANTICOAGULANTS: ICD-10-CM

## 2024-12-13 DIAGNOSIS — K92.2 LOWER GI BLEED: ICD-10-CM

## 2024-12-13 DIAGNOSIS — E78.2 MIXED HYPERLIPIDEMIA: ICD-10-CM

## 2024-12-13 DIAGNOSIS — R53.83 OTHER FATIGUE: ICD-10-CM

## 2024-12-13 DIAGNOSIS — I10 BENIGN ESSENTIAL HYPERTENSION: ICD-10-CM

## 2024-12-13 DIAGNOSIS — E11.65 TYPE 2 DIABETES MELLITUS WITH HYPERGLYCEMIA, WITHOUT LONG-TERM CURRENT USE OF INSULIN: ICD-10-CM

## 2024-12-13 DIAGNOSIS — Z09 HOSPITAL DISCHARGE FOLLOW-UP: Primary | ICD-10-CM

## 2024-12-13 DIAGNOSIS — I77.9 CAROTID ARTERY DISEASE, UNSPECIFIED LATERALITY, UNSPECIFIED TYPE: ICD-10-CM

## 2024-12-13 NOTE — PROGRESS NOTES
Transitional Care Follow Up Visit  Raimundo YOUNG is a 64 y.o. female who presents for a transitional care management visit.    Within 48 business hours after discharge our office contacted her via telephone to coordinate her care and needs.      I reviewed and discussed the details of that call along with the discharge summary, hospital problems, inpatient lab results, inpatient diagnostic studies, and consultation reports with Daisy.     Current outpatient and discharge medications have been reconciled for the patient.  Reviewed by: KEYONA Taylor          12/2/2024     5:52 PM   Date of TCM Phone Call   Hospital [facility]   Date of Admission 11/30/2024   Date of Discharge 12/2/2024       Risk for Readmission (LACE) Score: 8 (12/2/2024  6:00 AM)      History of Present Illness as a 64 year old female for follow up after being at [facility] 11/30/2024 to 12/2/2024  Overall since discharge she is continue to feel fatigued  No other complaints  No headaches no blurred vision no dizziness no flushing no chest pain or pressure    She does have all of the medication already taking exactly as directed  Denies any complaints of      She has seen GI since discharge    Plan reviewed from 12/04/2024  Assessment & Plan  1. Iron Deficiency Anemia.  2. Acute on chronic anemia, required frequent PRBC transfusions  3. 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 Chronic antiplatelet therapy  The etiology of her iron deficiency anemia could be due to the AVM found in the cecum, which was treated with plasma coagulation and clips. She received a blood transfusion of 2 units of PRBC on December 2, 2024.   Her hemoglobin level was 8.6 as of December 4, 2024.   She is advised to continue her current regimen of iron supplements and follow up closely with her hematologist, Dr. Daniel, every 3 months.      2. Mild Gastritis.  3. Esophagitis, grade A  The EGD performed on November 21, 2024, showed mild gastritis.    She is advised to continue taking omeprazole 40 mg daily as prescribed by Dr. Pepe.   Dietary caution is advised, particularly with acidic, spicy, and greasy foods.  She should continue taking pantoprazole 40 mg daily. She is also advised to continue vitamin D and calcium supplements to mitigate the risk of osteoporosis while on these medications.     4. Health Maintenance.  Her thyroid function was checked in January 2024 and was normal. Her B12 level was recorded at 315, and she is advised to continue taking one B12 supplement over the counter.     Follow-up  Return in 6 months or sooner if necessary.           She has seen cardiology on 12/10/2024  Plan from that visit reviewed  Plan  1.  Dyspnea/chest pain/fatigue on exertion.  EKG shows no acute changes.  I suspect her symptoms are due to her recent issues with GI bleed and anemia.  Although her hemoglobin improved and stabilized by the time of her discharge last week it is still lower than what she was running before her GI bleed (which shows a baseline hemoglobin around 12-13).  Since her symptoms have seemed to have gotten better I suspect her ongoing symptoms may be due to the persistent anemia and her body just reequilibrating.  At this point I recommend we just monitor her symptoms for continued improvement.  If her symptoms do not continue to improve and she does not have any evidence of worsening anemia may need to consider further cardiac workup in the future.  2.  Coronary artery disease.  Plan as per #1.  Otherwise continue current management.  3.  Ischemic cardiomyopathy.  EF of 35 to 40%.  On guideline directed management with carvedilol.  Blood pressures have been historically too low for ACE inhibitor or ARB.  No evidence of volume overload.  4.  Single-chamber ICD.  Interrogation today showed normal function with no recent events.  5.  Diabetes mellitus type 2  6.  History of stroke.  Suspected to be embolic although she has never had any  evidence of atrial fibrillation.  She remains on anticoagulation with rivaroxaban.  7.  Obstructive sleep apnea.     Will plan on seeing the patient back again in 1 month.     She does have upcoming appointments with hematology and cardiology scheduled.        She has no other acute complaints today    She is agreeable to labs            Course During Hospital Stay The following information was reviewed by: KEYONA Taylor on 12/13/2024:   Date of Admission: 11/30/2024  Date of Discharge:  12/2/2024  Primary Care Physician: Shannon Cole APRN      Discharge Diagnosis:        Active Hospital Problems     Diagnosis   POA    **Lower GI bleed [K92.2]   Yes    Hemorrhagic disorder due to extrinsic circulating anticoagulants [D68.32]   Yes    Iron deficiency anemia [D50.9]   Yes    Chronic systolic congestive heart failure [I50.22]   Yes    History of CVA (cerebrovascular accident) without residual deficits [Z86.73]   Not Applicable    Type 2 diabetes mellitus with complication [E11.8]   Yes    Benign essential hypertension [I10]   Yes    Chronic coronary artery disease [I25.10]   Yes       Resolved Hospital Problems   No resolved problems to display.         DETAILS OF HOSPITAL STAY      Hospital Course  This is a 64 year old female with coronary artery disease, chronic systolic heart failure s/p ICD, COPD, hypertension, hyperlipidemia, type 2 diabetes, hypothyroidism, history of embolic CVA on xarelto, SHELLIE who presented with bloody stools.  Please see H&P for full details.  She had an EGD/colonoscopy a little more than a week ago for ALMA DELIA and was found to have a non-bleeding angiectasia in her cecum which was coagulated.  She did have an initial drop of her hemoglobin required 2 units of packed red blood cells upon admission.  Her Plavix and Xarelto were placed on hold.  GI was consulted.  After washout of the anticoagulants they performed colonoscopy this morning which showed ulceration in the cecum.  A clip  "was applied.  Her hemoglobin is stable.  GI recommended she hold the Xarelto and Plavix for 3 days and these can be restarted on Thursday of this week.  They have cleared her for discharge.  Patient feels well postprocedure and is anxious for discharge.  She already has follow-up with her primary gastroenterologist this Wednesday and I recommended she keep this appointment.  She is medically stable and will be released back home.       The following portions of the patient's history were reviewed and updated as appropriate: allergies, current medications, past family history, past medical history, past social history, past surgical history, and problem list.     Vitals:    12/13/24 1408   BP: 130/80   Pulse: 66   Temp: 96.5 °F (35.8 °C)   SpO2: 99%   Weight: 84.6 kg (186 lb 9.6 oz)   Height: 152.4 cm (60\")   PainSc: 0-No pain       Review of Systems   Constitutional:  Positive for fatigue. Negative for chills and fever.   Eyes:  Negative for visual disturbance.   Respiratory:  Negative for cough, shortness of breath and wheezing.    Cardiovascular:  Negative for chest pain and leg swelling.   Gastrointestinal:  Negative for abdominal pain, constipation, diarrhea, nausea and vomiting.   Endocrine: Negative for polydipsia, polyphagia and polyuria.   Neurological:  Negative for dizziness.   Psychiatric/Behavioral:  Negative for self-injury, sleep disturbance and suicidal ideas. The patient is not nervous/anxious.         Objective   Physical Exam  Vitals reviewed.   Constitutional:       General: She is not in acute distress.  Eyes:      Conjunctiva/sclera: Conjunctivae normal.   Neck:      Thyroid: No thyromegaly.      Vascular: No carotid bruit.   Cardiovascular:      Rate and Rhythm: Normal rate and regular rhythm.      Heart sounds: Normal heart sounds. No murmur heard.  Pulmonary:      Effort: Pulmonary effort is normal. No respiratory distress.      Breath sounds: Normal breath sounds. No stridor. No wheezing, " rhonchi or rales.   Chest:      Chest wall: No tenderness.   Lymphadenopathy:      Cervical: No cervical adenopathy.   Neurological:      Mental Status: She is alert and oriented to person, place, and time.   Psychiatric:         Attention and Perception: Attention normal.         Mood and Affect: Mood normal.         DATA REVIEWED:    The following data was reviewed by: KEYONA Taylor on 12/13/2024:         Assessment & Plan     Diagnoses and all orders for this visit:    1. Hospital discharge follow-up (Primary)  Comments:  [facility] 11/30/2024 to 12/2/2024  Doing well since discharge  Orders:  -     Comprehensive Metabolic Panel  -     CBC & Differential  -     Iron Profile  -     Ferritin    2. Lower GI bleed  Comments:  Keep follow-up with specialist  No active pain or bleeding    3. Hemorrhagic disorder due to extrinsic circulating anticoagulants  Comments:  Keep follow-up with specialist    4. Type 2 diabetes mellitus with hyperglycemia, without long-term current use of insulin  Comments:  Hemoglobin A1c 5.9 well-controlled  Orders:  -     Comprehensive Metabolic Panel  -     CBC & Differential  -     Iron Profile  -     Ferritin    5. Iron deficiency anemia, unspecified iron deficiency anemia type  Comments:  Rechecking labs.keep follow up with hematology  Orders:  -     Comprehensive Metabolic Panel  -     CBC & Differential  -     Iron Profile  -     Ferritin    6. Benign essential hypertension  Comments:  stable continue current management  Orders:  -     Comprehensive Metabolic Panel  -     CBC & Differential  -     Iron Profile  -     Ferritin    7. Carotid artery disease, unspecified laterality, unspecified type  Comments:  Continue current management  Keep follow-up with cardiology  Orders:  -     Comprehensive Metabolic Panel  -     CBC & Differential  -     Iron Profile  -     Ferritin    8. Mixed hyperlipidemia  Comments:  at goal continue current management  Orders:  -      Comprehensive Metabolic Panel  -     CBC & Differential  -     Iron Profile  -     Ferritin    9. Other fatigue  Comments:  Checking labs  keep follow up with specialists  Orders:  -     Comprehensive Metabolic Panel  -     CBC & Differential  -     Iron Profile  -     Ferritin    10. History of CVA (cerebrovascular accident) without residual deficits  Comments:  keep follow up with specialists  no stevan S/S C/o           Follow Up     Return in about 3 months (around 3/13/2025), or if symptoms worsen or fail to improve.

## 2024-12-14 DIAGNOSIS — E11.65 TYPE 2 DIABETES MELLITUS WITH HYPERGLYCEMIA, WITHOUT LONG-TERM CURRENT USE OF INSULIN: ICD-10-CM

## 2024-12-14 LAB
ALBUMIN SERPL-MCNC: 4.4 G/DL (ref 3.5–5.2)
ALBUMIN/GLOB SERPL: 1.6 G/DL
ALP SERPL-CCNC: 120 U/L (ref 39–117)
ALT SERPL-CCNC: 22 U/L (ref 1–33)
AST SERPL-CCNC: 16 U/L (ref 1–32)
BASOPHILS # BLD AUTO: 0.05 10*3/MM3 (ref 0–0.2)
BASOPHILS NFR BLD AUTO: 0.6 % (ref 0–1.5)
BILIRUB SERPL-MCNC: 0.5 MG/DL (ref 0–1.2)
BUN SERPL-MCNC: 11 MG/DL (ref 8–23)
BUN/CREAT SERPL: 10.1 (ref 7–25)
CALCIUM SERPL-MCNC: 9.7 MG/DL (ref 8.6–10.5)
CHLORIDE SERPL-SCNC: 101 MMOL/L (ref 98–107)
CO2 SERPL-SCNC: 30.3 MMOL/L (ref 22–29)
CREAT SERPL-MCNC: 1.09 MG/DL (ref 0.57–1)
EGFRCR SERPLBLD CKD-EPI 2021: 57.2 ML/MIN/1.73
EOSINOPHIL # BLD AUTO: 0.19 10*3/MM3 (ref 0–0.4)
EOSINOPHIL NFR BLD AUTO: 2.3 % (ref 0.3–6.2)
ERYTHROCYTE [DISTWIDTH] IN BLOOD BY AUTOMATED COUNT: 14.1 % (ref 12.3–15.4)
FERRITIN SERPL-MCNC: 42.1 NG/ML (ref 13–150)
GLOBULIN SER CALC-MCNC: 2.7 GM/DL
GLUCOSE SERPL-MCNC: 132 MG/DL (ref 65–99)
HCT VFR BLD AUTO: 36.2 % (ref 34–46.6)
HGB BLD-MCNC: 12 G/DL (ref 12–15.9)
IMM GRANULOCYTES # BLD AUTO: 0.06 10*3/MM3 (ref 0–0.05)
IMM GRANULOCYTES NFR BLD AUTO: 0.7 % (ref 0–0.5)
IRON SATN MFR SERPL: 12 % (ref 20–50)
IRON SERPL-MCNC: 54 MCG/DL (ref 37–145)
LYMPHOCYTES # BLD AUTO: 2.41 10*3/MM3 (ref 0.7–3.1)
LYMPHOCYTES NFR BLD AUTO: 29.5 % (ref 19.6–45.3)
MCH RBC QN AUTO: 28.5 PG (ref 26.6–33)
MCHC RBC AUTO-ENTMCNC: 33.1 G/DL (ref 31.5–35.7)
MCV RBC AUTO: 86 FL (ref 79–97)
MONOCYTES # BLD AUTO: 0.9 10*3/MM3 (ref 0.1–0.9)
MONOCYTES NFR BLD AUTO: 11 % (ref 5–12)
NEUTROPHILS # BLD AUTO: 4.57 10*3/MM3 (ref 1.7–7)
NEUTROPHILS NFR BLD AUTO: 55.9 % (ref 42.7–76)
NRBC BLD AUTO-RTO: 0 /100 WBC (ref 0–0.2)
PLATELET # BLD AUTO: 404 10*3/MM3 (ref 140–450)
POTASSIUM SERPL-SCNC: 4.3 MMOL/L (ref 3.5–5.2)
PROT SERPL-MCNC: 7.1 G/DL (ref 6–8.5)
RBC # BLD AUTO: 4.21 10*6/MM3 (ref 3.77–5.28)
SODIUM SERPL-SCNC: 139 MMOL/L (ref 136–145)
TIBC SERPL-MCNC: 468 MCG/DL
UIBC SERPL-MCNC: 414 MCG/DL (ref 112–346)
WBC # BLD AUTO: 8.18 10*3/MM3 (ref 3.4–10.8)

## 2024-12-16 DIAGNOSIS — E11.65 TYPE 2 DIABETES MELLITUS WITH HYPERGLYCEMIA, WITHOUT LONG-TERM CURRENT USE OF INSULIN: Primary | ICD-10-CM

## 2024-12-16 RX ORDER — SEMAGLUTIDE 1.34 MG/ML
1 INJECTION, SOLUTION SUBCUTANEOUS WEEKLY
Qty: 9 ML | Refills: 1 | Status: SHIPPED | OUTPATIENT
Start: 2024-12-16 | End: 2024-12-18 | Stop reason: SDUPTHER

## 2024-12-16 NOTE — PROGRESS NOTES
Labs reviewed-  H&H significantly improved-  continuing to monitor Kidney function-  will repeat on next follow up   Happy with labs

## 2024-12-16 NOTE — TELEPHONE ENCOUNTER
The original prescription was discontinued on 11/20/2024 by Triny Roger, RN for the following reason: *Therapy completed. Renewing this prescription may not be appropriate.   Rx Refill Note  Requested Prescriptions     Pending Prescriptions Disp Refills    Ozempic, 1 MG/DOSE, 4 MG/3ML solution pen-injector [Pharmacy Med Name: OZEMPIC 4 MG/3 ML (1 MG/DOSE)]  1     Sig: INJECT 1 MG UNDER THE SKIN INTO THE APPROPRIATE AREA AS DIRECTED 1 (ONE) TIME PER WEEK.      Last office visit with prescribing clinician: 12/13/2024   Last telemedicine visit with prescribing clinician: Visit date not found   Next office visit with prescribing clinician: 12/16/2024                         Would you like a call back once the refill request has been completed: [] Yes [] No    If the office needs to give you a call back, can they leave a voicemail: [] Yes [] No    Chapito Corado MA  12/16/24, 08:50 EST

## 2025-01-13 ENCOUNTER — OFFICE VISIT (OUTPATIENT)
Dept: CARDIOLOGY | Facility: CLINIC | Age: 65
End: 2025-01-13
Payer: COMMERCIAL

## 2025-01-13 VITALS
HEART RATE: 77 BPM | BODY MASS INDEX: 36.32 KG/M2 | DIASTOLIC BLOOD PRESSURE: 74 MMHG | WEIGHT: 185 LBS | HEIGHT: 60 IN | SYSTOLIC BLOOD PRESSURE: 132 MMHG

## 2025-01-13 DIAGNOSIS — E78.2 MIXED HYPERLIPIDEMIA: ICD-10-CM

## 2025-01-13 DIAGNOSIS — I25.5 ISCHEMIC CARDIOMYOPATHY: ICD-10-CM

## 2025-01-13 DIAGNOSIS — I10 BENIGN ESSENTIAL HYPERTENSION: Primary | ICD-10-CM

## 2025-01-13 DIAGNOSIS — I77.9 CAROTID ARTERY DISEASE, UNSPECIFIED LATERALITY, UNSPECIFIED TYPE: ICD-10-CM

## 2025-01-13 DIAGNOSIS — Z95.5 S/P CORONARY ARTERY STENT PLACEMENT: ICD-10-CM

## 2025-01-13 DIAGNOSIS — Z95.810 ICD (IMPLANTABLE CARDIOVERTER-DEFIBRILLATOR) IN PLACE: ICD-10-CM

## 2025-01-13 PROCEDURE — 93000 ELECTROCARDIOGRAM COMPLETE: CPT | Performed by: NURSE PRACTITIONER

## 2025-01-13 PROCEDURE — 99214 OFFICE O/P EST MOD 30 MIN: CPT | Performed by: NURSE PRACTITIONER

## 2025-01-13 NOTE — PROGRESS NOTES
Subjective:     Encounter Date:01/13/2025      Patient ID: Daisy Dent is a 64 y.o. female.    Chief Complaint:follow up CAD  History of Present Illness  This is a 65 y/o female know to Dr. Saldivar who is known to me. She has a pmhx of ischemic cardiomyopathy with last EF of 40-45%, diabetes, SHELLIE on CPAP, hypertension, s/p dual-chamber AICD, hyperlipidemia, s/p left MCA CVA, coronary artery disease s/o anterior MI and LAD stent placement in 2006, 2009, 2019, and 2021.     She was admitted in December for anemia.  She had undergone an EGD and colonoscopy week prior to admission and was found to have nonbleeding angiectasia for which she underwent argon plasma coagulation.  She restarted her Xarelto and then developed bright red blood per rectum about a week later.  She was evaluated by GI and underwent a colonoscopy where she was found to have an ulcerated lesion in the cecum for which she underwent clip placement x 3.  Following discharge she sent a message to our office reporting she was still feeling fatigued and out of breath.  She was scheduled to see Dr. Saldivar urgently.  Her EKG showed no acute changes and it was felt that her symptoms were likely due to her GI bleed and anemia.  Dr. Saldivar recommended monitoring for the time being as her hemoglobin continued to improve and then if symptoms continue we would consider further workup.    She is here today for 4-week follow-up visit.  She tells me that her symptoms continue to improve.  She still has some dizziness but is not as bad as it was when she saw Dr. Saldivar.  She did some housework on Saturday and reports this took a lot out of her.  She was worn out not able to do much on Sunday.  She says she is not having dyspnea or palpitations as severely as she was before either.  Denies any chest pain.  She denies any hematuria or melena.  She is tolerating Xarelto well.  Overall she says she feels much better.    Prior history:  Dr. Saldivar began seeing the  patient in 2015 when she presented to [facility].  She had previously been followed by Dr. Jamie Kingsley cardiology.  She had an echocardiogram in 2017 that showed an ejection fraction of 40-45%.  She reported continued symptoms of chest discomfort while exercising so a PET stress test was performed that showed medium sized anterior wall infarct with mild elva-infarct ischemia and post stress ejection fraction of 58%.    She was admitted in December 2018 with aphasia. On arrival to the hospital she underwent a CT of the head that showed no evidence of acute CVA there was a large perfusion mismatch.  A CTA showed evidence of a left MCA thrombosis.  She was given tPA with improvement in her symptoms.  She underwent a repeat echo that showed moderately depressed left ventricular systolic function with an EF of 30%, grade 1 diastolic dysfunction, and no significant valvular disease.  She was then started on Xarelto.    In 2019 she had episodes of chest tightness similar to what she had with her prior MIs.  Medical management was attempted however she continued to have recurrent symptoms and subsequently underwent a cardiac catheterization.  She was found to have severe in-stent restenosis of her proximal LAD stent and ultimately underwent repeat drug-eluting stent placement of the in-stent restenosis.  She was also noted to have nonobstructive disease of the first obtuse marginal branch for which she underwent FFR evaluation which was normal.      She returned for urgent follow-up in 2020 with complaints of chest discomfort.  Due to the severity of her symptoms they went ahead and performed a cardiac catheterization that showed stable moderate nonobstructive coronary disease.    In 2021 she reported continued chest tightness and she was started on isosorbide 10 nitrate.  She was unable to tolerate this due to headaches.  Follow-up in 2021 she was continued to have episodes of nocturnal chest pain.  Dr. Saldivar  recommended proceeding with a repeat catheterization.  This showed 90% proximal in-stent restenosis and she underwent repeat drug-eluting stent placement.    December 2021 she reported improvement in her symptoms.  Then in April 2022 she reported she was having recurrent episodes of chest tightness.  It was decided to monitor for the time being.  In follow-up in August she was having symptoms about 3 times a week lasting 5 to 10 minutes at a time.  They opted to try isosorbide in the evening.    I saw her for the first time in November 2022. She had been hospitalized in October 2022 with anemia and a hemoglobin of 7.1. She underwent an EGD that was normal and colonoscopy showed a small area of nonbleeding colonic angiectasis. Plavix and Xarelto were both resumed.    She then saw Dr. Saldivar in March 2023 and was doing well. Her hemoglobin was remaining stable on both Xarelto and Plavix. No changes were made to her treatment plan.  I then saw her in October 2023 and she reported having more issues with epistaxis.  She also reported episodes of chest tightness that felt like heartburn and was relieved with taking Tums.  At that visit we opted to monitor her symptoms.    She then saw Dr. Saldivar in February.about 3 weeks prior, she reported more issues with fatigue, shortness of breath and palpitations. She was found to be anemic with a hemoglobin of 7.2.  She saw Dr. Hood who diagnosed her with iron deficiency anemia and started her on IV Venofer.  She was referred to GI as well.  By the time of her visit with Dr. Saldivar, she reported improvement in her symptoms.  She was still having issues with palpitations and shortness of breath.  They opted to monitor her symptoms at that time.  She then returned in April 2024 and was continuing to have issues with dizziness with position changes.  Her dizziness was not associated with any decline in her blood pressure.  They discussed placing a monitor but decided to keep an eye on  her symptoms at that time.    I have reviewed and updated as appropriate allergies, current medications, past family history, past medical history, past surgical history and problem list.    Review of Systems   Constitutional: Negative for fever, malaise/fatigue, weight gain and weight loss.   HENT:  Negative for congestion, hoarse voice, nosebleeds and sore throat.    Eyes:  Negative for blurred vision and double vision.   Cardiovascular:  Negative for chest pain, dyspnea on exertion, leg swelling, orthopnea, palpitations and syncope.   Respiratory:  Negative for cough, shortness of breath and wheezing.    Gastrointestinal:  Negative for abdominal pain, heartburn, hematemesis, hematochezia and melena.   Genitourinary:  Negative for dysuria and hematuria.   Neurological:  Positive for dizziness. Negative for headaches, light-headedness and numbness.   Psychiatric/Behavioral:  Negative for depression. The patient is not nervous/anxious.          Current Outpatient Medications:     Accu-Chek FastClix Lancets misc, Use to test blood sugar 4 times daily  E11.8, Disp: 400 each, Rfl: 1    Accu-Chek Guide test strip, USE 4 TIMES A DAY E11.8, Disp: 300 each, Rfl: 2    atorvastatin (LIPITOR) 80 MG tablet, TAKE 1 TABLET BY MOUTH EVERY DAY, Disp: 90 tablet, Rfl: 3    Blood Glucose Monitoring Suppl (Accu-Chek Guide) w/Device kit, Inject 1 Device under the skin into the appropriate area as directed Daily., Disp: 1 kit, Rfl: 0    carvedilol (COREG) 25 MG tablet, TAKE 1 TABLET BY MOUTH TWICE A DAY WITH MEALS, Disp: 180 tablet, Rfl: 2    Cholecalciferol (VITAMIN D3) 2000 UNITS capsule, Take 1,000 Units by mouth Daily., Disp: , Rfl:     clopidogrel (PLAVIX) 75 MG tablet, Take 1 tablet by mouth Daily., Disp: , Rfl:     furosemide (LASIX) 40 MG tablet, TAKE 1 TABLET BY MOUTH EVERY DAY, Disp: 90 tablet, Rfl: 1    Insulin Pen Needle 32G X 4 MM misc, USE 4 TIMES A DAY, Disp: 400 each, Rfl: 3    Multiple Vitamins-Minerals (MULTIVITAL PO),  Take 1 tablet by mouth Daily., Disp: , Rfl:     nitroglycerin (Nitrostat) 0.4 MG SL tablet, Place 1 tablet under the tongue Every 5 (Five) Minutes As Needed for Chest Pain. Take no more than 3 doses in 15 minutes., Disp: 30 tablet, Rfl: 11    Omega-3 Fatty Acids (FISH OIL) 1200 MG capsule capsule, Take 1 capsule by mouth Daily With Breakfast., Disp: , Rfl:     pantoprazole (PROTONIX) 40 MG EC tablet, Take 1 tablet by mouth Daily., Disp: 90 tablet, Rfl: 3    potassium chloride 10 MEQ CR tablet, Take 1 tablet by mouth Daily. Resume on 1/7/20, Disp: 90 tablet, Rfl: 1    potassium chloride 10 MEQ CR tablet, TAKE 1 TABLET BY MOUTH EVERY DAY, Disp: 90 tablet, Rfl: 1    rivaroxaban (Xarelto) 20 MG tablet, Take 1 tablet by mouth Daily With Dinner., Disp: , Rfl:     Semaglutide, 1 MG/DOSE, (Ozempic, 1 MG/DOSE,) 4 MG/3ML solution pen-injector, Inject 1 mg under the skin into the appropriate area as directed 1 (One) Time Per Week., Disp: 9 mL, Rfl: 1    spironolactone (ALDACTONE) 25 MG tablet, TAKE 1 TABLET BY MOUTH EVERY DAY, Disp: 90 tablet, Rfl: 3    traZODone (DESYREL) 50 MG tablet, TAKE 1 TABLET BY MOUTH EVERYDAY AT BEDTIME, Disp: 90 tablet, Rfl: 0    vitamin B-12 (CYANOCOBALAMIN) 500 MCG tablet, Take 1 tablet by mouth Daily., Disp: , Rfl:     Past Medical History:   Diagnosis Date    Abdominal pain     Abnormal liver function test     Acute bronchitis     Anemia     CAD (coronary artery disease)     Colon polyp     COPD (chronic obstructive pulmonary disease)     Cough     Diabetes     Diabetes mellitus     Diarrhea     Gastroenteritis     Health care maintenance     Hyperlipidemia     IFG (impaired fasting glucose)     Ischemic cardiomyopathy     Myocardial infarction     SHELLIE (obstructive sleep apnea)     Sore throat     Stroke 2020    Type 2 diabetes mellitus     Vaginal yeast infection     Vitamin D deficiency        Past Surgical History:   Procedure Laterality Date    CARDIAC CATHETERIZATION      CARDIAC  CATHETERIZATION N/A 01/06/2020    Procedure: Coronary angiography;  Surgeon: Oneida Saldivar MD;  Location:  [facility];  Service: Cardiovascular    CARDIAC CATHETERIZATION N/A 01/06/2020    Procedure: Left heart cath;  Surgeon: Oneida Saldivar MD;  Location:  [facility];  Service: Cardiovascular    CARDIAC CATHETERIZATION N/A 01/06/2020    Procedure: Left ventriculography;  Surgeon: Oneida Saldivar MD;  Location:  [facility];  Service: Cardiovascular    CARDIAC CATHETERIZATION N/A 01/06/2020    Procedure: Percutaneous Coronary Intervention;  Surgeon: Oneida Saldivar MD;  Location:  [facility];  Service: Cardiovascular    CARDIAC CATHETERIZATION N/A 01/06/2020    Procedure: Stent HUGO coronary;  Surgeon: Oneida Saldivar MD;  Location:  [facility];  Service: Cardiovascular    CARDIAC CATHETERIZATION  01/06/2020    Procedure: Functional Flow Cochrane;  Surgeon: Oneida Saldivar MD;  [facility];  Service: Cardiovascular    CARDIAC CATHETERIZATION N/A 10/26/2020    Procedure: Coronary angiography;  Surgeon: Oneida Saldivar MD;  Location:  [facility];  Service: Cardiovascular;  Laterality: N/A;    CARDIAC CATHETERIZATION N/A 10/26/2020    Procedure: Left heart cath;  Surgeon: Oneida Saldivar MD;  Location:  [facility];  Service: Cardiovascular;  Laterality: N/A;    CARDIAC CATHETERIZATION N/A 10/26/2020    Procedure: Left ventriculography;  Surgeon: Oneida Saldivar MD;  Location:  [facility];  Service: Cardiovascular;  Laterality: N/A;    CARDIAC CATHETERIZATION  10/26/2020    Procedure: Functional Flow Cochrane;  Surgeon: Oneida Saldivar MD;  Location:  [facility];  Service: Cardiovascular;;    CARDIAC CATHETERIZATION N/A 07/19/2021    Procedure: Coronary angiography;  Surgeon: Oneida Saldivar MD;  Location:  [facility];  Service: Cardiovascular;  Laterality: N/A;    CARDIAC CATHETERIZATION N/A 07/19/2021    Procedure: Left heart cath;  Surgeon: Oneida Saldivar MD;  Location: [facility] CATH INVASIVE LOCATION;  Service: Cardiovascular;  Laterality: N/A;    CARDIAC CATHETERIZATION N/A 07/19/2021    Procedure: Left ventriculography;  Surgeon: Oneida Saldivar MD;  Location: [facility] CATH INVASIVE LOCATION;  Service: Cardiovascular;  Laterality: N/A;    CARDIAC CATHETERIZATION N/A 07/19/2021    Procedure: Percutaneous Coronary Intervention;  Surgeon: Oneida Saldivar MD;  Location: [facility] CATH INVASIVE LOCATION;  Service: Cardiovascular;  Laterality: N/A;    CARDIAC CATHETERIZATION N/A 07/19/2021    Procedure: Optical Coherent Tomography;  Surgeon: Oneida Saldivar MD;  Location: [facility] CATH INVASIVE LOCATION;  Service: Cardiovascular;  Laterality: N/A;    CARDIAC CATHETERIZATION N/A 07/19/2021    Procedure: Stent HUGO coronary;  Surgeon: Oneida Saldivar MD;  Location: [facility] CATH INVASIVE LOCATION;  Service: Cardiovascular;  Laterality: N/A;    CARDIAC CATHETERIZATION  07/19/2021    Procedure: RESTING FULL CYCLE RATIO;  Surgeon: Oneida Saldivar MD;  Location: [facility] CATH INVASIVE LOCATION;  Service: Cardiovascular;;  RFR    CARDIAC DEFIBRILLATOR PLACEMENT  2010    Medtronic dual chamber/Dr. Valentin    CARDIAC ELECTROPHYSIOLOGY PROCEDURE N/A 01/12/2018    Procedure: ICD DC generator change  medtronic;  Surgeon: Hany Ornelas MD;  Location: [facility] CATH INVASIVE LOCATION;  Service:     COLONOSCOPY N/A 11/21/2024    Procedure: COLONOSCOPY;  Surgeon: Jayden Pepe MD;  Location: [facility];  Service: Gastroenterology;  Laterality: N/A;  APC for arterial venous malformation; internal hemorrhoids    COLONOSCOPY N/A 12/2/2024    Procedure: COLONOSCOPY into cecum with clip placement x3 and 1 cc epi injection;  Surgeon: Jony Fraire MD;  Location: [facility] ENDOSCOPY;  Service: Gastroenterology;  Laterality: N/A;   pre- rectal bleeding  post- cecal ulcer    ENDOSCOPY N/A 11/21/2024    Procedure: ESOPHAGOGASTRODUODENOSCOPY WITH BIOPSY;  Surgeon: Jayden Pepe MD;  [facility] OR;  Service: Gastroenterology;  Laterality: N/A;  duodenum bx to r/o celiac disease; gastritis; gastric bx to r/o h. pylori; esophagitis    PACEMAKER IMPLANTATION      TUBAL ABDOMINAL LIGATION         Family History   Problem Relation Age of Onset    Heart attack Mother     Diabetes Mother     Heart disease Mother     Hyperlipidemia Mother     Emphysema Mother     Diabetes Father     Heart disease Father     Hyperlipidemia Father     Hypertension Father     Colon cancer Father     Heart attack Father     Hypertension Sister     Hypertension Brother     Heart disease Brother     Hyperlipidemia Brother     Heart disease Brother     Heart attack Brother     Heart disease Maternal Grandmother     Heart disease Maternal Grandfather     Heart disease Paternal Grandmother     Heart disease Paternal Grandfather     Colon polyps Neg Hx     Crohn's disease Neg Hx     Irritable bowel syndrome Neg Hx     Ulcerative colitis Neg Hx     Malig Hyperthermia Neg Hx        Social History     Tobacco Use    Smoking status: Former     Current packs/day: 0.00     Average packs/day: 1 pack/day for 30.0 years (30.0 ttl pk-yrs)     Types: Cigarettes     Start date: 11/1/1979     Quit date: 11/1/2009     Years since quitting: 15.2    Smokeless tobacco: Never    Tobacco comments:     QUIT 10 YRS   Vaping Use    Vaping status: Never Used   Substance Use Topics    Alcohol use: Yes     Comment: rarely uses ETOH/caffeine use    Drug use: Never         ECG 12 Lead    Date/Time: 1/13/2025 12:57 PM  Performed by: Linda Simmons APRN    Authorized by: Linda Simmons APRN  Comparison: compared with previous ECG from 7/16/2024  Rhythm: sinus rhythm             Objective:     Visit Vitals  /74 (BP Location: Right arm, Patient Position: Sitting, Cuff Size: Adult)   Pulse 77  "  Ht 152.4 cm (60\")   Wt 83.9 kg (185 lb)   LMP  (LMP Unknown)   BMI 36.13 kg/m²               Physical Exam  Constitutional:       Appearance: Normal appearance. She is obese.   HENT:      Head: Normocephalic.   Neck:      Vascular: No carotid bruit.   Cardiovascular:      Rate and Rhythm: Normal rate and regular rhythm.      Chest Wall: PMI is not displaced.      Pulses: Normal pulses.           Radial pulses are 2+ on the right side and 2+ on the left side.        Posterior tibial pulses are 2+ on the right side and 2+ on the left side.      Heart sounds: Normal heart sounds. No murmur heard.     No friction rub. No gallop.   Pulmonary:      Effort: Pulmonary effort is normal.      Breath sounds: Normal breath sounds.   Abdominal:      General: Bowel sounds are normal. There is no distension.      Palpations: Abdomen is soft.   Musculoskeletal:      Right lower leg: No edema.      Left lower leg: No edema.   Skin:     General: Skin is warm and dry.      Capillary Refill: Capillary refill takes less than 2 seconds.   Neurological:      Mental Status: She is alert and oriented to person, place, and time.   Psychiatric:         Mood and Affect: Mood normal.         Behavior: Behavior normal.         Thought Content: Thought content normal.          Lab Review:   Lipid Panel          1/29/2024    14:01   Lipid Panel   Total Cholesterol 119    Triglycerides 228    HDL Cholesterol 31    VLDL Cholesterol 37    LDL Cholesterol  51        Cardiac Procedures:       Assessment:         There are no diagnoses linked to this encounter.          Plan:         Dyspnea: improved with improvement in hemoglobin. No further cardiac workup indicated at this time.  CAD: s/p stent to LAD, most recently in 2019. She is on Plavix, statin and beta blocker. EKG does not show any ischemic changes. She is also on isosorbide. She denies any anginal symptoms. Continue with current medical therapy.  Ischemic cardiomyopathy: on GDMT with " carvedilol, furosemide, spironolactone. Euvolemic on exam today. Continue with current treatment.  S/p AICD: normal device function on EKG today.  CVA: on Xarelto for CVA.  Iron deficiency anemia: follows with Dr. Hood and received IV Venofer    Thank you for allowing me to participate in this patient's care. Please call with any questions or concerns. Ms. Dent will follow up with Dr. Saldivar in 6 months.          Your medication list            Accurate as of January 13, 2025 12:55 PM. If you have any questions, ask your nurse or doctor.                CONTINUE taking these medications        Instructions Last Dose Given Next Dose Due   Accu-Chek FastClix Lancets misc      Use to test blood sugar 4 times daily  E11.8       Accu-Chek Guide test strip  Generic drug: glucose blood      USE 4 TIMES A DAY E11.8       Accu-Chek Guide w/Device kit      Inject 1 Device under the skin into the appropriate area as directed Daily.       atorvastatin 80 MG tablet  Commonly known as: LIPITOR      TAKE 1 TABLET BY MOUTH EVERY DAY       carvedilol 25 MG tablet  Commonly known as: COREG      TAKE 1 TABLET BY MOUTH TWICE A DAY WITH MEALS       clopidogrel 75 MG tablet  Commonly known as: PLAVIX      Take 1 tablet by mouth Daily.       fish oil 1200 MG capsule capsule      Take 1 capsule by mouth Daily With Breakfast.       furosemide 40 MG tablet  Commonly known as: LASIX      TAKE 1 TABLET BY MOUTH EVERY DAY       Insulin Pen Needle 32G X 4 MM misc      USE 4 TIMES A DAY       multivitamin with minerals tablet tablet      Take 1 tablet by mouth Daily.       nitroglycerin 0.4 MG SL tablet  Commonly known as: Nitrostat      Place 1 tablet under the tongue Every 5 (Five) Minutes As Needed for Chest Pain. Take no more than 3 doses in 15 minutes.       Ozempic (1 MG/DOSE) 4 MG/3ML solution pen-injector  Generic drug: Semaglutide (1 MG/DOSE)      Inject 1 mg under the skin into the appropriate area as directed 1 (One) Time Per  Week.       pantoprazole 40 MG EC tablet  Commonly known as: PROTONIX      Take 1 tablet by mouth Daily.       potassium chloride 10 MEQ CR tablet      Take 1 tablet by mouth Daily. Resume on 1/7/20       potassium chloride 10 MEQ CR tablet      TAKE 1 TABLET BY MOUTH EVERY DAY       rivaroxaban 20 MG tablet  Commonly known as: Xarelto      Take 1 tablet by mouth Daily With Dinner.       spironolactone 25 MG tablet  Commonly known as: ALDACTONE      TAKE 1 TABLET BY MOUTH EVERY DAY       traZODone 50 MG tablet  Commonly known as: DESYREL      TAKE 1 TABLET BY MOUTH EVERYDAY AT BEDTIME       vitamin B-12 500 MCG tablet  Commonly known as: CYANOCOBALAMIN      Take 1 tablet by mouth Daily.       Vitamin D3 50 MCG (2000 UT) capsule      Take 1,000 Units by mouth Daily.                  Linda Simmons, [place]  01/13/25  2:04 PM EST

## 2025-02-17 RX ORDER — FUROSEMIDE 40 MG/1
40 TABLET ORAL DAILY
Qty: 90 TABLET | Refills: 1 | Status: SHIPPED | OUTPATIENT
Start: 2025-02-17

## 2025-03-13 RX ORDER — TRAZODONE HYDROCHLORIDE 50 MG/1
50 TABLET ORAL
Qty: 90 TABLET | Refills: 0 | Status: SHIPPED | OUTPATIENT
Start: 2025-03-13

## 2025-03-13 NOTE — TELEPHONE ENCOUNTER
Rx Refill Note  Requested Prescriptions     Pending Prescriptions Disp Refills    traZODone (DESYREL) 50 MG tablet [Pharmacy Med Name: TRAZODONE 50 MG TABLET] 90 tablet 0     Sig: TAKE 1 TABLET BY MOUTH EVERYDAY AT BEDTIME      Last office visit with prescribing clinician: 12/13/2024   Last telemedicine visit with prescribing clinician: Visit date not found   Next office visit with prescribing clinician: Visit date not found                         Would you like a call back once the refill request has been completed: [] Yes [] No    If the office needs to give you a call back, can they leave a voicemail: [] Yes [] No    Chapito Corado MA  03/13/25, 14:27 EDT

## 2025-03-18 DIAGNOSIS — I25.10 CORONARY ARTERY DISEASE INVOLVING NATIVE CORONARY ARTERY OF NATIVE HEART WITHOUT ANGINA PECTORIS: ICD-10-CM

## 2025-03-18 RX ORDER — NITROGLYCERIN 0.4 MG/1
0.4 TABLET SUBLINGUAL
Qty: 75 TABLET | Refills: 4 | Status: SHIPPED | OUTPATIENT
Start: 2025-03-18

## 2025-03-24 ENCOUNTER — OFFICE VISIT (OUTPATIENT)
Dept: ONCOLOGY | Facility: CLINIC | Age: 65
End: 2025-03-24
Payer: COMMERCIAL

## 2025-03-24 ENCOUNTER — LAB (OUTPATIENT)
Dept: LAB | Facility: HOSPITAL | Age: 65
End: 2025-03-24
Payer: COMMERCIAL

## 2025-03-24 VITALS
RESPIRATION RATE: 16 BRPM | TEMPERATURE: 98.2 F | OXYGEN SATURATION: 96 % | DIASTOLIC BLOOD PRESSURE: 70 MMHG | BODY MASS INDEX: 36.57 KG/M2 | WEIGHT: 186.3 LBS | SYSTOLIC BLOOD PRESSURE: 134 MMHG | HEIGHT: 60 IN | HEART RATE: 78 BPM

## 2025-03-24 DIAGNOSIS — D50.0 IRON DEFICIENCY ANEMIA DUE TO CHRONIC BLOOD LOSS: ICD-10-CM

## 2025-03-24 DIAGNOSIS — D50.0 IRON DEFICIENCY ANEMIA DUE TO CHRONIC BLOOD LOSS: Primary | ICD-10-CM

## 2025-03-24 DIAGNOSIS — T45.4X5A ADVERSE REACTION TO COMPOUND IRON PREPARATION, INITIAL ENCOUNTER: ICD-10-CM

## 2025-03-24 LAB
BASOPHILS # BLD AUTO: 0.03 10*3/MM3 (ref 0–0.2)
BASOPHILS NFR BLD AUTO: 0.4 % (ref 0–1.5)
DEPRECATED RDW RBC AUTO: 46.8 FL (ref 37–54)
EOSINOPHIL # BLD AUTO: 0.13 10*3/MM3 (ref 0–0.4)
EOSINOPHIL NFR BLD AUTO: 1.9 % (ref 0.3–6.2)
ERYTHROCYTE [DISTWIDTH] IN BLOOD BY AUTOMATED COUNT: 16.6 % (ref 12.3–15.4)
FERRITIN SERPL-MCNC: <8 NG/ML (ref 13–150)
HCT VFR BLD AUTO: 39.2 % (ref 34–46.6)
HGB BLD-MCNC: 11.7 G/DL (ref 12–15.9)
IMM GRANULOCYTES # BLD AUTO: 0.02 10*3/MM3 (ref 0–0.05)
IMM GRANULOCYTES NFR BLD AUTO: 0.3 % (ref 0–0.5)
IRON 24H UR-MRATE: 40 MCG/DL (ref 37–145)
IRON SATN MFR SERPL: 8 % (ref 20–50)
LYMPHOCYTES # BLD AUTO: 2.08 10*3/MM3 (ref 0.7–3.1)
LYMPHOCYTES NFR BLD AUTO: 29.8 % (ref 19.6–45.3)
MCH RBC QN AUTO: 23.2 PG (ref 26.6–33)
MCHC RBC AUTO-ENTMCNC: 29.8 G/DL (ref 31.5–35.7)
MCV RBC AUTO: 77.6 FL (ref 79–97)
MONOCYTES # BLD AUTO: 0.65 10*3/MM3 (ref 0.1–0.9)
MONOCYTES NFR BLD AUTO: 9.3 % (ref 5–12)
NEUTROPHILS NFR BLD AUTO: 4.06 10*3/MM3 (ref 1.7–7)
NEUTROPHILS NFR BLD AUTO: 58.3 % (ref 42.7–76)
NRBC BLD AUTO-RTO: 0 /100 WBC (ref 0–0.2)
PLATELET # BLD AUTO: 309 10*3/MM3 (ref 140–450)
PMV BLD AUTO: 9.6 FL (ref 6–12)
RBC # BLD AUTO: 5.05 10*6/MM3 (ref 3.77–5.28)
TIBC SERPL-MCNC: 472 MCG/DL (ref 298–536)
TRANSFERRIN SERPL-MCNC: 317 MG/DL (ref 200–360)
WBC NRBC COR # BLD AUTO: 6.97 10*3/MM3 (ref 3.4–10.8)

## 2025-03-24 PROCEDURE — 83540 ASSAY OF IRON: CPT

## 2025-03-24 PROCEDURE — 36415 COLL VENOUS BLD VENIPUNCTURE: CPT

## 2025-03-24 PROCEDURE — 84466 ASSAY OF TRANSFERRIN: CPT

## 2025-03-24 PROCEDURE — 85025 COMPLETE CBC W/AUTO DIFF WBC: CPT

## 2025-03-24 PROCEDURE — 99214 OFFICE O/P EST MOD 30 MIN: CPT | Performed by: NURSE PRACTITIONER

## 2025-03-24 PROCEDURE — 82728 ASSAY OF FERRITIN: CPT

## 2025-03-24 NOTE — PROGRESS NOTES
Subjective     REASON FOR CONSULTATION:  anemia  Provide an opinion on any further workup or treatment                             REQUESTING PHYSICIAN:  Douglas    RECORDS OBTAINED:  Records of the patients history including those obtained from the referring provider were reviewed and summarized in detail.    History of Present Illness   This is a 63-year-old woman with type 2 diabetes, prior cerebrovascular accident, chronic bronchitis, obstructive sleep apnea, coronary artery disease, hypertension, hyperlipidemia, ischemic cardiomyopathy on anticoagulation with Xarelto and Plavix and AICD in place (most recent EF 40-45%).  The patient is referred for evaluation of anemia.  The patient has prior history of microcytic, hypochromic anemia in October 2022 requiring transfusion support.  Apparently she had endoscopy during hospitalization which showed some nonbleeding colonic angioectasias (per cardiology notes).  When checked on 1/24/2023 the patient had normal hemoglobin 13.2 with MCV 81.8.  A CBC with her primary care on 1/29/2024 showed substantial drop in the hemoglobin to 7.2 with MCV 72.8/MCHC 27.7 and iron studies showed a ferritin of 42 and iron saturation of 4% with elevated TIBC 592 consistent with severe iron deficiency.    The patient states she has been on oral iron over-the-counter for about 1 year.  She does not see blood in the stool including melena.    Because of poor response to oral iron therapy the patient was given IV iron with Venofer 300 mg x 3 doses completed on 2/22/2024.  She has noted improvement in fatigue and stamina since IV iron replacement.  She does continue on oral iron tablet daily as well.  Her hemoglobin has normalized but she still had low iron stores and received 2 additional doses of IV iron with Feraheme completed 10/9/2024.    She returns today feeling well reporting improvement in fatigue no lightheadedness dizziness shortness of breath above her baseline.  She  underwent EGD and colonoscopy on 11/21/2024 with normal duodenal architecture, no H. pylori, angioectasia seen in the colon.    INTERVAL HISTORY:  Patient returns the office today, 3/24/2025 for follow-up and review.  When evaluated in November, she was noted to be iron replete with a normal hemoglobin.  Unfortunately, few days after being evaluated by Dr. Hood, she developed bright red rectal bleeding which was quite significant.  She was seen in the emergency department.  Ultimately she required admission receiving transfusion support.  She underwent colonoscopy then noted ulceration of her cecum.    Since discharge, she reports she has struggled with some fatigue though overall is feeling well.  She has had no further signs or symptoms of bleeding.    Past Medical History:   Diagnosis Date    Abdominal pain     Abnormal liver function test     Acute bronchitis     Anemia     CAD (coronary artery disease)     Colon polyp     COPD (chronic obstructive pulmonary disease)     Cough     Diabetes     Diabetes mellitus     Diarrhea     Gastroenteritis     Health care maintenance     Hyperlipidemia     IFG (impaired fasting glucose)     Ischemic cardiomyopathy     Myocardial infarction     SHELLIE (obstructive sleep apnea)     Sore throat     Stroke 2020    Type 2 diabetes mellitus     Vaginal yeast infection     Vitamin D deficiency         Past Surgical History:   Procedure Laterality Date    CARDIAC CATHETERIZATION      CARDIAC CATHETERIZATION N/A 01/06/2020    Procedure: Coronary angiography;  Surgeon: Oneida Saldivar MD;  Location:  [facility];  Service: Cardiovascular    CARDIAC CATHETERIZATION N/A 01/06/2020    Procedure: Left heart cath;  Surgeon: Oneida Saldivar MD;  Location:  [facility];  Service: Cardiovascular    CARDIAC CATHETERIZATION N/A 01/06/2020    Procedure: Left ventriculography;  Surgeon: Oneida Saldivar MD;  Location:  [facility];  Service:  Cardiovascular    CARDIAC CATHETERIZATION N/A 01/06/2020    Procedure: Percutaneous Coronary Intervention;  Surgeon: Oneida Saldivar MD;  Location:  [facility];  Service: Cardiovascular    CARDIAC CATHETERIZATION N/A 01/06/2020    Procedure: Stent HUGO coronary;  Surgeon: Oneida Saldivar MD;  Location:  [facility];  Service: Cardiovascular    CARDIAC CATHETERIZATION  01/06/2020    Procedure: Functional Flow [place];  Surgeon: Oneida Saldivar MD;  [facility];  Service: Cardiovascular    CARDIAC CATHETERIZATION N/A 10/26/2020    Procedure: Coronary angiography;  Surgeon: Oneida Saldivar MD;  Location:  [facility];  Service: Cardiovascular;  Laterality: N/A;    CARDIAC CATHETERIZATION N/A 10/26/2020    Procedure: Left heart cath;  Surgeon: Oneida Saldivar MD;  [facility];  Service: Cardiovascular;  Laterality: N/A;    CARDIAC CATHETERIZATION N/A 10/26/2020    Procedure: Left ventriculography;  Surgeon: Oneida Saldivar MD;  [facility];  Service: Cardiovascular;  Laterality: N/A;    CARDIAC CATHETERIZATION  10/26/2020    Procedure: Functional Flow [place];  Surgeon: Oneida Saldivar MD;  [facility];  Service: Cardiovascular;;    CARDIAC CATHETERIZATION N/A 07/19/2021    Procedure: Coronary angiography;  Surgeon: Oneida Saldivar MD;  Location: [facility] CATH INVASIVE LOCATION;  Service: Cardiovascular;  Laterality: N/A;    CARDIAC CATHETERIZATION N/A 07/19/2021    Procedure: Left heart cath;  Surgeon: Oneida Saldivar MD;  [facility];  Service: Cardiovascular;  Laterality: N/A;    CARDIAC CATHETERIZATION N/A 07/19/2021    Procedure: Left ventriculography;  Surgeon: Oneida Saldivar MD;  [facility];  Service: Cardiovascular;  Laterality: N/A;    CARDIAC CATHETERIZATION N/A 07/19/2021     Procedure: Percutaneous Coronary Intervention;  Surgeon: Oneida Saldivar MD;  Location: [facility] CATH INVASIVE LOCATION;  Service: Cardiovascular;  Laterality: N/A;    CARDIAC CATHETERIZATION N/A 07/19/2021    Procedure: Optical Coherent Tomography;  Surgeon: Oneida Saldivar MD;  [facility];  Service: Cardiovascular;  Laterality: N/A;    CARDIAC CATHETERIZATION N/A 07/19/2021    Procedure: Stent HUGO coronary;  Surgeon: Oneida Saldivar MD;  [facility];  Service: Cardiovascular;  Laterality: N/A;    CARDIAC CATHETERIZATION  07/19/2021    Procedure: RESTING FULL CYCLE RATIO;  Surgeon: Oneida Saldivar MD;  Location: [facility] CATH INVASIVE LOCATION;  Service: Cardiovascular;;  RFR    CARDIAC DEFIBRILLATOR PLACEMENT  2010    Medtronic dual chamber/Dr. Valentin    CARDIAC ELECTROPHYSIOLOGY PROCEDURE N/A 01/12/2018    Procedure: ICD DC generator change  medtronic;  Surgeon: Hany Ornelas MD;  Location: [facility] CATH INVASIVE LOCATION;  Service:     COLONOSCOPY N/A 11/21/2024    Procedure: COLONOSCOPY;  Surgeon: Jayden Pepe MD;  Location: [facility] OR;  Service: Gastroenterology;  Laterality: N/A;  APC for arterial venous malformation; internal hemorrhoids    COLONOSCOPY N/A 12/2/2024    Procedure: COLONOSCOPY into cecum with clip placement x3 and 1 cc epi injection;  Surgeon: Jony Fraire MD;  Location: [facility] ENDOSCOPY;  Service: Gastroenterology;  Laterality: N/A;  pre- rectal bleeding  post- cecal ulcer    ENDOSCOPY N/A 11/21/2024    Procedure: ESOPHAGOGASTRODUODENOSCOPY WITH BIOPSY;  Surgeon: Jayden Pepe MD;  Location: [facility] OR;  Service: Gastroenterology;  Laterality: N/A;  duodenum bx to r/o celiac disease; gastritis; gastric bx to r/o h. pylori; esophagitis    PACEMAKER IMPLANTATION      TUBAL ABDOMINAL LIGATION          Current Outpatient Medications on File Prior to Visit   Medication Sig Dispense Refill    Accu-Chek FastClix Lancets [facility]  Use to test blood sugar 4 times daily  E11.8 400 each 1    Accu-Chek Guide test strip USE 4 TIMES A DAY E11.8 300 each 2    atorvastatin (LIPITOR) 80 MG tablet TAKE 1 TABLET BY MOUTH EVERY DAY 90 tablet 3    Blood Glucose Monitoring Suppl (Accu-Chek Guide) w/Device kit Inject 1 Device under the skin into the appropriate area as directed Daily. 1 kit 0    carvedilol (COREG) 25 MG tablet TAKE 1 TABLET BY MOUTH TWICE A DAY WITH MEALS 180 tablet 2    Cholecalciferol (VITAMIN D3) 2000 UNITS capsule Take 1,000 Units by mouth Daily.      clopidogrel (PLAVIX) 75 MG tablet Take 1 tablet by mouth Daily.      furosemide (LASIX) 40 MG tablet TAKE 1 TABLET BY MOUTH EVERY DAY 90 tablet 1    Insulin Pen Needle 32G X 4 MM misc USE 4 TIMES A  each 3    Multiple Vitamins-Minerals (MULTIVITAL PO) Take 1 tablet by mouth Daily.      nitroglycerin (NITROSTAT) 0.4 MG SL tablet PLACE 1 TABLET UNDER THE TONGUE EVERY 5 (FIVE) MINUTES AS NEEDED FOR CHEST PAIN. TAKE NO MORE THAN 3 DOSES IN 15 MINUTES. 75 tablet 4    Omega-3 Fatty Acids (FISH OIL) 1200 MG capsule capsule Take 1 capsule by mouth Daily With Breakfast.      pantoprazole (PROTONIX) 40 MG EC tablet Take 1 tablet by mouth Daily. 90 tablet 3    potassium chloride 10 MEQ CR tablet Take 1 tablet by mouth Daily. Resume on 1/7/20 90 tablet 1    potassium chloride 10 MEQ CR tablet TAKE 1 TABLET BY MOUTH EVERY DAY 90 tablet 1    rivaroxaban (Xarelto) 20 MG tablet Take 1 tablet by mouth Daily With Dinner.      Semaglutide, 1 MG/DOSE, (Ozempic, 1 MG/DOSE,) 4 MG/3ML solution pen-injector Inject 1 mg under the skin into the appropriate area as directed 1 (One) Time Per Week. 9 mL 1    spironolactone (ALDACTONE) 25 MG tablet TAKE 1 TABLET BY MOUTH EVERY DAY 90 tablet 3    traZODone (DESYREL) 50 MG tablet TAKE 1 TABLET BY MOUTH EVERYDAY AT BEDTIME 90 tablet 0    vitamin B-12 (CYANOCOBALAMIN) 500 MCG tablet Take 1 tablet by mouth Daily.       No current facility-administered medications on  "file prior to visit.        ALLERGIES:  No Known Allergies     Social History     Socioeconomic History    Marital status:     Number of children: 2   Tobacco Use    Smoking status: Former     Current packs/day: 0.00     Average packs/day: 1 pack/day for 30.0 years (30.0 ttl pk-yrs)     Types: Cigarettes     Start date: 11/1/1979     Quit date: 11/1/2009     Years since quitting: 15.4    Smokeless tobacco: Never    Tobacco comments:     QUIT 10 YRS   Vaping Use    Vaping status: Never Used   Substance and Sexual Activity    Alcohol use: Yes     Comment: rarely uses ETOH/caffeine use    Drug use: Never    Sexual activity: Yes     Partners: Male        Family History   Problem Relation Age of Onset    Heart attack Mother     Diabetes Mother     Heart disease Mother     Hyperlipidemia Mother     Emphysema Mother     Diabetes Father     Heart disease Father     Hyperlipidemia Father     Hypertension Father     Colon cancer Father     Heart attack Father     Hypertension Sister     Hypertension Brother     Heart disease Brother     Hyperlipidemia Brother     Heart disease Brother     Heart attack Brother     Heart disease Maternal Grandmother     Heart disease Maternal Grandfather     Heart disease Paternal Grandmother     Heart disease Paternal Grandfather     Colon polyps Neg Hx     Crohn's disease Neg Hx     Irritable bowel syndrome Neg Hx     Ulcerative colitis Neg Hx     Malig Hyperthermia Neg Hx         Review of Systems   ROS as per HPI    Objective     Vitals:    03/24/25 1253   BP: 134/70   Pulse: 78   Resp: 16   Temp: 98.2 °F (36.8 °C)   TempSrc: Oral   SpO2: 96%   Weight: 84.5 kg (186 lb 4.8 oz)   Height: 152.4 cm (60\")   PainSc: 0-No pain             3/24/2025    12:54 PM   Current Status   ECOG score 0       Physical Exam    CONSTITUTIONAL: pleasant well-developed adult woman  HEENT: no icterus, no thrush, moist membranes  LYMPH: no cervical or supraclavicular lad  CV: RRR, S1S2, no murmur  RESP: cta " bilat, no wheezing, no rales  GI: soft, non-tender, no splenomegaly, +bs  MUSC: no edema, normal gait  NEURO: alert and oriented x3, normal strength  PSYCH: normal mood and affect      RECENT LABS:  Results from last 7 days   Lab Units 03/24/25  1240   WBC 10*3/mm3 6.97   NEUTROS ABS 10*3/mm3 4.06   HEMOGLOBIN g/dL 11.7*   HEMATOCRIT % 39.2   PLATELETS 10*3/mm3 309     Results from last 7 days   Lab Units 03/24/25  1240   FERRITIN ng/mL <8.00*   IRON mcg/dL 40   TIBC mcg/dL 472             Assessment & Plan     *Severe iron deficiency anemia  The patient has prior history of iron deficiency anemia October 2022 requiring PRBC and IV iron during hospitalization  Intolerant to oral iron due to malabsorption  EGD/colonoscopy October 2022 showed nonbleeding colonic angioectasias  hemoglobin 7.2 with MCV 72.8 and iron sat 4% consistent with severe iron deficiency despite taking daily iron tablet OTC  Status post Venofer 300 mg x 3 doses completed 2/22/2024  Received 2 additional doses of iron with Feraheme completed October 2024  EGD and colonoscopy on 11/21/2024 with normal duodenal architecture, no H. pylori, angioectasia seen in the colon  Hemoglobin 11/25/2024 normal 13.4  Repeat hospitalization 11/30/2024 through 12-24 secondary to lower GI bleed requiring 2 units packed red blood cells.  Colonoscopy performed which showed ulceration in the cecum.  Plavix and Xarelto held for 3 days following colonoscopy  Today, 3/24/2025 hemoglobin 11.7 though she is iron deficient with a ferritin less than 8, iron saturation 28%.  We reviewed this is likely secondary to lower GI bleed just a few months ago.  Will proceed with IV iron therapy.    *Case complicated by need of chronic anticoagulation with Xarelto and antiplatelet therapy with Plavix for ischemic cardiomyopathy/CAD/prior stroke    Hematology plan/recommendations:  Patient will require IV iron therapy due to recurrent iron deficiency with malabsorption of oral iron.  We  will determine which iron preparation which she can receive based on insurance   The patient understands to call if she develops signs or symptoms of recurrent bleeding  MD follow-up with Dr. Hood in 4 months with CBC, ferritin, iron profile    Marly Tapia, KEYONA  03/24/2025

## 2025-03-25 ENCOUNTER — RESULTS FOLLOW-UP (OUTPATIENT)
Dept: LAB | Facility: HOSPITAL | Age: 65
End: 2025-03-25
Payer: COMMERCIAL

## 2025-03-25 NOTE — TELEPHONE ENCOUNTER
----- Message from Marly Tapia sent at 3/24/2025  2:47 PM EDT -----  Well, we could try and squeeze in venofer 300mg x 3 before April first (we technically can give daily) Or wait until April to try something different. I guess ask the patient her preference.     Thanks    Katlyn  ----- Message -----  From: Génesis Steven RN  Sent: 3/24/2025   2:43 PM EDT  To: KEYONA Webber    @Marly, pt's insurance-[employer], would need Venofer, for your recommendation on dose. We will definitely need to resubmit based on her new insurance.  Pt previously received Feraheme. For your   guidance. Thank you!   ----- Message -----  From: Marly Tapia APRN  Sent: 3/24/2025   1:54 PM EDT  To: Génesis Steven RN    This patient needs IV iron. Not sure which preparation she can get. She did tell me her insurance is changing effective April 1 so we honestly may be better waiting until next week if we can't get   the infusions in before 4/1.    Thanks    Katlyn  ----- Message -----  From: Gideon Hood MD  Sent: 3/24/2025   1:47 PM EDT  To: KEYONA Webber    Did you see today?  Needs iron I V again and close f/u.  ----- Message -----  From: Lab, Background User  Sent: 3/24/2025  12:48 PM EDT  To: Gideon Hood MD

## 2025-03-25 NOTE — TELEPHONE ENCOUNTER
----- Message from Marly Tapia sent at 3/24/2025  2:47 PM EDT -----  Well, we could try and squeeze in venofer 300mg x 3 before April first (we technically can give daily) Or wait until April to try something different. I guess ask the patient her preference.     Thanks    Katlyn  ----- Message -----  From: Génesis Steven RN  Sent: 3/24/2025   2:43 PM EDT  To: KEYONA Webber    @Marly, pt's insurance-[employer], would need Venofer, for your recommendation on dose. We will definitely need to resubmit based on her new insurance.  Pt previously received Feraheme. For your   guidance. Thank you!   ----- Message -----  From: Marly Tapia APRN  Sent: 3/24/2025   1:54 PM EDT  To: Génesis Steven RN    This patient needs IV iron. Not sure which preparation she can get. She did tell me her insurance is changing effective April 1 so we honestly may be better waiting until next week if we can't get   the infusions in before 4/1.    Thanks    Katlyn  ----- Message -----  From: Gideon Hood MD  Sent: 3/24/2025   1:47 PM EDT  To: KEYONA Webber    Did you see today?  Needs iron I V again and close f/u.  ----- Message -----  From: Lab, Background User  Sent: 3/24/2025  12:48 PM EDT  To: Gideon Hood MD      Called pt no answer, [facility] [phone]      03/26/25 Called pt no answer, [facility] [phone]    03/27/25 Called pt and was able to inform her of the recommendation. Pt has her cards from [facility] and would prefer to wait after April 1. She would like to know how much would she need to pay out of pocket for either feraheme or venofer, she prefers for one visit, but informed her that per insurance she has to fail 2 of their preferred drugs. She will send her insurance card and will coordinate with Cheyanne's team; and will coordinate accordingly.

## 2025-03-28 NOTE — TELEPHONE ENCOUNTER
"MyChart msg has not been read by patient at this time.   \"Thank you for sending your new insurance card. I have relayed this to our , Cheyanne. She recommended for you to call [phone] to obtain an estimate for treatment. We have placed the recommended treatments in the chart: Feraheme x2 and Venofer x3, which are [facility] Preferred Agents. Please let us know which one would you like to proceed so we can coordinate the appointments and proceed with prior authorization ([place]) . Thank you! \"   Called her back and was able to inform her of this msg. She was instructed to call the line to get an estimate and to notify us what drug she prefers so we can coordinate to pre-cert and scheduling.  "

## 2025-04-01 RX ORDER — FAMOTIDINE 10 MG/ML
20 INJECTION, SOLUTION INTRAVENOUS AS NEEDED
OUTPATIENT
Start: 2025-04-15

## 2025-04-01 RX ORDER — DIPHENHYDRAMINE HYDROCHLORIDE 50 MG/ML
50 INJECTION, SOLUTION INTRAMUSCULAR; INTRAVENOUS AS NEEDED
OUTPATIENT
Start: 2025-04-15

## 2025-04-01 RX ORDER — DIPHENHYDRAMINE HCL 25 MG
25 CAPSULE ORAL ONCE
OUTPATIENT
Start: 2025-04-15

## 2025-04-01 RX ORDER — HYDROCORTISONE SODIUM SUCCINATE 100 MG/2ML
100 INJECTION INTRAMUSCULAR; INTRAVENOUS AS NEEDED
OUTPATIENT
Start: 2025-04-08

## 2025-04-01 RX ORDER — HYDROCORTISONE SODIUM SUCCINATE 100 MG/2ML
100 INJECTION INTRAMUSCULAR; INTRAVENOUS AS NEEDED
OUTPATIENT
Start: 2025-04-15

## 2025-04-01 RX ORDER — FAMOTIDINE 10 MG/ML
20 INJECTION, SOLUTION INTRAVENOUS AS NEEDED
OUTPATIENT
Start: 2025-04-08

## 2025-04-01 RX ORDER — SODIUM CHLORIDE 9 MG/ML
20 INJECTION, SOLUTION INTRAVENOUS ONCE
OUTPATIENT
Start: 2025-04-15

## 2025-04-01 RX ORDER — SODIUM CHLORIDE 9 MG/ML
20 INJECTION, SOLUTION INTRAVENOUS ONCE
OUTPATIENT
Start: 2025-04-08

## 2025-04-01 RX ORDER — ACETAMINOPHEN 325 MG/1
650 TABLET ORAL ONCE
OUTPATIENT
Start: 2025-04-15

## 2025-04-01 RX ORDER — DIPHENHYDRAMINE HYDROCHLORIDE 50 MG/ML
50 INJECTION, SOLUTION INTRAMUSCULAR; INTRAVENOUS AS NEEDED
OUTPATIENT
Start: 2025-04-08

## 2025-04-01 RX ORDER — DIPHENHYDRAMINE HCL 25 MG
25 CAPSULE ORAL ONCE
OUTPATIENT
Start: 2025-04-08

## 2025-04-01 RX ORDER — ACETAMINOPHEN 325 MG/1
650 TABLET ORAL ONCE
OUTPATIENT
Start: 2025-04-08

## 2025-04-08 ENCOUNTER — INFUSION (OUTPATIENT)
Dept: ONCOLOGY | Facility: HOSPITAL | Age: 65
End: 2025-04-08
Payer: COMMERCIAL

## 2025-04-08 VITALS
TEMPERATURE: 98.2 F | OXYGEN SATURATION: 97 % | DIASTOLIC BLOOD PRESSURE: 66 MMHG | BODY MASS INDEX: 36.76 KG/M2 | SYSTOLIC BLOOD PRESSURE: 100 MMHG | WEIGHT: 188.2 LBS | RESPIRATION RATE: 16 BRPM | HEART RATE: 83 BPM

## 2025-04-08 DIAGNOSIS — D50.9 IRON DEFICIENCY ANEMIA, UNSPECIFIED IRON DEFICIENCY ANEMIA TYPE: ICD-10-CM

## 2025-04-08 DIAGNOSIS — T45.4X5A ADVERSE REACTION TO COMPOUND IRON PREPARATION, INITIAL ENCOUNTER: Primary | ICD-10-CM

## 2025-04-08 PROCEDURE — 96365 THER/PROPH/DIAG IV INF INIT: CPT

## 2025-04-08 PROCEDURE — 96374 THER/PROPH/DIAG INJ IV PUSH: CPT

## 2025-04-08 PROCEDURE — 63710000001 DIPHENHYDRAMINE PER 50 MG: Performed by: INTERNAL MEDICINE

## 2025-04-08 PROCEDURE — 25010000002 FERUMOXYTOL 510 MG/17ML SOLUTION 17 ML VIAL: Performed by: INTERNAL MEDICINE

## 2025-04-08 RX ORDER — ACETAMINOPHEN 325 MG/1
650 TABLET ORAL ONCE
Status: COMPLETED | OUTPATIENT
Start: 2025-04-08 | End: 2025-04-08

## 2025-04-08 RX ORDER — DIPHENHYDRAMINE HCL 25 MG
25 CAPSULE ORAL ONCE
Status: COMPLETED | OUTPATIENT
Start: 2025-04-08 | End: 2025-04-08

## 2025-04-08 RX ORDER — SODIUM CHLORIDE 9 MG/ML
20 INJECTION, SOLUTION INTRAVENOUS ONCE
Status: DISCONTINUED | OUTPATIENT
Start: 2025-04-08 | End: 2025-04-08 | Stop reason: HOSPADM

## 2025-04-08 RX ADMIN — ACETAMINOPHEN 650 MG: 325 TABLET ORAL at 14:45

## 2025-04-08 RX ADMIN — DIPHENHYDRAMINE HYDROCHLORIDE 25 MG: 25 CAPSULE ORAL at 14:46

## 2025-04-08 RX ADMIN — FERUMOXYTOL 510 MG: 510 INJECTION INTRAVENOUS at 15:28

## 2025-04-15 ENCOUNTER — INFUSION (OUTPATIENT)
Dept: ONCOLOGY | Facility: HOSPITAL | Age: 65
End: 2025-04-15
Payer: COMMERCIAL

## 2025-04-15 VITALS
SYSTOLIC BLOOD PRESSURE: 107 MMHG | HEART RATE: 78 BPM | TEMPERATURE: 98.4 F | WEIGHT: 189.6 LBS | RESPIRATION RATE: 16 BRPM | OXYGEN SATURATION: 96 % | DIASTOLIC BLOOD PRESSURE: 66 MMHG | BODY MASS INDEX: 37.03 KG/M2

## 2025-04-15 DIAGNOSIS — T45.4X5A ADVERSE REACTION TO COMPOUND IRON PREPARATION, INITIAL ENCOUNTER: Primary | ICD-10-CM

## 2025-04-15 DIAGNOSIS — D50.9 IRON DEFICIENCY ANEMIA, UNSPECIFIED IRON DEFICIENCY ANEMIA TYPE: ICD-10-CM

## 2025-04-15 PROCEDURE — 25010000002 FERUMOXYTOL 510 MG/17ML SOLUTION 17 ML VIAL: Performed by: INTERNAL MEDICINE

## 2025-04-15 PROCEDURE — 63710000001 DIPHENHYDRAMINE PER 50 MG: Performed by: INTERNAL MEDICINE

## 2025-04-15 PROCEDURE — 96374 THER/PROPH/DIAG INJ IV PUSH: CPT

## 2025-04-15 RX ORDER — ACETAMINOPHEN 325 MG/1
650 TABLET ORAL ONCE
Status: COMPLETED | OUTPATIENT
Start: 2025-04-15 | End: 2025-04-15

## 2025-04-15 RX ORDER — DIPHENHYDRAMINE HCL 25 MG
25 CAPSULE ORAL ONCE
Status: COMPLETED | OUTPATIENT
Start: 2025-04-15 | End: 2025-04-15

## 2025-04-15 RX ADMIN — ACETAMINOPHEN 650 MG: 325 TABLET ORAL at 14:38

## 2025-04-15 RX ADMIN — FERUMOXYTOL 510 MG: 510 INJECTION INTRAVENOUS at 15:05

## 2025-04-15 RX ADMIN — DIPHENHYDRAMINE HYDROCHLORIDE 25 MG: 25 CAPSULE ORAL at 14:38

## 2025-05-18 DIAGNOSIS — I25.10 CORONARY ARTERY DISEASE INVOLVING NATIVE CORONARY ARTERY OF NATIVE HEART WITHOUT ANGINA PECTORIS: ICD-10-CM

## 2025-05-20 RX ORDER — CLOPIDOGREL BISULFATE 75 MG/1
75 TABLET ORAL DAILY
Qty: 90 TABLET | Refills: 3 | Status: SHIPPED | OUTPATIENT
Start: 2025-05-20

## 2025-05-20 RX ORDER — SPIRONOLACTONE 25 MG/1
25 TABLET ORAL DAILY
Qty: 90 TABLET | Refills: 3 | Status: SHIPPED | OUTPATIENT
Start: 2025-05-20

## 2025-06-01 NOTE — PROGRESS NOTES
Chief Complaint  Follow-up    Subjective          History of Present Illness    Daisy Dent is a 63-year-old female with a complex past medical history for ischemic cardiomyopathy, diabetes, SHELLIE on CPAP management, hypertension, coronary artery disease status post anterior MI and LAD stent placement in 2006, 2009, 2019 and 2021, EF of 40 to 45%.     She has a long standing iron deficiency anemia;  Her anemia is further complex bychronic anticoagulation with Xarelto and antiplatelet therapy, Plavix for ischemic cardiomyopathy/CAD/CVA.  Patient has AICD in place, LVEF 40-50%.  She has been under the care of hematology and requires occasional iron and blood transfusion.  She has been on iron supplements but then recently, her iron lab results show significantly low level, iron supplement was DC'd and transitioned to iron IV infusions.  She will have a follow-up appointment with hematology at the end of this month.    - Previous visit, for her history of AVM in the colon, chronic antiplatelet therapy and frequent PRBC transfusions, she was advised to continue on iron supplements and follow up with hematologist, Dr. Daniel at every 3 months interval.     - For her Gastritis, esophagitis grade A, she was advised to continue on PPI daily.  Underwent EGD on November 21, 2024 under the care of Dr. Afshin Carpenter.    She is here today for follow up from previous visit with this NP on 12/4/25  Office Visit with Herbert Salas APRN (12/04/2024)   (Copied text in this note has been reviewed, modified, and accurate as of 6/1/25)  - Continues on Plavix  - Experiences occasional nausea managed with Tums. Takes pantoprazole  -She denies nausea or vomiting or unintentional weight loss.  She reports normal bowel movement, no blood in stool      Results reviewed:  CBC & Differential (03/24/2025 12:40) hemoglobin 11.7    Ferritin (03/24/2025 12:40) less than 8    Iron Profile (03/24/2025 12:40) iron saturation less than  "8    12/4/24   CBC (hemoglobin 8.6), PT 15.1, INR 1.17  Patient received 2 units of PRBC 12/2/24.    Colonoscopy (12/02/2024 09:17) Dr. Aguayo  - A single (solitary) ulcer in the cecum.   - The examination was otherwise normal.   - No specimens collected  ---------------------------------------------------------------------------------------  Upper GI Endoscopy (11/21/2024 11:07) Dr. Afshin Carpenter  - LA Grade A reflux esophagitis with no bleeding.   - Non-erosive gastritis, characterized by erythema. Biopsied.   - Normal examined duodenum. Biopsied    Colonoscopy (11/21/2024 11:07) Dr. Afshin Carpenter  - A single non-bleeding colonic angioectasia. Treated with argon plasma coagulation (APC).   - Non-bleeding internal hemorrhoids.   - No specimens collected.   - The examined portion of the ileum was normal    Tissue Pathology Exam (11/21/2024 11:15)   - EGD for ALMA DELIA   - Findings/path: grade A esophagitis, mild gastritis (biopsied -- negative for H Pylori), normal duodenum (biopsied -- negative for Celiac disease)   - POC: No bleeding lesions or masses seen. Started Omeprazole 40 mg daily before breakfast for esophagitis and gastritis seen.     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 Etiology of ALMA DELIA could be due to the AVM seen in the cecum which was eradicated with APC.     Objective   Vital Signs:  /70 (BP Location: Left arm)   Pulse 77   Temp 97 °F (36.1 °C)   Ht 152.4 cm (60\")   Wt 85.8 kg (189 lb 3.2 oz)   SpO2 97%   BMI 36.95 kg/m²   Estimated body mass index is 36.95 kg/m² as calculated from the following:    Height as of this encounter: 152.4 cm (60\").    Weight as of this encounter: 85.8 kg (189 lb 3.2 oz).       Physical Exam  Vitals and nursing note reviewed.   Constitutional:       General: She is not in acute distress.     Appearance: Normal appearance. She is normal weight. She is not " ill-appearing, toxic-appearing or diaphoretic.   Eyes:      General: No scleral icterus.     Conjunctiva/sclera: Conjunctivae normal.   Cardiovascular:      Rate and Rhythm: Normal rate and regular rhythm.      Pulses: Normal pulses.      Heart sounds: Normal heart sounds.   Pulmonary:      Effort: Pulmonary effort is normal. No respiratory distress.      Breath sounds: Normal breath sounds. No stridor.   Abdominal:      General: Abdomen is flat. Bowel sounds are normal. There is no distension.      Palpations: Abdomen is soft. There is no mass.      Tenderness: There is no abdominal tenderness. There is no right CVA tenderness, left CVA tenderness, guarding or rebound.      Hernia: No hernia is present.   Skin:     Coloration: Skin is not jaundiced or pale.      Findings: No erythema or rash.   Neurological:      Mental Status: She is alert and oriented to person, place, and time. Mental status is at baseline.   Psychiatric:         Mood and Affect: Mood normal.           Assessment and Plan     Diagnoses and all orders for this visit:    1. AVM (arteriovenous malformation) of colon (Primary)  -     Endoscopy, GI With Capsule    2. Iron deficiency anemia, unspecified iron deficiency anemia type  -     Endoscopy, GI With Capsule    3. Chronic anticoagulation  -     Endoscopy, GI With Capsule      Assessment & Plan  1. Iron deficiency anemia: Chronic. Ferritin was significantly low at 8 in March 2025, with iron saturation of 8. Hemoglobin is normal. Potential cause is AVM in the cecum.  - Follow-up with hematologist on 06/28/2025 for re-evaluation of iron levels  - Arrange capsule endoscopy for comprehensive GI evaluation    Follow-up  - Follow-up in 3 months    PROCEDURE  EGD in 2024 by Dr. Afshin Carpenter revealed gastritis and esophagitis. Colonoscopy up to date.    There are no Patient Instructions on file for this visit.     I have reviewed patient's current medications list, relevant clinical information  necessary for today's encounter. I discussed the clinical impression and treatment plan with the patient, who verbalized understanding and in agreement.  All questions were answered and support was provided.     Patient or patient representative verbalized consent for the use of Ambient Listening during the visit with  KEYONA Zapata for chart documentation. 6/11/2025  13:28 EST

## 2025-06-11 ENCOUNTER — OFFICE VISIT (OUTPATIENT)
Dept: GASTROENTEROLOGY | Facility: CLINIC | Age: 65
End: 2025-06-11
Payer: COMMERCIAL

## 2025-06-11 VITALS
SYSTOLIC BLOOD PRESSURE: 124 MMHG | HEART RATE: 77 BPM | BODY MASS INDEX: 37.15 KG/M2 | DIASTOLIC BLOOD PRESSURE: 70 MMHG | OXYGEN SATURATION: 97 % | WEIGHT: 189.2 LBS | HEIGHT: 60 IN | TEMPERATURE: 97 F

## 2025-06-11 DIAGNOSIS — D50.9 IRON DEFICIENCY ANEMIA, UNSPECIFIED IRON DEFICIENCY ANEMIA TYPE: ICD-10-CM

## 2025-06-11 DIAGNOSIS — Z79.01 CHRONIC ANTICOAGULATION: ICD-10-CM

## 2025-06-11 DIAGNOSIS — K55.20 AVM (ARTERIOVENOUS MALFORMATION) OF COLON: Primary | ICD-10-CM

## 2025-06-11 NOTE — PATIENT INSTRUCTIONS
- Proceed with capsule endoscopy for iron deficiency evaluation  - Follow up with hematology as scheduled for iron infusion and iron labs monitoring

## 2025-06-14 DIAGNOSIS — I25.5 ISCHEMIC CARDIOMYOPATHY: ICD-10-CM

## 2025-06-16 RX ORDER — TRAZODONE HYDROCHLORIDE 50 MG/1
50 TABLET ORAL
Qty: 90 TABLET | Refills: 0 | Status: SHIPPED | OUTPATIENT
Start: 2025-06-16

## 2025-06-16 NOTE — TELEPHONE ENCOUNTER
Rx Refill Note  Requested Prescriptions     Pending Prescriptions Disp Refills    traZODone (DESYREL) 50 MG tablet [Pharmacy Med Name: TRAZODONE 50 MG TABLET] 90 tablet 0     Sig: TAKE 1 TABLET BY MOUTH EVERYDAY AT BEDTIME      Last office visit with prescribing clinician: 12/13/2024   Last telemedicine visit with prescribing clinician: Visit date not found   Next office visit with prescribing clinician: Visit date not found                         Would you like a call back once the refill request has been completed: [] Yes [] No    If the office needs to give you a call back, can they leave a voicemail: [] Yes [] No    Chapito Corado MA  06/16/25, 11:19 EDT

## 2025-06-17 RX ORDER — POTASSIUM CHLORIDE 750 MG/1
10 TABLET, EXTENDED RELEASE ORAL DAILY
Qty: 90 TABLET | Refills: 3 | Status: SHIPPED | OUTPATIENT
Start: 2025-06-17

## 2025-07-23 ENCOUNTER — OFFICE VISIT (OUTPATIENT)
Dept: GASTROENTEROLOGY | Facility: CLINIC | Age: 65
End: 2025-07-23
Payer: COMMERCIAL

## 2025-07-23 DIAGNOSIS — K55.20 AVM (ARTERIOVENOUS MALFORMATION) OF COLON: Primary | ICD-10-CM

## 2025-07-23 DIAGNOSIS — D50.9 IRON DEFICIENCY ANEMIA, UNSPECIFIED IRON DEFICIENCY ANEMIA TYPE: ICD-10-CM

## 2025-07-24 ENCOUNTER — TELEPHONE (OUTPATIENT)
Dept: GASTROENTEROLOGY | Facility: CLINIC | Age: 65
End: 2025-07-24
Payer: COMMERCIAL

## 2025-07-28 ENCOUNTER — LAB (OUTPATIENT)
Dept: LAB | Facility: HOSPITAL | Age: 65
End: 2025-07-28
Payer: COMMERCIAL

## 2025-07-28 ENCOUNTER — OFFICE VISIT (OUTPATIENT)
Dept: ONCOLOGY | Facility: CLINIC | Age: 65
End: 2025-07-28
Payer: COMMERCIAL

## 2025-07-28 VITALS
HEIGHT: 60 IN | HEART RATE: 73 BPM | RESPIRATION RATE: 16 BRPM | TEMPERATURE: 98.1 F | WEIGHT: 187.6 LBS | DIASTOLIC BLOOD PRESSURE: 71 MMHG | SYSTOLIC BLOOD PRESSURE: 110 MMHG | OXYGEN SATURATION: 94 % | BODY MASS INDEX: 36.83 KG/M2

## 2025-07-28 DIAGNOSIS — D50.9 IRON DEFICIENCY ANEMIA, UNSPECIFIED IRON DEFICIENCY ANEMIA TYPE: ICD-10-CM

## 2025-07-28 DIAGNOSIS — K92.2 LOWER GI BLEED: ICD-10-CM

## 2025-07-28 DIAGNOSIS — D50.9 IRON DEFICIENCY ANEMIA, UNSPECIFIED IRON DEFICIENCY ANEMIA TYPE: Primary | ICD-10-CM

## 2025-07-28 LAB
BASOPHILS # BLD AUTO: 0.04 10*3/MM3 (ref 0–0.2)
BASOPHILS NFR BLD AUTO: 0.5 % (ref 0–1.5)
DEPRECATED RDW RBC AUTO: 42.9 FL (ref 37–54)
EOSINOPHIL # BLD AUTO: 0.15 10*3/MM3 (ref 0–0.4)
EOSINOPHIL NFR BLD AUTO: 1.9 % (ref 0.3–6.2)
ERYTHROCYTE [DISTWIDTH] IN BLOOD BY AUTOMATED COUNT: 13.9 % (ref 12.3–15.4)
FERRITIN SERPL-MCNC: 173 NG/ML (ref 13–150)
HCT VFR BLD AUTO: 44.6 % (ref 34–46.6)
HGB BLD-MCNC: 14.5 G/DL (ref 12–15.9)
IMM GRANULOCYTES # BLD AUTO: 0.03 10*3/MM3 (ref 0–0.05)
IMM GRANULOCYTES NFR BLD AUTO: 0.4 % (ref 0–0.5)
IRON 24H UR-MRATE: 182 MCG/DL (ref 37–145)
IRON SATN MFR SERPL: 48 % (ref 20–50)
LYMPHOCYTES # BLD AUTO: 2.16 10*3/MM3 (ref 0.7–3.1)
LYMPHOCYTES NFR BLD AUTO: 27.9 % (ref 19.6–45.3)
MCH RBC QN AUTO: 27.7 PG (ref 26.6–33)
MCHC RBC AUTO-ENTMCNC: 32.5 G/DL (ref 31.5–35.7)
MCV RBC AUTO: 85.1 FL (ref 79–97)
MONOCYTES # BLD AUTO: 0.99 10*3/MM3 (ref 0.1–0.9)
MONOCYTES NFR BLD AUTO: 12.8 % (ref 5–12)
NEUTROPHILS NFR BLD AUTO: 4.38 10*3/MM3 (ref 1.7–7)
NEUTROPHILS NFR BLD AUTO: 56.5 % (ref 42.7–76)
NRBC BLD AUTO-RTO: 0 /100 WBC (ref 0–0.2)
PLATELET # BLD AUTO: 262 10*3/MM3 (ref 140–450)
PMV BLD AUTO: 10.6 FL (ref 6–12)
RBC # BLD AUTO: 5.24 10*6/MM3 (ref 3.77–5.28)
TIBC SERPL-MCNC: 378 MCG/DL (ref 298–536)
TRANSFERRIN SERPL-MCNC: 254 MG/DL (ref 200–360)
WBC NRBC COR # BLD AUTO: 7.75 10*3/MM3 (ref 3.4–10.8)

## 2025-07-28 PROCEDURE — 84466 ASSAY OF TRANSFERRIN: CPT

## 2025-07-28 PROCEDURE — 36415 COLL VENOUS BLD VENIPUNCTURE: CPT

## 2025-07-28 PROCEDURE — 82728 ASSAY OF FERRITIN: CPT

## 2025-07-28 PROCEDURE — 85025 COMPLETE CBC W/AUTO DIFF WBC: CPT

## 2025-07-28 PROCEDURE — 83540 ASSAY OF IRON: CPT

## 2025-07-28 PROCEDURE — 99214 OFFICE O/P EST MOD 30 MIN: CPT | Performed by: INTERNAL MEDICINE

## 2025-07-28 NOTE — PROGRESS NOTES
Subjective     REASON FOR CONSULTATION:  anemia  Provide an opinion on any further workup or treatment                             REQUESTING PHYSICIAN:  Douglas    RECORDS OBTAINED:  Records of the patients history including those obtained from the referring provider were reviewed and summarized in detail.    History of Present Illness   This is a 64-year-old woman who returns for follow-up of recurrent iron deficiency anemia secondary to GI blood loss.  She currently is not noting hematochezia or melena.  She denies lightheadedness dizziness shortness of breath severe fatigue.  She is on Xarelto and Plavix.    Past Medical History:   Diagnosis Date    Abdominal pain     Abnormal liver function test     Acute bronchitis     Anemia     CAD (coronary artery disease)     Colon polyp     COPD (chronic obstructive pulmonary disease)     Cough     Diabetes     Diabetes mellitus     Diarrhea     Gastroenteritis     Health care maintenance     Hyperlipidemia     IFG (impaired fasting glucose)     Ischemic cardiomyopathy     Myocardial infarction     SHELLIE (obstructive sleep apnea)     Sore throat     Stroke 2020    Type 2 diabetes mellitus     Vaginal yeast infection     Vitamin D deficiency         Past Surgical History:   Procedure Laterality Date    CARDIAC CATHETERIZATION      CARDIAC CATHETERIZATION N/A 01/06/2020    Procedure: Coronary angiography;  Surgeon: Oneida Saldivar MD;  Location:  [facility] C[facility]VASIVE [facility];  Service: Cardiovascular    CARDIAC CATHETERIZATION N/A 01/06/2020    Procedure: Left heart cath;  Surgeon: Oneida Saldivar MD;  Location:  [facility];  Service: Cardiovascular    CARDIAC CATHETERIZATION N/A 01/06/2020    Procedure: Left ventriculography;  Surgeon: Oneida Saldivar MD;  Location:  [facility];  Service: Cardiovascular    CARDIAC CATHETERIZATION N/A 01/06/2020    Procedure: Percutaneous Coronary Intervention;  Surgeon: Oneida Saldivar MD;   [facility];  Service: Cardiovascular    CARDIAC CATHETERIZATION N/A 01/06/2020    Procedure: Stent HUGO coronary;  Surgeon: Oneida Saldivar MD;  [facility];  Service: Cardiovascular    CARDIAC CATHETERIZATION  01/06/2020    Procedure: Functional [place];  Surgeon: Oneida Saldivar MD;  [facility];  Service: Cardiovascular    CARDIAC CATHETERIZATION N/A 10/26/2020    Procedure: Coronary angiography;  Surgeon: Oneida Saldivar MD;  [facility];  Service: Cardiovascular;  Laterality: N/A;    CARDIAC CATHETERIZATION N/A 10/26/2020    Procedure: Left heart cath;  Surgeon: Oneida Saldivar MD;  [facility];  Service: Cardiovascular;  Laterality: N/A;    CARDIAC CATHETERIZATION N/A 10/26/2020    Procedure: Left ventriculography;  Surgeon: Oneida Saldivar MD;  Location: [facility] CATH INVASIVE LOCATION;  Service: Cardiovascular;  Laterality: N/A;    CARDIAC CATHETERIZATION  10/26/2020    Procedure: Functional [place];  Surgeon: Oneida Saldivar MD;  Location: [facility] CATH INVASIVE LOCATION;  Service: Cardiovascular;;    CARDIAC CATHETERIZATION N/A 07/19/2021    Procedure: Coronary angiography;  Surgeon: Oneida Saldivar MD;  Location: [facility] CATH INVASIVE LOCATION;  Service: Cardiovascular;  Laterality: N/A;    CARDIAC CATHETERIZATION N/A 07/19/2021    Procedure: Left heart cath;  Surgeon: Oneida Saldivar MD;  Location: [facility] CATH INVASIVE LOCATION;  Service: Cardiovascular;  Laterality: N/A;    CARDIAC CATHETERIZATION N/A 07/19/2021    Procedure: Left ventriculography;  Surgeon: Oneida Saldivar MD;  [facility];  Service: Cardiovascular;  Laterality: N/A;    CARDIAC CATHETERIZATION N/A 07/19/2021    Procedure: Percutaneous Coronary Intervention;  Surgeon: Oneida Saldivar MD;  Location: [facility] CATH INVASIVE LOCATION;  Service: Cardiovascular;   Laterality: N/A;    CARDIAC CATHETERIZATION N/A 07/19/2021    Procedure: Optical Coherent Tomography;  Surgeon: Oneida Saldivar MD;  [facility];  Service: Cardiovascular;  Laterality: N/A;    CARDIAC CATHETERIZATION N/A 07/19/2021    Procedure: Stent HUGO coronary;  Surgeon: Oneida Saldivar MD;  [facility];  Service: Cardiovascular;  Laterality: N/A;    CARDIAC CATHETERIZATION  07/19/2021    Procedure: RESTING FULL CYCLE RATIO;  Surgeon: Oneida Saldivar MD;  [facility];  Service: Cardiovascular;;  RFR    CARDIAC DEFIBRILLATOR PLACEMENT  2010    Medtronic dual chamber/Dr. Valentin    CARDIAC ELECTROPHYSIOLOGY PROCEDURE N/A 01/12/2018    Procedure: ICD DC generator change  medtronic;  Surgeon: Hany Ornelas MD;  Location: [facility] CATH INVASIVE LOCATION;  Service:     COLONOSCOPY N/A 11/21/2024    Procedure: COLONOSCOPY;  Surgeon: Jayden Pepe MD;  Location: [facility] OR;  Service: Gastroenterology;  Laterality: N/A;  APC for arterial venous malformation; internal hemorrhoids    COLONOSCOPY N/A 12/2/2024    Procedure: COLONOSCOPY into cecum with clip placement x3 and 1 cc epi injection;  Surgeon: Jony Fraire MD;  Location: [facility] ENDOSCOPY;  Service: Gastroenterology;  Laterality: N/A;  pre- rectal bleeding  post- cecal ulcer    ENDOSCOPY N/A 11/21/2024    Procedure: ESOPHAGOGASTRODUODENOSCOPY WITH BIOPSY;  Surgeon: Jayden Pepe MD;  Location: [facility] OR;  Service: Gastroenterology;  Laterality: N/A;  duodenum bx to r/o celiac disease; gastritis; gastric bx to r/o h. pylori; esophagitis    PACEMAKER IMPLANTATION      TUBAL ABDOMINAL LIGATION          Current Outpatient Medications on File Prior to Visit   Medication Sig Dispense Refill    Accu-Chek FastClix Lancets misc Use to test blood sugar 4 times daily  E11.8 400 each 1    Accu-Chek Guide test strip USE 4 TIMES A DAY E11.8 300 each 2    atorvastatin (LIPITOR)  80 MG tablet TAKE 1 TABLET BY MOUTH EVERY DAY 90 tablet 3    Blood Glucose Monitoring Suppl (Accu-Chek Guide) w/Device kit Inject 1 Device under the skin into the appropriate area as directed Daily. 1 kit 0    carvedilol (COREG) 25 MG tablet TAKE 1 TABLET BY MOUTH TWICE A DAY WITH MEALS 180 tablet 2    Cholecalciferol (VITAMIN D3) 2000 UNITS capsule Take 1,000 Units by mouth Daily.      clopidogrel (PLAVIX) 75 MG tablet TAKE 1 TABLET BY MOUTH EVERY DAY 90 tablet 3    furosemide (LASIX) 40 MG tablet TAKE 1 TABLET BY MOUTH EVERY DAY 90 tablet 1    Insulin Pen Needle 32G X 4 MM misc USE 4 TIMES A  each 3    Multiple Vitamins-Minerals (MULTIVITAL PO) Take 1 tablet by mouth Daily.      nitroglycerin (NITROSTAT) 0.4 MG SL tablet PLACE 1 TABLET UNDER THE TONGUE EVERY 5 (FIVE) MINUTES AS NEEDED FOR CHEST PAIN. TAKE NO MORE THAN 3 DOSES IN 15 MINUTES. 75 tablet 4    Omega-3 Fatty Acids (FISH OIL) 1200 MG capsule capsule Take 1 capsule by mouth Daily With Breakfast.      pantoprazole (PROTONIX) 40 MG EC tablet Take 1 tablet by mouth Daily. 90 tablet 3    potassium chloride 10 MEQ CR tablet TAKE 1 TABLET BY MOUTH EVERY DAY 90 tablet 3    rivaroxaban (Xarelto) 20 MG tablet Take 1 tablet by mouth Daily With Dinner.      Semaglutide, 1 MG/DOSE, (Ozempic, 1 MG/DOSE,) 4 MG/3ML solution pen-injector Inject 1 mg under the skin into the appropriate area as directed 1 (One) Time Per Week. 9 mL 1    spironolactone (ALDACTONE) 25 MG tablet TAKE 1 TABLET BY MOUTH EVERY DAY 90 tablet 3    traZODone (DESYREL) 50 MG tablet TAKE 1 TABLET BY MOUTH EVERYDAY AT BEDTIME 90 tablet 0    vitamin B-12 (CYANOCOBALAMIN) 500 MCG tablet Take 1 tablet by mouth Daily.      potassium chloride 10 MEQ CR tablet Take 1 tablet by mouth Daily. Resume on 1/7/20 (Patient not taking: Reported on 7/28/2025) 90 tablet 1     No current facility-administered medications on file prior to visit.        ALLERGIES:  No Known Allergies     Social History      Socioeconomic History    Marital status:     Number of children: 2   Tobacco Use    Smoking status: Former     Current packs/day: 0.00     Average packs/day: 1 pack/day for 30.0 years (30.0 ttl pk-yrs)     Types: Cigarettes     Start date: 11/1/1979     Quit date: 11/1/2009     Years since quitting: 15.7    Smokeless tobacco: Never    Tobacco comments:     QUIT 10 YRS   Vaping Use    Vaping status: Never Used   Substance and Sexual Activity    Alcohol use: Yes     Comment: rarely uses ETOH/caffeine use    Drug use: Never    Sexual activity: Yes     Partners: Male        Family History   Problem Relation Age of Onset    Heart attack Mother     Diabetes Mother     Heart disease Mother     Hyperlipidemia Mother     Emphysema Mother     Diabetes Father     Heart disease Father     Hyperlipidemia Father     Hypertension Father     Colon cancer Father     Heart attack Father     Hypertension Sister     Hypertension Brother     Heart disease Brother     Hyperlipidemia Brother     Heart disease Brother     Heart attack Brother     Heart disease Maternal Grandmother     Heart disease Maternal Grandfather     Heart disease Paternal Grandmother     Heart disease Paternal Grandfather     Colon polyps Neg Hx     Crohn's disease Neg Hx     Irritable bowel syndrome Neg Hx     Ulcerative colitis Neg Hx     Malig Hyperthermia Neg Hx         Review of Systems   Constitutional:  Negative for fatigue and unexpected weight change.   HENT: Negative.     Respiratory:  Negative for chest tightness and shortness of breath.    Cardiovascular:  Negative for chest pain and palpitations.   Gastrointestinal: Negative.    Musculoskeletal: Negative.    Allergic/Immunologic: Negative.    Neurological:  Negative for syncope, weakness and light-headedness.   Hematological: Negative.    Psychiatric/Behavioral: Negative.     Unchanged 7/28/2025      Objective     Vitals:    07/28/25 1343   BP: 110/71   Pulse: 73   Resp: 16   Temp: 98.1 °F  "(36.7 °C)   TempSrc: Infrared   SpO2: 94%   Weight: 85.1 kg (187 lb 9.6 oz)   Height: 152.4 cm (60\")   PainSc: 0-No pain             7/28/2025     1:52 PM   Current Status   ECOG score 0       Physical Exam    CONSTITUTIONAL: pleasant well-developed adult woman  HEENT: no icterus, no thrush, moist membranes  LYMPH: no cervical or supraclavicular lad  CV: RRR, S1S2, no murmur  RESP: cta bilat, no wheezing, no rales  GI: soft, non-tender, no splenomegaly, +bs  MUSC: no edema, normal gait  NEURO: alert and oriented x3, normal strength  PSYCH: normal mood and affect  Exam is unchanged-7/28/2025    RECENT LABS:  Hematology WBC   Date Value Ref Range Status   07/28/2025 7.75 3.40 - 10.80 10*3/mm3 Final   12/13/2024 8.18 3.40 - 10.80 10*3/mm3 Final   01/24/2023 8.27 4.5 - 11.0 10*3/uL Final     RBC   Date Value Ref Range Status   07/28/2025 5.24 3.77 - 5.28 10*6/mm3 Final   12/13/2024 4.21 3.77 - 5.28 10*6/mm3 Final   01/24/2023 5.23 (H) 4.0 - 5.2 10*6/uL Final     Hemoglobin   Date Value Ref Range Status   07/28/2025 14.5 12.0 - 15.9 g/dL Final   01/24/2023 13.2 12.0 - 16.0 g/dL Final     Hematocrit   Date Value Ref Range Status   07/28/2025 44.6 34.0 - 46.6 % Final   01/24/2023 42.8 36.0 - 46.0 % Final     Platelets   Date Value Ref Range Status   07/28/2025 262 140 - 450 10*3/mm3 Final   01/24/2023 378 140 - 440 10*3/uL Final            Assessment & Plan     *Severe iron deficiency anemia  The patient has prior history of iron deficiency anemia October 2022 requiring PRBC and IV iron during hospitalization  EGD/colonoscopy October 2022 showed nonbleeding colonic angioectasias  hemoglobin 7.2 with MCV 72.8 and iron sat 4% consistent with severe iron deficiency despite taking daily iron tablet OTC  Status post Venofer 300 mg x 3 doses completed 2/22/2024  Received 2 additional doses of iron with Feraheme completed October 2024  EGD and colonoscopy on 11/21/2024 with normal duodenal architecture, no H. pylori, angioectasia " seen in the colon  Admitted December 2024 with lower GI bleed with colonoscopy showing an area of ulceration in the cecum which was clipped  IV iron given April 2025  Hemoglobin normal today 14.5    *Case complicated by need of chronic anticoagulation with Xarelto and antiplatelet therapy with Plavix for ischemic cardiomyopathy/CAD/prior stroke    Hematology plan/recommendations:  Recheck ferritin iron profile today  3- in 6 months CBC ferritin iron profile  Patient discuss with cardiology if Plavix and Xarelto both needed at this point

## 2025-07-30 DIAGNOSIS — E11.65 TYPE 2 DIABETES MELLITUS WITH HYPERGLYCEMIA, WITHOUT LONG-TERM CURRENT USE OF INSULIN: ICD-10-CM

## 2025-07-30 RX ORDER — SEMAGLUTIDE 1.34 MG/ML
1 INJECTION, SOLUTION SUBCUTANEOUS WEEKLY
Qty: 9 ML | Refills: 0 | Status: SHIPPED | OUTPATIENT
Start: 2025-07-30

## 2025-07-30 NOTE — TELEPHONE ENCOUNTER
Lipid profile in last 12 months    A1c in last six months   Rx Refill Note  Requested Prescriptions     Pending Prescriptions Disp Refills    Semaglutide, 1 MG/DOSE, (Ozempic, 1 MG/DOSE,) 4 MG/3ML solution pen-injector 9 mL 1     Sig: Inject 1 mg under the skin into the appropriate area as directed 1 (One) Time Per Week.      Last office visit with prescribing clinician: 12/13/2024   Last telemedicine visit with prescribing clinician: Visit date not found   Next office visit with prescribing clinician: 8/11/2025                         Would you like a call back once the refill request has been completed: [] Yes [] No    If the office needs to give you a call back, can they leave a voicemail: [] Yes [] No    Chapito Corado MA  07/30/25, 09:10 EDT

## 2025-07-31 ENCOUNTER — TELEPHONE (OUTPATIENT)
Dept: GASTROENTEROLOGY | Facility: CLINIC | Age: 65
End: 2025-07-31
Payer: COMMERCIAL

## 2025-07-31 NOTE — TELEPHONE ENCOUNTER
"        Hub staff attempted to follow warm transfer process and was unsuccessful     Caller: Daisy Dent \"HECTOR\"    Relationship to patient: Self    Best call back number: [phone]     Patient is needing: PATIENT IS RETURNING CALL FROM [facility] REGARDING CAPSULE STUDY RESULTS.      "

## 2025-07-31 NOTE — PROGRESS NOTES
Subjective:     Encounter Date:08/01/2025      Patient ID: Daisy Dent is a 64 y.o. female.    Chief Complaint:  History of Present Illness    This is a 64-year-old female with ischemic cardiomyopathy, diabetes mellitus type 2, obstructive sleep apnea on CPAP, hypertension, status post dual-chamber AICD placement, dyslipidemia, status post left MCA stroke, coronary artery disease with a history of prior anterior myocardial infarction and LAD stent placement (in 7/2006, 10/2009, 11/2019, and 7/2021), who presents for follow-up.     The patient was admitted to the hospital earlier this month for anemia.  She had had an EGD and colonoscopy a week prior to admission was found to have a nonbleeding angiectasia for which she underwent argon plasma coagulation.  She restarted her rivaroxaban the following day and then developed bright red blood per rectum about a week later.  She was evaluated by GI and underwent a colonoscopy where she was found to have ulcerated lesion in the cecum for which she underwent clip placement x 3.  She did require 2 unit packed red blood cells transfusion during that admission.  She sent a message yesterday reporting that she was still feeling fatigued and out of breath.  She was scheduled for an appointment today.     I saw the patient last in 12/2004 at which time she reported dyspnea and fatigue with exertion associate with chest discomfort and mild diaphoresis.  She felt like the symptoms were similar to her anginal symptoms before her prior stents.  However by the time of her office visit the symptoms were getting better.  I felt that her symptoms may be due to persistent anemia.  We opted to just monitor her symptoms at that time.    She returned in 1/2025 and reported that her symptoms were continuing to improve.  No changes were made to her management.    She presents back today for follow-up.  She reports occasional episodes of tightness in her chest and palpitations when  she is feeling stressed or anxious.  Otherwise denies any chest discomfort similar to her prior anginal symptoms.  Denies any shortness of breath, orthopnea, near-syncope or syncope or lower extremity swelling.  She denies any recurrent bleeding issues.    Prior History:  I began following the patient in 11/2015 when she presented to establish care.  Prior to that she was followed by Dr. Ayala with [facility].  Her prior cardiac history includes a non-ST elevation MI and 7/2006 at which time she received a Cypher 3.0 x 23 mm stent to her mid LAD.  She then presented in 10/2009 with a late in-stent thrombosis that required repeat stenting of her mid LAD in addition to a second drug-eluting stent placement of her left circumflex artery.  Following that she had a repeat cardiac catheterization in 2010 that was reportedly unremarkable.  Following her myocardial infarction in 2009 she developed ischemic myopathy with an EF of around 25% and ultimately underwent AICD placement and 7/2010.  At some point in the course of her follow-ups her left ventricular function improved with an ejection fraction of 40-45%.  A stress test showed evidence of a large anterior infarct and minimal elva-infarct ischemia.     In her follow-ups she has done fairly well recently when she reported more fatigued than normal and episodes of chest discomfort that occurred while she was exercising on the treadmill.  She underwent  an echocardiogram in 3/2017 that showed an ejection fraction of 40-45%.  Due to continued symptoms we went ahead and proceeded with a PET stress test which was performed in 8/2017 that showed medium sized anterior wall infarct with mild elva-infarct ischemia and a post stress ejection fraction of 58%.      In 11/2017 her AICD was noted to be at CHELLE.  She subsequently underwent generator replacement on 1/12/2018 with Dr. Ornelas. When I saw her back in follow up in 9/2018 she reported that she denied any new or  worsening symptoms.       She was admitted and 12/2018 after she presented with aphasia.  On arrival to the hospital she underwent a CT of the head that showed no acute stroke but a perfusion scan showed a large perfusion mismatch.  A CTA showed evidence of a left MCA thrombus.  She was given TPA at this time with improvement in her symptoms.  Neurology felt that her stroke was likely embolic.  She underwent a repeat echocardiogram during her admission that showed moderately depressed left ventricular systolic function with an EF of 30%, grade 1 diastolic dysfunction, and no significant valvular disease.  Based on the likelihood that this was an embolic stroke I opted to go ahead and recommend anticoagulation.  She was subsequently started on rivaroxaban 20 mg a day.  Her aspirin was stopped and she was continued on clopidogrel.     In 11/2019 she reported a couple weeks of episodes of substernal chest tightness lasting 1 to 2 minutes and dyspnea on exertion.  She reported that the symptoms were similar to what she had at the time of her prior myocardial infarctions but reports that its much milder than what she experienced at that time.    Following that office visit she underwent a stress test that showed evidence of a medium anterior infarct with moderate elva-infarct ischemia.  The findings were similar to prior stress test however the amount of elva-infarct ischemia had impaired to increase.  We initially tried to treat her medically however she continued to have recurrent symptoms so we opted to proceed with a cardiac catheterization.  She presented to the hospital on 1/6/2020 for the procedure.  She was found to have severe in-stent restenosis of her proximal LAD stent and ultimately underwent repeat drug-eluting stent placement of the in-stent restenosis.  She was also noted to have moderate nonobstructive disease of the first obtuse marginal branch for which she underwent FFR evaluation which was normal at  0.96.     In follow-up she had some mild episodes of chest discomfort that we decided to monitor.  She then returned for urgent follow-up on 10/22/2020 at which time she reported more severe episodes of chest discomfort that were occurring primarily at rest.  Due to the severity of her symptoms she was quite concerned and we decided to proceed with a cardiac catheterization.  This was performed on 10/26/2020 and showed stable moderate nonobstructive coronary artery disease.     She reported increasing frequency of chest tightness that primarily occurred at night and woke her up from sleep starting in 5/2021.  She reported this was similar to her prior anginal symptoms.  I started her on isosorbide mononitrate 30 mg daily but she is unable to tolerate it due to the onset of severe headaches.  When she presented back for follow-up in 7/2021 she was continuing to have episodes of nocturnal discomfort that had progressed.  At that point I recommended proceeding with a repeat cardiac catheterization.      She presented for cardiac catheterization on 7/19/2021.  This showed 90% proximal in-stent restenosis and she underwent repeat drug-eluting stent placement using a 3.0 x 12 mm stent.  She was also noted to have moderate nonobstructive disease of the first obtuse marginal branch with a normal RFR.     She was seen back in the office by KEYONA Demarco on 7/30/2021 at which time she was feeling better.   It was noted around the time that she had mildly elevated liver enzymes which were unchanged compared to prior evaluations.  Was also noted that her last lipid panel on 7/7/2021 showed that her cholesterol was poorly controlled with an LDL of around 160 despite being on high-dose atorvastatin.     In 12/2021 she reported improvement in her symptoms overall.  However in follow-up in 4/2022 she reported that she was starting to have recurrent episodes of chest tightness they are occurring about twice a month and lasting  for 30 minutes at a time.  Again they usually occurred after laying down to sleep at night.  Symptoms are similar to what she experienced before her last PCI but not as frequent or as intense.  In 2/2022 she had a routine device interrogation that showed a 19 beat episode of ventricular tachycardia.  Repeat in device interrogation in 4/2022 showed no further episodes.  Since her symptoms were still fairly infrequent we opted to monitor her symptoms before considering any further work-up.  When I saw her back in follow-up in 8/2022 she was having symptoms about 3 times a week lasting 5 to 10 minutes at a time.  I recommended that she start taking her isosorbide mononitrate in the evening.       She was hospitalized in 10/2022 with anemia with a hemoglobin of 7.1.  Her Hemoccult stool was positive.  However endoscopy was unremarkable except for small area of nonbleeding colonic   angiectasia's.  Her clopidogrel and rivaroxaban were resumed.  She saw KEYONA Calix in follow-up in 11/2022.  At the time of that visit she reported improvement in her symptoms.    At a follow-up visit in 3/2023 she was doing well.  She denied any issues with worsening anemia despite the fact that she was on clopidogrel and rivaroxaban.  She reported improvement in her chest pain with taking the isosorbide in the evening.     She was last seen in the office by KEYONA Dumont in 10/2023.  She reports she was having more issues with nosebleeds.  She had been having episodes of chest tightness that felt like heartburn that improved with taking Tums.  The symptoms were different from her prior anginal symptoms.  It was recommended that she monitor the nosebleeds and otherwise no changes were made to her management.     In 2/2024 she presented for routine follow-up.  About 3 weeks prior she began having more issues with fatigue, shortness of breath, palpitations at rest and with activity, and chest tightness.  She was found to be anemic  with a hemoglobin of 7.2.  She was evaluated by Dr. Hood and was found to have significant iron deficiency anemia.  She was started on IV Venofer for infusions and referred to GI.  She was then started on oral iron.  She reported improvement in her symptoms by the time of that office visit including her chest discomfort and fatigue.  She still having palpitations and shortness of breath.  She had also lost about 40 pounds in part due to Ozempic use and also because her appetite had declined because of her symptoms.  Did not make any changes in her management.  Opted to monitor symptoms for further improvement before considering any further cardiac workup.  She was last seen by Dr. Hood in 3/2024 at which time her hemoglobin was up to 13.7.  She was still awaiting GI appointment at that time.     In 4/2024 she reported dizziness and balance issues that did not really improve with correction of her anemia.  We opted to monitor her symptoms at that time.  She also reported symptoms of chest tightness and palpitations.  Symptoms appear to be brief and again we opted to monitor.  She return in 7/2024 was seen by KEYONA Dumont.  At that time she reported palpitations at night that had been present since the onset of her anemia.  Otherwise denies any chest pain or worsening shortness of breath.  No changes were made.    Review of Systems   Constitutional: Negative for malaise/fatigue.   HENT:  Negative for hearing loss, hoarse voice, nosebleeds and sore throat.    Eyes:  Negative for pain.   Cardiovascular:  Positive for chest pain and palpitations. Negative for claudication, cyanosis, dyspnea on exertion, irregular heartbeat, leg swelling, near-syncope, orthopnea, paroxysmal nocturnal dyspnea and syncope.   Respiratory:  Negative for shortness of breath and snoring.    Endocrine: Negative for cold intolerance, heat intolerance, polydipsia, polyphagia and polyuria.   Skin:  Negative for itching and rash.    Musculoskeletal:  Negative for arthritis, falls, joint pain, joint swelling, muscle cramps, muscle weakness and myalgias.   Gastrointestinal:  Negative for constipation, diarrhea, dysphagia, heartburn, hematemesis, hematochezia, melena, nausea and vomiting.   Genitourinary:  Negative for frequency, hematuria and hesitancy.   Neurological:  Negative for excessive daytime sleepiness, dizziness, headaches, light-headedness, numbness and weakness.   Psychiatric/Behavioral:  Negative for depression. The patient is not nervous/anxious.          Current Outpatient Medications:     Accu-Chek FastClix Lancets misc, Use to test blood sugar 4 times daily  E11.8, Disp: 400 each, Rfl: 1    Accu-Chek Guide test strip, USE 4 TIMES A DAY E11.8, Disp: 300 each, Rfl: 2    atorvastatin (LIPITOR) 80 MG tablet, TAKE 1 TABLET BY MOUTH EVERY DAY, Disp: 90 tablet, Rfl: 3    Blood Glucose Monitoring Suppl (Accu-Chek Guide) w/Device kit, Inject 1 Device under the skin into the appropriate area as directed Daily., Disp: 1 kit, Rfl: 0    carvedilol (COREG) 25 MG tablet, TAKE 1 TABLET BY MOUTH TWICE A DAY WITH MEALS, Disp: 180 tablet, Rfl: 2    Cholecalciferol (VITAMIN D3) 2000 UNITS capsule, Take 1,000 Units by mouth Daily., Disp: , Rfl:     clopidogrel (PLAVIX) 75 MG tablet, TAKE 1 TABLET BY MOUTH EVERY DAY, Disp: 90 tablet, Rfl: 3    furosemide (LASIX) 40 MG tablet, TAKE 1 TABLET BY MOUTH EVERY DAY, Disp: 90 tablet, Rfl: 1    Insulin Pen Needle 32G X 4 MM misc, USE 4 TIMES A DAY, Disp: 400 each, Rfl: 3    lisinopril (PRINIVIL,ZESTRIL) 10 MG tablet, Take 1 tablet by mouth Daily., Disp: , Rfl:     Multiple Vitamins-Minerals (MULTIVITAL PO), Take 1 tablet by mouth Daily., Disp: , Rfl:     nitroglycerin (NITROSTAT) 0.4 MG SL tablet, PLACE 1 TABLET UNDER THE TONGUE EVERY 5 (FIVE) MINUTES AS NEEDED FOR CHEST PAIN. TAKE NO MORE THAN 3 DOSES IN 15 MINUTES., Disp: 75 tablet, Rfl: 4    Omega-3 Fatty Acids (FISH OIL) 1200 MG capsule capsule, Take 1  capsule by mouth Daily With Breakfast., Disp: , Rfl:     Ozempic, 1 MG/DOSE, 4 MG/3ML solution pen-injector, Inject 1 mg under the skin into the appropriate area as directed 1 (One) Time Per Week., Disp: 9 mL, Rfl: 0    pantoprazole (PROTONIX) 40 MG EC tablet, Take 1 tablet by mouth Daily., Disp: 90 tablet, Rfl: 3    potassium chloride 10 MEQ CR tablet, TAKE 1 TABLET BY MOUTH EVERY DAY, Disp: 90 tablet, Rfl: 3    rivaroxaban (Xarelto) 20 MG tablet, Take 1 tablet by mouth Daily With Dinner., Disp: , Rfl:     spironolactone (ALDACTONE) 25 MG tablet, TAKE 1 TABLET BY MOUTH EVERY DAY, Disp: 90 tablet, Rfl: 3    traZODone (DESYREL) 50 MG tablet, TAKE 1 TABLET BY MOUTH EVERYDAY AT BEDTIME, Disp: 90 tablet, Rfl: 0    vitamin B-12 (CYANOCOBALAMIN) 500 MCG tablet, Take 1 tablet by mouth Daily., Disp: , Rfl:     Past Medical History:   Diagnosis Date    Abdominal pain     Abnormal liver function test     Acute bronchitis     Anemia     CAD (coronary artery disease)     Colon polyp     COPD (chronic obstructive pulmonary disease)     Cough     Diabetes     Diabetes mellitus     Diarrhea     Gastroenteritis     Health care maintenance     Hyperlipidemia     IFG (impaired fasting glucose)     Ischemic cardiomyopathy     Myocardial infarction     SHELLIE (obstructive sleep apnea)     Sore throat     Stroke 2020    Type 2 diabetes mellitus     Vaginal yeast infection     Vitamin D deficiency        Past Surgical History:   Procedure Laterality Date    CARDIAC CATHETERIZATION      CARDIAC CATHETERIZATION N/A 01/06/2020    Procedure: Coronary angiography;  Surgeon: Oneida Saldivar MD;  Location: [facility] INVASIVE LOCATION;  Service: Cardiovascular    CARDIAC CATHETERIZATION N/A 01/06/2020    Procedure: Left heart cath;  Surgeon: Oneida Saldivar MD;  Location: [facility] INVASIVE LOCATION;  Service: Cardiovascular    CARDIAC CATHETERIZATION N/A 01/06/2020    Procedure: Left ventriculography;  Surgeon: Oneida Saldivar MD;   Location: [facility] CATH INVASIVE LOCATION;  Service: Cardiovascular    CARDIAC CATHETERIZATION N/A 01/06/2020    Procedure: Percutaneous Coronary Intervention;  Surgeon: Oneida Saldivar MD;  Location:  [facility] CATH INVASIVE LOCATION;  Service: Cardiovascular    CARDIAC CATHETERIZATION N/A 01/06/2020    Procedure: Stent HUGO coronary;  Surgeon: Oneida Saldivar MD;  Location:  [facility] CATH INVASIVE LOCATION;  Service: Cardiovascular    CARDIAC CATHETERIZATION  01/06/2020    Procedure: Functional Flow [place];  Surgeon: Oneida Saldivar MD;  Location: [facility] CATH INVASIVE LOCATION;  Service: Cardiovascular    CARDIAC CATHETERIZATION N/A 10/26/2020    Procedure: Coronary angiography;  Surgeon: Oneida Saldivar MD;  Location: [facility] CATH INVASIVE LOCATION;  Service: Cardiovascular;  Laterality: N/A;    CARDIAC CATHETERIZATION N/A 10/26/2020    Procedure: Left heart cath;  Surgeon: Oneida Saldivar MD;  Location: [facility] CATH INVASIVE LOCATION;  Service: Cardiovascular;  Laterality: N/A;    CARDIAC CATHETERIZATION N/A 10/26/2020    Procedure: Left ventriculography;  Surgeon: Oneida Saldivar MD;  Location: [facility] CATH INVASIVE LOCATION;  Service: Cardiovascular;  Laterality: N/A;    CARDIAC CATHETERIZATION  10/26/2020    Procedure: Functional Flow [place];  Surgeon: Oneida Saldivar MD;  Location: [facility] CATH INVASIVE LOCATION;  Service: Cardiovascular;;    CARDIAC CATHETERIZATION N/A 07/19/2021    Procedure: Coronary angiography;  Surgeon: Oneida Saldivar MD;  Location: [facility] CATH INVASIVE LOCATION;  Service: Cardiovascular;  Laterality: N/A;    CARDIAC CATHETERIZATION N/A 07/19/2021    Procedure: Left heart cath;  Surgeon: Oneida Saldivar MD;  Location: [facility] CATH INVASIVE LOCATION;  Service: Cardiovascular;  Laterality: N/A;    CARDIAC CATHETERIZATION N/A 07/19/2021    Procedure: Left ventriculography;  Surgeon: Oneida Saldivar MD;  Location: [facility] CATH INVASIVE LOCATION;  Service: Cardiovascular;  Laterality: N/A;     CARDIAC CATHETERIZATION N/A 07/19/2021    Procedure: Percutaneous Coronary Intervention;  Surgeon: Oneida Saldivar MD;  [facility];  Service: Cardiovascular;  Laterality: N/A;    CARDIAC CATHETERIZATION N/A 07/19/2021    Procedure: Optical Coherent Tomography;  Surgeon: Oneida Saldivar MD;  [facility]: [facility];  Service: Cardiovascular;  Laterality: N/A;    CARDIAC CATHETERIZATION N/A 07/19/2021    Procedure: Stent HUGO coronary;  Surgeon: Oneida Saldivar MD;  [facility]: [facility] LOC[facility];  Service: Cardiovascular;  Laterality: N/A;    CARDIAC CATHETERIZATION  07/19/2021    Procedure: RESTING FULL CYCLE RATIO;  Surgeon: Oneida Saldivar MD;  [facility];  Service: Cardiovascular;;  RFR    CARDIAC DEFIBRILLATOR PLACEMENT  2010    Medtronic dual chamber/Dr. Valentin    CARDIAC ELECTROPHYSIOLOGY PROCEDURE N/A 01/12/2018    Procedure: ICD DC generator change  medtronic;  Surgeon: Hany Ornelas MD;  Location: [facility] CATH INVASIVE LOCATION;  Service:     COLONOSCOPY N/A 11/21/2024    Procedure: COLONOSCOPY;  Surgeon: Jayden Pepe MD;  Location: [facility] OR;  Service: Gastroenterology;  Laterality: N/A;  APC for arterial venous malformation; internal hemorrhoids    COLONOSCOPY N/A 12/2/2024    Procedure: COLONOSCOPY into cecum with clip placement x3 and 1 cc epi injection;  Surgeon: Jony Fraire MD;  Location: [facility] ENDOSCOPY;  Service: Gastroenterology;  Laterality: N/A;  pre- rectal bleeding  post- cecal ulcer    ENDOSCOPY N/A 11/21/2024    Procedure: ESOPHAGOGASTRODUODENOSCOPY WITH BIOPSY;  Surgeon: Jayden Pepe MD;  Location: [facility] OR;  Service: Gastroenterology;  Laterality: N/A;  duodenum bx to r/o celiac disease; gastritis; gastric bx to r/o h. pylori; esophagitis    PACEMAKER IMPLANTATION      TUBAL ABDOMINAL LIGATION         Family History   Problem Relation Age of Onset    Heart attack Mother      "Diabetes Mother     Heart disease Mother     Hyperlipidemia Mother     Emphysema Mother     Diabetes Father     Heart disease Father     Hyperlipidemia Father     Hypertension Father     Colon cancer Father     Heart attack Father     Hypertension Sister     Hypertension Brother     Heart disease Brother     Hyperlipidemia Brother     Heart disease Brother     Heart attack Brother     Heart disease Maternal Grandmother     Heart disease Maternal Grandfather     Heart disease Paternal Grandmother     Heart disease Paternal Grandfather     Colon polyps Neg Hx     Crohn's disease Neg Hx     Irritable bowel syndrome Neg Hx     Ulcerative colitis Neg Hx     Malig Hyperthermia Neg Hx        Social History     Tobacco Use    Smoking status: Former     Current packs/day: 0.00     Average packs/day: 1 pack/day for 30.0 years (30.0 ttl pk-yrs)     Types: Cigarettes     Start date: 11/1/1979     Quit date: 11/1/2009     Years since quitting: 15.7    Smokeless tobacco: Never    Tobacco comments:     QUIT 10 YRS   Vaping Use    Vaping status: Never Used   Substance Use Topics    Alcohol use: Yes     Comment: rarely uses ETOH/caffeine use    Drug use: Never         ECG 12 Lead    Date/Time: 8/1/2025 11:08 AM  Performed by: Oneida Saldivar MD    Authorized by: Oneida Saldivar MD  Comparison: compared with previous ECG   Similar to previous ECG  Rhythm: sinus rhythm  Q waves: V1, V2 and V3               Objective:     Visit Vitals  /70 (BP Location: Left arm, Patient Position: Sitting, Cuff Size: Adult)   Pulse 70   Ht 152.4 cm (60\")   Wt 85.6 kg (188 lb 12.8 oz)   LMP  (LMP Unknown)   SpO2 97%   BMI 36.87 kg/m²         Constitutional:       Appearance: Normal appearance. Well-developed.   Eyes:      General: Lids are normal.      Conjunctiva/sclera: Conjunctivae normal.      Pupils: Pupils are equal, round, and reactive to light.   HENT:      Head: Normocephalic and atraumatic.   Neck:      Vascular: No carotid bruit or " JVD.      Lymphadenopathy: No cervical adenopathy.   Pulmonary:      Effort: Pulmonary effort is normal.      Breath sounds: Normal breath sounds.   Cardiovascular:      Normal rate. Regular rhythm.      No gallop.    Pulses:     Radial: 2+ bilaterally.  Edema:     Peripheral edema absent.   Abdominal:      Palpations: Abdomen is soft.   Musculoskeletal:      Cervical back: Full passive range of motion without pain, normal range of motion and neck supple. Skin:     General: Skin is warm and dry.   Neurological:      Mental Status: Alert and oriented to person, place, and time.              Assessment:          Diagnosis Plan   1. Chronic coronary artery disease        2. S/P coronary artery stent placement        3. Ischemic cardiomyopathy        4. ICD (implantable cardioverter-defibrillator) in place        5. Mixed hyperlipidemia        6. History of anterior myocardial infarction         7. Chronic systolic congestive heart failure        8. Benign essential hypertension        9. Type 2 diabetes mellitus with complication        10. Cerebrovascular accident (CVA) due to embolism of left middle cerebral artery        11. Obstructive sleep apnea syndrome               Plan:       1.  Coronary artery disease.  No symptoms concerning for angina.  Her EKG remains unremarkable.  Continue current medical management including clopidogrel due to the presence of more than 1 layer of stents in her LAD.  If she develops any recurrent bleeding issues we will have to reconsider chronic labetalol therapy.  2.  Ischemic cardiomyopathy.  EF of 35 to 40%.  On guideline directed management with carvedilol.  Blood pressures have been historically too low for ACE inhibitor or ARB.  No evidence of volume overload.  3.  Single-chamber ICD.  Continue routine device checks.  4.  Diabetes mellitus type 2  5.  History of stroke.  Suspected to be embolic although she has never had any evidence of atrial fibrillation.  Anticoagulation was  recommended by neurology at the time of her stroke and she has remained on this since.  Would continue rivaroxaban for now.  7.  Obstructive sleep apnea.  8.  Iron deficiency anemia.  Followed closely by hematology with periodic iron infusions.    Will plan on seeing the patient back again in 6 months.

## 2025-08-01 ENCOUNTER — OFFICE VISIT (OUTPATIENT)
Age: 65
End: 2025-08-01
Payer: COMMERCIAL

## 2025-08-01 VITALS
HEART RATE: 70 BPM | DIASTOLIC BLOOD PRESSURE: 70 MMHG | OXYGEN SATURATION: 97 % | SYSTOLIC BLOOD PRESSURE: 119 MMHG | HEIGHT: 60 IN | BODY MASS INDEX: 37.07 KG/M2 | WEIGHT: 188.8 LBS

## 2025-08-01 DIAGNOSIS — I10 BENIGN ESSENTIAL HYPERTENSION: ICD-10-CM

## 2025-08-01 DIAGNOSIS — I21.09 ACUTE MYOCARDIAL INFARCTION OF ANTERIOR WALL: ICD-10-CM

## 2025-08-01 DIAGNOSIS — E11.8 TYPE 2 DIABETES MELLITUS WITH COMPLICATION: ICD-10-CM

## 2025-08-01 DIAGNOSIS — I25.5 ISCHEMIC CARDIOMYOPATHY: ICD-10-CM

## 2025-08-01 DIAGNOSIS — I63.412 CEREBROVASCULAR ACCIDENT (CVA) DUE TO EMBOLISM OF LEFT MIDDLE CEREBRAL ARTERY: ICD-10-CM

## 2025-08-01 DIAGNOSIS — I25.10 CHRONIC CORONARY ARTERY DISEASE: Primary | ICD-10-CM

## 2025-08-01 DIAGNOSIS — I50.22 CHRONIC SYSTOLIC CONGESTIVE HEART FAILURE: ICD-10-CM

## 2025-08-01 DIAGNOSIS — Z95.5 S/P CORONARY ARTERY STENT PLACEMENT: ICD-10-CM

## 2025-08-01 DIAGNOSIS — Z95.810 ICD (IMPLANTABLE CARDIOVERTER-DEFIBRILLATOR) IN PLACE: ICD-10-CM

## 2025-08-01 DIAGNOSIS — G47.33 OBSTRUCTIVE SLEEP APNEA SYNDROME: ICD-10-CM

## 2025-08-01 DIAGNOSIS — E78.2 MIXED HYPERLIPIDEMIA: ICD-10-CM

## 2025-08-01 RX ORDER — LISINOPRIL 10 MG/1
10 TABLET ORAL DAILY
COMMUNITY

## 2025-08-01 NOTE — LETTER
August 1, 2025     KEYONA Taylor  [id number] [facility]  [address]  [facility] [address]    Patient: Daisy Dent   YOB: 1960   Date of Visit: 8/1/2025       Dear KEYONA Taylor    Daisy Dent was in my office today. Below is a copy of my note.    If you have questions, please do not hesitate to call me. I look forward to following Daisy along with you.         Sincerely,        Oneida Saldivar MD        CC: No Recipients        Subjective:     Encounter Date:08/01/2025      Patient ID: Daisy Dent is a 64 y.o. female.    Chief Complaint:  History of Present Illness    This is a 64-year-old female with ischemic cardiomyopathy, diabetes mellitus type 2, obstructive sleep apnea on CPAP, hypertension, status post dual-chamber AICD placement, dyslipidemia, status post left MCA stroke, coronary artery disease with a history of prior anterior myocardial infarction and LAD stent placement (in 7/2006, 10/2009, 11/2019, and 7/2021), who presents for follow-up.     The patient was admitted to the hospital earlier this month for anemia.  She had had an EGD and colonoscopy a week prior to admission was found to have a nonbleeding angiectasia for which she underwent argon plasma coagulation.  She restarted her rivaroxaban the following day and then developed bright red blood per rectum about a week later.  She was evaluated by GI and underwent a colonoscopy where she was found to have ulcerated lesion in the cecum for which she underwent clip placement x 3.  She did require 2 unit packed red blood cells transfusion during that admission.  She sent a message yesterday reporting that she was still feeling fatigued and out of breath.  She was scheduled for an appointment today.     I saw the patient last in 12/2004 at which time she reported dyspnea and fatigue with exertion associate with chest discomfort and mild diaphoresis.  She felt like the symptoms were similar to her  anginal symptoms before her prior stents.  However by the time of her office visit the symptoms were getting better.  I felt that her symptoms may be due to persistent anemia.  We opted to just monitor her symptoms at that time.    She returned in 1/2025 and reported that her symptoms were continuing to improve.  No changes were made to her management.    She presents back today for follow-up.  She reports occasional episodes of tightness in her chest and palpitations when she is feeling stressed or anxious.  Otherwise denies any chest discomfort similar to her prior anginal symptoms.  Denies any shortness of breath, orthopnea, near-syncope or syncope or lower extremity swelling.  She denies any recurrent bleeding issues.    Prior History:  I began following the patient in 11/2015 when she presented to establish care.  Prior to that she was followed by Dr. Ayala with [facility].  Her prior cardiac history includes a non-ST elevation MI and 7/2006 at which time she received a Cypher 3.0 x 23 mm stent to her mid LAD.  She then presented in 10/2009 with a late in-stent thrombosis that required repeat stenting of her mid LAD in addition to a second drug-eluting stent placement of her left circumflex artery.  Following that she had a repeat cardiac catheterization in 2010 that was reportedly unremarkable.  Following her myocardial infarction in 2009 she developed ischemic myopathy with an EF of around 25% and ultimately underwent AICD placement and 7/2010.  At some point in the course of her follow-ups her left ventricular function improved with an ejection fraction of 40-45%.  A stress test showed evidence of a large anterior infarct and minimal elva-infarct ischemia.     In her follow-ups she has done fairly well recently when she reported more fatigued than normal and episodes of chest discomfort that occurred while she was exercising on the treadmill.  She underwent  an echocardiogram in 3/2017 that showed  an ejection fraction of 40-45%.  Due to continued symptoms we went ahead and proceeded with a PET stress test which was performed in 8/2017 that showed medium sized anterior wall infarct with mild elva-infarct ischemia and a post stress ejection fraction of 58%.      In 11/2017 her AICD was noted to be at CHELLE.  She subsequently underwent generator replacement on 1/12/2018 with Dr. Ornelas. When I saw her back in follow up in 9/2018 she reported that she denied any new or worsening symptoms.       She was admitted and 12/2018 after she presented with aphasia.  On arrival to the hospital she underwent a CT of the head that showed no acute stroke but a perfusion scan showed a large perfusion mismatch.  A CTA showed evidence of a left MCA thrombus.  She was given TPA at this time with improvement in her symptoms.  Neurology felt that her stroke was likely embolic.  She underwent a repeat echocardiogram during her admission that showed moderately depressed left ventricular systolic function with an EF of 30%, grade 1 diastolic dysfunction, and no significant valvular disease.  Based on the likelihood that this was an embolic stroke I opted to go ahead and recommend anticoagulation.  She was subsequently started on rivaroxaban 20 mg a day.  Her aspirin was stopped and she was continued on clopidogrel.     In 11/2019 she reported a couple weeks of episodes of substernal chest tightness lasting 1 to 2 minutes and dyspnea on exertion.  She reported that the symptoms were similar to what she had at the time of her prior myocardial infarctions but reports that its much milder than what she experienced at that time.    Following that office visit she underwent a stress test that showed evidence of a medium anterior infarct with moderate elva-infarct ischemia.  The findings were similar to prior stress test however the amount of elva-infarct ischemia had impaired to increase.  We initially tried to treat her medically however  she continued to have recurrent symptoms so we opted to proceed with a cardiac catheterization.  She presented to the hospital on 1/6/2020 for the procedure.  She was found to have severe in-stent restenosis of her proximal LAD stent and ultimately underwent repeat drug-eluting stent placement of the in-stent restenosis.  She was also noted to have moderate nonobstructive disease of the first obtuse marginal branch for which she underwent FFR evaluation which was normal at 0.96.     In follow-up she had some mild episodes of chest discomfort that we decided to monitor.  She then returned for urgent follow-up on 10/22/2020 at which time she reported more severe episodes of chest discomfort that were occurring primarily at rest.  Due to the severity of her symptoms she was quite concerned and we decided to proceed with a cardiac catheterization.  This was performed on 10/26/2020 and showed stable moderate nonobstructive coronary artery disease.     She reported increasing frequency of chest tightness that primarily occurred at night and woke her up from sleep starting in 5/2021.  She reported this was similar to her prior anginal symptoms.  I started her on isosorbide mononitrate 30 mg daily but she is unable to tolerate it due to the onset of severe headaches.  When she presented back for follow-up in 7/2021 she was continuing to have episodes of nocturnal discomfort that had progressed.  At that point I recommended proceeding with a repeat cardiac catheterization.      She presented for cardiac catheterization on 7/19/2021.  This showed 90% proximal in-stent restenosis and she underwent repeat drug-eluting stent placement using a 3.0 x 12 mm stent.  She was also noted to have moderate nonobstructive disease of the first obtuse marginal branch with a normal RFR.     She was seen back in the office by KEYONA Demarco on 7/30/2021 at which time she was feeling better.   It was noted around the time that she had  mildly elevated liver enzymes which were unchanged compared to prior evaluations.  Was also noted that her last lipid panel on 7/7/2021 showed that her cholesterol was poorly controlled with an LDL of around 160 despite being on high-dose atorvastatin.     In 12/2021 she reported improvement in her symptoms overall.  However in follow-up in 4/2022 she reported that she was starting to have recurrent episodes of chest tightness they are occurring about twice a month and lasting for 30 minutes at a time.  Again they usually occurred after laying down to sleep at night.  Symptoms are similar to what she experienced before her last PCI but not as frequent or as intense.  In 2/2022 she had a routine device interrogation that showed a 19 beat episode of ventricular tachycardia.  Repeat in device interrogation in 4/2022 showed no further episodes.  Since her symptoms were still fairly infrequent we opted to monitor her symptoms before considering any further work-up.  When I saw her back in follow-up in 8/2022 she was having symptoms about 3 times a week lasting 5 to 10 minutes at a time.  I recommended that she start taking her isosorbide mononitrate in the evening.       She was hospitalized in 10/2022 with anemia with a hemoglobin of 7.1.  Her Hemoccult stool was positive.  However endoscopy was unremarkable except for small area of nonbleeding colonic   angiectasia's.  Her clopidogrel and rivaroxaban were resumed.  She saw KEYONA Calix in follow-up in 11/2022.  At the time of that visit she reported improvement in her symptoms.    At a follow-up visit in 3/2023 she was doing well.  She denied any issues with worsening anemia despite the fact that she was on clopidogrel and rivaroxaban.  She reported improvement in her chest pain with taking the isosorbide in the evening.     She was last seen in the office by KEYONA Dumont in 10/2023.  She reports she was having more issues with nosebleeds.  She had been  having episodes of chest tightness that felt like heartburn that improved with taking Tums.  The symptoms were different from her prior anginal symptoms.  It was recommended that she monitor the nosebleeds and otherwise no changes were made to her management.     In 2/2024 she presented for routine follow-up.  About 3 weeks prior she began having more issues with fatigue, shortness of breath, palpitations at rest and with activity, and chest tightness.  She was found to be anemic with a hemoglobin of 7.2.  She was evaluated by Dr. Hood and was found to have significant iron deficiency anemia.  She was started on IV Venofer for infusions and referred to GI.  She was then started on oral iron.  She reported improvement in her symptoms by the time of that office visit including her chest discomfort and fatigue.  She still having palpitations and shortness of breath.  She had also lost about 40 pounds in part due to Ozempic use and also because her appetite had declined because of her symptoms.  Did not make any changes in her management.  Opted to monitor symptoms for further improvement before considering any further cardiac workup.  She was last seen by Dr. Hood in 3/2024 at which time her hemoglobin was up to 13.7.  She was still awaiting GI appointment at that time.     In 4/2024 she reported dizziness and balance issues that did not really improve with correction of her anemia.  We opted to monitor her symptoms at that time.  She also reported symptoms of chest tightness and palpitations.  Symptoms appear to be brief and again we opted to monitor.  She return in 7/2024 was seen by KEYONA Dumont.  At that time she reported palpitations at night that had been present since the onset of her anemia.  Otherwise denies any chest pain or worsening shortness of breath.  No changes were made.    Review of Systems   Constitutional: Negative for malaise/fatigue.   HENT:  Negative for hearing loss, hoarse voice,  nosebleeds and sore throat.    Eyes:  Negative for pain.   Cardiovascular:  Positive for chest pain and palpitations. Negative for claudication, cyanosis, dyspnea on exertion, irregular heartbeat, leg swelling, near-syncope, orthopnea, paroxysmal nocturnal dyspnea and syncope.   Respiratory:  Negative for shortness of breath and snoring.    Endocrine: Negative for cold intolerance, heat intolerance, polydipsia, polyphagia and polyuria.   Skin:  Negative for itching and rash.   Musculoskeletal:  Negative for arthritis, falls, joint pain, joint swelling, muscle cramps, muscle weakness and myalgias.   Gastrointestinal:  Negative for constipation, diarrhea, dysphagia, heartburn, hematemesis, hematochezia, melena, nausea and vomiting.   Genitourinary:  Negative for frequency, hematuria and hesitancy.   Neurological:  Negative for excessive daytime sleepiness, dizziness, headaches, light-headedness, numbness and weakness.   Psychiatric/Behavioral:  Negative for depression. The patient is not nervous/anxious.          Current Outpatient Medications:   •  Accu-Chek FastClix Lancets misc, Use to test blood sugar 4 times daily  E11.8, Disp: 400 each, Rfl: 1  •  Accu-Chek Guide test strip, USE 4 TIMES A DAY E11.8, Disp: 300 each, Rfl: 2  •  atorvastatin (LIPITOR) 80 MG tablet, TAKE 1 TABLET BY MOUTH EVERY DAY, Disp: 90 tablet, Rfl: 3  •  Blood Glucose Monitoring Suppl (Accu-Chek Guide) w/Device kit, Inject 1 Device under the skin into the appropriate area as directed Daily., Disp: 1 kit, Rfl: 0  •  carvedilol (COREG) 25 MG tablet, TAKE 1 TABLET BY MOUTH TWICE A DAY WITH MEALS, Disp: 180 tablet, Rfl: 2  •  Cholecalciferol (VITAMIN D3) 2000 UNITS capsule, Take 1,000 Units by mouth Daily., Disp: , Rfl:   •  clopidogrel (PLAVIX) 75 MG tablet, TAKE 1 TABLET BY MOUTH EVERY DAY, Disp: 90 tablet, Rfl: 3  •  furosemide (LASIX) 40 MG tablet, TAKE 1 TABLET BY MOUTH EVERY DAY, Disp: 90 tablet, Rfl: 1  •  Insulin Pen Needle 32G X 4 MM  misc, USE 4 TIMES A DAY, Disp: 400 each, Rfl: 3  •  lisinopril (PRINIVIL,ZESTRIL) 10 MG tablet, Take 1 tablet by mouth Daily., Disp: , Rfl:   •  Multiple Vitamins-Minerals (MULTIVITAL PO), Take 1 tablet by mouth Daily., Disp: , Rfl:   •  nitroglycerin (NITROSTAT) 0.4 MG SL tablet, PLACE 1 TABLET UNDER THE TONGUE EVERY 5 (FIVE) MINUTES AS NEEDED FOR CHEST PAIN. TAKE NO MORE THAN 3 DOSES IN 15 MINUTES., Disp: 75 tablet, Rfl: 4  •  Omega-3 Fatty Acids (FISH OIL) 1200 MG capsule capsule, Take 1 capsule by mouth Daily With Breakfast., Disp: , Rfl:   •  Ozempic, 1 MG/DOSE, 4 MG/3ML solution pen-injector, Inject 1 mg under the skin into the appropriate area as directed 1 (One) Time Per Week., Disp: 9 mL, Rfl: 0  •  pantoprazole (PROTONIX) 40 MG EC tablet, Take 1 tablet by mouth Daily., Disp: 90 tablet, Rfl: 3  •  potassium chloride 10 MEQ CR tablet, TAKE 1 TABLET BY MOUTH EVERY DAY, Disp: 90 tablet, Rfl: 3  •  rivaroxaban (Xarelto) 20 MG tablet, Take 1 tablet by mouth Daily With Dinner., Disp: , Rfl:   •  spironolactone (ALDACTONE) 25 MG tablet, TAKE 1 TABLET BY MOUTH EVERY DAY, Disp: 90 tablet, Rfl: 3  •  traZODone (DESYREL) 50 MG tablet, TAKE 1 TABLET BY MOUTH EVERYDAY AT BEDTIME, Disp: 90 tablet, Rfl: 0  •  vitamin B-12 (CYANOCOBALAMIN) 500 MCG tablet, Take 1 tablet by mouth Daily., Disp: , Rfl:     Past Medical History:   Diagnosis Date   • Abdominal pain    • Abnormal liver function test    • Acute bronchitis    • Anemia    • CAD (coronary artery disease)    • Colon polyp    • COPD (chronic obstructive pulmonary disease)    • Cough    • Diabetes    • Diabetes mellitus    • Diarrhea    • Gastroenteritis    • Health care maintenance    • Hyperlipidemia    • IFG (impaired fasting glucose)    • Ischemic cardiomyopathy    • Myocardial infarction    • SHELLIE (obstructive sleep apnea)    • Sore throat    • Stroke 2020   • Type 2 diabetes mellitus    • Vaginal yeast infection    • Vitamin D deficiency        Past Surgical  History:   Procedure Laterality Date   • CARDIAC CATHETERIZATION     • CARDIAC CATHETERIZATION N/A 01/06/2020    Procedure: Coronary angiography;  Surgeon: Oneida Saldivar MD;  Location:  [facility];  Service: Cardiovascular   • CARDIAC CATHETERIZATION N/A 01/06/2020    Procedure: Left heart cath;  Surgeon: Oneida Saldivar MD;  Location:  [facility];  Service: Cardiovascular   • CARDIAC CATHETERIZATION N/A 01/06/2020    Procedure: Left ventriculography;  Surgeon: Oneida Saldivar MD;  Location:  [facility];  Service: Cardiovascular   • CARDIAC CATHETERIZATION N/A 01/06/2020    Procedure: Percutaneous Coronary Intervention;  Surgeon: Oneida Saldivar MD;  Location:  [facility];  Service: Cardiovascular   • CARDIAC CATHETERIZATION N/A 01/06/2020    Procedure: Stent HUGO coronary;  Surgeon: Oneida Saldivar MD;  [facility];  Service: Cardiovascular   • CARDIAC CATHETERIZATION  01/06/2020    Procedure: Functional Flow [place];  Surgeon: Oneida Saldivar MD;  [facility];  Service: Cardiovascular   • CARDIAC CATHETERIZATION N/A 10/26/2020    Procedure: Coronary angiography;  Surgeon: Oneida Saldivar MD;  [facility];  Service: Cardiovascular;  Laterality: N/A;   • CARDIAC CATHETERIZATION N/A 10/26/2020    Procedure: Left heart cath;  Surgeon: Oneida Saldivar MD;  [facility];  Service: Cardiovascular;  Laterality: N/A;   • CARDIAC CATHETERIZATION N/A 10/26/2020    Procedure: Left ventriculography;  Surgeon: Oneida Saldivar MD;  [facility];  Service: Cardiovascular;  Laterality: N/A;   • CARDIAC CATHETERIZATION  10/26/2020    Procedure: Functional Flow [place];  Surgeon: Oneida Saldivar MD;  [facility];  Service: Cardiovascular;;   • CARDIAC CATHETERIZATION N/A 07/19/2021     Procedure: Coronary angiography;  Surgeon: Oneida Saldivar MD;  Location:  [facility];  Service: Cardiovascular;  Laterality: N/A;   • CARDIAC CATHETERIZATION N/A 07/19/2021    Procedure: Left heart cath;  Surgeon: Oneida Saldivar MD;  Location:  [facility];  Service: Cardiovascular;  Laterality: N/A;   • CARDIAC CATHETERIZATION N/A 07/19/2021    Procedure: Left ventriculography;  Surgeon: Oneida Saldivar MD;  Location:  [facility];  Service: Cardiovascular;  Laterality: N/A;   • CARDIAC CATHETERIZATION N/A 07/19/2021    Procedure: Percutaneous Coronary Intervention;  Surgeon: Oneida Saldivar MD;  [facility];  Service: Cardiovascular;  Laterality: N/A;   • CARDIAC CATHETERIZATION N/A 07/19/2021    Procedure: Optical Coherent Tomography;  Surgeon: Oneida Saldivar MD;  Location: [facility] CATH INVASIVE [facility];  Service: Cardiovascular;  Laterality: N/A;   • CARDIAC CATHETERIZATION N/A 07/19/2021    Procedure: Stent HUGO coronary;  Surgeon: Oneida Saldivar MD;  Location:  [facility];  Service: Cardiovascular;  Laterality: N/A;   • CARDIAC CATHETERIZATION  07/19/2021    Procedure: RESTING FULL CYCLE RATIO;  Surgeon: Oneida Saldivar MD;  [facility];  Service: Cardiovascular;;  RFR   • CARDIAC DEFIBRILLATOR PLACEMENT  2010    Medtronic dual chamber/Dr. Valentin   • CARDIAC ELECTROPHYSIOLOGY PROCEDURE N/A 01/12/2018    Procedure: ICD DC generator change  medtronic;  Surgeon: Hany Ornelas MD;  [facility];  Service:    • COLONOSCOPY N/A 11/21/2024    Procedure: COLONOSCOPY;  Surgeon: Jayden Pepe MD;  Location: [facility];  Service: Gastroenterology;  Laterality: N/A;  APC for arterial venous malformation; internal hemorrhoids   • COLONOSCOPY N/A 12/2/2024    Procedure: COLONOSCOPY into cecum with clip placement x3 and 1 cc epi injection;  Surgeon:  Jony Fraire MD;  [facility];  Service: Gastroenterology;  Laterality: N/A;  pre- rectal bleeding  post- cecal ulcer   • ENDOSCOPY N/A 11/21/2024    Procedure: ESOPHAGOGASTRODUODENOSCOPY WITH BIOPSY;  Surgeon: Jayden Pepe MD;  Location:  [facility];  Service: Gastroenterology;  Laterality: N/A;  duodenum bx to r/o celiac disease; gastritis; gastric bx to r/o h. pylori; esophagitis   • PACEMAKER IMPLANTATION     • TUBAL ABDOMINAL LIGATION         Family History   Problem Relation Age of Onset   • Heart attack Mother    • Diabetes Mother    • Heart disease Mother    • Hyperlipidemia Mother    • Emphysema Mother    • Diabetes Father    • Heart disease Father    • Hyperlipidemia Father    • Hypertension Father    • Colon cancer Father    • Heart attack Father    • Hypertension Sister    • Hypertension Brother    • Heart disease Brother    • Hyperlipidemia Brother    • Heart disease Brother    • Heart attack Brother    • Heart disease Maternal Grandmother    • Heart disease Maternal Grandfather    • Heart disease Paternal Grandmother    • Heart disease Paternal Grandfather    • Colon polyps Neg Hx    • Crohn's disease Neg Hx    • Irritable bowel syndrome Neg Hx    • Ulcerative colitis Neg Hx    • Malig Hyperthermia Neg Hx        Social History     Tobacco Use   • Smoking status: Former     Current packs/day: 0.00     Average packs/day: 1 pack/day for 30.0 years (30.0 ttl pk-yrs)     Types: Cigarettes     Start date: 11/1/1979     Quit date: 11/1/2009     Years since quitting: 15.7   • Smokeless tobacco: Never   • Tobacco comments:     QUIT 10 YRS   Vaping Use   • Vaping status: Never Used   Substance Use Topics   • Alcohol use: Yes     Comment: rarely uses ETOH/caffeine use   • Drug use: Never         ECG 12 Lead    Date/Time: 8/1/2025 11:08 AM  Performed by: Oneida Saldivar MD    Authorized by: Oneida Saldivar MD  Comparison: compared with previous ECG   Similar to previous ECG  Rhythm:  "sinus rhythm  Q waves: V1, V2 and V3               Objective:     Visit Vitals  /70 (BP Location: Left arm, Patient Position: Sitting, Cuff Size: Adult)   Pulse 70   Ht 152.4 cm (60\")   Wt 85.6 kg (188 lb 12.8 oz)   LMP  (LMP Unknown)   SpO2 97%   BMI 36.87 kg/m²         Constitutional:       Appearance: Normal appearance. Well-developed.   Eyes:      General: Lids are normal.      Conjunctiva/sclera: Conjunctivae normal.      Pupils: Pupils are equal, round, and reactive to light.   HENT:      Head: Normocephalic and atraumatic.   Neck:      Vascular: No carotid bruit or JVD.      Lymphadenopathy: No cervical adenopathy.   Pulmonary:      Effort: Pulmonary effort is normal.      Breath sounds: Normal breath sounds.   Cardiovascular:      Normal rate. Regular rhythm.      No gallop.    Pulses:     Radial: 2+ bilaterally.  Edema:     Peripheral edema absent.   Abdominal:      Palpations: Abdomen is soft.   Musculoskeletal:      Cervical back: Full passive range of motion without pain, normal range of motion and neck supple. Skin:     General: Skin is warm and dry.   Neurological:      Mental Status: Alert and oriented to person, place, and time.              Assessment:          Diagnosis Plan   1. Chronic coronary artery disease        2. S/P coronary artery stent placement        3. Ischemic cardiomyopathy        4. ICD (implantable cardioverter-defibrillator) in place        5. Mixed hyperlipidemia        6. History of anterior myocardial infarction         7. Chronic systolic congestive heart failure        8. Benign essential hypertension        9. Type 2 diabetes mellitus with complication        10. Cerebrovascular accident (CVA) due to embolism of left middle cerebral artery        11. Obstructive sleep apnea syndrome               Plan:       1.  Coronary artery disease.  No symptoms concerning for angina.  Her EKG remains unremarkable.  Continue current medical management including clopidogrel due to " the presence of more than 1 layer of stents in her LAD.  If she develops any recurrent bleeding issues we will have to reconsider chronic labetalol therapy.  2.  Ischemic cardiomyopathy.  EF of 35 to 40%.  On guideline directed management with carvedilol.  Blood pressures have been historically too low for ACE inhibitor or ARB.  No evidence of volume overload.  3.  Single-chamber ICD.  Continue routine device checks.  4.  Diabetes mellitus type 2  5.  History of stroke.  Suspected to be embolic although she has never had any evidence of atrial fibrillation.  Anticoagulation was recommended by neurology at the time of her stroke and she has remained on this since.  Would continue rivaroxaban for now.  7.  Obstructive sleep apnea.  8.  Iron deficiency anemia.  Followed closely by hematology with periodic iron infusions.    Will plan on seeing the patient back again in 6 months.

## 2025-08-08 LAB
MC_CV_MDC_IDC_RATE_1: 133
MC_CV_MDC_IDC_RATE_1: 171
MC_CV_MDC_IDC_RATE_1: 182
MC_CV_MDC_IDC_RATE_1: 207
MC_CV_MDC_IDC_RATE_2: 182
MC_CV_MDC_IDC_RV_HV_IMPEDANCE: 62
MC_CV_MDC_IDC_SVC_MEASURED_IMPEDANCE: 112
MC_CV_MDC_IDC_THERAPIES: NORMAL
MC_CV_MDC_IDC_ZONE_ID: 2
MC_CV_MDC_IDC_ZONE_ID: 3
MC_CV_MDC_IDC_ZONE_ID: 4
MC_CV_MDC_IDC_ZONE_ID: 5
MC_CV_MDC_IDC_ZONE_ID: 6
MDC_IDC_MSMT_BATTERY_REMAINING_LONGEVITY: 40 MO
MDC_IDC_MSMT_BATTERY_RRT_TRIGGER: 2.73
MDC_IDC_MSMT_BATTERY_STATUS: NORMAL
MDC_IDC_MSMT_BATTERY_VOLTAGE: 2.96
MDC_IDC_MSMT_CAP_CHARGE_TIME: 4.12
MDC_IDC_MSMT_LEADCHNL_RA_DTM: NORMAL
MDC_IDC_MSMT_LEADCHNL_RA_IMPEDANCE_VALUE: 418
MDC_IDC_MSMT_LEADCHNL_RA_PACING_THRESHOLD_AMPLITUDE: 1
MDC_IDC_MSMT_LEADCHNL_RA_PACING_THRESHOLD_POLARITY: NORMAL
MDC_IDC_MSMT_LEADCHNL_RA_PACING_THRESHOLD_PULSEWIDTH: 0.4
MDC_IDC_MSMT_LEADCHNL_RA_SENSING_INTR_AMPL: 0.62
MDC_IDC_MSMT_LEADCHNL_RV_DTM: NORMAL
MDC_IDC_MSMT_LEADCHNL_RV_IMPEDANCE_VALUE: 532
MDC_IDC_MSMT_LEADCHNL_RV_PACING_THRESHOLD_AMPLITUDE: 0.75
MDC_IDC_MSMT_LEADCHNL_RV_PACING_THRESHOLD_POLARITY: NORMAL
MDC_IDC_MSMT_LEADCHNL_RV_PACING_THRESHOLD_PULSEWIDTH: 0.4
MDC_IDC_MSMT_LEADCHNL_RV_SENSING_INTR_AMPL: 31.62
MDC_IDC_PG_IMPLANT_DTM: NORMAL
MDC_IDC_PG_MFG: NORMAL
MDC_IDC_PG_MODEL: NORMAL
MDC_IDC_PG_SERIAL: NORMAL
MDC_IDC_PG_TYPE: NORMAL
MDC_IDC_SESS_DTM: NORMAL
MDC_IDC_SESS_TYPE: NORMAL
MDC_IDC_SET_BRADY_AT_MODE_SWITCH_RATE: 171
MDC_IDC_SET_BRADY_LOWRATE: 60
MDC_IDC_SET_BRADY_MAX_SENSOR_RATE: 120
MDC_IDC_SET_BRADY_MAX_TRACKING_RATE: 130
MDC_IDC_SET_BRADY_MODE: NORMAL
MDC_IDC_SET_BRADY_PAV_DELAY: 180
MDC_IDC_SET_BRADY_SAV_DELAY: 150
MDC_IDC_SET_LEADCHNL_RA_PACING_AMPLITUDE: 1.5
MDC_IDC_SET_LEADCHNL_RA_PACING_POLARITY: NORMAL
MDC_IDC_SET_LEADCHNL_RA_PACING_PULSEWIDTH: 0.4
MDC_IDC_SET_LEADCHNL_RA_SENSING_POLARITY: NORMAL
MDC_IDC_SET_LEADCHNL_RA_SENSING_SENSITIVITY: 0.3
MDC_IDC_SET_LEADCHNL_RV_PACING_AMPLITUDE: 2
MDC_IDC_SET_LEADCHNL_RV_PACING_POLARITY: NORMAL
MDC_IDC_SET_LEADCHNL_RV_PACING_PULSEWIDTH: 0.4
MDC_IDC_SET_LEADCHNL_RV_SENSING_POLARITY: NORMAL
MDC_IDC_SET_LEADCHNL_RV_SENSING_SENSITIVITY: 0.3
MDC_IDC_SET_ZONE_STATUS: NORMAL
MDC_IDC_SET_ZONE_TYPE: NORMAL
MDC_IDC_STAT_AT_BURDEN_PERCENT: 0
MDC_IDC_STAT_BRADY_RA_PERCENT_PACED: 5.06
MDC_IDC_STAT_BRADY_RV_PERCENT_PACED: 0.04
MDC_IDC_STAT_TACHYTHERAPY_ATP_DELIVERED_RECENT: 0
MDC_IDC_STAT_TACHYTHERAPY_SHOCKS_ABORTED_RECENT: 0
MDC_IDC_STAT_TACHYTHERAPY_SHOCKS_DELIVERED_RECENT: 0

## 2025-08-11 ENCOUNTER — OFFICE VISIT (OUTPATIENT)
Dept: FAMILY MEDICINE CLINIC | Facility: CLINIC | Age: 65
End: 2025-08-11
Payer: COMMERCIAL

## 2025-08-11 VITALS
HEIGHT: 60 IN | HEART RATE: 79 BPM | WEIGHT: 189.2 LBS | BODY MASS INDEX: 37.15 KG/M2 | OXYGEN SATURATION: 96 % | SYSTOLIC BLOOD PRESSURE: 110 MMHG | TEMPERATURE: 96.4 F | DIASTOLIC BLOOD PRESSURE: 68 MMHG

## 2025-08-11 DIAGNOSIS — I77.9 CAROTID ARTERY DISEASE, UNSPECIFIED LATERALITY, UNSPECIFIED TYPE: ICD-10-CM

## 2025-08-11 DIAGNOSIS — I10 BENIGN ESSENTIAL HYPERTENSION: ICD-10-CM

## 2025-08-11 DIAGNOSIS — D50.9 IRON DEFICIENCY ANEMIA, UNSPECIFIED IRON DEFICIENCY ANEMIA TYPE: ICD-10-CM

## 2025-08-11 DIAGNOSIS — Z86.73 HISTORY OF CVA (CEREBROVASCULAR ACCIDENT) WITHOUT RESIDUAL DEFICITS: ICD-10-CM

## 2025-08-11 DIAGNOSIS — R21 RASH: ICD-10-CM

## 2025-08-11 DIAGNOSIS — E78.2 MIXED HYPERLIPIDEMIA: ICD-10-CM

## 2025-08-11 DIAGNOSIS — E11.65 TYPE 2 DIABETES MELLITUS WITH HYPERGLYCEMIA, WITHOUT LONG-TERM CURRENT USE OF INSULIN: Primary | ICD-10-CM

## 2025-08-11 PROCEDURE — 99214 OFFICE O/P EST MOD 30 MIN: CPT | Performed by: NURSE PRACTITIONER

## 2025-08-11 RX ORDER — NYSTATIN AND TRIAMCINOLONE ACETONIDE 100000; 1 [USP'U]/G; MG/G
1 OINTMENT TOPICAL 2 TIMES DAILY
Qty: 30 G | Refills: 1 | Status: SHIPPED | OUTPATIENT
Start: 2025-08-11

## 2025-08-11 RX ORDER — SEMAGLUTIDE 1.34 MG/ML
1 INJECTION, SOLUTION SUBCUTANEOUS WEEKLY
Qty: 9 ML | Refills: 0 | Status: SHIPPED | OUTPATIENT
Start: 2025-08-11

## 2025-08-12 ENCOUNTER — RESULTS FOLLOW-UP (OUTPATIENT)
Dept: FAMILY MEDICINE CLINIC | Facility: CLINIC | Age: 65
End: 2025-08-12
Payer: COMMERCIAL

## 2025-08-12 LAB
ALBUMIN SERPL-MCNC: 4.6 G/DL (ref 3.9–4.9)
ALP SERPL-CCNC: 120 IU/L (ref 44–121)
ALT SERPL-CCNC: 26 IU/L (ref 0–32)
AST SERPL-CCNC: 19 IU/L (ref 0–40)
BASOPHILS # BLD AUTO: 0 X10E3/UL (ref 0–0.2)
BASOPHILS NFR BLD AUTO: 1 %
BILIRUB SERPL-MCNC: 0.5 MG/DL (ref 0–1.2)
BUN SERPL-MCNC: 9 MG/DL (ref 8–27)
BUN/CREAT SERPL: 10 (ref 12–28)
CALCIUM SERPL-MCNC: 9.8 MG/DL (ref 8.7–10.3)
CHLORIDE SERPL-SCNC: 101 MMOL/L (ref 96–106)
CHOLEST SERPL-MCNC: 156 MG/DL (ref 100–199)
CO2 SERPL-SCNC: 24 MMOL/L (ref 20–29)
CREAT SERPL-MCNC: 0.92 MG/DL (ref 0.57–1)
EGFRCR SERPLBLD CKD-EPI 2021: 70 ML/MIN/1.73
EOSINOPHIL # BLD AUTO: 0.1 X10E3/UL (ref 0–0.4)
EOSINOPHIL NFR BLD AUTO: 2 %
ERYTHROCYTE [DISTWIDTH] IN BLOOD BY AUTOMATED COUNT: 13.3 % (ref 11.7–15.4)
FERRITIN SERPL-MCNC: 140 NG/ML (ref 15–150)
GLOBULIN SER CALC-MCNC: 2.6 G/DL (ref 1.5–4.5)
GLUCOSE SERPL-MCNC: 154 MG/DL (ref 70–99)
HBA1C MFR BLD: 6.8 % (ref 4.8–5.6)
HCT VFR BLD AUTO: 44.9 % (ref 34–46.6)
HDLC SERPL-MCNC: 39 MG/DL
HGB BLD-MCNC: 14.5 G/DL (ref 11.1–15.9)
IMM GRANULOCYTES # BLD AUTO: 0 X10E3/UL (ref 0–0.1)
IMM GRANULOCYTES NFR BLD AUTO: 1 %
IRON SATN MFR SERPL: 26 % (ref 15–55)
IRON SERPL-MCNC: 86 UG/DL (ref 27–139)
LDLC SERPL CALC-MCNC: 83 MG/DL (ref 0–99)
LYMPHOCYTES # BLD AUTO: 2.2 X10E3/UL (ref 0.7–3.1)
LYMPHOCYTES NFR BLD AUTO: 25 %
MCH RBC QN AUTO: 28.1 PG (ref 26.6–33)
MCHC RBC AUTO-ENTMCNC: 32.3 G/DL (ref 31.5–35.7)
MCV RBC AUTO: 87 FL (ref 79–97)
MONOCYTES # BLD AUTO: 0.8 X10E3/UL (ref 0.1–0.9)
MONOCYTES NFR BLD AUTO: 9 %
NEUTROPHILS # BLD AUTO: 5.6 X10E3/UL (ref 1.4–7)
NEUTROPHILS NFR BLD AUTO: 62 %
PLATELET # BLD AUTO: 298 X10E3/UL (ref 150–450)
POTASSIUM SERPL-SCNC: 4.2 MMOL/L (ref 3.5–5.2)
PROT SERPL-MCNC: 7.2 G/DL (ref 6–8.5)
RBC # BLD AUTO: 5.16 X10E6/UL (ref 3.77–5.28)
SODIUM SERPL-SCNC: 140 MMOL/L (ref 134–144)
TIBC SERPL-MCNC: 330 UG/DL (ref 250–450)
TRIGL SERPL-MCNC: 202 MG/DL (ref 0–149)
TSH SERPL DL<=0.005 MIU/L-ACNC: 1.21 UIU/ML (ref 0.45–4.5)
UIBC SERPL-MCNC: 244 UG/DL (ref 118–369)
VLDLC SERPL CALC-MCNC: 34 MG/DL (ref 5–40)
WBC # BLD AUTO: 8.9 X10E3/UL (ref 3.4–10.8)

## 2025-08-19 RX ORDER — FUROSEMIDE 40 MG/1
40 TABLET ORAL DAILY
Qty: 90 TABLET | Refills: 1 | Status: SHIPPED | OUTPATIENT
Start: 2025-08-19

## (undated) DEVICE — BND PRESS RADL COMFRT 14IN STRL

## (undated) DEVICE — HI-TORQUE BALANCE MIDDLEWEIGHT GUIDE WIRE .014 STRAIGHT TIP 3.0 CM X 190 CM: Brand: HI-TORQUE BALANCE MIDDLEWEIGHT

## (undated) DEVICE — TUBING, SUCTION, 1/4" X 10', STRAIGHT: Brand: MEDLINE

## (undated) DEVICE — CATH VENT MIV RADL PIG ST TIP 5F 110CM

## (undated) DEVICE — KT ORCA ORCAPOD DISP STRL

## (undated) DEVICE — GLIDESHEATH SLENDER STAINLESS STEEL KIT: Brand: GLIDESHEATH SLENDER

## (undated) DEVICE — KT MANIFLD CARDIAC

## (undated) DEVICE — NC TREK CORONARY DILATATION CATHETER 3.0 MM X 15 MM / RAPID-EXCHANGE: Brand: NC TREK

## (undated) DEVICE — SENSR O2 OXIMAX FNGR A/ 18IN NONSTR

## (undated) DEVICE — Device

## (undated) DEVICE — KT CATH IMG DRAGONFLY OPTIS 2.7F 135CM

## (undated) DEVICE — CATH DIAG IMPULSE FL3.5 5F 100CM

## (undated) DEVICE — THE BITE BLOCK MAXI, LATEX FREE STRAP IS USED TO PROTECT THE ENDOSCOPE INSERTION TUBE FROM BEING BITTEN BY THE PATIENT.

## (undated) DEVICE — VIAL FORMALIN CAP 10P 40ML

## (undated) DEVICE — PLASMABLADE PS200-040 4.0: Brand: PLASMABLADE™

## (undated) DEVICE — SOL IRR H2O BO 1000ML STRL

## (undated) DEVICE — ELECTRD ECG CARBON/SNP RL FM A/ 5PK

## (undated) DEVICE — CATH DIAG IMPULSE FR4 5F 100CM

## (undated) DEVICE — 6F .070 XB 3 100CM: Brand: VISTA BRITE TIP

## (undated) DEVICE — GW PRESSUREWIRE AERIS W/ AGILE TP 175CM

## (undated) DEVICE — DEV INDEFLATOR P/N 580289

## (undated) DEVICE — PK CATH CARD 40

## (undated) DEVICE — GW EMR FIX EXCHG J STD .035 3MM 260CM

## (undated) DEVICE — CANN O2 ETCO2 FITS ALL CONN CO2 SMPL A/ 7IN DISP LF

## (undated) DEVICE — AIRLIFE™ NASAL OXYGEN CANNULA CURVED, NONFLARED TIP WITH 21 FOOT (6.4 M) CRUSH-RESISTANT TUBING, OVER-THE-EAR STYLE: Brand: AIRLIFE™

## (undated) DEVICE — APC PROBE 2200 A, SINGLE USE OD 2.3MM/6.9FR; L. 2.2M/7.2FT: Brand: ERBE

## (undated) DEVICE — ADAPT CLN BIOGUARD AIR/H2O DISP

## (undated) DEVICE — BW-412T DISP COMBO CLEANING BRUSH: Brand: SINGLE USE COMBINATION CLEANING BRUSH

## (undated) DEVICE — TREK CORONARY DILATATION CATHETER 3.0 MM X 8 MM / RAPID-EXCHANGE: Brand: TREK

## (undated) DEVICE — FRCP BX RADJAW4 NDL 2.8 240CM LG OG BX40

## (undated) DEVICE — LOU PACE DEFIB: Brand: MEDLINE INDUSTRIES, INC.

## (undated) DEVICE — LINER SURG CANSTR SXN S/RIGD 1500CC

## (undated) DEVICE — 5F .058 XB 3 100CM: Brand: ADROIT

## (undated) DEVICE — LIMB HOLDER, WRIST/ANKLE: Brand: DEROYAL

## (undated) DEVICE — THE CARR-LOCKE INJECTION NEEDLE IS A SINGLE USE, DISPOSABLE, FLEXIBLE SHEATH INJECTION NEEDLE USED FOR THE INJECTION OF VARIOUS TYPES OF MEDIA THROUGH FLEXIBLE ENDOSCOPES.

## (undated) DEVICE — DEV INDEFLATOR

## (undated) DEVICE — PATIENT RETURN ELECTRODE, SINGLE-USE, CONTACT QUALITY MONITORING, ADULT, WITH 9FT CORD, FOR PATIENTS WEIGING OVER 33LBS. (15KG): Brand: MEGADYNE

## (undated) DEVICE — RUNTHROUGH NS EXTRA FLOPPY PTCA GUIDEWIRE: Brand: RUNTHROUGH

## (undated) DEVICE — LN SMPL CO2 SHTRM SD STREAM W/M LUER

## (undated) DEVICE — NC TREK CORONARY DILATATION CATHETER 3.5 MM X 12 MM / RAPID-EXCHANGE: Brand: NC TREK